# Patient Record
Sex: FEMALE | Race: BLACK OR AFRICAN AMERICAN | NOT HISPANIC OR LATINO | Employment: OTHER | URBAN - METROPOLITAN AREA
[De-identification: names, ages, dates, MRNs, and addresses within clinical notes are randomized per-mention and may not be internally consistent; named-entity substitution may affect disease eponyms.]

---

## 2023-03-09 ENCOUNTER — DOCUMENTATION (OUTPATIENT)
Dept: PRIMARY CARE | Facility: CLINIC | Age: 63
End: 2023-03-09

## 2023-03-10 ENCOUNTER — PATIENT OUTREACH (OUTPATIENT)
Dept: PRIMARY CARE | Facility: CLINIC | Age: 63
End: 2023-03-10

## 2023-03-10 NOTE — PROGRESS NOTES
Discharge Facility:[facility]  Discharge Diagnosis: C. difficile colitis  Admission Date:3/4/2023  Discharge Date:3/8/2023    PCP appt: NONE    2 attempt to reach pt and assess needs  Task sent to Clinical support staff to schedule follow up

## 2023-05-12 ENCOUNTER — TELEPHONE (OUTPATIENT)
Dept: PRIMARY CARE | Facility: CLINIC | Age: 63
End: 2023-05-12

## 2023-05-26 LAB
C. DIFFICILE TOXIN, PCR: NOT DETECTED
CAMPYLOBACTER GP: NOT DETECTED
NOROVIRUS GI/GII: NOT DETECTED
ROTAVIRUS A: NOT DETECTED
SALMONELLA SP.: NOT DETECTED
SHIGA TOXIN 1: NOT DETECTED
SHIGA TOXIN 2: NOT DETECTED
SHIGELLA SP.: NOT DETECTED
VIBRIO GRP.: NOT DETECTED
YERSINIA ENTEROCOLITICA: NOT DETECTED

## 2023-05-29 LAB — LACTOFERRIN, FECAL, QUANT.: NEGATIVE

## 2023-05-31 LAB
CALPROTECTIN, STOOL: 40 UG/G
PANCREATIC ELASTASE, FECAL: 88 UG/G

## 2023-06-01 LAB
CRYPTOSPORIDIUM ANTIGEN-DATA CONVERSION: NEGATIVE
GIARDIA LAMBLIA AG-DATA CONVERSION: NEGATIVE
OVA + PARASITE EXAM: NEGATIVE

## 2023-06-05 ENCOUNTER — TELEPHONE (OUTPATIENT)
Dept: PRIMARY CARE | Facility: CLINIC | Age: 63
End: 2023-06-05

## 2023-06-05 DIAGNOSIS — I26.99 PULMONARY EMBOLISM, UNSPECIFIED CHRONICITY, UNSPECIFIED PULMONARY EMBOLISM TYPE, UNSPECIFIED WHETHER ACUTE COR PULMONALE PRESENT (MULTI): Primary | ICD-10-CM

## 2023-06-05 RX ORDER — WARFARIN 2 MG/1
TABLET ORAL
Qty: 60 TABLET | Refills: 1 | Status: SHIPPED | OUTPATIENT
Start: 2023-06-05 | End: 2023-06-30

## 2023-06-13 ENCOUNTER — DOCUMENTATION (OUTPATIENT)
Dept: CARE COORDINATION | Facility: CLINIC | Age: 63
End: 2023-06-13
Payer: COMMERCIAL

## 2023-06-13 ENCOUNTER — PATIENT OUTREACH (OUTPATIENT)
Dept: CARE COORDINATION | Facility: CLINIC | Age: 63
End: 2023-06-13
Payer: COMMERCIAL

## 2023-06-13 NOTE — CARE PLAN
"06/13/2023   Spoke with patient today, she was referred to me from insurance clinical rounds.  See below, and  will continue to follow for needs.       Problem: Access to Care Issue  Goal: Assess and Address Access Barriers  Intervention: Confirm adherence with follow up care (labs, tests, studies) and scheduled appointments per provider/specialist instruction  Note: 06/13/2023 Spoke with patient and she states she has family friends and her exboyfriend to assist her to her appointments and get her groceries and medications.   Will continue to follow.  michel     Problem: Risk of Uncoordinated Care  Goal: Care will be Coordinated and Supported by a Multidisciplinary Team of Providers  Intervention: Identify all care team members, maintain contact information and the supporting role are recorded in patients chart and available for all members of the team  Note: Patient stated she has a \"enzyme replacement \" pill that she has to take 3 pills with every meal and it costs thousands of dollars and she can't afford it.  Referred patient to pharmacy for assistance.  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  At times she said she could use a little more money for food. Referred patient to hailee pearson     "

## 2023-06-16 ENCOUNTER — PATIENT OUTREACH (OUTPATIENT)
Dept: CARE COORDINATION | Facility: CLINIC | Age: 63
End: 2023-06-16
Payer: COMMERCIAL

## 2023-06-16 NOTE — PROGRESS NOTES
ACO PAMELA received referral to reach out to patient for assistance with food resources and chair lift. Telephone call to patient, patient stated today is not a good time for her to talk, asked ACO PAMELA to call next week.     INOCENTE Saini   III  [facility] Health/Accountable Care Organization  Office Phone: [phone]  Office hours: Monday - Friday; 8:00 am - 5:00 pm  [email or web address]

## 2023-06-19 ENCOUNTER — PATIENT OUTREACH (OUTPATIENT)
Dept: PHARMACY | Facility: HOSPITAL | Age: 63
End: 2023-06-19
Payer: COMMERCIAL

## 2023-06-19 NOTE — PROGRESS NOTES
Spoke to patient regarding copay issues with [employer]. She was quoted that her copay would be $305/month at [facility]. Upon more research, [employer] has copay assistance card that allows member to pay as little as $5/month with max of $3k/year savings. Patient was emailed link with instructions on how to sign up for [employer] copay card and instructed to bring with her to [facility] to get med affordable. Provided my contact info if she has questions or concerns.    Creon Assistance: [email or web address]      Thanks,  Haile Garcia, PharmD

## 2023-06-20 ENCOUNTER — TELEPHONE (OUTPATIENT)
Dept: PRIMARY CARE | Facility: CLINIC | Age: 63
End: 2023-06-20
Payer: COMMERCIAL

## 2023-06-22 ENCOUNTER — PATIENT OUTREACH (OUTPATIENT)
Dept: CARE COORDINATION | Facility: CLINIC | Age: 63
End: 2023-06-22
Payer: COMMERCIAL

## 2023-06-22 NOTE — CARE PLAN
BO SANTIAGO telephone call to patient per request of RN Case Manager Vivienne Solorzano. Patient seeking additional resources for food and is also exploring option of chair lift. With patient consent via email, enrolled patient in [employer] and sent referral to [employer]. BO SANTIAGO discussed with patient calling her insurance and inquiring about any potential coverage of chair lift. Patient reports she has been receiving OT and PT in the home. Patient is unsure if services have finished. BO SANTIAGO will inquire if patient is enrolled in  Home Care and determine if SW orders have been put in/can be put in for further in-home assessment. BO SANTIAGO reviewed name/contact information. BO SANTIAGO will call patient again in 1 week.

## 2023-06-27 ENCOUNTER — DOCUMENTATION (OUTPATIENT)
Dept: CARE COORDINATION | Facility: CLINIC | Age: 63
End: 2023-06-27
Payer: COMMERCIAL

## 2023-06-27 NOTE — PROGRESS NOTES
Velia Barney suggested that Yoselyn have a SW visit added to her home care.  Attempted outreach with Yoselyn to inquire who she is getting care from and there was no answer.   Called  home care and no they are not seeing her, they did see her last year      06/27/2023  I did speak with Yoselyn, she has PT OT from [facility] [phone].  I left a message with Jovita Bailon  (unsure of his name spelling) who is assigned to the green team and informed him of our involvement in her care, and at our SW recommendation suggested she have a SW added to her home care team.  Unfortunately, she only has therapies until 06/30/2023.   She is being re-evaluated today if PT OT should continue.   I left Braten my contact information.     Yoselyn said she was a bit under the weather today, did not sleep well and has a headache.  She had no questions at this time and thanked me for her help with her care.  michel

## 2023-06-27 NOTE — TELEPHONE ENCOUNTER
Pt nurse called and said she needed a referral for home care services. She had a weight gain of 10 pounds in 1 week. She did have a recent fall with no injury. Her INR was 2.2 and her coumadin is 3mg daily.

## 2023-06-28 DIAGNOSIS — I26.99 PULMONARY EMBOLISM, UNSPECIFIED CHRONICITY, UNSPECIFIED PULMONARY EMBOLISM TYPE, UNSPECIFIED WHETHER ACUTE COR PULMONALE PRESENT (MULTI): ICD-10-CM

## 2023-06-29 ENCOUNTER — DOCUMENTATION (OUTPATIENT)
Dept: CARE COORDINATION | Facility: CLINIC | Age: 63
End: 2023-06-29
Payer: COMMERCIAL

## 2023-06-29 NOTE — CARE PLAN
Problem: Financial Problem  Goal: Assess and Address Specific Financial Obstacles Affecting Patient's Adderence to Plan of Care  Intervention: Develop plan to address financial concerns  Note: ACO SW outreach to patient to follow up on [employer] Bank referral, outreach to insurance re: chair lift, and potential of SW visit through Home Care Agency. Left voice-mail requesting return call.    Reviewed [employer] note: [facility] called patient and left voicemail, sent email 6/23/23. Patient can call [phone] to speak with Fallon Kaiser for assistance with food resources.

## 2023-06-30 RX ORDER — WARFARIN 2 MG/1
TABLET ORAL
Qty: 60 TABLET | Refills: 1 | Status: SHIPPED | OUTPATIENT
Start: 2023-06-30 | End: 2023-07-19

## 2023-07-06 ENCOUNTER — PATIENT OUTREACH (OUTPATIENT)
Dept: CARE COORDINATION | Facility: CLINIC | Age: 63
End: 2023-07-06
Payer: COMMERCIAL

## 2023-07-06 NOTE — CARE PLAN
07/06/2023   Spoke with Yoselyn today and she is having difficulty managing her day to day tasks.  She feels she needs to see a thyroid doctor and also needs to see someone about her mental health.  She is aware that she has tasks she needs to accomplish for her health, but just can't get the energy to complete them.  I will outreach her pcp, she does not know when she saw him last, and also outreach pharmacy, the [facility] card that she was told to fill out-she doesn't know how.   She is going to her pharmacy today for some assistance, and hopefully re-fill her prescriptions.   She was thankful for the call, and does have my number.   Will continue to follow.  Hjm      Problem: Access to Care Issue  Goal: Assess and Address Access Barriers  Intervention: Confirm adherence with follow up care (labs, tests, studies) and scheduled appointments per provider/specialist instruction  Note: 07/06/2023   Spoke with Yoselyn today, and she states the visiting nurse is coming out to the home to check her INR, she is no longer receiving  PT OT per patient and does not know why it stopped.   We did discuss the need for her to make an appointment with Dr Kerr for a check up and to share with him all her issues and stressors.   She said she can't manage to do anything, she knows she has things to do for herself but can't get herself motivated to do any of it.   This care manager will outreach Dr. Patrick office for an appointment.  hjm     Problem: Risk of Uncoordinated Care  Goal: Care will be Coordinated and Supported by a Multidisciplinary Team of Providers  Intervention: Identify all care team members, maintain contact information and the supporting role are recorded in patients chart and available for all members of the team  Note: 07/06/2023   Patient stated she has not been taking her medications, she ran out.   She was contacted by  pharmacy and did not know how to get the [facility] discount card.   She is going with her friend today  and going to ask the pharmacist to help her.  michel

## 2023-07-08 ENCOUNTER — TELEPHONE (OUTPATIENT)
Dept: PRIMARY CARE | Facility: CLINIC | Age: 63
End: 2023-07-08
Payer: COMMERCIAL

## 2023-07-08 NOTE — TELEPHONE ENCOUNTER
Ms. Hernandez called complaining of pain in her leg.  She has a very complex medical history she is looking for pain medications which I am unable to prescribe for her.  She tells me she just got out of the hospital after 36 days and that she has at had thrombosis and perhaps pulmonary embolism.  She did not want to go to the emergency room.  I advised her that evaluation and treatment would be required in the emergency room if she felt that her symptoms could not be managed with self-care and over-the-counter medications.

## 2023-07-14 DIAGNOSIS — I26.99 PULMONARY EMBOLISM, UNSPECIFIED CHRONICITY, UNSPECIFIED PULMONARY EMBOLISM TYPE, UNSPECIFIED WHETHER ACUTE COR PULMONALE PRESENT (MULTI): ICD-10-CM

## 2023-07-19 RX ORDER — WARFARIN 2 MG/1
TABLET ORAL
Qty: 180 TABLET | Refills: 1 | Status: SHIPPED | OUTPATIENT
Start: 2023-07-19 | End: 2023-08-31 | Stop reason: WASHOUT

## 2023-07-20 ENCOUNTER — PATIENT OUTREACH (OUTPATIENT)
Dept: PHARMACY | Facility: HOSPITAL | Age: 63
End: 2023-07-20
Payer: COMMERCIAL

## 2023-07-20 ENCOUNTER — PATIENT OUTREACH (OUTPATIENT)
Dept: CARE COORDINATION | Facility: CLINIC | Age: 63
End: 2023-07-20
Payer: COMMERCIAL

## 2023-07-20 NOTE — PROGRESS NOTES
"07/20/2023 Outreached patient for case mgmt follow up.  At this time she is not currently having a visiting nurse come out to the home.  After speaking with Mae, I think a visiting nurse might be beneficial.  She could not review her medications, she stated she does not have them all and she has not called the pharmacy to determine what is missing and why.  The last conversation we had, she was going to the pharmacy with her friend to have the pharmacist help her with her [facility] oj, to get her medications discounted and filled.  She was also requesting to see a psychiatrist because she \"can't seem to do what she needs to do for her health, like making appointments and following up on tasks\"  Called Dr Patrick office and spoke with Mae () who stated she would give him the message.    Will continue to follow.  hjm  "

## 2023-07-20 NOTE — PROGRESS NOTES
Followed up with patient regarding Creon affordability. Patient mentioned she was not able to use discount card. Enrolled patient in discount card, called [facility] pharmacy in [place] to have [facility] add  card on file for Creon. Pharmacy staff verified patient's Creon is going through for $5 copay. Instructed to patient that [facility] needs Creon ordered and will arrive by Monday. Patient verbalized understanding of affordable Creon and availability of Monday.    Haile Garcia, PharmD

## 2023-07-24 ENCOUNTER — TELEPHONE (OUTPATIENT)
Dept: PRIMARY CARE | Facility: CLINIC | Age: 63
End: 2023-07-24
Payer: COMMERCIAL

## 2023-07-26 ENCOUNTER — PATIENT OUTREACH (OUTPATIENT)
Dept: CARE COORDINATION | Facility: CLINIC | Age: 63
End: 2023-07-26
Payer: COMMERCIAL

## 2023-07-31 ENCOUNTER — PATIENT OUTREACH (OUTPATIENT)
Dept: CARE COORDINATION | Facility: CLINIC | Age: 63
End: 2023-07-31
Payer: COMMERCIAL

## 2023-07-31 NOTE — CARE PLAN
ACO SW follow up outreach call to patient. Patient reports she did not call her insurance about chair lift as her physical therapist encouraged her to keep working at improving mobility. Patient reports she did not connect with the [employer] Patient declines need for services for food, utilities, rent at this time. Patient reports main need is physical therapy/occupational therapy and incontinence supplies. Patient reports her friend Edith is with her today and comes every Monday/Wednesday/Saturday. Patient reports her sister and other friends help her out as well. ACO PAMELA recommended patient and her friend call [facility] today and inquire about benefits for incontinence supplies. Patient agreed.  O PAMELA will reach out to RN Care Manager BIBIANA Solorzano to inquire about requesting PT/OT again as well as following up with appropriate provider regarding patient's chronic diarrhea. In-basket message sent to BIBIANA Solorzano.      INOCENTE Saini   III   Population Health/Accountable Care Organization  Office Phone: [phone]  [email or web address]

## 2023-08-02 ENCOUNTER — DOCUMENTATION (OUTPATIENT)
Dept: CARE COORDINATION | Facility: CLINIC | Age: 63
End: 2023-08-02
Payer: COMMERCIAL

## 2023-08-02 NOTE — PROGRESS NOTES
08/02/2023  Patient went to the ED to be assessed at the recommendation of her pcp, she had not been feeling well.  She was having weakness, and diarrhea for a few days.  Will reach out to admitting team to discuss this patient further and what some of her needs are from working with her for a few months.  Will continue to follow.  michel

## 2023-08-08 ENCOUNTER — DOCUMENTATION (OUTPATIENT)
Dept: CARE COORDINATION | Facility: CLINIC | Age: 63
End: 2023-08-08
Payer: COMMERCIAL

## 2023-08-08 NOTE — PROGRESS NOTES
ACO SW will defer outreach at this time. Patient is currently hospitalized.    INOCENTE Saini   III  [facility] Health/Accountable Care Organization  Office Phone: [phone]  [email or web address]

## 2023-08-08 NOTE — PROGRESS NOTES
08/08/2023   Opened patients medical record to assess potential discharge needs.  She is still inpatient presently.   Doc Halo the inpatient PAMELA Stephenson of the patients needs that she verbalized prior to admission.   Awaiting a response.   michel

## 2023-08-09 ENCOUNTER — PATIENT OUTREACH (OUTPATIENT)
Dept: CARE COORDINATION | Facility: CLINIC | Age: 63
End: 2023-08-09
Payer: COMMERCIAL

## 2023-08-09 ENCOUNTER — DOCUMENTATION (OUTPATIENT)
Dept: PRIMARY CARE | Facility: CLINIC | Age: 63
End: 2023-08-09
Payer: COMMERCIAL

## 2023-08-10 ENCOUNTER — PATIENT OUTREACH (OUTPATIENT)
Dept: PRIMARY CARE | Facility: CLINIC | Age: 63
End: 2023-08-10
Payer: COMMERCIAL

## 2023-08-10 ENCOUNTER — PATIENT OUTREACH (OUTPATIENT)
Dept: CARE COORDINATION | Facility: CLINIC | Age: 63
End: 2023-08-10
Payer: COMMERCIAL

## 2023-08-10 RX ORDER — MIRTAZAPINE 7.5 MG/1
7.5 TABLET, FILM COATED ORAL NIGHTLY
COMMUNITY
End: 2023-10-22 | Stop reason: HOSPADM

## 2023-08-10 RX ORDER — ONDANSETRON 8 MG/1
4 TABLET, ORALLY DISINTEGRATING ORAL DAILY PRN
COMMUNITY
End: 2023-08-31 | Stop reason: WASHOUT

## 2023-08-10 RX ORDER — CARVEDILOL 3.12 MG/1
3.12 TABLET ORAL
COMMUNITY
End: 2023-10-22 | Stop reason: HOSPADM

## 2023-08-10 RX ORDER — ESCITALOPRAM OXALATE 5 MG/1
5 TABLET ORAL DAILY
COMMUNITY
End: 2023-10-18 | Stop reason: ENTERED-IN-ERROR

## 2023-08-10 RX ORDER — DULOXETIN HYDROCHLORIDE 60 MG/1
60 CAPSULE, DELAYED RELEASE ORAL DAILY
COMMUNITY
End: 2023-10-17 | Stop reason: WASHOUT

## 2023-08-10 RX ORDER — CALCIUM CARBONATE 300MG(750)
800 TABLET,CHEWABLE ORAL DAILY
COMMUNITY
End: 2024-01-31 | Stop reason: ENTERED-IN-ERROR

## 2023-08-10 RX ORDER — SIMETHICONE 80 MG
80 TABLET,CHEWABLE ORAL EVERY 6 HOURS PRN
COMMUNITY
End: 2023-08-31 | Stop reason: WASHOUT

## 2023-08-10 RX ORDER — HYDROMORPHONE HYDROCHLORIDE 2 MG/1
2 TABLET ORAL DAILY
COMMUNITY
End: 2023-08-31 | Stop reason: WASHOUT

## 2023-08-10 RX ORDER — PREGABALIN 25 MG/1
25 CAPSULE ORAL DAILY
COMMUNITY
End: 2023-08-31 | Stop reason: WASHOUT

## 2023-08-10 RX ORDER — TIZANIDINE 4 MG/1
4 TABLET ORAL 2 TIMES DAILY PRN
COMMUNITY
End: 2023-08-31 | Stop reason: WASHOUT

## 2023-08-10 RX ORDER — GENTAMICIN SULFATE 1 MG/G
0.1 CREAM TOPICAL 2 TIMES DAILY
COMMUNITY
End: 2023-08-31 | Stop reason: WASHOUT

## 2023-08-10 RX ORDER — WARFARIN 2.5 MG/1
2.5 TABLET ORAL DAILY
COMMUNITY
End: 2023-10-18 | Stop reason: ENTERED-IN-ERROR

## 2023-08-10 RX ORDER — ATORVASTATIN CALCIUM 40 MG/1
40 TABLET, FILM COATED ORAL DAILY
COMMUNITY
End: 2023-10-18 | Stop reason: ENTERED-IN-ERROR

## 2023-08-10 RX ORDER — HYOSCYAMINE SULFATE 0.38 MG/1
0.38 TABLET, EXTENDED RELEASE ORAL EVERY 12 HOURS PRN
COMMUNITY
End: 2023-10-18 | Stop reason: ENTERED-IN-ERROR

## 2023-08-10 RX ORDER — CALCITRIOL 0.5 UG/1
0.5 CAPSULE ORAL DAILY
COMMUNITY
End: 2024-01-31 | Stop reason: ENTERED-IN-ERROR

## 2023-08-10 RX ORDER — ALLOPURINOL 100 MG/1
100 TABLET ORAL DAILY
COMMUNITY
End: 2023-10-17 | Stop reason: SDUPTHER

## 2023-08-10 RX ORDER — MIDODRINE HYDROCHLORIDE 2.5 MG/1
2.5 TABLET ORAL 3 TIMES DAILY PRN
COMMUNITY
End: 2023-10-18 | Stop reason: ENTERED-IN-ERROR

## 2023-08-10 RX ORDER — PANTOPRAZOLE SODIUM 40 MG/1
40 TABLET, DELAYED RELEASE ORAL 2 TIMES DAILY
COMMUNITY
End: 2023-10-18 | Stop reason: ENTERED-IN-ERROR

## 2023-08-10 RX ORDER — DICYCLOMINE HYDROCHLORIDE 10 MG/1
10 CAPSULE ORAL 4 TIMES DAILY
COMMUNITY
End: 2023-10-18 | Stop reason: ENTERED-IN-ERROR

## 2023-08-10 RX ORDER — LOPERAMIDE HYDROCHLORIDE 2 MG/1
2 CAPSULE ORAL 4 TIMES DAILY PRN
COMMUNITY
End: 2023-10-18 | Stop reason: ENTERED-IN-ERROR

## 2023-08-10 RX ORDER — ASPIRIN 81 MG/1
81 TABLET ORAL DAILY
COMMUNITY
End: 2023-10-18 | Stop reason: ENTERED-IN-ERROR

## 2023-08-10 RX ORDER — LEVOTHYROXINE SODIUM 25 UG/1
25 TABLET ORAL
COMMUNITY
End: 2023-08-17 | Stop reason: SDUPTHER

## 2023-08-10 RX ORDER — POTASSIUM CHLORIDE 20 MEQ/1
20 TABLET, EXTENDED RELEASE ORAL DAILY
COMMUNITY
End: 2023-08-31 | Stop reason: WASHOUT

## 2023-08-10 NOTE — PROGRESS NOTES
08/10/2023 Called Yoselyn to check in after discharge, she was on the other line with a family member and asked if I could call back later. michel

## 2023-08-10 NOTE — PROGRESS NOTES
08/10/2023 Attempted outreach for the second time today unable to leave a ,  is full. Reached out to pharmacy to determine if she received all of her medications.   I was informed that she did receive her medication from [facility] on 08/08/2023 when she discharged.   Will enroll in [employer] today michel

## 2023-08-10 NOTE — PROGRESS NOTES
Discharge Facility:  [facility]  Discharge Diagnosis:  acute/chronic pancreatitis  Admission Date:  8/1/23  Discharge Date:   8/8/23    PCP Appointment Date:  8/17/23     Specialist Appointment Date:   Physician/Dept/Service:   Primary: Lisy Shepard MD, PhD          Scheduled Date/Time:   24-Aug-2023 09:00          Location:   [address] [place] [address] [place] [address]          Phone Number:   [phone]    Physician/Dept/Service:   [facility]           Date/Time next PT/INR due:   11-Aug-2023 08:52    Hospital Encounter and Summary: Linked   See discharge assessment below for further details     Engagement  Call Start Time: 0823 (8/10/2023  8:28 AM)    Medications  Medications reviewed with patient/caregiver?: Yes (8/10/2023  8:28 AM)  Is the patient having any side effects they believe may be caused by any medication additions or changes?: No (8/10/2023  8:28 AM)  Does the patient have all medications ordered at discharge?: Yes (8/10/2023  8:28 AM)  Is the patient taking all medications as directed (includes completed medication regime)?: Yes (8/10/2023  8:28 AM)  Medication Comments: reviewed new, changed, and discontinued meds. (8/10/2023  8:28 AM)    Appointments  Does the patient have a primary care provider?: Yes (8/10/2023  8:28 AM)  Care Management Interventions: Verified appointment date/time/provider (8/10/2023  8:28 AM)  Has the patient kept scheduled appointments due by today?: Yes (8/10/2023  8:28 AM)  Care Management Interventions: Advised patient to keep appointment (8/10/2023  8:28 AM)    Self Management  [facility] (8/10/2023  8:28 AM)  Has home health visited the patient within 72 hours of discharge?: Yes (8/10/2023  8:28 AM)    Patient Teaching  Does the patient have access to their discharge instructions?: Yes (8/10/2023  8:28 AM)  What is the patient's perception of their health status since discharge?: Same (8/10/2023  8:28 AM)  Is the patient/caregiver able  to teach back the hierarchy of who to call/visit for symptoms/problems? PCP, Specialist, Home Health nurse, Urgent Care, ED, 911: Yes (8/10/2023  8:28 AM)  Patient/Caregiver Education Comments: continues with diarrhea 3-4 times a day.  reviewed nutrition and hydration. (8/10/2023  8:28 AM)

## 2023-08-11 ENCOUNTER — DOCUMENTATION (OUTPATIENT)
Dept: CARE COORDINATION | Facility: CLINIC | Age: 63
End: 2023-08-11
Payer: COMMERCIAL

## 2023-08-11 ENCOUNTER — PATIENT OUTREACH (OUTPATIENT)
Dept: CARE COORDINATION | Facility: CLINIC | Age: 63
End: 2023-08-11
Payer: COMMERCIAL

## 2023-08-11 NOTE — PROGRESS NOTES
08/11/2023 Attempted outreach to assess how she is doing at home, if there are any needs at this time or anything I can assist her with today. Mailbox is full michel

## 2023-08-14 ENCOUNTER — PATIENT OUTREACH (OUTPATIENT)
Dept: CARE COORDINATION | Facility: CLINIC | Age: 63
End: 2023-08-14
Payer: COMMERCIAL

## 2023-08-14 NOTE — PROGRESS NOTES
ACO SW telephone call to patient. Patient reports she is currently at an appointment and requested call 8/15/23.    INOCENTE Saini   III  [facility]/Accountable Care Organization  Office Phone: [phone]  [email or web address]

## 2023-08-15 ENCOUNTER — PATIENT OUTREACH (OUTPATIENT)
Dept: CARE COORDINATION | Facility: CLINIC | Age: 63
End: 2023-08-15
Payer: COMMERCIAL

## 2023-08-15 NOTE — PROGRESS NOTES
08/15/2023   Received a request from ACLISA Barney that Yoselyn was not receiving home care, and that she was having issues with her phone.   I attempted to call her, and left  a vm advising her to call home care to get reinstated.  I will also reach out to the [facility] practice nurse and request her to please request the doctor to re-enter the home care orders with a PAMELA referral added.  michel

## 2023-08-15 NOTE — CARE PLAN
"BO SANTIAGO outreach to patient. Patient reports she does not currently have home care in place. Patient reports her phone has not been working so perhaps she missed call. Patient shared that she has fallen since hospital discharge. Patient reports her tailbone is sore. Patient states her back \"hurt before she fell and it doesn't hurt any worse now.\" BO SANTIAGO shared the RN Care Manager has been trying to reach patient. BO SANTIAGO will reach out to RN Care Manager to request assistance with homecare orders.     BO SANTIAGO spoke with BIBIANA Solorzano, attempted to call patient again with BIBIANA Solorzano, went to [place]. BIBIANA Solorzano will message patient's PCP regarding home care orders. Patient is scheduled to see PCP 8/17/23.     INOCENTE Saini   III  [facility] Health/Accountable Care Organization  Office Phone: [phone]  [email or web address]           "

## 2023-08-17 ENCOUNTER — PATIENT OUTREACH (OUTPATIENT)
Dept: CARE COORDINATION | Facility: CLINIC | Age: 63
End: 2023-08-17

## 2023-08-17 ENCOUNTER — OFFICE VISIT (OUTPATIENT)
Dept: PRIMARY CARE | Facility: CLINIC | Age: 63
End: 2023-08-17
Payer: COMMERCIAL

## 2023-08-17 VITALS
DIASTOLIC BLOOD PRESSURE: 68 MMHG | SYSTOLIC BLOOD PRESSURE: 110 MMHG | HEART RATE: 72 BPM | BODY MASS INDEX: 18.95 KG/M2 | WEIGHT: 121 LBS

## 2023-08-17 DIAGNOSIS — I42.9 CARDIOMYOPATHY, UNSPECIFIED TYPE (MULTI): ICD-10-CM

## 2023-08-17 DIAGNOSIS — F41.8 DEPRESSION WITH ANXIETY: ICD-10-CM

## 2023-08-17 DIAGNOSIS — J45.20 MILD INTERMITTENT ASTHMA, UNSPECIFIED WHETHER COMPLICATED (HHS-HCC): ICD-10-CM

## 2023-08-17 DIAGNOSIS — J44.9 CHRONIC OBSTRUCTIVE PULMONARY DISEASE, UNSPECIFIED COPD TYPE (MULTI): Primary | ICD-10-CM

## 2023-08-17 DIAGNOSIS — Z99.2 PERITONEAL DIALYSIS STATUS (CMS-HCC): ICD-10-CM

## 2023-08-17 DIAGNOSIS — N18.6 ESRD (END STAGE RENAL DISEASE) ON DIALYSIS (MULTI): ICD-10-CM

## 2023-08-17 DIAGNOSIS — E03.9 HYPOTHYROIDISM, UNSPECIFIED TYPE: ICD-10-CM

## 2023-08-17 DIAGNOSIS — Z99.2 ESRD (END STAGE RENAL DISEASE) ON DIALYSIS (MULTI): ICD-10-CM

## 2023-08-17 DIAGNOSIS — E78.2 MIXED HYPERLIPIDEMIA: ICD-10-CM

## 2023-08-17 DIAGNOSIS — I25.10 CORONARY ARTERY CALCIFICATION SEEN ON CT SCAN: ICD-10-CM

## 2023-08-17 DIAGNOSIS — I42.9 CARDIOMYOPATHY, UNSPECIFIED TYPE (MULTI): Primary | ICD-10-CM

## 2023-08-17 DIAGNOSIS — I71.00 DISSECTION OF AORTA, UNSPECIFIED PORTION OF AORTA (MULTI): ICD-10-CM

## 2023-08-17 PROBLEM — I71.60: Status: ACTIVE | Noted: 2023-08-17

## 2023-08-17 PROBLEM — E78.5 HYPERLIPEMIA: Status: ACTIVE | Noted: 2023-08-17

## 2023-08-17 PROBLEM — R19.7 DIARRHEA: Status: ACTIVE | Noted: 2023-08-17

## 2023-08-17 PROBLEM — J45.909 ASTHMA (HHS-HCC): Status: ACTIVE | Noted: 2023-08-17

## 2023-08-17 PROCEDURE — 1036F TOBACCO NON-USER: CPT | Performed by: INTERNAL MEDICINE

## 2023-08-17 PROCEDURE — 99495 TRANSJ CARE MGMT MOD F2F 14D: CPT | Performed by: INTERNAL MEDICINE

## 2023-08-17 RX ORDER — PANCRELIPASE 24000; 76000; 120000 [USP'U]/1; [USP'U]/1; [USP'U]/1
3 CAPSULE, DELAYED RELEASE PELLETS ORAL
COMMUNITY
Start: 2023-07-21 | End: 2023-10-17 | Stop reason: SDUPTHER

## 2023-08-17 RX ORDER — HYDROXYZINE HYDROCHLORIDE 25 MG/1
25 TABLET, FILM COATED ORAL EVERY 6 HOURS PRN
COMMUNITY
Start: 2022-11-04 | End: 2023-08-31 | Stop reason: WASHOUT

## 2023-08-17 RX ORDER — LEVOTHYROXINE SODIUM 25 UG/1
25 TABLET ORAL
Qty: 90 TABLET | Refills: 0 | Status: SHIPPED | OUTPATIENT
Start: 2023-08-17 | End: 2023-08-31 | Stop reason: SDUPTHER

## 2023-08-17 RX ORDER — SEVELAMER HYDROCHLORIDE 800 MG/1
800 TABLET, FILM COATED ORAL
COMMUNITY
End: 2024-02-07 | Stop reason: HOSPADM

## 2023-08-17 ASSESSMENT — ENCOUNTER SYMPTOMS
FACIAL ASYMMETRY: 0
ADENOPATHY: 0
TROUBLE SWALLOWING: 0
SINUS PRESSURE: 0
NECK PAIN: 0
PHOTOPHOBIA: 0
APPETITE CHANGE: 0
SINUS PAIN: 0
EYE REDNESS: 0
FREQUENCY: 0
SPEECH DIFFICULTY: 0
VOICE CHANGE: 0
PALPITATIONS: 0
EYE PAIN: 0
JOINT SWELLING: 0
POLYDIPSIA: 0
FATIGUE: 0
STRIDOR: 0
DIARRHEA: 1
COLOR CHANGE: 0
CHOKING: 0
HEMATURIA: 0
CONSTIPATION: 0
BLOOD IN STOOL: 0
DIZZINESS: 0
ABDOMINAL PAIN: 1
FACIAL SWELLING: 0
NECK STIFFNESS: 0
LIGHT-HEADEDNESS: 0
ANAL BLEEDING: 0
BRUISES/BLEEDS EASILY: 0
RECTAL PAIN: 0
EYE ITCHING: 0
TREMORS: 0
EYE DISCHARGE: 0
FLANK PAIN: 0
WHEEZING: 0
DIAPHORESIS: 0
COUGH: 0
VOMITING: 1
DIFFICULTY URINATING: 0
ACTIVITY CHANGE: 0
RHINORRHEA: 0
SHORTNESS OF BREATH: 0
CHILLS: 0
WEAKNESS: 0
ABDOMINAL DISTENTION: 0
SLEEP DISTURBANCE: 0
ARTHRALGIAS: 0
SORE THROAT: 0
BACK PAIN: 1
MYALGIAS: 1
DYSURIA: 0
HEADACHES: 0
CHEST TIGHTNESS: 0
POLYPHAGIA: 0
NAUSEA: 1
WOUND: 0
NUMBNESS: 0
SEIZURES: 0
UNEXPECTED WEIGHT CHANGE: 1

## 2023-08-17 NOTE — PROGRESS NOTES
"Patient: Yoselyn Hernandez  : 1960  PCP: Gregg Kerr MD  MRN: [id number]  Program: No linked episodes     Yoselyn Hernandez is a 63 y.o. female presenting today for follow-up after being discharged from the hospital 9 days ago. The main problem requiring admission was syncope The discharge summary and/or Transitional Care Management documentation was reviewed. Medication reconciliation was performed as indicated via the \"[place]andon as Reviewed\" timestamp.     Yoselyn Hernandez was contacted by Transitional Care Management services two days after her discharge. This encounter and supporting documentation was reviewed.    The complexity of medical decision making for this patient's transitional care is moderate.  Patient presents for posthospitalization visit.  She was hospitalized at [facility] with syncope and possible CHF.  Her work-up was negative.  She had also been complaining of severe back pain.  She had an MRI which revealed lumbar stenosis.  Continues to complain of significant back pain.  She reports that her diarrhea symptoms are at baseline.  She has had some nausea and occasional vomiting.  She reports occasional headaches, no dizziness, no chest pain but reports baseline dyspnea on exertion she reports baseline abdominal pain and diarrhea.  Her depression symptoms have been stable  Physical Exam  Constitutional:       Appearance: Normal appearance.   Cardiovascular:      Rate and Rhythm: Normal rate and regular rhythm.      Heart sounds: No murmur heard.     No gallop.   Pulmonary:      Effort: No respiratory distress.      Breath sounds: No wheezing or rales.   Abdominal:      General: There is no distension.      Palpations: There is no mass.      Tenderness: There is no abdominal tenderness. There is no guarding.   Musculoskeletal:      Right lower leg: No edema.      Left lower leg: No edema.   Neurological:      Mental Status: She is alert.         Assessment/Plan       Review of Systems "   Constitutional:  Positive for unexpected weight change. Negative for activity change, appetite change, chills, diaphoresis and fatigue.   HENT:  Negative for congestion, dental problem, drooling, ear discharge, ear pain, facial swelling, hearing loss, mouth sores, nosebleeds, postnasal drip, rhinorrhea, sinus pressure, sinus pain, sneezing, sore throat, tinnitus, trouble swallowing and voice change.    Eyes:  Negative for photophobia, pain, discharge, redness, itching and visual disturbance.   Respiratory:  Negative for cough, choking, chest tightness, shortness of breath, wheezing and stridor.    Cardiovascular:  Negative for chest pain, palpitations and leg swelling.   Gastrointestinal:  Positive for abdominal pain, diarrhea, nausea and vomiting. Negative for abdominal distention, anal bleeding, blood in stool, constipation and rectal pain.   Endocrine: Negative for cold intolerance, heat intolerance, polydipsia, polyphagia and polyuria.   Genitourinary:  Negative for decreased urine volume, difficulty urinating, dysuria, enuresis, flank pain, frequency, genital sores, hematuria and urgency.   Musculoskeletal:  Positive for back pain and myalgias. Negative for arthralgias, gait problem, joint swelling, neck pain and neck stiffness.   Skin:  Negative for color change, pallor, rash and wound.   Neurological:  Negative for dizziness, tremors, seizures, syncope, facial asymmetry, speech difficulty, weakness, light-headedness, numbness and headaches.   Hematological:  Negative for adenopathy. Does not bruise/bleed easily.   Psychiatric/Behavioral:  Negative for sleep disturbance.        No family history on file.    Engagement  Call Start Time: 0823 (8/10/2023  8:28 AM)    Medications  Medications reviewed with patient/caregiver?: Yes (8/10/2023  8:28 AM)  Is the patient having any side effects they believe may be caused by any medication additions or changes?: No (8/10/2023  8:28 AM)  Does the patient have all  medications ordered at discharge?: Yes (8/10/2023  8:28 AM)  Is the patient taking all medications as directed (includes completed medication regime)?: Yes (8/10/2023  8:28 AM)  Medication Comments: reviewed new, changed, and discontinued meds. (8/10/2023  8:28 AM)    Appointments  Does the patient have a primary care provider?: Yes (8/10/2023  8:28 AM)  Care Management Interventions: Verified appointment date/time/provider (8/10/2023  8:28 AM)  Has the patient kept scheduled appointments due by today?: Yes (8/10/2023  8:28 AM)  Care Management Interventions: Advised patient to keep appointment (8/10/2023  8:28 AM)    Self Management  What is the home health agency?: [facility] (8/10/2023  8:28 AM)  Has home health visited the patient within 72 hours of discharge?: Yes (8/10/2023  8:28 AM)    Patient Teaching  Does the patient have access to their discharge instructions?: Yes (8/10/2023  8:28 AM)  What is the patient's perception of their health status since discharge?: Same (8/10/2023  8:28 AM)  Is the patient/caregiver able to teach back the hierarchy of who to call/visit for symptoms/problems? PCP, Specialist, Home Health nurse, Urgent Care, ED, 911: Yes (8/10/2023  8:28 AM)  Patient/Caregiver Education Comments: continues with diarrhea 3-4 times a day.  reviewed nutrition and hydration. (8/10/2023  8:28 AM)    Hypertension-low-salt diet and exercise.  Hyperlipidemia-we will continue with statin.  Chronic diarrhea-she will follow-up with GI.  Aortic aneurysm-she will follow-up with cardiology.  Nceamvmdxmggse-umsdmg-tn with cardiology.  Chronic renal failure-she will continue with peritoneal dialysis.  Follow-up with renal.  Depression/anxiety-she will schedule follow-up appointment with psychiatry.  Back pain-she will follow-up with pain management.  Hypothyroidism-we will restart Synthroid.  DVT-we will continue with anticoagulation.  She will take 5 mg of warfarin tonight and then continue with 2.5  daily.  INR will be monitored at home          No follow-ups on file.

## 2023-08-17 NOTE — PROGRESS NOTES
"08/17/2023 Called Yoselyn to see how her appointment went with Dr Kerr. She was 15 minutes late, so she was rescheduled for 2:30 this afternoon.   I reminded her to please talk with Dr Kerr about her wanting to see a  specialist, and also her home care needs and review her medications.   She said she would.  I informed her that I would call her tomorrow to see if she had any other questions and she said \"ok\".  hjm   "

## 2023-08-17 NOTE — PROGRESS NOTES
Subjective   Patient ID: Yoselyn Hernandez is a 63 y.o. female who presents for No chief complaint on file..  HPI    Review of Systems    Objective   Physical Exam    Assessment/Plan

## 2023-08-18 ENCOUNTER — PATIENT OUTREACH (OUTPATIENT)
Dept: PRIMARY CARE | Facility: CLINIC | Age: 63
End: 2023-08-18
Payer: COMMERCIAL

## 2023-08-18 NOTE — PROGRESS NOTES
Unable to reach patient for call back after patient's follow up appointment with PCP.     If no voicemail available call attempts x 2 were made to contact the patient to assist with any questions or concerns patient may have.

## 2023-08-18 NOTE — PATIENT INSTRUCTIONS
Please take medication as prescribed.  Follow-up in 1 month.  You will be notified about home team evaluation.

## 2023-08-23 ENCOUNTER — APPOINTMENT (OUTPATIENT)
Dept: PHARMACY | Facility: HOSPITAL | Age: 63
End: 2023-08-23
Payer: COMMERCIAL

## 2023-08-25 ENCOUNTER — PATIENT OUTREACH (OUTPATIENT)
Dept: CARE COORDINATION | Facility: CLINIC | Age: 63
End: 2023-08-25
Payer: COMMERCIAL

## 2023-08-25 NOTE — CARE PLAN
LISA SANTIAGO telephone call with patient to inquire if home care services have started. Patient reports RN visited her this week to check her medications (patient believes home care is through , but not sure), patient is unsure about next scheduled appointments. Patient reports friend is visiting her at this time.     BO SANTIAGO spoke with BIBIANA Jensen, discharge notes indicate patient is receiving home care through [facility] [phone]. LISA SANTIAGO telephone call to [facility], left voicemail requesting return call.     INOCENTE Saini   III   Population Health/Accountable Care Organization  Office Phone: [phone]  [email or web address]

## 2023-08-28 ENCOUNTER — DOCUMENTATION (OUTPATIENT)
Dept: CARE COORDINATION | Facility: CLINIC | Age: 63
End: 2023-08-28
Payer: COMMERCIAL

## 2023-08-28 NOTE — CARE PLAN
BO SANTIAGO received message from [facility] ([facility] [phone]). Referred to [facility] [phone] (another provider under [facility]). BO SANTIAGO call to [facility] and was referred back to Savage at [facility]. Savage confirmed patient will receive services with [facility]: RN visit scheduled for 8/29/23 and PT/OT later this week. BO SANTIAGO requested SW services as well. Savage stated SW orders will be added.

## 2023-08-30 ENCOUNTER — APPOINTMENT (OUTPATIENT)
Dept: PHARMACY | Facility: HOSPITAL | Age: 63
End: 2023-08-30
Payer: COMMERCIAL

## 2023-08-30 ENCOUNTER — TELEPHONE (OUTPATIENT)
Dept: PHARMACY | Facility: HOSPITAL | Age: 63
End: 2023-08-30

## 2023-08-30 NOTE — PROGRESS NOTES
CHF Assessment    Symptom/Staging:  -Most recent ejection fraction: ***  -NYHA Stage: ***  -ABCD Stage: ***  -Weight changes?: {YES-DESCRIBE/NO:39357}    Guideline-Directed Medical Therapy:  -ARNI: {YES-DESCRIBE/NO:25117}   -If no, then ACEi/ARB?: {YES-DESCRIBE/NO:25117}  -Beta Blocker: {YES-DESCRIBE/NO:25117}  -MRA: {YES-DESCRIBE/NO:25117}  -SGLT2i: {YES-DESCRIBE/NO:47492}    Secondary Prevention:  -The ASCVD Risk score (Steve VILLALOBOS, et al., 2019) failed to calculate for the following reasons:    The valid total cholesterol range is 130 to 320 mg/dL   -Aspirin 81mg? {yes/no:17393}  -Statin?: {YES-DESCRIBE/NO:98957}  -HTN?: {YES-DESCRIBE/NO:18367}

## 2023-08-30 NOTE — TELEPHONE ENCOUNTER
I called patient for her appointment to discuss her heart failure, but she stated that she was not feeling well and wanted to reschedule. Patient reported having persistent diarrhea and has someone helping her at the moment. I advised patient if symptoms worsen, go to urgent care or contact PCP. Rescheduled appointment for next week 9/6/2023.    Rosita hWitten, PGY-1 Resident

## 2023-08-31 ENCOUNTER — OFFICE VISIT (OUTPATIENT)
Dept: PRIMARY CARE | Facility: CLINIC | Age: 63
End: 2023-08-31
Payer: COMMERCIAL

## 2023-08-31 ENCOUNTER — PATIENT OUTREACH (OUTPATIENT)
Dept: CARE COORDINATION | Facility: CLINIC | Age: 63
End: 2023-08-31

## 2023-08-31 VITALS
DIASTOLIC BLOOD PRESSURE: 74 MMHG | BODY MASS INDEX: 18.01 KG/M2 | WEIGHT: 115 LBS | HEART RATE: 76 BPM | SYSTOLIC BLOOD PRESSURE: 120 MMHG

## 2023-08-31 DIAGNOSIS — R53.81 MALAISE: ICD-10-CM

## 2023-08-31 DIAGNOSIS — N18.6 ESRD (END STAGE RENAL DISEASE) ON DIALYSIS (MULTI): ICD-10-CM

## 2023-08-31 DIAGNOSIS — F41.8 DEPRESSION WITH ANXIETY: ICD-10-CM

## 2023-08-31 DIAGNOSIS — Z12.31 SCREENING MAMMOGRAM FOR BREAST CANCER: ICD-10-CM

## 2023-08-31 DIAGNOSIS — I26.99 PULMONARY EMBOLISM, UNSPECIFIED CHRONICITY, UNSPECIFIED PULMONARY EMBOLISM TYPE, UNSPECIFIED WHETHER ACUTE COR PULMONALE PRESENT (MULTI): ICD-10-CM

## 2023-08-31 DIAGNOSIS — R42 MAL DE DEBARQUEMENT: ICD-10-CM

## 2023-08-31 DIAGNOSIS — Z99.2 ESRD (END STAGE RENAL DISEASE) ON DIALYSIS (MULTI): ICD-10-CM

## 2023-08-31 DIAGNOSIS — I42.9 CARDIOMYOPATHY, UNSPECIFIED TYPE (MULTI): ICD-10-CM

## 2023-08-31 DIAGNOSIS — I10 HYPERTENSION, UNSPECIFIED TYPE: ICD-10-CM

## 2023-08-31 DIAGNOSIS — E78.2 MIXED HYPERLIPIDEMIA: ICD-10-CM

## 2023-08-31 DIAGNOSIS — E03.9 HYPOTHYROIDISM, UNSPECIFIED TYPE: ICD-10-CM

## 2023-08-31 DIAGNOSIS — R19.7 DIARRHEA, UNSPECIFIED TYPE: ICD-10-CM

## 2023-08-31 DIAGNOSIS — Z99.2 PERITONEAL DIALYSIS STATUS (CMS-HCC): ICD-10-CM

## 2023-08-31 DIAGNOSIS — I71.60 THORACOABDOMINAL AORTIC ANEURYSM (TAAA) WITHOUT RUPTURE, UNSPECIFIED PART (CMS-HCC): Primary | ICD-10-CM

## 2023-08-31 PROBLEM — I82.409 DVT (DEEP VENOUS THROMBOSIS) (MULTI): Status: ACTIVE | Noted: 2023-08-31

## 2023-08-31 PROCEDURE — 3074F SYST BP LT 130 MM HG: CPT | Performed by: INTERNAL MEDICINE

## 2023-08-31 PROCEDURE — 3078F DIAST BP <80 MM HG: CPT | Performed by: INTERNAL MEDICINE

## 2023-08-31 PROCEDURE — 1036F TOBACCO NON-USER: CPT | Performed by: INTERNAL MEDICINE

## 2023-08-31 PROCEDURE — 99214 OFFICE O/P EST MOD 30 MIN: CPT | Performed by: INTERNAL MEDICINE

## 2023-08-31 PROCEDURE — 3008F BODY MASS INDEX DOCD: CPT | Performed by: INTERNAL MEDICINE

## 2023-08-31 RX ORDER — LEVOTHYROXINE SODIUM 25 UG/1
25 TABLET ORAL
Qty: 90 TABLET | Refills: 0 | Status: SHIPPED | OUTPATIENT
Start: 2023-08-31 | End: 2023-10-17 | Stop reason: SDUPTHER

## 2023-08-31 ASSESSMENT — ENCOUNTER SYMPTOMS
SINUS PAIN: 0
CHILLS: 0
BRUISES/BLEEDS EASILY: 0
EYE DISCHARGE: 0
VOMITING: 0
COLOR CHANGE: 0
RECTAL PAIN: 0
HEADACHES: 0
DYSURIA: 0
WOUND: 0
ANAL BLEEDING: 0
PALPITATIONS: 0
SEIZURES: 0
JOINT SWELLING: 0
TROUBLE SWALLOWING: 0
FREQUENCY: 0
LIGHT-HEADEDNESS: 0
ABDOMINAL DISTENTION: 0
FLANK PAIN: 0
TREMORS: 0
WHEEZING: 0
SLEEP DISTURBANCE: 0
STRIDOR: 0
NAUSEA: 0
SINUS PRESSURE: 0
BLOOD IN STOOL: 0
NUMBNESS: 0
SPEECH DIFFICULTY: 0
ADENOPATHY: 0
FACIAL ASYMMETRY: 0
FATIGUE: 1
SHORTNESS OF BREATH: 0
POLYPHAGIA: 0
CONSTIPATION: 0
EYE REDNESS: 0
VOICE CHANGE: 0
CHOKING: 0
MYALGIAS: 0
DIARRHEA: 1
RHINORRHEA: 0
EYE PAIN: 0
ACTIVITY CHANGE: 0
COUGH: 0
SORE THROAT: 0
PHOTOPHOBIA: 0
BACK PAIN: 0
DIFFICULTY URINATING: 0
EYE ITCHING: 0
APPETITE CHANGE: 0
NECK PAIN: 0
DIZZINESS: 0
FACIAL SWELLING: 0
CHEST TIGHTNESS: 0
DIAPHORESIS: 0
WEAKNESS: 0
ABDOMINAL PAIN: 0
POLYDIPSIA: 0
HEMATURIA: 0
NECK STIFFNESS: 0
ARTHRALGIAS: 0

## 2023-08-31 NOTE — PROGRESS NOTES
Subjective   Patient ID: Yoselyn Hernandez is a 63 y.o. female who presents for Diarrhea.    Patient presents for follow-up.  She  was complaining of significant diarrhea until today when she started taking loperamide.  Her diarrhea has resolved today.  She is frustrated about her overall health.  She has had occasional dizziness.  She denies any headaches, no chest pain or shortness of breath, no abdominal pain no nausea vomiting but now is constipated.  She reports baseline arthritis symptoms.    Diarrhea   Pertinent negatives include no abdominal pain, arthralgias, chills, coughing, headaches, myalgias or vomiting.        Review of Systems   Constitutional:  Positive for fatigue. Negative for activity change, appetite change, chills and diaphoresis.   HENT:  Negative for congestion, dental problem, drooling, ear discharge, ear pain, facial swelling, hearing loss, mouth sores, nosebleeds, postnasal drip, rhinorrhea, sinus pressure, sinus pain, sneezing, sore throat, tinnitus, trouble swallowing and voice change.    Eyes:  Negative for photophobia, pain, discharge, redness, itching and visual disturbance.   Respiratory:  Negative for cough, choking, chest tightness, shortness of breath, wheezing and stridor.    Cardiovascular:  Negative for chest pain, palpitations and leg swelling.   Gastrointestinal:  Positive for diarrhea. Negative for abdominal distention, abdominal pain, anal bleeding, blood in stool, constipation, nausea, rectal pain and vomiting.   Endocrine: Negative for cold intolerance, heat intolerance, polydipsia, polyphagia and polyuria.   Genitourinary:  Negative for decreased urine volume, difficulty urinating, dysuria, enuresis, flank pain, frequency, genital sores, hematuria and urgency.   Musculoskeletal:  Negative for arthralgias, back pain, gait problem, joint swelling, myalgias, neck pain and neck stiffness.   Skin:  Negative for color change, pallor, rash and wound.   Neurological:  Negative for  dizziness, tremors, seizures, syncope, facial asymmetry, speech difficulty, weakness, light-headedness, numbness and headaches.   Hematological:  Negative for adenopathy. Does not bruise/bleed easily.   Psychiatric/Behavioral:  Negative for sleep disturbance.        Objective   Wt 52.2 kg (115 lb)   BMI 18.01 kg/m²     Physical Exam  Constitutional:       Appearance: Normal appearance.   Cardiovascular:      Rate and Rhythm: Normal rate and regular rhythm.      Heart sounds: No murmur heard.     No gallop.   Pulmonary:      Effort: No respiratory distress.      Breath sounds: No wheezing or rales.   Abdominal:      General: There is no distension.      Palpations: There is no mass.      Tenderness: There is no abdominal tenderness. There is no guarding.   Musculoskeletal:      Right lower leg: Edema (Trace edema) present.      Left lower leg: Edema (Trace edema) present.   Neurological:      Mental Status: She is alert.         Assessment/Plan   Diagnoses and all orders for this visit:  Thoracoabdominal aortic aneurysm (TAAA) without rupture, unspecified part (CMS/[facility])-follow-up with cardiology  Hypothyroidism, unspecified type-take medication as prescribed  -     levothyroxine (Synthroid, Levoxyl) 25 mcg tablet; Take 1 tablet (25 mcg) by mouth once daily in the morning. Take before meals.  Mixed hyperlipidemia-we will continue with statin  Cardiomyopathy, unspecified type (CMS/HCC)-follow-up with cardiology  Diarrhea, unspecified type-she will take medication as prescribed.  Schedule appoint with GI  ESRD (end stage renal disease) on dialysis (CMS/HCC)-she will schedule appointment with nephrology  Peritoneal dialysis status (CMS/HCC)-we will continue with present management.  Depression with anxiety-take Lexapro as prescribed.  History of DVT-we will continue with anticoagulation.  Check INR-take 7.5 mg of warfarin tonight.  Repeat and return to the 2.5 mg daily.  Recheck on Monday.  Health maintenance-schedule  mammogram.  GYN appointment for Pap.

## 2023-08-31 NOTE — PATIENT INSTRUCTIONS
Please schedule appoint with GI and nephrology.  Schedule appoint with cardiology.  Follow-up in 1 month.  Take medication as prescribed

## 2023-09-08 ENCOUNTER — TELEPHONE (OUTPATIENT)
Dept: PRIMARY CARE | Facility: CLINIC | Age: 63
End: 2023-09-08

## 2023-09-08 NOTE — TELEPHONE ENCOUNTER
Fatou called to report patient INR result 1.7. She is currently taking 2mg daily. Call back with updated orders if there is a dosage change and when to check INR again.

## 2023-09-11 ENCOUNTER — PATIENT OUTREACH (OUTPATIENT)
Dept: CARE COORDINATION | Facility: CLINIC | Age: 63
End: 2023-09-11
Payer: COMMERCIAL

## 2023-09-11 NOTE — CARE PLAN
[facility] SW telephone call to patient, no answer, voicemail full.  [facility] SW telephone call to [facility]. RN visited patient 9/8/23. Patient is scheduled for PT/OT. SHELIA Isabel from [facility] will reach out to [facility] regarding coverage of SW services.     INOCENTE Saini   III  [facility]/Accountable Care Organization  Office Phone: [phone]  [email or web address]

## 2023-09-13 ENCOUNTER — TELEPHONE (OUTPATIENT)
Dept: PRIMARY CARE | Facility: CLINIC | Age: 63
End: 2023-09-13

## 2023-09-18 ENCOUNTER — APPOINTMENT (OUTPATIENT)
Dept: PRIMARY CARE | Facility: CLINIC | Age: 63
End: 2023-09-18
Payer: COMMERCIAL

## 2023-10-17 ENCOUNTER — OFFICE VISIT (OUTPATIENT)
Dept: PRIMARY CARE | Facility: CLINIC | Age: 63
End: 2023-10-17
Payer: COMMERCIAL

## 2023-10-17 VITALS — WEIGHT: 130.2 LBS | BODY MASS INDEX: 21.03 KG/M2

## 2023-10-17 DIAGNOSIS — Z99.2 ESRD (END STAGE RENAL DISEASE) ON DIALYSIS (MULTI): ICD-10-CM

## 2023-10-17 DIAGNOSIS — M10.9 GOUT, UNSPECIFIED CAUSE, UNSPECIFIED CHRONICITY, UNSPECIFIED SITE: ICD-10-CM

## 2023-10-17 DIAGNOSIS — R19.7 DIARRHEA, UNSPECIFIED TYPE: ICD-10-CM

## 2023-10-17 DIAGNOSIS — N25.81 SECONDARY HYPERPARATHYROIDISM OF RENAL ORIGIN (MULTI): ICD-10-CM

## 2023-10-17 DIAGNOSIS — E78.2 MIXED HYPERLIPIDEMIA: ICD-10-CM

## 2023-10-17 DIAGNOSIS — J44.9 CHRONIC OBSTRUCTIVE PULMONARY DISEASE, UNSPECIFIED COPD TYPE (MULTI): ICD-10-CM

## 2023-10-17 DIAGNOSIS — I42.9 CARDIOMYOPATHY, UNSPECIFIED TYPE (MULTI): ICD-10-CM

## 2023-10-17 DIAGNOSIS — D68.8 OTHER SPECIFIED COAGULATION DEFECTS (MULTI): Primary | ICD-10-CM

## 2023-10-17 DIAGNOSIS — I71.00 DISSECTION OF AORTA, UNSPECIFIED PORTION OF AORTA (MULTI): ICD-10-CM

## 2023-10-17 DIAGNOSIS — E03.9 HYPOTHYROIDISM, UNSPECIFIED TYPE: ICD-10-CM

## 2023-10-17 DIAGNOSIS — R06.02 SOB (SHORTNESS OF BREATH): ICD-10-CM

## 2023-10-17 DIAGNOSIS — F11.20 OPIOID DEPENDENCE, UNCOMPLICATED (MULTI): ICD-10-CM

## 2023-10-17 DIAGNOSIS — N18.6 ESRD (END STAGE RENAL DISEASE) ON DIALYSIS (MULTI): ICD-10-CM

## 2023-10-17 PROCEDURE — 3008F BODY MASS INDEX DOCD: CPT | Performed by: INTERNAL MEDICINE

## 2023-10-17 PROCEDURE — 1036F TOBACCO NON-USER: CPT | Performed by: INTERNAL MEDICINE

## 2023-10-17 PROCEDURE — 99213 OFFICE O/P EST LOW 20 MIN: CPT | Performed by: INTERNAL MEDICINE

## 2023-10-17 RX ORDER — ALLOPURINOL 100 MG/1
100 TABLET ORAL DAILY
Qty: 90 TABLET | Refills: 1 | Status: SHIPPED | OUTPATIENT
Start: 2023-10-17

## 2023-10-17 RX ORDER — LEVOTHYROXINE SODIUM 25 UG/1
25 TABLET ORAL
Qty: 90 TABLET | Refills: 0 | Status: SHIPPED | OUTPATIENT
Start: 2023-10-17

## 2023-10-17 RX ORDER — PANCRELIPASE 24000; 76000; 120000 [USP'U]/1; [USP'U]/1; [USP'U]/1
3 CAPSULE, DELAYED RELEASE PELLETS ORAL
Qty: 279 CAPSULE | Refills: 0 | Status: SHIPPED | OUTPATIENT
Start: 2023-10-17 | End: 2024-01-12 | Stop reason: SDUPTHER

## 2023-10-17 ASSESSMENT — ENCOUNTER SYMPTOMS
PHOTOPHOBIA: 0
CHILLS: 0
CHEST TIGHTNESS: 0
WEAKNESS: 0
JOINT SWELLING: 0
SINUS PRESSURE: 0
STRIDOR: 0
NECK STIFFNESS: 0
NAUSEA: 0
PALPITATIONS: 0
CHOKING: 0
APPETITE CHANGE: 0
ACTIVITY CHANGE: 0
ABDOMINAL PAIN: 1
NECK PAIN: 0
ARTHRALGIAS: 1
ADENOPATHY: 0
RHINORRHEA: 0
DIARRHEA: 1
NUMBNESS: 0
DIAPHORESIS: 0
SORE THROAT: 0
TREMORS: 0
DIZZINESS: 0
EYE PAIN: 0
LIGHT-HEADEDNESS: 0
FLANK PAIN: 0
WHEEZING: 0
SINUS PAIN: 0
FREQUENCY: 0
WOUND: 0
COLOR CHANGE: 0
VOMITING: 0
BACK PAIN: 0
HEADACHES: 0
RECTAL PAIN: 0
COUGH: 0
EYE REDNESS: 0
BLOOD IN STOOL: 0
MYALGIAS: 0
BRUISES/BLEEDS EASILY: 0
TROUBLE SWALLOWING: 0
FATIGUE: 0
ANAL BLEEDING: 0
EYE DISCHARGE: 0
CONSTIPATION: 0
SLEEP DISTURBANCE: 0
VOICE CHANGE: 0
FACIAL SWELLING: 0
SPEECH DIFFICULTY: 0
HEMATURIA: 0
FACIAL ASYMMETRY: 0
DIFFICULTY URINATING: 0
EYE ITCHING: 0
POLYPHAGIA: 0
POLYDIPSIA: 0
DYSURIA: 0
SHORTNESS OF BREATH: 1
ABDOMINAL DISTENTION: 0
SEIZURES: 0

## 2023-10-17 ASSESSMENT — PAIN SCALES - GENERAL: PAINLEVEL: 10-WORST PAIN EVER

## 2023-10-17 NOTE — PROGRESS NOTES
Subjective   Patient ID: Yoselyn Hernandez is a 63 y.o. female who presents for Hernia (WANT TO DISCUSS TO DO SOMETHING WITH IT).    HPI     Review of Systems    Objective   There were no vitals taken for this visit.    Physical Exam    Assessment/Plan

## 2023-10-17 NOTE — PROGRESS NOTES
Subjective   Patient ID: Yoselyn Hernandez is a 63 y.o. female who presents for Hernia (WANT TO DISCUSS TO DO SOMETHING WITH IT).    Patient presents for follow-up.  She has been noncompliant with most of her medications.  She is frustrated with her overall health.  She is taking dialysis.  She has been complaining of shortness of breath with exertion and shortness of breath when she lays flat.  She also complaining of worsening lower extremity swelling over the last few weeks.  She denies any headaches, but does complain of dizziness.  She reports baseline abdominal pain and diarrhea.  She reports baseline arthritis symptoms         Review of Systems   Constitutional:  Negative for activity change, appetite change, chills, diaphoresis and fatigue.   HENT:  Negative for congestion, dental problem, drooling, ear discharge, ear pain, facial swelling, hearing loss, mouth sores, nosebleeds, postnasal drip, rhinorrhea, sinus pressure, sinus pain, sneezing, sore throat, tinnitus, trouble swallowing and voice change.    Eyes:  Negative for photophobia, pain, discharge, redness, itching and visual disturbance.   Respiratory:  Positive for shortness of breath. Negative for cough, choking, chest tightness, wheezing and stridor.    Cardiovascular:  Negative for chest pain, palpitations and leg swelling.   Gastrointestinal:  Positive for abdominal pain and diarrhea. Negative for abdominal distention, anal bleeding, blood in stool, constipation, nausea, rectal pain and vomiting.   Endocrine: Negative for cold intolerance, heat intolerance, polydipsia, polyphagia and polyuria.   Genitourinary:  Negative for decreased urine volume, difficulty urinating, dysuria, enuresis, flank pain, frequency, genital sores, hematuria and urgency.   Musculoskeletal:  Positive for arthralgias. Negative for back pain, gait problem, joint swelling, myalgias, neck pain and neck stiffness.   Skin:  Negative for color change, pallor, rash and wound.    Neurological:  Negative for dizziness, tremors, seizures, syncope, facial asymmetry, speech difficulty, weakness, light-headedness, numbness and headaches.   Hematological:  Negative for adenopathy. Does not bruise/bleed easily.   Psychiatric/Behavioral:  Negative for sleep disturbance.        Objective   Wt 59.1 kg (130 lb 3.2 oz)   BMI 21.03 kg/m²     Physical Exam  Constitutional:       Appearance: Normal appearance.   Cardiovascular:      Rate and Rhythm: Normal rate and regular rhythm.      Heart sounds: No murmur heard.     No gallop.   Pulmonary:      Effort: No respiratory distress.      Breath sounds: No wheezing or rales.   Abdominal:      General: There is no distension.      Palpations: There is no mass.      Tenderness: There is abdominal tenderness (Diffusely tender.  No rebound or guarding). There is no guarding.   Musculoskeletal:      Right lower leg: No edema.      Left lower leg: No edema.   Neurological:      Mental Status: She is alert.         Assessment/Plan   Diagnoses and all orders for this visit:  Other specified coagulation defects (CMS/HCC)  Secondary hyperparathyroidism of renal origin (CMS/HCC)-follow-up with nephrology  Opioid dependence, uncomplicated (CMS/HCC)-stable  ESRD (end stage renal disease) on dialysis (CMS/HCC)-follow-up with nephrology  Dissection of aorta, unspecified portion of aorta (CMS/HCC)-schedule appointment with cardiology  Cardiomyopathy, unspecified type (CMS/HCC)  Chronic obstructive pulmonary disease, unspecified COPD type (CMS/HCC)-stable symptoms  Hypothyroidism, unspecified type-restart Synthroid  -     levothyroxine (Synthroid, Levoxyl) 25 mcg tablet; Take 1 tablet (25 mcg) by mouth once daily in the morning. Take before meals.  Diarrhea, unspecified type-follow-up with GI  -     Creon 24,000-76,000 -120,000 unit capsule; Take 3 capsules by mouth 3 times a day with meals.  Gout, unspecified cause, unspecified chronicity, unspecified site  -      allopurinol (Zyloprim) 100 mg tablet; Take 1 tablet (100 mg) by mouth once daily.  Mixed hyperlipidemia-she will take atorvastatin as prescribed  SOB (shortness of breath)-refer to emergency room for further evaluation

## 2023-10-18 ENCOUNTER — HOSPITAL ENCOUNTER (OUTPATIENT)
Facility: HOSPITAL | Age: 63
Setting detail: OBSERVATION
Discharge: HOME | End: 2023-10-22
Attending: EMERGENCY MEDICINE | Admitting: INTERNAL MEDICINE
Payer: COMMERCIAL

## 2023-10-18 ENCOUNTER — APPOINTMENT (OUTPATIENT)
Dept: CARDIOLOGY | Facility: HOSPITAL | Age: 63
End: 2023-10-18
Payer: COMMERCIAL

## 2023-10-18 ENCOUNTER — DOCUMENTATION (OUTPATIENT)
Dept: CARE COORDINATION | Facility: CLINIC | Age: 63
End: 2023-10-18

## 2023-10-18 ENCOUNTER — APPOINTMENT (OUTPATIENT)
Dept: RADIOLOGY | Facility: HOSPITAL | Age: 63
End: 2023-10-18
Payer: COMMERCIAL

## 2023-10-18 DIAGNOSIS — Z99.2 ESRD (END STAGE RENAL DISEASE) ON DIALYSIS (MULTI): ICD-10-CM

## 2023-10-18 DIAGNOSIS — I25.5 ISCHEMIC CARDIOMYOPATHY: ICD-10-CM

## 2023-10-18 DIAGNOSIS — N18.6 ESRD (END STAGE RENAL DISEASE) (MULTI): ICD-10-CM

## 2023-10-18 DIAGNOSIS — M54.16 LUMBAR RADICULOPATHY: ICD-10-CM

## 2023-10-18 DIAGNOSIS — J44.9 CHRONIC OBSTRUCTIVE PULMONARY DISEASE, UNSPECIFIED COPD TYPE (MULTI): ICD-10-CM

## 2023-10-18 DIAGNOSIS — J90 PLEURAL EFFUSION: Primary | ICD-10-CM

## 2023-10-18 DIAGNOSIS — N25.81 SECONDARY HYPERPARATHYROIDISM OF RENAL ORIGIN (MULTI): ICD-10-CM

## 2023-10-18 DIAGNOSIS — N18.6 ESRD (END STAGE RENAL DISEASE) ON DIALYSIS (MULTI): ICD-10-CM

## 2023-10-18 DIAGNOSIS — F41.8 DEPRESSION WITH ANXIETY: ICD-10-CM

## 2023-10-18 PROBLEM — E87.70 FLUID EXCESS: Status: ACTIVE | Noted: 2023-10-18

## 2023-10-18 LAB
ALBUMIN SERPL BCP-MCNC: 2.7 G/DL (ref 3.4–5)
ALP SERPL-CCNC: 81 U/L (ref 33–136)
ALT SERPL W P-5'-P-CCNC: 9 U/L (ref 7–45)
ANION GAP SERPL CALC-SCNC: 15 MMOL/L (ref 10–20)
APTT PPP: 38 SECONDS (ref 27–38)
AST SERPL W P-5'-P-CCNC: 13 U/L (ref 9–39)
ATRIAL RATE: 90 BPM
BASOPHILS # BLD AUTO: 0.03 X10*3/UL (ref 0–0.1)
BASOPHILS NFR BLD AUTO: 0.5 %
BILIRUB SERPL-MCNC: 0.3 MG/DL (ref 0–1.2)
BNP SERPL-MCNC: >4700 PG/ML (ref 0–99)
BUN SERPL-MCNC: 40 MG/DL (ref 6–23)
CALCIUM SERPL-MCNC: 7.7 MG/DL (ref 8.6–10.3)
CARDIAC TROPONIN I PNL SERPL HS: 45 NG/L (ref 0–13)
CARDIAC TROPONIN I PNL SERPL HS: 51 NG/L (ref 0–13)
CHLORIDE SERPL-SCNC: 97 MMOL/L (ref 98–107)
CO2 SERPL-SCNC: 28 MMOL/L (ref 21–32)
CREAT SERPL-MCNC: 6.21 MG/DL (ref 0.5–1.05)
EOSINOPHIL # BLD AUTO: 0.14 X10*3/UL (ref 0–0.7)
EOSINOPHIL NFR BLD AUTO: 2.3 %
ERYTHROCYTE [DISTWIDTH] IN BLOOD BY AUTOMATED COUNT: 17.1 % (ref 11.5–14.5)
GFR SERPL CREATININE-BSD FRML MDRD: 7 ML/MIN/1.73M*2
GLUCOSE SERPL-MCNC: 80 MG/DL (ref 74–99)
HCT VFR BLD AUTO: 27.6 % (ref 36–46)
HGB BLD-MCNC: 8.8 G/DL (ref 12–16)
IMM GRANULOCYTES # BLD AUTO: 0.03 X10*3/UL (ref 0–0.7)
IMM GRANULOCYTES NFR BLD AUTO: 0.5 % (ref 0–0.9)
INR PPP: 2.1 (ref 0.9–1.1)
LYMPHOCYTES # BLD AUTO: 1.07 X10*3/UL (ref 1.2–4.8)
LYMPHOCYTES NFR BLD AUTO: 17.2 %
MAGNESIUM SERPL-MCNC: 1.4 MG/DL (ref 1.6–2.4)
MCH RBC QN AUTO: 29.3 PG (ref 26–34)
MCHC RBC AUTO-ENTMCNC: 31.9 G/DL (ref 32–36)
MCV RBC AUTO: 92 FL (ref 80–100)
MONOCYTES # BLD AUTO: 0.47 X10*3/UL (ref 0.1–1)
MONOCYTES NFR BLD AUTO: 7.6 %
NEUTROPHILS # BLD AUTO: 4.48 X10*3/UL (ref 1.2–7.7)
NEUTROPHILS NFR BLD AUTO: 71.9 %
NRBC BLD-RTO: 0 /100 WBCS (ref 0–0)
P AXIS: 39 DEGREES
P OFFSET: 200 MS
P ONSET: 147 MS
PLATELET # BLD AUTO: 202 X10*3/UL (ref 150–450)
PMV BLD AUTO: 10.2 FL (ref 7.5–11.5)
POTASSIUM SERPL-SCNC: 3.4 MMOL/L (ref 3.5–5.3)
PR INTERVAL: 136 MS
PROT SERPL-MCNC: 6.2 G/DL (ref 6.4–8.2)
PROTHROMBIN TIME: 23.3 SECONDS (ref 9.8–12.8)
Q ONSET: 215 MS
QRS COUNT: 15 BEATS
QRS DURATION: 86 MS
QT INTERVAL: 398 MS
QTC CALCULATION(BAZETT): 486 MS
QTC FREDERICIA: 455 MS
R AXIS: -42 DEGREES
RBC # BLD AUTO: 3 X10*6/UL (ref 4–5.2)
SARS-COV-2 RNA RESP QL NAA+PROBE: NOT DETECTED
SODIUM SERPL-SCNC: 137 MMOL/L (ref 136–145)
T AXIS: 129 DEGREES
T OFFSET: 414 MS
VENTRICULAR RATE: 90 BPM
WBC # BLD AUTO: 6.2 X10*3/UL (ref 4.4–11.3)

## 2023-10-18 PROCEDURE — 36415 COLL VENOUS BLD VENIPUNCTURE: CPT | Performed by: EMERGENCY MEDICINE

## 2023-10-18 PROCEDURE — 71045 X-RAY EXAM CHEST 1 VIEW: CPT | Performed by: RADIOLOGY

## 2023-10-18 PROCEDURE — 85610 PROTHROMBIN TIME: CPT | Performed by: EMERGENCY MEDICINE

## 2023-10-18 PROCEDURE — 85730 THROMBOPLASTIN TIME PARTIAL: CPT | Performed by: EMERGENCY MEDICINE

## 2023-10-18 PROCEDURE — 96374 THER/PROPH/DIAG INJ IV PUSH: CPT | Mod: 59

## 2023-10-18 PROCEDURE — 84484 ASSAY OF TROPONIN QUANT: CPT | Performed by: EMERGENCY MEDICINE

## 2023-10-18 PROCEDURE — 2500000001 HC RX 250 WO HCPCS SELF ADMINISTERED DRUGS (ALT 637 FOR MEDICARE OP): Performed by: NURSE PRACTITIONER

## 2023-10-18 PROCEDURE — 82374 ASSAY BLOOD CARBON DIOXIDE: CPT | Performed by: EMERGENCY MEDICINE

## 2023-10-18 PROCEDURE — 71045 X-RAY EXAM CHEST 1 VIEW: CPT | Mod: FY

## 2023-10-18 PROCEDURE — 96376 TX/PRO/DX INJ SAME DRUG ADON: CPT

## 2023-10-18 PROCEDURE — 99222 1ST HOSP IP/OBS MODERATE 55: CPT | Performed by: NURSE PRACTITIONER

## 2023-10-18 PROCEDURE — 93005 ELECTROCARDIOGRAM TRACING: CPT

## 2023-10-18 PROCEDURE — 71275 CT ANGIOGRAPHY CHEST: CPT | Performed by: RADIOLOGY

## 2023-10-18 PROCEDURE — 83880 ASSAY OF NATRIURETIC PEPTIDE: CPT | Performed by: EMERGENCY MEDICINE

## 2023-10-18 PROCEDURE — 83735 ASSAY OF MAGNESIUM: CPT | Performed by: EMERGENCY MEDICINE

## 2023-10-18 PROCEDURE — 2500000001 HC RX 250 WO HCPCS SELF ADMINISTERED DRUGS (ALT 637 FOR MEDICARE OP): Performed by: EMERGENCY MEDICINE

## 2023-10-18 PROCEDURE — 74174 CTA ABD&PLVS W/CONTRAST: CPT | Performed by: RADIOLOGY

## 2023-10-18 PROCEDURE — 99285 EMERGENCY DEPT VISIT HI MDM: CPT | Mod: 25,CS | Performed by: EMERGENCY MEDICINE

## 2023-10-18 PROCEDURE — 80053 COMPREHEN METABOLIC PANEL: CPT | Performed by: EMERGENCY MEDICINE

## 2023-10-18 PROCEDURE — G0378 HOSPITAL OBSERVATION PER HR: HCPCS

## 2023-10-18 PROCEDURE — 85025 COMPLETE CBC W/AUTO DIFF WBC: CPT | Performed by: EMERGENCY MEDICINE

## 2023-10-18 PROCEDURE — 99358 PROLONG SERVICE W/O CONTACT: CPT | Performed by: INTERNAL MEDICINE

## 2023-10-18 PROCEDURE — 87635 SARS-COV-2 COVID-19 AMP PRB: CPT | Performed by: EMERGENCY MEDICINE

## 2023-10-18 PROCEDURE — 2500000004 HC RX 250 GENERAL PHARMACY W/ HCPCS (ALT 636 FOR OP/ED): Performed by: NURSE PRACTITIONER

## 2023-10-18 PROCEDURE — 71275 CT ANGIOGRAPHY CHEST: CPT

## 2023-10-18 PROCEDURE — 2500000004 HC RX 250 GENERAL PHARMACY W/ HCPCS (ALT 636 FOR OP/ED): Performed by: EMERGENCY MEDICINE

## 2023-10-18 PROCEDURE — 96375 TX/PRO/DX INJ NEW DRUG ADDON: CPT

## 2023-10-18 PROCEDURE — 2550000001 HC RX 255 CONTRASTS: Performed by: EMERGENCY MEDICINE

## 2023-10-18 RX ORDER — ONDANSETRON HYDROCHLORIDE 2 MG/ML
4 INJECTION, SOLUTION INTRAVENOUS ONCE
Status: COMPLETED | OUTPATIENT
Start: 2023-10-18 | End: 2023-10-18

## 2023-10-18 RX ORDER — MIRTAZAPINE 15 MG/1
7.5 TABLET, FILM COATED ORAL NIGHTLY
Status: DISCONTINUED | OUTPATIENT
Start: 2023-10-18 | End: 2023-10-20

## 2023-10-18 RX ORDER — OXYCODONE HYDROCHLORIDE 5 MG/1
5 TABLET ORAL EVERY 6 HOURS PRN
Status: DISCONTINUED | OUTPATIENT
Start: 2023-10-18 | End: 2023-10-18

## 2023-10-18 RX ORDER — DOCUSATE SODIUM 100 MG/1
100 CAPSULE, LIQUID FILLED ORAL 2 TIMES DAILY
Status: DISCONTINUED | OUTPATIENT
Start: 2023-10-18 | End: 2023-10-22 | Stop reason: HOSPADM

## 2023-10-18 RX ORDER — ATORVASTATIN CALCIUM 40 MG/1
40 TABLET, FILM COATED ORAL NIGHTLY
Status: DISCONTINUED | OUTPATIENT
Start: 2023-10-18 | End: 2023-10-22 | Stop reason: HOSPADM

## 2023-10-18 RX ORDER — WARFARIN 2.5 MG/1
2.5 TABLET ORAL DAILY
Status: DISCONTINUED | OUTPATIENT
Start: 2023-10-18 | End: 2023-10-19

## 2023-10-18 RX ORDER — OXYCODONE AND ACETAMINOPHEN 5; 325 MG/1; MG/1
1 TABLET ORAL ONCE
Status: COMPLETED | OUTPATIENT
Start: 2023-10-18 | End: 2023-10-18

## 2023-10-18 RX ORDER — PREGABALIN 25 MG/1
25 CAPSULE ORAL DAILY
Status: DISCONTINUED | OUTPATIENT
Start: 2023-10-18 | End: 2023-10-22 | Stop reason: HOSPADM

## 2023-10-18 RX ORDER — CARVEDILOL 3.12 MG/1
3.12 TABLET ORAL
Status: DISCONTINUED | OUTPATIENT
Start: 2023-10-19 | End: 2023-10-19

## 2023-10-18 RX ORDER — LEVOTHYROXINE SODIUM 25 UG/1
25 TABLET ORAL
Status: DISCONTINUED | OUTPATIENT
Start: 2023-10-19 | End: 2023-10-22 | Stop reason: HOSPADM

## 2023-10-18 RX ORDER — CALCITRIOL 0.25 UG/1
0.5 CAPSULE ORAL DAILY
Status: DISCONTINUED | OUTPATIENT
Start: 2023-10-18 | End: 2023-10-22 | Stop reason: HOSPADM

## 2023-10-18 RX ORDER — SEVELAMER HYDROCHLORIDE 800 MG/1
800 TABLET, FILM COATED ORAL
Status: DISCONTINUED | OUTPATIENT
Start: 2023-10-19 | End: 2023-10-18

## 2023-10-18 RX ORDER — POLYETHYLENE GLYCOL 3350 17 G/17G
17 POWDER, FOR SOLUTION ORAL DAILY PRN
Status: DISCONTINUED | OUTPATIENT
Start: 2023-10-18 | End: 2023-10-22 | Stop reason: HOSPADM

## 2023-10-18 RX ORDER — ONDANSETRON HYDROCHLORIDE 2 MG/ML
4 INJECTION, SOLUTION INTRAVENOUS EVERY 8 HOURS PRN
Status: DISCONTINUED | OUTPATIENT
Start: 2023-10-18 | End: 2023-10-22 | Stop reason: HOSPADM

## 2023-10-18 RX ORDER — ACETAMINOPHEN 325 MG/1
975 TABLET ORAL ONCE
Status: DISCONTINUED | OUTPATIENT
Start: 2023-10-18 | End: 2023-10-18

## 2023-10-18 RX ORDER — MORPHINE SULFATE 4 MG/ML
4 INJECTION, SOLUTION INTRAMUSCULAR; INTRAVENOUS ONCE
Status: COMPLETED | OUTPATIENT
Start: 2023-10-18 | End: 2023-10-18

## 2023-10-18 RX ORDER — TRAMADOL HYDROCHLORIDE 50 MG/1
50 TABLET ORAL EVERY 8 HOURS PRN
Status: DISCONTINUED | OUTPATIENT
Start: 2023-10-18 | End: 2023-10-21

## 2023-10-18 RX ORDER — PANTOPRAZOLE SODIUM 40 MG/1
40 TABLET, DELAYED RELEASE ORAL
Status: DISCONTINUED | OUTPATIENT
Start: 2023-10-19 | End: 2023-10-22 | Stop reason: HOSPADM

## 2023-10-18 RX ORDER — SEVELAMER CARBONATE 800 MG/1
800 TABLET, FILM COATED ORAL
Status: DISCONTINUED | OUTPATIENT
Start: 2023-10-19 | End: 2023-10-22 | Stop reason: HOSPADM

## 2023-10-18 RX ORDER — ONDANSETRON 4 MG/1
4 TABLET, FILM COATED ORAL EVERY 8 HOURS PRN
Status: DISCONTINUED | OUTPATIENT
Start: 2023-10-18 | End: 2023-10-22 | Stop reason: HOSPADM

## 2023-10-18 RX ORDER — ALLOPURINOL 100 MG/1
100 TABLET ORAL DAILY
Status: DISCONTINUED | OUTPATIENT
Start: 2023-10-18 | End: 2023-10-22 | Stop reason: HOSPADM

## 2023-10-18 RX ADMIN — MIRTAZAPINE 7.5 MG: 15 TABLET, FILM COATED ORAL at 21:43

## 2023-10-18 RX ADMIN — ALLOPURINOL 100 MG: 100 TABLET ORAL at 21:43

## 2023-10-18 RX ADMIN — ONDANSETRON 4 MG: 2 INJECTION INTRAMUSCULAR; INTRAVENOUS at 19:57

## 2023-10-18 RX ADMIN — WARFARIN SODIUM 2.5 MG: 2.5 TABLET ORAL at 21:42

## 2023-10-18 RX ADMIN — IOHEXOL 95 ML: 350 INJECTION, SOLUTION INTRAVENOUS at 07:09

## 2023-10-18 RX ADMIN — HYDROMORPHONE HYDROCHLORIDE 0.2 MG: 0.2 INJECTION, SOLUTION INTRAMUSCULAR; INTRAVENOUS; SUBCUTANEOUS at 19:48

## 2023-10-18 RX ADMIN — CALCITRIOL CAPSULES 0.25 MCG 0.5 MCG: 0.25 CAPSULE ORAL at 21:42

## 2023-10-18 RX ADMIN — PREGABALIN 25 MG: 25 CAPSULE ORAL at 21:47

## 2023-10-18 RX ADMIN — MORPHINE SULFATE 4 MG: 4 INJECTION, SOLUTION INTRAMUSCULAR; INTRAVENOUS at 09:57

## 2023-10-18 RX ADMIN — OXYCODONE HYDROCHLORIDE AND ACETAMINOPHEN 1 TABLET: 5; 325 TABLET ORAL at 06:58

## 2023-10-18 RX ADMIN — ATORVASTATIN CALCIUM 40 MG: 40 TABLET, FILM COATED ORAL at 21:43

## 2023-10-18 RX ADMIN — SODIUM CHLORIDE, SODIUM LACTATE, CALCIUM CHLORIDE, MAGNESIUM CHLORIDE AND DEXTROSE 2000 ML: 2.5; 538; 448; 18.3; 5.08 INJECTION, SOLUTION INTRAPERITONEAL at 20:43

## 2023-10-18 RX ADMIN — ONDANSETRON 4 MG: 2 INJECTION INTRAMUSCULAR; INTRAVENOUS at 11:06

## 2023-10-18 SDOH — SOCIAL STABILITY: SOCIAL INSECURITY: ARE YOU OR HAVE YOU BEEN THREATENED OR ABUSED PHYSICALLY, EMOTIONALLY, OR SEXUALLY BY ANYONE?: NO

## 2023-10-18 SDOH — SOCIAL STABILITY: SOCIAL INSECURITY: DO YOU FEEL UNSAFE GOING BACK TO THE PLACE WHERE YOU ARE LIVING?: NO

## 2023-10-18 SDOH — SOCIAL STABILITY: SOCIAL INSECURITY: DO YOU FEEL ANYONE HAS EXPLOITED OR TAKEN ADVANTAGE OF YOU FINANCIALLY OR OF YOUR PERSONAL PROPERTY?: NO

## 2023-10-18 SDOH — SOCIAL STABILITY: SOCIAL INSECURITY: DOES ANYONE TRY TO KEEP YOU FROM HAVING/CONTACTING OTHER FRIENDS OR DOING THINGS OUTSIDE YOUR HOME?: NO

## 2023-10-18 SDOH — SOCIAL STABILITY: SOCIAL INSECURITY: HAS ANYONE EVER THREATENED TO HURT YOUR FAMILY OR YOUR PETS?: NO

## 2023-10-18 SDOH — SOCIAL STABILITY: SOCIAL INSECURITY: ARE THERE ANY APPARENT SIGNS OF INJURIES/BEHAVIORS THAT COULD BE RELATED TO ABUSE/NEGLECT?: NO

## 2023-10-18 SDOH — SOCIAL STABILITY: SOCIAL INSECURITY: ABUSE: ADULT

## 2023-10-18 SDOH — SOCIAL STABILITY: SOCIAL INSECURITY: HAVE YOU HAD THOUGHTS OF HARMING ANYONE ELSE?: NO

## 2023-10-18 ASSESSMENT — PAIN SCALES - WONG BAKER: WONGBAKER_NUMERICALRESPONSE: HURTS LITTLE MORE

## 2023-10-18 ASSESSMENT — ACTIVITIES OF DAILY LIVING (ADL)
HEARING - RIGHT EAR: FUNCTIONAL
BATHING: NEEDS ASSISTANCE
DRESSING YOURSELF: NEEDS ASSISTANCE
GROOMING: NEEDS ASSISTANCE
PATIENT'S MEMORY ADEQUATE TO SAFELY COMPLETE DAILY ACTIVITIES?: YES
JUDGMENT_ADEQUATE_SAFELY_COMPLETE_DAILY_ACTIVITIES: YES
HEARING - LEFT EAR: FUNCTIONAL
ADEQUATE_TO_COMPLETE_ADL: YES
TOILETING: NEEDS ASSISTANCE
LACK_OF_TRANSPORTATION: NO
WALKS IN HOME: NEEDS ASSISTANCE
FEEDING YOURSELF: INDEPENDENT

## 2023-10-18 ASSESSMENT — LIFESTYLE VARIABLES
HOW OFTEN DO YOU HAVE 6 OR MORE DRINKS ON ONE OCCASION: NEVER
AUDIT-C TOTAL SCORE: 0
SUBSTANCE_ABUSE_PAST_12_MONTHS: YES
AUDIT-C TOTAL SCORE: 0
HOW MANY STANDARD DRINKS CONTAINING ALCOHOL DO YOU HAVE ON A TYPICAL DAY: PATIENT DOES NOT DRINK
HOW OFTEN DO YOU HAVE A DRINK CONTAINING ALCOHOL: NEVER
SKIP TO QUESTIONS 9-10: 1
PRESCIPTION_ABUSE_PAST_12_MONTHS: NO

## 2023-10-18 ASSESSMENT — COGNITIVE AND FUNCTIONAL STATUS - GENERAL
TOILETING: A LITTLE
DRESSING REGULAR LOWER BODY CLOTHING: A LITTLE
HELP NEEDED FOR BATHING: A LITTLE
DAILY ACTIVITIY SCORE: 21
MOBILITY SCORE: 19
WALKING IN HOSPITAL ROOM: A LITTLE
PATIENT BASELINE BEDBOUND: NO
STANDING UP FROM CHAIR USING ARMS: A LITTLE
MOVING TO AND FROM BED TO CHAIR: A LITTLE
CLIMB 3 TO 5 STEPS WITH RAILING: A LOT

## 2023-10-18 ASSESSMENT — COLUMBIA-SUICIDE SEVERITY RATING SCALE - C-SSRS
6. HAVE YOU EVER DONE ANYTHING, STARTED TO DO ANYTHING, OR PREPARED TO DO ANYTHING TO END YOUR LIFE?: NO
1. IN THE PAST MONTH, HAVE YOU WISHED YOU WERE DEAD OR WISHED YOU COULD GO TO SLEEP AND NOT WAKE UP?: NO
2. HAVE YOU ACTUALLY HAD ANY THOUGHTS OF KILLING YOURSELF?: NO

## 2023-10-18 ASSESSMENT — PAIN SCALES - GENERAL
PAINLEVEL_OUTOF10: 9
PAINLEVEL_OUTOF10: 10 - WORST POSSIBLE PAIN
PAINLEVEL_OUTOF10: 10 - WORST POSSIBLE PAIN
PAINLEVEL_OUTOF10: 8

## 2023-10-18 ASSESSMENT — ENCOUNTER SYMPTOMS
BACK PAIN: 1
ABDOMINAL DISTENTION: 1
NUMBNESS: 1
FATIGUE: 1

## 2023-10-18 ASSESSMENT — PAIN DESCRIPTION - ORIENTATION: ORIENTATION: LEFT

## 2023-10-18 ASSESSMENT — PAIN DESCRIPTION - DESCRIPTORS: DESCRIPTORS: ACHING

## 2023-10-18 ASSESSMENT — PATIENT HEALTH QUESTIONNAIRE - PHQ9
2. FEELING DOWN, DEPRESSED OR HOPELESS: NEARLY EVERY DAY
SUM OF ALL RESPONSES TO PHQ9 QUESTIONS 1 & 2: 6
1. LITTLE INTEREST OR PLEASURE IN DOING THINGS: NEARLY EVERY DAY

## 2023-10-18 ASSESSMENT — PAIN DESCRIPTION - LOCATION: LOCATION: ABDOMEN

## 2023-10-18 ASSESSMENT — PAIN - FUNCTIONAL ASSESSMENT: PAIN_FUNCTIONAL_ASSESSMENT: 0-10

## 2023-10-18 NOTE — PROGRESS NOTES
Emergency Medicine Transition of Care Note.    I received Yoselyn Hernandez in signout from Dr. Tong.  Please see the previous ED provider note for all HPI, PE and MDM up to the time of signout at 0600. This is in addition to the primary record.    In brief Yoselyn Hernandez is an 63 y.o. female presenting for   Chief Complaint   Patient presents with    Shortness of Breath    Chest Pain     At the time of signout we were awaiting: CT and completion of labs    Diagnoses as of 10/18/23 1456   Pleural effusion   ESRD (end stage renal disease) (CMS/[facility])       Medical Decision Making    This is a 63-year-old female with a history of ESRD on peritoneal dialysis and aortic dissection status post multiple surgeries.  She presents with chest pain and shortness of breath.  The patient also reports a 24 pound weight gain lately.  CT scan showed new pleural effusion and pulmonary vascular congestion.  BNP was over 4700.  Initial troponin was  mildly elevated but repeat was downtrending.  Contacted the hospitalist, Dr. Iglesias, and her nephrologis Dr. Lora.  The patient will be placed in CDU with attempt to remove fluid via dialysis.    Final diagnoses:   [J90] Pleural effusion   [N18.6] ESRD (end stage renal disease) (CMS/[facility])           Procedure  Procedures    Ranjit Mcfadden MD

## 2023-10-18 NOTE — ED PROVIDER NOTES
HPI   Chief Complaint   Patient presents with    Shortness of Breath    Chest Pain       Chest pain, shortness of breath.  The patient presents with worsening chest pain shortness of breath for the last 2 weeks.  She has a history of both pulmonary embolism and dissection.  No history of MI or coronary disease in the past.  She states that she has had 8 different procedures for dissections in the past.  She does have a history of peritoneal dialysis and has been doing this regularly at home.  She did miss today because she is feeling too weak and tired.  She did speak to her nephrologist who told her to come in for more fluid removal.  She states that her symptoms are worse with laying flat.  She that this has been a chronic issue ongoing for months but it is just worse in the last 2 weeks.                          Rebecca Coma Scale Score: 15                  Patient History   Past Medical History:   Diagnosis Date    Other abnormalities of gait and mobility     Gait, antalgic    Personal history of other endocrine, nutritional and metabolic disease     History of hyperlipidemia    Personal history of other specified conditions     History of shortness of breath     Past Surgical History:   Procedure Laterality Date    COLONOSCOPY  12/05/2017    Complete Colonoscopy    COLONOSCOPY  12/2021    [facility] 12-25    CT ABDOMEN PELVIS ANGIOGRAM W AND/OR WO IV CONTRAST  11/17/2014    CT ABDOMEN PELVIS ANGIOGRAM W AND/OR WO IV CONTRAST 11/17/2014 [facility] CLINICAL LEGACY    CT ABDOMEN PELVIS ANGIOGRAM W AND/OR WO IV CONTRAST  02/01/2018    CT ABDOMEN PELVIS ANGIOGRAM W AND/OR WO IV CONTRAST 2/1/2018 [facility] AIB LEGACY    CT ABDOMEN PELVIS ANGIOGRAM W AND/OR WO IV CONTRAST  02/23/2018    CT ABDOMEN PELVIS ANGIOGRAM W AND/OR WO IV CONTRAST 2/23/2018 [facility] INPATIENT LEGACY    CT ABDOMEN PELVIS ANGIOGRAM W AND/OR WO IV CONTRAST  01/10/2019    CT ABDOMEN PELVIS ANGIOGRAM W AND/OR WO IV CONTRAST 1/10/2019 [facility] EMERGENCY LEGACY    CT ABDOMEN  PELVIS ANGIOGRAM W AND/OR WO IV CONTRAST  2019    CT ABDOMEN PELVIS ANGIOGRAM W AND/OR WO IV CONTRAST 2019 [facility] ANCILLARY LEGACY    CT ABDOMEN PELVIS ANGIOGRAM W AND/OR WO IV CONTRAST  2021    CT ABDOMEN PELVIS ANGIOGRAM W AND/OR WO IV CONTRAST 2021 [facility] CLINICAL LEGACY    CT ABDOMEN PELVIS ANGIOGRAM W AND/OR WO IV CONTRAST  2017    CT ABDOMEN PELVIS ANGIOGRAM W AND/OR WO IV CONTRAST 2017 [facility] CLINICAL LEGACY    CT ABDOMEN PELVIS ANGIOGRAM W AND/OR WO IV CONTRAST  10/24/2018    CT ABDOMEN PELVIS ANGIOGRAM W AND/OR WO IV CONTRAST 10/24/2018 [facility] EMERGENCY LEGACY    CT ABDOMEN PELVIS ANGIOGRAM W AND/OR WO IV CONTRAST  2022    CT ABDOMEN PELVIS ANGIOGRAM W AND/OR WO IV CONTRAST 2022 DOCTOR OFFICE LEGACY    CT AORTA AND BILATERAL ILIOFEMORAL RUNOFF ANGIOGRAM W AND/OR WO IV CONTRAST  2023    CT AORTA AND BILATERAL ILIOFEMORAL RUNOFF ANGIOGRAM W AND/OR WO IV CONTRAST 2023 [facility] CT    HYSTERECTOMY  2014    Hysterectomy    IR ANGIOGRAM AORTA ABDOMEN  2019    IR ANGIOGRAM AORTA ABDOMEN 2019 [facility] INPATIENT LEGACY    IR ANGIOGRAM AORTA ABDOMEN  2022    IR ANGIOGRAM AORTA ABDOMEN 2022 [facility] INPATIENT LEGACY    IR ANGIOGRAM AORTA THORACIC  2018    IR ANGIOGRAM AORTA THORACIC 2018 [facility] INPATIENT LEGACY    IR ANGIOGRAM ENDOVASCULAR AORTIC REPAIR  2022    IR ANGIOGRAM ENDOVASCULAR AORTIC REPAIR 2022 [facility] INPATIENT LEGACY    IR INTERVENTION NEURO STENT  2019    IR INTERVENTION NEURO STENT 2019 [facility] INPATIENT LEGACY    OTHER SURGICAL HISTORY  2017    Aortic Aneurysm Repair Thoracoabdominal     Family History   Problem Relation Name Age of Onset    COPD Mother      Kidney failure Father       Social History     Tobacco Use    Smoking status: Former     Packs/day: 0.25     Years: 20.00     Additional pack years: 0.00     Total pack years: 5.00     Types: Cigarettes     Quit date:      Years since quittin.8     Smokeless tobacco: Never   Vaping Use    Vaping Use: Never used   Substance Use Topics    Alcohol use: Not Currently    Drug use: Yes     Types: Marijuana       Physical Exam   ED Triage Vitals [10/18/23 0405]   Temp Heart Rate Resp BP   36.9 °C (98.5 °F) 92 16 127/77      SpO2 Temp Source Heart Rate Source Patient Position   -- Oral -- --      BP Location FiO2 (%)     -- --       Physical Exam  Vitals and nursing note reviewed.   Constitutional:       General: She is not in acute distress.     Appearance: She is well-developed.   HENT:      Head: Normocephalic and atraumatic.   Eyes:      Conjunctiva/sclera: Conjunctivae normal.   Cardiovascular:      Rate and Rhythm: Normal rate and regular rhythm.      Heart sounds: No murmur heard.  Pulmonary:      Effort: Pulmonary effort is normal. No respiratory distress.      Breath sounds: Normal breath sounds.   Abdominal:      Palpations: Abdomen is soft.      Tenderness: There is no abdominal tenderness.      Comments: Peritoneal dialysis catheter clean dry intact   Musculoskeletal:         General: No swelling.      Cervical back: Neck supple.      Right lower leg: Edema present.      Left lower leg: Edema present.      Comments: Edema greater on left   Skin:     General: Skin is warm and dry.      Capillary Refill: Capillary refill takes less than 2 seconds.   Neurological:      Mental Status: She is alert.   Psychiatric:         Mood and Affect: Mood normal.         ED Course & MDM   Diagnoses as of 10/21/23 0925   Pleural effusion   ESRD (end stage renal disease) (CMS/[facility])       Medical Decision Making  The patient has history of both pulmonary medicine and dissection.  We will also do acute coronary syndrome rule out.  This time it is may be slight pulmonary congestion VASc edema secondary to kidney disease.  Her orthopnea could also represent a degree of heart failure although in the setting of kidney failure this would be difficult to assess without an echo.  The  "patient has normal vital signs no hypoxia.  Consider observation.  The patient was supposedly told by her nephrologist to come here to have \"more fluid taken off\".    EKG interpreted by myself.  Normal sinus rhythm at a rate of 90.  Normal intervals.  Normal axis.  No signs of acute ischemia.      Procedure  Procedures     Olvin Tong MD  10/18/23 0448       Olvin Tong MD  10/21/23 9443       Olvin Tong MD  10/21/23 2053    "

## 2023-10-18 NOTE — PROGRESS NOTES
Covering   Admitted to 10/18 per direction of renal for chest discomfort and worsening shortness of breath.    Unable to complete peritoneal dialysis due to weakness.   Patient edematous with plan to start peritoneal dialysis with Epogen.    Lucretia GIRON RN CCM  RN Care Coordinator  [facility]  Phone [phone]

## 2023-10-18 NOTE — PROGRESS NOTES
Yoselyn Hernandez is a 63 y.o. female on day 0 of admission presenting with No Principal Problem: There is no principal problem currently on the Problem List. Please update the Problem List and refresh..    Subjective   Ms. Hernandez is a 64yo female with PMH of ventral hernia, diarrhea, sciatica, ESRD (PD under Dr. Lora; DW estimated at 58.5kg), secondary hyperparathyroidism, anemia, HTN, PE and dissection, who presented to [facility] ED under the direction of her PCP because of worsening shortness of breath and chest discomfort. Symptoms worsen while lying flat. Ongoing over the past month and worsening over the past 2 weeks. Endorses weakness as well. Denied abdominal pain, cough, fever, chills. She was unable to complete her PD due to weakness.      In the ED: temp 36.9, HR 92, RR 16, /77. Labs: potassium 3.4, BUN 40, mag 1.4, INR 2.1, hbg 8.8. COVID negative. CT angio CAP negative for acute bleed. CXR notes no acute process. We are consulted for renal care.     Objective   As above    Physical Exam  Physical Exam  Constitutional:    Alert and oriented x3, appears in no distress  HENT:      Clear oropharynx, moist mucous membranes  Cardiovascular:   Normal rate and regular rhythm; Normal pulses; Normal heart sounds. No murmur heard; No friction rub.   Pulmonary:   Pulmonary effort is poor. No respiratory distress. Diminished breath sounds. No stridor. No wheezing or rhonchi.    Abdominal:   Bowel sounds are normal. There is no distension. Abdomen is soft. There is no tenderness or guarding. PD cath in place.  Musculoskeletal:      +2 pitting edema Lt>Rt. Moves all extremities  Skin:  Skin is warm and dry. Capillary refill takes less than 2 seconds. No lesion or rash.   Neurological:   No focal deficit present. Alert and oriented to person, place, and time.   Psych:  Appropriate mood.      Meds  No current facility-administered medications for this encounter.    Current Outpatient Medications:     allopurinol (Zyloprim)  100 mg tablet, Take 1 tablet (100 mg) by mouth once daily., Disp: 90 tablet, Rfl: 1    aspirin 81 mg EC tablet, Take 1 tablet (81 mg) by mouth once daily., Disp: , Rfl:     atorvastatin (Lipitor) 40 mg tablet, Take 1 tablet (40 mg) by mouth once daily., Disp: , Rfl:     atorvastatin (Lipitor) 40 mg tablet, TAKE 1 TABLET BY MOUTH ONCE DAILY AT BEDTIME, Disp: 30 tablet, Rfl: 0    bisacodyl (Dulcolax) 10 mg suppository, UNWRAP AND INSERT 1 SUPPOSITORY RECTALLY ONCE DAILY AS NEEDED FOR CONSTIPATION., Disp: 5 suppository, Rfl: 0    calcitriol (Rocaltrol) 0.5 mcg capsule, Take 1 capsule (0.5 mcg) by mouth once daily., Disp: , Rfl:     carvedilol (Coreg) 3.125 mg tablet, Take 1 tablet (3.125 mg) by mouth 2 times a day with meals., Disp: , Rfl:     Creon 24,000-76,000 -120,000 unit capsule, Take 3 capsules by mouth 3 times a day with meals., Disp: 279 capsule, Rfl: 0    dicyclomine (Bentyl) 10 mg capsule, Take 1 capsule (10 mg) by mouth 4 times a day., Disp: , Rfl:     epoetin treasure (Epogen,Procrit) 10,000 unit/mL injection, Inject 1 mL (10,000 Units) under the skin every 14 (fourteen) days., Disp: , Rfl:     escitalopram (Lexapro) 5 mg tablet, Take 1 tablet (5 mg) by mouth once daily., Disp: , Rfl:     hyoscyamine ER (Levbid) 0.375 mg 12 hr tablet, Take 1 tablet (0.375 mg) by mouth every 12 hours if needed for cramping. Do not crush or chew., Disp: , Rfl:     levothyroxine (Synthroid, Levoxyl) 25 mcg tablet, Take 1 tablet (25 mcg) by mouth once daily in the morning. Take before meals., Disp: 90 tablet, Rfl: 0    loperamide (Imodium) 2 mg capsule, Take 1 capsule (2 mg) by mouth 4 times a day as needed for diarrhea., Disp: , Rfl:     loperamide (Imodium) 2 mg capsule, TAKE 1 CAPSULE BY MOUTH FOUR TIMES A DAY AS NEEDED FOR DIARRHEA OR LOOSE STOOL, Disp: 40 capsule, Rfl: 0    magnesium oxide (Mag-Ox) 400 mg (241.3 mg magnesium) tablet, TAKE 2 TABLETS BY MOUTH TWO TIMES A DAY, Disp: 40 tablet, Rfl: 0    magnesium oxide (Mag-Ox)  "400 mg tablet, Take 2 tablets (800 mg) by mouth once daily., Disp: , Rfl:     melatonin 5 mg tablet, TAKE 1 TABLET BY MOUTH ONCE DAILY AT AT 6:00 PM, Disp: 30 tablet, Rfl: 0    midodrine (Proamatine) 2.5 mg tablet, Take 1 tablet (2.5 mg) by mouth 3 times a day as needed (sbp less 90 mmhg)., Disp: , Rfl:     mirtazapine (Remeron) 7.5 mg tablet, Take 1 tablet (7.5 mg) by mouth once daily at bedtime., Disp: , Rfl:     naloxone (Narcan) 4 mg/0.1 mL nasal spray, INSTILL 1 SPRAY IN ONE NOSTRIL AS NEEDED FOR ACCIDENTAL OPIOID OVERDOSE; REPEAT WITH SECOND DOSE IN 5 MINUTES IF NO RESPONSE, Disp: 2 each, Rfl: 0    pantoprazole (ProtoNix) 40 mg EC tablet, Take 1 tablet (40 mg) by mouth 2 times a day. Do not crush, chew, or split., Disp: , Rfl:     pantoprazole (ProtoNix) 40 mg EC tablet, TAKE 1 TABLET BY MOUTH TWO TIMES A DAY BEFORE MEALS, Disp: 60 tablet, Rfl: 0    pregabalin (Lyrica) 25 mg capsule, TAKE 1 CAPSULE BY MOUTH ONCE DAILY AT BEDTIME., Disp: 30 capsule, Rfl: 0    sevelamer (Renagel) 800 mg tablet, Take 1 tablet (800 mg) by mouth 3 times a day with meals., Disp: , Rfl:     warfarin (Coumadin) 2.5 mg tablet, Take 1 tablet (2.5 mg) by mouth once daily. Take as directed per After Visit Summary., Disp: , Rfl:     warfarin (Coumadin) 2.5 mg tablet, TAKE 1 TABLET BY MOUTH ONCE DAILY, Disp: 30 tablet, Rfl: 0   Medications Discontinued During This Encounter   Medication Reason    acetaminophen (Tylenol) tablet 975 mg         Allergies  Allergies   Allergen Reactions    Ace Inhibitors Angioedema, Other and Swelling    Ciprofloxacin GI intolerance, Nausea Only and Diarrhea    Gabapentin Unknown     Jerking head movements        Last Recorded Vitals  Blood pressure 137/81, pulse 89, temperature 36.9 °C (98.5 °F), temperature source Oral, resp. rate 13, height 1.702 m (5' 7\"), weight 68 kg (149 lb 14.6 oz), SpO2 92 %.  Intake/Output last 3 Shifts:  No intake/output data recorded.    Last 24 hour Results  Results for orders " placed or performed during the hospital encounter of 10/18/23 (from the past 24 hour(s))   CBC and Auto Differential   Result Value Ref Range    WBC 6.2 4.4 - 11.3 x10*3/uL    nRBC 0.0 0.0 - 0.0 /100 WBCs    RBC 3.00 (L) 4.00 - 5.20 x10*6/uL    Hemoglobin 8.8 (L) 12.0 - 16.0 g/dL    Hematocrit 27.6 (L) 36.0 - 46.0 %    MCV 92 80 - 100 fL    MCH 29.3 26.0 - 34.0 pg    MCHC 31.9 (L) 32.0 - 36.0 g/dL    RDW 17.1 (H) 11.5 - 14.5 %    Platelets 202 150 - 450 x10*3/uL    MPV 10.2 7.5 - 11.5 fL    Neutrophils % 71.9 40.0 - 80.0 %    Immature Granulocytes %, Automated 0.5 0.0 - 0.9 %    Lymphocytes % 17.2 13.0 - 44.0 %    Monocytes % 7.6 2.0 - 10.0 %    Eosinophils % 2.3 0.0 - 6.0 %    Basophils % 0.5 0.0 - 2.0 %    Neutrophils Absolute 4.48 1.20 - 7.70 x10*3/uL    Immature Granulocytes Absolute, Automated 0.03 0.00 - 0.70 x10*3/uL    Lymphocytes Absolute 1.07 (L) 1.20 - 4.80 x10*3/uL    Monocytes Absolute 0.47 0.10 - 1.00 x10*3/uL    Eosinophils Absolute 0.14 0.00 - 0.70 x10*3/uL    Basophils Absolute 0.03 0.00 - 0.10 x10*3/uL   Comprehensive Metabolic Panel   Result Value Ref Range    Glucose 80 74 - 99 mg/dL    Sodium 137 136 - 145 mmol/L    Potassium 3.4 (L) 3.5 - 5.3 mmol/L    Chloride 97 (L) 98 - 107 mmol/L    Bicarbonate 28 21 - 32 mmol/L    Anion Gap 15 10 - 20 mmol/L    Urea Nitrogen 40 (H) 6 - 23 mg/dL    Creatinine 6.21 (H) 0.50 - 1.05 mg/dL    eGFR 7 (L) >60 mL/min/1.73m*2    Calcium 7.7 (L) 8.6 - 10.3 mg/dL    Albumin 2.7 (L) 3.4 - 5.0 g/dL    Alkaline Phosphatase 81 33 - 136 U/L    Total Protein 6.2 (L) 6.4 - 8.2 g/dL    AST 13 9 - 39 U/L    Bilirubin, Total 0.3 0.0 - 1.2 mg/dL    ALT 9 7 - 45 U/L   Magnesium   Result Value Ref Range    Magnesium 1.40 (L) 1.60 - 2.40 mg/dL   aPTT   Result Value Ref Range    aPTT 38 27 - 38 seconds   Protime-INR   Result Value Ref Range    Protime 23.3 (H) 9.8 - 12.8 seconds    INR 2.1 (H) 0.9 - 1.1   B-Type Natriuretic Peptide   Result Value Ref Range    BNP >4,700 (H) 0 - 99  pg/mL   Troponin I, High Sensitivity, Initial   Result Value Ref Range    Troponin I, High Sensitivity 51 (HH) 0 - 13 ng/L   Sars-CoV-2 RT PCR, Symptomatic   Result Value Ref Range    Coronavirus 2019, PCR Not Detected Not Detected   Troponin, High Sensitivity, 1 Hour   Result Value Ref Range    Troponin I, High Sensitivity 45 (H) 0 - 13 ng/L        Imaging results  reviewed    Assessment and Plan  Ms. Hernandez is a 64yo female with PMH of ventral hernia, diarrhea, sciatica, ESRD (PD under Dr. Lora; DW estimated at 58.5kg), secondary hyperparathyroidism, anemia, HTN, PE and dissection, who presented to [facility] [place] ED under the direction of her PCP because of worsening shortness of breath and chest discomfort. Symptoms worsen while lying flat. Ongoing over the past month and worsening over the past 2 weeks. Endorses weakness as well. Denied abdominal pain, cough, fever, chills. She was unable to complete her PD due to weakness.      In the ED: temp 36.9, HR 92, RR 16, /77. Labs: potassium 3.4, BUN 40, mag 1.4, INR 2.1, hbg 8.8. COVID negative. CT angio CAP negative for acute bleed. CXR notes no acute process. We are consulted for renal care.     Will start PD exchanges with 4.5% fluid every other exchange. Epogen with treatment. Per [facility] notes DW is roughly 58.5kg and pt is weighting at 68kg. She tells me she has been gaining weight but she is quite edematous. Will need to work to pull extra fluid off with exchanges. Labs as ordered.     In conjunction with attending nephrologist.   Anita Lofton, APRN-CNP    I have read the above and agree.  Ms. Hernandez has significant vascular disease, she has had more than a dozen procedures as a pertains to aortic dissection with grafts, she has had leaks.  She has had most of her procedures under the care of Dr. Basilio with whom I have communicated as he moved to the [facility] and I would like her to continue to follow with him.  She has severe heart  failure, the last EF I find is 15-20%.  Has a history of irritable bowel syndrome, we have her on peritoneal dialysis.  She has had many issues with admissions, fatigue and weakness to where she has required hospital admissions followed by rehab.  She had a long rehab stay back in May.  It looks as if she saw Dr. Maguire yesterday, I am reviewing his note and I see that she had shortness of breath, he referred her to the emergency department.  Given her chest pain and shortness of breath, 24 pound weight gain, she was admitted to the hospital.  Chest x-ray notes cardiomegaly, perhaps fluid overload.  I reviewed the CT scan as well, no new vascular issues.    She will have 1 4.25% exchange to help with ultrafiltration, the rest will be 2.5% exchanges.  In the epic system it is not easy to place peritoneal dialysis orders and we had to work with the pharmacy.  I am not certain how she got this overloaded, she may not be doing her dialysis at home and her sodium intake may be high.  We will try to get her latest iron stores but she will get erythropoietin here.

## 2023-10-18 NOTE — ED TRIAGE NOTES
Pt arrives to ED with c/o shortness of breath and intermittent chest pain / pressure x 2 weeks. Pt is peritoneal dialysis pt, states she has not missed a night except tonight d/t her sx. Pt states she has been seen by her PCP who instructed her to come to ED if her sx persist. Pt is also c/o worsening chronic LLE pain as well as buttox pain. Denies any fever/chills, numbness/tingling, HA, dizziness, n/v/d

## 2023-10-19 LAB
ALBUMIN SERPL BCP-MCNC: 2.9 G/DL (ref 3.4–5)
ANION GAP SERPL CALC-SCNC: 21 MMOL/L (ref 10–20)
BASOPHILS # BLD AUTO: 0.06 X10*3/UL (ref 0–0.1)
BASOPHILS NFR BLD AUTO: 1 %
BUN SERPL-MCNC: 43 MG/DL (ref 6–23)
CALCIUM SERPL-MCNC: 8.1 MG/DL (ref 8.6–10.3)
CHLORIDE SERPL-SCNC: 94 MMOL/L (ref 98–107)
CO2 SERPL-SCNC: 23 MMOL/L (ref 21–32)
CREAT SERPL-MCNC: 6.63 MG/DL (ref 0.5–1.05)
EOSINOPHIL # BLD AUTO: 0.14 X10*3/UL (ref 0–0.7)
EOSINOPHIL NFR BLD AUTO: 2.3 %
ERYTHROCYTE [DISTWIDTH] IN BLOOD BY AUTOMATED COUNT: 17.4 % (ref 11.5–14.5)
GFR SERPL CREATININE-BSD FRML MDRD: 7 ML/MIN/1.73M*2
GLUCOSE SERPL-MCNC: 78 MG/DL (ref 74–99)
HCT VFR BLD AUTO: 31.4 % (ref 36–46)
HGB BLD-MCNC: 9.8 G/DL (ref 12–16)
IMM GRANULOCYTES # BLD AUTO: 0.03 X10*3/UL (ref 0–0.7)
IMM GRANULOCYTES NFR BLD AUTO: 0.5 % (ref 0–0.9)
LYMPHOCYTES # BLD AUTO: 1.59 X10*3/UL (ref 1.2–4.8)
LYMPHOCYTES NFR BLD AUTO: 26.2 %
MCH RBC QN AUTO: 29.5 PG (ref 26–34)
MCHC RBC AUTO-ENTMCNC: 31.2 G/DL (ref 32–36)
MCV RBC AUTO: 95 FL (ref 80–100)
MONOCYTES # BLD AUTO: 0.39 X10*3/UL (ref 0.1–1)
MONOCYTES NFR BLD AUTO: 6.4 %
NEUTROPHILS # BLD AUTO: 3.85 X10*3/UL (ref 1.2–7.7)
NEUTROPHILS NFR BLD AUTO: 63.6 %
NRBC BLD-RTO: 0 /100 WBCS (ref 0–0)
PHOSPHATE SERPL-MCNC: 7.2 MG/DL (ref 2.5–4.9)
PLATELET # BLD AUTO: 211 X10*3/UL (ref 150–450)
PMV BLD AUTO: 10.4 FL (ref 7.5–11.5)
POTASSIUM SERPL-SCNC: 4.3 MMOL/L (ref 3.5–5.3)
RBC # BLD AUTO: 3.32 X10*6/UL (ref 4–5.2)
SODIUM SERPL-SCNC: 134 MMOL/L (ref 136–145)
WBC # BLD AUTO: 6.1 X10*3/UL (ref 4.4–11.3)

## 2023-10-19 PROCEDURE — 2500000001 HC RX 250 WO HCPCS SELF ADMINISTERED DRUGS (ALT 637 FOR MEDICARE OP): Performed by: NURSE PRACTITIONER

## 2023-10-19 PROCEDURE — 2500000001 HC RX 250 WO HCPCS SELF ADMINISTERED DRUGS (ALT 637 FOR MEDICARE OP): Performed by: INTERNAL MEDICINE

## 2023-10-19 PROCEDURE — 2500000004 HC RX 250 GENERAL PHARMACY W/ HCPCS (ALT 636 FOR OP/ED): Performed by: NURSE PRACTITIONER

## 2023-10-19 PROCEDURE — 36415 COLL VENOUS BLD VENIPUNCTURE: CPT | Performed by: NURSE PRACTITIONER

## 2023-10-19 PROCEDURE — 99232 SBSQ HOSP IP/OBS MODERATE 35: CPT | Performed by: NURSE PRACTITIONER

## 2023-10-19 PROCEDURE — 96376 TX/PRO/DX INJ SAME DRUG ADON: CPT

## 2023-10-19 PROCEDURE — G0378 HOSPITAL OBSERVATION PER HR: HCPCS

## 2023-10-19 PROCEDURE — 85025 COMPLETE CBC W/AUTO DIFF WBC: CPT | Performed by: NURSE PRACTITIONER

## 2023-10-19 PROCEDURE — 80069 RENAL FUNCTION PANEL: CPT | Performed by: NURSE PRACTITIONER

## 2023-10-19 RX ORDER — CARVEDILOL 6.25 MG/1
6.25 TABLET ORAL
Status: DISCONTINUED | OUTPATIENT
Start: 2023-10-19 | End: 2023-10-22 | Stop reason: HOSPADM

## 2023-10-19 RX ORDER — WARFARIN 3 MG/1
3 TABLET ORAL
Status: DISCONTINUED | OUTPATIENT
Start: 2023-10-21 | End: 2023-10-22 | Stop reason: HOSPADM

## 2023-10-19 RX ORDER — WARFARIN 1 MG/1
0.5 TABLET ORAL ONCE
Status: COMPLETED | OUTPATIENT
Start: 2023-10-19 | End: 2023-10-19

## 2023-10-19 RX ORDER — WARFARIN 3 MG/1
3 TABLET ORAL
Status: DISCONTINUED | OUTPATIENT
Start: 2023-10-19 | End: 2023-10-19

## 2023-10-19 RX ORDER — LORAZEPAM 1 MG/1
1 TABLET ORAL ONCE
Status: COMPLETED | OUTPATIENT
Start: 2023-10-19 | End: 2023-10-19

## 2023-10-19 RX ORDER — WARFARIN 2 MG/1
2 TABLET ORAL
Status: DISCONTINUED | OUTPATIENT
Start: 2023-10-20 | End: 2023-10-22 | Stop reason: HOSPADM

## 2023-10-19 RX ADMIN — CARVEDILOL 6.25 MG: 6.25 TABLET, FILM COATED ORAL at 17:10

## 2023-10-19 RX ADMIN — LORAZEPAM 1 MG: 1 TABLET ORAL at 03:30

## 2023-10-19 RX ADMIN — SEVELAMER CARBONATE 800 MG: 800 TABLET, FILM COATED ORAL at 09:15

## 2023-10-19 RX ADMIN — MIRTAZAPINE 7.5 MG: 15 TABLET, FILM COATED ORAL at 20:11

## 2023-10-19 RX ADMIN — HYDROMORPHONE HYDROCHLORIDE 0.2 MG: 0.2 INJECTION, SOLUTION INTRAMUSCULAR; INTRAVENOUS; SUBCUTANEOUS at 15:45

## 2023-10-19 RX ADMIN — PANCRELIPASE 3 CAPSULE: 24000; 76000; 120000 CAPSULE, DELAYED RELEASE PELLETS ORAL at 12:27

## 2023-10-19 RX ADMIN — WARFARIN SODIUM 2.5 MG: 2.5 TABLET ORAL at 17:31

## 2023-10-19 RX ADMIN — DOCUSATE SODIUM 100 MG: 100 CAPSULE, LIQUID FILLED ORAL at 09:15

## 2023-10-19 RX ADMIN — CARVEDILOL 3.12 MG: 3.12 TABLET, FILM COATED ORAL at 09:14

## 2023-10-19 RX ADMIN — SODIUM CHLORIDE, SODIUM LACTATE, CALCIUM CHLORIDE, MAGNESIUM CHLORIDE AND DEXTROSE: 4.25; 538; 448; 18.3; 5.08 INJECTION, SOLUTION INTRAPERITONEAL at 17:11

## 2023-10-19 RX ADMIN — PANCRELIPASE 3 CAPSULE: 24000; 76000; 120000 CAPSULE, DELAYED RELEASE PELLETS ORAL at 09:15

## 2023-10-19 RX ADMIN — SODIUM CHLORIDE, SODIUM LACTATE, CALCIUM CHLORIDE, MAGNESIUM CHLORIDE AND DEXTROSE 2000 ML: 2.5; 538; 448; 18.3; 5.08 INJECTION, SOLUTION INTRAPERITONEAL at 09:27

## 2023-10-19 RX ADMIN — HYDROMORPHONE HYDROCHLORIDE 0.2 MG: 0.2 INJECTION, SOLUTION INTRAMUSCULAR; INTRAVENOUS; SUBCUTANEOUS at 23:26

## 2023-10-19 RX ADMIN — SODIUM CHLORIDE, SODIUM LACTATE, CALCIUM CHLORIDE, MAGNESIUM CHLORIDE AND DEXTROSE 2000 ML: 2.5; 538; 448; 18.3; 5.08 INJECTION, SOLUTION INTRAPERITONEAL at 12:27

## 2023-10-19 RX ADMIN — PREGABALIN 25 MG: 25 CAPSULE ORAL at 09:14

## 2023-10-19 RX ADMIN — ATORVASTATIN CALCIUM 40 MG: 40 TABLET, FILM COATED ORAL at 20:11

## 2023-10-19 RX ADMIN — SODIUM CHLORIDE, SODIUM LACTATE, CALCIUM CHLORIDE, MAGNESIUM CHLORIDE AND DEXTROSE 2000 ML: 2.5; 538; 448; 18.3; 5.08 INJECTION, SOLUTION INTRAPERITONEAL at 21:04

## 2023-10-19 RX ADMIN — LEVOTHYROXINE SODIUM 25 MCG: 0.03 TABLET ORAL at 07:06

## 2023-10-19 RX ADMIN — HYDROMORPHONE HYDROCHLORIDE 0.2 MG: 0.2 INJECTION, SOLUTION INTRAMUSCULAR; INTRAVENOUS; SUBCUTANEOUS at 01:13

## 2023-10-19 RX ADMIN — CALCITRIOL CAPSULES 0.25 MCG 0.5 MCG: 0.25 CAPSULE ORAL at 09:15

## 2023-10-19 RX ADMIN — TRAMADOL HYDROCHLORIDE 50 MG: 50 TABLET, COATED ORAL at 03:32

## 2023-10-19 RX ADMIN — ALLOPURINOL 100 MG: 100 TABLET ORAL at 09:14

## 2023-10-19 RX ADMIN — SEVELAMER CARBONATE 800 MG: 800 TABLET, FILM COATED ORAL at 12:27

## 2023-10-19 RX ADMIN — WARFARIN SODIUM 0.5 MG: 1 TABLET ORAL at 18:24

## 2023-10-19 ASSESSMENT — PAIN SCALES - GENERAL
PAINLEVEL_OUTOF10: 10 - WORST POSSIBLE PAIN
PAINLEVEL_OUTOF10: 0 - NO PAIN
PAINLEVEL_OUTOF10: 10 - WORST POSSIBLE PAIN
PAINLEVEL_OUTOF10: 10 - WORST POSSIBLE PAIN
PAINLEVEL_OUTOF10: 5 - MODERATE PAIN
PAINLEVEL_OUTOF10: 10 - WORST POSSIBLE PAIN

## 2023-10-19 ASSESSMENT — COGNITIVE AND FUNCTIONAL STATUS - GENERAL
DRESSING REGULAR LOWER BODY CLOTHING: A LITTLE
STANDING UP FROM CHAIR USING ARMS: A LITTLE
HELP NEEDED FOR BATHING: A LITTLE
WALKING IN HOSPITAL ROOM: A LITTLE
CLIMB 3 TO 5 STEPS WITH RAILING: A LOT
DAILY ACTIVITIY SCORE: 21
MOBILITY SCORE: 19
TOILETING: A LITTLE
MOVING TO AND FROM BED TO CHAIR: A LITTLE

## 2023-10-19 ASSESSMENT — PAIN - FUNCTIONAL ASSESSMENT
PAIN_FUNCTIONAL_ASSESSMENT: 0-10

## 2023-10-19 ASSESSMENT — ACTIVITIES OF DAILY LIVING (ADL): LACK_OF_TRANSPORTATION: NO

## 2023-10-19 NOTE — H&P
History Of Present Illness  Yoselyn Hernandez is a 63 y.o. female presenting with shortness of breath for past few weeks. Presented to pcp for routine appt today and was told she was fluid overloaded and to go to ed. She performs pd at home. She has also been having intense low back pain radiating down her left leg to her feet. She stopped all her home meds a few weeks ago including lyrica. Would like this to be restarted    Sig pmh: carop myoptahy, chf, hyperparathyroidism, esrd on PD, hypothyroidism, aortic aneurysm, dvt/pe, copd, ventral hernia, diarrhea, sciatica, ESRD (PD under Dr. Lora; DW estimated at 58.5kg), secondary hyperparathyroidism, anemia, HTN, PE and dissection     Past Medical History  Other specified coagulation defects (CMS/HCC)  Secondary hyperparathyroidism of renal origin (CMS/HCC)-follow-up with nephrology  Opioid dependence, uncomplicated (CMS/HCC)-stable  ESRD (end stage renal disease) on dialysis (CMS/HCC)-follow-up with nephrology  Dissection of aorta, unspecified portion of aorta (CMS/HCC)-schedule appointment with cardiology  Cardiomyopathy, unspecified type (CMS/HCC)  Chronic obstructive pulmonary disease, unspecified COPD type (CMS/HCC)-stable symptoms  Hypothyroidism, unspecified type-restart Synthroid  -     levothyroxine (Synthroid, Levoxyl) 25 mcg tablet; Take 1 tablet (25 mcg) by mouth once daily in the morning. Take before meals.  Diarrhea, unspecified type-follow-up with GI  -     Creon 24,000-76,000 -120,000 unit capsule; Take 3 capsules by mouth 3 times a day with meals.  Gout, unspecified cause, unspecified chronicity, unspecified site  -     allopurinol (Zyloprim) 100 mg tablet; Take 1 tablet (100 mg) by mouth once daily.  Mixed hyperlipidemia-she will take atorvastatin as prescribed  SOB (shortness of breath)-refer to emergency room for further evaluation     She has a past medical history of Other abnormalities of gait and mobility, Personal history of other endocrine,  nutritional and metabolic disease, and Personal history of other specified conditions.    Surgical History  She has a past surgical history that includes Colonoscopy (12/05/2017); Hysterectomy (01/06/2014); Other surgical history (01/20/2017); CT angio abdomen pelvis w and or wo IV IV contrast (11/17/2014); CT angio abdomen pelvis w and or wo IV IV contrast (02/01/2018); CT angio abdomen pelvis w and or wo IV IV contrast (02/23/2018); CT angio abdomen pelvis w and or wo IV IV contrast (01/10/2019); CT angio abdomen pelvis w and or wo IV IV contrast (11/25/2019); CT angio abdomen pelvis w and or wo IV IV contrast (04/30/2021); CT angio abdomen pelvis w and or wo IV IV contrast (01/09/2017); CT angio abdomen pelvis w and or wo IV IV contrast (10/24/2018); IR angiogram aorta thoracic (02/21/2018); IR angiogram aorta abdomen (08/02/2019); IR intervention NEURO stent (08/02/2019); IR angiogram endovascular aortic repair (08/19/2022); IR angiogram aorta abdomen (08/19/2022); CT angio abdomen pelvis w and or wo IV IV contrast (12/13/2022); CT angio aorta and bilateral iliofemoral runoff w and or wo IV contrast (05/12/2023); and Colonoscopy (12/2021).     Social History  She reports that she quit smoking about 28 years ago. Her smoking use included cigarettes. She has a 5.00 pack-year smoking history. She has never used smokeless tobacco. She reports that she does not currently use alcohol. She reports current drug use. Drug: Marijuana.    Family History  Family History   Problem Relation Name Age of Onset    COPD Mother      Kidney failure Father          Allergies  Ace inhibitors, Ciprofloxacin, and Gabapentin    Review of Systems   Constitutional:  Positive for fatigue.   Gastrointestinal:  Positive for abdominal distention.        Fluid in abdomen, abd hernia    Genitourinary:         Anuric   Musculoskeletal:  Positive for back pain.   Neurological:  Positive for numbness.        Left leg to feet. Tingling numb pain    All other systems reviewed and are negative.       Physical Exam  Constitutional:       General: She is not in acute distress.  HENT:      Head: Normocephalic and atraumatic.      Nose: Nose normal.      Mouth/Throat:      Mouth: Mucous membranes are moist.      Pharynx: Oropharynx is clear. No oropharyngeal exudate or posterior oropharyngeal erythema.   Eyes:      Extraocular Movements: Extraocular movements intact.      Conjunctiva/sclera: Conjunctivae normal.      Pupils: Pupils are equal, round, and reactive to light.   Cardiovascular:      Rate and Rhythm: Normal rate and regular rhythm.      Pulses: Normal pulses.      Heart sounds: Normal heart sounds.   Pulmonary:      Effort: Pulmonary effort is normal.      Breath sounds: Normal breath sounds.   Abdominal:      General: Bowel sounds are normal.      Palpations: Abdomen is soft.      Comments: Soft, 2 hernias midline, nontender   Musculoskeletal:         General: Normal range of motion.      Cervical back: Normal range of motion and neck supple.   Skin:     General: Skin is warm and dry.      Capillary Refill: Capillary refill takes less than 2 seconds.   Neurological:      General: No focal deficit present.      Mental Status: She is alert and oriented to person, place, and time.      Motor: Weakness present.   Psychiatric:         Mood and Affect: Mood normal.          Last Recorded Vitals  BP (!) 131/91   Pulse 96   Temp 36.9 °C (98.5 °F) (Oral)   Resp 22   Wt 68 kg (149 lb 14.6 oz)   SpO2 100%     Relevant Results  Results for orders placed or performed during the hospital encounter of 10/18/23 (from the past 24 hour(s))   CBC and Auto Differential   Result Value Ref Range    WBC 6.2 4.4 - 11.3 x10*3/uL    nRBC 0.0 0.0 - 0.0 /100 WBCs    RBC 3.00 (L) 4.00 - 5.20 x10*6/uL    Hemoglobin 8.8 (L) 12.0 - 16.0 g/dL    Hematocrit 27.6 (L) 36.0 - 46.0 %    MCV 92 80 - 100 fL    MCH 29.3 26.0 - 34.0 pg    MCHC 31.9 (L) 32.0 - 36.0 g/dL    RDW 17.1 (H)  11.5 - 14.5 %    Platelets 202 150 - 450 x10*3/uL    MPV 10.2 7.5 - 11.5 fL    Neutrophils % 71.9 40.0 - 80.0 %    Immature Granulocytes %, Automated 0.5 0.0 - 0.9 %    Lymphocytes % 17.2 13.0 - 44.0 %    Monocytes % 7.6 2.0 - 10.0 %    Eosinophils % 2.3 0.0 - 6.0 %    Basophils % 0.5 0.0 - 2.0 %    Neutrophils Absolute 4.48 1.20 - 7.70 x10*3/uL    Immature Granulocytes Absolute, Automated 0.03 0.00 - 0.70 x10*3/uL    Lymphocytes Absolute 1.07 (L) 1.20 - 4.80 x10*3/uL    Monocytes Absolute 0.47 0.10 - 1.00 x10*3/uL    Eosinophils Absolute 0.14 0.00 - 0.70 x10*3/uL    Basophils Absolute 0.03 0.00 - 0.10 x10*3/uL   Comprehensive Metabolic Panel   Result Value Ref Range    Glucose 80 74 - 99 mg/dL    Sodium 137 136 - 145 mmol/L    Potassium 3.4 (L) 3.5 - 5.3 mmol/L    Chloride 97 (L) 98 - 107 mmol/L    Bicarbonate 28 21 - 32 mmol/L    Anion Gap 15 10 - 20 mmol/L    Urea Nitrogen 40 (H) 6 - 23 mg/dL    Creatinine 6.21 (H) 0.50 - 1.05 mg/dL    eGFR 7 (L) >60 mL/min/1.73m*2    Calcium 7.7 (L) 8.6 - 10.3 mg/dL    Albumin 2.7 (L) 3.4 - 5.0 g/dL    Alkaline Phosphatase 81 33 - 136 U/L    Total Protein 6.2 (L) 6.4 - 8.2 g/dL    AST 13 9 - 39 U/L    Bilirubin, Total 0.3 0.0 - 1.2 mg/dL    ALT 9 7 - 45 U/L   Magnesium   Result Value Ref Range    Magnesium 1.40 (L) 1.60 - 2.40 mg/dL   aPTT   Result Value Ref Range    aPTT 38 27 - 38 seconds   Protime-INR   Result Value Ref Range    Protime 23.3 (H) 9.8 - 12.8 seconds    INR 2.1 (H) 0.9 - 1.1   B-Type Natriuretic Peptide   Result Value Ref Range    BNP >4,700 (H) 0 - 99 pg/mL   Troponin I, High Sensitivity, Initial   Result Value Ref Range    Troponin I, High Sensitivity 51 (HH) 0 - 13 ng/L   Sars-CoV-2 RT PCR, Symptomatic   Result Value Ref Range    Coronavirus 2019, PCR Not Detected Not Detected   Troponin, High Sensitivity, 1 Hour   Result Value Ref Range    Troponin I, High Sensitivity 45 (H) 0 - 13 ng/L   ECG 12 lead   Result Value Ref Range    Ventricular Rate 90 BPM     Atrial Rate 90 BPM    IA Interval 136 ms    QRS Duration 86 ms    QT Interval 398 ms    QTC Calculation(Bazett) 486 ms    P Axis 39 degrees    R Axis -42 degrees    T Axis 129 degrees    QRS Count 15 beats    Q Onset 215 ms    P Onset 147 ms    P Offset 200 ms    T Offset 414 ms    QTC Fredericia 455 ms      ECG 12 lead    Result Date: 10/18/2023  Normal sinus rhythm Left atrial enlargement Left axis deviation Nonspecific ST and T wave abnormality Prolonged QT Abnormal ECG Confirmed by Nazario Chavez ([id number]) on 10/18/2023 3:41:55 PM    CT angio chest abdomen pelvis    Result Date: 10/18/2023  Interpreted By:  Yaw Wright  and Nicanor Ramires STUDY: CT ANGIO CHEST ABDOMEN PELVIS;  10/18/2023 6:58 am   INDICATION: Signs/Symptoms:cp history dissection x8. Per EMR: Worsening chest pain and shortness of breath for 2 weeks, history of PE and aortic dissection status post repair, on peritoneal dialysis   COMPARISON: 08/01/2023 CTA chest abdomen pelvis   ACCESSION NUMBER(S): [id number]   ORDERING CLINICIAN: BESS CHOU   PROCEDURE: CT ANGIOGRAM OF THE CHEST, ABDOMEN AND PELVIS   TECHNIQUE: High-resolution contrast-enhanced helical CT of the  chest, abdomen, and pelvis was performed,  timed to the arterial phase.  3-D processing was performed by the physician on an independent work station, with MIP and volume-rendering techniques. A total of 95 ml of Omnipaque 350 was administered intravenously during the examination.  The study was performed without oral contrast.  The patient tolerated the study without complications.   FINDINGS: VASCULAR: AORTA: Changes of remote dissection and aneurysm status post ascending thoracic, arch, descending thoracic, and abdominal aortic endograft repair. The excluded aneurysm sac involving the distal arch is largely thrombosed with high-density internal signal on pre and postcontrast images, likely calcifications. The endograft is patent and measures 3.0 cm in diameter at the  level of the mid arch. The excluded distal arch aneurysm sac measures 5.2 cm in largest dimension, unchanged compared to prior CT dated 08/01/2023. Evidence of endoleak. AORTIC ARCH BRANCHES: Redemonstration of dissection flap extending along the innominate artery and right common carotid artery with contrast filling of both the true and false lumens, which appears unchanged compared to prior CTA. The right subclavian artery arises from the true lumen and is widely patent. Status post grafting of arch vessels to the proximal ascending aorta just proximal to the endograft. The left subclavian artery arises from the left common carotid. There is moderate narrowing at the origin of the left common carotid without occlusion. CELIAC TRUNK: The native origin of the celiac trunk is occluded. The grafted branches of the celiac artery radiograph originate from the right common iliac artery and appear widely patent. Mild atherosclerotic calcifications of the celiac artery and its branches. SUPERIOR MESENTERIC ARTERY: The native origin of the superior mesenteric artery is occluded. The graft superior mesenteric artery and branches arise from the common trunk graft of the right common iliac artery. INFERIOR MESENTERIC ARTERY: The inferior mesenteric artery is occluded and nonvisualized. RENAL ARTERIES: The native bilateral renal arteries are completely occluded. The grafted bilateral renal arteries arise from a common trunk graft from the left common iliac artery with complete thrombotic occlusion of both vessels, unchanged compared to prior CT. RIGHT COMMON ILIAC ARTERY: The right common iliac is widely patent. A common trunk giving rise to branches of the celiac and superior mesenteric artery is graft into the right common iliac. Mild calcified atherosclerotic changes without focal stenosis. RIGHT EXTERNAL ILIAC ARTERY: Unchanged appearance of dissection flap extending along the right external iliac artery to the common  femoral with contrast filling of both the true and false lumens. No apparent propagation of dissection flap. 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 No focal areas of stenosis. LEFT COMMON ILIAC ARTERY: The left common iliac artery is widely patent with mild calcified atherosclerotic changes. A common trunk giving rise to the bilateral renal arteries is grafted to the left common iliac artery. There is a large lentiform filling defect with narrowing and eventual occlusion of this trunk, likely chronic thrombosis LEFT EXTERNAL ILIAC ARTERY: The left external iliac artery is widely patent with mild calcified atherosclerotic disease. LEFT INTERNAL ILIAC ARTERY: The left internal iliac artery is status post embolism, unchanged from prior. COMMON FEMORAL ARTERIES: The bilateral common arteries are normal in appearance without focal stenosis or aneurysmal dilatation. SUPERFICIAL FEMORAL ARTERIES: There is an area of mild focal narrowing without associated calcified atherosclerotic plaque of the left superficial femoral artery (series 605, image [id number]). The visualized right superficial femoral artery is normal in appearance. DEEP FEMORAL ARTERIES: The visualized deep femoral arteries are normal in appearance.     NONVASCULAR FINDINGS: CHEST: LUNGS/PLEURA/LARGE AIRWAYS: Interval development of small left-sided pleural effusion. Mild increase in interstitial markings compared to prior CT with pulmonary venous congestion. No focal parenchymal consolidation or pneumothorax. Stable appearance of chronic bullous emphysematous changes. The trachea and central airways are patent. Evaluation of pulmonary arteries is not optimized on this exam, however within these limitations no evidence of acute pulmonary embolism.   HEART: The heart is enlarged stable from prior. Moderate calcified atherosclerotic  disease of the coronary arteries. There is no pericardial effusion.   MEDIASTINUM AND ALIDA: No mediastinal or hilar lymphadenopathy. No pneumomediastinum. The visualized esophagus is normal in appearance.   CHEST WALL AND LOWER NECK: The soft tissues and osseous structures of the chest wall are unremarkable. No axillary lymphadenopathy. The visualized thyroid is unremarkable.     ABDOMEN: LIVER: The liver is nonenlarged with normal, homogeneous attenuation.   BILE DUCTS: The intrahepatic and extrahepatic bile ducts are nondilated.   GALLBLADDER: The gallbladder is surgically absent.   PANCREAS: Mild pancreatic ductal prominence with compression of the pancreatic tail by a large chronic fluid collection, similar in appearance to prior.   SPLEEN: The spleen is nonenlarged with homogeneous attenuation.   ADRENAL GLANDS: The adrenal glands are not well visualized on this exam.   KIDNEYS AND URETERS: Bilateral kidneys are completely nonenhancing and severely atrophied. There is no arterial perfusion of the bilateral kidneys. Few hypodense lesions of the right lower renal pole, the largest measuring up to 2.2 cm, likely simple renal cysts.     PELVIS:   BLADDER: The urinary bladder is decompressed, limiting evaluation.   REPRODUCTIVE ORGANS: The uterus is surgically absent.   BOWEL: The stomach is unremarkable without wall thickening. The small and large bowel are of normal course and caliber. No evidence of bowel wall thickening. The appendix is not definitively visualized, however there are no secondary signs of appendicitis.   PERITONEUM/LYMPH NODES: Peritoneal dialysis catheter is seen traversing mid anterior abdominal wall and coiling the left hemipelvis. There is a moderate volume of simple fluid ascites, mildly increased in volume compared to prior CT dated 08/01/2023. There is a small amount of pneumoperitoneum with air pockets collecting anterior dependently within multiple midline ventral abdominal hernias,  likely related to recent peritoneal dialysis. Redemonstration of large partially rim calcified simple fluid collection within the left hemiabdomen measuring 7.7 x 6.6 cm abutting the superior mesenteric vascular grafts without apparent compression. There is compression by this fluid collection of the pancreatic tail.   RETROPERITONEUM: No retroperitoneal fluid collections. No retroperitoneal lymphadenopathy.   OSSEOUS STRUCTURES: Postsurgical changes of median sternotomy. Mild degenerative changes of the pubic symphysis and thoracolumbar spine. No suspicious osseous lesions. Sclerotic osseous lesion of the left iliac bone is unchanged compared to prior CT.   SOFT TISSUES/ABDOMINAL WALL: There are 3 small midline ventral abdominal hernias. The superior 2 hernias are largely ascitic fluid-filled with few small pockets of air layering anti dependently. The inferior-most hernia contains ascitic fluid and a partial small loop of bowel without apparent inflammatory changes. Stable partially imaged anterosuperior left thigh subcutaneous fluid collection, likely postoperative.       1. No acute arterial abnormality. Postsurgical changes of endograft repair and debranching of the entire length of the aorta extending from ascending arch to bifurcation without stenosis or occlusion. No evidence of endoleak. Unchanged appearance of excluded aneurysm sac. 2. Stable appearance of right innominate and common carotid artery dissection and right external iliac and femoral artery dissection. 3. Unchanged appearance of complete thrombotic occlusion of grafted bilateral renal arteries with associated atrophy and nonenhancement of the bilateral kidneys. 4. Mild increase in ascitic fluid volume. Small amount of air collecting anti dependently within ventral abdominal hernias, likely related to peritoneal dialysis rather than visceral perforation. Stable positioning of peritoneal dialysis catheter within the left hemipelvis.   5.  Unchanged appearance of partially rim calcified simple fluid collection within the left hemiabdomen causing compression of the pancreatic tail. No additional fluid collections are visualized. 6. New development of small left-sided pleural effusion with pulmonary venous congestion compared to prior CT dated 08/01/2023. Correlate with fluid status. 7. Stable cardiomegaly with moderate calcified atherosclerotic disease of the coronary arteries.   I personally reviewed the images/study and I agree with the findings as stated. This study was interpreted at [place], [place].   Signed by: Yaw Wright 10/18/2023 10:33 AM Dictation workstation:   [id number]    XR chest 1 view    Result Date: 10/18/2023  Interpreted By:  Manju Sandoval, STUDY: XR CHEST 1 VIEW;  10/18/2023 4:54 am   INDICATION: Signs/Symptoms:Chest Pain.   COMPARISON: 05/12/2023   ACCESSION NUMBER(S): [id number]   ORDERING CLINICIAN: BESS CHOU   FINDINGS:     CARDIOMEDIASTINAL SILHOUETTE: Stable cardiomegaly. Stable appearance of the thoracic aorta with stent graft.   LUNGS: No pulmonary consolidation, pleural effusion or pneumothorax.   ABDOMEN: No remarkable upper abdominal findings.   BONES: No acute osseous abnormality.       No acute cardiopulmonary process.   MACRO: None   Signed by: Manju Sandoval 10/18/2023 5:47 AM Dictation workstation:   [id number]        Assessment/Plan   Yoselyn Hernandez is a 63 y.o. female presenting with shortness of breath for past few weeks. Presented to pcp for routine appt today and was told she was fluid overloaded and to go to ed. She performs pd at home. She has also been having intense low back pain radiating down her left leg to her feet. She stopped all her home meds a few weeks ago including lyrica. Would like this to be restarted    Sig pmh: carop myoptahy, chf, hyperparathyroidism, esrd on PD, hypothyroidism, aortic aneurysm, dvt/pe, copd     Principal  Problem:    Pleural effusion  Active Problems:    Fluid excess  - nephrology consult, appreciate recs  - continue home meds, restart lyrica     Dvt prophylaxis   - on coumadin  - scd/ambulate     Gi prophylaxis   - pantopraxole   - miralax    DC plan  - DC home when medically stable    Labs/Testing reviewed  45 min spent on professional & overall care of this patient    Karina Waddell, APRN-CNP

## 2023-10-19 NOTE — PROGRESS NOTES
Yoselyn Hernandez is a 63 y.o. female on day 0 of admission presenting with Pleural effusion.    Subjective   In speaking with the patient she is very depressed about her situation with her kidney failure and fluid overload.  She is unsure if she is getting the care she needs with her care providers.  She states she has had a large amount of weight loss over the past year when starting peritoneal dialysis.  She states she is forgetful at times and very fatigued.  She lives at home alone but does have friends that she talks to.     ROS  Gen: POSITIVE fatigue, fever, sweats.  Head: No headache, trauma.  Eyes: No vision loss, double vision, drainage, eye pain.  ENT: No hearing changes, pain, epistaxis, congestion  Cardiac: No chest pain  Pulmonary: No shortness of breath,  pleuritic pain,   Heme/lymph: No swollen glands  GI: No abdominal pain, nausea, vomiting, diarrhea POSITIVE peritoneal dialysis   : No  dysuria, frequency, urgency, hematuria  Musculoskeletal: No limb pain, joint pain, back pain, joint swelling or stiffness.  Skin: No rashes, pruritus, lumps, lesions.  Neuro: No Numbness, tingling, or weakness.  Psych: No  anxiety POSITIVE depression     Review of systems is otherwise negative unless stated above or in history of present illness.     Objective     Physical Exam  General: Vital signs stable, Pt is alert forgetful at times, no acute distress  Eyes: Conjunctiva normal, PERRL, EOMs intact  HENMT: Normocephalic, atraumatic, external ears and nose normal, no scars or masses.  No mastoid tenderness. Trachea is midline. No meningeal signs, negative Kernig and Brudzinski, moves neck freely.  No sinus tenderness  Resp: Respiratory effort is normal, no retractions, no stridor. Lungs CTA, no wheezes or rhonchi  CV: Heart is regular rate and rhythm.   GI: POSITIVE peritoneal dialysis exchanging  Skin: No evidence of trauma, skin is warm and dry. No rashes, lesions or ulcers.  Skel: full range of motion of upper and  "lower extremities.   Neuro: Normal gait, CN II-XII intact, no motor or sensory changes.  Psych: Alert and oriented ×3, judgment is appropriate, Depressed about her health    Last Recorded Vitals  Blood pressure 142/78, pulse 89, temperature 36.2 °C (97.2 °F), temperature source Temporal, resp. rate 19, height 1.702 m (5' 7\"), weight 68 kg (149 lb 14.6 oz), SpO2 93 %.  Intake/Output last 3 Shifts:  I/O last 3 completed shifts:  In: 2000 (29.4 mL/kg) [Other:2000]  Out: 1500 (22.1 mL/kg) [Other:1500]  Weight: 68 kg     Relevant Results  Results for orders placed or performed during the hospital encounter of 10/18/23 (from the past 24 hour(s))   ECG 12 lead   Result Value Ref Range    Ventricular Rate 90 BPM    Atrial Rate 90 BPM    AR Interval 136 ms    QRS Duration 86 ms    QT Interval 398 ms    QTC Calculation(Bazett) 486 ms    P Axis 39 degrees    R Axis -42 degrees    T Axis 129 degrees    QRS Count 15 beats    Q Onset 215 ms    P Onset 147 ms    P Offset 200 ms    T Offset 414 ms    QTC Fredericia 455 ms   Renal Function Panel   Result Value Ref Range    Glucose 78 74 - 99 mg/dL    Sodium 134 (L) 136 - 145 mmol/L    Potassium 4.3 3.5 - 5.3 mmol/L    Chloride 94 (L) 98 - 107 mmol/L    Bicarbonate 23 21 - 32 mmol/L    Anion Gap 21 (H) 10 - 20 mmol/L    Urea Nitrogen 43 (H) 6 - 23 mg/dL    Creatinine 6.63 (H) 0.50 - 1.05 mg/dL    eGFR 7 (L) >60 mL/min/1.73m*2    Calcium 8.1 (L) 8.6 - 10.3 mg/dL    Phosphorus 7.2 (H) 2.5 - 4.9 mg/dL    Albumin 2.9 (L) 3.4 - 5.0 g/dL   CBC and Auto Differential   Result Value Ref Range    WBC 6.1 4.4 - 11.3 x10*3/uL    nRBC 0.0 0.0 - 0.0 /100 WBCs    RBC 3.32 (L) 4.00 - 5.20 x10*6/uL    Hemoglobin 9.8 (L) 12.0 - 16.0 g/dL    Hematocrit 31.4 (L) 36.0 - 46.0 %    MCV 95 80 - 100 fL    MCH 29.5 26.0 - 34.0 pg    MCHC 31.2 (L) 32.0 - 36.0 g/dL    RDW 17.4 (H) 11.5 - 14.5 %    Platelets 211 150 - 450 x10*3/uL    MPV 10.4 7.5 - 11.5 fL    Neutrophils % 63.6 40.0 - 80.0 %    Immature " Granulocytes %, Automated 0.5 0.0 - 0.9 %    Lymphocytes % 26.2 13.0 - 44.0 %    Monocytes % 6.4 2.0 - 10.0 %    Eosinophils % 2.3 0.0 - 6.0 %    Basophils % 1.0 0.0 - 2.0 %    Neutrophils Absolute 3.85 1.20 - 7.70 x10*3/uL    Immature Granulocytes Absolute, Automated 0.03 0.00 - 0.70 x10*3/uL    Lymphocytes Absolute 1.59 1.20 - 4.80 x10*3/uL    Monocytes Absolute 0.39 0.10 - 1.00 x10*3/uL    Eosinophils Absolute 0.14 0.00 - 0.70 x10*3/uL    Basophils Absolute 0.06 0.00 - 0.10 x10*3/uL       Imaging Results  ECG 12 lead    Result Date: 10/18/2023  Normal sinus rhythm Left atrial enlargement Left axis deviation Nonspecific ST and T wave abnormality Prolonged QT Abnormal ECG Confirmed by Nazario Chavez ([id number]) on 10/18/2023 3:41:55 PM    CT angio chest abdomen pelvis    Result Date: 10/18/2023  Interpreted By:  Yaw Wright  and Nicanor Ramires STUDY: CT ANGIO CHEST ABDOMEN PELVIS;  10/18/2023 6:58 am   INDICATION: Signs/Symptoms:cp history dissection x8. Per EMR: Worsening chest pain and shortness of breath for 2 weeks, history of PE and aortic dissection status post repair, on peritoneal dialysis   COMPARISON: 08/01/2023 CTA chest abdomen pelvis   ACCESSION NUMBER(S): [id number]   ORDERING CLINICIAN: BESS CHOU   PROCEDURE: CT ANGIOGRAM OF THE CHEST, ABDOMEN AND PELVIS   TECHNIQUE: High-resolution contrast-enhanced helical CT of the  chest, abdomen, and pelvis was performed,  timed to the arterial phase.  3-D processing was performed by the physician on an independent work station, with MIP and volume-rendering techniques. A total of 95 ml of Omnipaque 350 was administered intravenously during the examination.  The study was performed without oral contrast.  The patient tolerated the study without complications.   FINDINGS: VASCULAR: AORTA: Changes of remote dissection and aneurysm status post ascending thoracic, arch, descending thoracic, and abdominal aortic endograft repair. The excluded aneurysm  sac involving the distal arch is largely thrombosed with high-density internal signal on pre and postcontrast images, likely calcifications. The endograft is patent and measures 3.0 cm in diameter at the level of the mid arch. The excluded distal arch aneurysm sac measures 5.2 cm in largest dimension, unchanged compared to prior CT dated 08/01/2023. Evidence of endoleak. AORTIC ARCH BRANCHES: Redemonstration of dissection flap extending along the innominate artery and right common carotid artery with contrast filling of both the true and false lumens, which appears unchanged compared to prior CTA. The right subclavian artery arises from the true lumen and is widely patent. Status post grafting of arch vessels to the proximal ascending aorta just proximal to the endograft. The left subclavian artery arises from the left common carotid. There is moderate narrowing at the origin of the left common carotid without occlusion. CELIAC TRUNK: The native origin of the celiac trunk is occluded. The grafted branches of the celiac artery radiograph originate from the right common iliac artery and appear widely patent. Mild atherosclerotic calcifications of the celiac artery and its branches. SUPERIOR MESENTERIC ARTERY: The native origin of the superior mesenteric artery is occluded. The graft superior mesenteric artery and branches arise from the common trunk graft of the right common iliac artery. INFERIOR MESENTERIC ARTERY: The inferior mesenteric artery is occluded and nonvisualized. RENAL ARTERIES: The native bilateral renal arteries are completely occluded. The grafted bilateral renal arteries arise from a common trunk graft from the left common iliac artery with complete thrombotic occlusion of both vessels, unchanged compared to prior CT. RIGHT COMMON ILIAC ARTERY: The right common iliac is widely patent. A common trunk giving rise to branches of the celiac and superior mesenteric artery is graft into the right common  iliac. Mild calcified atherosclerotic changes without focal stenosis. RIGHT EXTERNAL ILIAC ARTERY: Unchanged appearance of dissection flap extending along the right external iliac artery to the common femoral with contrast filling of both the true and false lumens. No apparent propagation of dissection flap. 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 No focal areas of stenosis. LEFT COMMON ILIAC ARTERY: The left common iliac artery is widely patent with mild calcified atherosclerotic changes. A common trunk giving rise to the bilateral renal arteries is grafted to the left common iliac artery. There is a large lentiform filling defect with narrowing and eventual occlusion of this trunk, likely chronic thrombosis LEFT EXTERNAL ILIAC ARTERY: The left external iliac artery is widely patent with mild calcified atherosclerotic disease. LEFT INTERNAL ILIAC ARTERY: The left internal iliac artery is status post embolism, unchanged from prior. COMMON FEMORAL ARTERIES: The bilateral common arteries are normal in appearance without focal stenosis or aneurysmal dilatation. SUPERFICIAL FEMORAL ARTERIES: There is an area of mild focal narrowing without associated calcified atherosclerotic plaque of the left superficial femoral artery (series 605, image [id number]). The visualized right superficial femoral artery is normal in appearance. DEEP FEMORAL ARTERIES: The visualized deep femoral arteries are normal in appearance.     NONVASCULAR FINDINGS: CHEST: LUNGS/PLEURA/LARGE AIRWAYS: Interval development of small left-sided pleural effusion. Mild increase in interstitial markings compared to prior CT with pulmonary venous congestion. No focal parenchymal consolidation or pneumothorax. Stable appearance of chronic bullous emphysematous changes. The trachea and central airways are patent. Evaluation of pulmonary  arteries is not optimized on this exam, however within these limitations no evidence of acute pulmonary embolism.   HEART: The heart is enlarged stable from prior. Moderate calcified atherosclerotic disease of the coronary arteries. There is no pericardial effusion.   MEDIASTINUM AND ALIDA: No mediastinal or hilar lymphadenopathy. No pneumomediastinum. The visualized esophagus is normal in appearance.   CHEST WALL AND LOWER NECK: The soft tissues and osseous structures of the chest wall are unremarkable. No axillary lymphadenopathy. The visualized thyroid is unremarkable.     ABDOMEN: LIVER: The liver is nonenlarged with normal, homogeneous attenuation.   BILE DUCTS: The intrahepatic and extrahepatic bile ducts are nondilated.   GALLBLADDER: The gallbladder is surgically absent.   PANCREAS: Mild pancreatic ductal prominence with compression of the pancreatic tail by a large chronic fluid collection, similar in appearance to prior.   SPLEEN: The spleen is nonenlarged with homogeneous attenuation.   ADRENAL GLANDS: The adrenal glands are not well visualized on this exam.   KIDNEYS AND URETERS: Bilateral kidneys are completely nonenhancing and severely atrophied. There is no arterial perfusion of the bilateral kidneys. Few hypodense lesions of the right lower renal pole, the largest measuring up to 2.2 cm, likely simple renal cysts.     PELVIS:   BLADDER: The urinary bladder is decompressed, limiting evaluation.   REPRODUCTIVE ORGANS: The uterus is surgically absent.   BOWEL: The stomach is unremarkable without wall thickening. The small and large bowel are of normal course and caliber. No evidence of bowel wall thickening. The appendix is not definitively visualized, however there are no secondary signs of appendicitis.   PERITONEUM/LYMPH NODES: Peritoneal dialysis catheter is seen traversing mid anterior abdominal wall and coiling the left hemipelvis. There is a moderate volume of simple fluid ascites, mildly  increased in volume compared to prior CT dated 08/01/2023. There is a small amount of pneumoperitoneum with air pockets collecting anterior dependently within multiple midline ventral abdominal hernias, likely related to recent peritoneal dialysis. Redemonstration of large partially rim calcified simple fluid collection within the left hemiabdomen measuring 7.7 x 6.6 cm abutting the superior mesenteric vascular grafts without apparent compression. There is compression by this fluid collection of the pancreatic tail.   RETROPERITONEUM: No retroperitoneal fluid collections. No retroperitoneal lymphadenopathy.   OSSEOUS STRUCTURES: Postsurgical changes of median sternotomy. Mild degenerative changes of the pubic symphysis and thoracolumbar spine. No suspicious osseous lesions. Sclerotic osseous lesion of the left iliac bone is unchanged compared to prior CT.   SOFT TISSUES/ABDOMINAL WALL: There are 3 small midline ventral abdominal hernias. The superior 2 hernias are largely ascitic fluid-filled with few small pockets of air layering anti dependently. The inferior-most hernia contains ascitic fluid and a partial small loop of bowel without apparent inflammatory changes. Stable partially imaged anterosuperior left thigh subcutaneous fluid collection, likely postoperative.       1. No acute arterial abnormality. Postsurgical changes of endograft repair and debranching of the entire length of the aorta extending from ascending arch to bifurcation without stenosis or occlusion. No evidence of endoleak. Unchanged appearance of excluded aneurysm sac. 2. Stable appearance of right innominate and common carotid artery dissection and right external iliac and femoral artery dissection. 3. Unchanged appearance of complete thrombotic occlusion of grafted bilateral renal arteries with associated atrophy and nonenhancement of the bilateral kidneys. 4. Mild increase in ascitic fluid volume. Small amount of air collecting anti  dependently within ventral abdominal hernias, likely related to peritoneal dialysis rather than visceral perforation. Stable positioning of peritoneal dialysis catheter within the left hemipelvis.   5. Unchanged appearance of partially rim calcified simple fluid collection within the left hemiabdomen causing compression of the pancreatic tail. No additional fluid collections are visualized. 6. New development of small left-sided pleural effusion with pulmonary venous congestion compared to prior CT dated 08/01/2023. Correlate with fluid status. 7. Stable cardiomegaly with moderate calcified atherosclerotic disease of the coronary arteries.   I personally reviewed the images/study and I agree with the findings as stated. This study was interpreted at [place], [place].   Signed by: Yaw Wright 10/18/2023 10:33 AM Dictation workstation:   [id number]    XR chest 1 view    Result Date: 10/18/2023  Interpreted By:  Manju Sandoval, STUDY: XR CHEST 1 VIEW;  10/18/2023 4:54 am   INDICATION: Signs/Symptoms:Chest Pain.   COMPARISON: 05/12/2023   ACCESSION NUMBER(S): [id number]   ORDERING CLINICIAN: BESS CHOU   FINDINGS:     CARDIOMEDIASTINAL SILHOUETTE: Stable cardiomegaly. Stable appearance of the thoracic aorta with stent graft.   LUNGS: No pulmonary consolidation, pleural effusion or pneumothorax.   ABDOMEN: No remarkable upper abdominal findings.   BONES: No acute osseous abnormality.       No acute cardiopulmonary process.   MACRO: None   Signed by: Manju Sandoval 10/18/2023 5:47 AM Dictation workstation:   [id number]    XR CHEST 1V FRONTAL PORT    Result Date: 10/8/2023  * * *Final Report* * * DATE OF EXAM: Oct  8 2023  2:49PM   SPX   [id number]  -  XR CHEST 1V FRONTAL PORT  / ACCESSION #  [id number] PROCEDURE REASON: Shortness of breath      * * * * Physician Interpretation * * * * RESULT: EXAMINATION:  CHEST RADIOGRAPH (PORTABLE SINGLE VIEW AP) Exam Date/Time:   10/8/2023 2:49 PM CLINICAL HISTORY: Shortness of breath MQ:  XCPR_5 Comparison:  04/24/2023 RESULT: The patient is status post median sternotomy.  An endovascular stent graft is again seen within a tortuous and dilated thoracic aorta.   The heart is stable in size.  Mild pulmonary venous congestion.  There is no lobar consolidation or pneumothorax.  Mild blunting of both costophrenic angles.    IMPRESSION: Endovascular stent graft is again seen within the tortuous and dilated thoracic aorta.  Mild pulmonary venous congestion, with blunting of both costophrenic angles. Transcribed Using Voice Recognition Transcribe Date/Time: Oct  8 2023  2:49P Dictated by: ANASTASIYA WEAVER MD This examination was interpreted and the report reviewed and electronically signed by: ANASTASIYA WEAVER MD on Oct  8 2023  2:51PM  EST      Medications:  allopurinol, 100 mg, oral, Daily  atorvastatin, 40 mg, oral, Nightly  calcitriol, 0.5 mcg, oral, Daily  carvedilol, 3.125 mg, oral, BID with meals  dextrose 2.5 % - LOW calcium 2.5 mEq/L, , intraperitoneal, 3 times per day  dextrose 4.25 % - LOW calcium 2.5 mEq/L (Delflex-LC, Dianeal) in 2,000 mL peritoneal dialysate, , intraperitoneal, q24h  docusate sodium, 100 mg, oral, BID  epoetin treasure or biosimilar, 10,000 Units, subcutaneous, Weekly  levothyroxine, 25 mcg, oral, Daily before breakfast  lipase-protease-amylase, 3 capsule, oral, TID with meals  mirtazapine, 7.5 mg, oral, Nightly  pantoprazole, 40 mg, oral, Daily before breakfast  pregabalin, 25 mg, oral, Daily  sevelamer carbonate, 800 mg, oral, TID with meals  warfarin, 2.5 mg, oral, Daily       PRN medications: HYDROmorphone, ondansetron **OR** ondansetron, polyethylene glycol, traMADol     Assessment/Plan     Principal Problem:    Pleural effusion  Active Problems:    Fluid excess  - nephrology consult, appreciate recs  - Continue peritoneal dialysis  - Continue on a sodium restricted diet  - Continue monitoring lab work daily  - BMP  creatinine 6.21  - BNP greater than 4700  - continue home meds, restart lyrica   - depressed about health concerns will consult psychiatry    Hypothyroid/HTN/gout   -continue on home meds    Dvt prophylaxis/PE history  - on coumadin  - INR 2.1  - scd/ambulate      Gi prophylaxis   - pantopraxole   - miralax       Labs/Testing reviewed    Interdisciplinary team rounding completed with hospitalist, nurse, TCC    NP discussed plan and lab/testing results with Dr. Prasanth Lora will see patient and talk to her about her peritoneal dialysis.  She will continue on sodium restricted diet.    Patient will have psychiatry come and see her regarding her depression about her health concerns.    * 45 minutes total spent on patient's care today; >50% time spent on counseling/coordination of care    TADEO De La Garza-CNP

## 2023-10-19 NOTE — PROGRESS NOTES
10/19/23 1051   Discharge Planning   Living Arrangements Alone   Support Systems Friends/neighbors  (neighbor Sandra Oakley)   Type of Residence Private residence   Number of Stairs to Enter Residence 8   Number of Stairs Within Residence 20   Home or Post Acute Services In home services   Type of Home Care Services Home nursing visits   Does the patient need discharge transport arranged? Yes   RoundTrip coordination needed? Yes   Has discharge transport been arranged? No   Financial Resource Strain   How hard is it for you to pay for the very basics like food, housing, medical care, and heating? Not hard   Housing Stability   In the last 12 months, was there a time when you were not able to pay the mortgage or rent on time? N   In the last 12 months, was there a time when you did not have a steady place to sleep or slept in a shelter (including now)? N   Transportation Needs   In the past 12 months, has lack of transportation kept you from medical appointments or from getting medications? yes  (patient states she has trouble affording medications sometimes)   In the past 12 months, has lack of transportation kept you from meetings, work, or from getting things needed for daily living? No   Patient Choice   Provider Choice list and CMS website (https://medicare.gov/care-compare#search) for post-acute Quality and Resource Measure Data were provided and reviewed with: Patient   Patient / Family choosing to utilize agency / facility established prior to hospitalization Yes         Spoke with patient to discuss her preferences for care. Discussed how patient manages health at home. Lives home alone in a house. Independent in all ADLS, uses cane for ambulation.  Has PD that she does each night, and follows with DR. Lora. Patient denies any problems getting to appointments, but does endorse some difficulty affording medications. Asked SW to follow up with this. No additional resources or needs identified.     Plan:  SOB/pleural effusions, nephrology following.  Status: obs  Payor: MMO  Disposition: Home alone, may need HHC  Barrier: SOB/nephro recommendations  ADOD:  1-2 days    Savannah Campbell RN

## 2023-10-19 NOTE — NURSING NOTE
Patient refused both breakfast and lunch this shift offered alternative meal options refused as well. Visibly anxious and tearful. Verbalized upset regarding coping with illness NP notified.

## 2023-10-19 NOTE — PROGRESS NOTES
Seen on dialysis.  Ms. Hernandez has ESRD on peritoneal dialysis.  She comes in now with significant fluid overload, she does have heart failure.  We discussed the importance of a low-sodium diet.  It would be very important that we make certain she has 4 exchanges, that she is ultrafiltrating appropriately.  I will ask psychiatry to see her as well as she is having significant depression which is not unexpected in the setting of her kidney failure and fragile state.

## 2023-10-19 NOTE — NURSING NOTE
Patient refused dinner this shift, alternative meal options offered. Patient refused alternative meal options. Continues to be tearful and anxious.

## 2023-10-20 LAB
ALBUMIN SERPL BCP-MCNC: 2.7 G/DL (ref 3.4–5)
ANION GAP SERPL CALC-SCNC: 18 MMOL/L (ref 10–20)
BASOPHILS # BLD AUTO: 0.03 X10*3/UL (ref 0–0.1)
BASOPHILS NFR BLD AUTO: 0.5 %
BUN SERPL-MCNC: 42 MG/DL (ref 6–23)
CALCIUM SERPL-MCNC: 8.7 MG/DL (ref 8.6–10.3)
CHLORIDE SERPL-SCNC: 94 MMOL/L (ref 98–107)
CO2 SERPL-SCNC: 26 MMOL/L (ref 21–32)
CREAT SERPL-MCNC: 6.76 MG/DL (ref 0.5–1.05)
EOSINOPHIL # BLD AUTO: 0.15 X10*3/UL (ref 0–0.7)
EOSINOPHIL NFR BLD AUTO: 2.5 %
ERYTHROCYTE [DISTWIDTH] IN BLOOD BY AUTOMATED COUNT: 17.2 % (ref 11.5–14.5)
GFR SERPL CREATININE-BSD FRML MDRD: 6 ML/MIN/1.73M*2
GLUCOSE SERPL-MCNC: 94 MG/DL (ref 74–99)
HCT VFR BLD AUTO: 28.5 % (ref 36–46)
HGB BLD-MCNC: 8.9 G/DL (ref 12–16)
IMM GRANULOCYTES # BLD AUTO: 0.02 X10*3/UL (ref 0–0.7)
IMM GRANULOCYTES NFR BLD AUTO: 0.3 % (ref 0–0.9)
INR PPP: 2.9 (ref 0.9–1.1)
LYMPHOCYTES # BLD AUTO: 0.87 X10*3/UL (ref 1.2–4.8)
LYMPHOCYTES NFR BLD AUTO: 14.7 %
MCH RBC QN AUTO: 28.8 PG (ref 26–34)
MCHC RBC AUTO-ENTMCNC: 31.2 G/DL (ref 32–36)
MCV RBC AUTO: 92 FL (ref 80–100)
MONOCYTES # BLD AUTO: 0.41 X10*3/UL (ref 0.1–1)
MONOCYTES NFR BLD AUTO: 6.9 %
NEUTROPHILS # BLD AUTO: 4.45 X10*3/UL (ref 1.2–7.7)
NEUTROPHILS NFR BLD AUTO: 75.1 %
NRBC BLD-RTO: 0 /100 WBCS (ref 0–0)
PHOSPHATE SERPL-MCNC: 6.7 MG/DL (ref 2.5–4.9)
PLATELET # BLD AUTO: 202 X10*3/UL (ref 150–450)
PMV BLD AUTO: 10.7 FL (ref 7.5–11.5)
POTASSIUM SERPL-SCNC: 3.7 MMOL/L (ref 3.5–5.3)
PROTHROMBIN TIME: 33 SECONDS (ref 9.8–12.8)
RBC # BLD AUTO: 3.09 X10*6/UL (ref 4–5.2)
SODIUM SERPL-SCNC: 134 MMOL/L (ref 136–145)
WBC # BLD AUTO: 5.9 X10*3/UL (ref 4.4–11.3)

## 2023-10-20 PROCEDURE — 2500000001 HC RX 250 WO HCPCS SELF ADMINISTERED DRUGS (ALT 637 FOR MEDICARE OP): Performed by: NURSE PRACTITIONER

## 2023-10-20 PROCEDURE — 99223 1ST HOSP IP/OBS HIGH 75: CPT | Performed by: PSYCHIATRY & NEUROLOGY

## 2023-10-20 PROCEDURE — 85025 COMPLETE CBC W/AUTO DIFF WBC: CPT | Performed by: NURSE PRACTITIONER

## 2023-10-20 PROCEDURE — 36415 COLL VENOUS BLD VENIPUNCTURE: CPT | Performed by: NURSE PRACTITIONER

## 2023-10-20 PROCEDURE — 2500000004 HC RX 250 GENERAL PHARMACY W/ HCPCS (ALT 636 FOR OP/ED): Performed by: NURSE PRACTITIONER

## 2023-10-20 PROCEDURE — 85610 PROTHROMBIN TIME: CPT | Performed by: NURSE PRACTITIONER

## 2023-10-20 PROCEDURE — 99232 SBSQ HOSP IP/OBS MODERATE 35: CPT | Performed by: NURSE PRACTITIONER

## 2023-10-20 PROCEDURE — 80069 RENAL FUNCTION PANEL: CPT | Performed by: NURSE PRACTITIONER

## 2023-10-20 PROCEDURE — 96376 TX/PRO/DX INJ SAME DRUG ADON: CPT

## 2023-10-20 PROCEDURE — G0378 HOSPITAL OBSERVATION PER HR: HCPCS

## 2023-10-20 PROCEDURE — 2500000001 HC RX 250 WO HCPCS SELF ADMINISTERED DRUGS (ALT 637 FOR MEDICARE OP): Performed by: PSYCHIATRY & NEUROLOGY

## 2023-10-20 RX ORDER — MIRTAZAPINE 15 MG/1
15 TABLET, FILM COATED ORAL NIGHTLY
Status: DISCONTINUED | OUTPATIENT
Start: 2023-10-20 | End: 2023-10-22 | Stop reason: HOSPADM

## 2023-10-20 RX ADMIN — SEVELAMER CARBONATE 800 MG: 800 TABLET, FILM COATED ORAL at 16:50

## 2023-10-20 RX ADMIN — PANCRELIPASE 3 CAPSULE: 24000; 76000; 120000 CAPSULE, DELAYED RELEASE PELLETS ORAL at 16:50

## 2023-10-20 RX ADMIN — SEVELAMER CARBONATE 800 MG: 800 TABLET, FILM COATED ORAL at 08:48

## 2023-10-20 RX ADMIN — HYDROMORPHONE HYDROCHLORIDE 0.2 MG: 0.2 INJECTION, SOLUTION INTRAMUSCULAR; INTRAVENOUS; SUBCUTANEOUS at 16:39

## 2023-10-20 RX ADMIN — TRAMADOL HYDROCHLORIDE 50 MG: 50 TABLET, COATED ORAL at 00:56

## 2023-10-20 RX ADMIN — PREGABALIN 25 MG: 25 CAPSULE ORAL at 08:47

## 2023-10-20 RX ADMIN — HYDROMORPHONE HYDROCHLORIDE 0.2 MG: 0.2 INJECTION, SOLUTION INTRAMUSCULAR; INTRAVENOUS; SUBCUTANEOUS at 13:23

## 2023-10-20 RX ADMIN — ONDANSETRON 4 MG: 2 INJECTION INTRAMUSCULAR; INTRAVENOUS at 13:29

## 2023-10-20 RX ADMIN — SODIUM CHLORIDE, SODIUM LACTATE, CALCIUM CHLORIDE, MAGNESIUM CHLORIDE AND DEXTROSE 2000 ML: 2.5; 538; 448; 18.3; 5.08 INJECTION, SOLUTION INTRAPERITONEAL at 13:23

## 2023-10-20 RX ADMIN — SODIUM CHLORIDE, SODIUM LACTATE, CALCIUM CHLORIDE, MAGNESIUM CHLORIDE AND DEXTROSE 2000 ML: 2.5; 538; 448; 18.3; 5.08 INJECTION, SOLUTION INTRAPERITONEAL at 23:53

## 2023-10-20 RX ADMIN — MIRTAZAPINE 15 MG: 15 TABLET, FILM COATED ORAL at 20:18

## 2023-10-20 RX ADMIN — ALLOPURINOL 100 MG: 100 TABLET ORAL at 08:48

## 2023-10-20 RX ADMIN — PANCRELIPASE 3 CAPSULE: 24000; 76000; 120000 CAPSULE, DELAYED RELEASE PELLETS ORAL at 08:47

## 2023-10-20 RX ADMIN — DOCUSATE SODIUM 100 MG: 100 CAPSULE, LIQUID FILLED ORAL at 08:47

## 2023-10-20 RX ADMIN — SODIUM CHLORIDE, SODIUM LACTATE, CALCIUM CHLORIDE, MAGNESIUM CHLORIDE AND DEXTROSE 2000 ML: 2.5; 538; 448; 18.3; 5.08 INJECTION, SOLUTION INTRAPERITONEAL at 10:12

## 2023-10-20 RX ADMIN — CARVEDILOL 6.25 MG: 6.25 TABLET, FILM COATED ORAL at 08:47

## 2023-10-20 RX ADMIN — WARFARIN SODIUM 2 MG: 2 TABLET ORAL at 18:09

## 2023-10-20 RX ADMIN — HYDROMORPHONE HYDROCHLORIDE 0.2 MG: 0.2 INJECTION, SOLUTION INTRAMUSCULAR; INTRAVENOUS; SUBCUTANEOUS at 20:19

## 2023-10-20 RX ADMIN — LEVOTHYROXINE SODIUM 25 MCG: 0.03 TABLET ORAL at 07:05

## 2023-10-20 RX ADMIN — HYDROMORPHONE HYDROCHLORIDE 0.2 MG: 0.2 INJECTION, SOLUTION INTRAMUSCULAR; INTRAVENOUS; SUBCUTANEOUS at 10:14

## 2023-10-20 RX ADMIN — HYDROMORPHONE HYDROCHLORIDE 0.2 MG: 0.2 INJECTION, SOLUTION INTRAMUSCULAR; INTRAVENOUS; SUBCUTANEOUS at 07:06

## 2023-10-20 RX ADMIN — ATORVASTATIN CALCIUM 40 MG: 40 TABLET, FILM COATED ORAL at 20:18

## 2023-10-20 RX ADMIN — TRAMADOL HYDROCHLORIDE 50 MG: 50 TABLET, COATED ORAL at 08:48

## 2023-10-20 RX ADMIN — ONDANSETRON 4 MG: 2 INJECTION INTRAMUSCULAR; INTRAVENOUS at 08:48

## 2023-10-20 RX ADMIN — HYDROMORPHONE HYDROCHLORIDE 0.2 MG: 0.2 INJECTION, SOLUTION INTRAMUSCULAR; INTRAVENOUS; SUBCUTANEOUS at 23:53

## 2023-10-20 RX ADMIN — DOCUSATE SODIUM 100 MG: 100 CAPSULE, LIQUID FILLED ORAL at 20:18

## 2023-10-20 RX ADMIN — CALCITRIOL CAPSULES 0.25 MCG 0.5 MCG: 0.25 CAPSULE ORAL at 08:47

## 2023-10-20 RX ADMIN — SEVELAMER CARBONATE 800 MG: 800 TABLET, FILM COATED ORAL at 11:58

## 2023-10-20 RX ADMIN — CARVEDILOL 6.25 MG: 6.25 TABLET, FILM COATED ORAL at 16:50

## 2023-10-20 RX ADMIN — SODIUM CHLORIDE, SODIUM LACTATE, CALCIUM CHLORIDE, MAGNESIUM CHLORIDE AND DEXTROSE: 4.25; 538; 448; 18.3; 5.08 INJECTION, SOLUTION INTRAPERITONEAL at 16:00

## 2023-10-20 RX ADMIN — HYDROMORPHONE HYDROCHLORIDE 0.2 MG: 0.2 INJECTION, SOLUTION INTRAMUSCULAR; INTRAVENOUS; SUBCUTANEOUS at 03:02

## 2023-10-20 RX ADMIN — PANCRELIPASE 3 CAPSULE: 24000; 76000; 120000 CAPSULE, DELAYED RELEASE PELLETS ORAL at 11:58

## 2023-10-20 ASSESSMENT — PAIN SCALES - GENERAL
PAINLEVEL_OUTOF10: 7
PAINLEVEL_OUTOF10: 10 - WORST POSSIBLE PAIN
PAINLEVEL_OUTOF10: 8
PAINLEVEL_OUTOF10: 6
PAINLEVEL_OUTOF10: 8
PAINLEVEL_OUTOF10: 5 - MODERATE PAIN
PAINLEVEL_OUTOF10: 6

## 2023-10-20 ASSESSMENT — COGNITIVE AND FUNCTIONAL STATUS - GENERAL
DAILY ACTIVITIY SCORE: 21
CLIMB 3 TO 5 STEPS WITH RAILING: A LOT
DRESSING REGULAR LOWER BODY CLOTHING: A LITTLE
WALKING IN HOSPITAL ROOM: A LITTLE
MOBILITY SCORE: 19
TOILETING: A LITTLE
STANDING UP FROM CHAIR USING ARMS: A LITTLE
MOVING TO AND FROM BED TO CHAIR: A LITTLE
HELP NEEDED FOR BATHING: A LITTLE

## 2023-10-20 ASSESSMENT — PAIN DESCRIPTION - DESCRIPTORS: DESCRIPTORS: SHARP;SPASM

## 2023-10-20 ASSESSMENT — PAIN - FUNCTIONAL ASSESSMENT
PAIN_FUNCTIONAL_ASSESSMENT: 0-10

## 2023-10-20 NOTE — CARE PLAN
Problem: Pain  Goal: Takes deep breaths with improved pain control throughout the shift  Outcome: Progressing  Goal: Turns in bed with improved pain control throughout the shift  Outcome: Progressing  Goal: Walks with improved pain control throughout the shift  Outcome: Progressing  Goal: Performs ADL's with improved pain control throughout shift  Outcome: Progressing  Goal: Participates in PT with improved pain control throughout the shift  Outcome: Progressing  Goal: Free from opioid side effects throughout the shift  Outcome: Progressing  Goal: Free from acute confusion related to pain meds throughout the shift  Outcome: Progressing     Problem: Fall/Injury  Goal: Not fall by end of shift  Outcome: Progressing  Goal: Be free from injury by end of the shift  Outcome: Progressing  Goal: Verbalize understanding of personal risk factors for fall in the hospital  Outcome: Progressing  Goal: Verbalize understanding of risk factor reduction measures to prevent injury from fall in the home  Outcome: Progressing  Goal: Use assistive devices by end of the shift  Outcome: Progressing  Goal: Pace activities to prevent fatigue by end of the shift  Outcome: Progressing   The patient's goals for the shift include      The clinical goals for the shift include peritoneal dialysis    Over the shift, the patient did not make progress toward the following goals. Barriers to progression include . Recommendations to address these barriers include .

## 2023-10-20 NOTE — CONSULTS
"Reason For Consult  depression    History Of Present Illness  Yoselyn Hernandez is a 63 y.o. single female presenting with CHF/fluid overload on 10/19, is on nightly peritoneal dialysis. Missed on night on 10/18 when came to ED for bothersome hernia symptoms. Pt lives independently but has a male friend who lives downstairs and helps her. Per pt quality of life affected adversely by chronic diarrhea. Smokes MJ for pain, it helps completely - \"In here they have to give me all these pain pills that dont do anything except make me woozy\".     Pt noted to be extremely depressed by staff and physicians, consult to psychiatry.   Has had difficulty with anorexia, not hungry, down 50 lbs.   Is extremely upset she's been taken off the transplant list due to health being \"too poor\".     Pt has had an evolution of depression since parents , followed by multiple aortic procedures for dissection, then renal failure, now on peritoneal dialysis nightly. She describes a downward spiral since 2016.   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   Never , no children - closest person is her sister but she has health concerns too. One cousin who comes over and many close friends - I call them my sisters - when I had explosive diarrhea 3 of them came over and cleaned me and the bathroom, I love them dearly\".     Will not consider HD - parent had that - she doesn't want to go through that. This is just fine- no bad days, not like HD where they're wiped out half the time.     Pt willing to take something for depression - I am concerned that any SSRI will make her diarrhea worse - agrees to increase in mirtazapine which may also help with anxiety    Past Psych History  Outpatient medication only  No hospitalizations or suicide attempts     Past Medical History  She has a past medical history of Other abnormalities of gait and mobility, Personal history of other " endocrine, nutritional and metabolic disease, and Personal history of other specified conditions.    Surgical History  She has a past surgical history that includes Colonoscopy (12/05/2017); Hysterectomy (01/06/2014); Other surgical history (01/20/2017); CT angio abdomen pelvis w and or wo IV IV contrast (11/17/2014); CT angio abdomen pelvis w and or wo IV IV contrast (02/01/2018); CT angio abdomen pelvis w and or wo IV IV contrast (02/23/2018); CT angio abdomen pelvis w and or wo IV IV contrast (01/10/2019); CT angio abdomen pelvis w and or wo IV IV contrast (11/25/2019); CT angio abdomen pelvis w and or wo IV IV contrast (04/30/2021); CT angio abdomen pelvis w and or wo IV IV contrast (01/09/2017); CT angio abdomen pelvis w and or wo IV IV contrast (10/24/2018); IR angiogram aorta thoracic (02/21/2018); IR angiogram aorta abdomen (08/02/2019); IR intervention NEURO stent (08/02/2019); IR angiogram endovascular aortic repair (08/19/2022); IR angiogram aorta abdomen (08/19/2022); CT angio abdomen pelvis w and or wo IV IV contrast (12/13/2022); CT angio aorta and bilateral iliofemoral runoff w and or wo IV contrast (05/12/2023); and Colonoscopy (12/2021).     Social History  She reports that she quit smoking about 28 years ago. Her smoking use included cigarettes. She has a 5.00 pack-year smoking history. She has never used smokeless tobacco. She reports that she does not currently use alcohol. She reports current drug use. Drug: Marijuana.    Family History  Family History   Problem Relation Name Age of Onset   • COPD Mother     • Kidney failure Father          Allergies  Ace inhibitors, Ciprofloxacin, and Gabapentin     Physical Exam  Physical Exam  Psychiatric:         Attention and Perception: Attention normal. She does not perceive auditory or visual hallucinations.         Mood and Affect: Mood is depressed. Affect is flat.         Speech: Speech normal.         Behavior: Behavior is slowed. Behavior is  "cooperative.         Thought Content: Thought content is not paranoid or delusional. Thought content does not include homicidal or suicidal ideation.         Cognition and Memory: Cognition normal.         Judgment: Judgment normal.         Last Recorded Vitals  Blood pressure 132/59, pulse 83, temperature 36.1 °C (97 °F), temperature source Temporal, resp. rate 19, height 1.702 m (5' 7\"), weight 68 kg (149 lb 14.6 oz), SpO2 95 %.    Relevant Results  Results for orders placed or performed during the hospital encounter of 10/18/23 (from the past 24 hour(s))   Renal Function Panel   Result Value Ref Range    Glucose 106 (H) 74 - 99 mg/dL    Sodium 135 (L) 136 - 145 mmol/L    Potassium 3.1 (L) 3.5 - 5.3 mmol/L    Chloride 93 (L) 98 - 107 mmol/L    Bicarbonate 26 21 - 32 mmol/L    Anion Gap 19 10 - 20 mmol/L    Urea Nitrogen 40 (H) 6 - 23 mg/dL    Creatinine 6.62 (H) 0.50 - 1.05 mg/dL    eGFR 7 (L) >60 mL/min/1.73m*2    Calcium 8.5 (L) 8.6 - 10.3 mg/dL    Phosphorus 6.4 (H) 2.5 - 4.9 mg/dL    Albumin 2.7 (L) 3.4 - 5.0 g/dL   CBC and Auto Differential   Result Value Ref Range    WBC 5.8 4.4 - 11.3 x10*3/uL    nRBC 0.0 0.0 - 0.0 /100 WBCs    RBC 3.14 (L) 4.00 - 5.20 x10*6/uL    Hemoglobin 9.2 (L) 12.0 - 16.0 g/dL    Hematocrit 29.1 (L) 36.0 - 46.0 %    MCV 93 80 - 100 fL    MCH 29.3 26.0 - 34.0 pg    MCHC 31.6 (L) 32.0 - 36.0 g/dL    RDW 17.1 (H) 11.5 - 14.5 %    Platelets 206 150 - 450 x10*3/uL    MPV 10.7 7.5 - 11.5 fL    Neutrophils % 66.9 40.0 - 80.0 %    Immature Granulocytes %, Automated 0.5 0.0 - 0.9 %    Lymphocytes % 21.9 13.0 - 44.0 %    Monocytes % 6.9 2.0 - 10.0 %    Eosinophils % 3.3 0.0 - 6.0 %    Basophils % 0.5 0.0 - 2.0 %    Neutrophils Absolute 3.87 1.20 - 7.70 x10*3/uL    Immature Granulocytes Absolute, Automated 0.03 0.00 - 0.70 x10*3/uL    Lymphocytes Absolute 1.27 1.20 - 4.80 x10*3/uL    Monocytes Absolute 0.40 0.10 - 1.00 x10*3/uL    Eosinophils Absolute 0.19 0.00 - 0.70 x10*3/uL    Basophils " Absolute 0.03 0.00 - 0.10 x10*3/uL   Coagulation Screen   Result Value Ref Range    Protime 38.7 (H) 9.8 - 12.8 seconds    INR 3.4 (H) 0.9 - 1.1    aPTT 41 (H) 27 - 38 seconds         Assessment/Plan     IMP:  Recurrent Major Depression severe without psychotic features  Anxiety  ESRD on PD    PLAN:  Increase mirtazapine 15 mg hs  Agrees to referral for therapy to psych - please contact [phone] for appt with someone specializing in medical illness - pt prefers zoom to inperson    I spent 60 minutes in the professional and overall care of this patient.      Rema Dhillon MD

## 2023-10-20 NOTE — PROGRESS NOTES
Yoselyn Hernandez is a 63 y.o. female on day 0 of admission presenting with Pleural effusion.    Subjective   No complaints feeling better just depressed will speak with Dr Dhillon, SW went in to speak with patient and pharmacy consult was put in for medications     ROS  Gen: No fatigue, fever, sweats.  Head: No headache, trauma.  Eyes: No vision loss, double vision, drainage, eye pain.  ENT: No hearing changes, pain, epistaxis, congestion  Cardiac: No chest pain  Pulmonary: No shortness of breath,  pleuritic pain,   Heme/lymph: No swollen glands  GI: No abdominal pain, nausea, vomiting, diarrhea, POSITIVE peritoneal dialysis catheter draining  : No  dysuria, frequency, urgency, hematuria  Musculoskeletal: No limb pain, joint pain, back pain, joint swelling or stiffness.  Skin: No rashes, pruritus, lumps, lesions.  Neuro: No Numbness, tingling, or weakness.  Psych: No  anxiety, POSITIVE depression tearful at times     Review of systems is otherwise negative unless stated above or in history of present illness.    Objective     Physical Exam  General: Vital signs stable, Pt is alert, no acute distress  Eyes: Conjunctiva normal, PERRL, EOMs intact  HENMT: Normocephalic, atraumatic, external ears and nose normal, no scars or masses.  No mastoid tenderness. Trachea is midline. No meningeal signs, negative Kernig and Brudzinski, moves neck freely.  No sinus tenderness  Resp: Respiratory effort is normal, no retractions, no stridor. Lungs CTA, no wheezes or rhonchi  CV: Heart is regular rate and rhythm.   : Positive peritoneal dialysis catheter draining from dwell overnight  Skin: No evidence of trauma, skin is warm and dry. No rashes, lesions or ulcers.  Skel: full range of motion of upper and lower extremities.   Neuro: Normal gait, CN II-XII intact, no motor or sensory changes.  Psych: Alert and oriented ×3, judgment is appropriate, normal mood and affect      Last Recorded Vitals  Blood pressure 125/88, pulse 95,  "temperature 37.6 °C (99.7 °F), temperature source Temporal, resp. rate 19, height 1.702 m (5' 7\"), weight 68 kg (149 lb 14.6 oz), SpO2 95 %.  Intake/Output last 3 Shifts:  I/O last 3 completed shifts:  In: 94543 (154.1 mL/kg) [P.O.:480; Other:12820]  Out: 85952 (169.9 mL/kg) [Other:04759]  Weight: 68 kg     Relevant Results  Results for orders placed or performed during the hospital encounter of 10/18/23 (from the past 24 hour(s))   Renal Function Panel   Result Value Ref Range    Glucose 94 74 - 99 mg/dL    Sodium 134 (L) 136 - 145 mmol/L    Potassium 3.7 3.5 - 5.3 mmol/L    Chloride 94 (L) 98 - 107 mmol/L    Bicarbonate 26 21 - 32 mmol/L    Anion Gap 18 10 - 20 mmol/L    Urea Nitrogen 42 (H) 6 - 23 mg/dL    Creatinine 6.76 (H) 0.50 - 1.05 mg/dL    eGFR 6 (L) >60 mL/min/1.73m*2    Calcium 8.7 8.6 - 10.3 mg/dL    Phosphorus 6.7 (H) 2.5 - 4.9 mg/dL    Albumin 2.7 (L) 3.4 - 5.0 g/dL   CBC and Auto Differential   Result Value Ref Range    WBC 5.9 4.4 - 11.3 x10*3/uL    nRBC 0.0 0.0 - 0.0 /100 WBCs    RBC 3.09 (L) 4.00 - 5.20 x10*6/uL    Hemoglobin 8.9 (L) 12.0 - 16.0 g/dL    Hematocrit 28.5 (L) 36.0 - 46.0 %    MCV 92 80 - 100 fL    MCH 28.8 26.0 - 34.0 pg    MCHC 31.2 (L) 32.0 - 36.0 g/dL    RDW 17.2 (H) 11.5 - 14.5 %    Platelets 202 150 - 450 x10*3/uL    MPV 10.7 7.5 - 11.5 fL    Neutrophils % 75.1 40.0 - 80.0 %    Immature Granulocytes %, Automated 0.3 0.0 - 0.9 %    Lymphocytes % 14.7 13.0 - 44.0 %    Monocytes % 6.9 2.0 - 10.0 %    Eosinophils % 2.5 0.0 - 6.0 %    Basophils % 0.5 0.0 - 2.0 %    Neutrophils Absolute 4.45 1.20 - 7.70 x10*3/uL    Immature Granulocytes Absolute, Automated 0.02 0.00 - 0.70 x10*3/uL    Lymphocytes Absolute 0.87 (L) 1.20 - 4.80 x10*3/uL    Monocytes Absolute 0.41 0.10 - 1.00 x10*3/uL    Eosinophils Absolute 0.15 0.00 - 0.70 x10*3/uL    Basophils Absolute 0.03 0.00 - 0.10 x10*3/uL   Protime-INR   Result Value Ref Range    Protime 33.0 (H) 9.8 - 12.8 seconds    INR 2.9 (H) 0.9 - 1.1 "       Imaging Results  ECG 12 lead    Result Date: 10/18/2023  Normal sinus rhythm Left atrial enlargement Left axis deviation Nonspecific ST and T wave abnormality Prolonged QT Abnormal ECG Confirmed by Nazario Chavez ([id number]) on 10/18/2023 3:41:55 PM    CT angio chest abdomen pelvis    Result Date: 10/18/2023  Interpreted By:  Yaw Wright  and Nicanor Ramires STUDY: CT ANGIO CHEST ABDOMEN PELVIS;  10/18/2023 6:58 am   INDICATION: Signs/Symptoms:cp history dissection x8. Per EMR: Worsening chest pain and shortness of breath for 2 weeks, history of PE and aortic dissection status post repair, on peritoneal dialysis   COMPARISON: 08/01/2023 CTA chest abdomen pelvis   ACCESSION NUMBER(S): [id number]   ORDERING CLINICIAN: BESS CHOU   PROCEDURE: CT ANGIOGRAM OF THE CHEST, ABDOMEN AND PELVIS   TECHNIQUE: High-resolution contrast-enhanced helical CT of the  chest, abdomen, and pelvis was performed,  timed to the arterial phase.  3-D processing was performed by the physician on an independent work station, with MIP and volume-rendering techniques. A total of 95 ml of Omnipaque 350 was administered intravenously during the examination.  The study was performed without oral contrast.  The patient tolerated the study without complications.   FINDINGS: VASCULAR: AORTA: Changes of remote dissection and aneurysm status post ascending thoracic, arch, descending thoracic, and abdominal aortic endograft repair. The excluded aneurysm sac involving the distal arch is largely thrombosed with high-density internal signal on pre and postcontrast images, likely calcifications. The endograft is patent and measures 3.0 cm in diameter at the level of the mid arch. The excluded distal arch aneurysm sac measures 5.2 cm in largest dimension, unchanged compared to prior CT dated 08/01/2023. Evidence of endoleak. AORTIC ARCH BRANCHES: Redemonstration of dissection flap extending along the innominate artery and right common  carotid artery with contrast filling of both the true and false lumens, which appears unchanged compared to prior CTA. The right subclavian artery arises from the true lumen and is widely patent. Status post grafting of arch vessels to the proximal ascending aorta just proximal to the endograft. The left subclavian artery arises from the left common carotid. There is moderate narrowing at the origin of the left common carotid without occlusion. CELIAC TRUNK: The native origin of the celiac trunk is occluded. The grafted branches of the celiac artery radiograph originate from the right common iliac artery and appear widely patent. Mild atherosclerotic calcifications of the celiac artery and its branches. SUPERIOR MESENTERIC ARTERY: The native origin of the superior mesenteric artery is occluded. The graft superior mesenteric artery and branches arise from the common trunk graft of the right common iliac artery. INFERIOR MESENTERIC ARTERY: The inferior mesenteric artery is occluded and nonvisualized. RENAL ARTERIES: The native bilateral renal arteries are completely occluded. The grafted bilateral renal arteries arise from a common trunk graft from the left common iliac artery with complete thrombotic occlusion of both vessels, unchanged compared to prior CT. RIGHT COMMON ILIAC ARTERY: The right common iliac is widely patent. A common trunk giving rise to branches of the celiac and superior mesenteric artery is graft into the right common iliac. Mild calcified atherosclerotic changes without focal stenosis. RIGHT EXTERNAL ILIAC ARTERY: Unchanged appearance of dissection flap extending along the right external iliac artery to the common femoral with contrast filling of both the true and false lumens. No apparent propagation of dissection flap. 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 No focal  areas of stenosis. LEFT COMMON ILIAC ARTERY: The left common iliac artery is widely patent with mild calcified atherosclerotic changes. A common trunk giving rise to the bilateral renal arteries is grafted to the left common iliac artery. There is a large lentiform filling defect with narrowing and eventual occlusion of this trunk, likely chronic thrombosis LEFT EXTERNAL ILIAC ARTERY: The left external iliac artery is widely patent with mild calcified atherosclerotic disease. LEFT INTERNAL ILIAC ARTERY: The left internal iliac artery is status post embolism, unchanged from prior. COMMON FEMORAL ARTERIES: The bilateral common arteries are normal in appearance without focal stenosis or aneurysmal dilatation. SUPERFICIAL FEMORAL ARTERIES: There is an area of mild focal narrowing without associated calcified atherosclerotic plaque of the left superficial femoral artery (series 605, image 593). The visualized right superficial femoral artery is normal in appearance. DEEP FEMORAL ARTERIES: The visualized deep femoral arteries are normal in appearance.     NONVASCULAR FINDINGS: CHEST: LUNGS/PLEURA/LARGE AIRWAYS: Interval development of small left-sided pleural effusion. Mild increase in interstitial markings compared to prior CT with pulmonary venous congestion. No focal parenchymal consolidation or pneumothorax. Stable appearance of chronic bullous emphysematous changes. The trachea and central airways are patent. Evaluation of pulmonary arteries is not optimized on this exam, however within these limitations no evidence of acute pulmonary embolism.   HEART: The heart is enlarged stable from prior. Moderate calcified atherosclerotic disease of the coronary arteries. There is no pericardial effusion.   MEDIASTINUM AND ALIDA: No mediastinal or hilar lymphadenopathy. No pneumomediastinum. The visualized esophagus is normal in appearance.   CHEST WALL AND LOWER NECK: The soft tissues and osseous structures of the chest wall  are unremarkable. No axillary lymphadenopathy. The visualized thyroid is unremarkable.     ABDOMEN: LIVER: The liver is nonenlarged with normal, homogeneous attenuation.   BILE DUCTS: The intrahepatic and extrahepatic bile ducts are nondilated.   GALLBLADDER: The gallbladder is surgically absent.   PANCREAS: Mild pancreatic ductal prominence with compression of the pancreatic tail by a large chronic fluid collection, similar in appearance to prior.   SPLEEN: The spleen is nonenlarged with homogeneous attenuation.   ADRENAL GLANDS: The adrenal glands are not well visualized on this exam.   KIDNEYS AND URETERS: Bilateral kidneys are completely nonenhancing and severely atrophied. There is no arterial perfusion of the bilateral kidneys. Few hypodense lesions of the right lower renal pole, the largest measuring up to 2.2 cm, likely simple renal cysts.     PELVIS:   BLADDER: The urinary bladder is decompressed, limiting evaluation.   REPRODUCTIVE ORGANS: The uterus is surgically absent.   BOWEL: The stomach is unremarkable without wall thickening. The small and large bowel are of normal course and caliber. No evidence of bowel wall thickening. The appendix is not definitively visualized, however there are no secondary signs of appendicitis.   PERITONEUM/LYMPH NODES: Peritoneal dialysis catheter is seen traversing mid anterior abdominal wall and coiling the left hemipelvis. There is a moderate volume of simple fluid ascites, mildly increased in volume compared to prior CT dated 08/01/2023. There is a small amount of pneumoperitoneum with air pockets collecting anterior dependently within multiple midline ventral abdominal hernias, likely related to recent peritoneal dialysis. Redemonstration of large partially rim calcified simple fluid collection within the left hemiabdomen measuring 7.7 x 6.6 cm abutting the superior mesenteric vascular grafts without apparent compression. There is compression by this fluid collection  of the pancreatic tail.   RETROPERITONEUM: No retroperitoneal fluid collections. No retroperitoneal lymphadenopathy.   OSSEOUS STRUCTURES: Postsurgical changes of median sternotomy. Mild degenerative changes of the pubic symphysis and thoracolumbar spine. No suspicious osseous lesions. Sclerotic osseous lesion of the left iliac bone is unchanged compared to prior CT.   SOFT TISSUES/ABDOMINAL WALL: There are 3 small midline ventral abdominal hernias. The superior 2 hernias are largely ascitic fluid-filled with few small pockets of air layering anti dependently. The inferior-most hernia contains ascitic fluid and a partial small loop of bowel without apparent inflammatory changes. Stable partially imaged anterosuperior left thigh subcutaneous fluid collection, likely postoperative.       1. No acute arterial abnormality. Postsurgical changes of endograft repair and debranching of the entire length of the aorta extending from ascending arch to bifurcation without stenosis or occlusion. No evidence of endoleak. Unchanged appearance of excluded aneurysm sac. 2. Stable appearance of right innominate and common carotid artery dissection and right external iliac and femoral artery dissection. 3. Unchanged appearance of complete thrombotic occlusion of grafted bilateral renal arteries with associated atrophy and nonenhancement of the bilateral kidneys. 4. Mild increase in ascitic fluid volume. Small amount of air collecting anti dependently within ventral abdominal hernias, likely related to peritoneal dialysis rather than visceral perforation. Stable positioning of peritoneal dialysis catheter within the left hemipelvis.   5. Unchanged appearance of partially rim calcified simple fluid collection within the left hemiabdomen causing compression of the pancreatic tail. No additional fluid collections are visualized. 6. New development of small left-sided pleural effusion with pulmonary venous congestion compared to prior CT  dated 08/01/2023. Correlate with fluid status. 7. Stable cardiomegaly with moderate calcified atherosclerotic disease of the coronary arteries.   I personally reviewed the images/study and I agree with the findings as stated. This study was interpreted at [place], [place].   Signed by: Yaw Wright 10/18/2023 10:33 AM Dictation workstation:   [id number]    XR chest 1 view    Result Date: 10/18/2023  Interpreted By:  Manju Sandoval, STUDY: XR CHEST 1 VIEW;  10/18/2023 4:54 am   INDICATION: Signs/Symptoms:Chest Pain.   COMPARISON: 05/12/2023   ACCESSION NUMBER(S): [id number]   ORDERING CLINICIAN: BESS CHOU   FINDINGS:     CARDIOMEDIASTINAL SILHOUETTE: Stable cardiomegaly. Stable appearance of the thoracic aorta with stent graft.   LUNGS: No pulmonary consolidation, pleural effusion or pneumothorax.   ABDOMEN: No remarkable upper abdominal findings.   BONES: No acute osseous abnormality.       No acute cardiopulmonary process.   MACRO: None   Signed by: Manju Sandoval 10/18/2023 5:47 AM Dictation workstation:   [id number]    XR CHEST 1V FRONTAL PORT    Result Date: 10/8/2023  * * *Final Report* * * DATE OF EXAM: Oct  8 2023  2:49PM   SPX   [id number]  -  XR CHEST 1V FRONTAL PORT  / ACCESSION #  [id number] PROCEDURE REASON: Shortness of breath      * * * * Physician Interpretation * * * * RESULT: EXAMINATION:  CHEST RADIOGRAPH (PORTABLE SINGLE VIEW AP) Exam Date/Time:  10/8/2023 2:49 PM CLINICAL HISTORY: Shortness of breath MQ:  [id number] Comparison:  04/24/2023 RESULT: The patient is status post median sternotomy.  An endovascular stent graft is again seen within a tortuous and dilated thoracic aorta.   The heart is stable in size.  Mild pulmonary venous congestion.  There is no lobar consolidation or pneumothorax.  Mild blunting of both costophrenic angles.    IMPRESSION: Endovascular stent graft is again seen within the tortuous and dilated thoracic aorta.  Mild  pulmonary venous congestion, with blunting of both costophrenic angles. Transcribed Using Voice Recognition Transcribe Date/Time: Oct  8 2023  2:49P Dictated by: ANASTASIYA WEAVER MD This examination was interpreted and the report reviewed and electronically signed by: ANASTASIYA WEAVER MD on Oct  8 2023  2:51PM  EST      Medications:  allopurinol, 100 mg, oral, Daily  atorvastatin, 40 mg, oral, Nightly  calcitriol, 0.5 mcg, oral, Daily  carvedilol, 6.25 mg, oral, BID with meals  dextrose 2.5 % - LOW calcium 2.5 mEq/L, , intraperitoneal, 3 times per day  dextrose 4.25 % - LOW calcium 2.5 mEq/L (Delflex-LC, Dianeal) in 2,000 mL peritoneal dialysate, , intraperitoneal, q24h  docusate sodium, 100 mg, oral, BID  epoetin treasure or biosimilar, 10,000 Units, subcutaneous, Weekly  levothyroxine, 25 mcg, oral, Daily before breakfast  lipase-protease-amylase, 3 capsule, oral, TID with meals  mirtazapine, 7.5 mg, oral, Nightly  pantoprazole, 40 mg, oral, Daily before breakfast  pregabalin, 25 mg, oral, Daily  sevelamer carbonate, 800 mg, oral, TID with meals  warfarin, 2 mg, oral, Once every other day  [START ON 10/21/2023] warfarin, 3 mg, oral, Once every other day       PRN medications: HYDROmorphone, ondansetron **OR** ondansetron, polyethylene glycol, traMADol     Assessment/Plan     Principal Problem:    Pleural effusion  Active Problems:    Fluid excess  - nephrology consult, appreciate recs  - Continue peritoneal dialysis  - Continue on a sodium restricted diet  - Continue monitoring lab work daily  - BMP creatinine 6.21  - BNP greater than 4700  - continue home meds, restart lyrica   - pharmacy consult for med control  - depressed about health concerns will consult psychiatry     Hypothyroid/HTN/gout   -continue on home meds     Dvt prophylaxis/PE history  - on coumadin  - INR 2.1  - scd/ambulate      Gi prophylaxis   - pantopraxole   - miralax    Labs/Testing reviewed    Interdisciplinary team rounding completed with  hospitalist, nurse, TCC    NP discussed plan and lab/testing results with Dr. Prasanth Lora still following will get recommendations  Dr Dhillon psych to see her and will get recommendations  Pharmacy to see her about her home meds    * 45 minutes total spent on patient's care today; >50% time spent on counseling/coordination of care    Sharri Krishnan, APRN-CNP

## 2023-10-20 NOTE — NURSING NOTE
1030 peritoneal dialysis empty 2100MLs (OUT).  2000mls dialysate IN.  1330 peritoneal dialysis empty 2100mls(OUT).   2000MLS dialysate IN.

## 2023-10-20 NOTE — PROGRESS NOTES
SW received referral from [facility] about pt having difficulties affording medication. Sent message to APRN about possible consult to pharmacy.

## 2023-10-20 NOTE — PROGRESS NOTES
Seen on peritoneal dialysis.  She has less edema, seems to be ultrafiltrating well.  She has received erythropoietin, multifactorial anemia.  Her ferritin is high.  I am hoping that we can avoid a blood transfusion.  We are awaiting psychiatry's recommendations but I think she will need a therapist as well.  Fractures is coming down, she is on a binder.  Blood pressures are up and down but reasonably well controlled.

## 2023-10-21 LAB
ALBUMIN SERPL BCP-MCNC: 2.7 G/DL (ref 3.4–5)
ANION GAP SERPL CALC-SCNC: 19 MMOL/L (ref 10–20)
APTT PPP: 41 SECONDS (ref 27–38)
BASOPHILS # BLD AUTO: 0.03 X10*3/UL (ref 0–0.1)
BASOPHILS NFR BLD AUTO: 0.5 %
BUN SERPL-MCNC: 40 MG/DL (ref 6–23)
CALCIUM SERPL-MCNC: 8.5 MG/DL (ref 8.6–10.3)
CHLORIDE SERPL-SCNC: 93 MMOL/L (ref 98–107)
CO2 SERPL-SCNC: 26 MMOL/L (ref 21–32)
CREAT SERPL-MCNC: 6.62 MG/DL (ref 0.5–1.05)
EOSINOPHIL # BLD AUTO: 0.19 X10*3/UL (ref 0–0.7)
EOSINOPHIL NFR BLD AUTO: 3.3 %
ERYTHROCYTE [DISTWIDTH] IN BLOOD BY AUTOMATED COUNT: 17.1 % (ref 11.5–14.5)
GFR SERPL CREATININE-BSD FRML MDRD: 7 ML/MIN/1.73M*2
GLUCOSE SERPL-MCNC: 106 MG/DL (ref 74–99)
HCT VFR BLD AUTO: 29.1 % (ref 36–46)
HGB BLD-MCNC: 9.2 G/DL (ref 12–16)
IMM GRANULOCYTES # BLD AUTO: 0.03 X10*3/UL (ref 0–0.7)
IMM GRANULOCYTES NFR BLD AUTO: 0.5 % (ref 0–0.9)
INR PPP: 3.4 (ref 0.9–1.1)
LYMPHOCYTES # BLD AUTO: 1.27 X10*3/UL (ref 1.2–4.8)
LYMPHOCYTES NFR BLD AUTO: 21.9 %
MCH RBC QN AUTO: 29.3 PG (ref 26–34)
MCHC RBC AUTO-ENTMCNC: 31.6 G/DL (ref 32–36)
MCV RBC AUTO: 93 FL (ref 80–100)
MONOCYTES # BLD AUTO: 0.4 X10*3/UL (ref 0.1–1)
MONOCYTES NFR BLD AUTO: 6.9 %
NEUTROPHILS # BLD AUTO: 3.87 X10*3/UL (ref 1.2–7.7)
NEUTROPHILS NFR BLD AUTO: 66.9 %
NRBC BLD-RTO: 0 /100 WBCS (ref 0–0)
PHOSPHATE SERPL-MCNC: 6.4 MG/DL (ref 2.5–4.9)
PLATELET # BLD AUTO: 206 X10*3/UL (ref 150–450)
PMV BLD AUTO: 10.7 FL (ref 7.5–11.5)
POTASSIUM SERPL-SCNC: 3.1 MMOL/L (ref 3.5–5.3)
PROTHROMBIN TIME: 38.7 SECONDS (ref 9.8–12.8)
RBC # BLD AUTO: 3.14 X10*6/UL (ref 4–5.2)
SODIUM SERPL-SCNC: 135 MMOL/L (ref 136–145)
WBC # BLD AUTO: 5.8 X10*3/UL (ref 4.4–11.3)

## 2023-10-21 PROCEDURE — 85025 COMPLETE CBC W/AUTO DIFF WBC: CPT | Performed by: NURSE PRACTITIONER

## 2023-10-21 PROCEDURE — 36415 COLL VENOUS BLD VENIPUNCTURE: CPT | Performed by: NURSE PRACTITIONER

## 2023-10-21 PROCEDURE — 99232 SBSQ HOSP IP/OBS MODERATE 35: CPT | Performed by: NURSE PRACTITIONER

## 2023-10-21 PROCEDURE — 2500000001 HC RX 250 WO HCPCS SELF ADMINISTERED DRUGS (ALT 637 FOR MEDICARE OP): Performed by: NURSE PRACTITIONER

## 2023-10-21 PROCEDURE — 2500000004 HC RX 250 GENERAL PHARMACY W/ HCPCS (ALT 636 FOR OP/ED): Performed by: NURSE PRACTITIONER

## 2023-10-21 PROCEDURE — S0119 ONDANSETRON 4 MG: HCPCS | Performed by: NURSE PRACTITIONER

## 2023-10-21 PROCEDURE — 85730 THROMBOPLASTIN TIME PARTIAL: CPT | Performed by: NURSE PRACTITIONER

## 2023-10-21 PROCEDURE — G0378 HOSPITAL OBSERVATION PER HR: HCPCS

## 2023-10-21 PROCEDURE — 2500000001 HC RX 250 WO HCPCS SELF ADMINISTERED DRUGS (ALT 637 FOR MEDICARE OP): Performed by: PSYCHIATRY & NEUROLOGY

## 2023-10-21 PROCEDURE — 85610 PROTHROMBIN TIME: CPT | Performed by: NURSE PRACTITIONER

## 2023-10-21 PROCEDURE — 96376 TX/PRO/DX INJ SAME DRUG ADON: CPT

## 2023-10-21 PROCEDURE — 80069 RENAL FUNCTION PANEL: CPT | Performed by: NURSE PRACTITIONER

## 2023-10-21 PROCEDURE — 2500000005 HC RX 250 GENERAL PHARMACY W/O HCPCS: Performed by: NURSE PRACTITIONER

## 2023-10-21 PROCEDURE — 2500000004 HC RX 250 GENERAL PHARMACY W/ HCPCS (ALT 636 FOR OP/ED): Performed by: INTERNAL MEDICINE

## 2023-10-21 RX ORDER — POTASSIUM CHLORIDE 20 MEQ/1
40 TABLET, EXTENDED RELEASE ORAL ONCE
Status: COMPLETED | OUTPATIENT
Start: 2023-10-21 | End: 2023-10-21

## 2023-10-21 RX ORDER — TRAMADOL HYDROCHLORIDE 50 MG/1
50 TABLET ORAL EVERY 6 HOURS PRN
Status: DISCONTINUED | OUTPATIENT
Start: 2023-10-21 | End: 2023-10-22 | Stop reason: HOSPADM

## 2023-10-21 RX ADMIN — ALLOPURINOL 100 MG: 100 TABLET ORAL at 08:18

## 2023-10-21 RX ADMIN — HYDROMORPHONE HYDROCHLORIDE 0.2 MG: 0.2 INJECTION, SOLUTION INTRAMUSCULAR; INTRAVENOUS; SUBCUTANEOUS at 23:47

## 2023-10-21 RX ADMIN — TRAMADOL HYDROCHLORIDE 50 MG: 50 TABLET, COATED ORAL at 20:13

## 2023-10-21 RX ADMIN — DOCUSATE SODIUM 100 MG: 100 CAPSULE, LIQUID FILLED ORAL at 20:13

## 2023-10-21 RX ADMIN — PANCRELIPASE 3 CAPSULE: 24000; 76000; 120000 CAPSULE, DELAYED RELEASE PELLETS ORAL at 17:43

## 2023-10-21 RX ADMIN — SEVELAMER CARBONATE 800 MG: 800 TABLET, FILM COATED ORAL at 12:00

## 2023-10-21 RX ADMIN — ONDANSETRON HYDROCHLORIDE 4 MG: 4 TABLET, FILM COATED ORAL at 06:11

## 2023-10-21 RX ADMIN — CALCITRIOL CAPSULES 0.25 MCG 0.5 MCG: 0.25 CAPSULE ORAL at 08:18

## 2023-10-21 RX ADMIN — HYDROMORPHONE HYDROCHLORIDE 0.2 MG: 0.2 INJECTION, SOLUTION INTRAMUSCULAR; INTRAVENOUS; SUBCUTANEOUS at 06:11

## 2023-10-21 RX ADMIN — HYDROMORPHONE HYDROCHLORIDE 0.2 MG: 0.2 INJECTION, SOLUTION INTRAMUSCULAR; INTRAVENOUS; SUBCUTANEOUS at 13:31

## 2023-10-21 RX ADMIN — PANCRELIPASE 3 CAPSULE: 24000; 76000; 120000 CAPSULE, DELAYED RELEASE PELLETS ORAL at 13:30

## 2023-10-21 RX ADMIN — SEVELAMER CARBONATE 800 MG: 800 TABLET, FILM COATED ORAL at 08:18

## 2023-10-21 RX ADMIN — CARVEDILOL 6.25 MG: 6.25 TABLET, FILM COATED ORAL at 16:37

## 2023-10-21 RX ADMIN — LEVOTHYROXINE SODIUM 25 MCG: 0.03 TABLET ORAL at 06:10

## 2023-10-21 RX ADMIN — PANCRELIPASE 3 CAPSULE: 24000; 76000; 120000 CAPSULE, DELAYED RELEASE PELLETS ORAL at 08:18

## 2023-10-21 RX ADMIN — HYDROMORPHONE HYDROCHLORIDE 0.2 MG: 0.2 INJECTION, SOLUTION INTRAMUSCULAR; INTRAVENOUS; SUBCUTANEOUS at 03:02

## 2023-10-21 RX ADMIN — HYDROMORPHONE HYDROCHLORIDE 0.2 MG: 0.2 INJECTION, SOLUTION INTRAMUSCULAR; INTRAVENOUS; SUBCUTANEOUS at 16:38

## 2023-10-21 RX ADMIN — PREGABALIN 25 MG: 25 CAPSULE ORAL at 08:18

## 2023-10-21 RX ADMIN — SEVELAMER CARBONATE 800 MG: 800 TABLET, FILM COATED ORAL at 16:37

## 2023-10-21 RX ADMIN — DOCUSATE SODIUM 100 MG: 100 CAPSULE, LIQUID FILLED ORAL at 08:18

## 2023-10-21 RX ADMIN — WARFARIN SODIUM 3 MG: 3 TABLET ORAL at 17:45

## 2023-10-21 RX ADMIN — ATORVASTATIN CALCIUM 40 MG: 40 TABLET, FILM COATED ORAL at 20:13

## 2023-10-21 RX ADMIN — SODIUM CHLORIDE, SODIUM LACTATE, CALCIUM CHLORIDE, MAGNESIUM CHLORIDE AND DEXTROSE 2000 ML: 2.5; 538; 448; 18.3; 5.08 INJECTION, SOLUTION INTRAPERITONEAL at 20:25

## 2023-10-21 RX ADMIN — PANTOPRAZOLE SODIUM 40 MG: 40 TABLET, DELAYED RELEASE ORAL at 06:10

## 2023-10-21 RX ADMIN — POTASSIUM CHLORIDE 40 MEQ: 1500 TABLET, EXTENDED RELEASE ORAL at 13:26

## 2023-10-21 RX ADMIN — CARVEDILOL 6.25 MG: 6.25 TABLET, FILM COATED ORAL at 08:18

## 2023-10-21 RX ADMIN — SODIUM CHLORIDE, SODIUM LACTATE, CALCIUM CHLORIDE, MAGNESIUM CHLORIDE AND DEXTROSE: 4.25; 538; 448; 18.3; 5.08 INJECTION, SOLUTION INTRAPERITONEAL at 15:22

## 2023-10-21 RX ADMIN — MIRTAZAPINE 15 MG: 15 TABLET, FILM COATED ORAL at 20:13

## 2023-10-21 ASSESSMENT — COGNITIVE AND FUNCTIONAL STATUS - GENERAL
DAILY ACTIVITIY SCORE: 21
CLIMB 3 TO 5 STEPS WITH RAILING: A LOT
DRESSING REGULAR LOWER BODY CLOTHING: A LITTLE
MOBILITY SCORE: 19
MOVING TO AND FROM BED TO CHAIR: A LITTLE
STANDING UP FROM CHAIR USING ARMS: A LITTLE
HELP NEEDED FOR BATHING: A LITTLE
WALKING IN HOSPITAL ROOM: A LITTLE
TOILETING: A LITTLE

## 2023-10-21 ASSESSMENT — PAIN SCALES - GENERAL
PAINLEVEL_OUTOF10: 10 - WORST POSSIBLE PAIN
PAINLEVEL_OUTOF10: 7
PAINLEVEL_OUTOF10: 10 - WORST POSSIBLE PAIN
PAINLEVEL_OUTOF10: 10 - WORST POSSIBLE PAIN

## 2023-10-21 ASSESSMENT — PAIN SCALES - WONG BAKER
WONGBAKER_NUMERICALRESPONSE: HURTS LITTLE MORE
WONGBAKER_NUMERICALRESPONSE: HURTS EVEN MORE

## 2023-10-21 ASSESSMENT — PAIN - FUNCTIONAL ASSESSMENT: PAIN_FUNCTIONAL_ASSESSMENT: 0-10

## 2023-10-21 NOTE — PROGRESS NOTES
"Yoselyn Hernandez is a 63 y.o. female who [place] to [facility] [place] with complaint of fluid overload and need for increased dialysis, she also was found to be with Pleural effusion.    Subjective   Still having back pain. She self medicates at home with marijuana but can't do that here.      ROS   Review of systems is otherwise negative unless stated above or in history of present illness.    Objective     Physical Exam  General: Vital signs stable, Pt is alert, no acute distress  Eyes: Conjunctiva normal, PERRL, EOMs intact  HENMT: Normocephalic, atraumatic, external ears and nose normal, no scars or masses.  No mastoid tenderness. Trachea is midline. No meningeal signs, negative Kernig and Brudzinski, moves neck freely.  No sinus tenderness  Resp: Respiratory effort is normal, no retractions, no stridor. Lungs CTA, no wheezes or rhonchi  CV: Heart is regular rate and rhythm.   : Positive peritoneal dialysis catheter. 3 midline abd hernias. Bs active x4. Soft  nontender abd.  Skin: No evidence of trauma, skin is warm and dry. No rashes, lesions or ulcers.  Skel: full range of motion of upper and lower extremities.   Neuro: Normal gait, CN II-XII intact, no motor or sensory changes.  Psych: Alert and oriented ×3, judgment is appropriate, normal mood and affect      Last Recorded Vitals  Blood pressure 112/82, pulse 85, temperature 36.8 °C (98.2 °F), temperature source Temporal, resp. rate 18, height 1.702 m (5' 7\"), weight 68 kg (149 lb 14.6 oz), SpO2 98 %.  Intake/Output last 3 Shifts:  I/O last 3 completed shifts:  In: 8610 (126.6 mL/kg) [P.O.:600; I.V.:10 (0.1 mL/kg); Other:8000]  Out: 50751 (149.3 mL/kg) [Other:57730]  Weight: 68 kg     Relevant Results  Results for orders placed or performed during the hospital encounter of 10/18/23 (from the past 24 hour(s))   Renal Function Panel   Result Value Ref Range    Glucose 106 (H) 74 - 99 mg/dL    Sodium 135 (L) 136 - 145 mmol/L    Potassium 3.1 (L) 3.5 - 5.3 mmol/L "    Chloride 93 (L) 98 - 107 mmol/L    Bicarbonate 26 21 - 32 mmol/L    Anion Gap 19 10 - 20 mmol/L    Urea Nitrogen 40 (H) 6 - 23 mg/dL    Creatinine 6.62 (H) 0.50 - 1.05 mg/dL    eGFR 7 (L) >60 mL/min/1.73m*2    Calcium 8.5 (L) 8.6 - 10.3 mg/dL    Phosphorus 6.4 (H) 2.5 - 4.9 mg/dL    Albumin 2.7 (L) 3.4 - 5.0 g/dL   CBC and Auto Differential   Result Value Ref Range    WBC 5.8 4.4 - 11.3 x10*3/uL    nRBC 0.0 0.0 - 0.0 /100 WBCs    RBC 3.14 (L) 4.00 - 5.20 x10*6/uL    Hemoglobin 9.2 (L) 12.0 - 16.0 g/dL    Hematocrit 29.1 (L) 36.0 - 46.0 %    MCV 93 80 - 100 fL    MCH 29.3 26.0 - 34.0 pg    MCHC 31.6 (L) 32.0 - 36.0 g/dL    RDW 17.1 (H) 11.5 - 14.5 %    Platelets 206 150 - 450 x10*3/uL    MPV 10.7 7.5 - 11.5 fL    Neutrophils % 66.9 40.0 - 80.0 %    Immature Granulocytes %, Automated 0.5 0.0 - 0.9 %    Lymphocytes % 21.9 13.0 - 44.0 %    Monocytes % 6.9 2.0 - 10.0 %    Eosinophils % 3.3 0.0 - 6.0 %    Basophils % 0.5 0.0 - 2.0 %    Neutrophils Absolute 3.87 1.20 - 7.70 x10*3/uL    Immature Granulocytes Absolute, Automated 0.03 0.00 - 0.70 x10*3/uL    Lymphocytes Absolute 1.27 1.20 - 4.80 x10*3/uL    Monocytes Absolute 0.40 0.10 - 1.00 x10*3/uL    Eosinophils Absolute 0.19 0.00 - 0.70 x10*3/uL    Basophils Absolute 0.03 0.00 - 0.10 x10*3/uL   Coagulation Screen   Result Value Ref Range    Protime 38.7 (H) 9.8 - 12.8 seconds    INR 3.4 (H) 0.9 - 1.1    aPTT 41 (H) 27 - 38 seconds       Imaging Results  ECG 12 lead    Result Date: 10/18/2023  Normal sinus rhythm Left atrial enlargement Left axis deviation Nonspecific ST and T wave abnormality Prolonged QT Abnormal ECG Confirmed by Nazario Chavez ([id number]) on 10/18/2023 3:41:55 PM    CT angio chest abdomen pelvis    Result Date: 10/18/2023  Interpreted By:  Hinckley, Christopher,  and [place] STUDY: CT ANGIO CHEST ABDOMEN PELVIS;  10/18/2023 6:58 am   INDICATION: Signs/Symptoms:cp history dissection x8. 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  3-D processing was performed by the physician on an independent work station, with MIP and volume-rendering techniques. A total of 95 ml of Omnipaque 350 was administered intravenously during the examination.  The study was performed without oral contrast.  The patient tolerated the study without complications.   FINDINGS: VASCULAR: AORTA: Changes of remote dissection and aneurysm status post ascending thoracic, arch, descending thoracic, and abdominal aortic endograft repair. The excluded aneurysm sac involving the distal arch is largely thrombosed with high-density internal signal on pre and postcontrast images, likely calcifications. The endograft is patent and measures 3.0 cm in diameter at the level of the mid arch. The excluded distal arch aneurysm sac measures 5.2 cm in largest dimension, unchanged compared to prior CT dated 08/01/2023. Evidence of endoleak. AORTIC ARCH BRANCHES: Redemonstration of dissection flap extending along the innominate artery and right common carotid artery with contrast filling of both the true and false lumens, which appears unchanged compared to prior CTA. The right subclavian artery arises from the true lumen and is widely patent. Status post grafting of arch vessels to the proximal ascending aorta just proximal to the endograft. The left subclavian artery arises from the left common carotid. There is moderate narrowing at the origin of the left common carotid without occlusion. CELIAC TRUNK: The native origin of the celiac trunk is occluded. The grafted branches of the celiac  artery radiograph originate from the right common iliac artery and appear widely patent. Mild atherosclerotic calcifications of the celiac artery and its branches. SUPERIOR MESENTERIC ARTERY: The native origin of the superior mesenteric artery is occluded. The graft superior mesenteric artery and branches arise from the common trunk graft of the right common iliac artery. INFERIOR MESENTERIC ARTERY: The inferior mesenteric artery is occluded and nonvisualized. RENAL ARTERIES: The native bilateral renal arteries are completely occluded. The grafted bilateral renal arteries arise from a common trunk graft from the left common iliac artery with complete thrombotic occlusion of both vessels, unchanged compared to prior CT. RIGHT COMMON ILIAC ARTERY: The right common iliac is widely patent. A common trunk giving rise to branches of the celiac and superior mesenteric artery is graft into the right common iliac. Mild calcified atherosclerotic changes without focal stenosis. RIGHT EXTERNAL ILIAC ARTERY: Unchanged appearance of dissection flap extending along the right external iliac artery to the common femoral with contrast filling of both the true and false lumens. No apparent propagation of dissection flap. 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 No focal areas of stenosis. LEFT COMMON ILIAC ARTERY: The left common iliac artery is widely patent with mild calcified atherosclerotic changes. A common trunk giving rise to the bilateral renal arteries is grafted to the left common iliac artery. There is a large lentiform filling defect with narrowing and eventual occlusion of this trunk, likely chronic thrombosis LEFT EXTERNAL ILIAC ARTERY: The left external iliac artery is widely patent with mild calcified atherosclerotic disease. LEFT INTERNAL ILIAC ARTERY: The left internal iliac artery is  status post embolism, unchanged from prior. COMMON FEMORAL ARTERIES: The bilateral common arteries are normal in appearance without focal stenosis or aneurysmal dilatation. SUPERFICIAL FEMORAL ARTERIES: There is an area of mild focal narrowing without associated calcified atherosclerotic plaque of the left superficial femoral artery (series 605, image 593). The visualized right superficial femoral artery is normal in appearance. DEEP FEMORAL ARTERIES: The visualized deep femoral arteries are normal in appearance.     NONVASCULAR FINDINGS: CHEST: LUNGS/PLEURA/LARGE AIRWAYS: Interval development of small left-sided pleural effusion. Mild increase in interstitial markings compared to prior CT with pulmonary venous congestion. No focal parenchymal consolidation or pneumothorax. Stable appearance of chronic bullous emphysematous changes. The trachea and central airways are patent. Evaluation of pulmonary arteries is not optimized on this exam, however within these limitations no evidence of acute pulmonary embolism.   HEART: The heart is enlarged stable from prior. Moderate calcified atherosclerotic disease of the coronary arteries. There is no pericardial effusion.   MEDIASTINUM AND ALIDA: No mediastinal or hilar lymphadenopathy. No pneumomediastinum. The visualized esophagus is normal in appearance.   CHEST WALL AND LOWER NECK: The soft tissues and osseous structures of the chest wall are unremarkable. No axillary lymphadenopathy. The visualized thyroid is unremarkable.     ABDOMEN: LIVER: The liver is nonenlarged with normal, homogeneous attenuation.   BILE DUCTS: The intrahepatic and extrahepatic bile ducts are nondilated.   GALLBLADDER: The gallbladder is surgically absent.   PANCREAS: Mild pancreatic ductal prominence with compression of the pancreatic tail by a large chronic fluid collection, similar in appearance to prior.   SPLEEN: The spleen is nonenlarged with homogeneous attenuation.   ADRENAL GLANDS: The  adrenal glands are not well visualized on this exam.   KIDNEYS AND URETERS: Bilateral kidneys are completely nonenhancing and severely atrophied. There is no arterial perfusion of the bilateral kidneys. Few hypodense lesions of the right lower renal pole, the largest measuring up to 2.2 cm, likely simple renal cysts.     PELVIS:   BLADDER: The urinary bladder is decompressed, limiting evaluation.   REPRODUCTIVE ORGANS: The uterus is surgically absent.   BOWEL: The stomach is unremarkable without wall thickening. The small and large bowel are of normal course and caliber. No evidence of bowel wall thickening. The appendix is not definitively visualized, however there are no secondary signs of appendicitis.   PERITONEUM/LYMPH NODES: Peritoneal dialysis catheter is seen traversing mid anterior abdominal wall and coiling the left hemipelvis. There is a moderate volume of simple fluid ascites, mildly increased in volume compared to prior CT dated 08/01/2023. There is a small amount of pneumoperitoneum with air pockets collecting anterior dependently within multiple midline ventral abdominal hernias, likely related to recent peritoneal dialysis. Redemonstration of large partially rim calcified simple fluid collection within the left hemiabdomen measuring 7.7 x 6.6 cm abutting the superior mesenteric vascular grafts without apparent compression. There is compression by this fluid collection of the pancreatic tail.   RETROPERITONEUM: No retroperitoneal fluid collections. No retroperitoneal lymphadenopathy.   OSSEOUS STRUCTURES: Postsurgical changes of median sternotomy. Mild degenerative changes of the pubic symphysis and thoracolumbar spine. No suspicious osseous lesions. Sclerotic osseous lesion of the left iliac bone is unchanged compared to prior CT.   SOFT TISSUES/ABDOMINAL WALL: There are 3 small midline ventral abdominal hernias. The superior 2 hernias are largely ascitic fluid-filled with few small pockets of air  layering anti dependently. The inferior-most hernia contains ascitic fluid and a partial small loop of bowel without apparent inflammatory changes. Stable partially imaged anterosuperior left thigh subcutaneous fluid collection, likely postoperative.       1. No acute arterial abnormality. Postsurgical changes of endograft repair and debranching of the entire length of the aorta extending from ascending arch to bifurcation without stenosis or occlusion. No evidence of endoleak. Unchanged appearance of excluded aneurysm sac. 2. Stable appearance of right innominate and common carotid artery dissection and right external iliac and femoral artery dissection. 3. Unchanged appearance of complete thrombotic occlusion of grafted bilateral renal arteries with associated atrophy and nonenhancement of the bilateral kidneys. 4. Mild increase in ascitic fluid volume. Small amount of air collecting anti dependently within ventral abdominal hernias, likely related to peritoneal dialysis rather than visceral perforation. Stable positioning of peritoneal dialysis catheter within the left hemipelvis.   5. Unchanged appearance of partially rim calcified simple fluid collection within the left hemiabdomen causing compression of the pancreatic tail. No additional fluid collections are visualized. 6. New development of small left-sided pleural effusion with pulmonary venous congestion compared to prior CT dated 08/01/2023. Correlate with fluid status. 7. Stable cardiomegaly with moderate calcified atherosclerotic disease of the coronary arteries.   I personally reviewed the images/study and I agree with the findings as stated. This study was interpreted at [place], [place].   Signed by: Yaw Wright 10/18/2023 10:33 AM Dictation workstation:   [id number]    XR chest 1 view    Result Date: 10/18/2023  Interpreted By:  Manju Sandoval, STUDY: XR CHEST 1 VIEW;  10/18/2023 4:54 am    INDICATION: Signs/Symptoms:Chest Pain.   COMPARISON: 05/12/2023   ACCESSION NUMBER(S): [id number]   ORDERING CLINICIAN: BESS CHOU   FINDINGS:     CARDIOMEDIASTINAL SILHOUETTE: Stable cardiomegaly. Stable appearance of the thoracic aorta with stent graft.   LUNGS: No pulmonary consolidation, pleural effusion or pneumothorax.   ABDOMEN: No remarkable upper abdominal findings.   BONES: No acute osseous abnormality.       No acute cardiopulmonary process.   MACRO: None   Signed by: Manju Sandoval 10/18/2023 5:47 AM Dictation workstation:   [id number]    XR CHEST 1V FRONTAL PORT    Result Date: 10/8/2023  * * *Final Report* * * DATE OF EXAM: Oct  8 2023  2:49PM   SPX   [id number]  -  XR CHEST 1V FRONTAL PORT  / ACCESSION #  [id number] PROCEDURE REASON: Shortness of breath      * * * * Physician Interpretation * * * * RESULT: EXAMINATION:  CHEST RADIOGRAPH (PORTABLE SINGLE VIEW AP) Exam Date/Time:  10/8/2023 2:49 PM CLINICAL HISTORY: Shortness of breath MQ:  XCPR_5 Comparison:  04/24/2023 RESULT: The patient is status post median sternotomy.  An endovascular stent graft is again seen within a tortuous and dilated thoracic aorta.   The heart is stable in size.  Mild pulmonary venous congestion.  There is no lobar consolidation or pneumothorax.  Mild blunting of both costophrenic angles.    IMPRESSION: Endovascular stent graft is again seen within the tortuous and dilated thoracic aorta.  Mild pulmonary venous congestion, with blunting of both costophrenic angles. Transcribed Using Voice Recognition Transcribe Date/Time: Oct  8 2023  2:49P Dictated by: ANASTASIYA WEAVER MD This examination was interpreted and the report reviewed and electronically signed by: ANASTASIYA WEAVER MD on Oct  8 2023  2:51PM  EST      Medications:  allopurinol, 100 mg, oral, Daily  atorvastatin, 40 mg, oral, Nightly  calcitriol, 0.5 mcg, oral, Daily  carvedilol, 6.25 mg, oral, BID with meals  dextrose 2.5 % - LOW calcium 2.5 mEq/L, ,  intraperitoneal, 3 times per day  dextrose 4.25 % - LOW calcium 2.5 mEq/L (Delflex-LC, Dianeal) in 2,000 mL peritoneal dialysate, , intraperitoneal, q24h  docusate sodium, 100 mg, oral, BID  epoetin treasure or biosimilar, 10,000 Units, subcutaneous, Weekly  levothyroxine, 25 mcg, oral, Daily before breakfast  lipase-protease-amylase, 3 capsule, oral, TID with meals  mirtazapine, 15 mg, oral, Nightly  pantoprazole, 40 mg, oral, Daily before breakfast  pregabalin, 25 mg, oral, Daily  sevelamer carbonate, 800 mg, oral, TID with meals  warfarin, 2 mg, oral, Once every other day  warfarin, 3 mg, oral, Once every other day       PRN medications: HYDROmorphone, ondansetron **OR** ondansetron, polyethylene glycol, traMADol     Assessment/Plan   Yoselyn Hernandez is a 63 y.o. female presenting with shortness of breath for past few weeks. Presented to pcp for routine appt today and was told she was fluid overloaded and to go to ed. She performs pd at home. She has also been having intense low back pain radiating down her left leg to her feet. She stopped all her home meds a few weeks ago including lyrica. Would like this to be restarted    Sig pmh: carop myoptahy, chf, hyperparathyroidism, esrd on PD, hypothyroidism, aortic aneurysm, dvt/pe, copd, ventral hernia, diarrhea, sciatica, ESRD (PD under Dr. Lora; DW estimated at 58.5kg), secondary hyperparathyroidism, anemia, HTN, PE and dissection     Principal Problem:    Pleural effusion  Active Problems:    Fluid excess  - nephrology consult, appreciate recs -- hoping to dc tomorrow per notes   - Continue peritoneal dialysis  - Continue on a sodium restricted diet  - Continue monitoring lab work daily  - BMP creatinine 6.21  - BNP greater than 4700  - continue home meds, restart lyrica   - pharmacy consult for med control  - depressed about health concerns will consult psychiatry - - rec increase mirtazipine to 15mg at bedtime      Hypothyroid/HTN/gout   -continue on home meds      Dvt prophylaxis/PE history  - on coumadin  - INR 2.1  - scd/ambulate      Gi prophylaxis   - pantopraxole   - miralax    Labs/Testing reviewed  Interdisciplinary team rounding completed with hospitalist, nurse, TCC  NP discussed plan and lab/testing results with Dr. Prasanth Lora still following - plan dc tomorrow?  Pharmacy to see her about her home meds    * 45 minutes total spent on patient's care today; >50% time spent on counseling/coordination of care    Karina Waddell, APRN-CNP

## 2023-10-21 NOTE — PROGRESS NOTES
Seen on peritoneal dialysis.  She will get potassium chloride today.  Phosphorus is coming down, hemoglobin is noted.  She had received erythropoietin.  She was seen by Dr. Dhillon, I do not yet see her recommendations.  She is in slightly better spirits.  Hoping for discharge tomorrow.

## 2023-10-21 NOTE — CARE PLAN
The patient's goals for the shift include  have pain less than 5/10  Problem: Pain  Goal: Takes deep breaths with improved pain control throughout the shift  Outcome: Progressing  Goal: Turns in bed with improved pain control throughout the shift  Outcome: Progressing  Goal: Walks with improved pain control throughout the shift  Outcome: Progressing  Goal: Performs ADL's with improved pain control throughout shift  Outcome: Progressing  Goal: Participates in PT with improved pain control throughout the shift  Outcome: Progressing  Goal: Free from opioid side effects throughout the shift  Outcome: Progressing  Goal: Free from acute confusion related to pain meds throughout the shift  Outcome: Progressing     Problem: Fall/Injury  Goal: Not fall by end of shift  Outcome: Progressing  Goal: Be free from injury by end of the shift  Outcome: Progressing  Goal: Verbalize understanding of personal risk factors for fall in the hospital  Outcome: Progressing  Goal: Verbalize understanding of risk factor reduction measures to prevent injury from fall in the home  Outcome: Progressing  Goal: Use assistive devices by end of the shift  Outcome: Progressing  Goal: Pace activities to prevent fatigue by end of the shift  Outcome: Progressing       The clinical goals for the shift include peritoneal dialysis  Goal met

## 2023-10-22 VITALS
HEART RATE: 85 BPM | SYSTOLIC BLOOD PRESSURE: 128 MMHG | RESPIRATION RATE: 20 BRPM | HEIGHT: 67 IN | OXYGEN SATURATION: 98 % | DIASTOLIC BLOOD PRESSURE: 71 MMHG | TEMPERATURE: 97.5 F | WEIGHT: 149.91 LBS | BODY MASS INDEX: 23.53 KG/M2

## 2023-10-22 LAB
ANION GAP SERPL CALC-SCNC: 16 MMOL/L (ref 10–20)
BUN SERPL-MCNC: 41 MG/DL (ref 6–23)
CALCIUM SERPL-MCNC: 8.1 MG/DL (ref 8.6–10.3)
CHLORIDE SERPL-SCNC: 97 MMOL/L (ref 98–107)
CO2 SERPL-SCNC: 26 MMOL/L (ref 21–32)
CREAT SERPL-MCNC: 7.23 MG/DL (ref 0.5–1.05)
ERYTHROCYTE [DISTWIDTH] IN BLOOD BY AUTOMATED COUNT: 17 % (ref 11.5–14.5)
GFR SERPL CREATININE-BSD FRML MDRD: 6 ML/MIN/1.73M*2
GLUCOSE SERPL-MCNC: 75 MG/DL (ref 74–99)
HCT VFR BLD AUTO: 28.5 % (ref 36–46)
HGB BLD-MCNC: 9 G/DL (ref 12–16)
INR PPP: 4 (ref 0.9–1.1)
MCH RBC QN AUTO: 29.9 PG (ref 26–34)
MCHC RBC AUTO-ENTMCNC: 31.6 G/DL (ref 32–36)
MCV RBC AUTO: 95 FL (ref 80–100)
NRBC BLD-RTO: 0.3 /100 WBCS (ref 0–0)
PLATELET # BLD AUTO: 220 X10*3/UL (ref 150–450)
PMV BLD AUTO: 11 FL (ref 7.5–11.5)
POTASSIUM SERPL-SCNC: 4.1 MMOL/L (ref 3.5–5.3)
PROTHROMBIN TIME: 45.3 SECONDS (ref 9.8–12.8)
RBC # BLD AUTO: 3.01 X10*6/UL (ref 4–5.2)
SODIUM SERPL-SCNC: 135 MMOL/L (ref 136–145)
WBC # BLD AUTO: 6 X10*3/UL (ref 4.4–11.3)

## 2023-10-22 PROCEDURE — 96376 TX/PRO/DX INJ SAME DRUG ADON: CPT

## 2023-10-22 PROCEDURE — 36415 COLL VENOUS BLD VENIPUNCTURE: CPT | Performed by: NURSE PRACTITIONER

## 2023-10-22 PROCEDURE — G0378 HOSPITAL OBSERVATION PER HR: HCPCS

## 2023-10-22 PROCEDURE — 2500000001 HC RX 250 WO HCPCS SELF ADMINISTERED DRUGS (ALT 637 FOR MEDICARE OP): Performed by: NURSE PRACTITIONER

## 2023-10-22 PROCEDURE — 99238 HOSP IP/OBS DSCHRG MGMT 30/<: CPT | Performed by: NURSE PRACTITIONER

## 2023-10-22 PROCEDURE — 85027 COMPLETE CBC AUTOMATED: CPT | Performed by: NURSE PRACTITIONER

## 2023-10-22 PROCEDURE — 2500000004 HC RX 250 GENERAL PHARMACY W/ HCPCS (ALT 636 FOR OP/ED): Performed by: NURSE PRACTITIONER

## 2023-10-22 PROCEDURE — 85610 PROTHROMBIN TIME: CPT | Performed by: NURSE PRACTITIONER

## 2023-10-22 PROCEDURE — 80051 ELECTROLYTE PANEL: CPT | Performed by: NURSE PRACTITIONER

## 2023-10-22 RX ORDER — MIRTAZAPINE 15 MG/1
15 TABLET, FILM COATED ORAL NIGHTLY
Qty: 30 TABLET | Refills: 0 | Status: SHIPPED | OUTPATIENT
Start: 2023-10-22 | End: 2024-01-12 | Stop reason: SDUPTHER

## 2023-10-22 RX ORDER — PREGABALIN 25 MG/1
25 CAPSULE ORAL DAILY
Qty: 30 CAPSULE | Refills: 0 | Status: SHIPPED | OUTPATIENT
Start: 2023-10-22 | End: 2024-02-07 | Stop reason: HOSPADM

## 2023-10-22 RX ORDER — CARVEDILOL 6.25 MG/1
6.25 TABLET ORAL
Qty: 60 TABLET | Refills: 0 | Status: SHIPPED | OUTPATIENT
Start: 2023-10-22 | End: 2024-01-12 | Stop reason: SDUPTHER

## 2023-10-22 RX ORDER — SEVELAMER CARBONATE 800 MG/1
800 TABLET, FILM COATED ORAL
Qty: 90 TABLET | Refills: 0 | Status: SHIPPED | OUTPATIENT
Start: 2023-10-22 | End: 2024-02-07 | Stop reason: HOSPADM

## 2023-10-22 RX ADMIN — LEVOTHYROXINE SODIUM 25 MCG: 0.03 TABLET ORAL at 06:30

## 2023-10-22 RX ADMIN — HYDROMORPHONE HYDROCHLORIDE 0.2 MG: 0.2 INJECTION, SOLUTION INTRAMUSCULAR; INTRAVENOUS; SUBCUTANEOUS at 10:01

## 2023-10-22 RX ADMIN — ALLOPURINOL 100 MG: 100 TABLET ORAL at 09:55

## 2023-10-22 RX ADMIN — CARVEDILOL 6.25 MG: 6.25 TABLET, FILM COATED ORAL at 09:55

## 2023-10-22 RX ADMIN — PREGABALIN 25 MG: 25 CAPSULE ORAL at 09:54

## 2023-10-22 RX ADMIN — CALCITRIOL CAPSULES 0.25 MCG 0.5 MCG: 0.25 CAPSULE ORAL at 09:55

## 2023-10-22 RX ADMIN — PANCRELIPASE 3 CAPSULE: 24000; 76000; 120000 CAPSULE, DELAYED RELEASE PELLETS ORAL at 09:55

## 2023-10-22 RX ADMIN — TRAMADOL HYDROCHLORIDE 50 MG: 50 TABLET, COATED ORAL at 05:25

## 2023-10-22 RX ADMIN — PANTOPRAZOLE SODIUM 40 MG: 40 TABLET, DELAYED RELEASE ORAL at 06:30

## 2023-10-22 RX ADMIN — DOCUSATE SODIUM 100 MG: 100 CAPSULE, LIQUID FILLED ORAL at 09:55

## 2023-10-22 RX ADMIN — SEVELAMER CARBONATE 800 MG: 800 TABLET, FILM COATED ORAL at 09:54

## 2023-10-22 ASSESSMENT — COGNITIVE AND FUNCTIONAL STATUS - GENERAL
MOBILITY SCORE: 19
MOVING TO AND FROM BED TO CHAIR: A LITTLE
STANDING UP FROM CHAIR USING ARMS: A LITTLE
WALKING IN HOSPITAL ROOM: A LITTLE
DAILY ACTIVITIY SCORE: 24
CLIMB 3 TO 5 STEPS WITH RAILING: A LOT

## 2023-10-22 ASSESSMENT — PAIN SCALES - GENERAL
PAINLEVEL_OUTOF10: 10 - WORST POSSIBLE PAIN
PAINLEVEL_OUTOF10: 10 - WORST POSSIBLE PAIN
PAINLEVEL_OUTOF10: 5 - MODERATE PAIN
PAINLEVEL_OUTOF10: 10 - WORST POSSIBLE PAIN

## 2023-10-22 ASSESSMENT — PAIN DESCRIPTION - DESCRIPTORS: DESCRIPTORS: PRESSURE;STABBING

## 2023-10-22 ASSESSMENT — PAIN - FUNCTIONAL ASSESSMENT: PAIN_FUNCTIONAL_ASSESSMENT: 0-10

## 2023-10-22 NOTE — DISCHARGE INSTRUCTIONS
Follow up with pain management - dr blackburn  Follow up with dr leary  Follow up with primary care   Consider mental health provider- you may contact [phone] for appt with someone specializing in medical illness - and let them know you prefer zoom to inperson

## 2023-10-22 NOTE — NURSING NOTE
Discharged via private vehicle with all personal belongings, accompanied by friend/family . In stable condition with VS WNL  and able to ambulate with assistance  with a cane.  Written discharge instructions were given and reviewed, including meds, follow-up appts, and precautions to take at home. Patient verbalized understanding of instructions, and all questions were addressed.   Cautioned about not driving while taking narcotic medications and pt verbalized understanding.

## 2023-10-22 NOTE — PROGRESS NOTES
Seen on peritoneal dialysis.  Less edema, she is in much better spirits.  She will be discharged today.  I reviewed Dr. Dhillon's recommendations.  She will call her therapist as well.  Blood pressures are noted.  Phosphorus has been coming down, multifactorial anemia.  I will see her at home dialysis clinic this coming Tuesday.

## 2023-10-22 NOTE — CARE PLAN
Problem: Pain  Goal: Takes deep breaths with improved pain control throughout the shift  Outcome: Progressing  Goal: Turns in bed with improved pain control throughout the shift  Outcome: Progressing  Goal: Walks with improved pain control throughout the shift  Outcome: Progressing  Goal: Performs ADL's with improved pain control throughout shift  Outcome: Progressing  Goal: Participates in PT with improved pain control throughout the shift  Outcome: Progressing  Goal: Free from opioid side effects throughout the shift  Outcome: Progressing  Goal: Free from acute confusion related to pain meds throughout the shift  Outcome: Progressing     Problem: Fall/Injury  Goal: Not fall by end of shift  Outcome: Progressing  Goal: Be free from injury by end of the shift  Outcome: Progressing  Goal: Verbalize understanding of personal risk factors for fall in the hospital  Outcome: Progressing  Goal: Verbalize understanding of risk factor reduction measures to prevent injury from fall in the home  Outcome: Progressing  Goal: Use assistive devices by end of the shift  Outcome: Progressing  Goal: Pace activities to prevent fatigue by end of the shift  Outcome: Progressing   The patient's goals for the shift include      The clinical goals for the shift include Pt will remain safe throughout the shift

## 2023-10-22 NOTE — DISCHARGE SUMMARY
Discharge Diagnosis  Pleural effusion    Discharge Meds     Your medication list        START taking these medications        Instructions Last Dose Given Next Dose Due   pregabalin 25 mg capsule  Commonly known as: Lyrica      Take 1 capsule (25 mg) by mouth once daily.       sevelamer carbonate 800 mg tablet  Commonly known as: Renvela      Take 1 tablet (800 mg) by mouth 3 times a day with meals. Swallow tablet whole; do not crush, break, or chew.              CHANGE how you take these medications        Instructions Last Dose Given Next Dose Due   carvedilol 6.25 mg tablet  Commonly known as: Coreg  What changed:   medication strength  how much to take      Take 1 tablet (6.25 mg) by mouth 2 times a day with meals.       mirtazapine 15 mg tablet  Commonly known as: Remeron  What changed:   medication strength  how much to take      Take 1 tablet (15 mg) by mouth once daily at bedtime.              CONTINUE taking these medications        Instructions Last Dose Given Next Dose Due   allopurinol 100 mg tablet  Commonly known as: Zyloprim      Take 1 tablet (100 mg) by mouth once daily.       atorvastatin 40 mg tablet  Commonly known as: Lipitor      TAKE 1 TABLET BY MOUTH ONCE DAILY AT BEDTIME       calcitriol 0.5 mcg capsule  Commonly known as: Rocaltrol           Creon 24,000-76,000 -120,000 unit capsule  Generic drug: lipase-protease-amylase      Take 3 capsules by mouth 3 times a day with meals.       epoetin treasure 10,000 unit/mL injection  Commonly known as: Epogen,Procrit           levothyroxine 25 mcg tablet  Commonly known as: Synthroid, Levoxyl      Take 1 tablet (25 mcg) by mouth once daily in the morning. Take before meals.       magnesium oxide 400 mg tablet  Commonly known as: Mag-Ox           naloxone 4 mg/0.1 mL nasal spray  Commonly known as: Narcan      INSTILL 1 SPRAY IN ONE NOSTRIL AS NEEDED FOR ACCIDENTAL OPIOID OVERDOSE; REPEAT WITH SECOND DOSE IN 5 MINUTES IF NO RESPONSE       sevelamer HCl  800 mg tablet  Commonly known as: Renagel           warfarin 2.5 mg tablet  Commonly known as: Coumadin      TAKE 1 TABLET BY MOUTH ONCE DAILY                 Where to Get Your Medications        These medications were sent to [facility]/pharmacy #[id number] - [facility], [place] - [address] [address] [facility] [address]  [address] [facility] [address]      Phone: [phone]   carvedilol 6.25 mg tablet  mirtazapine 15 mg tablet  pregabalin 25 mg capsule  sevelamer carbonate 800 mg tablet         Test Results Pending At Discharge  Pending Labs       No current pending labs.            Hospital Course  Yoselyn Hernandez is a 63 y.o. female presenting with shortness of breath for past few weeks. Presented to pcp for routine appt today and was told she was fluid overloaded and to go to ed. She performs pd at home. She has also been having intense low back pain radiating down her left leg to her feet. She stopped all her home meds a few weeks ago including lyrica. Would like this to be restarted    Sig pmh: carop myoptahy, chf, hyperparathyroidism, esrd on PD, hypothyroidism, aortic aneurysm, dvt/pe, copd, ventral hernia, diarrhea, sciatica, ESRD (PD under Dr. Lora; DW estimated at 58.5kg), secondary hyperparathyroidism, anemia, HTN, PE and dissection      Principal Problem:    Pleural effusion  Active Problems:    Fluid excess  - nephrology consult, appreciate recs -- hoping to dc tomorrow per notes   - Continue peritoneal dialysis  - Continue on a sodium restricted diet  - Continue monitoring lab work daily  - BMP creatinine 6.21  - BNP greater than 4700  - continue home meds, restart lyrica - fu with dr blackburn for pain management   - pharmacy consult for med control  - depressed about health concerns will consult psychiatry - - rec increase mirtazipine to 15mg at bedtime      Hypothyroid/HTN/gout   -continue on home meds     Dvt prophylaxis/PE history  - on coumadin  - INR 2.1  - scd/ambulate      Gi prophylaxis   -  pantopraxole   - miralax    Pertinent Physical Exam At Time of Discharge  Physical Exam  General: Vital signs stable, Pt is alert, no acute distress  Eyes: Conjunctiva normal, PERRL, EOMs intact  HENMT: Normocephalic, atraumatic, external ears and nose normal, no scars or masses.  No mastoid tenderness. Trachea is midline. No meningeal signs, negative Kernig and Brudzinski, moves neck freely.  No sinus tenderness  Resp: Respiratory effort is normal, no retractions, no stridor. Lungs CTA, no wheezes or rhonchi  CV: Heart is regular rate and rhythm. Trace edema left lower ext. None in right lower. Slight edema noted bl thigh.  : Positive peritoneal dialysis catheter. 3 midline abd hernias. Bs active x4. Soft  nontender abd.  Skin: No evidence of trauma, skin is warm and dry. No rashes, lesions or ulcers.  Skel: full range of motion of upper and lower extremities.   Neuro: Normal gait, CN II-XII intact, no motor or sensory changes.  Psych: Alert and oriented ×3, judgment is appropriate, normal mood and affect      Outpatient Follow-Up  No future appointments.      Karina Waddell, APRN-CNP

## 2023-10-23 ENCOUNTER — PATIENT OUTREACH (OUTPATIENT)
Dept: PRIMARY CARE | Facility: CLINIC | Age: 63
End: 2023-10-23
Payer: COMMERCIAL

## 2023-10-23 ENCOUNTER — DOCUMENTATION (OUTPATIENT)
Dept: CARE COORDINATION | Facility: CLINIC | Age: 63
End: 2023-10-23
Payer: COMMERCIAL

## 2023-10-23 NOTE — PROGRESS NOTES
Discharge Facility: [facility]  Discharge Diagnosis:Pleural Effusion  Admission Date: 10/18/2023  Discharge Date: 10/22/2023    PCP Appointment Date: Tasked to office for scheduling due to no contact with patient  Specialist Appointment Date: None  Hospital Encounter and Summary: Linked    2 attempts made to reach the patient with no return call as of this note.

## 2023-10-25 ENCOUNTER — DOCUMENTATION (OUTPATIENT)
Dept: CARE COORDINATION | Facility: CLINIC | Age: 63
End: 2023-10-25
Payer: COMMERCIAL

## 2023-11-05 PROCEDURE — 83880 ASSAY OF NATRIURETIC PEPTIDE: CPT | Performed by: STUDENT IN AN ORGANIZED HEALTH CARE EDUCATION/TRAINING PROGRAM

## 2023-11-05 PROCEDURE — 83735 ASSAY OF MAGNESIUM: CPT

## 2023-11-05 PROCEDURE — 84484 ASSAY OF TROPONIN QUANT: CPT | Performed by: EMERGENCY MEDICINE

## 2023-11-05 PROCEDURE — 36415 COLL VENOUS BLD VENIPUNCTURE: CPT | Performed by: EMERGENCY MEDICINE

## 2023-11-05 PROCEDURE — 83516 IMMUNOASSAY NONANTIBODY: CPT | Performed by: STUDENT IN AN ORGANIZED HEALTH CARE EDUCATION/TRAINING PROGRAM

## 2023-11-05 PROCEDURE — 83690 ASSAY OF LIPASE: CPT

## 2023-11-05 PROCEDURE — 99285 EMERGENCY DEPT VISIT HI MDM: CPT | Mod: 25

## 2023-11-05 PROCEDURE — 85652 RBC SED RATE AUTOMATED: CPT | Performed by: STUDENT IN AN ORGANIZED HEALTH CARE EDUCATION/TRAINING PROGRAM

## 2023-11-05 PROCEDURE — 99285 EMERGENCY DEPT VISIT HI MDM: CPT | Performed by: EMERGENCY MEDICINE

## 2023-11-05 PROCEDURE — 85025 COMPLETE CBC W/AUTO DIFF WBC: CPT

## 2023-11-05 PROCEDURE — 93010 ELECTROCARDIOGRAM REPORT: CPT | Performed by: NURSE PRACTITIONER

## 2023-11-05 PROCEDURE — 96374 THER/PROPH/DIAG INJ IV PUSH: CPT | Mod: 59

## 2023-11-05 RX ORDER — SODIUM CHLORIDE 9 MG/ML
3 INJECTION, SOLUTION INTRAMUSCULAR; INTRAVENOUS; SUBCUTANEOUS AS NEEDED
Status: DISCONTINUED | OUTPATIENT
Start: 2023-11-05 | End: 2023-11-05

## 2023-11-05 ASSESSMENT — PAIN DESCRIPTION - LOCATION: LOCATION: ABDOMEN

## 2023-11-05 ASSESSMENT — COLUMBIA-SUICIDE SEVERITY RATING SCALE - C-SSRS
2. HAVE YOU ACTUALLY HAD ANY THOUGHTS OF KILLING YOURSELF?: NO
1. IN THE PAST MONTH, HAVE YOU WISHED YOU WERE DEAD OR WISHED YOU COULD GO TO SLEEP AND NOT WAKE UP?: NO
6. HAVE YOU EVER DONE ANYTHING, STARTED TO DO ANYTHING, OR PREPARED TO DO ANYTHING TO END YOUR LIFE?: NO

## 2023-11-05 ASSESSMENT — PAIN SCALES - GENERAL: PAINLEVEL_OUTOF10: 10 - WORST POSSIBLE PAIN

## 2023-11-05 ASSESSMENT — LIFESTYLE VARIABLES
REASON UNABLE TO ASSESS: NO
HAVE PEOPLE ANNOYED YOU BY CRITICIZING YOUR DRINKING: NO
HAVE YOU EVER FELT YOU SHOULD CUT DOWN ON YOUR DRINKING: NO
EVER FELT BAD OR GUILTY ABOUT YOUR DRINKING: NO
EVER HAD A DRINK FIRST THING IN THE MORNING TO STEADY YOUR NERVES TO GET RID OF A HANGOVER: NO

## 2023-11-05 ASSESSMENT — PAIN - FUNCTIONAL ASSESSMENT: PAIN_FUNCTIONAL_ASSESSMENT: 0-10

## 2023-11-06 ENCOUNTER — HOSPITAL ENCOUNTER (OUTPATIENT)
Facility: HOSPITAL | Age: 63
Setting detail: OBSERVATION
Discharge: HOME | End: 2023-11-15
Attending: EMERGENCY MEDICINE | Admitting: STUDENT IN AN ORGANIZED HEALTH CARE EDUCATION/TRAINING PROGRAM
Payer: COMMERCIAL

## 2023-11-06 ENCOUNTER — APPOINTMENT (OUTPATIENT)
Dept: RADIOLOGY | Facility: HOSPITAL | Age: 63
End: 2023-11-06
Payer: COMMERCIAL

## 2023-11-06 ENCOUNTER — CLINICAL SUPPORT (OUTPATIENT)
Dept: EMERGENCY MEDICINE | Facility: HOSPITAL | Age: 63
End: 2023-11-06
Payer: COMMERCIAL

## 2023-11-06 DIAGNOSIS — K46.9 ABDOMINAL HERNIA WITHOUT OBSTRUCTION AND WITHOUT GANGRENE, RECURRENCE NOT SPECIFIED, UNSPECIFIED HERNIA TYPE: ICD-10-CM

## 2023-11-06 DIAGNOSIS — R53.81 PHYSICAL DECONDITIONING: ICD-10-CM

## 2023-11-06 DIAGNOSIS — Z99.2 PERITONEAL DIALYSIS STATUS (CMS-HCC): ICD-10-CM

## 2023-11-06 DIAGNOSIS — R10.13 EPIGASTRIC PAIN: ICD-10-CM

## 2023-11-06 DIAGNOSIS — K59.00 CONSTIPATION, UNSPECIFIED CONSTIPATION TYPE: ICD-10-CM

## 2023-11-06 DIAGNOSIS — R19.7 DIARRHEA, UNSPECIFIED TYPE: ICD-10-CM

## 2023-11-06 DIAGNOSIS — R79.1 SUBTHERAPEUTIC INTERNATIONAL NORMALIZED RATIO (INR): ICD-10-CM

## 2023-11-06 DIAGNOSIS — R10.13 ABDOMINAL PAIN, EPIGASTRIC: Primary | ICD-10-CM

## 2023-11-06 DIAGNOSIS — K65.9: ICD-10-CM

## 2023-11-06 PROBLEM — R10.9 ABDOMINAL PAIN: Status: ACTIVE | Noted: 2023-11-06

## 2023-11-06 LAB
ALBUMIN SERPL BCP-MCNC: 2.7 G/DL (ref 3.4–5)
ALP SERPL-CCNC: 100 U/L (ref 33–136)
ALT SERPL W P-5'-P-CCNC: 11 U/L (ref 7–45)
ANION GAP BLDV CALCULATED.4IONS-SCNC: 13 MMOL/L (ref 10–25)
ANION GAP SERPL CALC-SCNC: 27 MMOL/L (ref 10–20)
AST SERPL W P-5'-P-CCNC: 13 U/L (ref 9–39)
ATRIAL RATE: 95 BPM
BASE EXCESS BLDV CALC-SCNC: 0 MMOL/L (ref -2–3)
BASOPHILS # BLD AUTO: 0.02 X10*3/UL (ref 0–0.1)
BASOPHILS NFR BLD AUTO: 0.3 %
BILIRUB SERPL-MCNC: 0.5 MG/DL (ref 0–1.2)
BNP SERPL-MCNC: >5000 PG/ML (ref 0–99)
BODY TEMPERATURE: 37 DEGREES CELSIUS
BUN SERPL-MCNC: 49 MG/DL (ref 6–23)
CA-I BLDV-SCNC: 1 MMOL/L (ref 1.1–1.33)
CALCIUM SERPL-MCNC: 8 MG/DL (ref 8.6–10.6)
CARDIAC TROPONIN I PNL SERPL HS: 40 NG/L (ref 0–34)
CARDIAC TROPONIN I PNL SERPL HS: 41 NG/L (ref 0–34)
CHLORIDE BLDV-SCNC: 100 MMOL/L (ref 98–107)
CHLORIDE SERPL-SCNC: 98 MMOL/L (ref 98–107)
CO2 SERPL-SCNC: 17 MMOL/L (ref 21–32)
CREAT SERPL-MCNC: 8.24 MG/DL (ref 0.5–1.05)
CRP SERPL-MCNC: 4.91 MG/DL
EOSINOPHIL # BLD AUTO: 0.06 X10*3/UL (ref 0–0.7)
EOSINOPHIL NFR BLD AUTO: 0.8 %
ERYTHROCYTE [DISTWIDTH] IN BLOOD BY AUTOMATED COUNT: 18.6 % (ref 11.5–14.5)
ERYTHROCYTE [SEDIMENTATION RATE] IN BLOOD BY WESTERGREN METHOD: 60 MM/H (ref 0–30)
FLUAV RNA RESP QL NAA+PROBE: NOT DETECTED
FLUBV RNA RESP QL NAA+PROBE: NOT DETECTED
GFR SERPL CREATININE-BSD FRML MDRD: 5 ML/MIN/1.73M*2
GLUCOSE BLDV-MCNC: 72 MG/DL (ref 74–99)
GLUCOSE SERPL-MCNC: 70 MG/DL (ref 74–99)
HCO3 BLDV-SCNC: 23.5 MMOL/L (ref 22–26)
HCT VFR BLD AUTO: 28.5 % (ref 36–46)
HCT VFR BLD EST: 29 % (ref 36–46)
HGB BLD-MCNC: 9.1 G/DL (ref 12–16)
HGB BLDV-MCNC: 9.5 G/DL (ref 12–16)
HOLD SPECIMEN: NORMAL
IMM GRANULOCYTES # BLD AUTO: 0.03 X10*3/UL (ref 0–0.7)
IMM GRANULOCYTES NFR BLD AUTO: 0.4 % (ref 0–0.9)
INR PPP: 1.4 (ref 0.9–1.1)
LACTATE BLDV-SCNC: 1.3 MMOL/L (ref 0.4–2)
LIPASE SERPL-CCNC: 80 U/L (ref 9–82)
LIPASE SERPL-CCNC: 89 U/L (ref 9–82)
LYMPHOCYTES # BLD AUTO: 0.94 X10*3/UL (ref 1.2–4.8)
LYMPHOCYTES NFR BLD AUTO: 13.1 %
MAGNESIUM SERPL-MCNC: 1.4 MG/DL (ref 1.6–2.4)
MCH RBC QN AUTO: 30.1 PG (ref 26–34)
MCHC RBC AUTO-ENTMCNC: 31.9 G/DL (ref 32–36)
MCV RBC AUTO: 94 FL (ref 80–100)
MONOCYTES # BLD AUTO: 0.46 X10*3/UL (ref 0.1–1)
MONOCYTES NFR BLD AUTO: 6.4 %
NEUTROPHILS # BLD AUTO: 5.66 X10*3/UL (ref 1.2–7.7)
NEUTROPHILS NFR BLD AUTO: 79 %
NRBC BLD-RTO: 0 /100 WBCS (ref 0–0)
OXYHGB MFR BLDV: 76.6 % (ref 45–75)
P AXIS: 58 DEGREES
P OFFSET: 210 MS
P ONSET: 147 MS
PCO2 BLDV: 33 MM HG (ref 41–51)
PH BLDV: 7.46 PH (ref 7.33–7.43)
PHOSPHATE SERPL-MCNC: 8.1 MG/DL (ref 2.5–4.9)
PLATELET # BLD AUTO: 186 X10*3/UL (ref 150–450)
PO2 BLDV: 51 MM HG (ref 35–45)
POTASSIUM BLDV-SCNC: 3.3 MMOL/L (ref 3.5–5.3)
POTASSIUM SERPL-SCNC: 3.5 MMOL/L (ref 3.5–5.3)
PR INTERVAL: 150 MS
PROT SERPL-MCNC: 6.3 G/DL (ref 6.4–8.2)
PROTHROMBIN TIME: 15.3 SECONDS (ref 9.8–12.8)
Q ONSET: 222 MS
QRS COUNT: 16 BEATS
QRS DURATION: 90 MS
QT INTERVAL: 354 MS
QTC CALCULATION(BAZETT): 444 MS
QTC FREDERICIA: 412 MS
R AXIS: 110 DEGREES
RBC # BLD AUTO: 3.02 X10*6/UL (ref 4–5.2)
SAO2 % BLDV: 79 % (ref 45–75)
SARS-COV-2 RNA RESP QL NAA+PROBE: NOT DETECTED
SODIUM BLDV-SCNC: 133 MMOL/L (ref 136–145)
SODIUM SERPL-SCNC: 138 MMOL/L (ref 136–145)
T AXIS: -46 DEGREES
T OFFSET: 399 MS
T4 FREE SERPL-MCNC: 1.01 NG/DL (ref 0.78–1.48)
TSH SERPL-ACNC: 5.28 MIU/L (ref 0.44–3.98)
TTG IGA SER IA-ACNC: <1 U/ML
VENTRICULAR RATE: 95 BPM
WBC # BLD AUTO: 7.2 X10*3/UL (ref 4.4–11.3)

## 2023-11-06 PROCEDURE — 87636 SARSCOV2 & INF A&B AMP PRB: CPT | Performed by: EMERGENCY MEDICINE

## 2023-11-06 PROCEDURE — 36415 COLL VENOUS BLD VENIPUNCTURE: CPT

## 2023-11-06 PROCEDURE — 71275 CT ANGIOGRAPHY CHEST: CPT

## 2023-11-06 PROCEDURE — 85610 PROTHROMBIN TIME: CPT | Performed by: EMERGENCY MEDICINE

## 2023-11-06 PROCEDURE — 71275 CT ANGIOGRAPHY CHEST: CPT | Performed by: RADIOLOGY

## 2023-11-06 PROCEDURE — G0378 HOSPITAL OBSERVATION PER HR: HCPCS

## 2023-11-06 PROCEDURE — 2500000004 HC RX 250 GENERAL PHARMACY W/ HCPCS (ALT 636 FOR OP/ED): Performed by: EMERGENCY MEDICINE

## 2023-11-06 PROCEDURE — 96374 THER/PROPH/DIAG INJ IV PUSH: CPT | Mod: 59 | Performed by: STUDENT IN AN ORGANIZED HEALTH CARE EDUCATION/TRAINING PROGRAM

## 2023-11-06 PROCEDURE — 84100 ASSAY OF PHOSPHORUS: CPT | Performed by: EMERGENCY MEDICINE

## 2023-11-06 PROCEDURE — 96376 TX/PRO/DX INJ SAME DRUG ADON: CPT | Mod: 59 | Performed by: STUDENT IN AN ORGANIZED HEALTH CARE EDUCATION/TRAINING PROGRAM

## 2023-11-06 PROCEDURE — 93005 ELECTROCARDIOGRAM TRACING: CPT

## 2023-11-06 PROCEDURE — 2500000001 HC RX 250 WO HCPCS SELF ADMINISTERED DRUGS (ALT 637 FOR MEDICARE OP): Performed by: STUDENT IN AN ORGANIZED HEALTH CARE EDUCATION/TRAINING PROGRAM

## 2023-11-06 PROCEDURE — 84443 ASSAY THYROID STIM HORMONE: CPT | Performed by: STUDENT IN AN ORGANIZED HEALTH CARE EDUCATION/TRAINING PROGRAM

## 2023-11-06 PROCEDURE — 84439 ASSAY OF FREE THYROXINE: CPT | Performed by: STUDENT IN AN ORGANIZED HEALTH CARE EDUCATION/TRAINING PROGRAM

## 2023-11-06 PROCEDURE — 83690 ASSAY OF LIPASE: CPT | Performed by: EMERGENCY MEDICINE

## 2023-11-06 PROCEDURE — 96375 TX/PRO/DX INJ NEW DRUG ADDON: CPT | Mod: 59 | Performed by: STUDENT IN AN ORGANIZED HEALTH CARE EDUCATION/TRAINING PROGRAM

## 2023-11-06 PROCEDURE — 84484 ASSAY OF TROPONIN QUANT: CPT | Performed by: EMERGENCY MEDICINE

## 2023-11-06 PROCEDURE — 2500000004 HC RX 250 GENERAL PHARMACY W/ HCPCS (ALT 636 FOR OP/ED)

## 2023-11-06 PROCEDURE — 82947 ASSAY GLUCOSE BLOOD QUANT: CPT

## 2023-11-06 PROCEDURE — 2550000001 HC RX 255 CONTRASTS: Performed by: EMERGENCY MEDICINE

## 2023-11-06 PROCEDURE — 2500000004 HC RX 250 GENERAL PHARMACY W/ HCPCS (ALT 636 FOR OP/ED): Performed by: STUDENT IN AN ORGANIZED HEALTH CARE EDUCATION/TRAINING PROGRAM

## 2023-11-06 PROCEDURE — 2500000001 HC RX 250 WO HCPCS SELF ADMINISTERED DRUGS (ALT 637 FOR MEDICARE OP): Performed by: EMERGENCY MEDICINE

## 2023-11-06 PROCEDURE — 86140 C-REACTIVE PROTEIN: CPT | Performed by: STUDENT IN AN ORGANIZED HEALTH CARE EDUCATION/TRAINING PROGRAM

## 2023-11-06 PROCEDURE — 99222 1ST HOSP IP/OBS MODERATE 55: CPT | Performed by: STUDENT IN AN ORGANIZED HEALTH CARE EDUCATION/TRAINING PROGRAM

## 2023-11-06 PROCEDURE — 82435 ASSAY OF BLOOD CHLORIDE: CPT | Mod: 59

## 2023-11-06 PROCEDURE — 74174 CTA ABD&PLVS W/CONTRAST: CPT | Performed by: RADIOLOGY

## 2023-11-06 PROCEDURE — 36415 COLL VENOUS BLD VENIPUNCTURE: CPT | Performed by: EMERGENCY MEDICINE

## 2023-11-06 RX ORDER — ONDANSETRON HYDROCHLORIDE 2 MG/ML
4 INJECTION, SOLUTION INTRAVENOUS ONCE
Status: COMPLETED | OUTPATIENT
Start: 2023-11-06 | End: 2023-11-06

## 2023-11-06 RX ORDER — HEPARIN SODIUM 5000 [USP'U]/ML
INJECTION, SOLUTION INTRAVENOUS; SUBCUTANEOUS EVERY 4 HOURS PRN
Status: DISCONTINUED | OUTPATIENT
Start: 2023-11-06 | End: 2023-11-12

## 2023-11-06 RX ORDER — CARVEDILOL 6.25 MG/1
6.25 TABLET ORAL
Status: DISCONTINUED | OUTPATIENT
Start: 2023-11-06 | End: 2023-11-15 | Stop reason: HOSPADM

## 2023-11-06 RX ORDER — LOPERAMIDE HYDROCHLORIDE 2 MG/1
2 CAPSULE ORAL 4 TIMES DAILY PRN
Status: DISCONTINUED | OUTPATIENT
Start: 2023-11-06 | End: 2023-11-15 | Stop reason: HOSPADM

## 2023-11-06 RX ORDER — LEVOTHYROXINE SODIUM 25 UG/1
25 TABLET ORAL
Status: DISCONTINUED | OUTPATIENT
Start: 2023-11-07 | End: 2023-11-15 | Stop reason: HOSPADM

## 2023-11-06 RX ORDER — PREGABALIN 25 MG/1
25 CAPSULE ORAL DAILY
Status: DISCONTINUED | OUTPATIENT
Start: 2023-11-06 | End: 2023-11-15 | Stop reason: HOSPADM

## 2023-11-06 RX ORDER — HEPARIN SODIUM 5000 [USP'U]/ML
60 INJECTION, SOLUTION INTRAVENOUS; SUBCUTANEOUS ONCE
Status: DISCONTINUED | OUTPATIENT
Start: 2023-11-06 | End: 2023-11-06

## 2023-11-06 RX ORDER — HYDROMORPHONE HYDROCHLORIDE 1 MG/ML
0.2 INJECTION, SOLUTION INTRAMUSCULAR; INTRAVENOUS; SUBCUTANEOUS EVERY 4 HOURS PRN
Status: DISCONTINUED | OUTPATIENT
Start: 2023-11-06 | End: 2023-11-09

## 2023-11-06 RX ORDER — ALLOPURINOL 100 MG/1
100 TABLET ORAL DAILY
Status: DISCONTINUED | OUTPATIENT
Start: 2023-11-06 | End: 2023-11-15 | Stop reason: HOSPADM

## 2023-11-06 RX ORDER — ONDANSETRON HYDROCHLORIDE 2 MG/ML
INJECTION, SOLUTION INTRAVENOUS
Status: COMPLETED
Start: 2023-11-06 | End: 2023-11-06

## 2023-11-06 RX ORDER — ATORVASTATIN CALCIUM 40 MG/1
40 TABLET, FILM COATED ORAL NIGHTLY
Status: DISCONTINUED | OUTPATIENT
Start: 2023-11-06 | End: 2023-11-15 | Stop reason: HOSPADM

## 2023-11-06 RX ORDER — CALCITRIOL 0.5 UG/1
0.5 CAPSULE ORAL DAILY
Status: DISCONTINUED | OUTPATIENT
Start: 2023-11-06 | End: 2023-11-15 | Stop reason: HOSPADM

## 2023-11-06 RX ORDER — TALC
3 POWDER (GRAM) TOPICAL DAILY
Status: DISCONTINUED | OUTPATIENT
Start: 2023-11-06 | End: 2023-11-15 | Stop reason: HOSPADM

## 2023-11-06 RX ORDER — HYDROMORPHONE HYDROCHLORIDE 1 MG/ML
0.5 INJECTION, SOLUTION INTRAMUSCULAR; INTRAVENOUS; SUBCUTANEOUS ONCE
Status: COMPLETED | OUTPATIENT
Start: 2023-11-06 | End: 2023-11-06

## 2023-11-06 RX ORDER — SEVELAMER HYDROCHLORIDE 800 MG/1
800 TABLET, FILM COATED ORAL
Status: DISCONTINUED | OUTPATIENT
Start: 2023-11-06 | End: 2023-11-15 | Stop reason: HOSPADM

## 2023-11-06 RX ORDER — ACETAMINOPHEN 160 MG/5ML
650 SOLUTION ORAL EVERY 4 HOURS PRN
Status: DISCONTINUED | OUTPATIENT
Start: 2023-11-06 | End: 2023-11-10

## 2023-11-06 RX ORDER — HYDROMORPHONE HYDROCHLORIDE 1 MG/ML
INJECTION, SOLUTION INTRAMUSCULAR; INTRAVENOUS; SUBCUTANEOUS
Status: COMPLETED
Start: 2023-11-06 | End: 2023-11-06

## 2023-11-06 RX ORDER — HYDROMORPHONE HYDROCHLORIDE 1 MG/ML
0.4 INJECTION, SOLUTION INTRAMUSCULAR; INTRAVENOUS; SUBCUTANEOUS EVERY 4 HOURS PRN
Status: DISCONTINUED | OUTPATIENT
Start: 2023-11-06 | End: 2023-11-09

## 2023-11-06 RX ORDER — MIRTAZAPINE 15 MG/1
15 TABLET, FILM COATED ORAL NIGHTLY
Status: DISCONTINUED | OUTPATIENT
Start: 2023-11-06 | End: 2023-11-15 | Stop reason: HOSPADM

## 2023-11-06 RX ORDER — ONDANSETRON HYDROCHLORIDE 2 MG/ML
4 INJECTION, SOLUTION INTRAVENOUS EVERY 6 HOURS PRN
Status: DISCONTINUED | OUTPATIENT
Start: 2023-11-06 | End: 2023-11-15 | Stop reason: HOSPADM

## 2023-11-06 RX ORDER — POLYETHYLENE GLYCOL 3350 17 G/17G
17 POWDER, FOR SOLUTION ORAL DAILY PRN
Status: DISCONTINUED | OUTPATIENT
Start: 2023-11-06 | End: 2023-11-09

## 2023-11-06 RX ORDER — ACETAMINOPHEN 650 MG/1
650 SUPPOSITORY RECTAL EVERY 4 HOURS PRN
Status: DISCONTINUED | OUTPATIENT
Start: 2023-11-06 | End: 2023-11-10

## 2023-11-06 RX ORDER — HEPARIN SODIUM 10000 [USP'U]/100ML
0-4000 INJECTION, SOLUTION INTRAVENOUS CONTINUOUS
Status: DISCONTINUED | OUTPATIENT
Start: 2023-11-06 | End: 2023-11-12

## 2023-11-06 RX ORDER — ACETAMINOPHEN 325 MG/1
650 TABLET ORAL EVERY 4 HOURS PRN
Status: DISCONTINUED | OUTPATIENT
Start: 2023-11-06 | End: 2023-11-06

## 2023-11-06 RX ADMIN — IOHEXOL 100 ML: 350 INJECTION, SOLUTION INTRAVENOUS at 06:22

## 2023-11-06 RX ADMIN — HYDROMORPHONE HYDROCHLORIDE 0.5 MG: 1 INJECTION, SOLUTION INTRAMUSCULAR; INTRAVENOUS; SUBCUTANEOUS at 05:08

## 2023-11-06 RX ADMIN — ATORVASTATIN CALCIUM 40 MG: 40 TABLET, FILM COATED ORAL at 21:22

## 2023-11-06 RX ADMIN — ONDANSETRON 4 MG: 2 INJECTION INTRAMUSCULAR; INTRAVENOUS at 05:08

## 2023-11-06 RX ADMIN — ONDANSETRON 4 MG: 2 INJECTION INTRAMUSCULAR; INTRAVENOUS at 11:27

## 2023-11-06 RX ADMIN — HYDROMORPHONE HYDROCHLORIDE 0.4 MG: 1 INJECTION, SOLUTION INTRAMUSCULAR; INTRAVENOUS; SUBCUTANEOUS at 22:18

## 2023-11-06 RX ADMIN — SEVELAMER HYDROCHLORIDE 800 MG: 800 TABLET, FILM COATED ORAL at 17:04

## 2023-11-06 RX ADMIN — ALLOPURINOL 100 MG: 100 TABLET ORAL at 16:19

## 2023-11-06 RX ADMIN — HYDROMORPHONE HYDROCHLORIDE 0.4 MG: 1 INJECTION, SOLUTION INTRAMUSCULAR; INTRAVENOUS; SUBCUTANEOUS at 18:09

## 2023-11-06 RX ADMIN — CALCITRIOL 0.5 MCG: 0.5 CAPSULE ORAL at 17:04

## 2023-11-06 RX ADMIN — PREGABALIN 25 MG: 25 CAPSULE ORAL at 17:10

## 2023-11-06 RX ADMIN — ONDANSETRON 4 MG: 2 INJECTION INTRAMUSCULAR; INTRAVENOUS at 22:18

## 2023-11-06 RX ADMIN — Medication 3 MG: at 21:23

## 2023-11-06 RX ADMIN — CARVEDILOL 6.25 MG: 12.5 TABLET, FILM COATED ORAL at 16:19

## 2023-11-06 RX ADMIN — HEPARIN SODIUM 700 UNITS/HR: 10000 INJECTION, SOLUTION INTRAVENOUS at 15:30

## 2023-11-06 RX ADMIN — HYDROMORPHONE HYDROCHLORIDE 0.5 MG: 1 INJECTION, SOLUTION INTRAMUSCULAR; INTRAVENOUS; SUBCUTANEOUS at 11:29

## 2023-11-06 RX ADMIN — PANCRELIPASE 3 CAPSULE: 24000; 76000; 120000 CAPSULE, DELAYED RELEASE PELLETS ORAL at 17:04

## 2023-11-06 RX ADMIN — LOPERAMIDE HYDROCHLORIDE 2 MG: 2 CAPSULE ORAL at 16:44

## 2023-11-06 RX ADMIN — MIRTAZAPINE 15 MG: 15 TABLET, FILM COATED ORAL at 21:22

## 2023-11-06 SDOH — SOCIAL STABILITY: SOCIAL INSECURITY: ARE THERE ANY APPARENT SIGNS OF INJURIES/BEHAVIORS THAT COULD BE RELATED TO ABUSE/NEGLECT?: NO

## 2023-11-06 SDOH — SOCIAL STABILITY: SOCIAL INSECURITY: DOES ANYONE TRY TO KEEP YOU FROM HAVING/CONTACTING OTHER FRIENDS OR DOING THINGS OUTSIDE YOUR HOME?: NO

## 2023-11-06 SDOH — SOCIAL STABILITY: SOCIAL INSECURITY: HAVE YOU HAD THOUGHTS OF HARMING ANYONE ELSE?: NO

## 2023-11-06 SDOH — SOCIAL STABILITY: SOCIAL INSECURITY: DO YOU FEEL UNSAFE GOING BACK TO THE PLACE WHERE YOU ARE LIVING?: NO

## 2023-11-06 SDOH — SOCIAL STABILITY: SOCIAL INSECURITY: ARE YOU OR HAVE YOU BEEN THREATENED OR ABUSED PHYSICALLY, EMOTIONALLY, OR SEXUALLY BY ANYONE?: NO

## 2023-11-06 SDOH — SOCIAL STABILITY: SOCIAL INSECURITY: DO YOU FEEL ANYONE HAS EXPLOITED OR TAKEN ADVANTAGE OF YOU FINANCIALLY OR OF YOUR PERSONAL PROPERTY?: NO

## 2023-11-06 SDOH — SOCIAL STABILITY: SOCIAL INSECURITY: ABUSE: ADULT

## 2023-11-06 SDOH — SOCIAL STABILITY: SOCIAL INSECURITY: HAS ANYONE EVER THREATENED TO HURT YOUR FAMILY OR YOUR PETS?: NO

## 2023-11-06 ASSESSMENT — PAIN - FUNCTIONAL ASSESSMENT
PAIN_FUNCTIONAL_ASSESSMENT: 0-10

## 2023-11-06 ASSESSMENT — PAIN SCALES - GENERAL
PAINLEVEL_OUTOF10: 10 - WORST POSSIBLE PAIN

## 2023-11-06 ASSESSMENT — PAIN DESCRIPTION - LOCATION
LOCATION: ABDOMEN
LOCATION: ABDOMEN

## 2023-11-06 ASSESSMENT — LIFESTYLE VARIABLES
SUBSTANCE_ABUSE_PAST_12_MONTHS: YES
PRESCIPTION_ABUSE_PAST_12_MONTHS: NO
HOW OFTEN DO YOU HAVE 6 OR MORE DRINKS ON ONE OCCASION: NEVER
SKIP TO QUESTIONS 9-10: 1
AUDIT-C TOTAL SCORE: 0
HOW MANY STANDARD DRINKS CONTAINING ALCOHOL DO YOU HAVE ON A TYPICAL DAY: PATIENT DOES NOT DRINK
AUDIT-C TOTAL SCORE: 0
HOW OFTEN DO YOU HAVE A DRINK CONTAINING ALCOHOL: NEVER

## 2023-11-06 ASSESSMENT — COGNITIVE AND FUNCTIONAL STATUS - GENERAL
TURNING FROM BACK TO SIDE WHILE IN FLAT BAD: A LITTLE
PATIENT BASELINE BEDBOUND: NO
MOBILITY SCORE: 19
DAILY ACTIVITIY SCORE: 22
HELP NEEDED FOR BATHING: A LITTLE
TOILETING: A LITTLE
STANDING UP FROM CHAIR USING ARMS: A LITTLE
WALKING IN HOSPITAL ROOM: A LITTLE
MOVING TO AND FROM BED TO CHAIR: A LITTLE
CLIMB 3 TO 5 STEPS WITH RAILING: A LITTLE

## 2023-11-06 ASSESSMENT — ACTIVITIES OF DAILY LIVING (ADL)
FEEDING YOURSELF: INDEPENDENT
GROOMING: INDEPENDENT
LACK_OF_TRANSPORTATION: NO
DRESSING YOURSELF: INDEPENDENT
JUDGMENT_ADEQUATE_SAFELY_COMPLETE_DAILY_ACTIVITIES: YES
HEARING - LEFT EAR: FUNCTIONAL
PATIENT'S MEMORY ADEQUATE TO SAFELY COMPLETE DAILY ACTIVITIES?: YES
TOILETING: INDEPENDENT
WALKS IN HOME: NEEDS ASSISTANCE
ADEQUATE_TO_COMPLETE_ADL: YES
ASSISTIVE_DEVICE: CANE;WALKER
BATHING: NEEDS ASSISTANCE
HEARING - RIGHT EAR: FUNCTIONAL

## 2023-11-06 ASSESSMENT — PATIENT HEALTH QUESTIONNAIRE - PHQ9
SUM OF ALL RESPONSES TO PHQ9 QUESTIONS 1 & 2: 3
2. FEELING DOWN, DEPRESSED OR HOPELESS: MORE THAN HALF THE DAYS
1. LITTLE INTEREST OR PLEASURE IN DOING THINGS: SEVERAL DAYS

## 2023-11-06 ASSESSMENT — PAIN DESCRIPTION - ORIENTATION: ORIENTATION: MID

## 2023-11-06 NOTE — H&P
"History Of Present Illness   64 y/o female with a history of ESRD on dialysis, AAA, HFrEF (25% 03/2023) and pancreatitis, PE (03/2023 on warfarin) who presents to the ED with acute on chronic abdominal pain that worsened over the last 4 days.  Patient reports 18 episodes of diarrhea today, as well as worsening 10/10 abdominal pain so she came to the ED for an evaluation. She also reports associated nausea and vomiting. Denies fevers or chills. She tried imodium with no relief of diarrhea. Reports prolonged immobility the past few days. Patient also reports worsening shortness of breath and new onset chest pain over the last few days. She reports PND which she has not had before. Denies any diaphoresis. States she last did her peritoneal dialysis session on Saturday. Also endorses intermittent HA and chronic back pain.  Patient reports abdominal pain and diarrhea have been an ongoing issue for the last four years but recently have gotten worse.     Of note: Patient recently seen in [facility] ED for 1.5 year hx of diarrhea with frequent flare ups as well as chronic SOB i/s/o chronic PE. Patient was found to be subtherapeutic on warfarin during that ED stay was given a one time extra warfarin dose as she had no worsening SOB at the time and instructed to follow up with pcp for dosing adjustments.       ED Course     VS   Heart Rate:  []   Temp:  [36.3 °C (97.3 °F)-36.8 °C (98.2 °F)]   Resp:  [5-34]   BP: ()/(50-85)   Height:  [170.2 cm (5' 7\")]   Weight:  [56.2 kg (124 lb)]   SpO2:  [90 %-99 %]      Labs  Na 138 K 3.5 Bicarb 17 Bun 49 Creat 8.24  Phos 8.1  Lipase 80   WBC 7.2 Hgb 9.1 RDW 18.6 Plt 186   COVID/flu neg  Trop 40-> 41   INR 1.4 PT 15.3     Imaging  CTA chest, abd. Pelvis pending    Intervention  Heparin gtt     Past Medical History  Past Medical History:   Diagnosis Date    Other abnormalities of gait and mobility     Gait, antalgic    Personal history of other endocrine, nutritional and " metabolic disease     History of hyperlipidemia    Personal history of other specified conditions     History of shortness of breath       Surgical History  Past Surgical History:   Procedure Laterality Date    COLONOSCOPY  12/05/2017    Complete Colonoscopy    COLONOSCOPY  12/2021    [facility] 12-25    CT ABDOMEN PELVIS ANGIOGRAM W AND/OR WO IV CONTRAST  11/17/2014    CT ABDOMEN PELVIS ANGIOGRAM W AND/OR WO IV CONTRAST 11/17/2014 [facility] CLINICAL LEGACY    CT ABDOMEN PELVIS ANGIOGRAM W AND/OR WO IV CONTRAST  02/01/2018    CT ABDOMEN PELVIS ANGIOGRAM W AND/OR WO IV CONTRAST 2/1/2018 [facility] AIB LEGACY    CT ABDOMEN PELVIS ANGIOGRAM W AND/OR WO IV CONTRAST  02/23/2018    CT ABDOMEN PELVIS ANGIOGRAM W AND/OR WO IV CONTRAST [place]23/[place] [facility] [place]    CT ABDOMEN PELVIS ANGIOGRAM W AND/OR WO IV CONTRAST  01/10/2019    CT ABDOMEN PELVIS ANGIOGRAM W AND/OR WO IV CONTRAST 1/10/2019 [facility] EMERGENCY LEGACY    CT ABDOMEN PELVIS ANGIOGRAM W AND/OR WO IV CONTRAST  11/25/2019    CT ABDOMEN PELVIS ANGIOGRAM W AND/OR WO IV CONTRAST 11/25/2019 [facility] ANCILLARY LEGACY    CT ABDOMEN PELVIS ANGIOGRAM W AND/OR WO IV CONTRAST  04/30/2021    CT ABDOMEN PELVIS ANGIOGRAM W AND/OR WO IV CONTRAST 4/30/2021 [facility] CLINICAL LEGACY    CT ABDOMEN PELVIS ANGIOGRAM W AND/OR WO IV CONTRAST  01/09/2017    CT ABDOMEN PELVIS ANGIOGRAM W AND/OR WO IV CONTRAST 1/9/2017 [facility] CLINICAL LEGACY    CT ABDOMEN PELVIS ANGIOGRAM W AND/OR WO IV CONTRAST  10/24/2018    CT ABDOMEN PELVIS ANGIOGRAM W AND/OR WO IV CONTRAST 10/24/2018 [facility] EMERGENCY LEGACY    CT ABDOMEN PELVIS ANGIOGRAM W AND/OR WO IV CONTRAST  12/13/2022    CT ABDOMEN PELVIS ANGIOGRAM W AND/OR WO IV CONTRAST 12/13/2022 DOCTOR OFFICE LEGACY    CT AORTA AND BILATERAL ILIOFEMORAL RUNOFF ANGIOGRAM W AND/OR WO IV CONTRAST  05/12/2023    CT AORTA AND BILATERAL ILIOFEMORAL RUNOFF ANGIOGRAM W AND/OR WO IV CONTRAST 5/12/2023 [facility] [place]    HYSTERECTOMY  01/06/2014    Hysterectomy    IR ANGIOGRAM AORTA ABDOMEN  08/02/2019    IR  ANGIOGRAM [place]ORTA ABDOMEN [place] [facility] [place]    IR ANGIOGRAM AORTA ABDOMEN  08/19/2022    IR ANGIOGRAM AORTA ABDOMEN [place]/20[place] [facility] [place]    IR ANGIOGRAM AORTA THORACIC  02/21/2018    IR ANGIOGRAM AORTA THORACIC 2[place]21/2018 [facility] [place]    IR ANGIOGRAM ENDOVASCULAR AORTIC REPAIR  08/19/2022    IR ANGIOGRAM ENDOVASCULAR AORTIC REPAIR 8[place]/2022 [facility] [place]    IR INTERVENTION NEURO STENT  08/02/2019    IR INTERVENTION NEURO STENT 8/2/2019 [facility] [place]    OTHER SURGICAL HISTORY  01/20/2017    Aortic Aneurysm Repair Thoracoabdominal        Social History  She reports that she quit smoking about 28 years ago. Her smoking use included cigarettes. She has a 5.00 pack-year smoking history. She has never used smokeless tobacco. She reports that she does not currently use alcohol. She reports current drug use. Drug: Marijuana.    Family History  Family History   Problem Relation Name Age of Onset    COPD Mother      Kidney failure Father          Allergies  Ace inhibitors, Ciprofloxacin, Gabapentin, and Oxycodone    Review of Systems   All other systems reviewed and are negative.       Physical Exam  Constitutional:       Appearance: She is not diaphoretic.   HENT:      Head: Normocephalic and atraumatic.   Eyes:      Pupils: Pupils are equal, round, and reactive to light.   Cardiovascular:      Rate and Rhythm: Normal rate and regular rhythm.      Heart sounds: Normal heart sounds.   Pulmonary:      Effort: Pulmonary effort is normal.      Breath sounds: Normal breath sounds. No wheezing, rhonchi or rales.   Abdominal:      Palpations: Abdomen is soft.      Tenderness: There is abdominal tenderness. There is no guarding or rebound.   Musculoskeletal:      Cervical back: Neck supple.      Right lower leg: Edema present.      Left lower leg: Edema present.   Skin:     General: Skin is warm and dry.   Neurological:      Mental Status: She is alert.          Last Recorded Vitals  Blood  "pressure 128/75, pulse 96, temperature 36.8 °C (98.2 °F), resp. rate 13, height 1.702 m (5' 7\"), weight 56.2 kg (124 lb), SpO2 95 %.    Relevant Results    Scheduled medications  allopurinol, 100 mg, oral, Daily  atorvastatin, 40 mg, oral, Nightly  calcitriol, 0.5 mcg, oral, Daily  carvedilol, 6.25 mg, oral, BID with meals  [START ON 11/7/2023] levothyroxine, 25 mcg, oral, Daily before breakfast  lipase-protease-amylase, 3 capsule, oral, TID with meals  melatonin, 3 mg, oral, Daily  mirtazapine, 15 mg, oral, Nightly  pregabalin, 25 mg, oral, Daily  sevelamer HCl, 800 mg, oral, TID with meals      Continuous medications  heparin, 0-4,000 Units/hr, Last Rate: 700 Units/hr (11/06/23 1530)      PRN medications  PRN medications: acetaminophen **OR** acetaminophen **OR** acetaminophen, heparin, polyethylene glycol         Assessment/Plan   Principal Problem:    Abdominal pain   64 y/o female with a history of ESRD on dialysis, AAA, HFrEF (25% 03/2023) . pancreatitis, and PE (03/2023 on warfarin) who presents to the ED with acute on chronic abdominal pain. Patient also with associated nausea, vomiting, and non-bloody diarrhea. Differential continues to be broad infectious colitis given acute worsening  vs ischemic colitis given AA hx but low concern given non bloody diarrhea vs IBD/IBS. CTA chest/abd/pelvis pending. Continue with heparin gtt then bridge to warfarin to obtain therapeautic INR.    #Acute on Chronic Abdominal Pain  #Acute on Chronic Diarrhea   - Cdiff, stool infectious panel, stool electrolytes, fecal calprotectin, anti-tissue  - ESR, CRP   - follow up CTA chest/abd/pelvis  - consider GI c/s for colonoscopy to r/I microscopic colitis if stool workup negative   - pain regimen: oxycodone 5mg for moderate pain, 10mg for severe pain    #Acute on chronic SOB  #HFrEF (25% 03/2023)  #Hx of PE  #CP  Worsening SOB/CP could likely be due to delayed dialysis session given symptom onset correlation  vs decompensated " HFrEF given reported PND and LE edema. Also ACS on differential given risk factors but low concern given minimally elevated troponin  :; Troponin 40 -> 41   - c/w heparin gtt, then transition to warfarin to achieve therapeutic inr pending CTA read  - follow up CTA chest/abd/pelvis   - check BNP, iron studies  - formal echo   - needs GDMT optimized : consider staring losartan, spironolactone, and jardiance if can tolerate  - c/w home coreg     #AAA  -BP < 120/80, HR < 80  -c/w home coreg     #ESRD  -c/w sevalamer 600 tid  -hold nightly PD until CTA comes back  -monitor Bicarb given significant diarrhea    #Hypothryoidism  -c/w levothyroxine 25mcg  -recheck tsh, t4    PGY-3 IM  Chacha Garland MD

## 2023-11-06 NOTE — ED TRIAGE NOTES
Enters ED reporting generalized abdominal pain rated 10/10 that began today. Associated complaints include shortness of breath and chest pain, nausea and diarrhea. Baseline history of peritoneal dialysis daily. Denies injuries or falls. Denies sick contacts

## 2023-11-06 NOTE — PROGRESS NOTES
The patient has an emergent need for contrast administration during CT scan prior to the result of laboratory studies to evaluate for life and/or limb threatening pathology. The risks of worsening kidney injury are outweighed by the risks of delay to diagnosis.    Cleo Thomson MD 11/06/23 at 4:56 AM

## 2023-11-06 NOTE — PROGRESS NOTES
Yoselyn Hernandez is a 62 y/o female with h/o ESRD on dialysis, AAA s/p repair, pancreatitis, who is received in signout from . She is being worked up in the ED today for abdominal pain, diarrhea, and dyspnea x4 days, as well as nausea, vomiting, left sided chest pain, and a headache. She is pending the radiologist's interpretation of a CTA chest/abdomen/pelvis, and will likely be admitted. She was also found to have subtherapeutic PT/INR, and will need to be bridged with heparin once her CTA is read. CTA images have been available since 0606 today, awaiting official read. Radiology resident did call and informed ED that there were no acute findings on the CTA to preclude anticoagulative therapy. Heparin initiated. Admit to medicine.

## 2023-11-06 NOTE — ED PROVIDER NOTES
Limitations to History: None  External Records Reviewed: Yes  Independent Historians: Patient    HPI  Yoselyn Hernandez is a 63 y.o. female with a history of ESRD on dialysis, AAA, and pancreatitis, who presents to the ED for abdominal pain that began about 4 days ago. Pt has also been experiencing diarrhea and shortness of breath for 4 days, an episode of vomiting yesterday, left-sided chest pain, nausea, and a headache. She reports 18 episodes of diarrhea today, as well as worsening abdominal pain and shortness of breath, so she came to the ED for an evaluation. Denies fevers. She tried imodium with no relief of diarrhea. No hx of cancer. Reports prolonged immobility the past few days. Allergies to Oxycodone. At this time, Yoselyn denies any other acute concerns.    PMH  Past Medical History:   Diagnosis Date    Other abnormalities of gait and mobility     Gait, antalgic    Personal history of other endocrine, nutritional and metabolic disease     History of hyperlipidemia    Personal history of other specified conditions     History of shortness of breath       Meds  Current Outpatient Medications   Medication Instructions    allopurinol (ZYLOPRIM) 100 mg, oral, Daily    atorvastatin (Lipitor) 40 mg tablet TAKE 1 TABLET BY MOUTH ONCE DAILY AT BEDTIME    calcitriol (ROCALTROL) 0.5 mcg, oral, Daily    carvedilol (COREG) 6.25 mg, oral, 2 times daily with meals    Creon 24,000-76,000 -120,000 unit capsule 3 capsules, oral, 3 times daily with meals    epoetin treasure (EPOGEN,PROCRIT) 10,000 Units, subcutaneous, Every 14 days    levothyroxine (SYNTHROID, LEVOXYL) 25 mcg, oral, Daily before breakfast    magnesium oxide (MAG-OX) 800 mg, oral, Daily    mirtazapine (REMERON) 15 mg, oral, Nightly    naloxone (Narcan) 4 mg/0.1 mL nasal spray INSTILL 1 SPRAY IN ONE NOSTRIL AS NEEDED FOR ACCIDENTAL OPIOID OVERDOSE; REPEAT WITH SECOND DOSE IN 5 MINUTES IF NO RESPONSE    pregabalin (LYRICA) 25 mg, oral, Daily    sevelamer carbonate  "(RENVELA) 800 mg, oral, 3 times daily with meals, Swallow tablet whole; do not crush, break, or chew.    sevelamer HCl (RENAGEL) 800 mg, oral, 3 times daily with meals    warfarin (Coumadin) 2.5 mg tablet TAKE 1 TABLET BY MOUTH ONCE DAILY       Allergies  Allergies   Allergen Reactions    Ace Inhibitors Angioedema, Other, Swelling and Unknown    Ciprofloxacin GI intolerance, Nausea Only and Diarrhea    Gabapentin Unknown     Jerking head movements    Oxycodone Itching        SHx  Social History     Tobacco Use    Smoking status: Former     Packs/day: 0.25     Years: 20.00     Additional pack years: 0.00     Total pack years: 5.00     Types: Cigarettes     Quit date:      Years since quittin.8    Smokeless tobacco: Never   Vaping Use    Vaping Use: Never used   Substance Use Topics    Alcohol use: Not Currently    Drug use: Yes     Types: Marijuana         ------------------------------------------------------------------------------------------------------------------------------------------    /77   Pulse 93   Temp 36.3 °C (97.3 °F) (Temporal)   Resp 17   Ht 1.702 m (5' 7\")   Wt 56.2 kg (124 lb)   SpO2 98%   BMI 19.42 kg/m²     Physical Exam  Vitals and nursing note reviewed.   Constitutional:       Appearance: She is well-developed.   Cardiovascular:      Rate and Rhythm: Normal rate and regular rhythm.      Pulses:           Dorsalis pedis pulses are 2+ on the right side and 2+ on the left side.      Heart sounds: Normal heart sounds. No murmur heard.     No friction rub. No gallop.   Pulmonary:      Effort: Pulmonary effort is normal. No respiratory distress.      Breath sounds: Normal breath sounds. No wheezing, rhonchi or rales.   Chest:      Chest wall: Tenderness (left chest wall) present.   Abdominal:      General: A surgical scar is present.      Palpations: Abdomen is soft.      Tenderness: There is abdominal tenderness in the epigastric area.      Hernia: A hernia is present. Hernia " is present in the ventral area.      Comments: Dialysis access port in RLQ   Musculoskeletal:      Right lower leg: Tenderness present. 2+ Edema present.      Left lower leg: Tenderness present. 2+ Edema present.   Skin:     General: Skin is warm and dry.   Neurological:      General: No focal deficit present.      Mental Status: She is alert and oriented to person, place, and time.   Psychiatric:         Mood and Affect: Mood normal.         Behavior: Behavior normal.          ------------------------------------------------------------------------------------------------------------------------------------------    Medical Decision Making: Patient is a 63 y.o. y/o female with a history of ESRD on dialysis, AAA, and pancreatitis who presents with abdominal pain that began a few days ago. Although hemodynamically stable, given a concerning history and physical exam findings, I was concerned for multiple etiologies that will require more extensive workup. Bloodwork to rule out ACS, acidosis, infection, and pancreatitis. Imaging to rule out PE, AAA rupture, AMI, bowel strangulation, and infection. So far, labs and imaging have been unrevealing, though INR shows subtherapeutic anticoagulation. Awaiting CTA abd/pel read and VBG. Ordered Dilaudid and Zofran for symptom control, pt reports feeling improved.      ED Course as of 11/06/23 0744   Mon Nov 06, 2023   0714 CT angio chest abdomen pelvis [LM]      ED Course User Index  [LM] Cleo Thomson MD       Discussed with: MD Palmer Majano, OMS4  Emergency Medicine       Palmer Youngblood  11/06/23 1744       Cleo Thomson MD  11/11/23 0904

## 2023-11-07 LAB
ALBUMIN SERPL BCP-MCNC: 2.4 G/DL (ref 3.4–5)
ALP SERPL-CCNC: 101 U/L (ref 33–136)
ALT SERPL W P-5'-P-CCNC: 12 U/L (ref 7–45)
ANION GAP SERPL CALC-SCNC: 21 MMOL/L (ref 10–20)
AST SERPL W P-5'-P-CCNC: 13 U/L (ref 9–39)
BASOPHILS NFR FLD MANUAL: 0 %
BILIRUB SERPL-MCNC: 0.5 MG/DL (ref 0–1.2)
BLASTS NFR FLD MANUAL: 0 %
BUN SERPL-MCNC: 55 MG/DL (ref 6–23)
CALCIUM SERPL-MCNC: 7.7 MG/DL (ref 8.6–10.6)
CHLORIDE SERPL-SCNC: 92 MMOL/L (ref 98–107)
CLARITY FLD: ABNORMAL
CO2 SERPL-SCNC: 24 MMOL/L (ref 21–32)
COLOR FLD: ABNORMAL
CREAT SERPL-MCNC: 8.87 MG/DL (ref 0.5–1.05)
EOSINOPHIL NFR FLD MANUAL: 1 %
ERYTHROCYTE [DISTWIDTH] IN BLOOD BY AUTOMATED COUNT: 17.9 % (ref 11.5–14.5)
GFR SERPL CREATININE-BSD FRML MDRD: 5 ML/MIN/1.73M*2
GLUCOSE SERPL-MCNC: 71 MG/DL (ref 74–99)
HCT VFR BLD AUTO: 24.3 % (ref 36–46)
HGB BLD-MCNC: 8 G/DL (ref 12–16)
IMMATURE GRANULOCYTES IN FLUID: 0 %
LYMPHOCYTES NFR FLD MANUAL: 2 %
MCH RBC QN AUTO: 30 PG (ref 26–34)
MCHC RBC AUTO-ENTMCNC: 32.9 G/DL (ref 32–36)
MCV RBC AUTO: 91 FL (ref 80–100)
MONOS+MACROS NFR FLD MANUAL: 2 %
NEUTROPHILS NFR FLD MANUAL: 95 %
NRBC BLD-RTO: 0 /100 WBCS (ref 0–0)
OTHER CELLS NFR FLD MANUAL: 0 %
PLASMA CELLS NFR FLD MANUAL: 0 %
PLATELET # BLD AUTO: 157 X10*3/UL (ref 150–450)
POTASSIUM SERPL-SCNC: 3.7 MMOL/L (ref 3.5–5.3)
PROT SERPL-MCNC: 5.5 G/DL (ref 6.4–8.2)
RBC # BLD AUTO: 2.67 X10*6/UL (ref 4–5.2)
RBC # FLD AUTO: 54 /UL
SODIUM SERPL-SCNC: 133 MMOL/L (ref 136–145)
TOTAL CELLS COUNTED FLD: 99
UFH PPP CHRO-ACNC: 0.1 IU/ML
UFH PPP CHRO-ACNC: 0.1 IU/ML
UFH PPP CHRO-ACNC: 0.2 IU/ML
UFH PPP CHRO-ACNC: 0.3 IU/ML
UFH PPP CHRO-ACNC: 0.3 IU/ML
WBC # BLD AUTO: 4.2 X10*3/UL (ref 4.4–11.3)
WBC # FLD MANUAL: 1628 /UL

## 2023-11-07 PROCEDURE — 36415 COLL VENOUS BLD VENIPUNCTURE: CPT

## 2023-11-07 PROCEDURE — 99222 1ST HOSP IP/OBS MODERATE 55: CPT | Performed by: INTERNAL MEDICINE

## 2023-11-07 PROCEDURE — 99221 1ST HOSP IP/OBS SF/LOW 40: CPT | Performed by: SURGERY

## 2023-11-07 PROCEDURE — 2500000004 HC RX 250 GENERAL PHARMACY W/ HCPCS (ALT 636 FOR OP/ED)

## 2023-11-07 PROCEDURE — 85520 HEPARIN ASSAY: CPT

## 2023-11-07 PROCEDURE — 87205 SMEAR GRAM STAIN: CPT | Performed by: INTERNAL MEDICINE

## 2023-11-07 PROCEDURE — 80053 COMPREHEN METABOLIC PANEL: CPT | Performed by: STUDENT IN AN ORGANIZED HEALTH CARE EDUCATION/TRAINING PROGRAM

## 2023-11-07 PROCEDURE — 85027 COMPLETE CBC AUTOMATED: CPT | Performed by: STUDENT IN AN ORGANIZED HEALTH CARE EDUCATION/TRAINING PROGRAM

## 2023-11-07 PROCEDURE — G0378 HOSPITAL OBSERVATION PER HR: HCPCS

## 2023-11-07 PROCEDURE — 85520 HEPARIN ASSAY: CPT | Performed by: STUDENT IN AN ORGANIZED HEALTH CARE EDUCATION/TRAINING PROGRAM

## 2023-11-07 PROCEDURE — 99232 SBSQ HOSP IP/OBS MODERATE 35: CPT | Performed by: STUDENT IN AN ORGANIZED HEALTH CARE EDUCATION/TRAINING PROGRAM

## 2023-11-07 PROCEDURE — 2500000004 HC RX 250 GENERAL PHARMACY W/ HCPCS (ALT 636 FOR OP/ED): Performed by: STUDENT IN AN ORGANIZED HEALTH CARE EDUCATION/TRAINING PROGRAM

## 2023-11-07 PROCEDURE — 2500000001 HC RX 250 WO HCPCS SELF ADMINISTERED DRUGS (ALT 637 FOR MEDICARE OP): Performed by: STUDENT IN AN ORGANIZED HEALTH CARE EDUCATION/TRAINING PROGRAM

## 2023-11-07 PROCEDURE — 90947 DIALYSIS REPEATED EVAL: CPT

## 2023-11-07 PROCEDURE — 89051 BODY FLUID CELL COUNT: CPT | Performed by: INTERNAL MEDICINE

## 2023-11-07 PROCEDURE — 96376 TX/PRO/DX INJ SAME DRUG ADON: CPT | Performed by: STUDENT IN AN ORGANIZED HEALTH CARE EDUCATION/TRAINING PROGRAM

## 2023-11-07 PROCEDURE — 2500000004 HC RX 250 GENERAL PHARMACY W/ HCPCS (ALT 636 FOR OP/ED): Performed by: INTERNAL MEDICINE

## 2023-11-07 RX ORDER — PANTOPRAZOLE SODIUM 40 MG/1
40 TABLET, DELAYED RELEASE ORAL
Status: DISCONTINUED | OUTPATIENT
Start: 2023-11-07 | End: 2023-11-14

## 2023-11-07 RX ADMIN — HEPARIN SODIUM 1000 UNITS/HR: 10000 INJECTION, SOLUTION INTRAVENOUS at 12:24

## 2023-11-07 RX ADMIN — HYDROMORPHONE HYDROCHLORIDE 0.4 MG: 1 INJECTION, SOLUTION INTRAMUSCULAR; INTRAVENOUS; SUBCUTANEOUS at 20:25

## 2023-11-07 RX ADMIN — PANCRELIPASE 3 CAPSULE: 24000; 76000; 120000 CAPSULE, DELAYED RELEASE PELLETS ORAL at 16:59

## 2023-11-07 RX ADMIN — HYDROMORPHONE HYDROCHLORIDE 0.4 MG: 1 INJECTION, SOLUTION INTRAMUSCULAR; INTRAVENOUS; SUBCUTANEOUS at 06:16

## 2023-11-07 RX ADMIN — ALLOPURINOL 100 MG: 100 TABLET ORAL at 08:12

## 2023-11-07 RX ADMIN — PREGABALIN 25 MG: 25 CAPSULE ORAL at 08:12

## 2023-11-07 RX ADMIN — PANTOPRAZOLE SODIUM 40 MG: 40 TABLET, DELAYED RELEASE ORAL at 16:59

## 2023-11-07 RX ADMIN — ONDANSETRON 4 MG: 2 INJECTION INTRAMUSCULAR; INTRAVENOUS at 06:16

## 2023-11-07 RX ADMIN — SODIUM CHLORIDE, SODIUM LACTATE, CALCIUM CHLORIDE, MAGNESIUM CHLORIDE AND DEXTROSE: 2.5; 538; 448; 18.3; 5.08 INJECTION, SOLUTION INTRAPERITONEAL at 14:30

## 2023-11-07 RX ADMIN — HEPARIN SODIUM 800 UNITS/HR: 10000 INJECTION, SOLUTION INTRAVENOUS at 01:50

## 2023-11-07 RX ADMIN — CARVEDILOL 6.25 MG: 12.5 TABLET, FILM COATED ORAL at 08:12

## 2023-11-07 RX ADMIN — LEVOTHYROXINE SODIUM 25 MCG: 25 TABLET ORAL at 06:17

## 2023-11-07 RX ADMIN — SEVELAMER HYDROCHLORIDE 800 MG: 800 TABLET, FILM COATED ORAL at 11:56

## 2023-11-07 RX ADMIN — CALCITRIOL 0.5 MCG: 0.5 CAPSULE ORAL at 10:40

## 2023-11-07 RX ADMIN — HEPARIN SODIUM 1000 UNITS/HR: 10000 INJECTION, SOLUTION INTRAVENOUS at 17:00

## 2023-11-07 RX ADMIN — SODIUM CHLORIDE, SODIUM LACTATE, CALCIUM CHLORIDE, MAGNESIUM CHLORIDE AND DEXTROSE: 2.5; 538; 448; 18.3; 5.08 INJECTION, SOLUTION INTRAPERITONEAL at 15:53

## 2023-11-07 RX ADMIN — SODIUM CHLORIDE, SODIUM LACTATE, CALCIUM CHLORIDE, MAGNESIUM CHLORIDE AND DEXTROSE: 4.25; 538; 448; 18.3; 5.08 INJECTION, SOLUTION INTRAPERITONEAL at 15:54

## 2023-11-07 RX ADMIN — HYDROMORPHONE HYDROCHLORIDE 0.4 MG: 1 INJECTION, SOLUTION INTRAMUSCULAR; INTRAVENOUS; SUBCUTANEOUS at 15:48

## 2023-11-07 RX ADMIN — HYDROMORPHONE HYDROCHLORIDE 0.4 MG: 1 INJECTION, SOLUTION INTRAMUSCULAR; INTRAVENOUS; SUBCUTANEOUS at 02:18

## 2023-11-07 RX ADMIN — ATORVASTATIN CALCIUM 40 MG: 40 TABLET, FILM COATED ORAL at 20:24

## 2023-11-07 RX ADMIN — Medication 3 MG: at 20:24

## 2023-11-07 RX ADMIN — MIRTAZAPINE 15 MG: 15 TABLET, FILM COATED ORAL at 20:24

## 2023-11-07 RX ADMIN — HYDROMORPHONE HYDROCHLORIDE 0.4 MG: 1 INJECTION, SOLUTION INTRAMUSCULAR; INTRAVENOUS; SUBCUTANEOUS at 10:46

## 2023-11-07 RX ADMIN — CARVEDILOL 6.25 MG: 12.5 TABLET, FILM COATED ORAL at 16:59

## 2023-11-07 RX ADMIN — PANTOPRAZOLE SODIUM 40 MG: 40 TABLET, DELAYED RELEASE ORAL at 11:56

## 2023-11-07 ASSESSMENT — PAIN DESCRIPTION - LOCATION
LOCATION: ABDOMEN
LOCATION: ABDOMEN
LOCATION: ARM

## 2023-11-07 ASSESSMENT — PAIN - FUNCTIONAL ASSESSMENT
PAIN_FUNCTIONAL_ASSESSMENT: 0-10

## 2023-11-07 ASSESSMENT — PAIN SCALES - GENERAL
PAINLEVEL_OUTOF10: 7
PAINLEVEL_OUTOF10: 10 - WORST POSSIBLE PAIN
PAINLEVEL_OUTOF10: 8
PAINLEVEL_OUTOF10: 10 - WORST POSSIBLE PAIN
PAINLEVEL_OUTOF10: 4

## 2023-11-07 NOTE — PROGRESS NOTES
INTERNAL MEDICINE PROGRESS NOTE     BRIEF NARRATIVE      Yoselyn Hernandez is a 63 y.o. female on day 0 of admission presenting with Abdominal pain.  Patient has a history of ESRD on dialysis, AAA, HFrEF (25% 03/2023) and pancreatitis, PE (03/2023 on warfarin) who presents to the ED with acute on chronic abdominal pain that worsened over the last 4 days.  Patient reports 18 episodes of diarrhea today, as well as worsening 10/10 abdominal pain so she came to the ED for an evaluation. She also reports associated nausea and vomiting. Denies fevers or chills. She tried imodium with no relief of diarrhea. Reports prolonged immobility the past few days. Patient also reports worsening shortness of breath and new onset chest pain over the last few days. She reports PND which she has not had before. Denies any diaphoresis. States she last did her peritoneal dialysis session on Saturday. Also endorses intermittent HA and chronic back pain.  Patient reports abdominal pain and diarrhea have been an ongoing issue for the last four years but recently have gotten worse.      Of note: Patient recently seen in [facility] ED for 1.5 year hx of diarrhea with frequent flare ups as well as chronic SOB i/s/o chronic PE. Patient was found to be subtherapeutic on warfarin during that ED stay was given a one time extra warfarin dose as she had no worsening SOB at the time and instructed to follow up with pcp for dosing adjustments.     SUBJECTIVE     Patient seen and examined today, she still complaining of abdominal pain but stated abdominal pain is better today.  Patient reports history of several days of diarrhea followed by several days of constipation,  appearing like IBS with combined diarrhea ongoing constipation for him.  Patient last bowel movement was yesterday morning.  Patient was placed on renal diet however she refused to diet and would like to be on regular diet  OBJECTIVE      Visit Vitals  /65   Pulse 71   Temp 36.1 °C (97 °F) (Temporal)   Resp 16        Intake/Output Summary (Last 24 hours) at 11/7/2023 1220  Last data filed at 11/7/2023 0955  Gross per 24 hour   Intake 150 ml   Output --   Net 150 ml       Physical Exam   Constitutional:       Appearance: She is not diaphoretic.   HENT:      Head: Normocephalic and atraumatic.   Eyes:      Pupils: Pupils are equal, round, and reactive to light.   Cardiovascular:      Rate and Rhythm: Normal rate and regular rhythm.      Heart sounds: Normal heart sounds.   Pulmonary:      Effort: Pulmonary effort is normal.      Breath sounds: Normal breath sounds. No wheezing, rhonchi or rales.   Abdominal:      Palpations: Abdomen is soft.      Tenderness: There is abdominal tenderness. There is no guarding or rebound.   Musculoskeletal:      Cervical back: Neck supple.      Right lower leg: Edema present.      Left lower leg: Edema present.   Skin:     General: Skin is warm and dry.   Neurological:      Mental Status: She is alert.   Current Meds   allopurinol, 100 mg, oral, Daily  atorvastatin, 40 mg, oral, Nightly  calcitriol, 0.5 mcg, oral, Daily  carvedilol, 6.25 mg, oral, BID with meals  levothyroxine, 25 mcg, oral, Daily before breakfast  lipase-protease-amylase, 3 capsule, oral, TID with meals  melatonin, 3 mg, oral, Daily  mirtazapine, 15 mg, oral, Nightly  pantoprazole, 40 mg, oral, BID AC  pregabalin, 25 mg, oral, Daily  sevelamer HCl, 800 mg, oral, TID with meals       PRN medications: [DISCONTINUED] acetaminophen **OR** acetaminophen **OR** acetaminophen, heparin, HYDROmorphone, HYDROmorphone, loperamide, ondansetron, polyethylene glycol     LABS and IMAGING     WBC   Date Value Ref Range Status    11/07/2023 4.2 (L) 4.4 - 11.3 x10*3/uL Final   11/05/2023 7.2 4.4 - 11.3 x10*3/uL Final     Hemoglobin   Date Value Ref Range Status   11/07/2023 8.0 (L) 12.0 - 16.0 g/dL Final   11/05/2023 9.1 (L) 12.0 - 16.0 g/dL Final     Hematocrit   Date Value Ref Range Status   11/07/2023 24.3 (L) 36.0 - 46.0 % Final   11/05/2023 28.5 (L) 36.0 - 46.0 % Final     Bicarbonate   Date Value Ref Range Status   11/07/2023 24 21 - 32 mmol/L Final   11/06/2023 17 (L) 21 - 32 mmol/L Final     Creatinine   Date Value Ref Range Status   11/07/2023 8.87 (H) 0.50 - 1.05 mg/dL Final   11/06/2023 8.24 (H) 0.50 - 1.05 mg/dL Final     Calcium   Date Value Ref Range Status   11/07/2023 7.7 (L) 8.6 - 10.6 mg/dL Final   11/06/2023 8.0 (L) 8.6 - 10.6 mg/dL Final     INR   Date Value Ref Range Status   11/06/2023 1.4 (H) 0.9 - 1.1 Final          ASSESSMENT / PLANS      #Acute on Chronic Abdominal Pain  #Acute on Chronic Diarrhea  Patient presented with episodes of intermittent diarrhea diarrhea last 4 to 5 days followed by constipation of the same duration.  Chronic condition, seem more like IBS with combine both diarrhea and constipation form.  Patient last colonoscopy and EGD 2021 and 2022 respectively  -CTA c/a/p negative for any acute changes, showed chronic changes with interval development  Incisional hernia  -CRP elevated  -Tissue transglutaminase IgA negative  -C. difficile pending, continue to follow  -We will start PPI trial with pantoprazole 40 mg twice daily  - follow up CTA chest/abd/pelvis  -We will consider GI c/s for colonoscopy to r/I microscopic colitis if stool workup negative   - pain regimen: oxycodone 5mg for moderate pain, 10mg for severe pain  -We will consult ACS for incisional hernia evaluation     #Acute on chronic SOB  #HFrEF (25% 03/2023)  #Hx of PE  #CP  Worsening SOB/CP could likely be due to delayed dialysis session given symptom onset correlation  vs decompensated HFrEF given reported PND and LE edema. Also ACS on  differential given risk factors but low concern given minimally elevated troponin  :; Troponin 40 -> 41   - c/w heparin gtt, then transition to warfarin to achieve therapeutic inr pending CTA read  - follow up CTA chest/abd/pelvis   - check BNP, iron studies  - formal echo   - needs GDMT optimized : consider staring losartan, spironolactone, and jardiance if can tolerate  - c/w home coreg      #AAA  -BP < 120/80, HR < 80  -c/w home coreg      #ESRD  -c/w sevalamer 600 tid  -hold nightly PD until CTA comes back  -monitor Bicarb given significant diarrhea     #Hypothryoidism  -c/w levothyroxine 25mcg  -recheck tsh, t4      Etelvina Lewis MD

## 2023-11-07 NOTE — CONSULTS
[facility]  ACUTE CARE SURGERY - HISTORY AND PHYSICAL / CONSULT    Patient Name: Yoselyn Hernandez  MRN: [id number]  Admit Date: 1106  : 1960  AGE: 63 y.o.   GENDER: female  ==============================================================================  TODAY'S ASSESSMENT AND PLAN OF CARE:  Patient is a 63-year-old woman with history of ESRD on dialysis, aortic aneurysm status post repair, heart failure with reduced ejection fraction, pancreatitis, PE on warfarin who presented with worsening abdominal pain x4 days in the setting of multiple episodes of diarrhea.  ACS was consulted regarding patients epigastric and incisional hernias.  Clinically the patient is not obstructed.  She has a benign abdominal exam, as well as have CT findings demonstrating stable appearance of known incisional hernias without evidence of incarceration or obstruction.  They are tolerating a diet and having robust bowel function and they are nontoxic-appearing with stable vitals.     Plan:  There is no indication to fix these incisional hernias in the inpatient setting  As such we recommend outpatient follow-up/referral to general surgery for evaluation/consideration of elective hernia repair  Continue monitor for signs concerning for bowel obstruction, such as nausea vomiting p.o. intolerance cessation of bowel function, and extreme tenderness or incarceration or epigastric hernia areas    Discussed with attending physician Dr. Avery Sarmiento MD  [facility] [id number]    ==============================================================================  CHIEF COMPLAINT/REASON FOR CONSULT:  Incisional hernia    PAST MEDICAL HISTORY:   PMH: AAA, ESRD, HFrEF, pancreatitis, PE  Past Medical History:   Diagnosis Date    Other abnormalities of gait and mobility     Gait, antalgic    Personal history of other endocrine, nutritional and metabolic disease     History of hyperlipidemia    Personal history of other  specified conditions     History of shortness of breath     Last menstrual period: unk    PSH:   Past Surgical History:   Procedure Laterality Date    COLONOSCOPY  12/05/2017    Complete Colonoscopy    COLONOSCOPY  12/2021    [facility] 12-25    CT ABDOMEN PELVIS ANGIOGRAM W AND/OR WO IV CONTRAST  11/17/2014    CT ABDOMEN PELVIS ANGIOGRAM W AND/OR WO IV CONTRAST 11/17/2014 [facility] CLINICAL LEGACY    CT ABDOMEN PELVIS ANGIOGRAM W AND/OR WO IV CONTRAST  02/01/2018    CT ABDOMEN PELVIS ANGIOGRAM W AND/OR WO IV CONTRAST 2/1/2018 [facility] AIB LEGACY    CT ABDOMEN PELVIS ANGIOGRAM W AND/OR WO IV CONTRAST  02/23/2018    CT ABDOMEN PELVIS ANGIOGRAM W AND/OR WO IV CONTRAST 2/23/2018 [facility] INPATIENT LEGACY    CT ABDOMEN PELVIS ANGIOGRAM W AND/OR WO IV CONTRAST  01/10/2019    CT ABDOMEN PELVIS ANGIOGRAM W AND/OR WO IV CONTRAST 1/10/2019 [facility] EMERGENCY LEGACY    CT ABDOMEN PELVIS ANGIOGRAM W AND/OR WO IV CONTRAST  11/25/2019    CT ABDOMEN PELVIS ANGIOGRAM W AND/OR WO IV CONTRAST 11/25/2019 [facility] ANCILLARY LEGACY    CT ABDOMEN PELVIS ANGIOGRAM W AND/OR WO IV CONTRAST  04/30/2021    CT ABDOMEN PELVIS ANGIOGRAM W AND/OR WO IV CONTRAST 4/30/2021 [facility] CLINICAL LEGACY    CT ABDOMEN PELVIS ANGIOGRAM W AND/OR WO IV CONTRAST  01/09/2017    CT ABDOMEN PELVIS ANGIOGRAM W AND/OR WO IV CONTRAST 1/9/2017 [facility] CLINICAL LEGACY    CT ABDOMEN PELVIS ANGIOGRAM W AND/OR WO IV CONTRAST  10/24/2018    CT ABDOMEN PELVIS ANGIOGRAM W AND/OR WO IV CONTRAST 10/24/2018 [facility] EMERGENCY LEGACY    CT ABDOMEN PELVIS ANGIOGRAM W AND/OR WO IV CONTRAST  12/13/2022    CT ABDOMEN PELVIS ANGIOGRAM W AND/OR WO IV CONTRAST 12/13/2022 DOCTOR OFFICE LEGACY    CT AORTA AND BILATERAL ILIOFEMORAL RUNOFF ANGIOGRAM W AND/OR WO IV CONTRAST  05/12/2023    CT AORTA AND BILATERAL ILIOFEMORAL RUNOFF ANGIOGRAM W AND/OR WO IV CONTRAST 5/12/2023 [facility] CT    HYSTERECTOMY  01/06/2014    Hysterectomy    IR ANGIOGRAM AORTA ABDOMEN  08/02/2019    IR ANGIOGRAM AORTA ABDOMEN 8/2/2019 [facility] INPATIENT LEGACY    IR  ANGIOGRAM AORTA ABDOMEN  2022    IR ANGIOGRAM AORTA ABDOMEN 2022 [facility] INPATIENT LEGACY    IR ANGIOGRAM AORTA THORACIC  2018    IR ANGIOGRAM AORTA THORACIC 2018 [facility] INPATIENT LEGACY    IR ANGIOGRAM ENDOVASCULAR AORTIC REPAIR  2022    IR ANGIOGRAM ENDOVASCULAR AORTIC REPAIR 2022 [facility] INPATIENT LEGACY    IR INTERVENTION NEURO STENT  2019    IR INTERVENTION NEURO STENT 2019 [facility] INPATIENT LEGACY    OTHER SURGICAL HISTORY  2017    Aortic Aneurysm Repair Thoracoabdominal     FH:   Family History   Problem Relation Name Age of Onset    COPD Mother      Kidney failure Father       SOCIAL HISTORY:    Smoking:    Social History     Tobacco Use   Smoking Status Former    Packs/day: 0.25    Years: 20.00    Additional pack years: 0.00    Total pack years: 5.00    Types: Cigarettes    Quit date:     Years since quittin.8   Smokeless Tobacco Never       Alcohol:    Social History     Substance and Sexual Activity   Alcohol Use Not Currently       Drug use: unk    MEDICATIONS:   Prior to Admission medications    Medication Sig Start Date End Date Taking? Authorizing Provider   allopurinol (Zyloprim) 100 mg tablet Take 1 tablet (100 mg) by mouth once daily. 10/17/23   Gregg Kerr MD   atorvastatin (Lipitor) 40 mg tablet TAKE 1 TABLET BY MOUTH ONCE DAILY AT BEDTIME 23  Erasmo Saldaña MD   calcitriol (Rocaltrol) 0.5 mcg capsule Take 1 capsule (0.5 mcg) by mouth once daily.    Historical Provider, MD   carvedilol (Coreg) 6.25 mg tablet Take 1 tablet (6.25 mg) by mouth 2 times a day with meals. 10/22/23 11/21/23  Karina Waddell, APRN-CNP   Creon 24,000-76,000 -120,000 unit capsule Take 3 capsules by mouth 3 times a day with meals. 10/17/23   Gregg Kerr MD   epoetin treasure (Epogen,Procrit) 10,000 unit/mL injection Inject 1 mL (10,000 Units) under the skin every 14 (fourteen) days.    Historical Provider, MD   levothyroxine (Synthroid, Levoxyl) 25 mcg tablet  Take 1 tablet (25 mcg) by mouth once daily in the morning. Take before meals. 10/17/23   Gregg Kerr MD   magnesium oxide (Mag-Ox) 400 mg tablet Take 2 tablets (800 mg) by mouth once daily.    Historical Provider, MD   mirtazapine (Remeron) 15 mg tablet Take 1 tablet (15 mg) by mouth once daily at bedtime. 10/22/23 11/21/23  ALYSA Hanson   naloxone (Narcan) 4 mg/0.1 mL nasal spray INSTILL 1 SPRAY IN ONE NOSTRIL AS NEEDED FOR ACCIDENTAL OPIOID OVERDOSE; REPEAT WITH SECOND DOSE IN 5 MINUTES IF NO RESPONSE 8/8/23 8/7/24  Erasmo Saldaña MD   pregabalin (Lyrica) 25 mg capsule Take 1 capsule (25 mg) by mouth once daily. 10/22/23 11/21/23  ALYSA Hanson   sevelamer (Renagel) 800 mg tablet Take 1 tablet (800 mg) by mouth 3 times a day with meals.    Historical Provider, MD   sevelamer carbonate (Renvela) 800 mg tablet Take 1 tablet (800 mg) by mouth 3 times a day with meals. Swallow tablet whole; do not crush, break, or chew. 10/22/23 11/21/23  ALYSA Hanson   warfarin (Coumadin) 2.5 mg tablet TAKE 1 TABLET BY MOUTH ONCE DAILY 8/8/23 8/7/24  Erasmo Saldaña MD     ALLERGIES:   Allergies   Allergen Reactions    Ace Inhibitors Angioedema, Other, Swelling and Unknown    Ciprofloxacin GI intolerance, Nausea Only and Diarrhea    Gabapentin Unknown     Jerking head movements    Oxycodone Itching       REVIEW OF SYSTEMS:  Review of Systems  Negative unless otherwise specified in HPI  PHYSICAL EXAM:  Physical Exam  Constitutional: no acute distress  Neuro: A/O x4, no gross deficits   Psych: normal affect  HEENT: Normocephalic, atraumatic, no scleral icterus, EOMI  Cardiac: RRR  Pulmonary: unlabored respirations   Abdomen: soft, mild distention, mildly tender, no evidence of peritonitis, 3 midline easily reducible incisional hernias 2-3 cm superiorly 1cm middle, just <1cm inferiorly, PD catheter  Skin: warm and dry overall   Extremities: moving all four, no swelling noted     IMAGING  SUMMARY:  (summary of findings, not a copy of dictation)  CT angio chest abdomen pelvis    Result Date: 11/6/2023  Interpreted By:  Erasmo Jamison and Kamau Nyokabi STUDY: CT ANGIO CHEST ABDOMEN PELVIS;  11/6/2023 6:20 am   INDICATION: Signs/Symptoms:chest pain, epigastric pain h/o multiple aortic surgeries.   Per EMR: 63-year-old female ED with abdominal pain rated 2/10 shortness of breath and chest pain, nausea and diarrhea peritoneal dialysis. History of endograft repair of thoracic ascending, thoracic descending, and entire abdominal aorta.   COMPARISON: CT angiography chest abdomen pelvis 08/01/2023   ACCESSION NUMBER(S): [id number]   ORDERING CLINICIAN: ANTONIO RAINES   TECHNIQUE: High-resolution contrast-enhanced helical CT of the chest, abdomen, pelvis was performed,  timed to the arterial phase.  3-D processing was performed by the physician on an independent work station, with MIP and volume-rendering techniques.  Total of 100 ml of Omnipaque 350 was injected  intravenously during the examination.  The study was performed without oral contrast.  The patient tolerated the injection without complications.   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 There is a dissection flap extending into the brachiocephalic and right common carotid arteries, which is unchanged. 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   The aortic endograft extends from the distal ascending thoracic aorta to the distal abdominal aorta. The endograft is patent. There is no evidence of endoleak. Redemonstration of proximal descending aortic aneurysm measuring up to 5.3 cm in largest dimension (series 501, image 139), unchanged from at least 01/10/2019.   There is a graft arising from the right common iliac artery supplying the  celiac and superior mesenteric arteries, which are widely patent. Additionally, there is a graft arising from the left common iliac artery supplying the bilateral renal arteries. The proximal component of the graft is incompletely thrombosed with moderate narrowing of the lumen, which is unchanged from 08/01/2023. The graft supplying the left renal artery is thrombosed. The graft supplying the right renal artery appears patent, but demonstrates decreased flow supplying the right kidney. The kidneys demonstrate delayed nephrogram, as discussed further below.   The bilateral common iliac arteries are widely patent. 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 There is beam hardening from the embolization coils of left internal iliac artery.   Unchanged appearance of limited dissection involving the right external iliac artery and extending to the right common femoral artery with, which is unchanged. Unchanged short segment left common femoral artery dissection when compared to prior CT with adjacent fluid collection.   CELIAC ARTERY: As mentioned above, the celiac artery branches are supplied by a graft arising from the right common iliac artery. The graft and celiac artery branches are widely patent.   SUPERIOR MESENTERIC ARTERY: As mentioned above, the superior mesenteric artery is supplied by a graft arising from the right common iliac artery. The graft and superior mesenteric artery and branches are widely patent.   INFERIOR MESENTERIC ARTERY: The proximal VERNELL appears occluded, but distally patent via retrograde flow.   RENAL VASCULATURE: As mentioned above, the bilateral renal arteries are supplied by a graft arising from the left common iliac artery. The graft is partially thrombosed moderately narrowing the lumen, which is unchanged. The left renal artery is thrombosed and there is significantly decreased flow  into the right renal artery. 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 There are bilateral delayed nephrograms, left greater than right.     NONVASCULAR FINDINGS:   CT CHEST:   LUNGS/AIRWAY: Trachea and central airways are patent. Redemonstration of chronic centrilobular and paraseptal emphysematous change predominantly in bilateral apices. Left basilar atelectasis. Minimal right basilar atelectasis or scarring. There is no pleural effusion or pneumothorax.   HEART: The heart is enlarged with enlargement of the bilateral atria and left ventricle. There are coronary artery calcifications.   MEDIASTINUM: There is no mediastinal lymphadenopathy. The visualized esophagus normal in appearance.   THE CHEST WALL AND LOWER NECK: Median sternotomy wires noted. The chest wall soft tissues are normal in appearance. The visualized thyroid gland is normal appearance.   CT ABDOMEN:   LIVER: The liver is enlarged measuring up to 19.3 cm in the craniocaudal dimension, previously measuring 16.7 cm (series 503, image 103) and demonstrates heterogeneous enhancement on the delayed phase. There are no focal lesions.   BILE DUCTS: The intra and extrahepatic ducts are nondilated. Mild common bile duct dilation secondary to cholecystectomy.   GALLBLADDER: Status post cholecystectomy.   PANCREAS: Unchanged main pancreatic ductal dilation measuring up to 0.6 cm with compression of the pancreatic tail from the adjacent chronic fluid collection.   SPLEEN: Spleen is normal in size and attenuation.   ADRENAL GLANDS: Bilateral adrenal glands are normal appearance.   KIDNEYS/URETERS: Bilateral kidneys are severely atrophied with delayed nephrogram, left greater than right. Redemonstration of bilateral well-circumscribed fluid attenuating lesions consistent with simple renal cysts. The largest measures up to 1.8 cm in the largest dimension, previously measuring 1.7  cm, in the superior pole of the right kidney (series 601, image 61).   PERITONEUM/RETROPERITONEUM: Unchanged appearance of a large partially rim calcified fluid collection within the left hemiabdomen measuring 7.3 x 6.7 cm, which appears to have been slowly increasing in size, for example this structure measured 5.6 x 5.1 cm on 10/10/2021. The fluid collection surrounds a portion of the superior mesenteric artery graft without evidence compression, but does exert mass effect on the tail of the pancreas. Moderate abdominopelvic peritoneal fluid is likely dialysate fluid. Unchanged appearance of peritoneal dialysis catheter with tip coiled in the left hemipelvis without evidence of discontinuation. No lymphadenopathy.   BOWEL: The stomach is normal in appearance. The small and large bowel are normal in caliber without evidence of obstruction or abnormal wall thickening. There is an incisional hernia within which there is a small portion of anterior transverse colon. There is no evidence of strangulation or obstruction. The appendix is not well-visualized however there are no secondary signs of appendicitis.   CT PELVIS:   REPRODUCTIVE ORGANS: The uterus is surgically absent.   BLADDER: The urinary bladder is decompressed limiting assessment.   OSSEOUS STRUCTURES: Mild degenerative change of the thoracolumbar spine. Otherwise, the visualized bony structures are unremarkable.   ABDOMINAL WALL SOFT TISSUES: Unchanged appearance of midline ventral abdominal hernias containing fluid and an anterior segment of the transverse colon, as described above. Diffuse body wall edema.       1. No acute aortic or arterial pathology. 2. Stable postoperative changes of thoracic and abdominal aortic endograft repair and arch debranching. There is no evidence of endoleak. Stable aneurysmal dilation of the thoracic descending aorta. 3. Again seen are dissections involving the right innominate artery at its origin and extending into the  right common carotid artery, right external iliac extending to right common femoral artery, and left femoral artery, which are stable. 4. Interval development incisional hernia containing a portion of transverse colon without evidence of obstruction or strangulation. 5. Interval increase in size of hepatomegaly with associated heterogeneous enhancement of the liver, which can be seen with hepatitis. Clinical correlation is suggested. 6. Additional stable findings as above.   Critical Finding:  See findings. Notification was initiated on 11/6/2023 at 5:05 pm by  Erasmo Jamison.  (**-YCF-**) Instructions: Clinical consult. 24 hours.     I personally reviewed the images/study and I agree with Dr. Vj Joe findings as stated. This study was interpreted at [place], [place].   Signed by: Erasmo Jamison 11/6/2023 5:06 PM Dictation workstation:   [id number]    ECG 12 lead    Result Date: 11/6/2023  Please see ED Provider Note for formal interpretation Confirmed by Karen Paz ([id number]) on 11/6/2023 3:54:16 AM       LABS:  Results for orders placed or performed during the hospital encounter of 11/06/23 (from the past 24 hour(s))   Heparin Assay, UFH   Result Value Ref Range    Heparin Unfractionated 0.1 See Comment Below for Therapeutic Ranges IU/mL   CBC   Result Value Ref Range    WBC 4.2 (L) 4.4 - 11.3 x10*3/uL    nRBC 0.0 0.0 - 0.0 /100 WBCs    RBC 2.67 (L) 4.00 - 5.20 x10*6/uL    Hemoglobin 8.0 (L) 12.0 - 16.0 g/dL    Hematocrit 24.3 (L) 36.0 - 46.0 %    MCV 91 80 - 100 fL    MCH 30.0 26.0 - 34.0 pg    MCHC 32.9 32.0 - 36.0 g/dL    RDW 17.9 (H) 11.5 - 14.5 %    Platelets 157 150 - 450 x10*3/uL   Comprehensive metabolic panel   Result Value Ref Range    Glucose 71 (L) 74 - 99 mg/dL    Sodium 133 (L) 136 - 145 mmol/L    Potassium 3.7 3.5 - 5.3 mmol/L    Chloride 92 (L) 98 - 107 mmol/L    Bicarbonate 24 21 - 32 mmol/L    Anion Gap 21 (H) 10 - 20 mmol/L    Urea Nitrogen 55 (H) 6  - 23 mg/dL    Creatinine 8.87 (H) 0.50 - 1.05 mg/dL    eGFR 5 (L) >60 mL/min/1.73m*2    Calcium 7.7 (L) 8.6 - 10.6 mg/dL    Albumin 2.4 (L) 3.4 - 5.0 g/dL    Alkaline Phosphatase 101 33 - 136 U/L    Total Protein 5.5 (L) 6.4 - 8.2 g/dL    AST 13 9 - 39 U/L    Bilirubin, Total 0.5 0.0 - 1.2 mg/dL    ALT 12 7 - 45 U/L   Heparin Assay, UFH   Result Value Ref Range    Heparin Unfractionated 0.1 See Comment Below for Therapeutic Ranges IU/mL   Heparin Assay, UFH   Result Value Ref Range    Heparin Unfractionated 0.2 See Comment Below for Therapeutic Ranges IU/mL       I have reviewed all laboratory and imaging results ordered/pertinent for this encounter.

## 2023-11-07 NOTE — CARE PLAN
The patient's goals for the shift include To remain pain free    The clinical goals for the shift include Heparin will become therapeutic    Over the shift, the patient did not make progress toward the following goals. Barriers to progression include continued abdominal pain and heparin off when patient arrived. Recommendations to address these barriers include restarted Heparin and patient will call for assistance prior to ambulating.

## 2023-11-07 NOTE — CARE PLAN
The patient's goals for the shift include To remain pain free    The clinical goals for the shift include Heparin will become therapeutic

## 2023-11-07 NOTE — NURSING NOTE
Patient arrived from ED with Heparin off. This nurse restarted Heparin at 7ml/hr per order. Initial Heparin assay was cancelled by lab for insufficient quantity. It took multiple attempts to draw. Heparin redrawn also took multiple attempts and RN's to obtain. Heparin rate adjusted based on that draw.

## 2023-11-07 NOTE — CONSULTS
"63-year-old patient with past medical history including ESRD on PD as noted below who was admitted with complaints of profuse explosive diarrhea and abdominal pain.  The patient has been admitted multiple times over the past year with similar complaints following an initial episode of C. difficile colitis.  Patient states that the diarrhea \"comes and goes\" this most recent episode started a few days ago.  She describes multiple explosive watery bowel movements per day.  Denies vomiting.  Does not have an appetite. She attributes all her symptoms to abdominal herias that she has and is frustrated and upset that she cannot get them surgically corrected.  Patient states that she is compliant with PD at home.  She states her current [facility] prescription is 10 and half hours, with 5 exchanges and fill volume of 1.8 L.  She has a last fill whose volume she does not know.  She describes pain in her abdomen with dwell.  Patient complains of leg swelling and orthopnea.  She describes using 4-5 pillows to sleep at night but when she rolls off of the pillows, she wakes up with shortness of breath and sitting up relieves the shortness of breath.  She also describes feeling very sad and hopeless and emotional and was crying intermittently throughout my encounter.    RoS negative for all other systems except as noted above.    PMH; ESRD on PD, C. Diff colitis, chronic diarrhea, AAA, HFrEF, PE, pancreatitis    Past Surgical History:   Procedure Laterality Date    COLONOSCOPY  12/05/2017    Complete Colonoscopy    COLONOSCOPY  12/2021    [facility] 12-25    CT ABDOMEN PELVIS ANGIOGRAM W AND/OR WO IV CONTRAST  11/17/2014    CT ABDOMEN PELVIS ANGIOGRAM W AND/OR WO IV CONTRAST 11/17/2014 [facility] CLINICAL LEGACY    CT ABDOMEN PELVIS ANGIOGRAM W AND/OR WO IV CONTRAST  02/01/2018    CT ABDOMEN PELVIS ANGIOGRAM W AND/OR WO IV CONTRAST 2/1/2018 [facility] AIB LEGACY    CT ABDOMEN PELVIS ANGIOGRAM W AND/OR WO IV CONTRAST  02/23/2018    CT ABDOMEN PELVIS " ANGIOGRAM W AND/OR WO IV CONTRAST 2/23/2018 [facility] [place] [place] INPATIENT LEGACY    CT ABDOMEN PELVIS ANGIOGRAM W AND/OR WO IV CONTRAST  01/10/2019    CT ABDOMEN PELVIS ANGIOGRAM W AND/OR WO IV CONTRAST 1/10/2019 [facility] EMERGENCY LEGACY    CT ABDOMEN PELVIS ANGIOGRAM W AND/OR WO IV CONTRAST  11/25/2019    CT ABDOMEN PELVIS ANGIOGRAM W AND/OR WO IV CONTRAST 11/25/2019 [facility] ANCILLARY LEGACY    CT ABDOMEN PELVIS ANGIOGRAM W AND/OR WO IV CONTRAST  04/30/2021    CT ABDOMEN PELVIS ANGIOGRAM W AND/OR WO IV CONTRAST 4/30/2021 [facility] CLINICAL LEGACY    CT ABDOMEN PELVIS ANGIOGRAM W AND/OR WO IV CONTRAST  01/09/2017    CT ABDOMEN PELVIS ANGIOGRAM W AND/OR WO IV CONTRAST 1/9/2017 [facility] CLINICAL LEGACY    CT ABDOMEN PELVIS ANGIOGRAM W AND/OR WO IV CONTRAST  10/24/2018    CT ABDOMEN PELVIS ANGIOGRAM W AND/OR WO IV CONTRAST 10/24/2018 [facility] EMERGENCY LEGACY    CT ABDOMEN PELVIS ANGIOGRAM W AND/OR WO IV CONTRAST  12/13/2022    CT ABDOMEN PELVIS ANGIOGRAM W AND/OR WO IV CONTRAST 12/13/2022 [facility] LEGACY    CT AORTA AND BILATERAL ILIOFEMORAL RUNOFF ANGIOGRAM W AND/OR WO IV CONTRAST  05/12/2023    CT AORTA AND BILATERAL ILIOFEMORAL RUNOFF ANGIOGRAM W AND/OR WO IV CONTRAST 5/12/2023 [facility] [place] [place] CT    HYSTERECTOMY  01/06/2014    Hysterectomy    IR ANGIOGRAM AORTA ABDOMEN  08/02/2019    IR ANGIOGRAM AORTA ABDOMEN 8/2/2019 [facility] [place] [place] INPATIENT LEGACY    IR ANGIOGRAM AORTA ABDOMEN  08/19/2022    IR ANGIOGRAM AORTA ABDOMEN 8/19/2022 [facility] [place] [place] INPATIENT LEGACY    IR ANGIOGRAM AORTA THORACIC  02/21/2018    IR ANGIOGRAM AORTA THORACIC 2/21/2018 [facility] [place] [place] INPATIENT LEGACY    IR ANGIOGRAM ENDOVASCULAR AORTIC REPAIR  08/19/2022    IR ANGIOGRAM ENDOVASCULAR AORTIC REPAIR 8/19/2022 [facility] [place] [place] INPATIENT LEGACY    IR INTERVENTION NEURO STENT  08/02/2019    IR INTERVENTION NEURO STENT 8/2/2019 [facility] [place] [place] INPATIENT LEGACY    OTHER SURGICAL HISTORY  01/20/2017    Aortic Aneurysm Repair Thoracoabdominal      Social History     Tobacco Use    Smoking status: Former     Packs/day: 0.25     Years: 20.00      Additional pack years: 0.00     Total pack years: 5.00     Types: Cigarettes     Quit date:      Years since quittin.8    Smokeless tobacco: Never   Vaping Use    Vaping Use: Never used   Substance Use Topics    Alcohol use: Not Currently    Drug use: Yes     Types: Marijuana      Family History   Problem Relation Name Age of Onset    COPD Mother      Kidney failure Father          Allergies   Allergen Reactions    Ace Inhibitors Angioedema, Other, Swelling and Unknown    Ciprofloxacin GI intolerance, Nausea Only and Diarrhea    Gabapentin Unknown     Jerking head movements    Oxycodone Itching      Distressed and tearful  HEENT:  moist, no pallor  2-3+ edema joel LE up to knees  Breath sounds joel equal, clear  S1 S2 regular, normal, no rub or murmur  Abdomen soft, PD catheter in situ, covered by dressing, has epigastric and umbilical tenderness  AAO x3, non focal     Results from last 72 hours   Lab Units 23  0622 23  0457 23  2244   SODIUM mmol/L 133* 138  --    POTASSIUM mmol/L 3.7 3.5  --    CHLORIDE mmol/L 92* 98  --    CO2 mmol/L 24 17*  --    BUN mg/dL 55* 49*  --    PHOSPHORUS mg/dL  --  8.1*  --    HEMOGLOBIN g/dL 8.0*  --  9.1*      Current Facility-Administered Medications   Medication Dose Route Frequency Provider Last Rate Last Admin    acetaminophen (Tylenol) oral liquid 650 mg  650 mg oral q4h PRN Chacha Garland MD        Or    acetaminophen (Tylenol) suppository 650 mg  650 mg rectal q4h PRN Chacha Garland MD        allopurinol (Zyloprim) tablet 100 mg  100 mg oral Daily Chacha Garland MD   100 mg at 23 0812    atorvastatin (Lipitor) tablet 40 mg  40 mg oral Nightly Chacha Garland MD   40 mg at 23 2122    calcitriol (Rocaltrol) capsule 0.5 mcg  0.5 mcg oral Daily Chacha Garland MD   0.5 mcg at 23 1040    carvedilol (Coreg) tablet 6.25 mg  6.25 mg oral BID with meals Chacha Garland MD   6.25 mg at 23 0812    heparin (porcine) injection  1,500-3,000 Units  1,500-3,000 Units intravenous q4h PRN Janna Mcpherson DO        heparin 25,000 Units in dextrose 5% 250 mL (100 Units/mL) infusion (premix)  0-4,000 Units/hr intravenous Continuous Janna Mcpherson DO 10 mL/hr at 11/07/23 1224 1,000 Units/hr at 11/07/23 1224    HYDROmorphone (Dilaudid) injection 0.2 mg  0.2 mg intravenous q4h PRN Chacha Garland MD        HYDROmorphone (Dilaudid) injection 0.4 mg  0.4 mg intravenous q4h PRN Chacha Garland MD   0.4 mg at 11/07/23 1046    levothyroxine (Synthroid, Levoxyl) tablet 25 mcg  25 mcg oral Daily before breakfast Chacha Garland MD   25 mcg at 11/07/23 0617    lipase-protease-amylase (Creon) 24,000-76,000 -120,000 unit per capsule 3 capsule  3 capsule oral TID with meals Chacha Garland MD   3 capsule at 11/06/23 1704    loperamide (Imodium) capsule 2 mg  2 mg oral 4x daily PRN Paulie Mcmillan MD   2 mg at 11/06/23 1644    melatonin tablet 3 mg  3 mg oral Daily Chacha Garland MD   3 mg at 11/06/23 2123    mirtazapine (Remeron) tablet 15 mg  15 mg oral Nightly Chacha Garland MD   15 mg at 11/06/23 2122    ondansetron (Zofran) injection 4 mg  4 mg intravenous q6h PRN Rachna Chen DO   4 mg at 11/07/23 0616    pantoprazole (ProtoNix) EC tablet 40 mg  40 mg oral BID AC Etelvina Lewis MD   40 mg at 11/07/23 1156    polyethylene glycol (Glycolax, Miralax) packet 17 g  17 g oral Daily PRN Chacha Garland MD        pregabalin (Lyrica) capsule 25 mg  25 mg oral Daily Chacha Garland MD   25 mg at 11/07/23 0812    sevelamer HCl (Renagel) tablet 800 mg  800 mg oral TID with meals Chacha Garland MD   800 mg at 11/07/23 1156      63 Year old with h/o ESRD on HD admitted with   Nephrology following for ESRD    #ESRD on PD: Will resume home prescription-10.5 hours on cycler, fill volume 1.8 L, total volume 11 L, last filled 2 L.  In view of lower extremity edema and complaints of orthopnea, will use 2.5% and 4.25% dextrose.  I suspect  "noncompliance with PD at home.  I discussed with her primary nephrologist Dr. Lora who confirms that intermittently she does not perform PD at home.  Please liberalize patient's diet and allow her to eat a regular diet as she is severely malnourished as evidenced by low albumin and muscle wasting.    # Anemia: HgB   Hemoglobin   Date Value Ref Range Status   11/07/2023 8.0 (L) 12.0 - 16.0 g/dL Final   continue Aranesp 100 mcg subcu every 2 weeks    # MBD: Ca   Calcium   Date Value Ref Range Status   11/07/2023 7.7 (L) 8.6 - 10.6 mg/dL Final     Phosphorus   Date Value Ref Range Status   11/06/2023 8.1 (H) 2.5 - 4.9 mg/dL Final     Comment:     The performance characteristics of phosphorus testing in heparinized plasma have been validated by the individual  laboratory site where testing is performed. Testing on heparinized plasma is not approved by the FDA; however, such approval is not necessary.     PTH   Date Value Ref Range Status   07/29/2022 690.4 (H) 12.0 - 88.0 pg/mL Final     Vitamin D, 25-Hydroxy   Date Value Ref Range Status   06/30/2021 36 ng/mL Final     Comment:     .  DEFICIENCY:         < 20   NG/ML  INSUFFICIENCY:      20-29  NG/ML  SUFFICIENCY:         NG/ML    THIS ASSAY ACCURATELY QUANTIFIES THE SUM OF  VITAMIN D3, 25-HYDROXY AND VIT D2,25-HYDROXY.     Start sevelamer carbonate 2400 mg p.o. 3 times daily with meals and daily renal vitamin, continue calcitriol 0.5 mcg daily    #Hypertension:  Bp control acceptable, continue current anti-hypertensive medications/start    #Volume status: Has lower extremity edema which may be related to hypoalbuminemia.  However given history of orthopnea, will aim for aggressive UF today with 4.25% dextrose, will reevaluate tomorrow.    #Abdominal pain, diarrhea, CT abdomen showing \"large partially rim calcified fluid   collection within the left hemiabdomen measuring 7.3 x 6.7 cm\": Please consult surgery for opinion.    Will continue to follow   "

## 2023-11-08 LAB
ALBUMIN SERPL BCP-MCNC: 2.6 G/DL (ref 3.4–5)
ALP SERPL-CCNC: 99 U/L (ref 33–136)
ALT SERPL W P-5'-P-CCNC: 13 U/L (ref 7–45)
ANION GAP SERPL CALC-SCNC: 23 MMOL/L (ref 10–20)
AST SERPL W P-5'-P-CCNC: 13 U/L (ref 9–39)
BILIRUB SERPL-MCNC: 0.3 MG/DL (ref 0–1.2)
BUN SERPL-MCNC: 46 MG/DL (ref 6–23)
CALCIUM SERPL-MCNC: 7.5 MG/DL (ref 8.6–10.6)
CHLORIDE SERPL-SCNC: 94 MMOL/L (ref 98–107)
CO2 SERPL-SCNC: 19 MMOL/L (ref 21–32)
CREAT SERPL-MCNC: 8.03 MG/DL (ref 0.5–1.05)
ERYTHROCYTE [DISTWIDTH] IN BLOOD BY AUTOMATED COUNT: 17.7 % (ref 11.5–14.5)
GFR SERPL CREATININE-BSD FRML MDRD: 5 ML/MIN/1.73M*2
GLUCOSE SERPL-MCNC: 103 MG/DL (ref 74–99)
HCT VFR BLD AUTO: 24.9 % (ref 36–46)
HGB BLD-MCNC: 8 G/DL (ref 12–16)
MAGNESIUM SERPL-MCNC: 1.25 MG/DL (ref 1.6–2.4)
MCH RBC QN AUTO: 30.2 PG (ref 26–34)
MCHC RBC AUTO-ENTMCNC: 32.1 G/DL (ref 32–36)
MCV RBC AUTO: 94 FL (ref 80–100)
NRBC BLD-RTO: 0 /100 WBCS (ref 0–0)
PHOSPHATE SERPL-MCNC: 7.8 MG/DL (ref 2.5–4.9)
PLATELET # BLD AUTO: 182 X10*3/UL (ref 150–450)
POTASSIUM SERPL-SCNC: 3.4 MMOL/L (ref 3.5–5.3)
PROT SERPL-MCNC: 5.8 G/DL (ref 6.4–8.2)
RBC # BLD AUTO: 2.65 X10*6/UL (ref 4–5.2)
SODIUM SERPL-SCNC: 133 MMOL/L (ref 136–145)
UFH PPP CHRO-ACNC: 0.2 IU/ML
UFH PPP CHRO-ACNC: 0.4 IU/ML
UFH PPP CHRO-ACNC: 0.4 IU/ML
WBC # BLD AUTO: 4.9 X10*3/UL (ref 4.4–11.3)

## 2023-11-08 PROCEDURE — 99232 SBSQ HOSP IP/OBS MODERATE 35: CPT | Performed by: STUDENT IN AN ORGANIZED HEALTH CARE EDUCATION/TRAINING PROGRAM

## 2023-11-08 PROCEDURE — 36415 COLL VENOUS BLD VENIPUNCTURE: CPT

## 2023-11-08 PROCEDURE — 2500000001 HC RX 250 WO HCPCS SELF ADMINISTERED DRUGS (ALT 637 FOR MEDICARE OP): Performed by: STUDENT IN AN ORGANIZED HEALTH CARE EDUCATION/TRAINING PROGRAM

## 2023-11-08 PROCEDURE — G0378 HOSPITAL OBSERVATION PER HR: HCPCS

## 2023-11-08 PROCEDURE — 36415 COLL VENOUS BLD VENIPUNCTURE: CPT | Performed by: STUDENT IN AN ORGANIZED HEALTH CARE EDUCATION/TRAINING PROGRAM

## 2023-11-08 PROCEDURE — 2500000004 HC RX 250 GENERAL PHARMACY W/ HCPCS (ALT 636 FOR OP/ED): Performed by: STUDENT IN AN ORGANIZED HEALTH CARE EDUCATION/TRAINING PROGRAM

## 2023-11-08 PROCEDURE — 4055F PT RCVNG PERITON DIALYSIS: CPT | Performed by: INTERNAL MEDICINE

## 2023-11-08 PROCEDURE — 85520 HEPARIN ASSAY: CPT

## 2023-11-08 PROCEDURE — 96376 TX/PRO/DX INJ SAME DRUG ADON: CPT | Performed by: STUDENT IN AN ORGANIZED HEALTH CARE EDUCATION/TRAINING PROGRAM

## 2023-11-08 PROCEDURE — 80053 COMPREHEN METABOLIC PANEL: CPT | Performed by: STUDENT IN AN ORGANIZED HEALTH CARE EDUCATION/TRAINING PROGRAM

## 2023-11-08 PROCEDURE — 84100 ASSAY OF PHOSPHORUS: CPT | Performed by: STUDENT IN AN ORGANIZED HEALTH CARE EDUCATION/TRAINING PROGRAM

## 2023-11-08 PROCEDURE — 83735 ASSAY OF MAGNESIUM: CPT | Performed by: STUDENT IN AN ORGANIZED HEALTH CARE EDUCATION/TRAINING PROGRAM

## 2023-11-08 PROCEDURE — 2500000001 HC RX 250 WO HCPCS SELF ADMINISTERED DRUGS (ALT 637 FOR MEDICARE OP): Performed by: INTERNAL MEDICINE

## 2023-11-08 PROCEDURE — 2500000004 HC RX 250 GENERAL PHARMACY W/ HCPCS (ALT 636 FOR OP/ED)

## 2023-11-08 PROCEDURE — 2500000004 HC RX 250 GENERAL PHARMACY W/ HCPCS (ALT 636 FOR OP/ED): Performed by: INTERNAL MEDICINE

## 2023-11-08 PROCEDURE — 85027 COMPLETE CBC AUTOMATED: CPT | Performed by: STUDENT IN AN ORGANIZED HEALTH CARE EDUCATION/TRAINING PROGRAM

## 2023-11-08 RX ORDER — HEPARIN SODIUM 1000 [USP'U]/ML
INJECTION, SOLUTION INTRAVENOUS; SUBCUTANEOUS
Status: DISPENSED
Start: 2023-11-08 | End: 2023-11-08

## 2023-11-08 RX ORDER — CEFTAZIDIME 1 G/1
INJECTION, POWDER, FOR SOLUTION INTRAMUSCULAR; INTRAVENOUS
Status: COMPLETED
Start: 2023-11-08 | End: 2023-11-08

## 2023-11-08 RX ORDER — SODIUM CHLORIDE 0.9 % (FLUSH) 0.9 %
10 SYRINGE (ML) INJECTION AS NEEDED
Status: DISCONTINUED | OUTPATIENT
Start: 2023-11-08 | End: 2023-11-15 | Stop reason: HOSPADM

## 2023-11-08 RX ORDER — NYSTATIN 100000 [USP'U]/ML
500000 SUSPENSION ORAL EVERY 8 HOURS SCHEDULED
Status: DISCONTINUED | OUTPATIENT
Start: 2023-11-08 | End: 2023-11-15 | Stop reason: HOSPADM

## 2023-11-08 RX ORDER — VANCOMYCIN HYDROCHLORIDE 1 G/20ML
INJECTION, POWDER, LYOPHILIZED, FOR SOLUTION INTRAVENOUS
Status: COMPLETED
Start: 2023-11-08 | End: 2023-11-08

## 2023-11-08 RX ORDER — GENTAMICIN SULFATE 1 MG/G
CREAM TOPICAL DAILY
Status: DISCONTINUED | OUTPATIENT
Start: 2023-11-08 | End: 2023-11-15 | Stop reason: HOSPADM

## 2023-11-08 RX ORDER — SODIUM CHLORIDE 0.9 % (FLUSH) 0.9 %
10 SYRINGE (ML) INJECTION EVERY 12 HOURS SCHEDULED
Status: DISCONTINUED | OUTPATIENT
Start: 2023-11-08 | End: 2023-11-15 | Stop reason: HOSPADM

## 2023-11-08 RX ADMIN — PANCRELIPASE 3 CAPSULE: 24000; 76000; 120000 CAPSULE, DELAYED RELEASE PELLETS ORAL at 17:15

## 2023-11-08 RX ADMIN — NYSTATIN 500000 UNITS: 500000 SUSPENSION ORAL at 13:03

## 2023-11-08 RX ADMIN — ALLOPURINOL 100 MG: 100 TABLET ORAL at 08:39

## 2023-11-08 RX ADMIN — HEPARIN SODIUM 1100 UNITS/HR: 10000 INJECTION, SOLUTION INTRAVENOUS at 16:15

## 2023-11-08 RX ADMIN — HYDROMORPHONE HYDROCHLORIDE 0.4 MG: 1 INJECTION, SOLUTION INTRAMUSCULAR; INTRAVENOUS; SUBCUTANEOUS at 21:24

## 2023-11-08 RX ADMIN — VANCOMYCIN HYDROCHLORIDE: 1 INJECTION, POWDER, LYOPHILIZED, FOR SOLUTION INTRAVENOUS at 10:00

## 2023-11-08 RX ADMIN — LEVOTHYROXINE SODIUM 25 MCG: 25 TABLET ORAL at 06:09

## 2023-11-08 RX ADMIN — CARVEDILOL 6.25 MG: 12.5 TABLET, FILM COATED ORAL at 17:16

## 2023-11-08 RX ADMIN — SEVELAMER HYDROCHLORIDE 800 MG: 800 TABLET, FILM COATED ORAL at 13:04

## 2023-11-08 RX ADMIN — Medication 3 MG: at 21:17

## 2023-11-08 RX ADMIN — PREGABALIN 25 MG: 25 CAPSULE ORAL at 08:39

## 2023-11-08 RX ADMIN — CALCITRIOL 0.5 MCG: 0.5 CAPSULE ORAL at 08:38

## 2023-11-08 RX ADMIN — VANCOMYCIN HYDROCHLORIDE: 1 INJECTION, POWDER, LYOPHILIZED, FOR SOLUTION INTRAVENOUS at 11:00

## 2023-11-08 RX ADMIN — HEPARIN SODIUM 1500 UNITS: 5000 INJECTION, SOLUTION INTRAVENOUS; SUBCUTANEOUS at 10:58

## 2023-11-08 RX ADMIN — NYSTATIN 500000 UNITS: 500000 SUSPENSION ORAL at 21:17

## 2023-11-08 RX ADMIN — CEFTAZIDIME: 1 INJECTION, POWDER, FOR SOLUTION INTRAMUSCULAR; INTRAVENOUS at 11:20

## 2023-11-08 RX ADMIN — PANTOPRAZOLE SODIUM 40 MG: 40 TABLET, DELAYED RELEASE ORAL at 06:09

## 2023-11-08 RX ADMIN — CARVEDILOL 6.25 MG: 12.5 TABLET, FILM COATED ORAL at 08:39

## 2023-11-08 RX ADMIN — SEVELAMER HYDROCHLORIDE 800 MG: 800 TABLET, FILM COATED ORAL at 17:16

## 2023-11-08 RX ADMIN — PANTOPRAZOLE SODIUM 40 MG: 40 TABLET, DELAYED RELEASE ORAL at 16:12

## 2023-11-08 RX ADMIN — HYDROMORPHONE HYDROCHLORIDE 0.4 MG: 1 INJECTION, SOLUTION INTRAMUSCULAR; INTRAVENOUS; SUBCUTANEOUS at 17:15

## 2023-11-08 RX ADMIN — Medication 10 ML: at 12:17

## 2023-11-08 RX ADMIN — SEVELAMER HYDROCHLORIDE 800 MG: 800 TABLET, FILM COATED ORAL at 08:38

## 2023-11-08 RX ADMIN — ATORVASTATIN CALCIUM 40 MG: 40 TABLET, FILM COATED ORAL at 21:17

## 2023-11-08 RX ADMIN — HYDROMORPHONE HYDROCHLORIDE 0.4 MG: 1 INJECTION, SOLUTION INTRAMUSCULAR; INTRAVENOUS; SUBCUTANEOUS at 12:15

## 2023-11-08 RX ADMIN — PANCRELIPASE 3 CAPSULE: 24000; 76000; 120000 CAPSULE, DELAYED RELEASE PELLETS ORAL at 08:39

## 2023-11-08 RX ADMIN — PANCRELIPASE 3 CAPSULE: 24000; 76000; 120000 CAPSULE, DELAYED RELEASE PELLETS ORAL at 13:04

## 2023-11-08 RX ADMIN — Medication 10 ML: at 21:20

## 2023-11-08 RX ADMIN — MIRTAZAPINE 15 MG: 15 TABLET, FILM COATED ORAL at 21:17

## 2023-11-08 RX ADMIN — HYDROMORPHONE HYDROCHLORIDE 0.4 MG: 1 INJECTION, SOLUTION INTRAMUSCULAR; INTRAVENOUS; SUBCUTANEOUS at 04:56

## 2023-11-08 ASSESSMENT — PAIN - FUNCTIONAL ASSESSMENT
PAIN_FUNCTIONAL_ASSESSMENT: 0-10

## 2023-11-08 ASSESSMENT — PAIN DESCRIPTION - LOCATION
LOCATION: LEG
LOCATION: LEG

## 2023-11-08 ASSESSMENT — PAIN SCALES - GENERAL
PAINLEVEL_OUTOF10: 7
PAINLEVEL_OUTOF10: 10 - WORST POSSIBLE PAIN
PAINLEVEL_OUTOF10: 10 - WORST POSSIBLE PAIN
PAINLEVEL_OUTOF10: 6
PAINLEVEL_OUTOF10: 8
PAINLEVEL_OUTOF10: 10 - WORST POSSIBLE PAIN
PAINLEVEL_OUTOF10: 3
PAINLEVEL_OUTOF10: 0 - NO PAIN
PAINLEVEL_OUTOF10: 10 - WORST POSSIBLE PAIN

## 2023-11-08 ASSESSMENT — COGNITIVE AND FUNCTIONAL STATUS - GENERAL
MOVING TO AND FROM BED TO CHAIR: A LITTLE
TURNING FROM BACK TO SIDE WHILE IN FLAT BAD: A LITTLE
STANDING UP FROM CHAIR USING ARMS: A LITTLE
HELP NEEDED FOR BATHING: A LITTLE
TOILETING: A LITTLE
MOBILITY SCORE: 19
DAILY ACTIVITIY SCORE: 22
CLIMB 3 TO 5 STEPS WITH RAILING: A LITTLE
WALKING IN HOSPITAL ROOM: A LITTLE

## 2023-11-08 NOTE — PROGRESS NOTES
11/8/23 2156 Transitional Care Coordinator Notes:    Met with patient to discuss discharge needs. Patient stated she lives alone and uses a cane for ambulation assistance. Patient states she has had some falls at home. Will ask team to place PT/OT orders.     Patient performs peritoneal dialysis at home. Patient requesting home care services. Will notify team of her request.                     Assessment/Plan   Principal Problem:    Abdominal pain    Discharge Plan: discharge home with possible home care services           Rubi Duncan RN

## 2023-11-08 NOTE — PROGRESS NOTES
.                                                                                                                                                                                                                                                                                             INTERNAL MEDICINE PROGRESS NOTE     BRIEF NARRATIVE      Yoselyn Hernandez is a 63 y.o. female on day 0 of admission presenting with Abdominal pain.  Patient has a history of ESRD on dialysis, AAA, HFrEF (25% 03/2023) and pancreatitis, PE (03/2023 on warfarin) who presents to the ED with acute on chronic abdominal pain that worsened over the last 4 days.  Patient reports 18 episodes of diarrhea today, as well as worsening 10/10 abdominal pain so she came to the ED for an evaluation. She also reports associated nausea and vomiting. Denies fevers or chills. She tried imodium with no relief of diarrhea. Reports prolonged immobility the past few days. Patient also reports worsening shortness of breath and new onset chest pain over the last few days. She reports PND which she has not had before. Denies any diaphoresis. States she last did her peritoneal dialysis session on Saturday. Also endorses intermittent HA and chronic back pain.  Patient reports abdominal pain and diarrhea have been an ongoing issue for the last four years but recently have gotten worse.      Of note: Patient recently seen in [facility] ED for 1.5 year hx of diarrhea with frequent flare ups as well as chronic SOB i/s/o chronic PE. Patient was found to be subtherapeutic on warfarin during that ED stay was given a one time extra warfarin dose as she had no worsening SOB at the time and instructed to follow up with pcp for dosing adjustments.     SUBJECTIVE     Patient seen and examined today, feels that abdominal pain is better today, patient had a normal bowel movement overnight.    OBJECTIVE      Visit Vitals  /73 (BP Location: Left arm, Patient Position:  Lying)   Pulse 76   Temp 36.7 °C (98.1 °F) (Temporal)   Resp 17      No intake or output data in the 24 hours ending 11/08/23 1127      Physical Exam   Constitutional:       Appearance: She is not diaphoretic.   HENT:      Head: Normocephalic and atraumatic.   Eyes:      Pupils: Pupils are equal, round, and reactive to light.   Cardiovascular:      Rate and Rhythm: Normal rate and regular rhythm.      Heart sounds: Normal heart sounds.   Pulmonary:      Effort: Pulmonary effort is normal.      Breath sounds: Normal breath sounds. No wheezing, rhonchi or rales.   Abdominal:      Palpations: Abdomen is soft.      Tenderness: There is abdominal tenderness. There is no guarding or rebound.   Musculoskeletal:      Cervical back: Neck supple.      Right lower leg: Edema present.      Left lower leg: Edema present.   Skin:     General: Skin is warm and dry.   Neurological:      Mental Status: She is alert.     Current Meds   cefTAZidime, , ,   heparin, , ,   vancomycin, , ,   allopurinol, 100 mg, oral, Daily  atorvastatin, 40 mg, oral, Nightly  calcitriol, 0.5 mcg, oral, Daily  carvedilol, 6.25 mg, oral, BID with meals  dextrose 2.5 % - LOW calcium 2.5 mEq/L 2,000 mL peritoneal dialysate, , intraperitoneal, q24h  dextrose 2.5 % - LOW calcium 2.5 mEq/L 2,000 mL with heparin 1,000 Units, cefTAZidime 1,000 mg, vancomycin 2,000 mg peritoneal dialysate, , intraperitoneal, q24h  dextrose 2.5 % - LOW calcium 2.5 mEq/L 5,000 mL peritoneal dialysate, , intraperitoneal, q24h  dextrose 4.25 % - LOW calcium 2.5 mEq/L 5,000 mL peritoneal dialysate, , intraperitoneal, q24h  levothyroxine, 25 mcg, oral, Daily before breakfast  lipase-protease-amylase, 3 capsule, oral, TID with meals  melatonin, 3 mg, oral, Daily  mirtazapine, 15 mg, oral, Nightly  nystatin, 500,000 Units, oral, q8h AMARJIT  pantoprazole, 40 mg, oral, BID AC  pregabalin, 25 mg, oral, Daily  sevelamer HCl, 800 mg, oral, TID with meals  sodium chloride 0.9%, 10 mL, intravenous,  q12h [facility]       PRN medications: cefTAZidime, heparin, vancomycin, [DISCONTINUED] acetaminophen **OR** acetaminophen **OR** acetaminophen, heparin, HYDROmorphone, HYDROmorphone, loperamide, ondansetron, polyethylene glycol, sodium chloride 0.9%     LABS and IMAGING     WBC   Date Value Ref Range Status   11/08/2023 4.9 4.4 - 11.3 x10*3/uL Final   11/07/2023 4.2 (L) 4.4 - 11.3 x10*3/uL Final   11/05/2023 7.2 4.4 - 11.3 x10*3/uL Final     Hemoglobin   Date Value Ref Range Status   11/08/2023 8.0 (L) 12.0 - 16.0 g/dL Final   11/07/2023 8.0 (L) 12.0 - 16.0 g/dL Final   11/05/2023 9.1 (L) 12.0 - 16.0 g/dL Final     Hematocrit   Date Value Ref Range Status   11/08/2023 24.9 (L) 36.0 - 46.0 % Final   11/07/2023 24.3 (L) 36.0 - 46.0 % Final   11/05/2023 28.5 (L) 36.0 - 46.0 % Final     Bicarbonate   Date Value Ref Range Status   11/07/2023 24 21 - 32 mmol/L Final   11/06/2023 17 (L) 21 - 32 mmol/L Final     Creatinine   Date Value Ref Range Status   11/07/2023 8.87 (H) 0.50 - 1.05 mg/dL Final   11/06/2023 8.24 (H) 0.50 - 1.05 mg/dL Final     Calcium   Date Value Ref Range Status   11/07/2023 7.7 (L) 8.6 - 10.6 mg/dL Final   11/06/2023 8.0 (L) 8.6 - 10.6 mg/dL Final     INR   Date Value Ref Range Status   11/06/2023 1.4 (H) 0.9 - 1.1 Final          ASSESSMENT / PLANS      #Acute on Chronic Abdominal Pain  #Acute on Chronic Diarrhea  #Abdominal hernia  Patient presented with episodes of intermittent diarrhea diarrhea last 4 to 5 days followed by constipation of the same duration.  Chronic condition, seem more like IBS with combine both diarrhea and constipation form.  Patient last colonoscopy and EGD 2021 and 2022 respectively  -CTA c/a/p negative for any acute changes, showed chronic changes with interval development  Incisional hernia  -CRP elevated  -Tissue transglutaminase IgA negative  -C. difficile pending, continue to follow  -We will start PPI trial with pantoprazole 40 mg twice daily  - follow up CTA  chest/abd/pelvis  -We will consider GI c/s for colonoscopy to r/I microscopic colitis if stool workup negative   - pain regimen: oxycodone 5mg for moderate pain, 10mg for severe pain  -ACS consulted, no acute intervention recommended.  Will place outpatient general surgery follow-up upon discharge for abdominal hernia, and other unchanged intra-abdominal abnormalities     #Acute on chronic SOB  #HFrEF (25% 03/2023)  #Hx of PE  #CP  Worsening SOB/CP could likely be due to delayed dialysis session given symptom onset correlation  vs decompensated HFrEF given reported PND and LE edema. Also ACS on differential given risk factors but low concern given minimally elevated troponin  :; Troponin 40 -> 41   - c/w heparin gtt, then transition to warfarin to achieve therapeutic inr pending CTA read  - follow up CTA chest/abd/pelvis   - check BNP, iron studies  - formal echo   - needs GDMT optimized : consider staring losartan, spironolactone, and jardiance if can tolerate  - c/w home coreg      #AAA  #Stable right innominate, cannulated coronary dissection  -BP < 120/80, HR < 80  -c/w home coreg      #ESRD  -c/w sevalamer 600 tid  -hold nightly PD until CTA comes back  -monitor Bicarb given significant diarrhea  -Nephrology starting intra-abdominal antibiotic     #Hypothryoidism  -c/w levothyroxine 25mcg  -recheck tsh, t4      Etelvina Lewis MD

## 2023-11-08 NOTE — PROGRESS NOTES
[facility]  ACUTE CARE SURGERY - PROGRESS NOTE    Patient Name: Yoselyn Hernandez  MRN: [id number]  Admit Date: 1106  : 1960  AGE: 63 y.o.   GENDER: female  ==============================================================================  TODAY'S ASSESSMENT AND PLAN OF CARE:    Yoselyn Hernandez is a 64 yo F admitted for abdominal pain in setting of diarrhea with PMHx of ESRD on dialysis, aortic aneurysm status post repair, heart failure with reduced ejection fraction, pancreatitis, PE on warfarin.  ACS was consulted regarding patients epigastric and incisional hernias.  On admission patient was deemed not obstructed with benign abdominal exam, with CT showing stable appearance of known incisional hernias without evidence of incarceration or obstruction.    Plan:  Continue to monitor for signs c/f bowel obstruction (N/V, po intolerance, cessation of bowel function, extreme tenderness or incarceration or epigastric hernia areas)  Outpatient follow-up and referral to General Surgery for evaluation of elective hernia repair  ACS will sign off at this time    Discussed with Dr. Avery Bradshaw MD  [id number]  General Surgery  Acute Care Surgery [id number]    ==============================================================================  CHIEF COMPLAINT / EVENTS LAST 24HRS / HPI:  NAEON  -Patient states she had some vision issues  -Patient recently received multiple doses of imodium  -Patient not endorsing abdominal pain at the moment    PHYSICAL EXAM:  Heart Rate:  [71-89]   Temp:  [36.1 °C (97 °F)-36.8 °C (98.2 °F)]   Resp:  [16-18]   BP: (104-124)/(65-85)   Weight:  [57 kg (125 lb 10.6 oz)-59.1 kg (130 lb 4.8 oz)]   SpO2:  [98 %-100 %]   Physical Exam    Constitutional: no acute distress  Neuro: A/O x4, no gross deficits   Psych: normal affect, sad affect upon reflecting all procedures patient has gone through  HEENT: Normocephalic, atraumatic, no scleral icterus, EOMI  Cardiac:  well perfused  Pulmonary: unlabored respirations   Abdomen: soft, mild distention, mildly tender, no evidence of peritonitis, 3 midline easily reducible incisional hernias 2-3 cm superiorly 1cm middle, just <1cm inferiorly, PD catheter (with PD equipment at bedside)  Skin: warm and dry overall   Extremities: moving all four, no swelling noted     IMAGING SUMMARY:  (summary of new imaging findings, not a copy of dictation)    No new imaging    I have reviewed all medications, laboratory results, and imaging pertinent for today's encounter.

## 2023-11-08 NOTE — CARE PLAN
Problem: Fall/Injury  Goal: Not fall by end of shift  Outcome: Progressing     Problem: Fall/Injury  Goal: Verbalize understanding of personal risk factors for fall in the hospital  Outcome: Progressing     Problem: Pain  Goal: Takes deep breaths with improved pain control throughout the shift  Outcome: Progressing   The patient's goals for the shift include To remain pain free    The clinical goals for the shift include Heparin will become therapeutic by end of shift    Will continue to monitor and assess.

## 2023-11-08 NOTE — PROGRESS NOTES
"Seen and examined.  Has not had a bowel movement since admission.  States abdominal pain is better.  She disconnected the cyclical self.    RoS negative for all other systems except as noted above.    Visit Vitals  /73 (BP Location: Left arm, Patient Position: Lying)   Pulse 76   Temp 36.7 °C (98.1 °F) (Temporal)   Resp 17   Ht 1.702 m (5' 7\")   Wt 57 kg (125 lb 10.6 oz)   SpO2 99%   BMI 19.68 kg/m²   Smoking Status Former   BSA 1.64 m²      No distress. Not tearful today. Cachectic  HEENT:  moist, no pallor  2+ edema joel LE, better  Breath sounds joel equal, clear  S1 S2 regular, normal, no rub or murmur  Abdomen soft, non tender on palpation while patient distracted  AAO x3, non focal    I/O last 3 completed shifts:  In: 150 (2.6 mL/kg) [P.O.:150]  Out: - (0 mL/kg)   Weight: 57 kg   No intake/output data recorded.   Total UF : 1.7 l (I drain 80 ml)    Results from last 72 hours   Lab Units 11/08/23  0804 11/07/23  0622 11/06/23  0457 11/05/23  2244   SODIUM mmol/L  --  133* 138  --    POTASSIUM mmol/L  --  3.7 3.5  --    CHLORIDE mmol/L  --  92* 98  --    CO2 mmol/L  --  24 17*  --    BUN mg/dL  --  55* 49*  --    PHOSPHORUS mg/dL  --   --  8.1*  --    HEMOGLOBIN g/dL 8.0* 8.0*  --  9.1*        PD fluid cell count 1628 with 95% neutrophils    Assessment and Plan:  63 Year old with h/o ESRD on PD admitted with abdominal pain and diarrhea  Nephrology following for ESRD     #ESRD on PD: Will resume home prescription-10.5 hours on cycler, fill volume 1.8 L, total volume 11 L, last filled 2 L.  In view of lower extremity edema and complaints of orthopnea, will use 2.5% and 4.25% dextrose again today.    # PD peritonitis: On cell count and diff. Will start with IP Vancomycin and Ceftazidime, ct for 2 weeks, continue Nystatin swish and swallow for 5 days after last dose of Vancomycin     # Anemia: HgB 8 g/dl  continue Aranesp 100 mcg subcu every 2 weeks     # MBD: Ca         Calcium   Date Value Ref Range Status " "  11/07/2023 7.7 (L) 8.6 - 10.6 mg/dL Final              Phosphorus   Date Value Ref Range Status   11/06/2023 8.1 (H) 2.5 - 4.9 mg/dL Final       Comment:       The performance characteristics of phosphorus testing in heparinized plasma have been validated by the individual  laboratory site where testing is performed. Testing on heparinized plasma is not approved by the FDA; however, such approval is not necessary.            PTH   Date Value Ref Range Status   07/29/2022 690.4 (H) 12.0 - 88.0 pg/mL Final              Vitamin D, 25-Hydroxy   Date Value Ref Range Status   06/30/2021 36 ng/mL Final       Comment:       .  DEFICIENCY:         < 20   NG/ML  INSUFFICIENCY:      20-29  NG/ML  SUFFICIENCY:         NG/ML     THIS ASSAY ACCURATELY QUANTIFIES THE SUM OF  VITAMIN D3, 25-HYDROXY AND VIT D2,25-HYDROXY.      Start sevelamer carbonate 2400 mg p.o. 3 times daily with meals and daily renal vitamin, continue calcitriol 0.5 mcg daily     #Hypertension:  Bp control acceptable, continue current anti-hypertensive medications     #Volume status: Has lower extremity edema which may be related to hypoalbuminemia.  However given history of orthopnea, will aim for aggressive UF again today with 4.25% dextrose, will reevaluate tomorrow.     #Abdominal pain, diarrhea, CT abdomen showing \"large partially rim calcified fluid   collection within the left hemiabdomen measuring 7.3 x 6.7 cm\": per surgery     # malnutrition: Please consider nutrition consult and protein supplement like Nepro shake BID     Will continue to follow       "

## 2023-11-08 NOTE — CARE PLAN
The patient's goals for the shift include To remain pain free    The clinical goals for the shift include Patient will remain therapeutic on heparin during shift      Problem: Pain - Adult  Goal: Verbalizes/displays adequate comfort level or baseline comfort level  Flowsheets (Taken 11/8/2023 1251)  Verbalizes/displays adequate comfort level or baseline comfort level:   Encourage patient to monitor pain and request assistance   Administer analgesics based on type and severity of pain and evaluate response   Consider cultural and social influences on pain and pain management   Implement non-pharmacological measures as appropriate and evaluate response   Notify Licensed Independent Practitioner if interventions unsuccessful or patient reports new pain   Assess pain using appropriate pain scale     Problem: Safety - Adult  Goal: Free from fall injury  Flowsheets (Taken 11/8/2023 1251)  Free from fall injury:   Instruct family/caregiver on patient safety   Based on caregiver fall risk screen, instruct family/caregiver to ask for assistance with transferring infant if caregiver noted to have fall risk factors     Problem: Discharge Planning  Goal: Discharge to home or other facility with appropriate resources  Flowsheets (Taken 11/8/2023 1251)  Discharge to home or other facility with appropriate resources:   Identify barriers to discharge with patient and caregiver   Identify discharge learning needs (meds, wound care, etc)   Refer to discharge planning if patient needs post-hospital services based on physician order or complex needs related to functional status, cognitive ability or social support system   Arrange for interpreters to assist at discharge as needed   Arrange for needed discharge resources and transportation as appropriate     Problem: Chronic Conditions and Co-morbidities  Goal: Patient's chronic conditions and co-morbidity symptoms are monitored and maintained or improved  Flowsheets (Taken 11/8/2023  1251)  Care Plan - Patient's Chronic Conditions and Co-Morbidity Symptoms are Monitored and Maintained or Improved:   Monitor and assess patient's chronic conditions and comorbid symptoms for stability, deterioration, or improvement   Collaborate with multidisciplinary team to address chronic and comorbid conditions and prevent exacerbation or deterioration   Update acute care plan with appropriate goals if chronic or comorbid symptoms are exacerbated and prevent overall improvement and discharge

## 2023-11-09 LAB
ALBUMIN SERPL BCP-MCNC: 2.6 G/DL (ref 3.4–5)
ALP SERPL-CCNC: 101 U/L (ref 33–136)
ALT SERPL W P-5'-P-CCNC: 12 U/L (ref 7–45)
ANION GAP SERPL CALC-SCNC: 21 MMOL/L (ref 10–20)
AST SERPL W P-5'-P-CCNC: 14 U/L (ref 9–39)
BASOPHILS NFR FLD MANUAL: 0 %
BILIRUB SERPL-MCNC: 0.3 MG/DL (ref 0–1.2)
BLASTS NFR FLD MANUAL: 0 %
BUN SERPL-MCNC: 43 MG/DL (ref 6–23)
CALCIUM SERPL-MCNC: 7.6 MG/DL (ref 8.6–10.6)
CHLORIDE SERPL-SCNC: 96 MMOL/L (ref 98–107)
CLARITY FLD: CLEAR
CO2 SERPL-SCNC: 22 MMOL/L (ref 21–32)
COLOR FLD: COLORLESS
CREAT SERPL-MCNC: 7.37 MG/DL (ref 0.5–1.05)
EOSINOPHIL NFR FLD MANUAL: 0 %
GFR SERPL CREATININE-BSD FRML MDRD: 6 ML/MIN/1.73M*2
GLUCOSE SERPL-MCNC: 107 MG/DL (ref 74–99)
IMMATURE GRANULOCYTES IN FLUID: 0 %
INR PPP: 1.3 (ref 0.9–1.1)
LYMPHOCYTES NFR FLD MANUAL: 66 %
MAGNESIUM SERPL-MCNC: 1.21 MG/DL (ref 1.6–2.4)
MAGNESIUM SERPL-MCNC: 1.23 MG/DL (ref 1.6–2.4)
MONOS+MACROS NFR FLD MANUAL: 29 %
NEUTROPHILS NFR FLD MANUAL: 6 %
OTHER CELLS NFR FLD MANUAL: 0 %
PHOSPHATE SERPL-MCNC: 6 MG/DL (ref 2.5–4.9)
PLASMA CELLS NFR FLD MANUAL: 0 %
POTASSIUM SERPL-SCNC: 2.9 MMOL/L (ref 3.5–5.3)
PROT SERPL-MCNC: 6.5 G/DL (ref 6.4–8.2)
PROTHROMBIN TIME: 14.1 SECONDS (ref 9.8–12.8)
RBC # FLD AUTO: 3 /UL
SODIUM SERPL-SCNC: 136 MMOL/L (ref 136–145)
TOTAL CELLS COUNTED FLD: 70
UFH PPP CHRO-ACNC: 0.5 IU/ML
WBC # FLD AUTO: 21 /UL

## 2023-11-09 PROCEDURE — 96365 THER/PROPH/DIAG IV INF INIT: CPT | Performed by: STUDENT IN AN ORGANIZED HEALTH CARE EDUCATION/TRAINING PROGRAM

## 2023-11-09 PROCEDURE — 84100 ASSAY OF PHOSPHORUS: CPT | Performed by: STUDENT IN AN ORGANIZED HEALTH CARE EDUCATION/TRAINING PROGRAM

## 2023-11-09 PROCEDURE — G0378 HOSPITAL OBSERVATION PER HR: HCPCS

## 2023-11-09 PROCEDURE — 2500000004 HC RX 250 GENERAL PHARMACY W/ HCPCS (ALT 636 FOR OP/ED): Performed by: INTERNAL MEDICINE

## 2023-11-09 PROCEDURE — 80053 COMPREHEN METABOLIC PANEL: CPT | Performed by: STUDENT IN AN ORGANIZED HEALTH CARE EDUCATION/TRAINING PROGRAM

## 2023-11-09 PROCEDURE — 4055F PT RCVNG PERITON DIALYSIS: CPT | Performed by: INTERNAL MEDICINE

## 2023-11-09 PROCEDURE — 99221 1ST HOSP IP/OBS SF/LOW 40: CPT | Performed by: PSYCHIATRY & NEUROLOGY

## 2023-11-09 PROCEDURE — 2500000004 HC RX 250 GENERAL PHARMACY W/ HCPCS (ALT 636 FOR OP/ED): Performed by: STUDENT IN AN ORGANIZED HEALTH CARE EDUCATION/TRAINING PROGRAM

## 2023-11-09 PROCEDURE — 99232 SBSQ HOSP IP/OBS MODERATE 35: CPT | Performed by: STUDENT IN AN ORGANIZED HEALTH CARE EDUCATION/TRAINING PROGRAM

## 2023-11-09 PROCEDURE — 36415 COLL VENOUS BLD VENIPUNCTURE: CPT | Performed by: STUDENT IN AN ORGANIZED HEALTH CARE EDUCATION/TRAINING PROGRAM

## 2023-11-09 PROCEDURE — 2500000001 HC RX 250 WO HCPCS SELF ADMINISTERED DRUGS (ALT 637 FOR MEDICARE OP): Performed by: INTERNAL MEDICINE

## 2023-11-09 PROCEDURE — 89051 BODY FLUID CELL COUNT: CPT | Performed by: INTERNAL MEDICINE

## 2023-11-09 PROCEDURE — 2500000004 HC RX 250 GENERAL PHARMACY W/ HCPCS (ALT 636 FOR OP/ED)

## 2023-11-09 PROCEDURE — 2500000001 HC RX 250 WO HCPCS SELF ADMINISTERED DRUGS (ALT 637 FOR MEDICARE OP): Performed by: STUDENT IN AN ORGANIZED HEALTH CARE EDUCATION/TRAINING PROGRAM

## 2023-11-09 PROCEDURE — 83735 ASSAY OF MAGNESIUM: CPT | Performed by: STUDENT IN AN ORGANIZED HEALTH CARE EDUCATION/TRAINING PROGRAM

## 2023-11-09 PROCEDURE — 85520 HEPARIN ASSAY: CPT | Performed by: STUDENT IN AN ORGANIZED HEALTH CARE EDUCATION/TRAINING PROGRAM

## 2023-11-09 PROCEDURE — 96376 TX/PRO/DX INJ SAME DRUG ADON: CPT | Performed by: STUDENT IN AN ORGANIZED HEALTH CARE EDUCATION/TRAINING PROGRAM

## 2023-11-09 PROCEDURE — 85610 PROTHROMBIN TIME: CPT | Performed by: STUDENT IN AN ORGANIZED HEALTH CARE EDUCATION/TRAINING PROGRAM

## 2023-11-09 RX ORDER — POTASSIUM CHLORIDE 20 MEQ/1
40 TABLET, EXTENDED RELEASE ORAL ONCE
Status: COMPLETED | OUTPATIENT
Start: 2023-11-09 | End: 2023-11-09

## 2023-11-09 RX ORDER — HYDROMORPHONE HYDROCHLORIDE 1 MG/ML
0.2 INJECTION, SOLUTION INTRAMUSCULAR; INTRAVENOUS; SUBCUTANEOUS EVERY 4 HOURS PRN
Status: DISCONTINUED | OUTPATIENT
Start: 2023-11-09 | End: 2023-11-10

## 2023-11-09 RX ORDER — WARFARIN 2.5 MG/1
2.5 TABLET ORAL DAILY
Status: DISCONTINUED | OUTPATIENT
Start: 2023-11-09 | End: 2023-11-15 | Stop reason: HOSPADM

## 2023-11-09 RX ORDER — POLYETHYLENE GLYCOL 3350 17 G/17G
17 POWDER, FOR SOLUTION ORAL EVERY 12 HOURS PRN
Status: DISCONTINUED | OUTPATIENT
Start: 2023-11-09 | End: 2023-11-15 | Stop reason: HOSPADM

## 2023-11-09 RX ORDER — DOCUSATE SODIUM 100 MG/1
100 CAPSULE, LIQUID FILLED ORAL 2 TIMES DAILY
Status: DISCONTINUED | OUTPATIENT
Start: 2023-11-09 | End: 2023-11-15 | Stop reason: HOSPADM

## 2023-11-09 RX ORDER — LANOLIN ALCOHOL/MO/W.PET/CERES
400 CREAM (GRAM) TOPICAL ONCE
Status: COMPLETED | OUTPATIENT
Start: 2023-11-09 | End: 2023-11-09

## 2023-11-09 RX ORDER — POTASSIUM CHLORIDE 14.9 MG/ML
20 INJECTION INTRAVENOUS
Status: DISPENSED | OUTPATIENT
Start: 2023-11-09 | End: 2023-11-09

## 2023-11-09 RX ADMIN — Medication 400 MG: at 12:05

## 2023-11-09 RX ADMIN — POTASSIUM CHLORIDE 20 MEQ: 14.9 INJECTION, SOLUTION INTRAVENOUS at 16:04

## 2023-11-09 RX ADMIN — HYDROMORPHONE HYDROCHLORIDE 0.4 MG: 1 INJECTION, SOLUTION INTRAMUSCULAR; INTRAVENOUS; SUBCUTANEOUS at 08:01

## 2023-11-09 RX ADMIN — DOCUSATE SODIUM 100 MG: 100 CAPSULE, LIQUID FILLED ORAL at 20:43

## 2023-11-09 RX ADMIN — PANTOPRAZOLE SODIUM 40 MG: 40 TABLET, DELAYED RELEASE ORAL at 16:10

## 2023-11-09 RX ADMIN — PANCRELIPASE 3 CAPSULE: 24000; 76000; 120000 CAPSULE, DELAYED RELEASE PELLETS ORAL at 17:29

## 2023-11-09 RX ADMIN — DOCUSATE SODIUM 100 MG: 100 CAPSULE, LIQUID FILLED ORAL at 11:47

## 2023-11-09 RX ADMIN — HYDROMORPHONE HYDROCHLORIDE 0.2 MG: 1 INJECTION, SOLUTION INTRAMUSCULAR; INTRAVENOUS; SUBCUTANEOUS at 14:58

## 2023-11-09 RX ADMIN — VANCOMYCIN HYDROCHLORIDE: 500 INJECTION, POWDER, LYOPHILIZED, FOR SOLUTION INTRAVENOUS at 14:30

## 2023-11-09 RX ADMIN — SEVELAMER HYDROCHLORIDE 800 MG: 800 TABLET, FILM COATED ORAL at 08:08

## 2023-11-09 RX ADMIN — PANTOPRAZOLE SODIUM 40 MG: 40 TABLET, DELAYED RELEASE ORAL at 06:01

## 2023-11-09 RX ADMIN — GENTAMICIN SULFATE: 1 CREAM TOPICAL at 10:43

## 2023-11-09 RX ADMIN — ATORVASTATIN CALCIUM 40 MG: 40 TABLET, FILM COATED ORAL at 20:43

## 2023-11-09 RX ADMIN — NYSTATIN 500000 UNITS: 500000 SUSPENSION ORAL at 16:10

## 2023-11-09 RX ADMIN — PREGABALIN 25 MG: 25 CAPSULE ORAL at 08:08

## 2023-11-09 RX ADMIN — ALLOPURINOL 100 MG: 100 TABLET ORAL at 08:08

## 2023-11-09 RX ADMIN — CARVEDILOL 6.25 MG: 12.5 TABLET, FILM COATED ORAL at 08:08

## 2023-11-09 RX ADMIN — WARFARIN SODIUM 2.5 MG: 2.5 TABLET ORAL at 10:42

## 2023-11-09 RX ADMIN — CALCITRIOL 0.5 MCG: 0.5 CAPSULE ORAL at 08:08

## 2023-11-09 RX ADMIN — NYSTATIN 500000 UNITS: 500000 SUSPENSION ORAL at 06:01

## 2023-11-09 RX ADMIN — LEVOTHYROXINE SODIUM 25 MCG: 25 TABLET ORAL at 06:01

## 2023-11-09 RX ADMIN — SEVELAMER HYDROCHLORIDE 800 MG: 800 TABLET, FILM COATED ORAL at 17:29

## 2023-11-09 RX ADMIN — Medication 3 MG: at 20:49

## 2023-11-09 RX ADMIN — POTASSIUM CHLORIDE 20 MEQ: 14.9 INJECTION, SOLUTION INTRAVENOUS at 12:18

## 2023-11-09 RX ADMIN — POTASSIUM CHLORIDE 40 MEQ: 1500 TABLET, EXTENDED RELEASE ORAL at 11:36

## 2023-11-09 RX ADMIN — CARVEDILOL 6.25 MG: 12.5 TABLET, FILM COATED ORAL at 17:29

## 2023-11-09 RX ADMIN — Medication 10 ML: at 08:10

## 2023-11-09 RX ADMIN — HEPARIN SODIUM 1100 UNITS/HR: 10000 INJECTION, SOLUTION INTRAVENOUS at 16:03

## 2023-11-09 RX ADMIN — HYDROMORPHONE HYDROCHLORIDE 0.2 MG: 1 INJECTION, SOLUTION INTRAMUSCULAR; INTRAVENOUS; SUBCUTANEOUS at 20:43

## 2023-11-09 RX ADMIN — PANCRELIPASE 3 CAPSULE: 24000; 76000; 120000 CAPSULE, DELAYED RELEASE PELLETS ORAL at 08:07

## 2023-11-09 RX ADMIN — MIRTAZAPINE 15 MG: 15 TABLET, FILM COATED ORAL at 20:43

## 2023-11-09 ASSESSMENT — COGNITIVE AND FUNCTIONAL STATUS - GENERAL
CLIMB 3 TO 5 STEPS WITH RAILING: A LITTLE
TURNING FROM BACK TO SIDE WHILE IN FLAT BAD: A LITTLE
STANDING UP FROM CHAIR USING ARMS: A LITTLE
TOILETING: A LITTLE
HELP NEEDED FOR BATHING: A LITTLE
DAILY ACTIVITIY SCORE: 22
MOVING TO AND FROM BED TO CHAIR: A LITTLE
WALKING IN HOSPITAL ROOM: A LITTLE
MOBILITY SCORE: 19

## 2023-11-09 ASSESSMENT — PAIN - FUNCTIONAL ASSESSMENT
PAIN_FUNCTIONAL_ASSESSMENT: 0-10

## 2023-11-09 ASSESSMENT — PAIN SCALES - GENERAL
PAINLEVEL_OUTOF10: 0 - NO PAIN
PAINLEVEL_OUTOF10: 5 - MODERATE PAIN
PAINLEVEL_OUTOF10: 10 - WORST POSSIBLE PAIN
PAINLEVEL_OUTOF10: 6

## 2023-11-09 ASSESSMENT — PAIN DESCRIPTION - DESCRIPTORS
DESCRIPTORS: ACHING;CRAMPING;SHARP
DESCRIPTORS: ACHING;BURNING;CRAMPING;SHARP

## 2023-11-09 ASSESSMENT — PAIN DESCRIPTION - LOCATION: LOCATION: ABDOMEN

## 2023-11-09 NOTE — CONSULTS
Inpatient consult to Psychiatry  Consult performed by: Hope Corbin MD  Consult ordered by: Etelvina Lewis MD         HISTORY OF PRESENT ILLNESS:  Yoselyn Hernandez is a 63 y.o. female with unremarkable psychiatric history and a past medical history of ESRD on dialysis, AAA, HFrEF (25% 3/2023), pancreatitis, PE (3/23 on warfarin) who was admitted to [facility] on  for abdominal pain. Psychiatry was consulted on   for depressed mood.    On chart review, Patient presents to the ED for abdominal pian. Patient reports intermittent episodes of diarrhea followed by constipation, shortness of breath, chest pain, and prolonged immobility. CT chest/abd/pelvic was unremarkable for acute change, however, showed chronic changes with interval development incisional hernia.     On interview, Patient describes the up and down of managing her chronic illness. Patient reports thoughts of death, however, denies plan or intent to take her own life. Patient denies past suicide attempt or engaging in self harming behavior. Patient states,  her Sabianist beliefs and spirituality is against suicidality. Patient reports feeling helpless at times due to the burden of diarrhea and worsen general weakness.   Patient reports changes in sleep habits, which started after dialysis was initiated. Patient reports staying up overnight watching TV and thinking of  parents at times, usually sleeps from 6 am to 11 am in the morning or longer than 11 am sometimes. Patient describes positive family relationship with both parents. Patient states, Mirtazapine was  prescribed by nephrologist to stimulate appetite and mood stabilization. Patient reports improvement in since she started taking Mirtazapine but, reports fluctuation of appetites. Patient reports smoking two to three blunts daily to manage pain. Patient reports plan to seek psychotherapy in the past, however, multiple admission to the hospital had hindered her attempt.         PSYCHIATRIC REVIEW OF SYSTEMS  Depression: Patient reports feelings of hopelessness in the context of chronic illness   Anxiety: negative  Vicenta: negative  Psychosis: negative  Delirium: negative   Trauma: negative    PSYCHIATRIC HISTORY  Prior diagnoses: Denies   Prior hospitalizations: Denies   History of suicide attempts: Denies   History of self-harm: Denies   History of trauma/abuse/loss: Denies   History of violence: Denies     Current psychiatrist: Denies   Current mental health agency: Denies   Current : Denies   Current outpatient treatment: Denies   Guardian or payee: Denies     Current psychiatric medications: Mirtazapine 15 mg   Past psychiatric medications: Escitalopram 5 mg, Duloxetine 60 mg   Past psychiatric treatments: Denies     Family psychiatric history: Unknown   SUBSTANCE USE HISTORY   She reports that she quit smoking about 28 years ago. Her smoking use included cigarettes. She has a 5.00 pack-year smoking history. She has never used smokeless tobacco. She reports that she does not currently use alcohol. She reports current drug use. Drug: Marijuana.    Tobacco: Denies   Alcohol: Denies   Cannabis: Smokes 2 to 3 blunts daily to manage pain       SOCIAL HISTORY  Social History     Socioeconomic History    Marital status: Single     Spouse name: None    Number of children: None    Years of education: None    Highest education level: None   Occupational History    None   Tobacco Use    Smoking status: Former     Packs/day: 0.25     Years: 20.00     Additional pack years: 0.00     Total pack years: 5.00     Types: Cigarettes     Quit date:      Years since quittin.    Smokeless tobacco: Never   Vaping Use    Vaping Use: Never used   Substance and Sexual Activity    Alcohol use: Not Currently    Drug use: Yes     Types: Marijuana    Sexual activity: None   Other Topics Concern    None   Social History Narrative    None     Social Determinants of Health     Financial Resource  Strain: Medium Risk (11/6/2023)    Overall Financial Resource Strain (CARDIA)     Difficulty of Paying Living Expenses: Somewhat hard   Food Insecurity: Not on file   Transportation Needs: No Transportation Needs (11/6/2023)    PRAPARE - Transportation     Lack of Transportation (Medical): No     Lack of Transportation (Non-Medical): No   Recent Concern: Transportation Needs - Unmet Transportation Needs (10/19/2023)    PRAPARE - Transportation     Lack of Transportation (Medical): Yes     Lack of Transportation (Non-Medical): No   Physical Activity: Not on file   Stress: Not on file   Social Connections: Not on file   Intimate Partner Violence: Not on file   Housing Stability: Low Risk  (11/6/2023)    Housing Stability Vital Sign     Unable to Pay for Housing in the Last Year: No     Number of Places Lived in the Last Year: 1     Unstable Housing in the Last Year: No      Current living situation: Lives alone   Current employment/source of income: Retired   Current stressors: Management of chronic illness, flare of IBS, and generalized weakness   Education: BS and Master degree   History of learning difficulty: No   Marital status: Never     Children: No children   Social support: Step sister   Legal history: Denies    history: Denies   Access to weapons: Denies     PAST MEDICAL HISTORY  Past Medical History:   Diagnosis Date    Other abnormalities of gait and mobility     Gait, antalgic    Personal history of other endocrine, nutritional and metabolic disease     History of hyperlipidemia    Personal history of other specified conditions     History of shortness of breath        Additional Past Medical History: As stated above       PAST SURGICAL HISTORY  Past Surgical History:   Procedure Laterality Date    COLONOSCOPY  12/05/2017    Complete Colonoscopy    COLONOSCOPY  12/2021    rpt 12-25    CT ABDOMEN PELVIS ANGIOGRAM W AND/OR WO IV CONTRAST  11/17/2014    CT ABDOMEN PELVIS ANGIOGRAM W AND/OR WO  IV CONTRAST 11/17/2014 [facility] CLINICAL LEGACY    CT ABDOMEN PELVIS ANGIOGRAM W AND/OR WO IV CONTRAST  02/01/2018    CT ABDOMEN PELVIS ANGIOGRAM W AND/OR WO IV CONTRAST 2/1/2018 [facility] AIB LEGACY    CT ABDOMEN PELVIS ANGIOGRAM W AND/OR WO IV CONTRAST  02/23/2018    CT ABDOMEN PELVIS ANGIOGRAM W AND/OR WO IV CONTRAST 2/23/2018 [facility] INPATIENT LEGACY    CT ABDOMEN PELVIS ANGIOGRAM W AND/OR WO IV CONTRAST  01/10/2019    CT ABDOMEN PELVIS ANGIOGRAM W AND/OR WO IV CONTRAST 1/10/2019 [facility] EMERGENCY LEGACY    CT ABDOMEN PELVIS ANGIOGRAM W AND/OR WO IV CONTRAST  11/25/2019    CT ABDOMEN PELVIS ANGIOGRAM W AND/OR WO IV CONTRAST 11/25/2019 [facility] ANCILLARY LEGACY    CT ABDOMEN PELVIS ANGIOGRAM W AND/OR WO IV CONTRAST  04/30/2021    CT ABDOMEN PELVIS ANGIOGRAM W AND/OR WO IV CONTRAST 4/30/2021 [facility] CLINICAL LEGACY    CT ABDOMEN PELVIS ANGIOGRAM W AND/OR WO IV CONTRAST  01/09/2017    CT ABDOMEN PELVIS ANGIOGRAM W AND/OR WO IV CONTRAST 1/9/2017 [facility] CLINICAL LEGACY    CT ABDOMEN PELVIS ANGIOGRAM W AND/OR WO IV CONTRAST  10/24/2018    CT ABDOMEN PELVIS ANGIOGRAM W AND/OR WO IV CONTRAST 10/24/2018 [facility] EMERGENCY LEGACY    CT ABDOMEN PELVIS ANGIOGRAM W AND/OR WO IV CONTRAST  12/13/2022    CT ABDOMEN PELVIS ANGIOGRAM W AND/OR WO IV CONTRAST 12/13/2022 [facility] OFFICE LEGACY    CT AORTA AND BILATERAL ILIOFEMORAL RUNOFF ANGIOGRAM W AND/OR WO IV CONTRAST  05/12/2023    CT AORTA AND BILATERAL ILIOFEMORAL RUNOFF ANGIOGRAM W AND/OR WO IV CONTRAST 5/12/2023 [facility] CT    HYSTERECTOMY  01/06/2014    Hysterectomy    IR ANGIOGRAM AORTA ABDOMEN  08/02/2019    IR ANGIOGRAM AORTA ABDOMEN 8/2/2019 [facility] INPATIENT LEGACY    IR ANGIOGRAM AORTA ABDOMEN  08/19/2022    IR ANGIOGRAM AORTA ABDOMEN 8/19/2022 [facility] INPATIENT LEGACY    IR ANGIOGRAM AORTA THORACIC  02/21/2018    IR ANGIOGRAM AORTA THORACIC 2/21/2018 [facility] INPATIENT LEGACY    IR ANGIOGRAM ENDOVASCULAR AORTIC REPAIR  08/19/2022    IR ANGIOGRAM ENDOVASCULAR AORTIC REPAIR 8/19/2022 [facility] INPATIENT LEGACY    IR  "INTERVENTION NEURO STENT  08/02/2019    IR INTERVENTION NEURO STENT 8/2/2019 [facility] [place]PATIENT LEGACY    OTHER SURGICAL HISTORY  01/20/2017    Aortic Aneurysm Repair Thoracoabdominal        Additional Past Surgical History: As stated above     FAMILY HISTORY  Family History   Problem Relation Name Age of Onset    COPD Mother      Kidney failure Father          ALLERGIES  Ace inhibitors, Ciprofloxacin, Gabapentin, and Oxycodone    OARRS REVIEW  OARRS checked:   OARRS comments:     OBJECTIVE    VITALS      11/8/2023    12:03 PM 11/8/2023     3:57 PM 11/8/2023     8:14 PM 11/8/2023    11:52 PM 11/9/2023     4:20 AM 11/9/2023    11:14 AM 11/9/2023     3:15 PM   Vitals   Systolic 133 124 101 102 100 92    Diastolic 77 80 63 65 56 53    Heart Rate 82 82 87 86 79 80    Temp 36.8 °C (98.2 °F) 35.8 °C (96.4 °F) 36.2 °C (97.2 °F) 36.8 °C (98.2 °F) 36.9 °C (98.4 °F) 36.9 °C (98.4 °F)    Resp 18 17 16 19 16     Height (in)       1.702 m (5' 7.01\")   Weight (lb)       125.66   BMI       19.68 kg/m2   BSA (m2)       1.64 m2        MENTAL STATUS EXAM  Appearance:  female, short gray hair, sitting in bed comfortably, adquate hygiene    Attitude: calm, cooperative   Behavior: Engaging, good eye contact   Motor Activity: No abnormal movement observed   Speech: spontaneous, Normal rate, rhythm, and volume   Mood: \"I am feeling better\"  Affect: Mood congruent, appropriate to situation    Thought Process: Organized, logical   Thought Content:  Denies SI/HI, no delusion elicited    Thought Perception: Denies AVH, does not appear to be internally stimulated   Cognition: Alert, oriented   Insight: Fair, as patient understand symptoms of illness and the need for treatment   Judgement: Fair, as patient is able to appreciate the need for recommend treatment        HOME MEDICATIONS  Medication Documentation Review Audit       Reviewed by Zandra Landa RN (Registered Nurse) on 11/06/23 at 2223      Medication Order Taking? Sig " Documenting Provider Last Dose Status   allopurinol (Zyloprim) 100 mg tablet [id number]  Take 1 tablet (100 mg) by mouth once daily. Gregg Kerr MD  Active   atorvastatin (Lipitor) 40 mg tablet [id number]  TAKE 1 TABLET BY MOUTH ONCE DAILY AT BEDTIME Erasmo Saldaña MD  Active   calcitriol (Rocaltrol) 0.5 mcg capsule [id number]  Take 1 capsule (0.5 mcg) by mouth once daily. Historical Provider, MD  Active   carvedilol (Coreg) 6.25 mg tablet [id number]  Take 1 tablet (6.25 mg) by mouth 2 times a day with meals. ALYSA Hanson  Active   Creon 24,000-76,000 -120,000 unit capsule [id number]  Take 3 capsules by mouth 3 times a day with meals. Gregg Kerr MD  Active   epoetin treasure (Epogen,Procrit) 10,000 unit/mL injection [id number]  Inject 1 mL (10,000 Units) under the skin every 14 (fourteen) days. Historical Provider, MD  Active   levothyroxine (Synthroid, Levoxyl) 25 mcg tablet [id number]  Take 1 tablet (25 mcg) by mouth once daily in the morning. Take before meals. Gregg Kerr MD  Active   magnesium oxide (Mag-Ox) 400 mg tablet [id number]  Take 2 tablets (800 mg) by mouth once daily. Historical Provider, MD  Active   mirtazapine (Remeron) 15 mg tablet [id number]  Take 1 tablet (15 mg) by mouth once daily at bedtime. ALYSA Hanson  Active   naloxone (Narcan) 4 mg/0.1 mL nasal spray [id number]  INSTILL 1 SPRAY IN ONE NOSTRIL AS NEEDED FOR ACCIDENTAL OPIOID OVERDOSE; REPEAT WITH SECOND DOSE IN 5 MINUTES IF NO RESPONSE Erasmo Saldaña MD  Active   pregabalin (Lyrica) 25 mg capsule [id number]  Take 1 capsule (25 mg) by mouth once daily. ALYSA Hanson  Active   sevelamer (Renagel) 800 mg tablet [id number]  Take 1 tablet (800 mg) by mouth 3 times a day with meals. Historical Provider, MD  Active   sevelamer carbonate (Renvela) 800 mg tablet [id number]  Take 1 tablet (800 mg) by mouth 3 times a day with meals. Swallow tablet whole; do not crush, break, or chew. Karina Waddell, APRN-CNP   Active   warfarin (Coumadin) 2.5 mg tablet [id number]  TAKE 1 TABLET BY MOUTH ONCE DAILY Erasmo Saldaña MD  Active                     CURRENT MEDICATIONS  Scheduled medications  allopurinol, 100 mg, oral, Daily  atorvastatin, 40 mg, oral, Nightly  calcitriol, 0.5 mcg, oral, Daily  carvedilol, 6.25 mg, oral, BID with meals  dextrose 2.5 % - LOW calcium 2.5 mEq/L 2,000 mL with vancomycin 50 mg peritoneal dialysate, , intraperitoneal, q24h  dextrose 2.5 % - LOW calcium 2.5 mEq/L 5,000 mL with vancomycin 125 mg peritoneal dialysate, , intraperitoneal, q24h  dextrose 2.5 % - LOW calcium 2.5 mEq/L 5,000 mL with vancomycin 125 mg peritoneal dialysate, , intraperitoneal, q24h  docusate sodium, 100 mg, oral, BID  gentamicin, , Topical, Daily  levothyroxine, 25 mcg, oral, Daily before breakfast  lipase-protease-amylase, 3 capsule, oral, TID with meals  melatonin, 3 mg, oral, Daily  mirtazapine, 15 mg, oral, Nightly  nystatin, 500,000 Units, oral, q8h AMARJIT  pantoprazole, 40 mg, oral, BID AC  potassium chloride, 20 mEq, intravenous, q2h  pregabalin, 25 mg, oral, Daily  sevelamer HCl, 800 mg, oral, TID with meals  sodium chloride 0.9%, 10 mL, intravenous, q12h AMARJIT  warfarin, 2.5 mg, oral, Daily        Continuous medications  heparin, 0-4,000 Units/hr, Last Rate: 1,100 Units/hr (11/09/23 1304)        PRN medications  PRN medications: [DISCONTINUED] acetaminophen **OR** acetaminophen **OR** acetaminophen, heparin, HYDROmorphone, loperamide, ondansetron, polyethylene glycol, sodium chloride 0.9%     LABS  Results for orders placed or performed during the hospital encounter of 11/06/23 (from the past 24 hour(s))   Heparin Assay, UFH   Result Value Ref Range    Heparin Unfractionated 0.4 See Comment Below for Therapeutic Ranges IU/mL   Comprehensive metabolic panel   Result Value Ref Range    Glucose 107 (H) 74 - 99 mg/dL    Sodium 136 136 - 145 mmol/L    Potassium 2.9 (LL) 3.5 - 5.3 mmol/L    Chloride 96 (L) 98 - 107 mmol/L     Bicarbonate 22 21 - 32 mmol/L    Anion Gap 21 (H) 10 - 20 mmol/L    Urea Nitrogen 43 (H) 6 - 23 mg/dL    Creatinine 7.37 (H) 0.50 - 1.05 mg/dL    eGFR 6 (L) >60 mL/min/1.73m*2    Calcium 7.6 (L) 8.6 - 10.6 mg/dL    Albumin 2.6 (L) 3.4 - 5.0 g/dL    Alkaline Phosphatase 101 33 - 136 U/L    Total Protein 6.5 6.4 - 8.2 g/dL    AST 14 9 - 39 U/L    Bilirubin, Total 0.3 0.0 - 1.2 mg/dL    ALT 12 7 - 45 U/L   Magnesium   Result Value Ref Range    Magnesium 1.21 (L) 1.60 - 2.40 mg/dL   Phosphorus   Result Value Ref Range    Phosphorus 6.0 (H) 2.5 - 4.9 mg/dL   Heparin Assay, UFH   Result Value Ref Range    Heparin Unfractionated 0.5 See Comment Below for Therapeutic Ranges IU/mL   Protime-INR   Result Value Ref Range    Protime 14.1 (H) 9.8 - 12.8 seconds    INR 1.3 (H) 0.9 - 1.1   Body Fluid Cell Count   Result Value Ref Range    Color, Fluid Colorless Colorless, Straw, Yellow    Clarity, Fluid Clear Clear    WBC, Fluid 21 See Comment /uL    RBC, Fluid 3 0  /uL /uL   Body Fluid Differential   Result Value Ref Range    Neutrophils %, Manual, Fluid 6 <25 % %    Lymphocytes %, Manual, Fluid 66 <75 % %    Mono/Macrophages %, Manual, Fluid 29 <70 % %    Eosinophils %, Manual, Fluid 0 0 % %    Basophils %, Manual, Fluid 0 0 % %    Immature Granulocytes %, Manual, Fluid 0 0 % %    Blasts %, Manual, Fluid 0 0 % %    Unclassified Cells %, Manual, Fluid 0 0 % %    Plasma Cells %, Manual, Fluid 0 0 % %    Total Cells Counted, Fluid 70    Magnesium   Result Value Ref Range    Magnesium 1.23 (L) 1.60 - 2.40 mg/dL        IMAGING  No results found.     PSYCHIATRIC RISK ASSESSMENT  Violence Risk Factors:  none  Acute Risk of Harm to Others is Considered: Low  Suicide Risk Factors: chronic medical illness  Protective Factors: Protestant affiliation/spirituality, moral objections to suicide, and positive family relationships  Acute Risk of Harm to Self is Considered: Low    ASSESSMENT AND PLAN  Yoselyn Hernandez is a 63 y.o. female with  "unremarkable psychiatric history and a past medical history of ESRD on dialysis, AAA, HFrEF (25% 3/2023), pancreatitis, PE (3/23 on warfarin) who was admitted to [facility] on 11/5 for abdominal pain. Psychiatry was consulted on 11/9  for depressed mood.    On initial assessment, Patient was calm and pleasant. Patient reports feeling of hopelessness and passive thought of death, in the context of chronic illness burden. Patient denies active plan or intent to harm self. Patient reports changes in sleeping pattern after the initiation of dialysis. Patient reports some therapeutic benefit with Mirtazapine and plan to engage in psychotherapy. Mental health resource was provided. Patient mood fluctuation is due to the burden of chronic illness. Patient will benefit mostly from outpatient mental health services.    IMPRESSION  R/O Unspecified Depressive Disorder     RECOMMENDATIONS    Safety:  - Patient  does not currently meet criteria for inpatient psychiatric admission.   - To evaluate decision-making capacity, recommend use of the Capacity Evaluation Tool. Search “ IP Capacity Evaluation under SmartText\" unless the patient has a legal guardian, in which case all decisions per the legal guardian.  - Patient does not require a 1:1 sitter from a psychiatric perspective at this time.  -Defer to primary team decision for 1:1 sitter for safety precautions.  - As with all hospitalized patients, would recommend delirium precautions, as below.    Work-up:  None     Medications:  -Continue Mirtazapine 15 mg, PO,  at bedtime     Ancillary Services:  - Recommend , and art therapy     Follow-up:  - Patient was given list of outpatient [mental health/chemical dependency] resources on 11/9/23.    - Discussed recommendations with primary team.  - Psychiatry will continue to follow    Thank you for allowing us to participate in the care of this patient. Please page [phone] with any questions or concerns.    Patient " staffed/discussed with Dr. Pardo, who agrees with above plan.    Medication Consent  Medication Consent: n/a; consult service    Hope Corbin MD

## 2023-11-09 NOTE — PROGRESS NOTES
"Seen and examined.  Has not had a bowel movement since admission.  States abdominal pain is present but her PD fluid was clear. Very upset at being told she might be able to go home. Sates she is SoB    RoS negative for all other systems except as noted above.    Visit Vitals  BP 92/53   Pulse 80   Temp 36.9 °C (98.4 °F)   Resp 16   Ht 1.702 m (5' 7\")   Wt 57 kg (125 lb 10.6 oz)   SpO2 95%   BMI 19.68 kg/m²   Smoking Status Former   BSA 1.64 m²      No distress.  Cachectic  HEENT:  moist, no pallor  2+ edema joel LE  Breath sounds joel equal, clear  S1 S2 regular, normal, no rub or murmur  Abdomen soft, non tender on palpation while patient distracted but otherwise has RUE tenderness  AAO x3, non focal      Intake/Output Summary (Last 24 hours) at 11/9/2023 1316  Last data filed at 11/9/2023 1304  Gross per 24 hour   Intake 1152.84 ml   Output --   Net 1152.84 ml    Total UF ~2.4 L    Current Facility-Administered Medications   Medication Dose Route Frequency Provider Last Rate Last Admin    acetaminophen (Tylenol) oral liquid 650 mg  650 mg oral q4h PRN Chacha Garland MD        Or    acetaminophen (Tylenol) suppository 650 mg  650 mg rectal q4h PRN Chacha Garland MD        allopurinol (Zyloprim) tablet 100 mg  100 mg oral Daily Chacha Garland MD   100 mg at 11/09/23 0808    atorvastatin (Lipitor) tablet 40 mg  40 mg oral Nightly Chacha Garland MD   40 mg at 11/08/23 2117    calcitriol (Rocaltrol) capsule 0.5 mcg  0.5 mcg oral Daily Chacha Garland MD   0.5 mcg at 11/09/23 0808    carvedilol (Coreg) tablet 6.25 mg  6.25 mg oral BID with meals Chacha Garland MD   6.25 mg at 11/09/23 0808    dextrose 2.5 % - LOW calcium 2.5 mEq/L 2,000 mL with vancomycin 50 mg peritoneal dialysate   intraperitoneal q24h Kristi Montoya MD        dextrose 2.5 % - LOW calcium 2.5 mEq/L 5,000 mL with vancomycin 125 mg peritoneal dialysate   intraperitoneal q24h Kristi Montoya MD        dextrose 2.5 % - LOW calcium " 2.5 mEq/L 5,000 mL with vancomycin 125 mg peritoneal dialysate   intraperitoneal q24h Kristi Montoya MD        docusate sodium (Colace) capsule 100 mg  100 mg oral BID Etelvina Lewis MD   100 mg at 11/09/23 1147    gentamicin (Garamycin) 0.1 % cream   Topical Daily Kristi Montoya MD   Given at 11/09/23 1043    heparin (porcine) injection 1,500-3,000 Units  1,500-3,000 Units intravenous q4h PRN Janna BURROWS Mcpherson, DO   1,500 Units at 11/08/23 1058    heparin 25,000 Units in dextrose 5% 250 mL (100 Units/mL) infusion (premix)  0-4,000 Units/hr intravenous Continuous Janna M Mcpherson, DO 11 mL/hr at 11/09/23 1304 1,100 Units/hr at 11/09/23 1304    HYDROmorphone (Dilaudid) injection 0.2 mg  0.2 mg intravenous q4h PRN Chacha Garland MD        levothyroxine (Synthroid, Levoxyl) tablet 25 mcg  25 mcg oral Daily before breakfast Chacha Garland MD   25 mcg at 11/09/23 0601    lipase-protease-amylase (Creon) 24,000-76,000 -120,000 unit per capsule 3 capsule  3 capsule oral TID with meals Chacha Garland MD   3 capsule at 11/09/23 0807    loperamide (Imodium) capsule 2 mg  2 mg oral 4x daily PRN Paulie Mcmillan MD   2 mg at 11/06/23 1644    melatonin tablet 3 mg  3 mg oral Daily Chacha Garland MD   3 mg at 11/08/23 2117    mirtazapine (Remeron) tablet 15 mg  15 mg oral Nightly Chacha Garland MD   15 mg at 11/08/23 2117    nystatin (Mycostatin) 100,000 unit/mL suspension 500,000 Units  500,000 Units oral q8h MAARJIT Kristi Montoya MD   500,000 Units at 11/09/23 0601    ondansetron (Zofran) injection 4 mg  4 mg intravenous q6h PRN Rachna Chen DO   4 mg at 11/07/23 0616    pantoprazole (ProtoNix) EC tablet 40 mg  40 mg oral BID AC Etelvina Lewis MD   40 mg at 11/09/23 0601    polyethylene glycol (Glycolax, Miralax) packet 17 g  17 g oral q12h PRN Etelvina Lewis MD        potassium chloride 20 mEq in 100 mL IV premix  20 mEq intravenous q2h Etelvina Lewis MD 50 mL/hr at  11/09/23 1218 20 mEq at 11/09/23 1218    pregabalin (Lyrica) capsule 25 mg  25 mg oral Daily Chacha Garland MD   25 mg at 11/09/23 0808    sevelamer HCl (Renagel) tablet 800 mg  800 mg oral TID with meals Chacha Garland MD   800 mg at 11/09/23 0808    sodium chloride 0.9% flush 10 mL  10 mL intravenous PRN Etelvina Lewis MD        sodium chloride 0.9% flush 10 mL  10 mL intravenous q12h AMARJIT Etelvina Lewis MD   10 mL at 11/09/23 0810    warfarin (Coumadin) tablet 2.5 mg  2.5 mg oral Daily Etelvina Lewis MD   2.5 mg at 11/09/23 1042      63 Year old with h/o ESRD on PD admitted with abdominal pain and diarrhea  Nephrology following for ESRD    #ESRD:  ct  home prescription-10.5 hours on cycler, fill volume 1.8 L, total volume 11 L, last filled 2 L.  In view of lower extremity edema and complaints of orthopnea, had used 2.5% and 4.25% dextrose last night but now BP lower, so will use all 2.5%. LE edema likely due to severely low albumin      # PD peritonitis: On cell count and diff. Will start with IP Vancomycin and Ceftazidime, ct for 3 weeks, continue Nystatin swish and swallow for 5 days after last dose of Vancomycin     # Anemia:   Hemoglobin   Date Value Ref Range Status   11/08/2023 8.0 (L) 12.0 - 16.0 g/dL Final   continue Aranesp 100 mcg subcu every 2 weeks     # MBD: Ca   Calcium   Date Value Ref Range Status   11/09/2023 7.6 (L) 8.6 - 10.6 mg/dL Final     Phosphorus   Date Value Ref Range Status   11/09/2023 6.0 (H) 2.5 - 4.9 mg/dL Final     Comment:     The performance characteristics of phosphorus testing in heparinized plasma have been validated by the individual  laboratory site where testing is performed. Testing on heparinized plasma is not approved by the FDA; however, such approval is not necessary.     PTH   Date Value Ref Range Status   07/29/2022 690.4 (H) 12.0 - 88.0 pg/mL Final     Vitamin D, 25-Hydroxy   Date Value Ref Range Status   06/30/2021 36 ng/mL Final      Comment:     .  DEFICIENCY:         < 20   NG/ML  INSUFFICIENCY:      20-29  NG/ML  SUFFICIENCY:         NG/ML    THIS ASSAY ACCURATELY QUANTIFIES THE SUM OF  VITAMIN D3, 25-HYDROXY AND VIT D2,25-HYDROXY.     continue Sevelamer carbonate and  daily renal MVI.     #Hypertension: Bp control acceptable, continue current anti-hypertensive medications     #Volume status: continue UF as tolerated    # malnutrition: Please consider nutrition consult and protein supplement like Nepro shake BID     Will continue to follow   Will continue to follow

## 2023-11-09 NOTE — CONSULTS
"Nutrition   Nutrition Assessment    Reason for Assessment: Provider consult order     62 y/o female with a history of ESRD on dialysis, AAA, HFrEF (25% 03/2023) and pancreatitis, PE (03/2023 on warfarin) who presents to the ED with acute on chronic abdominal pain that worsened over the last 4 days.  Patient reports 18 episodes of diarrhea today, as well as worsening 10/10 abdominal pain so she came to the ED for an evaluation.     Patient reports abdominal pain and diarrhea have been an ongoing issue for the last four years but recently have gotten worse.     Nutrition History:   Food and Nutrient History: Met with patient briefly as interview cut short due to Provider entered room to speak with patient. Patient states she's been on CAPD for ~ 3 years. Reports that her appetite \"comes and goes, but it goes more than it comes since being on dialysis\". Pt. states she tries to eat 2 meals a day or 1 big meal and admits to \"not eating properly.\"  Lunch meal on her tray table with 1/4 of grilled cheese consumed. States this is her first meal of the day and is agreeable to drinking a serving of Nephro or Ensure.  Energy intake: Energy Intake: Fair 50-75 %  GI Symptoms: Reports having a bm today.  Vitamin/Herbal Supplement Use: home meds include magnesium-oxide.  Oral Problems: None  Food and Nutrition Program Participation:      Anthropometrics:  Height: 170.2 cm (5' 7.01\")   Weight: 57 kg (125 lb 10.6 oz)   BMI (Calculated): 19.68  IBW/kg (Dietitian Calculated): 61.4 kg  Percent of IBW: 93 %     Weight History:     Wt Readings from Last 10 Encounters:   11/09/23 57 kg (125 lb 10.6 oz)   10/18/23 68 kg (149 lb 14.6 oz)   10/17/23 59.1 kg (130 lb 3.2 oz)   08/31/23 52.2 kg (115 lb)   08/17/23 54.9 kg (121 lb)   05/25/23 62.6 kg (138 lb)   09/19/22 58.5 kg (129 lb 0.1 oz)   08/12/22 59.4 kg (131 lb)   07/13/22 54.5 kg (120 lb 2.4 oz)   06/20/22 49.9 kg (110 lb 0.3 oz)     Weight Change %:   Question validity of 10/18 wt. " Patient reports her weight's at 120lbs. Based on above weight records, 3.8% wt loss x 1 month (not clinically significant). Suspect wide wt fluctuations r/t fluid exchanges with CAPD.     Nutrition Focused Physical Exam Findings:  Deferred as Psych came in to speak with patient.     Nutrition Significant Labs:  Lab Results   Component Value Date    GLUCOSE 107 (H) 11/09/2023    CALCIUM 7.6 (L) 11/09/2023     11/09/2023    K 2.9 (LL) 11/09/2023    CO2 22 11/09/2023    CL 96 (L) 11/09/2023    BUN 43 (H) 11/09/2023    CREATININE 7.37 (H) 11/09/2023     Lab Results   Component Value Date    WBC 4.9 11/08/2023    HGB 8.0 (L) 11/08/2023    HCT 24.9 (L) 11/08/2023    MCV 94 11/08/2023     11/08/2023     Lab Results   Component Value Date    PHOS 6.0 (H) 11/09/2023     Lab Results   Component Value Date    MG 1.23 (L) 11/09/2023     Lab Results   Component Value Date    VITD25 36 06/30/2021     Results from last 7 days   Lab Units 11/09/23  0755 11/08/23  1529 11/07/23  0622 11/06/23  0457   GLUCOSE mg/dL 107* 103* 71* 70*         Nutrition Specific Medications:  Scheduled medications  allopurinol, 100 mg, oral, Daily  atorvastatin, 40 mg, oral, Nightly  calcitriol, 0.5 mcg, oral, Daily  docusate sodium, 100 mg, oral, BID  lipase-protease-amylase, 3 capsule, oral, TID with meals  mirtazapine, 15 mg, oral, Nightly  nystatin, 500,000 Units, oral, q8h AMARJIT  pantoprazole, 40 mg, oral, BID AC  potassium chloride, 20 mEq, intravenous, q2h  sevelamer HCl, 800 mg, oral, TID with meals  warfarin, 2.5 mg, oral, Daily    PRN medications  PRN medications: [DISCONTINUED] acetaminophen **OR** acetaminophen **OR** acetaminophen, heparin, HYDROmorphone, loperamide, ondansetron, polyethylene glycol, sodium chloride 0.9%    I/O:     Intake/Output Summary (Last 24 hours) at 11/9/2023 1527  Last data filed at 11/9/2023 1304  Gross per 24 hour   Intake 733.73 ml   Output --   Net 733.73 ml   Last BM Date: 11/09/23 - large, loose.      Dietary Orders (From admission, onward)       Start     Ordered    11/08/23 1102  Adult diet Regular  Diet effective now        Question:  Diet type  Answer:  Regular    11/08/23 1101    11/06/23 2244  May Participate in Room Service  Once        Question:  .  Answer:  Yes    11/06/23 2244                   Estimated Needs:   Total Energy Estimated Needs (kCal): 1700 kCal  Method for Estimating Needs: 57kg/30kcal;    Daily kcal needs range:   Total Protein Estimated Needs (g): 75 g  Method for Estimating Needs: 57kg/1.2-1.4g.;        Nutrition Diagnosis   Nutrition Diagnosis:  Malnutrition Diagnosis  Patient has Malnutrition Diagnosis:  (Highly likely, but unable to substantiate without the benefit of a nutrition physical exam.)    Nutrition Diagnosis  Patient has Nutrition Diagnosis: Yes  Diagnosis Status (1): New  Nutrition Diagnosis 1: Altered nutrition related to laboratory values  Related to (1): ESRD  As Evidenced by (1): elevated phos level       Nutrition Interventions/Recommendations   Nutrition Interventions and Recommendations:        Nutrition Prescription:  Individualized Nutrition Prescription Provided        Patient would benefit from a Low phosphorus diet, but agree with liberal diet as her PO   intake has been inconsistent.   Nephro ordered by Dietitian     Daily Renal MVI       Nutrition Monitoring and Evaluation   Monitoring/Evaluation:   Food/Nutrient Related History Monitoring   PO intake    Weight    Labs     Time Spent/Follow-up Reminder:   Follow Up  Time Spent (min): 45 minutes  Last Date of Nutrition Visit: 11/09/23  Nutrition Follow-Up Needed?: Dietitian to reassess per policy

## 2023-11-09 NOTE — PROGRESS NOTES
..                                                                                                                                                                                                                                                                                             INTERNAL MEDICINE PROGRESS NOTE     BRIEF NARRATIVE      Yoselyn Hernandez is a 63 y.o. female on day 0 of admission presenting with Abdominal pain.  Patient has a history of ESRD on dialysis, AAA, HFrEF (25% 03/2023) and pancreatitis, PE (03/2023 on warfarin) who presents to the ED with acute on chronic abdominal pain that worsened over the last 4 days.  Patient reports 18 episodes of diarrhea today, as well as worsening 10/10 abdominal pain so she came to the ED for an evaluation. She also reports associated nausea and vomiting. Denies fevers or chills. She tried imodium with no relief of diarrhea. Reports prolonged immobility the past few days. Patient also reports worsening shortness of breath and new onset chest pain over the last few days. She reports PND which she has not had before. Denies any diaphoresis. States she last did her peritoneal dialysis session on Saturday. Also endorses intermittent HA and chronic back pain.  Patient reports abdominal pain and diarrhea have been an ongoing issue for the last four years but recently have gotten worse.      Of note: Patient recently seen in [facility] ED for 1.5 year hx of diarrhea with frequent flare ups as well as chronic SOB i/s/o chronic PE. Patient was found to be subtherapeutic on warfarin during that ED stay was given a one time extra warfarin dose as she had no worsening SOB at the time and instructed to follow up with pcp for dosing adjustments.     SUBJECTIVE     Patient seen and examined today, feels that abdominal pain is better today.  Patient now reports that her last bowel movement was 5 days ago.  Denies abdominal pain or distention    OBJECTIVE      Visit  Vitals  BP 92/53   Pulse 80   Temp 36.9 °C (98.4 °F)   Resp 16        Intake/Output Summary (Last 24 hours) at 11/9/2023 1230  Last data filed at 11/8/2023 1619  Gross per 24 hour   Intake 924.59 ml   Output --   Net 924.59 ml           Physical Exam   Constitutional:       Appearance: She is not diaphoretic.   HENT:      Head: Normocephalic and atraumatic.   Eyes:      Pupils: Pupils are equal, round, and reactive to light.   Cardiovascular:      Rate and Rhythm: Normal rate and regular rhythm.      Heart sounds: Normal heart sounds.   Pulmonary:      Effort: Pulmonary effort is normal.      Breath sounds: Normal breath sounds. No wheezing, rhonchi or rales.   Abdominal:      Palpations: Abdomen is soft.      Tenderness: There is abdominal tenderness. There is no guarding or rebound.   Musculoskeletal:      Cervical back: Neck supple.      Right lower leg: Edema present.      Left lower leg: Edema present.   Skin:     General: Skin is warm and dry.   Neurological:      Mental Status: She is alert.     Current Meds   allopurinol, 100 mg, oral, Daily  atorvastatin, 40 mg, oral, Nightly  calcitriol, 0.5 mcg, oral, Daily  carvedilol, 6.25 mg, oral, BID with meals  dextrose 2.5 % - LOW calcium 2.5 mEq/L 2,000 mL with vancomycin 50 mg peritoneal dialysate, , intraperitoneal, q24h  dextrose 2.5 % - LOW calcium 2.5 mEq/L 5,000 mL with vancomycin 125 mg peritoneal dialysate, , intraperitoneal, q24h  dextrose 2.5 % - LOW calcium 2.5 mEq/L 5,000 mL with vancomycin 125 mg peritoneal dialysate, , intraperitoneal, q24h  docusate sodium, 100 mg, oral, BID  gentamicin, , Topical, Daily  levothyroxine, 25 mcg, oral, Daily before breakfast  lipase-protease-amylase, 3 capsule, oral, TID with meals  melatonin, 3 mg, oral, Daily  mirtazapine, 15 mg, oral, Nightly  nystatin, 500,000 Units, oral, q8h AMARJIT  pantoprazole, 40 mg, oral, BID AC  potassium chloride, 20 mEq, intravenous, q2h  pregabalin, 25 mg, oral, Daily  sevelamer HCl, 800  mg, oral, TID with meals  sodium chloride 0.9%, 10 mL, intravenous, q12h AMARJIT  warfarin, 2.5 mg, oral, Daily       PRN medications: [DISCONTINUED] acetaminophen **OR** acetaminophen **OR** acetaminophen, heparin, HYDROmorphone, loperamide, ondansetron, polyethylene glycol, sodium chloride 0.9%     LABS and IMAGING     WBC   Date Value Ref Range Status   11/08/2023 4.9 4.4 - 11.3 x10*3/uL Final   11/07/2023 4.2 (L) 4.4 - 11.3 x10*3/uL Final     Hemoglobin   Date Value Ref Range Status   11/08/2023 8.0 (L) 12.0 - 16.0 g/dL Final   11/07/2023 8.0 (L) 12.0 - 16.0 g/dL Final     Hematocrit   Date Value Ref Range Status   11/08/2023 24.9 (L) 36.0 - 46.0 % Final   11/07/2023 24.3 (L) 36.0 - 46.0 % Final     Bicarbonate   Date Value Ref Range Status   11/09/2023 22 21 - 32 mmol/L Final   11/08/2023 19 (L) 21 - 32 mmol/L Final   11/07/2023 24 21 - 32 mmol/L Final     Creatinine   Date Value Ref Range Status   11/09/2023 7.37 (H) 0.50 - 1.05 mg/dL Final   11/08/2023 8.03 (H) 0.50 - 1.05 mg/dL Final   11/07/2023 8.87 (H) 0.50 - 1.05 mg/dL Final     Calcium   Date Value Ref Range Status   11/09/2023 7.6 (L) 8.6 - 10.6 mg/dL Final   11/08/2023 7.5 (L) 8.6 - 10.6 mg/dL Final   11/07/2023 7.7 (L) 8.6 - 10.6 mg/dL Final     INR   Date Value Ref Range Status   11/09/2023 1.3 (H) 0.9 - 1.1 Final          ASSESSMENT / PLANS      #Acute on Chronic Abdominal Pain  #Acute on Chronic Diarrhea  #Abdominal hernia  Patient presented with episodes of intermittent diarrhea diarrhea last 4 to 5 days followed by constipation of the same duration.  Chronic condition, seem more like IBS with combine both diarrhea and constipation form.  Patient last colonoscopy and EGD 2021 and 2022 respectively  -CTA c/a/p negative for any acute changes, showed chronic changes with interval development  Incisional hernia  -CRP elevated  -Tissue transglutaminase IgA negative  -Continue PPI trial with pantoprazole 40 mg twice daily  - follow up CTA  chest/abd/pelvis  - pain regimen: oxycodone 5mg for moderate pain, 10mg for severe pain  -ACS consulted, no acute intervention recommended.  Will place outpatient general surgery follow-up upon discharge for abdominal hernia, and other unchanged intra-abdominal abnormalities     #Hypokalemia  #Hypomagnesemia  -Replace per protocol  -Daily labs    #Acute on chronic SOB  #HFrEF (25% 03/2023)  #Hx of PE  #CP  Worsening SOB/CP could likely be due to delayed dialysis session given symptom onset correlation  vs decompensated HFrEF given reported PND and LE edema. Also ACS on differential given risk factors but low concern given minimally elevated troponin  :; Troponin 40 -> 41, BNP> 5000  - c/w heparin gtt, then transition to warfarin to achieve therapeutic inr pending CTA read  - follow up CTA chest/abd/pelvis   - check BNP, iron studies  - formal echo   - needs GDMT optimized : consider staring losartan, spironolactone, and jardiance if can tolerate  - c/w home coreg      #AAA  #Stable right innominate, cannulated coronary dissection  -BP < 120/80, HR < 80  -c/w home coreg      #ESRD  -c/w sevalamer 600 tid  -hold nightly PD until CTA comes back  -monitor Bicarb given significant diarrhea  -Nephrology starting intra-abdominal antibiotic     #Hypothryoidism  -c/w levothyroxine 25mcg  -TSH elevated 5.28, free T4 within normal limits at 1.01.  Most likely subclinical      Etelvina Lewis MD

## 2023-11-09 NOTE — CARE PLAN
Problem: Fall/Injury  Goal: Not fall by end of shift  Outcome: Progressing     Problem: Fall/Injury  Goal: Be free from injury by end of the shift  Outcome: Progressing     Problem: Fall/Injury  Goal: Verbalize understanding of personal risk factors for fall in the hospital  Outcome: Progressing   The patient's goals for the shift include To remain pain free    The clinical goals for the shift include Patient will remain therapeutic on heparin during shift    Will continue to monitor and assist.

## 2023-11-09 NOTE — CARE PLAN
The patient's goals for the shift include To remain pain free    The clinical goals for the shift include Patient will remain therapeutic on heparin during shift    Problem: Fall/Injury  Goal: Not fall by end of shift  Outcome: Progressing  Goal: Be free from injury by end of the shift  Outcome: Progressing  Goal: Verbalize understanding of personal risk factors for fall in the hospital  Outcome: Progressing  Goal: Verbalize understanding of risk factor reduction measures to prevent injury from fall in the home  Outcome: Progressing  Goal: Use assistive devices by end of the shift  Outcome: Progressing  Goal: Pace activities to prevent fatigue by end of the shift  Outcome: Progressing     Problem: Pain  Goal: Takes deep breaths with improved pain control throughout the shift  Outcome: Progressing  Goal: Turns in bed with improved pain control throughout the shift  Outcome: Progressing  Goal: Walks with improved pain control throughout the shift  Outcome: Progressing  Goal: Performs ADL's with improved pain control throughout shift  Outcome: Progressing  Goal: Participates in PT with improved pain control throughout the shift  Outcome: Progressing  Goal: Free from opioid side effects throughout the shift  Outcome: Progressing  Goal: Free from acute confusion related to pain meds throughout the shift  Outcome: Progressing     Problem: Pain - Adult  Goal: Verbalizes/displays adequate comfort level or baseline comfort level  Outcome: Progressing     Problem: Safety - Adult  Goal: Free from fall injury  Outcome: Progressing     Problem: Discharge Planning  Goal: Discharge to home or other facility with appropriate resources  Outcome: Progressing     Problem: Chronic Conditions and Co-morbidities  Goal: Patient's chronic conditions and co-morbidity symptoms are monitored and maintained or improved  Outcome: Progressing

## 2023-11-10 LAB
ALBUMIN SERPL BCP-MCNC: 2.7 G/DL (ref 3.4–5)
ALP SERPL-CCNC: 106 U/L (ref 33–136)
ALT SERPL W P-5'-P-CCNC: 12 U/L (ref 7–45)
ANION GAP SERPL CALC-SCNC: 20 MMOL/L (ref 10–20)
AST SERPL W P-5'-P-CCNC: 11 U/L (ref 9–39)
BILIRUB SERPL-MCNC: 0.3 MG/DL (ref 0–1.2)
BUN SERPL-MCNC: 39 MG/DL (ref 6–23)
CALCIUM SERPL-MCNC: 8 MG/DL (ref 8.6–10.6)
CHLORIDE SERPL-SCNC: 99 MMOL/L (ref 98–107)
CO2 SERPL-SCNC: 23 MMOL/L (ref 21–32)
CREAT SERPL-MCNC: 7.7 MG/DL (ref 0.5–1.05)
ERYTHROCYTE [DISTWIDTH] IN BLOOD BY AUTOMATED COUNT: 17.8 % (ref 11.5–14.5)
GFR SERPL CREATININE-BSD FRML MDRD: 6 ML/MIN/1.73M*2
GLUCOSE SERPL-MCNC: 90 MG/DL (ref 74–99)
HCT VFR BLD AUTO: 27 % (ref 36–46)
HGB BLD-MCNC: 8.7 G/DL (ref 12–16)
MAGNESIUM SERPL-MCNC: 1.32 MG/DL (ref 1.6–2.4)
MCH RBC QN AUTO: 30.1 PG (ref 26–34)
MCHC RBC AUTO-ENTMCNC: 32.2 G/DL (ref 32–36)
MCV RBC AUTO: 93 FL (ref 80–100)
NRBC BLD-RTO: 0 /100 WBCS (ref 0–0)
PHOSPHATE SERPL-MCNC: 5.4 MG/DL (ref 2.5–4.9)
PLATELET # BLD AUTO: 251 X10*3/UL (ref 150–450)
POTASSIUM SERPL-SCNC: 3.7 MMOL/L (ref 3.5–5.3)
PROT SERPL-MCNC: 6 G/DL (ref 6.4–8.2)
RBC # BLD AUTO: 2.89 X10*6/UL (ref 4–5.2)
SODIUM SERPL-SCNC: 138 MMOL/L (ref 136–145)
UFH PPP CHRO-ACNC: 0.5 IU/ML
WBC # BLD AUTO: 6.4 X10*3/UL (ref 4.4–11.3)

## 2023-11-10 PROCEDURE — 80053 COMPREHEN METABOLIC PANEL: CPT | Performed by: STUDENT IN AN ORGANIZED HEALTH CARE EDUCATION/TRAINING PROGRAM

## 2023-11-10 PROCEDURE — 2500000004 HC RX 250 GENERAL PHARMACY W/ HCPCS (ALT 636 FOR OP/ED)

## 2023-11-10 PROCEDURE — 85027 COMPLETE CBC AUTOMATED: CPT | Performed by: STUDENT IN AN ORGANIZED HEALTH CARE EDUCATION/TRAINING PROGRAM

## 2023-11-10 PROCEDURE — 2500000001 HC RX 250 WO HCPCS SELF ADMINISTERED DRUGS (ALT 637 FOR MEDICARE OP): Performed by: STUDENT IN AN ORGANIZED HEALTH CARE EDUCATION/TRAINING PROGRAM

## 2023-11-10 PROCEDURE — 2500000004 HC RX 250 GENERAL PHARMACY W/ HCPCS (ALT 636 FOR OP/ED): Performed by: INTERNAL MEDICINE

## 2023-11-10 PROCEDURE — 84100 ASSAY OF PHOSPHORUS: CPT | Performed by: STUDENT IN AN ORGANIZED HEALTH CARE EDUCATION/TRAINING PROGRAM

## 2023-11-10 PROCEDURE — 85520 HEPARIN ASSAY: CPT | Performed by: STUDENT IN AN ORGANIZED HEALTH CARE EDUCATION/TRAINING PROGRAM

## 2023-11-10 PROCEDURE — 2500000004 HC RX 250 GENERAL PHARMACY W/ HCPCS (ALT 636 FOR OP/ED): Performed by: STUDENT IN AN ORGANIZED HEALTH CARE EDUCATION/TRAINING PROGRAM

## 2023-11-10 PROCEDURE — G0378 HOSPITAL OBSERVATION PER HR: HCPCS

## 2023-11-10 PROCEDURE — 96376 TX/PRO/DX INJ SAME DRUG ADON: CPT | Mod: 59 | Performed by: STUDENT IN AN ORGANIZED HEALTH CARE EDUCATION/TRAINING PROGRAM

## 2023-11-10 PROCEDURE — 36415 COLL VENOUS BLD VENIPUNCTURE: CPT | Performed by: STUDENT IN AN ORGANIZED HEALTH CARE EDUCATION/TRAINING PROGRAM

## 2023-11-10 PROCEDURE — 83735 ASSAY OF MAGNESIUM: CPT | Performed by: STUDENT IN AN ORGANIZED HEALTH CARE EDUCATION/TRAINING PROGRAM

## 2023-11-10 PROCEDURE — 2500000001 HC RX 250 WO HCPCS SELF ADMINISTERED DRUGS (ALT 637 FOR MEDICARE OP): Performed by: INTERNAL MEDICINE

## 2023-11-10 PROCEDURE — 99232 SBSQ HOSP IP/OBS MODERATE 35: CPT | Performed by: STUDENT IN AN ORGANIZED HEALTH CARE EDUCATION/TRAINING PROGRAM

## 2023-11-10 PROCEDURE — 90947 DIALYSIS REPEATED EVAL: CPT

## 2023-11-10 RX ORDER — HYDROMORPHONE HYDROCHLORIDE 2 MG/1
1 TABLET ORAL EVERY 4 HOURS PRN
Status: DISCONTINUED | OUTPATIENT
Start: 2023-11-10 | End: 2023-11-13

## 2023-11-10 RX ORDER — HYDROMORPHONE HYDROCHLORIDE 2 MG/1
1 TABLET ORAL EVERY 4 HOURS PRN
Status: DISCONTINUED | OUTPATIENT
Start: 2023-11-10 | End: 2023-11-10

## 2023-11-10 RX ORDER — HYDROMORPHONE HYDROCHLORIDE 2 MG/1
2 TABLET ORAL EVERY 4 HOURS PRN
Status: DISCONTINUED | OUTPATIENT
Start: 2023-11-10 | End: 2023-11-10

## 2023-11-10 RX ORDER — HYDROXYZINE HYDROCHLORIDE 25 MG/1
25 TABLET, FILM COATED ORAL 3 TIMES DAILY PRN
Status: DISCONTINUED | OUTPATIENT
Start: 2023-11-10 | End: 2023-11-15 | Stop reason: HOSPADM

## 2023-11-10 RX ORDER — OXYCODONE HYDROCHLORIDE 5 MG/1
2.5 TABLET ORAL EVERY 6 HOURS PRN
Status: DISCONTINUED | OUTPATIENT
Start: 2023-11-10 | End: 2023-11-12

## 2023-11-10 RX ORDER — ACETAMINOPHEN 325 MG/1
975 TABLET ORAL EVERY 8 HOURS
Status: DISCONTINUED | OUTPATIENT
Start: 2023-11-10 | End: 2023-11-15 | Stop reason: HOSPADM

## 2023-11-10 RX ADMIN — PANTOPRAZOLE SODIUM 40 MG: 40 TABLET, DELAYED RELEASE ORAL at 16:06

## 2023-11-10 RX ADMIN — NYSTATIN 500000 UNITS: 500000 SUSPENSION ORAL at 21:49

## 2023-11-10 RX ADMIN — MIRTAZAPINE 15 MG: 15 TABLET, FILM COATED ORAL at 21:47

## 2023-11-10 RX ADMIN — PANCRELIPASE 3 CAPSULE: 24000; 76000; 120000 CAPSULE, DELAYED RELEASE PELLETS ORAL at 08:10

## 2023-11-10 RX ADMIN — Medication 10 ML: at 21:50

## 2023-11-10 RX ADMIN — VANCOMYCIN HYDROCHLORIDE: 500 INJECTION, POWDER, LYOPHILIZED, FOR SOLUTION INTRAVENOUS at 16:53

## 2023-11-10 RX ADMIN — GENTAMICIN SULFATE: 1 CREAM TOPICAL at 16:54

## 2023-11-10 RX ADMIN — HYDROMORPHONE HYDROCHLORIDE 0.2 MG: 1 INJECTION, SOLUTION INTRAMUSCULAR; INTRAVENOUS; SUBCUTANEOUS at 06:16

## 2023-11-10 RX ADMIN — CARVEDILOL 6.25 MG: 12.5 TABLET, FILM COATED ORAL at 08:10

## 2023-11-10 RX ADMIN — Medication 10 ML: at 08:09

## 2023-11-10 RX ADMIN — ACETAMINOPHEN 975 MG: 325 TABLET ORAL at 21:48

## 2023-11-10 RX ADMIN — HYDROMORPHONE HYDROCHLORIDE 2 MG: 2 TABLET ORAL at 17:47

## 2023-11-10 RX ADMIN — HYDROMORPHONE HYDROCHLORIDE 0.2 MG: 1 INJECTION, SOLUTION INTRAMUSCULAR; INTRAVENOUS; SUBCUTANEOUS at 02:12

## 2023-11-10 RX ADMIN — NYSTATIN 500000 UNITS: 500000 SUSPENSION ORAL at 06:11

## 2023-11-10 RX ADMIN — NYSTATIN 500000 UNITS: 500000 SUSPENSION ORAL at 14:38

## 2023-11-10 RX ADMIN — ALLOPURINOL 100 MG: 100 TABLET ORAL at 08:10

## 2023-11-10 RX ADMIN — DOCUSATE SODIUM 100 MG: 100 CAPSULE, LIQUID FILLED ORAL at 08:10

## 2023-11-10 RX ADMIN — HEPARIN SODIUM 1100 UNITS/HR: 10000 INJECTION, SOLUTION INTRAVENOUS at 14:51

## 2023-11-10 RX ADMIN — PREGABALIN 25 MG: 25 CAPSULE ORAL at 08:10

## 2023-11-10 RX ADMIN — PANTOPRAZOLE SODIUM 40 MG: 40 TABLET, DELAYED RELEASE ORAL at 06:11

## 2023-11-10 RX ADMIN — GENTAMICIN SULFATE: 1 CREAM TOPICAL at 08:22

## 2023-11-10 RX ADMIN — DOCUSATE SODIUM 100 MG: 100 CAPSULE, LIQUID FILLED ORAL at 21:48

## 2023-11-10 RX ADMIN — Medication 3 MG: at 17:50

## 2023-11-10 RX ADMIN — ACETAMINOPHEN 650 MG: 650 SOLUTION ORAL at 10:18

## 2023-11-10 RX ADMIN — VANCOMYCIN HYDROCHLORIDE: 500 INJECTION, POWDER, LYOPHILIZED, FOR SOLUTION INTRAVENOUS at 16:54

## 2023-11-10 RX ADMIN — ACETAMINOPHEN 650 MG: 650 SOLUTION ORAL at 17:47

## 2023-11-10 RX ADMIN — WARFARIN SODIUM 2.5 MG: 2.5 TABLET ORAL at 17:42

## 2023-11-10 RX ADMIN — OXYCODONE HYDROCHLORIDE 2.5 MG: 5 TABLET ORAL at 21:46

## 2023-11-10 RX ADMIN — LEVOTHYROXINE SODIUM 25 MCG: 25 TABLET ORAL at 06:11

## 2023-11-10 RX ADMIN — POLYETHYLENE GLYCOL 3350 17 G: 17 POWDER, FOR SOLUTION ORAL at 08:10

## 2023-11-10 RX ADMIN — CARVEDILOL 6.25 MG: 12.5 TABLET, FILM COATED ORAL at 16:06

## 2023-11-10 RX ADMIN — HYDROMORPHONE HYDROCHLORIDE 2 MG: 2 TABLET ORAL at 10:19

## 2023-11-10 RX ADMIN — ATORVASTATIN CALCIUM 40 MG: 40 TABLET, FILM COATED ORAL at 21:48

## 2023-11-10 RX ADMIN — SEVELAMER HYDROCHLORIDE 800 MG: 800 TABLET, FILM COATED ORAL at 08:10

## 2023-11-10 RX ADMIN — CALCITRIOL 0.5 MCG: 0.5 CAPSULE ORAL at 08:10

## 2023-11-10 ASSESSMENT — PAIN DESCRIPTION - LOCATION
LOCATION: OTHER (COMMENT)
LOCATION: ABDOMEN
LOCATION: LEG
LOCATION: ABDOMEN
LOCATION: LEG
LOCATION: LEG

## 2023-11-10 ASSESSMENT — COGNITIVE AND FUNCTIONAL STATUS - GENERAL
STANDING UP FROM CHAIR USING ARMS: A LITTLE
TOILETING: A LITTLE
WALKING IN HOSPITAL ROOM: A LITTLE
MOBILITY SCORE: 20
DAILY ACTIVITIY SCORE: 22
HELP NEEDED FOR BATHING: A LITTLE
CLIMB 3 TO 5 STEPS WITH RAILING: A LITTLE
MOVING TO AND FROM BED TO CHAIR: A LITTLE

## 2023-11-10 ASSESSMENT — PAIN - FUNCTIONAL ASSESSMENT
PAIN_FUNCTIONAL_ASSESSMENT: 0-10

## 2023-11-10 ASSESSMENT — PAIN SCALES - GENERAL
PAINLEVEL_OUTOF10: 8
PAINLEVEL_OUTOF10: 9
PAINLEVEL_OUTOF10: 3
PAINLEVEL_OUTOF10: 7
PAINLEVEL_OUTOF10: 8
PAINLEVEL_OUTOF10: 0 - NO PAIN
PAINLEVEL_OUTOF10: 3
PAINLEVEL_OUTOF10: 0 - NO PAIN
PAINLEVEL_OUTOF10: 8
PAINLEVEL_OUTOF10: 5 - MODERATE PAIN

## 2023-11-10 ASSESSMENT — PAIN SCALES - WONG BAKER
WONGBAKER_NUMERICALRESPONSE: HURTS WHOLE LOT
WONGBAKER_NUMERICALRESPONSE: HURTS EVEN MORE
WONGBAKER_NUMERICALRESPONSE: NO HURT

## 2023-11-10 ASSESSMENT — PAIN DESCRIPTION - DESCRIPTORS
DESCRIPTORS: ACHING

## 2023-11-10 NOTE — PROGRESS NOTES
"Spiritual Care Visit    Clinical Encounter Type  Visited With: Patient  Routine Visit: Introduction  Continue Visiting: Yes  Referral From: Physician  Referral To:     Presybeterian Encounters  Presybeterian Needs: Prayer    Values/Beliefs  Cultural Requests During Hospitalization: none noted  Spiritual Requests During Hospitalization: listening ear, companionship, assistance with grief, prayer         Patient Spiritual Care Encounters  Suffering Severity: Moderate  Fear Level: Mild  Feelings of Loneliness: Moderate  Feelings of Hopelessness: Fair  Coping: Sometimes demonstrated              PC-7 Assessment (Level of Unmet Needs)  Existential Struggle: Some  Spiritual/Presybeterian Struggle: Some  Legacy: Some  Relationships: Some  Fear of Death/Dying: Further assess  Values/Medical Decision Making: Further assess  Ritual/Other: None  PC-7 Score: 4    SDAT (Spiritual Distress Assessment Tool)  Need for Life Balance: Some evidence of unmet spiritual need  Need for Connection: Some evidence of unmet spiritual need  Need for Values Acknowledgement: Some evidence of unmet spiritual need  Need to Maintain Control: Substancial evidence of unmet spiritual need  Need to Maintain Identity: Substancial evidence of unmet spiritual need  SDAT Score: 7  SDAT Average Score: 1.4    Taxonomy  Intended Effects: Build relationship of care and support, Demonstrate caring and concern, Lessen someone's feelings of isolation, Journeying with someone in the grief process  Methods: Encourage self care, Encourage story-telling, Explore presence of God, Exploring hope, Offer support  Interventions: Active listening, Assist with identifying strengths, Discuss concerns, Discuss coping mechanisms with someone, [place], Prayer for healing      Initial spiritual care visit with patient . Pt is grieving the death of both her mother and father. She is an only child and patient shared, through tears, \"I am alone\".  sat with patient and listened as " "she engaged in life review and story telling. Pt has been grieving the death of her parents, and she has \"not had a chance to really grieve due to my illness\" she said. Pt expressed some frustration with her hospital stay. She shared that she \"does not feel the doctor's have a plan going forward\". Pt said she cannot remember \"all the questions I want to ask them\".  provided a pen and a piece of paper and assisted patient in writing down all her questions for the doctor and team. Patient found this to be \"so wonderful\".  provided support and comfort as well as prayer.  also provided a mediation to decrease her anxiety. Patient said, \"you have really helped me today\". Pt did share that she has \"good friends\" that support her.  was happy to hear this.  gave patient her card and provided prayer for patient .  will continue to follow. Pt seemed more hopeful at end of visit.    "

## 2023-11-10 NOTE — CARE PLAN
Problem: Fall/Injury  Goal: Not fall by end of shift  11/10/2023 0904 by Ly Lucas RN  Outcome: Progressing  11/10/2023 0904 by Ly Lucas RN  Outcome: Progressing  Goal: Be free from injury by end of the shift  11/10/2023 0904 by Ly Lucas RN  Outcome: Progressing  11/10/2023 0904 by yL Lucas RN  Outcome: Progressing  Goal: Verbalize understanding of personal risk factors for fall in the hospital  11/10/2023 0904 by Ly Lucas RN  Outcome: Progressing  11/10/2023 0904 by Ly Lucas RN  Outcome: Progressing  Goal: Verbalize understanding of risk factor reduction measures to prevent injury from fall in the home  11/10/2023 0904 by Ly Lucas RN  Outcome: Progressing  11/10/2023 0904 by Ly Lucas RN  Outcome: Progressing  Goal: Use assistive devices by end of the shift  11/10/2023 0904 by Ly Lucas RN  Outcome: Progressing  11/10/2023 0904 by Ly Lucas RN  Outcome: Progressing  Goal: Pace activities to prevent fatigue by end of the shift  11/10/2023 0904 by Ly Lucas RN  Outcome: Progressing  11/10/2023 0904 by Ly Lucas RN  Outcome: Progressing   The patient's goals for the shift include To remain pain free    The clinical goals for the shift include Patient will remain therapeutic on heparin during shift    Over the shift, the patient did not make progress toward the following goals. Barriers to progression include pain. Recommendations to address these barriers include pain management.

## 2023-11-10 NOTE — CARE PLAN
The patient's goals for the shift include To remain pain free    The clinical goals for the shift include Patient will remain therapeutic on heparin during shift    Over the shift, the patient did not make progress toward the following goals. Barriers to progression include pain control. Recommendations to address these barriers include plan for pain management.

## 2023-11-10 NOTE — CARE PLAN
Problem: Fall/Injury  Goal: Not fall by end of shift  Outcome: Progressing     Problem: Fall/Injury  Goal: Verbalize understanding of personal risk factors for fall in the hospital  Outcome: Progressing     Problem: Pain  Goal: Takes deep breaths with improved pain control throughout the shift  Outcome: Progressing   The patient's goals for the shift include To remain pain free    The clinical goals for the shift include Patient will remain therapeutic on heparin during shift    Will continue to monitor and assess.

## 2023-11-10 NOTE — PROGRESS NOTES
"SUBJECTIVE  Patient seen at bedside. Patient reports feeling better overall. Patient reports sleeping well overnight. Patient express the desire to connect with a therapist for support.      OBJECTIVE    VITALS      11/9/2023     3:15 PM 11/9/2023     4:02 PM 11/9/2023     8:40 PM 11/10/2023    12:35 AM 11/10/2023     4:27 AM 11/10/2023     8:15 AM 11/10/2023    12:21 PM   Vitals   Systolic  100 108 105 122 126 109   Diastolic  63 64 65 68 79 64   Heart Rate  75 89 98 92 89 77   Temp  36.9 °C (98.4 °F) 36.8 °C (98.2 °F) 36.9 °C (98.4 °F) 36.5 °C (97.7 °F) 36.6 °C (97.9 °F) 36.8 °C (98.2 °F)   Resp   16 18 18 18 18   Height (in) 1.702 m (5' 7.01\")         Weight (lb) 125.66         BMI 19.68 kg/m2         BSA (m2) 1.64 m2              MENTAL STATUS EXAM  Appearance:  female, short gray hair, laying in bed comfortably, adquate hygiene    Attitude: calm, cooperative   Behavior: Engaging, good eye contact   Motor Activity: No abnormal movement observed   Speech: spontaneous, Normal rate, rhythm, and volume   Mood: \"am good\"  Affect: Mood congruent, appropriate to situation    Thought Process: Organized, logical   Thought Content:  Denies SI/HI, no delusion elicited    Thought Perception: Denies AVH, does not appear to be internally stimulated   Cognition: Alert, oriented   Insight: Fair, as patient understand symptoms of illness and the need for treatment   Judgement: Fair, as patient is able to appreciate the need for recommend treatment    CURRENT MEDICATIONS  Scheduled medications  allopurinol, 100 mg, oral, Daily  atorvastatin, 40 mg, oral, Nightly  calcitriol, 0.5 mcg, oral, Daily  carvedilol, 6.25 mg, oral, BID with meals  dextrose 2.5 % - LOW calcium 2.5 mEq/L 2,000 mL with vancomycin 50 mg peritoneal dialysate, , intraperitoneal, q24h  dextrose 2.5 % - LOW calcium 2.5 mEq/L 5,000 mL with vancomycin 125 mg peritoneal dialysate, , intraperitoneal, q24h  dextrose 2.5 % - LOW calcium 2.5 mEq/L 5,000 " mL with vancomycin 125 mg peritoneal dialysate, , intraperitoneal, q24h  docusate sodium, 100 mg, oral, BID  gentamicin, , Topical, Daily  levothyroxine, 25 mcg, oral, Daily before breakfast  lipase-protease-amylase, 3 capsule, oral, TID with meals  melatonin, 3 mg, oral, Daily  mirtazapine, 15 mg, oral, Nightly  nystatin, 500,000 Units, oral, q8h AMARJIT  pantoprazole, 40 mg, oral, BID AC  pregabalin, 25 mg, oral, Daily  sevelamer HCl, 800 mg, oral, TID with meals  sodium chloride 0.9%, 10 mL, intravenous, q12h AMARJIT  warfarin, 2.5 mg, oral, Daily        Continuous medications  heparin, 0-4,000 Units/hr, Last Rate: 1,100 Units/hr (11/10/23 1451)        PRN medications  PRN medications: [DISCONTINUED] acetaminophen **OR** acetaminophen **OR** acetaminophen, heparin, HYDROmorphone, loperamide, ondansetron, polyethylene glycol, sodium chloride 0.9%     LABS  Results for orders placed or performed during the hospital encounter of 11/06/23 (from the past 24 hour(s))   CBC   Result Value Ref Range    WBC 6.4 4.4 - 11.3 x10*3/uL    nRBC 0.0 0.0 - 0.0 /100 WBCs    RBC 2.89 (L) 4.00 - 5.20 x10*6/uL    Hemoglobin 8.7 (L) 12.0 - 16.0 g/dL    Hematocrit 27.0 (L) 36.0 - 46.0 %    MCV 93 80 - 100 fL    MCH 30.1 26.0 - 34.0 pg    MCHC 32.2 32.0 - 36.0 g/dL    RDW 17.8 (H) 11.5 - 14.5 %    Platelets 251 150 - 450 x10*3/uL   Comprehensive metabolic panel   Result Value Ref Range    Glucose 90 74 - 99 mg/dL    Sodium 138 136 - 145 mmol/L    Potassium 3.7 3.5 - 5.3 mmol/L    Chloride 99 98 - 107 mmol/L    Bicarbonate 23 21 - 32 mmol/L    Anion Gap 20 10 - 20 mmol/L    Urea Nitrogen 39 (H) 6 - 23 mg/dL    Creatinine 7.70 (H) 0.50 - 1.05 mg/dL    eGFR 6 (L) >60 mL/min/1.73m*2    Calcium 8.0 (L) 8.6 - 10.6 mg/dL    Albumin 2.7 (L) 3.4 - 5.0 g/dL    Alkaline Phosphatase 106 33 - 136 U/L    Total Protein 6.0 (L) 6.4 - 8.2 g/dL    AST 11 9 - 39 U/L    Bilirubin, Total 0.3 0.0 - 1.2 mg/dL    ALT 12 7 - 45 U/L   Heparin Assay, UFH   Result Value  "Ref Range    Heparin Unfractionated 0.5 See Comment Below for Therapeutic Ranges IU/mL   Magnesium   Result Value Ref Range    Magnesium 1.32 (L) 1.60 - 2.40 mg/dL   Phosphorus   Result Value Ref Range    Phosphorus 5.4 (H) 2.5 - 4.9 mg/dL        IMAGING  No results found.     PSYCHIATRIC RISK ASSESSMENT  Acute Risk of Harm to Others is Considered: Low  Acute Risk of Harm to Self is Considered: Low    ASSESSMENT AND PLAN  Yoselyn Hernandez is a 63 y.o. female with unremarkable psychiatric history and a past medical history of ESRD on dialysis, AAA, HFrEF (25% 3/2023), pancreatitis, PE (3/23 on warfarin) who was admitted to [facility] on 11/5 for abdominal pain. Psychiatry was consulted on 11/9  for depressed mood.     On initial assessment, Patient was calm and pleasant. Patient reports feeling of hopelessness and passive thought of death, in the context of chronic illness burden. Patient denies active plan or intent to harm self. Patient reports changes in sleeping pattern after the initiation of dialysis. Patient reports some therapeutic benefit with Mirtazapine and plan to engage in psychotherapy. Mental health resource was provided. Patient mood fluctuation is due to the burden of chronic illness. Patient will benefit mostly from outpatient mental health services.     11/10/23: Patient reports feeling better overall. Patient is future oriented with plan to engage in mental health services. Psychiatry will sign off, Please page [phone] with any questions or concerns. Continue with Mirtazapine 15 mg, patient will also benefit from  services and art therapy while admitted.    IMPRESSION  R/O Unspecified Depressive Disorder      RECOMMENDATIONS     Safety:  - Patient  does not currently meet criteria for inpatient psychiatric admission.   - To evaluate decision-making capacity, recommend use of the Capacity Evaluation Tool. Search “ IP Capacity Evaluation under SmartText\" unless the patient has a legal guardian, in " which case all decisions per the legal guardian.  - Patient does not require a 1:1 sitter from a psychiatric perspective at this time.  -Defer to primary team decision for 1:1 sitter for safety precautions.  - As with all hospitalized patients, would recommend delirium precautions, as below.     Work-up:  None      Medications:  -Continue Mirtazapine 15 mg, PO,  at bedtime      Ancillary Services:  - Recommend , and art therapy      Follow-up:  - Patient was given list of outpatient [mental health/chemical dependency] resources on 11/9/23.     - Psychiatry will signoff, Please page [phone] with any questions or concerns.     Patient discussed with Dr. Crouch, who agrees with above plan.    Medication Consent  Medication Consent: n/a; consult service    Hope Corbin MD

## 2023-11-10 NOTE — PROGRESS NOTES
...                                                                                                                                                                                                                                                                                             INTERNAL MEDICINE PROGRESS NOTE     BRIEF NARRATIVE      Yoselyn Hernandez is a 63 y.o. female on day 0 of admission presenting with Abdominal pain.  Patient has a history of ESRD on dialysis, AAA, HFrEF (25% 03/2023) and pancreatitis, PE (03/2023 on warfarin) who presents to the ED with acute on chronic abdominal pain that worsened over the last 4 days.  Patient reports 18 episodes of diarrhea today, as well as worsening 10/10 abdominal pain so she came to the ED for an evaluation. She also reports associated nausea and vomiting. Denies fevers or chills. She tried imodium with no relief of diarrhea. Reports prolonged immobility the past few days. Patient also reports worsening shortness of breath and new onset chest pain over the last few days. She reports PND which she has not had before. Denies any diaphoresis. States she last did her peritoneal dialysis session on Saturday. Also endorses intermittent HA and chronic back pain.  Patient reports abdominal pain and diarrhea have been an ongoing issue for the last four years but recently have gotten worse.      Of note: Patient recently seen in [facility] ED for 1.5 year hx of diarrhea with frequent flare ups as well as chronic SOB i/s/o chronic PE. Patient was found to be subtherapeutic on warfarin during that ED stay was given a one time extra warfarin dose as she had no worsening SOB at the time and instructed to follow up with pcp for dosing adjustments.     SUBJECTIVE     Patient seen and examined today, feels that abdominal pain is better today.  Patient appears still depressed today, still complaining of abdominal pain.  OBJECTIVE      Visit Vitals  /62 (BP Location:  Right arm, Patient Position: Lying)   Pulse 87   Temp 36.6 °C (97.9 °F) (Temporal)   Resp 17        Intake/Output Summary (Last 24 hours) at 11/10/2023 1811  Last data filed at 11/10/2023 0958  Gross per 24 hour   Intake 60 ml   Output 680 ml   Net -620 ml           Physical Exam   Constitutional:       Appearance: She is not diaphoretic.   HENT:      Head: Normocephalic and atraumatic.   Eyes:      Pupils: Pupils are equal, round, and reactive to light.   Cardiovascular:      Rate and Rhythm: Normal rate and regular rhythm.      Heart sounds: Normal heart sounds.   Pulmonary:      Effort: Pulmonary effort is normal.      Breath sounds: Normal breath sounds. No wheezing, rhonchi or rales.   Abdominal:      Palpations: Abdomen is soft.      Tenderness: There is abdominal tenderness. There is no guarding or rebound.   Musculoskeletal:      Cervical back: Neck supple.      Right lower leg: Edema present.      Left lower leg: Edema present.   Skin:     General: Skin is warm and dry.   Neurological:      Mental Status: She is alert.     Current Meds   allopurinol, 100 mg, oral, Daily  atorvastatin, 40 mg, oral, Nightly  calcitriol, 0.5 mcg, oral, Daily  carvedilol, 6.25 mg, oral, BID with meals  dextrose 2.5 % - LOW calcium 2.5 mEq/L 2,000 mL with vancomycin 50 mg peritoneal dialysate, , intraperitoneal, q24h  dextrose 2.5 % - LOW calcium 2.5 mEq/L 5,000 mL with vancomycin 125 mg peritoneal dialysate, , intraperitoneal, q24h  dextrose 2.5 % - LOW calcium 2.5 mEq/L 5,000 mL with vancomycin 125 mg peritoneal dialysate, , intraperitoneal, q24h  docusate sodium, 100 mg, oral, BID  gentamicin, , Topical, Daily  levothyroxine, 25 mcg, oral, Daily before breakfast  lipase-protease-amylase, 3 capsule, oral, TID with meals  melatonin, 3 mg, oral, Daily  mirtazapine, 15 mg, oral, Nightly  nystatin, 500,000 Units, oral, q8h AMARJIT  pantoprazole, 40 mg, oral, BID AC  pregabalin, 25 mg, oral, Daily  sevelamer HCl, 800 mg, oral, TID with  meals  sodium chloride 0.9%, 10 mL, intravenous, q12h AMARJIT  warfarin, 2.5 mg, oral, Daily       PRN medications: [DISCONTINUED] acetaminophen **OR** acetaminophen **OR** acetaminophen, heparin, HYDROmorphone, loperamide, ondansetron, polyethylene glycol, sodium chloride 0.9%     LABS and IMAGING     WBC   Date Value Ref Range Status   11/10/2023 6.4 4.4 - 11.3 x10*3/uL Final   11/08/2023 4.9 4.4 - 11.3 x10*3/uL Final     Hemoglobin   Date Value Ref Range Status   11/10/2023 8.7 (L) 12.0 - 16.0 g/dL Final   11/08/2023 8.0 (L) 12.0 - 16.0 g/dL Final     Hematocrit   Date Value Ref Range Status   11/10/2023 27.0 (L) 36.0 - 46.0 % Final   11/08/2023 24.9 (L) 36.0 - 46.0 % Final     Bicarbonate   Date Value Ref Range Status   11/10/2023 23 21 - 32 mmol/L Final   11/09/2023 22 21 - 32 mmol/L Final   11/08/2023 19 (L) 21 - 32 mmol/L Final     Creatinine   Date Value Ref Range Status   11/10/2023 7.70 (H) 0.50 - 1.05 mg/dL Final   11/09/2023 7.37 (H) 0.50 - 1.05 mg/dL Final   11/08/2023 8.03 (H) 0.50 - 1.05 mg/dL Final     Calcium   Date Value Ref Range Status   11/10/2023 8.0 (L) 8.6 - 10.6 mg/dL Final   11/09/2023 7.6 (L) 8.6 - 10.6 mg/dL Final   11/08/2023 7.5 (L) 8.6 - 10.6 mg/dL Final     INR   Date Value Ref Range Status   11/09/2023 1.3 (H) 0.9 - 1.1 Final          ASSESSMENT / PLANS      #Acute on Chronic Abdominal Pain  #Acute on Chronic Diarrhea  #Abdominal hernia  Patient presented with episodes of intermittent diarrhea diarrhea last 4 to 5 days followed by constipation of the same duration.  Chronic condition, seem more like IBS with combine both diarrhea and constipation form.  Patient last colonoscopy and EGD 2021 and 2022 respectively  -CTA c/a/p negative for any acute changes, showed chronic changes with interval development  Incisional hernia  -CRP elevated  -Tissue transglutaminase IgA negative  -Continue PPI trial with pantoprazole 40 mg twice daily  - follow up CTA chest/abd/pelvis  - pain regimen:  oxycodone 5mg for moderate pain, 10mg for severe pain.  IV Dilaudid and switch to p.o.  -ACS consulted, no acute intervention recommended.  Will place outpatient general surgery follow-up upon discharge for abdominal hernia, and other unchanged intra-abdominal abnormalities     #Hypokalemia  #Hypomagnesemia  -Replace per protocol  -Daily labs    #Depressive disorder  -Psych consulted, appreciate recommendation  -Continue mirtazapine 15 mg at bedside  -We will consult  and art   -Continue to monitor close    #Acute on chronic SOB  #HFrEF (25% 03/2023)  #Hx of PE  #CP  Worsening SOB/CP could likely be due to delayed dialysis session given symptom onset correlation  vs decompensated HFrEF given reported PND and LE edema. Also ACS on differential given risk factors but low concern given minimally elevated troponin  :; Troponin 40 -> 41, BNP> 5000  - c/w heparin gtt, then transition to warfarin to achieve therapeutic inr pending CTA read  - follow up CTA chest/abd/pelvis   - check BNP, iron studies  - formal echo   - needs GDMT optimized : consider staring losartan, spironolactone, and jardiance if can tolerate  - c/w home coreg      #AAA  #Stable right innominate, cannulated coronary dissection  -BP < 120/80, HR < 80  -c/w home coreg      #ESRD  -c/w sevalamer 600 tid  -hold nightly PD until CTA comes back  -monitor Bicarb given significant diarrhea  -Nephrology starting intra-abdominal antibiotic     #Hypothryoidism  -c/w levothyroxine 25mcg  -TSH elevated 5.28, free T4 within normal limits at 1.01.  Most likely subclinical      Etelvina Lewis MD

## 2023-11-11 LAB
ALBUMIN SERPL BCP-MCNC: 2.4 G/DL (ref 3.4–5)
ALP SERPL-CCNC: 90 U/L (ref 33–136)
ALT SERPL W P-5'-P-CCNC: 10 U/L (ref 7–45)
ANION GAP SERPL CALC-SCNC: 18 MMOL/L (ref 10–20)
AST SERPL W P-5'-P-CCNC: 9 U/L (ref 9–39)
BILIRUB SERPL-MCNC: 0.3 MG/DL (ref 0–1.2)
BUN SERPL-MCNC: 40 MG/DL (ref 6–23)
CALCIUM SERPL-MCNC: 8.3 MG/DL (ref 8.6–10.6)
CHLORIDE SERPL-SCNC: 101 MMOL/L (ref 98–107)
CO2 SERPL-SCNC: 24 MMOL/L (ref 21–32)
CREAT SERPL-MCNC: 6.82 MG/DL (ref 0.5–1.05)
GFR SERPL CREATININE-BSD FRML MDRD: 6 ML/MIN/1.73M*2
GLUCOSE SERPL-MCNC: 82 MG/DL (ref 74–99)
INR PPP: 1.9 (ref 0.9–1.1)
POTASSIUM SERPL-SCNC: 3.9 MMOL/L (ref 3.5–5.3)
PROT SERPL-MCNC: 5.3 G/DL (ref 6.4–8.2)
PROTHROMBIN TIME: 21.6 SECONDS (ref 9.8–12.8)
SODIUM SERPL-SCNC: 139 MMOL/L (ref 136–145)
UFH PPP CHRO-ACNC: 0.4 IU/ML

## 2023-11-11 PROCEDURE — 80053 COMPREHEN METABOLIC PANEL: CPT | Performed by: STUDENT IN AN ORGANIZED HEALTH CARE EDUCATION/TRAINING PROGRAM

## 2023-11-11 PROCEDURE — 2500000004 HC RX 250 GENERAL PHARMACY W/ HCPCS (ALT 636 FOR OP/ED): Performed by: STUDENT IN AN ORGANIZED HEALTH CARE EDUCATION/TRAINING PROGRAM

## 2023-11-11 PROCEDURE — 2500000004 HC RX 250 GENERAL PHARMACY W/ HCPCS (ALT 636 FOR OP/ED): Performed by: INTERNAL MEDICINE

## 2023-11-11 PROCEDURE — 85520 HEPARIN ASSAY: CPT | Performed by: STUDENT IN AN ORGANIZED HEALTH CARE EDUCATION/TRAINING PROGRAM

## 2023-11-11 PROCEDURE — 90945 DIALYSIS ONE EVALUATION: CPT | Performed by: INTERNAL MEDICINE

## 2023-11-11 PROCEDURE — 2500000001 HC RX 250 WO HCPCS SELF ADMINISTERED DRUGS (ALT 637 FOR MEDICARE OP): Performed by: STUDENT IN AN ORGANIZED HEALTH CARE EDUCATION/TRAINING PROGRAM

## 2023-11-11 PROCEDURE — G0378 HOSPITAL OBSERVATION PER HR: HCPCS

## 2023-11-11 PROCEDURE — 2500000001 HC RX 250 WO HCPCS SELF ADMINISTERED DRUGS (ALT 637 FOR MEDICARE OP): Performed by: INTERNAL MEDICINE

## 2023-11-11 PROCEDURE — 36415 COLL VENOUS BLD VENIPUNCTURE: CPT | Performed by: STUDENT IN AN ORGANIZED HEALTH CARE EDUCATION/TRAINING PROGRAM

## 2023-11-11 PROCEDURE — 90947 DIALYSIS REPEATED EVAL: CPT

## 2023-11-11 PROCEDURE — 99232 SBSQ HOSP IP/OBS MODERATE 35: CPT | Performed by: STUDENT IN AN ORGANIZED HEALTH CARE EDUCATION/TRAINING PROGRAM

## 2023-11-11 PROCEDURE — 96376 TX/PRO/DX INJ SAME DRUG ADON: CPT | Mod: 59 | Performed by: STUDENT IN AN ORGANIZED HEALTH CARE EDUCATION/TRAINING PROGRAM

## 2023-11-11 PROCEDURE — 85610 PROTHROMBIN TIME: CPT | Performed by: STUDENT IN AN ORGANIZED HEALTH CARE EDUCATION/TRAINING PROGRAM

## 2023-11-11 PROCEDURE — 2500000004 HC RX 250 GENERAL PHARMACY W/ HCPCS (ALT 636 FOR OP/ED)

## 2023-11-11 RX ORDER — LANOLIN ALCOHOL/MO/W.PET/CERES
400 CREAM (GRAM) TOPICAL ONCE
Status: COMPLETED | OUTPATIENT
Start: 2023-11-11 | End: 2023-11-11

## 2023-11-11 RX ADMIN — HYDROMORPHONE HYDROCHLORIDE 1 MG: 2 TABLET ORAL at 01:45

## 2023-11-11 RX ADMIN — ATORVASTATIN CALCIUM 40 MG: 40 TABLET, FILM COATED ORAL at 21:38

## 2023-11-11 RX ADMIN — PANTOPRAZOLE SODIUM 40 MG: 40 TABLET, DELAYED RELEASE ORAL at 17:00

## 2023-11-11 RX ADMIN — MIRTAZAPINE 15 MG: 15 TABLET, FILM COATED ORAL at 21:38

## 2023-11-11 RX ADMIN — DOCUSATE SODIUM 100 MG: 100 CAPSULE, LIQUID FILLED ORAL at 21:38

## 2023-11-11 RX ADMIN — CARVEDILOL 6.25 MG: 12.5 TABLET, FILM COATED ORAL at 08:51

## 2023-11-11 RX ADMIN — VANCOMYCIN HYDROCHLORIDE: 500 INJECTION, POWDER, LYOPHILIZED, FOR SOLUTION INTRAVENOUS at 20:18

## 2023-11-11 RX ADMIN — NYSTATIN 500000 UNITS: 500000 SUSPENSION ORAL at 13:14

## 2023-11-11 RX ADMIN — SEVELAMER HYDROCHLORIDE 800 MG: 800 TABLET, FILM COATED ORAL at 17:00

## 2023-11-11 RX ADMIN — CALCITRIOL 0.5 MCG: 0.5 CAPSULE ORAL at 08:51

## 2023-11-11 RX ADMIN — DOCUSATE SODIUM 100 MG: 100 CAPSULE, LIQUID FILLED ORAL at 08:51

## 2023-11-11 RX ADMIN — GENTAMICIN SULFATE: 1 CREAM TOPICAL at 09:00

## 2023-11-11 RX ADMIN — PANTOPRAZOLE SODIUM 40 MG: 40 TABLET, DELAYED RELEASE ORAL at 06:20

## 2023-11-11 RX ADMIN — ACETAMINOPHEN 975 MG: 325 TABLET ORAL at 13:14

## 2023-11-11 RX ADMIN — PANCRELIPASE 3 CAPSULE: 24000; 76000; 120000 CAPSULE, DELAYED RELEASE PELLETS ORAL at 08:51

## 2023-11-11 RX ADMIN — ACETAMINOPHEN 975 MG: 325 TABLET ORAL at 21:38

## 2023-11-11 RX ADMIN — Medication 10 ML: at 21:43

## 2023-11-11 RX ADMIN — CARVEDILOL 6.25 MG: 12.5 TABLET, FILM COATED ORAL at 17:00

## 2023-11-11 RX ADMIN — Medication 10 ML: at 08:52

## 2023-11-11 RX ADMIN — NYSTATIN 500000 UNITS: 500000 SUSPENSION ORAL at 06:21

## 2023-11-11 RX ADMIN — Medication 3 MG: at 21:38

## 2023-11-11 RX ADMIN — OXYCODONE HYDROCHLORIDE 2.5 MG: 5 TABLET ORAL at 06:25

## 2023-11-11 RX ADMIN — OXYCODONE HYDROCHLORIDE 2.5 MG: 5 TABLET ORAL at 13:13

## 2023-11-11 RX ADMIN — ALLOPURINOL 100 MG: 100 TABLET ORAL at 08:51

## 2023-11-11 RX ADMIN — OXYCODONE HYDROCHLORIDE 2.5 MG: 5 TABLET ORAL at 21:38

## 2023-11-11 RX ADMIN — WARFARIN SODIUM 2.5 MG: 2.5 TABLET ORAL at 17:00

## 2023-11-11 RX ADMIN — HEPARIN SODIUM 1100 UNITS/HR: 10000 INJECTION, SOLUTION INTRAVENOUS at 13:18

## 2023-11-11 RX ADMIN — PREGABALIN 25 MG: 25 CAPSULE ORAL at 08:50

## 2023-11-11 RX ADMIN — Medication 400 MG: at 08:51

## 2023-11-11 RX ADMIN — PANCRELIPASE 3 CAPSULE: 24000; 76000; 120000 CAPSULE, DELAYED RELEASE PELLETS ORAL at 17:00

## 2023-11-11 RX ADMIN — HYDROMORPHONE HYDROCHLORIDE 1 MG: 2 TABLET ORAL at 08:50

## 2023-11-11 RX ADMIN — SEVELAMER HYDROCHLORIDE 800 MG: 800 TABLET, FILM COATED ORAL at 08:51

## 2023-11-11 RX ADMIN — LEVOTHYROXINE SODIUM 25 MCG: 25 TABLET ORAL at 06:21

## 2023-11-11 ASSESSMENT — COGNITIVE AND FUNCTIONAL STATUS - GENERAL
MOBILITY SCORE: 20
DAILY ACTIVITIY SCORE: 21
TOILETING: A LITTLE
WALKING IN HOSPITAL ROOM: A LITTLE
CLIMB 3 TO 5 STEPS WITH RAILING: A LITTLE
MOVING TO AND FROM BED TO CHAIR: A LITTLE
DRESSING REGULAR LOWER BODY CLOTHING: A LITTLE
STANDING UP FROM CHAIR USING ARMS: A LITTLE
HELP NEEDED FOR BATHING: A LITTLE

## 2023-11-11 ASSESSMENT — PAIN SCALES - GENERAL
PAINLEVEL_OUTOF10: 10 - WORST POSSIBLE PAIN
PAINLEVEL_OUTOF10: 6
PAINLEVEL_OUTOF10: 4
PAINLEVEL_OUTOF10: 8
PAINLEVEL_OUTOF10: 10 - WORST POSSIBLE PAIN
PAINLEVEL_OUTOF10: 6
PAINLEVEL_OUTOF10: 10 - WORST POSSIBLE PAIN
PAINLEVEL_OUTOF10: 5 - MODERATE PAIN

## 2023-11-11 ASSESSMENT — PAIN - FUNCTIONAL ASSESSMENT
PAIN_FUNCTIONAL_ASSESSMENT: 0-10

## 2023-11-11 ASSESSMENT — PAIN DESCRIPTION - LOCATION: LOCATION: ABDOMEN

## 2023-11-11 NOTE — CARE PLAN
Problem: Fall/Injury  Goal: Not fall by end of shift  Outcome: Progressing     Problem: Fall/Injury  Goal: Be free from injury by end of the shift  Outcome: Progressing     Problem: Fall/Injury  Goal: Verbalize understanding of personal risk factors for fall in the hospital  Outcome: Progressing     Problem: Fall/Injury  Goal: Pace activities to prevent fatigue by end of the shift  Outcome: Progressing     Problem: Pain  Goal: Takes deep breaths with improved pain control throughout the shift  Outcome: Progressing     Problem: Pain  Goal: Performs ADL's with improved pain control throughout shift  Outcome: Progressing     Problem: Pain  Goal: Free from opioid side effects throughout the shift  Outcome: Progressing   The patient's goals for the shift include To remain pain free    The clinical goals for the shift include pt will remain hemodynamically stable during this shift.    O

## 2023-11-11 NOTE — PROGRESS NOTES
...                                                                                                                                                                                                                                                                                             INTERNAL MEDICINE PROGRESS NOTE     BRIEF NARRATIVE      Yoselyn Hernandez is a 63 y.o. female on day 0 of admission presenting with Abdominal pain.  Patient has a history of ESRD on dialysis, AAA, HFrEF (25% 03/2023) and pancreatitis, PE (03/2023 on warfarin) who presents to the ED with acute on chronic abdominal pain that worsened over the last 4 days.  Patient reports 18 episodes of diarrhea today, as well as worsening 10/10 abdominal pain so she came to the ED for an evaluation. She also reports associated nausea and vomiting. Denies fevers or chills. She tried imodium with no relief of diarrhea. Reports prolonged immobility the past few days. Patient also reports worsening shortness of breath and new onset chest pain over the last few days. She reports PND which she has not had before. Denies any diaphoresis. States she last did her peritoneal dialysis session on Saturday. Also endorses intermittent HA and chronic back pain.  Patient reports abdominal pain and diarrhea have been an ongoing issue for the last four years but recently have gotten worse.      Of note: Patient recently seen in [facility] ED for 1.5 year hx of diarrhea with frequent flare ups as well as chronic SOB i/s/o chronic PE. Patient was found to be subtherapeutic on warfarin during that ED stay was given a one time extra warfarin dose as she had no worsening SOB at the time and instructed to follow up with pcp for dosing adjustments.     SUBJECTIVE     Patient seen and examined today, feels that abdominal pain is better today.  Today patient started complaining of generalized weakness, she stated that she is having difficulty ambulating.  Of note, patient  has been ambulating normally since her admission.  OBJECTIVE      Visit Vitals  /70   Pulse 83   Temp 37.4 °C (99.3 °F)   Resp 17      No intake or output data in the 24 hours ending 11/11/23 1226        Physical Exam   Constitutional:       Appearance: She is not diaphoretic.   HENT:      Head: Normocephalic and atraumatic.   Eyes:      Pupils: Pupils are equal, round, and reactive to light.   Cardiovascular:      Rate and Rhythm: Normal rate and regular rhythm.      Heart sounds: Normal heart sounds.   Pulmonary:      Effort: Pulmonary effort is normal.      Breath sounds: Normal breath sounds. No wheezing, rhonchi or rales.   Abdominal:      Palpations: Abdomen is soft.      Tenderness: There is abdominal tenderness. There is no guarding or rebound.   Musculoskeletal:      Cervical back: Neck supple.      Right lower leg: Edema present.      Left lower leg: Edema present.   Skin:     General: Skin is warm and dry.   Neurological:      Mental Status: She is alert.     Current Meds   acetaminophen, 975 mg, oral, q8h  allopurinol, 100 mg, oral, Daily  atorvastatin, 40 mg, oral, Nightly  calcitriol, 0.5 mcg, oral, Daily  carvedilol, 6.25 mg, oral, BID with meals  dextrose 2.5 % - LOW calcium 2.5 mEq/L 2,000 mL with vancomycin 50 mg peritoneal dialysate, , intraperitoneal, q24h  dextrose 2.5 % - LOW calcium 2.5 mEq/L 5,000 mL with vancomycin 125 mg peritoneal dialysate, , intraperitoneal, q24h  dextrose 2.5 % - LOW calcium 2.5 mEq/L 5,000 mL with vancomycin 125 mg peritoneal dialysate, , intraperitoneal, q24h  docusate sodium, 100 mg, oral, BID  gentamicin, , Topical, Daily  levothyroxine, 25 mcg, oral, Daily before breakfast  lipase-protease-amylase, 3 capsule, oral, TID with meals  melatonin, 3 mg, oral, Daily  mirtazapine, 15 mg, oral, Nightly  nystatin, 500,000 Units, oral, q8h AMARJIT  pantoprazole, 40 mg, oral, BID AC  pregabalin, 25 mg, oral, Daily  sevelamer HCl, 800 mg, oral, TID with meals  sodium chloride  0.9%, 10 mL, intravenous, q12h AMARJIT  warfarin, 2.5 mg, oral, Daily       PRN medications: eucerin, heparin, HYDROmorphone, hydrOXYzine HCL, loperamide, ondansetron, oxyCODONE, polyethylene glycol, sodium chloride 0.9%     LABS and IMAGING     WBC   Date Value Ref Range Status   11/10/2023 6.4 4.4 - 11.3 x10*3/uL Final     Hemoglobin   Date Value Ref Range Status   11/10/2023 8.7 (L) 12.0 - 16.0 g/dL Final     Hematocrit   Date Value Ref Range Status   11/10/2023 27.0 (L) 36.0 - 46.0 % Final     Bicarbonate   Date Value Ref Range Status   11/11/2023 24 21 - 32 mmol/L Final   11/10/2023 23 21 - 32 mmol/L Final   11/09/2023 22 21 - 32 mmol/L Final     Creatinine   Date Value Ref Range Status   11/11/2023 6.82 (H) 0.50 - 1.05 mg/dL Final   11/10/2023 7.70 (H) 0.50 - 1.05 mg/dL Final   11/09/2023 7.37 (H) 0.50 - 1.05 mg/dL Final     Calcium   Date Value Ref Range Status   11/11/2023 8.3 (L) 8.6 - 10.6 mg/dL Final   11/10/2023 8.0 (L) 8.6 - 10.6 mg/dL Final   11/09/2023 7.6 (L) 8.6 - 10.6 mg/dL Final     INR   Date Value Ref Range Status   11/11/2023 1.9 (H) 0.9 - 1.1 Final   11/09/2023 1.3 (H) 0.9 - 1.1 Final          ASSESSMENT / PLANS      #Acute on Chronic Abdominal Pain  #Acute on Chronic Diarrhea  #Abdominal hernia  Patient presented with episodes of intermittent diarrhea diarrhea last 4 to 5 days followed by constipation of the same duration.  Chronic condition, seem more like IBS with combine both diarrhea and constipation form.  Patient last colonoscopy and EGD 2021 and 2022 respectively  -CTA c/a/p negative for any acute changes, showed chronic changes with interval development  Incisional hernia  -CRP elevated  -Tissue transglutaminase IgA negative  -Continue PPI trial with pantoprazole 40 mg twice daily  - follow up CTA chest/abd/pelvis  - pain regimen: oxycodone 5mg for moderate pain, 10mg for severe pain.  IV Dilaudid and switch to p.o.  -ACS consulted, no acute intervention recommended.  Will place  outpatient general surgery follow-up upon discharge for abdominal hernia, and other unchanged intra-abdominal abnormalities     #Hypokalemia  #Hypomagnesemia  -Replace per protocol  -Daily labs    #Depressive disorder  -Psych consulted, appreciate recommendation  -Continue mirtazapine 15 mg at bedside  -We will consult  and art   -Continue to monitor close    #Generalized weakness and deconditioning  -On 11/11, patient started complaining of generalized weakness and inability to ambulate properly.  This is new complaint, patient has been ambulating without any difficulty since her admission, she was able to go to the bathroom  -We will order PT/OT    #Acute on chronic SOB  #HFrEF (25% 03/2023)  #Hx of PE  #CP  Worsening SOB/CP could likely be due to delayed dialysis session given symptom onset correlation  vs decompensated HFrEF given reported PND and LE edema. Also ACS on differential given risk factors but low concern given minimally elevated troponin  :; Troponin 40 -> 41, BNP> 5000  - c/w heparin gtt, then transition to warfarin to achieve therapeutic inr pending CTA read  - follow up CTA chest/abd/pelvis   - check BNP, iron studies  - formal echo   - needs GDMT optimized : consider staring losartan, spironolactone, and jardiance if can tolerate  - c/w home coreg      #AAA  #Stable right innominate, cannulated coronary dissection  -BP < 120/80, HR < 80  -c/w home coreg      #ESRD  -c/w sevalamer 600 tid  -hold nightly PD until CTA comes back  -monitor Bicarb given significant diarrhea  -Nephrology starting intra-abdominal antibiotic     #Hypothryoidism  -c/w levothyroxine 25mcg  -TSH elevated 5.28, free T4 within normal limits at 1.01.  Most likely subclinical      Etelvina Lewis MD

## 2023-11-11 NOTE — CARE PLAN
Problem: Fall/Injury  Goal: Not fall by end of shift  Outcome: Progressing     Problem: Fall/Injury  Goal: Verbalize understanding of personal risk factors for fall in the hospital  Outcome: Progressing     Problem: Fall/Injury  Goal: Use assistive devices by end of the shift  Outcome: Progressing     Problem: Pain  Goal: Takes deep breaths with improved pain control throughout the shift  Outcome: Progressing   The patient's goals for the shift include To remain pain free    The clinical goals for the shift include pain management    Will continue to monitor and assess.

## 2023-11-12 LAB
ALBUMIN SERPL BCP-MCNC: 2.5 G/DL (ref 3.4–5)
ALP SERPL-CCNC: 90 U/L (ref 33–136)
ALT SERPL W P-5'-P-CCNC: 9 U/L (ref 7–45)
ANION GAP SERPL CALC-SCNC: 19 MMOL/L (ref 10–20)
AST SERPL W P-5'-P-CCNC: 9 U/L (ref 9–39)
BILIRUB SERPL-MCNC: 0.3 MG/DL (ref 0–1.2)
BUN SERPL-MCNC: 42 MG/DL (ref 6–23)
CALCIUM SERPL-MCNC: 8.7 MG/DL (ref 8.6–10.6)
CHLORIDE SERPL-SCNC: 100 MMOL/L (ref 98–107)
CO2 SERPL-SCNC: 23 MMOL/L (ref 21–32)
CREAT SERPL-MCNC: 7.41 MG/DL (ref 0.5–1.05)
ERYTHROCYTE [DISTWIDTH] IN BLOOD BY AUTOMATED COUNT: 17.8 % (ref 11.5–14.5)
GFR SERPL CREATININE-BSD FRML MDRD: 6 ML/MIN/1.73M*2
GLUCOSE SERPL-MCNC: 100 MG/DL (ref 74–99)
HCT VFR BLD AUTO: 24.6 % (ref 36–46)
HGB BLD-MCNC: 8.1 G/DL (ref 12–16)
INR PPP: 2.2 (ref 0.9–1.1)
MCH RBC QN AUTO: 30.2 PG (ref 26–34)
MCHC RBC AUTO-ENTMCNC: 32.9 G/DL (ref 32–36)
MCV RBC AUTO: 92 FL (ref 80–100)
NRBC BLD-RTO: 0 /100 WBCS (ref 0–0)
PLATELET # BLD AUTO: 256 X10*3/UL (ref 150–450)
POTASSIUM SERPL-SCNC: 4 MMOL/L (ref 3.5–5.3)
PROT SERPL-MCNC: 5.5 G/DL (ref 6.4–8.2)
PROTHROMBIN TIME: 25.3 SECONDS (ref 9.8–12.8)
RBC # BLD AUTO: 2.68 X10*6/UL (ref 4–5.2)
SODIUM SERPL-SCNC: 138 MMOL/L (ref 136–145)
UFH PPP CHRO-ACNC: 0.4 IU/ML
WBC # BLD AUTO: 6.2 X10*3/UL (ref 4.4–11.3)

## 2023-11-12 PROCEDURE — 96367 TX/PROPH/DG ADDL SEQ IV INF: CPT | Performed by: STUDENT IN AN ORGANIZED HEALTH CARE EDUCATION/TRAINING PROGRAM

## 2023-11-12 PROCEDURE — 85610 PROTHROMBIN TIME: CPT | Performed by: STUDENT IN AN ORGANIZED HEALTH CARE EDUCATION/TRAINING PROGRAM

## 2023-11-12 PROCEDURE — 2500000004 HC RX 250 GENERAL PHARMACY W/ HCPCS (ALT 636 FOR OP/ED): Performed by: INTERNAL MEDICINE

## 2023-11-12 PROCEDURE — 2500000004 HC RX 250 GENERAL PHARMACY W/ HCPCS (ALT 636 FOR OP/ED): Performed by: STUDENT IN AN ORGANIZED HEALTH CARE EDUCATION/TRAINING PROGRAM

## 2023-11-12 PROCEDURE — 85027 COMPLETE CBC AUTOMATED: CPT | Performed by: STUDENT IN AN ORGANIZED HEALTH CARE EDUCATION/TRAINING PROGRAM

## 2023-11-12 PROCEDURE — 96366 THER/PROPH/DIAG IV INF ADDON: CPT | Performed by: STUDENT IN AN ORGANIZED HEALTH CARE EDUCATION/TRAINING PROGRAM

## 2023-11-12 PROCEDURE — 2500000004 HC RX 250 GENERAL PHARMACY W/ HCPCS (ALT 636 FOR OP/ED)

## 2023-11-12 PROCEDURE — 99232 SBSQ HOSP IP/OBS MODERATE 35: CPT | Performed by: STUDENT IN AN ORGANIZED HEALTH CARE EDUCATION/TRAINING PROGRAM

## 2023-11-12 PROCEDURE — 85520 HEPARIN ASSAY: CPT | Performed by: STUDENT IN AN ORGANIZED HEALTH CARE EDUCATION/TRAINING PROGRAM

## 2023-11-12 PROCEDURE — 2500000001 HC RX 250 WO HCPCS SELF ADMINISTERED DRUGS (ALT 637 FOR MEDICARE OP): Performed by: STUDENT IN AN ORGANIZED HEALTH CARE EDUCATION/TRAINING PROGRAM

## 2023-11-12 PROCEDURE — 97161 PT EVAL LOW COMPLEX 20 MIN: CPT | Mod: GP

## 2023-11-12 PROCEDURE — 36415 COLL VENOUS BLD VENIPUNCTURE: CPT | Performed by: STUDENT IN AN ORGANIZED HEALTH CARE EDUCATION/TRAINING PROGRAM

## 2023-11-12 PROCEDURE — 80053 COMPREHEN METABOLIC PANEL: CPT | Performed by: STUDENT IN AN ORGANIZED HEALTH CARE EDUCATION/TRAINING PROGRAM

## 2023-11-12 PROCEDURE — G0378 HOSPITAL OBSERVATION PER HR: HCPCS

## 2023-11-12 PROCEDURE — 2500000001 HC RX 250 WO HCPCS SELF ADMINISTERED DRUGS (ALT 637 FOR MEDICARE OP): Performed by: INTERNAL MEDICINE

## 2023-11-12 PROCEDURE — 90947 DIALYSIS REPEATED EVAL: CPT

## 2023-11-12 RX ADMIN — HYDROXYZINE HYDROCHLORIDE 25 MG: 25 TABLET, FILM COATED ORAL at 13:00

## 2023-11-12 RX ADMIN — HEPARIN SODIUM 1100 UNITS/HR: 10000 INJECTION, SOLUTION INTRAVENOUS at 09:31

## 2023-11-12 RX ADMIN — VANCOMYCIN HYDROCHLORIDE: 500 INJECTION, POWDER, LYOPHILIZED, FOR SOLUTION INTRAVENOUS at 17:16

## 2023-11-12 RX ADMIN — CALCITRIOL 0.5 MCG: 0.5 CAPSULE ORAL at 09:18

## 2023-11-12 RX ADMIN — HYDROMORPHONE HYDROCHLORIDE 1 MG: 2 TABLET ORAL at 20:49

## 2023-11-12 RX ADMIN — OXYCODONE HYDROCHLORIDE 2.5 MG: 5 TABLET ORAL at 06:18

## 2023-11-12 RX ADMIN — SEVELAMER HYDROCHLORIDE 800 MG: 800 TABLET, FILM COATED ORAL at 16:58

## 2023-11-12 RX ADMIN — GENTAMICIN SULFATE: 1 CREAM TOPICAL at 17:20

## 2023-11-12 RX ADMIN — MIRTAZAPINE 15 MG: 15 TABLET, FILM COATED ORAL at 20:49

## 2023-11-12 RX ADMIN — PANTOPRAZOLE SODIUM 40 MG: 40 TABLET, DELAYED RELEASE ORAL at 16:58

## 2023-11-12 RX ADMIN — ACETAMINOPHEN 975 MG: 325 TABLET ORAL at 06:19

## 2023-11-12 RX ADMIN — CARVEDILOL 6.25 MG: 12.5 TABLET, FILM COATED ORAL at 09:22

## 2023-11-12 RX ADMIN — CARVEDILOL 6.25 MG: 12.5 TABLET, FILM COATED ORAL at 16:58

## 2023-11-12 RX ADMIN — PANCRELIPASE 3 CAPSULE: 24000; 76000; 120000 CAPSULE, DELAYED RELEASE PELLETS ORAL at 14:05

## 2023-11-12 RX ADMIN — HYDROMORPHONE HYDROCHLORIDE 1 MG: 2 TABLET ORAL at 09:18

## 2023-11-12 RX ADMIN — Medication 10 ML: at 09:18

## 2023-11-12 RX ADMIN — WARFARIN SODIUM 2.5 MG: 2.5 TABLET ORAL at 18:12

## 2023-11-12 RX ADMIN — ACETAMINOPHEN 975 MG: 325 TABLET ORAL at 14:03

## 2023-11-12 RX ADMIN — DOCUSATE SODIUM 100 MG: 100 CAPSULE, LIQUID FILLED ORAL at 09:18

## 2023-11-12 RX ADMIN — Medication 10 ML: at 20:53

## 2023-11-12 RX ADMIN — HYDROXYZINE HYDROCHLORIDE 25 MG: 25 TABLET, FILM COATED ORAL at 20:49

## 2023-11-12 RX ADMIN — SEVELAMER HYDROCHLORIDE 800 MG: 800 TABLET, FILM COATED ORAL at 14:05

## 2023-11-12 RX ADMIN — PREGABALIN 25 MG: 25 CAPSULE ORAL at 09:18

## 2023-11-12 RX ADMIN — PANTOPRAZOLE SODIUM 40 MG: 40 TABLET, DELAYED RELEASE ORAL at 06:19

## 2023-11-12 RX ADMIN — LEVOTHYROXINE SODIUM 25 MCG: 25 TABLET ORAL at 06:19

## 2023-11-12 RX ADMIN — ATORVASTATIN CALCIUM 40 MG: 40 TABLET, FILM COATED ORAL at 20:49

## 2023-11-12 RX ADMIN — NYSTATIN 500000 UNITS: 500000 SUSPENSION ORAL at 14:03

## 2023-11-12 RX ADMIN — ACETAMINOPHEN 975 MG: 325 TABLET ORAL at 20:49

## 2023-11-12 RX ADMIN — Medication 3 MG: at 20:49

## 2023-11-12 RX ADMIN — ALLOPURINOL 100 MG: 100 TABLET ORAL at 09:18

## 2023-11-12 RX ADMIN — PANCRELIPASE 3 CAPSULE: 24000; 76000; 120000 CAPSULE, DELAYED RELEASE PELLETS ORAL at 16:58

## 2023-11-12 ASSESSMENT — COGNITIVE AND FUNCTIONAL STATUS - GENERAL
MOBILITY SCORE: 20
STANDING UP FROM CHAIR USING ARMS: A LITTLE
CLIMB 3 TO 5 STEPS WITH RAILING: A LITTLE
DRESSING REGULAR LOWER BODY CLOTHING: A LITTLE
MOBILITY SCORE: 22
DAILY ACTIVITIY SCORE: 21
WALKING IN HOSPITAL ROOM: A LITTLE
CLIMB 3 TO 5 STEPS WITH RAILING: A LITTLE
HELP NEEDED FOR BATHING: A LITTLE
WALKING IN HOSPITAL ROOM: A LITTLE
TOILETING: A LITTLE
MOVING TO AND FROM BED TO CHAIR: A LITTLE

## 2023-11-12 ASSESSMENT — PAIN - FUNCTIONAL ASSESSMENT
PAIN_FUNCTIONAL_ASSESSMENT: 0-10

## 2023-11-12 ASSESSMENT — PAIN SCALES - GENERAL
PAINLEVEL_OUTOF10: 5 - MODERATE PAIN
PAINLEVEL_OUTOF10: 10 - WORST POSSIBLE PAIN
PAINLEVEL_OUTOF10: 10 - WORST POSSIBLE PAIN
PAINLEVEL_OUTOF10: 5 - MODERATE PAIN
PAINLEVEL_OUTOF10: 10 - WORST POSSIBLE PAIN

## 2023-11-12 ASSESSMENT — ACTIVITIES OF DAILY LIVING (ADL): ADL_ASSISTANCE: INDEPENDENT

## 2023-11-12 NOTE — PROGRESS NOTES
...                                                                                                                                                                                                                                                                                             INTERNAL MEDICINE PROGRESS NOTE     BRIEF NARRATIVE      Yoselyn Hernandez is a 63 y.o. female on day 0 of admission presenting with Abdominal pain.  Patient has a history of ESRD on dialysis, AAA, HFrEF (25% 03/2023) and pancreatitis, PE (03/2023 on warfarin) who presents to the ED with acute on chronic abdominal pain that worsened over the last 4 days.  Patient reports 18 episodes of diarrhea today, as well as worsening 10/10 abdominal pain so she came to the ED for an evaluation. She also reports associated nausea and vomiting. Denies fevers or chills. She tried imodium with no relief of diarrhea. Reports prolonged immobility the past few days. Patient also reports worsening shortness of breath and new onset chest pain over the last few days. She reports PND which she has not had before. Denies any diaphoresis. States she last did her peritoneal dialysis session on Saturday. Also endorses intermittent HA and chronic back pain.  Patient reports abdominal pain and diarrhea have been an ongoing issue for the last four years but recently have gotten worse.   Of note: Patient recently seen in [facility] ED for 1.5 year hx of diarrhea with frequent flare ups as well as chronic SOB i/s/o chronic PE. Patient was found to be subtherapeutic on warfarin during that ED stay was given a one time extra warfarin dose as she had no worsening SOB at the time and instructed to follow up with pcp for dosing adjustments.     SUBJECTIVE     Patient seen and examined today, feels that abdominal pain is better today.  Today patient started complaining of generalized weakness, she stated that she is having difficulty ambulating.  Of note, patient has  been ambulating normally since her admission.  OBJECTIVE      Visit Vitals  /87 (BP Location: Right arm, Patient Position: Lying)   Pulse 79   Temp 36.9 °C (98.4 °F)   Resp 18        Intake/Output Summary (Last 24 hours) at 11/12/2023 1501  Last data filed at 11/12/2023 1241  Gross per 24 hour   Intake 447 ml   Output 1285 ml   Net -838 ml           Physical Exam   Constitutional:       Appearance: She is not diaphoretic.   HENT:      Head: Normocephalic and atraumatic.   Eyes:      Pupils: Pupils are equal, round, and reactive to light.   Cardiovascular:      Rate and Rhythm: Normal rate and regular rhythm.      Heart sounds: Normal heart sounds.   Pulmonary:      Effort: Pulmonary effort is normal.      Breath sounds: Normal breath sounds. No wheezing, rhonchi or rales.   Abdominal:      Palpations: Abdomen is soft.      Tenderness: There is abdominal tenderness. There is no guarding or rebound.   Musculoskeletal:      Cervical back: Neck supple.      Right lower leg: Edema present.      Left lower leg: Edema present.   Skin:     General: Skin is warm and dry.   Neurological:      Mental Status: She is alert.     Current Meds   acetaminophen, 975 mg, oral, q8h  allopurinol, 100 mg, oral, Daily  atorvastatin, 40 mg, oral, Nightly  calcitriol, 0.5 mcg, oral, Daily  carvedilol, 6.25 mg, oral, BID with meals  dextrose 2.5 % - LOW calcium 2.5 mEq/L 2,000 mL with vancomycin 50 mg peritoneal dialysate, , intraperitoneal, q24h  dextrose 2.5 % - LOW calcium 2.5 mEq/L 5,000 mL with vancomycin 125 mg peritoneal dialysate, , intraperitoneal, q24h  dextrose 2.5 % - LOW calcium 2.5 mEq/L 5,000 mL with vancomycin 125 mg peritoneal dialysate, , intraperitoneal, q24h  docusate sodium, 100 mg, oral, BID  gentamicin, , Topical, Daily  levothyroxine, 25 mcg, oral, Daily before breakfast  lipase-protease-amylase, 3 capsule, oral, TID with meals  melatonin, 3 mg, oral, Daily  mirtazapine, 15 mg, oral, Nightly  nystatin, 500,000  Units, oral, q8h AMARJIT  pantoprazole, 40 mg, oral, BID AC  pregabalin, 25 mg, oral, Daily  sevelamer HCl, 800 mg, oral, TID with meals  sodium chloride 0.9%, 10 mL, intravenous, q12h AMARJIT  warfarin, 2.5 mg, oral, Daily       PRN medications: eucerin, heparin, HYDROmorphone, hydrOXYzine HCL, loperamide, ondansetron, polyethylene glycol, sodium chloride 0.9%     LABS and IMAGING     WBC   Date Value Ref Range Status   11/12/2023 6.2 4.4 - 11.3 x10*3/uL Final   11/10/2023 6.4 4.4 - 11.3 x10*3/uL Final     Hemoglobin   Date Value Ref Range Status   11/12/2023 8.1 (L) 12.0 - 16.0 g/dL Final   11/10/2023 8.7 (L) 12.0 - 16.0 g/dL Final     Hematocrit   Date Value Ref Range Status   11/12/2023 24.6 (L) 36.0 - 46.0 % Final   11/10/2023 27.0 (L) 36.0 - 46.0 % Final     Bicarbonate   Date Value Ref Range Status   11/12/2023 23 21 - 32 mmol/L Final   11/11/2023 24 21 - 32 mmol/L Final   11/10/2023 23 21 - 32 mmol/L Final     Creatinine   Date Value Ref Range Status   11/12/2023 7.41 (H) 0.50 - 1.05 mg/dL Final   11/11/2023 6.82 (H) 0.50 - 1.05 mg/dL Final   11/10/2023 7.70 (H) 0.50 - 1.05 mg/dL Final     Calcium   Date Value Ref Range Status   11/12/2023 8.7 8.6 - 10.6 mg/dL Final   11/11/2023 8.3 (L) 8.6 - 10.6 mg/dL Final   11/10/2023 8.0 (L) 8.6 - 10.6 mg/dL Final     INR   Date Value Ref Range Status   11/12/2023 2.2 (H) 0.9 - 1.1 Final   11/11/2023 1.9 (H) 0.9 - 1.1 Final          ASSESSMENT / PLANS      #Acute on Chronic Abdominal Pain  #Acute on Chronic Diarrhea  #Abdominal hernia  Patient presented with episodes of intermittent diarrhea diarrhea last 4 to 5 days followed by constipation of the same duration.  Chronic condition, seem more like IBS with combine both diarrhea and constipation form.  Patient last colonoscopy and EGD 2021 and 2022 respectively  -CTA c/a/p negative for any acute changes, showed chronic changes with interval development  Incisional hernia  -CRP elevated  -Tissue transglutaminase IgA  negative  -Continue PPI trial with pantoprazole 40 mg twice daily, patient responding to the trial well  - ain regimen: IV Dilaudid and switch to p.o.  -ACS consulted, no acute intervention recommended.  Will place outpatient general surgery follow-up upon discharge for abdominal hernia, and other unchanged intra-abdominal abnormalities     #Hypokalemia  #Hypomagnesemia  -Replace per protocol  -Daily labs    #Depressive disorder  -Psych consulted, appreciate recommendation  -Continue mirtazapine 15 mg at bedside  -We will consult  and art   -Continue to monitor close    #Generalized weakness and deconditioning  -On 11/11, patient started complaining of generalized weakness and inability to ambulate properly.  This is new complaint, patient has been ambulating without any difficulty since her admission, she was able to go to the bathroom  - PT/OT evaluate and treat    #Acute on chronic SOB  #HFrEF (25% 03/2023)  #Hx of PE  #CP  Worsening SOB/CP could likely be due to delayed dialysis session given symptom onset correlation  vs decompensated HFrEF given reported PND and LE edema. Also ACS on differential given risk factors but low concern given minimally elevated troponin  :; Troponin 40 -> 41, BNP> 5000  -INR finally therapeutic at 2.2 today, heparin drip stopped 11/12.  Continue home Coumadin  -Iron study unremarkable  -Follow-up echo if necessary  -c/w home coreg   -Outpatient cardiology follow-up upon discharge     #AAA  #Stable right innominate, cannulated coronary dissection  -BP < 120/80, HR < 80  -c/w home coreg      #ESRD  -c/w sevalamer 600 tid  -monitor Bicarb given significant diarrhea  -Nephrology added intra-abdominal antibiotic  -Daily lab     #Hypothryoidism  -c/w levothyroxine 25mcg  -TSH elevated 5.28, free T4 within normal limits at 1.01.  Most likely subclinical    Etelvina Lewis MD

## 2023-11-12 NOTE — PROGRESS NOTES
Physical Therapy    Physical Therapy Evaluation    Patient Name: Yoselyn Hernandez  MRN: [id number]  Today's Date: 11/12/2023   Time Calculation  Start Time: 1403  Stop Time: 1429  Time Calculation (min): 26 min    Assessment/Plan   PT Assessment  PT Assessment Results: Decreased strength, Decreased endurance, Impaired balance  Rehab Prognosis: Good  Evaluation/Treatment Tolerance: Patient limited by fatigue  Medical Staff Made Aware: Yes  Strengths: Support of Caregivers  Barriers to Participation: Comorbidities  End of Session Communication: Bedside nurse  Assessment Comment: Pt admitted for weakness and fatigue presents with impaired endurance.  She would benefit from skilled therapy to further progress mobility, balance, and endurance  End of Session Patient Position: Bed, 3 rail up  IP OR SWING BED PT PLAN  Inpatient or Swing Bed: Inpatient  PT Plan  Treatment/Interventions: Gait training, Stair training, Balance training, Neuromuscular re-education, Strengthening, Endurance training, Therapeutic exercise, Therapeutic activity, Home exercise program  PT Plan: Skilled PT  PT Frequency: 3 times per week  PT Discharge Recommendations: Low intensity level of continued care  Equipment Recommended upon Discharge: Wheeled walker  PT Recommended Transfer Status: Independent  PT - OK to Discharge: Yes      Subjective   General Visit Information:  General  Reason for Referral: diarrhea and weakness  Past Medical History Relevant to Rehab: ESRD on dialysis, AAA, HFrEF (25% 03/2023) and pancreatitis, PE  Family/Caregiver Present: Yes  Caregiver Feedback: sister present during eval  Patient Position Received: Bed, 3 rail up  General Comment: pt is agreeable to therapy eval  Home Living:  Home Living  Type of Home: House  Lives With: Alone  Home Adaptive Equipment: Walker rolling or standard  Home Layout: Two level  Home Access: Stairs to enter with rails  Entrance Stairs-Number of Steps: 8  Prior Level of Function:  Prior  Function Per Pt/Caregiver Report  Level of [place]: Independent with ADLs and functional transfers, Independent with homemaking with ambulation  Receives Help From: Family  ADL Assistance: Independent  Homemaking Assistance: Independent  Ambulatory Assistance: Independent  Prior Function Comments:  (has family assist with home dialysis supplies)  Precautions:  Precautions  Medical Precautions: Fall precautions  Vital Signs:       Objective   Pain:  Pain Assessment  Pain Assessment: 0-10  Pain Score: 5 - Moderate pain  Pain Location: Abdomen  Cognition:  Cognition  Overall Cognitive Status: Within Functional Limits  Orientation Level: Oriented X4  Attention: Within Functional Limits  Memory: Within Funtional Limits    General Assessments:                Activity Tolerance  Endurance: Decreased tolerance for upright activites    Sensation  Light Touch: No apparent deficits     Coordination  Movements are Fluid and Coordinated: Yes  Alternating Toe Taps: Intact  Heel to [place]: Intact     Static Sitting Balance  Static Sitting-Balance Support: No upper extremity supported  Static Sitting-Level of Assistance: Independent    Static Standing Balance  Static Standing-Balance Support: Bilateral upper extremity supported  Static Standing-Level of Assistance: Close supervision  Functional Assessments:  Bed Mobility  Bed Mobility: Yes  Bed Mobility 1  Bed Mobility 1: Supine to sitting, Sitting to supine  Level of Assistance 1: Independent    Transfers  Transfer: Yes  Transfer 1  Transfer From 1: Sit to  Transfer to 1: Stand  Transfer Device 1: Walker  Transfer Level of Assistance 1: Independent    Ambulation/Gait Training  Ambulation/Gait Training Performed: Yes  Ambulation/Gait Training 1  Surface 1: Level tile  Device 1: Rolling walker  Assistance 1: Close supervision  Comments/Distance (ft) 1: x150'  Extremity/Trunk Assessments:  Cervical Spine   Cervical Spine: Within Functional Limits  Lumbar Spine   Lumbar Spine :  Within Functional Limits  RUE   RUE : Within Functional Limits  LUE   LUE: Within Functional Limits  RLE   RLE : Within Functional Limits  LLE   LLE : Within Functional Limits  Outcome Measures:  [facility] Basic Mobility  Turning from your back to your side while in a flat bed without using bedrails: None  Moving from lying on your back to sitting on the side of a flat bed without using bedrails: None  Moving to and from bed to chair (including a wheelchair): None  Standing up from a chair using your arms (e.g. wheelchair or bedside chair): None  To walk in hospital room: A little  Climbing 3-5 steps with railing: A little  Basic Mobility - Total Score: 22    Encounter Problems       Encounter Problems (Active)       PT Problem       Pt to improve ambulation to 200' with LRAD       Start:  11/12/23    Expected End:  12/03/23            Pt to improve stair negotiation x14 SBA       Start:  11/12/23    Expected End:  12/03/23               Pain - Adult              Education Documentation  Mobility Training, taught by Lawrence Stevens, PT at 11/12/2023  4:02 PM.  Learner: Patient  Readiness: Eager  Method: Explanation  Response: Verbalizes Understanding    Education Comments  No comments found.

## 2023-11-12 NOTE — CARE PLAN
Problem: Fall/Injury  Goal: Not fall by end of shift  Outcome: Progressing     Problem: Fall/Injury  Goal: Verbalize understanding of personal risk factors for fall in the hospital  Outcome: Progressing     Problem: Fall/Injury  Goal: Verbalize understanding of risk factor reduction measures to prevent injury from fall in the home  Outcome: Progressing     Problem: Pain - Adult  Goal: Verbalizes/displays adequate comfort level or baseline comfort level  Outcome: Progressing   The patient's goals for the shift include To remain pain free    The clinical goals for the shift include Pt will report decrease of pain with pain meds given as ordered.

## 2023-11-13 LAB
ALBUMIN SERPL BCP-MCNC: 2.5 G/DL (ref 3.4–5)
ALP SERPL-CCNC: 86 U/L (ref 33–136)
ALT SERPL W P-5'-P-CCNC: 9 U/L (ref 7–45)
ANION GAP SERPL CALC-SCNC: 18 MMOL/L (ref 10–20)
AST SERPL W P-5'-P-CCNC: 9 U/L (ref 9–39)
BILIRUB SERPL-MCNC: 0.2 MG/DL (ref 0–1.2)
BUN SERPL-MCNC: 42 MG/DL (ref 6–23)
CALCIUM SERPL-MCNC: 8.6 MG/DL (ref 8.6–10.6)
CHLORIDE SERPL-SCNC: 99 MMOL/L (ref 98–107)
CO2 SERPL-SCNC: 26 MMOL/L (ref 21–32)
CREAT SERPL-MCNC: 7.2 MG/DL (ref 0.5–1.05)
GFR SERPL CREATININE-BSD FRML MDRD: 6 ML/MIN/1.73M*2
GLUCOSE SERPL-MCNC: 82 MG/DL (ref 74–99)
INR PPP: 2.4 (ref 0.9–1.1)
MAGNESIUM SERPL-MCNC: 1.28 MG/DL (ref 1.6–2.4)
POTASSIUM SERPL-SCNC: 3.8 MMOL/L (ref 3.5–5.3)
PROT SERPL-MCNC: 6 G/DL (ref 6.4–8.2)
PROTHROMBIN TIME: 27 SECONDS (ref 9.8–12.8)
SODIUM SERPL-SCNC: 139 MMOL/L (ref 136–145)

## 2023-11-13 PROCEDURE — 90945 DIALYSIS ONE EVALUATION: CPT | Performed by: STUDENT IN AN ORGANIZED HEALTH CARE EDUCATION/TRAINING PROGRAM

## 2023-11-13 PROCEDURE — 99233 SBSQ HOSP IP/OBS HIGH 50: CPT | Performed by: INTERNAL MEDICINE

## 2023-11-13 PROCEDURE — 2500000001 HC RX 250 WO HCPCS SELF ADMINISTERED DRUGS (ALT 637 FOR MEDICARE OP): Performed by: STUDENT IN AN ORGANIZED HEALTH CARE EDUCATION/TRAINING PROGRAM

## 2023-11-13 PROCEDURE — 2500000004 HC RX 250 GENERAL PHARMACY W/ HCPCS (ALT 636 FOR OP/ED): Performed by: STUDENT IN AN ORGANIZED HEALTH CARE EDUCATION/TRAINING PROGRAM

## 2023-11-13 PROCEDURE — 36415 COLL VENOUS BLD VENIPUNCTURE: CPT | Performed by: STUDENT IN AN ORGANIZED HEALTH CARE EDUCATION/TRAINING PROGRAM

## 2023-11-13 PROCEDURE — 85610 PROTHROMBIN TIME: CPT | Performed by: STUDENT IN AN ORGANIZED HEALTH CARE EDUCATION/TRAINING PROGRAM

## 2023-11-13 PROCEDURE — 2500000001 HC RX 250 WO HCPCS SELF ADMINISTERED DRUGS (ALT 637 FOR MEDICARE OP): Performed by: INTERNAL MEDICINE

## 2023-11-13 PROCEDURE — 80053 COMPREHEN METABOLIC PANEL: CPT | Performed by: STUDENT IN AN ORGANIZED HEALTH CARE EDUCATION/TRAINING PROGRAM

## 2023-11-13 PROCEDURE — G0378 HOSPITAL OBSERVATION PER HR: HCPCS

## 2023-11-13 PROCEDURE — 83735 ASSAY OF MAGNESIUM: CPT | Performed by: INTERNAL MEDICINE

## 2023-11-13 RX ORDER — HYDROMORPHONE HYDROCHLORIDE 2 MG/1
2 TABLET ORAL EVERY 4 HOURS PRN
Status: DISPENSED | OUTPATIENT
Start: 2023-11-13 | End: 2023-11-13

## 2023-11-13 RX ORDER — HYDROMORPHONE HYDROCHLORIDE 2 MG/1
2 TABLET ORAL EVERY 4 HOURS PRN
Status: DISCONTINUED | OUTPATIENT
Start: 2023-11-13 | End: 2023-11-14

## 2023-11-13 RX ADMIN — Medication 10 ML: at 09:06

## 2023-11-13 RX ADMIN — NYSTATIN 500000 UNITS: 500000 SUSPENSION ORAL at 22:20

## 2023-11-13 RX ADMIN — VANCOMYCIN HYDROCHLORIDE: 500 INJECTION, POWDER, LYOPHILIZED, FOR SOLUTION INTRAVENOUS at 14:30

## 2023-11-13 RX ADMIN — PANTOPRAZOLE SODIUM 40 MG: 40 TABLET, DELAYED RELEASE ORAL at 06:27

## 2023-11-13 RX ADMIN — HYDROMORPHONE HYDROCHLORIDE 1 MG: 2 TABLET ORAL at 13:20

## 2023-11-13 RX ADMIN — ALLOPURINOL 100 MG: 100 TABLET ORAL at 09:02

## 2023-11-13 RX ADMIN — CALCITRIOL 0.5 MCG: 0.5 CAPSULE ORAL at 09:02

## 2023-11-13 RX ADMIN — ACETAMINOPHEN 975 MG: 325 TABLET ORAL at 20:38

## 2023-11-13 RX ADMIN — PREGABALIN 25 MG: 25 CAPSULE ORAL at 09:02

## 2023-11-13 RX ADMIN — HYDROMORPHONE HYDROCHLORIDE 2 MG: 2 TABLET ORAL at 22:19

## 2023-11-13 RX ADMIN — PANCRELIPASE 3 CAPSULE: 24000; 76000; 120000 CAPSULE, DELAYED RELEASE PELLETS ORAL at 09:02

## 2023-11-13 RX ADMIN — PANTOPRAZOLE SODIUM 40 MG: 40 TABLET, DELAYED RELEASE ORAL at 17:29

## 2023-11-13 RX ADMIN — CARVEDILOL 6.25 MG: 12.5 TABLET, FILM COATED ORAL at 17:29

## 2023-11-13 RX ADMIN — PANCRELIPASE 3 CAPSULE: 24000; 76000; 120000 CAPSULE, DELAYED RELEASE PELLETS ORAL at 17:29

## 2023-11-13 RX ADMIN — Medication 3 MG: at 20:38

## 2023-11-13 RX ADMIN — CARVEDILOL 6.25 MG: 12.5 TABLET, FILM COATED ORAL at 09:02

## 2023-11-13 RX ADMIN — GENTAMICIN SULFATE: 1 CREAM TOPICAL at 17:31

## 2023-11-13 RX ADMIN — SEVELAMER HYDROCHLORIDE 800 MG: 800 TABLET, FILM COATED ORAL at 13:21

## 2023-11-13 RX ADMIN — HYDROMORPHONE HYDROCHLORIDE 1 MG: 2 TABLET ORAL at 04:47

## 2023-11-13 RX ADMIN — SEVELAMER HYDROCHLORIDE 800 MG: 800 TABLET, FILM COATED ORAL at 09:02

## 2023-11-13 RX ADMIN — WARFARIN SODIUM 2.5 MG: 2.5 TABLET ORAL at 17:31

## 2023-11-13 RX ADMIN — HYDROMORPHONE HYDROCHLORIDE 2 MG: 2 TABLET ORAL at 17:30

## 2023-11-13 RX ADMIN — ACETAMINOPHEN 975 MG: 325 TABLET ORAL at 04:47

## 2023-11-13 RX ADMIN — LEVOTHYROXINE SODIUM 25 MCG: 25 TABLET ORAL at 06:27

## 2023-11-13 RX ADMIN — ATORVASTATIN CALCIUM 40 MG: 40 TABLET, FILM COATED ORAL at 20:38

## 2023-11-13 RX ADMIN — VANCOMYCIN HYDROCHLORIDE: 1 INJECTION, POWDER, LYOPHILIZED, FOR SOLUTION INTRAVENOUS at 14:30

## 2023-11-13 RX ADMIN — HYDROMORPHONE HYDROCHLORIDE 1 MG: 2 TABLET ORAL at 09:06

## 2023-11-13 RX ADMIN — ACETAMINOPHEN 975 MG: 325 TABLET ORAL at 13:19

## 2023-11-13 RX ADMIN — MIRTAZAPINE 15 MG: 15 TABLET, FILM COATED ORAL at 20:38

## 2023-11-13 RX ADMIN — SEVELAMER HYDROCHLORIDE 800 MG: 800 TABLET, FILM COATED ORAL at 17:29

## 2023-11-13 RX ADMIN — Medication 10 ML: at 20:39

## 2023-11-13 RX ADMIN — PANCRELIPASE 3 CAPSULE: 24000; 76000; 120000 CAPSULE, DELAYED RELEASE PELLETS ORAL at 13:21

## 2023-11-13 RX ADMIN — HYDROXYZINE HYDROCHLORIDE 25 MG: 25 TABLET, FILM COATED ORAL at 09:24

## 2023-11-13 ASSESSMENT — PAIN - FUNCTIONAL ASSESSMENT
PAIN_FUNCTIONAL_ASSESSMENT: 0-10

## 2023-11-13 ASSESSMENT — COGNITIVE AND FUNCTIONAL STATUS - GENERAL
DAILY ACTIVITIY SCORE: 22
STANDING UP FROM CHAIR USING ARMS: A LITTLE
TOILETING: A LITTLE
WALKING IN HOSPITAL ROOM: A LITTLE
CLIMB 3 TO 5 STEPS WITH RAILING: A LITTLE
MOBILITY SCORE: 21
HELP NEEDED FOR BATHING: A LITTLE

## 2023-11-13 ASSESSMENT — PAIN SCALES - GENERAL
PAINLEVEL_OUTOF10: 10 - WORST POSSIBLE PAIN
PAINLEVEL_OUTOF10: 10 - WORST POSSIBLE PAIN
PAINLEVEL_OUTOF10: 8
PAINLEVEL_OUTOF10: 10 - WORST POSSIBLE PAIN
PAINLEVEL_OUTOF10: 8
PAINLEVEL_OUTOF10: 10 - WORST POSSIBLE PAIN
PAINLEVEL_OUTOF10: 8
PAINLEVEL_OUTOF10: 10 - WORST POSSIBLE PAIN
PAINLEVEL_OUTOF10: 6
PAINLEVEL_OUTOF10: 6
PAINLEVEL_OUTOF10: 7

## 2023-11-13 NOTE — NURSING NOTE
11/13/23 @ 1726 Connected pt to PD cycler as ordered without problems.  Savannah layne RN educated on how to disconnect pt when needed.  Page # [phone] if staff or pt needs PD nurse on call.  Exit site dressing changed, no concerns.  Pt in good spirits, ambulated in griffith today.  Waiting for friend to bring baked potato for dinner.  Pt denies pain.  Kierra Burnett RN

## 2023-11-13 NOTE — PROGRESS NOTES
11/13/23 1310 Transitional Care Coordinator Notes:    MD awaiting nephrology recommendations and culture sensitivities. PT/OT recommending low intensity therapy. Patient PCP is Dr. Kerr, will ask MD to place home care orders with [facility]. Will continue to follow for discharge updates.               Assessment/Plan   Principal Problem:    Abdominal pain    Discharge Plans: discharge home with home care           Rubi Duncan RN

## 2023-11-13 NOTE — PROGRESS NOTES
"Yoselyn Hernandez is a 63 y.o. female on day 0 of admission presenting with Abdominal pain.      Subjective   C/o abdominal fullness and epigastric pain. Very upset over\" giving herself\" peritonitis. No BM yet       Objective     Visit Vitals  /70   Pulse 83   Temp 37.4 °C (99.3 °F)   Resp 17      No intake or output data in the 24 hours ending 11/11/23 1226    PD intake and output:   Initial drain 1910 ml  Total UF 1385 ml     Physical Exam  General: chronically ill appearing, in NAD  HEENT: NC/AT, clear sclerae, MMM  Lungs: CTAB  Heart RRR, no M,R,G  Abdomen: mild epigastric tendeness with palpation, PD catheter exit site clean  Extr - 1+ edema on both LE  Skin: warm and dry    Current Meds   acetaminophen, 975 mg, oral, q8h  allopurinol, 100 mg, oral, Daily  atorvastatin, 40 mg, oral, Nightly  calcitriol, 0.5 mcg, oral, Daily  carvedilol, 6.25 mg, oral, BID with meals  dextrose 2.5 % - LOW calcium 2.5 mEq/L 2,000 mL with vancomycin 50 mg peritoneal dialysate, , intraperitoneal, q24h  dextrose 2.5 % - LOW calcium 2.5 mEq/L 5,000 mL with vancomycin 125 mg peritoneal dialysate, , intraperitoneal, q24h  dextrose 2.5 % - LOW calcium 2.5 mEq/L 5,000 mL with vancomycin 125 mg peritoneal dialysate, , intraperitoneal, q24h  docusate sodium, 100 mg, oral, BID  gentamicin, , Topical, Daily  levothyroxine, 25 mcg, oral, Daily before breakfast  lipase-protease-amylase, 3 capsule, oral, TID with meals  melatonin, 3 mg, oral, Daily  mirtazapine, 15 mg, oral, Nightly  nystatin, 500,000 Units, oral, q8h AMARJIT  pantoprazole, 40 mg, oral, BID AC  pregabalin, 25 mg, oral, Daily  sevelamer HCl, 800 mg, oral, TID with meals  sodium chloride 0.9%, 10 mL, intravenous, q12h AMARJIT  warfarin, 2.5 mg, oral, Daily        PRN medications: eucerin, heparin, HYDROmorphone, hydrOXYzine HCL, loperamide, ondansetron, oxyCODONE, polyethylene glycol, sodium chloride 0.9%         Assessment/Plan   ESRD patient on Dr. Lora, admitted with abdominal pain " and diarrhea. NO diarrhea since admission. PD fluid cell count and culture indicate staph epi peritonitis.   Currently on IP antibiotics for that.   PD prescription: 5 x 1800 ml cycler assisted exhanges over 10.5 hours. Last fill 1200 ml . Total therapy volume 64991 ml. Currently on all 2.5 % dianeal.     Impression:  Staph epi PD associated peritonitis  ESRD on PD  Anemia of ESRD +/- other    Recommendations/plan:  Continue IP antibiotics for 2 week (review antibiogram and use vanc alternative if feasible)  PD catheter stripping with TPA per out unit protocol, if in house  Continue all 2.5 % dianeal exchanges until euvolemic  Add renal MVI 1 po daily  Obtain outpatient DIALLO dose and order( we will do)  Review last iron studies, parenteral iron if appropriate.  Will follow along.  (Late entry for 11/11/23)    Maxine Zhao MD

## 2023-11-13 NOTE — PROGRESS NOTES
"Yoselyn Hernandez is a 63 y.o. female on day 0 of admission presenting with Abdominal pain.    Subjective   Lying in bed. No distress.  Reports to have intermittent abdominal pain, points more towards epigastric and umbilical area.  States she thinks she may have had fever at home.         Objective     General: Lying in bed without distress.  Cooperative.  Skin: No rashes or ulcerations.  HEENT: Sclera is white.  Mucous membranes moist.  Neck: Supple.  No JVD.  Cardiac: Regular rate and rhythm, S1/S2 normal.  Lungs: Clear to auscultation bilaterally, no wheezing or crackles, no accessory muscle use at rest.  Abdomen: Soft, some tenderness noted epigastric area, mild distention, BS +  Extremities: No cyanosis.  No lower extremity edema.  Neurologic: Alert and oriented x3.  No focal deficits.  Psychiatric: Appropriate mood and behavior.  Currently no agitation.    Last Recorded Vitals  Blood pressure 113/68, pulse 80, temperature 36.5 °C (97.7 °F), resp. rate 18, height 1.702 m (5' 7.01\"), weight 57 kg (125 lb 10.6 oz), SpO2 98 %.  On room air.    Intake/Output last 3 Shifts:  I/O last 3 completed shifts:  In: 472.2 (8.3 mL/kg) [P.O.:240; I.V.:232.2 (4.1 mL/kg)]  Out: 1833 (32.2 mL/kg) [Other:1833]  Weight: 57 kg     Relevant Results  acetaminophen, 975 mg, oral, q8h  allopurinol, 100 mg, oral, Daily  atorvastatin, 40 mg, oral, Nightly  calcitriol, 0.5 mcg, oral, Daily  carvedilol, 6.25 mg, oral, BID with meals  dextrose 2.5 % - LOW calcium 2.5 mEq/L 2,000 mL with vancomycin 50 mg peritoneal dialysate, , intraperitoneal, q24h  dextrose 2.5 % - LOW calcium 2.5 mEq/L 5,000 mL with vancomycin 125 mg peritoneal dialysate, , intraperitoneal, q24h  dextrose 2.5 % - LOW calcium 2.5 mEq/L 5,000 mL with vancomycin 125 mg peritoneal dialysate, , intraperitoneal, q24h  docusate sodium, 100 mg, oral, BID  gentamicin, , Topical, Daily  levothyroxine, 25 mcg, oral, Daily before breakfast  lipase-protease-amylase, 3 capsule, oral, TID " with meals  melatonin, 3 mg, oral, Daily  mirtazapine, 15 mg, oral, Nightly  nystatin, 500,000 Units, oral, q8h AMARJIT  pantoprazole, 40 mg, oral, BID AC  pregabalin, 25 mg, oral, Daily  sevelamer HCl, 800 mg, oral, TID with meals  sodium chloride 0.9%, 10 mL, intravenous, q12h AMARJIT  warfarin, 2.5 mg, oral, Daily           PRN medications: eucerin, HYDROmorphone, hydrOXYzine HCL, loperamide, ondansetron, polyethylene glycol, sodium chloride 0.9%     Results for orders placed or performed during the hospital encounter of 11/06/23 (from the past 24 hour(s))   Comprehensive metabolic panel   Result Value Ref Range    Glucose 82 74 - 99 mg/dL    Sodium 139 136 - 145 mmol/L    Potassium 3.8 3.5 - 5.3 mmol/L    Chloride 99 98 - 107 mmol/L    Bicarbonate 26 21 - 32 mmol/L    Anion Gap 18 10 - 20 mmol/L    Urea Nitrogen 42 (H) 6 - 23 mg/dL    Creatinine 7.20 (H) 0.50 - 1.05 mg/dL    eGFR 6 (L) >60 mL/min/1.73m*2    Calcium 8.6 8.6 - 10.6 mg/dL    Albumin 2.5 (L) 3.4 - 5.0 g/dL    Alkaline Phosphatase 86 33 - 136 U/L    Total Protein 6.0 (L) 6.4 - 8.2 g/dL    AST 9 9 - 39 U/L    Bilirubin, Total 0.2 0.0 - 1.2 mg/dL    ALT 9 7 - 45 U/L   Protime-INR   Result Value Ref Range    Protime 27.0 (H) 9.8 - 12.8 seconds    INR 2.4 (H) 0.9 - 1.1        Hospital Course:  Yoselyn Hernandez is a 63 y.o. female on day 0 of admission presenting with Abdominal pain.  Patient has a history of ESRD on dialysis, AAA, HFrEF (25% 03/2023) and pancreatitis, PE (03/2023 on warfarin) who presents to the ED with acute on chronic abdominal pain that worsened over the last 4 days.  Fluid drawn off from PD catheter shows growth of staph epidermis and concerns for peritonitis.  Nephrology has been consulted.  Patient started on vancomycin with dialysate.    Assessment/Plan     Acute on Chronic Abdominal Pain  Acute bacterial peritonitis  Acute on Chronic Diarrhea  Abdominal hernia  -Patient presented with episodes of intermittent diarrhea diarrhea last 4 to 5  days followed by constipation of the same duration.  Chronic condition, seem more like IBS with combine both diarrhea and constipation form to explain chronic pain.  Patient last colonoscopy and EGD 2021 and 2022 respectively.  Patient already following up with GI outpatient.  -CTA c/a/p negative for any acute changes, showed chronic changes with interval development  Incisional hernia  -CRP elevated  -Tissue transglutaminase IgA negative  -Continue PPI trial with pantoprazole 40 mg twice daily.  -Culture from peritoneal fluid growing Staph epidermidis.  Concern for acute peritonitis.  Nephrology following.  Patient currently getting vancomycin with peritoneal dialysis.  -ACS consulted, no acute intervention recommended.  Will place outpatient general surgery follow-up upon discharge for abdominal hernia.     Hypokalemia  Hypomagnesemia  -Hypokalemia resolved.  Check magnesium level today.     Depressive disorder  -Psych consulted, appreciate recommendation  -Continue mirtazapine 15 mg at bedside  - and art   -Continue to monitor close  -Reports mentally feeling anxious when at home by herself.     Generalized weakness and deconditioning  -On 11/11, patient started complaining of generalized weakness and inability to ambulate properly.  This is new complaint, patient has been ambulating without any difficulty since her admission, she was able to go to the bathroom  - PT has seen the patient and recommended low intensity.     Acute on chronic SOB  HFrEF (25% 03/2023)  Hx of PE  CP  -Worsening SOB/CP could likely be due to delayed dialysis session given symptom onset correlation  vs decompensated HFrEF given reported PND and LE edema. Also ACS on differential given risk factors but low concern given minimally elevated troponin.  Currently patient does not appear to be having any significant dyspnea and does not have hypoxia.  -INR finally therapeutic at 2.4 today  -Iron study unremarkable  -Follow-up echo if  necessary  -c/w home coreg   -Outpatient cardiology follow-up upon discharge     AAA  Stable right innominate, cannulated coronary dissection  -BP < 120/80, HR < 80  -c/w home coreg      ESRD on peritoneal dialysis  -c/w sevalamer 600 tid  -monitor Bicarb given significant diarrhea  -Nephrology added intra-abdominal antibiotic.     Hypothryoidism  -c/w levothyroxine 25mcg  -TSH elevated 5.28, free T4 within normal limits at 1.01.     Disposition: Await cultures and sensitivities and await further nephrology input.    Carl Wiley MD

## 2023-11-13 NOTE — CARE PLAN
The patient's goals for the shift include To remain pain free    The clinical goals for the shift include pt will identify non-pharmacological interventions for pain.      Problem: Skin  Goal: Prevent/minimize sheer/friction injuries  Outcome: Progressing  Flowsheets (Taken 11/13/2023 3760)  Prevent/minimize sheer/friction injuries: Increase activity/out of bed for meals     Problem: Fall/Injury  Goal: Not fall by end of shift  Outcome: Progressing  Goal: Be free from injury by end of the shift  Outcome: Progressing  Goal: Verbalize understanding of personal risk factors for fall in the hospital  Outcome: Progressing  Goal: Verbalize understanding of risk factor reduction measures to prevent injury from fall in the home  Outcome: Progressing  Goal: Use assistive devices by end of the shift  Outcome: Progressing  Goal: Pace activities to prevent fatigue by end of the shift  Outcome: Progressing     Problem: Pain  Goal: Takes deep breaths with improved pain control throughout the shift  Outcome: Progressing  Goal: Turns in bed with improved pain control throughout the shift  Outcome: Progressing  Goal: Walks with improved pain control throughout the shift  Outcome: Progressing  Goal: Performs ADL's with improved pain control throughout shift  Outcome: Progressing  Goal: Participates in PT with improved pain control throughout the shift  Outcome: Progressing  Goal: Free from opioid side effects throughout the shift  Outcome: Progressing  Goal: Free from acute confusion related to pain meds throughout the shift  Outcome: Progressing     Problem: Pain - Adult  Goal: Verbalizes/displays adequate comfort level or baseline comfort level  Outcome: Progressing     Problem: Safety - Adult  Goal: Free from fall injury  Outcome: Progressing     Problem: Discharge Planning  Goal: Discharge to home or other facility with appropriate resources  Outcome: Progressing     Problem: Chronic Conditions and Co-morbidities  Goal: Patient's  chronic conditions and co-morbidity symptoms are monitored and maintained or improved  Outcome: Progressing     Problem: Skin  Goal: Prevent/minimize sheer/friction injuries  11/13/2023 1851 by Savannah Knapp RN  Outcome: Progressing  11/13/2023 1850 by Savannah Knapp RN  Outcome: Progressing  Flowsheets (Taken 11/13/2023 1850)  Prevent/minimize sheer/friction injuries: Increase activity/out of bed for meals  Goal: Promote/optimize nutrition  Outcome: Progressing

## 2023-11-13 NOTE — CARE PLAN
Problem: Fall/Injury  Goal: Not fall by end of shift  Outcome: Progressing  Goal: Be free from injury by end of the shift  Outcome: Progressing  Goal: Verbalize understanding of personal risk factors for fall in the hospital  Outcome: Progressing  Goal: Verbalize understanding of risk factor reduction measures to prevent injury from fall in the home  Outcome: Progressing  Goal: Use assistive devices by end of the shift  Outcome: Progressing  Goal: Pace activities to prevent fatigue by end of the shift  Outcome: Progressing     Problem: Pain  Goal: Takes deep breaths with improved pain control throughout the shift  Outcome: Progressing  Goal: Turns in bed with improved pain control throughout the shift  Outcome: Progressing  Goal: Walks with improved pain control throughout the shift  Outcome: Progressing  Goal: Performs ADL's with improved pain control throughout shift  Outcome: Progressing  Goal: Participates in PT with improved pain control throughout the shift  Outcome: Progressing  Goal: Free from opioid side effects throughout the shift  Outcome: Progressing  Goal: Free from acute confusion related to pain meds throughout the shift  Outcome: Progressing     Problem: Pain - Adult  Goal: Verbalizes/displays adequate comfort level or baseline comfort level  Outcome: Progressing     Problem: Safety - Adult  Goal: Free from fall injury  Outcome: Progressing     Problem: Discharge Planning  Goal: Discharge to home or other facility with appropriate resources  Outcome: Progressing     Problem: Chronic Conditions and Co-morbidities  Goal: Patient's chronic conditions and co-morbidity symptoms are monitored and maintained or improved  Outcome: Progressing   The patient's goals for the shift include To remain pain free    The clinical goals for the shift include Pt will report decrease of pain with pain meds given as ordered.

## 2023-11-13 NOTE — PROGRESS NOTES
Yoselyn Hernandez   63 [address]    @@  [facility]/Room: [id number]/6020/6020-A    Subjective:   Complaining of dizziness   Diarrhea is improved  Denies any fevers or chills     Objective:     Meds:   acetaminophen, 975 mg, q8h  allopurinol, 100 mg, Daily  atorvastatin, 40 mg, Nightly  calcitriol, 0.5 mcg, Daily  carvedilol, 6.25 mg, BID with meals  dextrose 1.5% - LOW calcium 2.5mEq/L 2,000 mL with vancomycin 50 mg peritoneal dialysate, , q24h  dextrose 2.5 % - LOW calcium 2.5 mEq/L 5,000 mL with vancomycin 125 mg peritoneal dialysate, , q24h  dextrose 2.5 % - LOW calcium 2.5 mEq/L 5,000 mL with vancomycin 125 mg peritoneal dialysate, , q24h  docusate sodium, 100 mg, BID  gentamicin, , Daily  levothyroxine, 25 mcg, Daily before breakfast  lipase-protease-amylase, 3 capsule, TID with meals  melatonin, 3 mg, Daily  mirtazapine, 15 mg, Nightly  nystatin, 500,000 Units, q8h AMARJIT  pantoprazole, 40 mg, BID AC  pregabalin, 25 mg, Daily  sevelamer HCl, 800 mg, TID with meals  sodium chloride 0.9%, 10 mL, q12h AMARJIT  warfarin, 2.5 mg, Daily         eucerin, , PRN  HYDROmorphone, 1 mg, q4h PRN  hydrOXYzine HCL, 25 mg, TID PRN  loperamide, 2 mg, 4x daily PRN  ondansetron, 4 mg, q6h PRN  polyethylene glycol, 17 g, q12h PRN  sodium chloride 0.9%, 10 mL, PRN        Vitals:    11/13/23 0900   BP: 113/68   Pulse: 80   Resp: 18   Temp: 36.5 °C (97.7 °F)   SpO2: 98%          Intake/Output Summary (Last 24 hours) at 11/13/2023 1516  Last data filed at 11/13/2023 1051  Gross per 24 hour   Intake --   Output 2057 ml   Net -2057 ml       General appearance: no distress  Eyes: non-icteric  Skin: no apparent rash  Heart: regular  Lungs: CTA bilat no wheezing/crackles  Abdomen: soft, nt/nd  Extremities: no edema bilat  Neuro: No FND,asterixis  Access PD catheter without any drainage or pus or induration     Blood Labs:  Results for orders placed or performed during the hospital encounter of 11/06/23 (from the past 24 hour(s))   Comprehensive metabolic panel    Result Value Ref Range    Glucose 82 74 - 99 mg/dL    Sodium 139 136 - 145 mmol/L    Potassium 3.8 3.5 - 5.3 mmol/L    Chloride 99 98 - 107 mmol/L    Bicarbonate 26 21 - 32 mmol/L    Anion Gap 18 10 - 20 mmol/L    Urea Nitrogen 42 (H) 6 - 23 mg/dL    Creatinine 7.20 (H) 0.50 - 1.05 mg/dL    eGFR 6 (L) >60 mL/min/1.73m*2    Calcium 8.6 8.6 - 10.6 mg/dL    Albumin 2.5 (L) 3.4 - 5.0 g/dL    Alkaline Phosphatase 86 33 - 136 U/L    Total Protein 6.0 (L) 6.4 - 8.2 g/dL    AST 9 9 - 39 U/L    Bilirubin, Total 0.2 0.0 - 1.2 mg/dL    ALT 9 7 - 45 U/L   Protime-INR   Result Value Ref Range    Protime 27.0 (H) 9.8 - 12.8 seconds    INR 2.4 (H) 0.9 - 1.1              ASSESSMENT:  ESRD patient on Dr. Lora, admitted with abdominal pain and diarrhea. NO diarrhea since admission. PD fluid cell count and culture indicate staph epi peritonitis.   Currently on IP antibiotics for that.   PD prescription: 5 x 1800 ml cycler assisted exhanges over 10.5 hours. Last fill 1200 ml . Total therapy volume 00138 ml. Currently on all 2.5 % dianeal.      Impression:  Staph epi PD associated peritonitis  ESRD on PD  Anemia of ESRD +/- other     Recommendations/plan:  Will plan to switch one of the bags to 1.5% as she is euvolemic and complaining of dizziness and lightheadedness   Continue IP antibiotics for 2 week (review antibiogram and use vanc alternative if feasible)  PD catheter stripping with TPA per out unit protocol, if in house  Continue renal MVI 1 po daily  Obtain outpatient DIALLO dose and order( we will do)  Will follow along.    Tana Rodríguez DO  Nephrology Fellow   Daytime / Weekend Renal Pager [phone]  After 7 pm Emergencies [phone] Pager [phone]

## 2023-11-14 LAB
ALBUMIN SERPL BCP-MCNC: 2.6 G/DL (ref 3.4–5)
ALP SERPL-CCNC: 84 U/L (ref 33–136)
ALT SERPL W P-5'-P-CCNC: 10 U/L (ref 7–45)
ANION GAP SERPL CALC-SCNC: 18 MMOL/L (ref 10–20)
AST SERPL W P-5'-P-CCNC: 11 U/L (ref 9–39)
BILIRUB SERPL-MCNC: 0.2 MG/DL (ref 0–1.2)
BUN SERPL-MCNC: 44 MG/DL (ref 6–23)
CALCIUM SERPL-MCNC: 8.8 MG/DL (ref 8.6–10.6)
CHLORIDE SERPL-SCNC: 100 MMOL/L (ref 98–107)
CO2 SERPL-SCNC: 25 MMOL/L (ref 21–32)
CREAT SERPL-MCNC: 7.19 MG/DL (ref 0.5–1.05)
GFR SERPL CREATININE-BSD FRML MDRD: 6 ML/MIN/1.73M*2
GLUCOSE SERPL-MCNC: 87 MG/DL (ref 74–99)
INR PPP: 2.7 (ref 0.9–1.1)
POTASSIUM SERPL-SCNC: 3.9 MMOL/L (ref 3.5–5.3)
PROT SERPL-MCNC: 6 G/DL (ref 6.4–8.2)
PROTHROMBIN TIME: 30.2 SECONDS (ref 9.8–12.8)
SODIUM SERPL-SCNC: 139 MMOL/L (ref 136–145)

## 2023-11-14 PROCEDURE — 2500000001 HC RX 250 WO HCPCS SELF ADMINISTERED DRUGS (ALT 637 FOR MEDICARE OP): Performed by: INTERNAL MEDICINE

## 2023-11-14 PROCEDURE — 2500000001 HC RX 250 WO HCPCS SELF ADMINISTERED DRUGS (ALT 637 FOR MEDICARE OP): Performed by: STUDENT IN AN ORGANIZED HEALTH CARE EDUCATION/TRAINING PROGRAM

## 2023-11-14 PROCEDURE — 96366 THER/PROPH/DIAG IV INF ADDON: CPT | Performed by: STUDENT IN AN ORGANIZED HEALTH CARE EDUCATION/TRAINING PROGRAM

## 2023-11-14 PROCEDURE — 2500000004 HC RX 250 GENERAL PHARMACY W/ HCPCS (ALT 636 FOR OP/ED): Performed by: STUDENT IN AN ORGANIZED HEALTH CARE EDUCATION/TRAINING PROGRAM

## 2023-11-14 PROCEDURE — 90945 DIALYSIS ONE EVALUATION: CPT | Performed by: INTERNAL MEDICINE

## 2023-11-14 PROCEDURE — 36415 COLL VENOUS BLD VENIPUNCTURE: CPT | Performed by: STUDENT IN AN ORGANIZED HEALTH CARE EDUCATION/TRAINING PROGRAM

## 2023-11-14 PROCEDURE — 80053 COMPREHEN METABOLIC PANEL: CPT | Performed by: STUDENT IN AN ORGANIZED HEALTH CARE EDUCATION/TRAINING PROGRAM

## 2023-11-14 PROCEDURE — G0378 HOSPITAL OBSERVATION PER HR: HCPCS

## 2023-11-14 PROCEDURE — 90947 DIALYSIS REPEATED EVAL: CPT

## 2023-11-14 PROCEDURE — 2500000004 HC RX 250 GENERAL PHARMACY W/ HCPCS (ALT 636 FOR OP/ED): Performed by: INTERNAL MEDICINE

## 2023-11-14 PROCEDURE — 85610 PROTHROMBIN TIME: CPT | Performed by: STUDENT IN AN ORGANIZED HEALTH CARE EDUCATION/TRAINING PROGRAM

## 2023-11-14 PROCEDURE — 99232 SBSQ HOSP IP/OBS MODERATE 35: CPT | Performed by: INTERNAL MEDICINE

## 2023-11-14 PROCEDURE — 84100 ASSAY OF PHOSPHORUS: CPT | Performed by: INTERNAL MEDICINE

## 2023-11-14 PROCEDURE — 96367 TX/PROPH/DG ADDL SEQ IV INF: CPT | Performed by: STUDENT IN AN ORGANIZED HEALTH CARE EDUCATION/TRAINING PROGRAM

## 2023-11-14 RX ORDER — PANTOPRAZOLE SODIUM 40 MG/1
40 TABLET, DELAYED RELEASE ORAL
Status: DISCONTINUED | OUTPATIENT
Start: 2023-11-15 | End: 2023-11-15 | Stop reason: HOSPADM

## 2023-11-14 RX ORDER — MAGNESIUM SULFATE HEPTAHYDRATE 40 MG/ML
2 INJECTION, SOLUTION INTRAVENOUS ONCE
Status: COMPLETED | OUTPATIENT
Start: 2023-11-14 | End: 2023-11-14

## 2023-11-14 RX ORDER — OXYCODONE HYDROCHLORIDE 5 MG/1
2.5 TABLET ORAL EVERY 4 HOURS PRN
Status: DISCONTINUED | OUTPATIENT
Start: 2023-11-14 | End: 2023-11-15 | Stop reason: HOSPADM

## 2023-11-14 RX ADMIN — PREGABALIN 25 MG: 25 CAPSULE ORAL at 09:01

## 2023-11-14 RX ADMIN — OXYCODONE HYDROCHLORIDE 2.5 MG: 5 TABLET ORAL at 12:41

## 2023-11-14 RX ADMIN — ACETAMINOPHEN 975 MG: 325 TABLET ORAL at 20:40

## 2023-11-14 RX ADMIN — NYSTATIN 500000 UNITS: 500000 SUSPENSION ORAL at 14:00

## 2023-11-14 RX ADMIN — VANCOMYCIN HYDROCHLORIDE: 500 INJECTION, POWDER, LYOPHILIZED, FOR SOLUTION INTRAVENOUS at 14:30

## 2023-11-14 RX ADMIN — DOCUSATE SODIUM 100 MG: 100 CAPSULE, LIQUID FILLED ORAL at 09:01

## 2023-11-14 RX ADMIN — ACETAMINOPHEN 975 MG: 325 TABLET ORAL at 12:41

## 2023-11-14 RX ADMIN — CARVEDILOL 6.25 MG: 12.5 TABLET, FILM COATED ORAL at 09:01

## 2023-11-14 RX ADMIN — SEVELAMER HYDROCHLORIDE 800 MG: 800 TABLET, FILM COATED ORAL at 12:41

## 2023-11-14 RX ADMIN — MAGNESIUM SULFATE HEPTAHYDRATE 2 G: 40 INJECTION, SOLUTION INTRAVENOUS at 20:38

## 2023-11-14 RX ADMIN — PANTOPRAZOLE SODIUM 40 MG: 40 TABLET, DELAYED RELEASE ORAL at 06:28

## 2023-11-14 RX ADMIN — OXYCODONE HYDROCHLORIDE 2.5 MG: 5 TABLET ORAL at 20:41

## 2023-11-14 RX ADMIN — CALCITRIOL 0.5 MCG: 0.5 CAPSULE ORAL at 09:00

## 2023-11-14 RX ADMIN — WARFARIN SODIUM 2.5 MG: 2.5 TABLET ORAL at 17:24

## 2023-11-14 RX ADMIN — Medication 3 MG: at 20:40

## 2023-11-14 RX ADMIN — PANCRELIPASE 3 CAPSULE: 24000; 76000; 120000 CAPSULE, DELAYED RELEASE PELLETS ORAL at 12:41

## 2023-11-14 RX ADMIN — Medication 10 ML: at 20:47

## 2023-11-14 RX ADMIN — GENTAMICIN SULFATE: 1 CREAM TOPICAL at 17:09

## 2023-11-14 RX ADMIN — ALLOPURINOL 100 MG: 100 TABLET ORAL at 09:01

## 2023-11-14 RX ADMIN — SEVELAMER HYDROCHLORIDE 800 MG: 800 TABLET, FILM COATED ORAL at 09:01

## 2023-11-14 RX ADMIN — PANCRELIPASE 3 CAPSULE: 24000; 76000; 120000 CAPSULE, DELAYED RELEASE PELLETS ORAL at 09:00

## 2023-11-14 RX ADMIN — Medication 10 ML: at 09:01

## 2023-11-14 RX ADMIN — ACETAMINOPHEN 975 MG: 325 TABLET ORAL at 05:12

## 2023-11-14 RX ADMIN — MIRTAZAPINE 15 MG: 15 TABLET, FILM COATED ORAL at 20:41

## 2023-11-14 RX ADMIN — VANCOMYCIN HYDROCHLORIDE: 1 INJECTION, POWDER, LYOPHILIZED, FOR SOLUTION INTRAVENOUS at 14:30

## 2023-11-14 RX ADMIN — CARVEDILOL 6.25 MG: 12.5 TABLET, FILM COATED ORAL at 17:24

## 2023-11-14 RX ADMIN — HYDROXYZINE HYDROCHLORIDE 25 MG: 25 TABLET, FILM COATED ORAL at 21:19

## 2023-11-14 RX ADMIN — LEVOTHYROXINE SODIUM 25 MCG: 25 TABLET ORAL at 06:28

## 2023-11-14 RX ADMIN — HYDROMORPHONE HYDROCHLORIDE 2 MG: 2 TABLET ORAL at 05:11

## 2023-11-14 RX ADMIN — ATORVASTATIN CALCIUM 40 MG: 40 TABLET, FILM COATED ORAL at 20:40

## 2023-11-14 ASSESSMENT — COGNITIVE AND FUNCTIONAL STATUS - GENERAL
TOILETING: A LITTLE
MOVING TO AND FROM BED TO CHAIR: A LITTLE
WALKING IN HOSPITAL ROOM: A LITTLE
HELP NEEDED FOR BATHING: A LITTLE
CLIMB 3 TO 5 STEPS WITH RAILING: A LITTLE
DRESSING REGULAR LOWER BODY CLOTHING: A LITTLE
MOBILITY SCORE: 19
CLIMB 3 TO 5 STEPS WITH RAILING: A LOT
DAILY ACTIVITIY SCORE: 21
DAILY ACTIVITIY SCORE: 22
MOBILITY SCORE: 22
DRESSING REGULAR LOWER BODY CLOTHING: A LITTLE
HELP NEEDED FOR BATHING: A LITTLE
WALKING IN HOSPITAL ROOM: A LITTLE
STANDING UP FROM CHAIR USING ARMS: A LITTLE

## 2023-11-14 ASSESSMENT — PAIN - FUNCTIONAL ASSESSMENT
PAIN_FUNCTIONAL_ASSESSMENT: 0-10

## 2023-11-14 ASSESSMENT — PAIN SCALES - GENERAL
PAINLEVEL_OUTOF10: 10 - WORST POSSIBLE PAIN
PAINLEVEL_OUTOF10: 9
PAINLEVEL_OUTOF10: 10 - WORST POSSIBLE PAIN

## 2023-11-14 ASSESSMENT — PAIN DESCRIPTION - DESCRIPTORS: DESCRIPTORS: ACHING;THROBBING;TENDER

## 2023-11-14 NOTE — CARE PLAN
The patient is requesting a HHA and I am making an application to [employer] for her.   In addition, the patient is interested in get a chairlift as she is challenged by stairs in her home.   Going to see what is possible in getting the chairlift and follow up.

## 2023-11-14 NOTE — PROGRESS NOTES
"Yoselyn Hernandez is a 63 y.o. female on day 0 of admission presenting with Abdominal pain.    Subjective   Lying in bed. No distress.  Reports feeling abdominal fullness and discomfort.  Otherwise feels okay.         Objective     General: Lying in bed without distress.  Cooperative.  Skin: No rashes or ulcerations.  HEENT: Sclera is white.  Mucous membranes moist.  Neck: Supple.  No JVD.  Cardiac: Regular rate and rhythm, S1/S2 normal.  Lungs: Clear to auscultation bilaterally, no wheezing or crackles, no accessory muscle use at rest.  Abdomen: Soft, some tenderness noted epigastric area, mild distention, BS +  Extremities: No cyanosis.  No lower extremity edema.  Neurologic: Alert and oriented x3.  No focal deficits.  Psychiatric: Appropriate mood and behavior.  Currently no agitation.    Last Recorded Vitals  Blood pressure 131/70, pulse 91, temperature 36.8 °C (98.2 °F), resp. rate 18, height 1.702 m (5' 7.01\"), weight 57 kg (125 lb 10.6 oz), SpO2 100 %.  On room air.    Intake/Output last 3 Shifts:  I/O last 3 completed shifts:  In: 240 (4.2 mL/kg) [P.O.:240]  Out: 1509 (26.5 mL/kg) [Other:1509]  Weight: 57 kg     Relevant Results  acetaminophen, 975 mg, oral, q8h  allopurinol, 100 mg, oral, Daily  atorvastatin, 40 mg, oral, Nightly  calcitriol, 0.5 mcg, oral, Daily  carvedilol, 6.25 mg, oral, BID with meals  dextrose 1.5% - LOW calcium 2.5mEq/L 2,000 mL with vancomycin 50 mg peritoneal dialysate, , intraperitoneal, q24h  dextrose 2.5 % - LOW calcium 2.5 mEq/L 5,000 mL with vancomycin 125 mg peritoneal dialysate, , intraperitoneal, q24h  dextrose 2.5 % - LOW calcium 2.5 mEq/L 5,000 mL with vancomycin 125 mg peritoneal dialysate, , intraperitoneal, q24h  docusate sodium, 100 mg, oral, BID  gentamicin, , Topical, Daily  levothyroxine, 25 mcg, oral, Daily before breakfast  lipase-protease-amylase, 3 capsule, oral, TID with meals  melatonin, 3 mg, oral, Daily  mirtazapine, 15 mg, oral, Nightly  nystatin, 500,000 " Units, oral, q8h AMARJIT  pantoprazole, 40 mg, oral, BID AC  pregabalin, 25 mg, oral, Daily  sevelamer HCl, 800 mg, oral, TID with meals  sodium chloride 0.9%, 10 mL, intravenous, q12h AMARJIT  warfarin, 2.5 mg, oral, Daily           PRN medications: eucerin, hydrOXYzine HCL, loperamide, ondansetron, oxyCODONE, polyethylene glycol, sodium chloride 0.9%     Results for orders placed or performed during the hospital encounter of 11/06/23 (from the past 24 hour(s))   Comprehensive metabolic panel   Result Value Ref Range    Glucose 87 74 - 99 mg/dL    Sodium 139 136 - 145 mmol/L    Potassium 3.9 3.5 - 5.3 mmol/L    Chloride 100 98 - 107 mmol/L    Bicarbonate 25 21 - 32 mmol/L    Anion Gap 18 10 - 20 mmol/L    Urea Nitrogen 44 (H) 6 - 23 mg/dL    Creatinine 7.19 (H) 0.50 - 1.05 mg/dL    eGFR 6 (L) >60 mL/min/1.73m*2    Calcium 8.8 8.6 - 10.6 mg/dL    Albumin 2.6 (L) 3.4 - 5.0 g/dL    Alkaline Phosphatase 84 33 - 136 U/L    Total Protein 6.0 (L) 6.4 - 8.2 g/dL    AST 11 9 - 39 U/L    Bilirubin, Total 0.2 0.0 - 1.2 mg/dL    ALT 10 7 - 45 U/L   Protime-INR   Result Value Ref Range    Protime 30.2 (H) 9.8 - 12.8 seconds    INR 2.7 (H) 0.9 - 1.1        Hospital Course:  Yoselyn Hernandez is a 63 y.o. female on day 0 of admission presenting with Abdominal pain.  Patient has a history of ESRD on dialysis, AAA, HFrEF (25% 03/2023) and pancreatitis, PE (03/2023 on warfarin) who presents to the ED with acute on chronic abdominal pain that worsened over the last 4 days.  Fluid drawn off from PD catheter shows growth of staph epidermis and concerns for peritonitis.  Nephrology has been consulted.  Patient started on vancomycin with dialysate.    Assessment/Plan     Acute on Chronic Abdominal Pain  Acute bacterial peritonitis  Acute on Chronic Diarrhea  Abdominal hernia  -Suspect that patient's acute component of abdominal pain is related to bacterial peritonitis and chronic abdominal pain may be related to irritable bowel syndrome.  -CTA c/a/p  negative for any acute changes, showed chronic changes with interval development of incisional hernia.  -CRP elevated  -Tissue transglutaminase IgA negative  -Change PPI to daily.  -Culture from peritoneal fluid growing Staph epidermidis.  Concern for acute peritonitis.  Nephrology following.  Patient currently getting intraperitoneal vancomycin with peritoneal dialysis.  -ACS consulted, no acute intervention recommended.  Will place outpatient general surgery follow-up upon discharge for abdominal hernia.     Hypokalemia  Hypomagnesemia  -Hypokalemia resolved.   -Hypomagnesemia still present, will give IV magnesium sulfate replacement.  Recheck magnesium level tomorrow.     Depressive disorder  -Psych consulted.  -Continue mirtazapine 15 mg at bedside  - and art   -Continue to monitor close  -Reports mentally feeling anxious when at home by herself.     Generalized weakness and deconditioning  -On 11/11, patient started complaining of generalized weakness and inability to ambulate properly.  This is new complaint, patient has been ambulating without any difficulty since her admission, she was able to go to the bathroom  - PT has seen the patient and recommended low intensity.     Acute on chronic SOB  HFrEF (25% 03/2023)  Hx of PE  CP  -Symptoms could be related to bacterial peritonitis, currently not having a lot of complaints about dyspnea.  -Last INR 2.7  -Iron study unremarkable  -Follow-up echo if necessary  -c/w home coreg   -Outpatient cardiology follow-up upon discharge     AAA  Stable right innominate, cannulated coronary dissection  -BP < 120/80, HR < 80  -c/w home coreg      ESRD on peritoneal dialysis  -c/w sevalamer 600 tid  -monitor Bicarb given significant diarrhea  -Nephrology has ordered vancomycin with dialysate.     Hypothryoidism  -c/w levothyroxine 25mcg    Disposition: Discussed with nephrology, observe for another 24 hours, possible discharge tomorrow.    Carl Wiley MD

## 2023-11-14 NOTE — CARE PLAN
Problem: Fall/Injury  Goal: Not fall by end of shift  Outcome: Progressing     Problem: Fall/Injury  Goal: Verbalize understanding of personal risk factors for fall in the hospital  Outcome: Progressing     Problem: Fall/Injury  Goal: Use assistive devices by end of the shift  Outcome: Progressing     Problem: Pain  Goal: Performs ADL's with improved pain control throughout shift  Outcome: Progressing     Problem: Safety - Adult  Goal: Free from fall injury  Outcome: Progressing   The patient's goals for the shift include To remain pain free    The clinical goals for the shift include Pt will remain safe and free of fall/injury during this shift.

## 2023-11-14 NOTE — CARE PLAN
Problem: Pain  Goal: Takes deep breaths with improved pain control throughout the shift  Outcome: Not Progressing  Goal: Turns in bed with improved pain control throughout the shift  Outcome: Not Progressing  Goal: Walks with improved pain control throughout the shift  Outcome: Not Progressing  Goal: Performs ADL's with improved pain control throughout shift  Outcome: Not Progressing   The patient's goals for the shift include To remain pain free    The clinical goals for the shift include pain control        Problem: Pain  Goal: Takes deep breaths with improved pain control throughout the shift  Outcome: Not Progressing  Goal: Turns in bed with improved pain control throughout the shift  Outcome: Not Progressing  Goal: Walks with improved pain control throughout the shift  Outcome: Not Progressing  Goal: Performs ADL's with improved pain control throughout shift  Outcome: Not Progressing

## 2023-11-15 ENCOUNTER — HOME HEALTH ADMISSION (OUTPATIENT)
Dept: HOME HEALTH SERVICES | Facility: HOME HEALTH | Age: 63
End: 2023-11-15
Payer: COMMERCIAL

## 2023-11-15 ENCOUNTER — PHARMACY VISIT (OUTPATIENT)
Dept: PHARMACY | Facility: CLINIC | Age: 63
End: 2023-11-15
Payer: COMMERCIAL

## 2023-11-15 VITALS
WEIGHT: 125.66 LBS | SYSTOLIC BLOOD PRESSURE: 131 MMHG | DIASTOLIC BLOOD PRESSURE: 79 MMHG | RESPIRATION RATE: 17 BRPM | TEMPERATURE: 98.6 F | HEART RATE: 88 BPM | BODY MASS INDEX: 19.72 KG/M2 | OXYGEN SATURATION: 98 % | HEIGHT: 67 IN

## 2023-11-15 LAB
ALBUMIN SERPL BCP-MCNC: 2.6 G/DL (ref 3.4–5)
ALP SERPL-CCNC: 82 U/L (ref 33–136)
ALT SERPL W P-5'-P-CCNC: 8 U/L (ref 7–45)
ANION GAP SERPL CALC-SCNC: 16 MMOL/L (ref 10–20)
AST SERPL W P-5'-P-CCNC: 10 U/L (ref 9–39)
BILIRUB SERPL-MCNC: 0.3 MG/DL (ref 0–1.2)
BUN SERPL-MCNC: 49 MG/DL (ref 6–23)
CALCIUM SERPL-MCNC: 8.9 MG/DL (ref 8.6–10.6)
CHLORIDE SERPL-SCNC: 101 MMOL/L (ref 98–107)
CO2 SERPL-SCNC: 24 MMOL/L (ref 21–32)
CREAT SERPL-MCNC: 6.99 MG/DL (ref 0.5–1.05)
GFR SERPL CREATININE-BSD FRML MDRD: 6 ML/MIN/1.73M*2
GLUCOSE SERPL-MCNC: 91 MG/DL (ref 74–99)
INR PPP: 2.5 (ref 0.9–1.1)
MAGNESIUM SERPL-MCNC: 1.64 MG/DL (ref 1.6–2.4)
PHOSPHATE SERPL-MCNC: 6.2 MG/DL (ref 2.5–4.9)
POTASSIUM SERPL-SCNC: 4.3 MMOL/L (ref 3.5–5.3)
PROT SERPL-MCNC: 6 G/DL (ref 6.4–8.2)
PROTHROMBIN TIME: 29 SECONDS (ref 9.8–12.8)
SODIUM SERPL-SCNC: 137 MMOL/L (ref 136–145)

## 2023-11-15 PROCEDURE — G0378 HOSPITAL OBSERVATION PER HR: HCPCS

## 2023-11-15 PROCEDURE — 83735 ASSAY OF MAGNESIUM: CPT | Performed by: INTERNAL MEDICINE

## 2023-11-15 PROCEDURE — 36415 COLL VENOUS BLD VENIPUNCTURE: CPT | Performed by: STUDENT IN AN ORGANIZED HEALTH CARE EDUCATION/TRAINING PROGRAM

## 2023-11-15 PROCEDURE — 99239 HOSP IP/OBS DSCHRG MGMT >30: CPT | Performed by: INTERNAL MEDICINE

## 2023-11-15 PROCEDURE — 2500000001 HC RX 250 WO HCPCS SELF ADMINISTERED DRUGS (ALT 637 FOR MEDICARE OP): Performed by: INTERNAL MEDICINE

## 2023-11-15 PROCEDURE — 90945 DIALYSIS ONE EVALUATION: CPT | Performed by: STUDENT IN AN ORGANIZED HEALTH CARE EDUCATION/TRAINING PROGRAM

## 2023-11-15 PROCEDURE — 2500000004 HC RX 250 GENERAL PHARMACY W/ HCPCS (ALT 636 FOR OP/ED): Performed by: STUDENT IN AN ORGANIZED HEALTH CARE EDUCATION/TRAINING PROGRAM

## 2023-11-15 PROCEDURE — RXMED WILLOW AMBULATORY MEDICATION CHARGE

## 2023-11-15 PROCEDURE — 2500000001 HC RX 250 WO HCPCS SELF ADMINISTERED DRUGS (ALT 637 FOR MEDICARE OP): Performed by: STUDENT IN AN ORGANIZED HEALTH CARE EDUCATION/TRAINING PROGRAM

## 2023-11-15 PROCEDURE — 85610 PROTHROMBIN TIME: CPT | Performed by: STUDENT IN AN ORGANIZED HEALTH CARE EDUCATION/TRAINING PROGRAM

## 2023-11-15 PROCEDURE — 97165 OT EVAL LOW COMPLEX 30 MIN: CPT | Mod: GO

## 2023-11-15 PROCEDURE — 80053 COMPREHEN METABOLIC PANEL: CPT | Performed by: STUDENT IN AN ORGANIZED HEALTH CARE EDUCATION/TRAINING PROGRAM

## 2023-11-15 PROCEDURE — 90947 DIALYSIS REPEATED EVAL: CPT

## 2023-11-15 PROCEDURE — 2500000004 HC RX 250 GENERAL PHARMACY W/ HCPCS (ALT 636 FOR OP/ED): Performed by: INTERNAL MEDICINE

## 2023-11-15 RX ORDER — ACETAMINOPHEN 325 MG/1
975 TABLET ORAL EVERY 8 HOURS PRN
Qty: 270 TABLET | Refills: 0 | Status: SHIPPED | OUTPATIENT
Start: 2023-11-15 | End: 2024-02-07 | Stop reason: HOSPADM

## 2023-11-15 RX ORDER — PANTOPRAZOLE SODIUM 40 MG/1
40 TABLET, DELAYED RELEASE ORAL
Qty: 30 TABLET | Refills: 2 | Status: SHIPPED | OUTPATIENT
Start: 2023-11-16 | End: 2023-11-15 | Stop reason: HOSPADM

## 2023-11-15 RX ORDER — OXYCODONE HYDROCHLORIDE 5 MG/1
2.5 TABLET ORAL EVERY 4 HOURS PRN
Qty: 21 TABLET | Refills: 0
Start: 2023-11-15 | End: 2023-11-15 | Stop reason: SDUPTHER

## 2023-11-15 RX ORDER — DOCUSATE SODIUM 100 MG/1
100 CAPSULE, LIQUID FILLED ORAL 2 TIMES DAILY PRN
Start: 2023-11-15 | End: 2023-12-20 | Stop reason: WASHOUT

## 2023-11-15 RX ORDER — OXYCODONE HYDROCHLORIDE 5 MG/1
2.5 TABLET ORAL EVERY 4 HOURS PRN
Qty: 21 TABLET | Refills: 0 | Status: SHIPPED | OUTPATIENT
Start: 2023-11-15 | End: 2024-01-12 | Stop reason: ENTERED-IN-ERROR

## 2023-11-15 RX ORDER — GENTAMICIN SULFATE 1 MG/G
CREAM TOPICAL DAILY
Qty: 30 G | Refills: 2 | Status: SHIPPED | OUTPATIENT
Start: 2023-11-16 | End: 2024-01-31 | Stop reason: ENTERED-IN-ERROR

## 2023-11-15 RX ORDER — LOPERAMIDE HYDROCHLORIDE 2 MG/1
2 CAPSULE ORAL 4 TIMES DAILY PRN
Qty: 30 CAPSULE | Refills: 0 | Status: SHIPPED | OUTPATIENT
Start: 2023-11-15 | End: 2024-02-07 | Stop reason: HOSPADM

## 2023-11-15 RX ADMIN — PANCRELIPASE 3 CAPSULE: 24000; 76000; 120000 CAPSULE, DELAYED RELEASE PELLETS ORAL at 09:49

## 2023-11-15 RX ADMIN — Medication 10 ML: at 09:51

## 2023-11-15 RX ADMIN — CARVEDILOL 6.25 MG: 12.5 TABLET, FILM COATED ORAL at 16:29

## 2023-11-15 RX ADMIN — SEVELAMER HYDROCHLORIDE 800 MG: 800 TABLET, FILM COATED ORAL at 16:28

## 2023-11-15 RX ADMIN — NYSTATIN 500000 UNITS: 500000 SUSPENSION ORAL at 16:28

## 2023-11-15 RX ADMIN — NYSTATIN 500000 UNITS: 500000 SUSPENSION ORAL at 05:08

## 2023-11-15 RX ADMIN — OXYCODONE HYDROCHLORIDE 2.5 MG: 5 TABLET ORAL at 13:13

## 2023-11-15 RX ADMIN — PREGABALIN 25 MG: 25 CAPSULE ORAL at 09:49

## 2023-11-15 RX ADMIN — OXYCODONE HYDROCHLORIDE 2.5 MG: 5 TABLET ORAL at 05:08

## 2023-11-15 RX ADMIN — LEVOTHYROXINE SODIUM 25 MCG: 25 TABLET ORAL at 06:09

## 2023-11-15 RX ADMIN — ACETAMINOPHEN 975 MG: 325 TABLET ORAL at 05:08

## 2023-11-15 RX ADMIN — ACETAMINOPHEN 975 MG: 325 TABLET ORAL at 13:13

## 2023-11-15 RX ADMIN — ALLOPURINOL 100 MG: 100 TABLET ORAL at 09:50

## 2023-11-15 RX ADMIN — PANCRELIPASE 3 CAPSULE: 24000; 76000; 120000 CAPSULE, DELAYED RELEASE PELLETS ORAL at 16:29

## 2023-11-15 RX ADMIN — PANTOPRAZOLE SODIUM 40 MG: 40 TABLET, DELAYED RELEASE ORAL at 06:09

## 2023-11-15 RX ADMIN — CARVEDILOL 6.25 MG: 12.5 TABLET, FILM COATED ORAL at 09:49

## 2023-11-15 RX ADMIN — ONDANSETRON 4 MG: 2 INJECTION INTRAMUSCULAR; INTRAVENOUS at 07:23

## 2023-11-15 RX ADMIN — CALCITRIOL 0.5 MCG: 0.5 CAPSULE ORAL at 09:49

## 2023-11-15 RX ADMIN — SEVELAMER HYDROCHLORIDE 800 MG: 800 TABLET, FILM COATED ORAL at 09:49

## 2023-11-15 ASSESSMENT — COGNITIVE AND FUNCTIONAL STATUS - GENERAL
DRESSING REGULAR UPPER BODY CLOTHING: A LITTLE
PERSONAL GROOMING: A LITTLE
TOILETING: A LITTLE
DRESSING REGULAR LOWER BODY CLOTHING: A LITTLE
HELP NEEDED FOR BATHING: A LITTLE
DAILY ACTIVITIY SCORE: 19

## 2023-11-15 ASSESSMENT — ACTIVITIES OF DAILY LIVING (ADL)
ADL_ASSISTANCE: INDEPENDENT
BATHING_ASSISTANCE: MINIMAL

## 2023-11-15 ASSESSMENT — PAIN - FUNCTIONAL ASSESSMENT
PAIN_FUNCTIONAL_ASSESSMENT: 0-10

## 2023-11-15 ASSESSMENT — PAIN SCALES - GENERAL
PAINLEVEL_OUTOF10: 5 - MODERATE PAIN
PAINLEVEL_OUTOF10: 7
PAINLEVEL_OUTOF10: 8
PAINLEVEL_OUTOF10: 10 - WORST POSSIBLE PAIN

## 2023-11-15 NOTE — CARE PLAN
"While I was in the ED seeing patients, was notified by bedside nursing that patient wanted to see me again.  Went to address the patient's concerns.  Upon arrival to the room patient was there along with her sister.  Previously patient had claimed she had no ride and no family around to care for her and that is part the reason why she was anxious about being at home.  Patient had initially agreed for [facility] to help arrange a ride for her.  Since she had refused Uber/Lyft we had initially arranged wheelchair van.  Then I was notified later that patient's sister arrived and would transport her home.  Patient continues to complain that she should have stayed another day and did not need to be discharged.  I explained to the patient that she is tolerating diet, hemodynamically stable, I have not even received any concerns from nursing about significant diarrhea, and this was a chronic issue that had already been evaluated outpatient by GI and can continue to be followed up by GI.  I also pointed out once again that we had discussed this 2 days ago and at that time she had seemed fine with it, and today she reported she does not recall any of this conversation.  I told her we had to agree to disagree, I told her have a good day and left the room.  As I was leaving the room she called me \"asshole\" and slammed the door.  "

## 2023-11-15 NOTE — PROGRESS NOTES
Nephrology Follow-up Note   Patient ID: Yoselyn Hernandez is a 63 y.o. female.   Admitted for :   Chief Complaint   Patient presents with    Abdominal Pain      Evaluation of patient on dialysis at [facility]  [address]  [facility] [address] on 11/5/2023     Subjective:   C/o diarrhea with bowel incontinence  C/o abd pain and pressure especially at night    Otherwise  c/o CP/SOB/F/C  No alarms with PD overnight ; I drain 892; overall last 24 hrs  ml    Patient Active Problem List   Diagnosis    Asthma    Cardiomyopathy (CMS/HCC)    Depression with anxiety    Coronary artery calcification seen on CT scan    Diarrhea    Dissection of aorta (CMS/HCC)    ESRD (end stage renal disease) on dialysis (CMS/HCC)    Hyperlipemia    Peritoneal dialysis status (CMS/HCC)    Thoraco abdominal aneurysm (CMS/HCC)    Chronic obstructive pulmonary disease, unspecified COPD type (CMS/HCC)    DVT (deep venous thrombosis) (CMS/HCC)    Other specified coagulation defects (CMS/HCC)    Secondary hyperparathyroidism of renal origin (CMS/HCC)    Opioid dependence, uncomplicated (CMS/HCC)    Pleural effusion    Fluid excess    Abdominal pain       Scheduled medications:  acetaminophen, 975 mg, oral, q8h  allopurinol, 100 mg, oral, Daily  atorvastatin, 40 mg, oral, Nightly  calcitriol, 0.5 mcg, oral, Daily  carvedilol, 6.25 mg, oral, BID with meals  dextrose 1.5% - LOW calcium 2.5mEq/L 2,000 mL with vancomycin 50 mg peritoneal dialysate, , intraperitoneal, q24h  dextrose 2.5 % - LOW calcium 2.5 mEq/L 5,000 mL with vancomycin 125 mg peritoneal dialysate, , intraperitoneal, q24h  dextrose 2.5 % - LOW calcium 2.5 mEq/L 5,000 mL with vancomycin 125 mg peritoneal dialysate, , intraperitoneal, q24h  docusate sodium, 100 mg, oral, BID  gentamicin, , Topical, Daily  levothyroxine, 25 mcg, oral, Daily before breakfast  lipase-protease-amylase, 3 capsule, oral, TID with meals  melatonin, 3 mg, oral,  Daily  mirtazapine, 15 mg, oral, Nightly  nystatin, 500,000 Units, oral, q8h AMARJIT  [START ON 11/15/2023] pantoprazole, 40 mg, oral, Daily before breakfast  pregabalin, 25 mg, oral, Daily  sevelamer HCl, 800 mg, oral, TID with meals  sodium chloride 0.9%, 10 mL, intravenous, q12h AMARJIT  warfarin, 2.5 mg, oral, Daily         PRN medications: eucerin, hydrOXYzine HCL, loperamide, ondansetron, oxyCODONE, polyethylene glycol, sodium chloride 0.9%     Heart Rate:  [77-91]   Temp:  [36.2 °C (97.2 °F)-37 °C (98.6 °F)]   Resp:  [16-18]   BP: (107-131)/(64-76)   SpO2:  [94 %-100 %]    Weight: 56.2 kg (124 lb)   Gen: alert, NAD  HEENT: NC/AT  Neck: supple, no JVD   Pulm: clear ant b/l   CVS: RRR, no rub  Abd: S/NT, mildly distended   LE: no edema , no cyanosis   Neuro: no asterixis   Dialysis acces:  PD catheter exit site clean      Lab Results   Component Value Date    WBC 6.2 11/12/2023    HGB 8.1 (L) 11/12/2023    HCT 24.6 (L) 11/12/2023    MCV 92 11/12/2023     11/12/2023     Lab Results   Component Value Date    GLUCOSE 87 11/14/2023    CALCIUM 8.8 11/14/2023     11/14/2023    K 3.9 11/14/2023    CO2 25 11/14/2023     11/14/2023    BUN 44 (H) 11/14/2023    CREATININE 7.19 (H) 11/14/2023     Results from last 72 hours   Lab Units 11/14/23  0714 11/13/23  0850 11/12/23  0731   ALBUMIN g/dL 2.6*   < > 2.5*   GLUCOSE mg/dL 87   < > 100*   HEMOGLOBIN g/dL  --   --  8.1*   WBC AUTO x10*3/uL  --   --  6.2    < > = values in this interval not displayed.      Results from last 72 hours   Lab Units 11/14/23  0714   SODIUM mmol/L 139   POTASSIUM mmol/L 3.9   CO2 mmol/L 25   BUN mg/dL 44*   CREATININE mg/dL 7.19*   CALCIUM mg/dL 8.8        Assessment   ESRD-PD admitted with staph epi peritonitis on IP Vanco   Significant abd discomfort at night when supine   Euvolemic on exam   Plan   Plan PD per submitted orders, lower fill volume to 1400 to ease of abd distension and pain   MBD - please add Phosphorus to AM labs;   continue with Sevelamer q AC  Anemia of CKD: EPO to be given Weekly   Access: no issues   BP: acceptable  Low salt diet    Daily renal MVI   Hold docusate in the setting of ongoing diarrhea

## 2023-11-15 NOTE — CARE PLAN
The patient's goals for the shift include   Problem: Fall/Injury  Goal: Be free from injury by end of the shift  Outcome: Progressing     Problem: Pain  Goal: Performs ADL's with improved pain control throughout shift  Outcome: Progressing     Problem: Pain - Adult  Goal: Verbalizes/displays adequate comfort level or baseline comfort level  Outcome: Progressing       The clinical goals for the shift include pt will remain safe throughout shift.

## 2023-11-15 NOTE — CARE PLAN
Patient was interested in information on chair lift in her home.  I followed up from Monday and provided the patient with contractors recommended for seniors by the Department [employer]ging. Patient was pleased.

## 2023-11-15 NOTE — PROGRESS NOTES
11/15/23 1321 Transitional Care Coordinator Notes:    Per MD, patient will possibly discharge today. Home care orders sent to [facility] and SOC scheduled for 11/18. MD notified of SOC date and agreeable. Family will transport patient to home. Will continue to monitor for discharge updates.     1321 Updates: SOC scheduled for 11/18. Transport placed in Round Trip. Will follow for time confirmation.               Assessment/Plan   Principal Problem:    Abdominal pain    Discharge Plans: discharge home with home care           Rubi Duncan RN

## 2023-11-15 NOTE — PROGRESS NOTES
Yoselyn Hernandez   63 [address]    @@  [facility]/Room: [id number]/6020/6020-A    Subjective: patient feels much better this AM with the new reduced dose of PD fluid. Denies any fevers or chills. Diarrhea is improved - more formed stools now.     Objective:     Meds:   acetaminophen, 975 mg, q8h  allopurinol, 100 mg, Daily  atorvastatin, 40 mg, Nightly  calcitriol, 0.5 mcg, Daily  carvedilol, 6.25 mg, BID with meals  dextrose 1.5% - LOW calcium 2.5mEq/L 2,000 mL with vancomycin 50 mg peritoneal dialysate, , q24h  dextrose 2.5 % - LOW calcium 2.5 mEq/L 5,000 mL with vancomycin 125 mg peritoneal dialysate, , q24h  dextrose 2.5 % - LOW calcium 2.5 mEq/L 5,000 mL with vancomycin 125 mg peritoneal dialysate, , q24h  [Held by provider] docusate sodium, 100 mg, BID  gentamicin, , Daily  levothyroxine, 25 mcg, Daily before breakfast  lipase-protease-amylase, 3 capsule, TID with meals  melatonin, 3 mg, Daily  mirtazapine, 15 mg, Nightly  nystatin, 500,000 Units, q8h AMARJIT  pantoprazole, 40 mg, Daily before breakfast  pregabalin, 25 mg, Daily  sevelamer HCl, 800 mg, TID with meals  sodium chloride 0.9%, 10 mL, q12h AMARJIT  warfarin, 2.5 mg, Daily         eucerin, , PRN  hydrOXYzine HCL, 25 mg, TID PRN  loperamide, 2 mg, 4x daily PRN  ondansetron, 4 mg, q6h PRN  oxyCODONE, 2.5 mg, q4h PRN  polyethylene glycol, 17 g, q12h PRN  sodium chloride 0.9%, 10 mL, PRN        Vitals:    11/15/23 1003   BP: 131/79   Pulse: 88   Resp:    Temp:    SpO2: 98%          Intake/Output Summary (Last 24 hours) at 11/15/2023 1404  Last data filed at 11/15/2023 1000  Gross per 24 hour   Intake 360 ml   Output 1197 ml   Net -837 ml       General appearance: no distress  Eyes: non-icteric  Skin: no apparent rash  Heart: regular  Lungs: CTA bilat no wheezing/crackles  Abdomen: soft, nt/nd  Extremities: no edema bilat  Mckenna  Neuro: No FND,asterixis  Access    Blood Labs:  Results for orders placed or performed during the hospital encounter of 11/06/23 (from the past 24  hour(s))   Comprehensive metabolic panel   Result Value Ref Range    Glucose 91 74 - 99 mg/dL    Sodium 137 136 - 145 mmol/L    Potassium 4.3 3.5 - 5.3 mmol/L    Chloride 101 98 - 107 mmol/L    Bicarbonate 24 21 - 32 mmol/L    Anion Gap 16 10 - 20 mmol/L    Urea Nitrogen 49 (H) 6 - 23 mg/dL    Creatinine 6.99 (H) 0.50 - 1.05 mg/dL    eGFR 6 (L) >60 mL/min/1.73m*2    Calcium 8.9 8.6 - 10.6 mg/dL    Albumin 2.6 (L) 3.4 - 5.0 g/dL    Alkaline Phosphatase 82 33 - 136 U/L    Total Protein 6.0 (L) 6.4 - 8.2 g/dL    AST 10 9 - 39 U/L    Bilirubin, Total 0.3 0.0 - 1.2 mg/dL    ALT 8 7 - 45 U/L   Protime-INR   Result Value Ref Range    Protime 29.0 (H) 9.8 - 12.8 seconds    INR 2.5 (H) 0.9 - 1.1   Magnesium   Result Value Ref Range    Magnesium 1.64 1.60 - 2.40 mg/dL              ASSESSMENT:  ESRD patient on Dr. Lora, admitted with abdominal pain and diarrhea. NO diarrhea since admission. PD fluid cell count and culture indicate staph epi peritonitis.   Currently on IP antibiotics for that.   PD prescription: 5 x 1800 ml cycler assisted exhanges over 10.5 hours. Last fill 1200 ml . Total therapy volume 42651 ml. Currently on all 2.5 % dianeal.      Impression:  Staph epi PD associated peritonitis  ESRD on PD  Anemia of ESRD +/- other     Recommendations/plan:  - Patient feels much better with the new PD prescription - will plan to discharge with this new regimen:  TV 47149 ml's, time: 10.5  hours, 5 exchanges, each fill 1400 ml, last fill 1200 ml's 1.5% and 2.5%  - Continue IP antibiotics for 2 week - plan for IP vancomycin till 11/22 (25 mg/l dosing)  - PD catheter stripping with TPA per out unit protocol, if in house  - Continue renal MVI 1 po daily  - okay to be discharged from renal standpoint        Tana Rodríguez DO  Nephrology Fellow   Daytime / Weekend Renal Pager [phone]  After 7 pm Emergencies [phone] Pager [phone]

## 2023-11-15 NOTE — PROGRESS NOTES
Physical Therapy                 Therapy Communication Note    Patient Name: Yoselyn Hernandez  MRN: [id number]  Today's Date: 11/15/2023     Discipline: Physical Therapy    Missed Visit Reason: Missed Visit Reason: Patient refused (Pt declined PT at this time, awaiting to be D/C today and was not feeling up to work with PT.)    Missed Time: Attempt

## 2023-11-15 NOTE — PROGRESS NOTES
"Occupational Therapy    Evaluation    Patient Name: Yoselyn Hernandez  MRN: [id number]  Today's Date: 11/15/2023  Time Calculation  Start Time: 1003  Stop Time: 1035  Time Calculation (min): 32 min    Assessment  IP OT Assessment  OT Assessment: NATY IS A 64 Y/O F WHO EXHIBITS DIFFICULTY PERFORMING ADL AND IADL TASKS  Prognosis: Good  Evaluation/Treatment Tolerance: Patient limited by fatigue  Medical Staff Made Aware: Yes  End of Session Communication: Bedside nurse  End of Session Patient Position: Up in chair  Plan:  Treatment Interventions: ADL retraining, Functional transfer training, UE strengthening/ROM, Endurance training  OT Frequency: 2 times per week  OT Discharge Recommendations: Low intensity level of continued care  OT Recommended Transfer Status:  (DISTANT SUPERVISION FOR SAFETY)  OT - OK to Discharge: Yes    Subjective   Current Problem:  1. Abdominal pain, epigastric        2. Subtherapeutic international normalized ratio (INR)        3. Epigastric pain  Referral to Primary Care      4. Physical deconditioning  Referral to Home Health        General:  General  Reason for Referral: diarrhea and weakness  Past Medical History Relevant to Rehab: ESRD on dialysis, AAA, HFrEF (25% 03/2023) and pancreatitis, PE  Family/Caregiver Present: No  Prior to Session Communication: Bedside nurse  Patient Position Received: Bed, 2 rail up, Alarm off, not on at start of session  Preferred Learning Style: verbal  General Comment: RN CLEARED; PATEINT SUPINE IN  BED UPON APPROACH. SHE WAS PLEASANT, HIGHLY COMMUNICATIVE, AND VERBALIZED FULL RECEPTIVITY TO PARTCIPATION.  Precautions:  Medical Precautions: Fall precautions  Vital Signs:  Heart Rate: 88  SpO2: 98 %  BP: 131/79  BP Location: Right arm  BP Method: Automatic  Patient Position: Sitting  Pain:  Pain Assessment  Pain Assessment: 0-10  Pain Score: 8 (\"I AM ALWAYS IN PAIN USUALLY 10+/10\")    Objective   Cognition:  Overall Cognitive Status: Within Functional " Limits  Orientation Level: Oriented X4  Attention: Within Functional Limits  Memory: Within Funtional Limits  Safety/Judgement: Within Functional Limits  Insight: Within function limits           Home Living:  Type of Home: House  Lives With: Alone  Home Adaptive Equipment: Walker rolling or standard  Home Layout: Two level  Home Access: Stairs to enter with rails  Entrance Stairs-Rails: Both  Entrance Stairs-Number of Steps: 8  Bathroom Shower/Tub: Tub/shower unit, Walk-in shower (CURRENTLY HAS BOTH; PREFERS TUB/USES SHOWER FOR PD SUPPLIES)  Bathroom Toilet: Handicapped height  Bathroom Equipment:  (HH, SHOWER BENCH)   Prior Function:  Level of [place]: Independent with ADLs and functional transfers, Independent with homemaking with ambulation  Receives Help From:  (REPORTS HAVING PRN ASSISTANCE FROM FRIENDS)  ADL Assistance: Independent  Homemaking Assistance: Needs assistance  Meal Prep:  (SIMPLE WARMING OF MEALS PREPARED BY FRIENDS)  Laundry:  (PRN ASSISTANCE FROM FRIENDS)  Vocational: Retired (TEACHER)  Leisure: Orthodox, TRAVEL  Hand Dominance: Right  IADL History:  Current License:  (HAS NOT DRIVEN SINCE 2022)  ADL:  Eating Assistance: Independent  Grooming Assistance: Independent  Bathing Assistance: Minimal (ANTICIPATED)  UE Dressing Assistance: Stand by  LE Dressing Assistance: Stand by  Toileting Assistance with Device: Stand by (ANTICIPATED)  Activity Tolerance:  Endurance: Decreased tolerance for upright activites (REST BREAKS REQUIRED WITH MIN EXERTION DURING ADL TASKS)  Bed Mobility/Transfers: Bed Mobility  Bed Mobility: Yes  Bed Mobility 1  Bed Mobility 1:  (MOD I IN OOB USING LOG ROLL)    Transfers  Transfer: Yes  Modalities:     IADL's:   Current License:  (HAS NOT DRIVEN SINCE 2022)  Vision: Vision - Basic Assessment  Current Vision: No visual deficits  Visual History:  (LASER SURGERY)  Sensation:  Light Touch: No apparent deficits  Strength:  Strength Comments: LUE >/= 3+/5; RUE SHOULDER  3-/3  Perception:     Coordination:      Hand Function:  Hand Function  Gross Grasp: Functional  Extremities: RUE   RUE : Exceptions to WFL      Outcome Measures: [facility] Daily Activity  Putting on and taking off regular lower body clothing: A little  Bathing (including washing, rinsing, drying): A little  Putting on and taking off regular upper body clothing: A little  Toileting, which includes using toilet, bedpan or urinal: A little  Taking care of personal grooming such as brushing teeth: A little  Eating Meals: None  Daily Activity - Total Score: 19         and Brief Confusion Assessment Method (bCAM)  CAM Result: CAM -  Education Documentation  Precautions, taught by Sita Enriquez OT at 11/15/2023  1:02 PM.  Learner: Patient  Readiness: Eager  Method: Explanation  Response: Verbalizes Understanding    ADL Training, taught by Sita Enriquez OT at 11/15/2023  1:02 PM.  Learner: Patient  Readiness: Eager  Method: Explanation  Response: Verbalizes Understanding    Education Comments  No comments found.      Goals:   Encounter Problems       Encounter Problems (Active)       ADLs       Patient will perform UB bathing with modified independent level of assistance        Start:  11/15/23    Expected End:  11/15/23            Patient with complete upper body dressing with modified independent level of assistance donning and doffing with PRN adaptive equipment        Start:  11/15/23    Expected End:  11/15/23            Patient with complete lower body dressing with modified independent level of assistance donning and doffing all LE clothes  with PRN adaptive equipment        Start:  11/15/23    Expected End:  11/15/23            Patient will complete daily grooming tasks brushing teeth and washing face/hair with modified independent level of assistance        Start:  11/15/23    Expected End:  11/15/23            Patient will complete toileting including hygiene clothing management/hygiene with modified  independent level of assistance        Start:  11/15/23    Expected End:  11/15/23               EXERCISE/STRENGTHENING       Patient will complete RUE exercises for 4 sets and 10 reps in order to improve ROM and strength for increased functional activity for ADL performance.        Start:  11/15/23    Expected End:  11/15/23

## 2023-11-15 NOTE — DISCHARGE SUMMARY
Discharge Diagnosis  Acute on chronic abdominal pain with chronic intermittent diarrhea  Bacterial peritonitis    Issues Requiring Follow-Up  Follow-up with GI outpatient.  Scheduling department will call you to help you schedule an appointment.  May follow-up with your current GI doctor or find a new GI doctor.    Test Results Pending At Discharge    Hospital Course  Yoselyn Hernandez is a 63 y.o. female on day 0 of admission presenting with Abdominal pain.  Patient has a history of ESRD on dialysis, AAA, HFrEF (25% 03/2023) and pancreatitis, PE (03/2023 on warfarin) who presents to the ED with acute on chronic abdominal pain that worsened over the last 4 days.  Patient has known chronic abdominal pain with intermittent diarrhea for which she had seen gastroenterology outpatient.  There has been previous documentation indicating evaluation with endoscopy and colonoscopy.  No specific diagnosis given.  Nephrology has been consulted.  Fluid drawn off from PD catheter shows growth of staph epidermis and concerns for bacterial peritonitis. Patient started on vancomycin with dialysate.  Patient remained afebrile.  No leukocytosis.  Hemodynamically stable.  No other acute issues found.  Patient stable for discharge from nephrology standpoint.  When informing patient about the fact that she is stable for discharge patient quickly becomes anxious and agitated.  Patient keeps asking to wait another day.  Patient keeps claiming that she is at home by herself and feels anxious.  Patient also claims that she came for diarrhea and no one has addressed.  I have informed the patient that we have not seen significant amount of diarrhea here, she has also been evaluated by GI outpatient, and this evaluation can remain outpatient since she is tolerating diet and hemodynamically stable.  Patient claims that the GI doctor does nothing for her.  I offered to get our scheduling department to assist her on scheduling with another GI doctor  if that is her wish.  Patient agreeable.  Patient then claimed that she could not go home because she does not have a ride.  I offered her that we can arrange a ride for her.  She refuses to go by Uber or Ly[employer].  We offered wheelchair van.  PT/OT saw the patient while in the hospital because she claimed that she could not care for herself at home, but PT/OT felt patient stable for home with home health care.  Home health care ordered.          Assessment/Plan   Acute on Chronic Abdominal Pain  Acute bacterial peritonitis  Acute on Chronic Diarrhea  Abdominal hernia  -Suspect that patient's acute component of abdominal pain is related to bacterial peritonitis and chronic abdominal pain may be related to irritable bowel syndrome?  When she first came in we had talked about her abdominal pain she had talked about intermittent constipation and diarrhea, on the day of discharge she is denying intermittent constipation and very focused on diarrhea only.  Having said this she claims she does have Colace at home, to which I pointed out if she has Colace at home at one point someone was concern for constipation.  I advised her to call it should only be for as needed if she is constipated.  -CTA c/a/p negative for any acute changes, showed chronic changes with interval development of incisional hernia.  -Tissue transglutaminase IgA negative  -Culture from peritoneal fluid growing Staph epidermidis.  Concern for acute peritonitis.  Discussed with nephrology.  Patient is stable for discharge from their standpoint.  They have notified patient's home peritoneal dialysis team and have arranged for vancomycin with PD for home.  Plan was total 2 weeks intraperitoneal vancomycin.  Patient also given prescription for gentamicin ointment to place around PD catheter site.  -ACS consulted, no acute intervention recommended.  Will place outpatient general surgery follow-up upon discharge for abdominal hernia.  -Gave 7 days worth of  oxycodone (total 21 pills) for bacterial peritonitis, not for reported chronic abdominal pain, which should not be treated with opiates.     Hypokalemia  Hypomagnesemia  -Hypokalemia resolved.   -Hypomagnesemia resolved.     Depressive disorder  -Psych consulted.  -Continue mirtazapine 15 mg at bedside  - and art   -Continue to monitor close  -Patient demonstrates a lot of anxiety, and some behaviors concerning for wanting to stay in the hospital.  Frequently talks about how she does not have anyone at home to care for her.     Generalized weakness and reported deconditioning  -On 11/11, patient started complaining of generalized weakness and inability to ambulate properly.  This is new complaint, patient has been ambulating without any difficulty since her admission, she was able to go to the bathroom  - PT has seen the patient and recommended low intensity.  Home health care ordered.     Acute on chronic SOB  HFrEF (25% 03/2023)  Hx of PE  CP  -Symptoms could be related to bacterial peritonitis, currently not having increased respiratory rate or hypoxia issues.  Patient did not even mention dyspnea the last 2 days, and on the day of discharge Complaining why no one addressed her dyspnea.  During her rant and yelling at me patient did not appear short of breath at all.  -Last INR 2.5  -Iron study unremarkable  -c/w home coreg   -Outpatient cardiology follow-up      AAA  Stable right innominate, cannulated coronary dissection  -BP < 120/80, HR < 80  -c/w home coreg      ESRD on peritoneal dialysis  -c/w sevalamer 600 tid  -monitor Bicarb given significant diarrhea  -Nephrology has ordered vancomycin with dialysate.     Hypothryoidism  -c/w levothyroxine 25mcg    Pertinent Physical Exam At Time of Discharge  General: Lying in bed without distress.  Agitated and angry about going home.  Skin: No rashes or ulcerations.  HEENT: Sclera is white.  Mucous membranes moist.  Neck: Supple.  No JVD.  Cardiac: Regular  rate and rhythm, S1/S2 normal.  Lungs: Clear to auscultation bilaterally, no wheezing or crackles, no accessory muscle use at rest.  Abdomen: Soft, some tenderness noted epigastric area, mild distention, BS +  Extremities: No cyanosis.  No lower extremity edema.  Neurologic: Alert and oriented x3.  No focal deficits.  Psychiatric: Yesterday appeared normal behavior.  Today when talking about her being discharged patient quickly becomes anxious and agitated.  Patient making claims that no one cares about her diarrhea which is the main reason why she came.  Patient then proceeded to claimed that no one has tried to help her with her diarrhea or evaluate the diarrhea.  When I pointed out that she has had GI follow-up and evaluations including endoscopies/colonoscopies which contradicts what she has just stated, patient claims they do not do anything.    Home Medications     Medication List      START taking these medications     acetaminophen 325 mg tablet; Commonly known as: Tylenol; Take 3 tablets   (975 mg) by mouth every 8 hours if needed (pain or fever).   dextrose 1.5% - LOW calcium 2.5mEq/L Ca 2.5 mEq/L- Mg 0.5 mEq/L solution   2,000 mL with vancomycin 1,000 mg recon soln 50 mg; Inject 1,400 mL into   the abdomen / abdominal cavity once every 24 hours. TV 55889 ml's, time:   10.5  hours, 5 exchanges, each fill 1400 ml, last fill 1200 ml's 1.5% and   2.5% - Continue IP antibiotics for 2 week - plan for IP vancomycin till   11/22 (25 mg/l dosing)   * dextrose 2.5 % - LOW calcium 2.5 mEq/L Ca 2.5 mEq/L- Mg 0.5 mEq/L   solution 5,000 mL with vancomycin 1,000 mg recon soln 125 mg; Inject 1,400   mL into the abdomen / abdominal cavity once every 24 hours. TV [id number] ml's,   time: 10.5  hours, 5 exchanges, each fill 1400 ml, last fill 1200 ml's   1.5% and 2.5% - Continue IP antibiotics for 2 week - plan for IP   vancomycin till 11/22 (25 mg/l dosing)   * dextrose 2.5 % - LOW calcium 2.5 mEq/L Ca 2.5 mEq/L- Mg 0.5 mEq/L    solution 5,000 mL with vancomycin 1,000 mg recon soln 125 mg; Inject 1,400   mL into the abdomen / abdominal cavity once every 24 hours. TV 40929 ml's,   time: 10.5  hours, 5 exchanges, each fill 1400 ml, last fill 1200 ml's   1.5% and 2.5% - Continue IP antibiotics for 2 week - plan for IP   vancomycin till 11/22 (25 mg/l dosing)   docusate sodium 100 mg capsule; Commonly known as: Colace; Take 1   capsule (100 mg) by mouth 2 times a day as needed for constipation.   gentamicin 0.1 % cream; Commonly known as: Garamycin; Apply topically   once daily. Do not start before November 16, 2023.; Start taking on:   November 16, 2023   loperamide 2 mg capsule; Commonly known as: Imodium; Take 1 capsule (2   mg) by mouth 4 times a day as needed for diarrhea.   oxyCODONE 5 mg immediate release tablet; Commonly known as: Roxicodone;   Take 0.5 tablets (2.5 mg) by mouth every 4 hours if needed for severe pain   (7 - 10).  * This list has 2 medication(s) that are the same as other medications   prescribed for you. Read the directions carefully, and ask your doctor or   other care provider to review them with you.     CONTINUE taking these medications     allopurinol 100 mg tablet; Commonly known as: Zyloprim; Take 1 tablet   (100 mg) by mouth once daily.   atorvastatin 40 mg tablet; Commonly known as: Lipitor; TAKE 1 TABLET BY   MOUTH ONCE DAILY AT BEDTIME   calcitriol 0.5 mcg capsule; Commonly known as: Rocaltrol   carvedilol 6.25 mg tablet; Commonly known as: Coreg; Take 1 tablet (6.25   mg) by mouth 2 times a day with meals.   Creon 24,000-76,000 -120,000 unit capsule; Generic drug:   lipase-protease-amylase; Take 3 capsules by mouth 3 times a day with   meals.   epoetin treasure 10,000 unit/mL injection; Commonly known as: Epogen,Procrit   levothyroxine 25 mcg tablet; Commonly known as: Synthroid, Levoxyl; Take   1 tablet (25 mcg) by mouth once daily in the morning. Take before meals.   magnesium oxide 400 mg tablet; Commonly  known as: Mag-Ox   mirtazapine 15 mg tablet; Commonly known as: Remeron; Take 1 tablet (15   mg) by mouth once daily at bedtime.   naloxone 4 mg/0.1 mL nasal spray; Commonly known as: Narcan; INSTILL 1   SPRAY IN ONE NOSTRIL AS NEEDED FOR ACCIDENTAL OPIOID OVERDOSE; REPEAT WITH   SECOND DOSE IN 5 MINUTES IF NO RESPONSE   pregabalin 25 mg capsule; Commonly known as: Lyrica; Take 1 capsule (25   mg) by mouth once daily.   sevelamer carbonate 800 mg tablet; Commonly known as: Renvela; Take 1   tablet (800 mg) by mouth 3 times a day with meals. Swallow tablet whole;   do not crush, break, or chew.   sevelamer HCl 800 mg tablet; Commonly known as: Renagel   warfarin 2.5 mg tablet; Commonly known as: Coumadin; TAKE 1 TABLET BY   MOUTH ONCE DAILY       Outpatient Follow-Up    Scheduling request made to help patient follow-up with GI outpatient in 2 to 4 weeks.  Patient has already seen a gastroenterologist outpatient and already has had work-up but she claims she does not feel they have done anything for her so wants a new gastroenterologist.    Time spent caring for patient and coordinating discharge total 35 minutes.    Carl Wiley MD

## 2023-11-16 ENCOUNTER — PATIENT OUTREACH (OUTPATIENT)
Dept: PRIMARY CARE | Facility: CLINIC | Age: 63
End: 2023-11-16
Payer: COMMERCIAL

## 2023-11-16 NOTE — PROGRESS NOTES
[place] [facility] [place] Diagnosis: Abdominal pain  Admission Date: 11/06/2023  Discharge Date: 11/15/2023    PCP Appointment Date: Tasked to office  Specialist Appointment Date: General surgery 11/21/2023, GI 11/30/2023  Hospital Encounter and Summary: Linked    See discharge assessment below for further details    Engagement  Call Start Time: 1310 (11/16/2023  1:23 PM)    Medications  Medications reviewed with patient/caregiver?: Yes (11/16/2023  1:23 PM)  Is the patient having any side effects they believe may be caused by any medication additions or changes?: No (11/16/2023  1:23 PM)  Does the patient have all medications ordered at discharge?: Yes (11/16/2023  1:23 PM)  Prescription Comments: No new medication upon discharge (11/16/2023  1:23 PM)  Is the patient taking all medications as directed (includes completed medication regime)?: Yes (11/16/2023  1:23 PM)  Medication Comments: Patient denies any issues obtaining or affording medication (11/16/2023  1:23 PM)    Appointments  Does the patient have a primary care provider?: Yes (11/16/2023  1:23 PM)    Self Management  What is the home health agency?: [facility] (11/16/2023  1:23 PM)  Has home health visited the patient within 72 hours of discharge?: Unsure (11/16/2023  1:23 PM)  What Durable Medical Equipment (DME) was ordered?: N/A (11/16/2023  1:23 PM)    Patient Teaching  Does the patient have access to their discharge instructions?: Yes (11/16/2023  1:23 PM)  Care Management Interventions: Reviewed instructions with patient (11/16/2023  1:23 PM)  What is the patient's perception of their health status since discharge?: Improving (11/16/2023  1:23 PM)  Is the patient/caregiver able to teach back the hierarchy of who to call/visit for symptoms/problems? PCP, Specialist, Home Health nurse, Urgent Care, ED, 911: Yes (11/16/2023  1:23 PM)  Patient/Caregiver Education Comments: CM spoke to patient via phone. She states that she is doing well at home. She  is awaiting a call from [facility]. Medication list was discussed at length. PCP follow up appoitnment has been tasked to the office as the available appointment would interfere with the patients dialysis needs. She has no questions or concerns at this time. She was thankful for this call. (11/16/2023  1:23 PM)

## 2023-11-19 ENCOUNTER — HOME CARE VISIT (OUTPATIENT)
Dept: HOME HEALTH SERVICES | Facility: HOME HEALTH | Age: 63
End: 2023-11-19
Payer: COMMERCIAL

## 2023-11-19 VITALS
WEIGHT: 125 LBS | BODY MASS INDEX: 19.62 KG/M2 | HEART RATE: 80 BPM | DIASTOLIC BLOOD PRESSURE: 70 MMHG | SYSTOLIC BLOOD PRESSURE: 120 MMHG | HEIGHT: 67 IN | TEMPERATURE: 97.6 F

## 2023-11-19 PROCEDURE — 0023 HH SOC

## 2023-11-19 PROCEDURE — G0299 HHS/HOSPICE OF RN EA 15 MIN: HCPCS

## 2023-11-19 ASSESSMENT — ENCOUNTER SYMPTOMS
DIARRHEA: 1
PAIN LOCATION: GENERALIZED
APPETITE LEVEL: FAIR
DIZZINESS: 1
PAIN SEVERITY GOAL: 4/10
HEADACHES: 1
PAIN: 1
CHANGE IN APPETITE: VARYING
SUBJECTIVE PAIN PROGRESSION: UNCHANGED
LAST BOWEL MOVEMENT: 66796
LOWEST PAIN SEVERITY IN PAST 24 HOURS: 4/10
ABDOMINAL PAIN: 1
HIGHEST PAIN SEVERITY IN PAST 24 HOURS: 10/10
PERSON REPORTING PAIN: PATIENT

## 2023-11-19 ASSESSMENT — ACTIVITIES OF DAILY LIVING (ADL): OASIS_M1830: 02

## 2023-11-20 ENCOUNTER — APPOINTMENT (OUTPATIENT)
Dept: PRIMARY CARE | Facility: CLINIC | Age: 63
End: 2023-11-20
Payer: COMMERCIAL

## 2023-11-21 ENCOUNTER — APPOINTMENT (OUTPATIENT)
Dept: SURGERY | Facility: CLINIC | Age: 63
End: 2023-11-21
Payer: COMMERCIAL

## 2023-11-21 DIAGNOSIS — Z99.2 HYPERPARATHYROIDISM DUE TO END STAGE RENAL DISEASE ON DIALYSIS (MULTI): Primary | ICD-10-CM

## 2023-11-21 DIAGNOSIS — N18.6 HYPERPARATHYROIDISM DUE TO END STAGE RENAL DISEASE ON DIALYSIS (MULTI): Primary | ICD-10-CM

## 2023-11-21 DIAGNOSIS — N25.81 HYPERPARATHYROIDISM DUE TO END STAGE RENAL DISEASE ON DIALYSIS (MULTI): Primary | ICD-10-CM

## 2023-11-24 ENCOUNTER — HOME CARE VISIT (OUTPATIENT)
Dept: HOME HEALTH SERVICES | Facility: HOME HEALTH | Age: 63
End: 2023-11-24
Payer: COMMERCIAL

## 2023-11-25 ENCOUNTER — HOME CARE VISIT (OUTPATIENT)
Dept: HOME HEALTH SERVICES | Facility: HOME HEALTH | Age: 63
End: 2023-11-25
Payer: COMMERCIAL

## 2023-11-28 ENCOUNTER — PATIENT OUTREACH (OUTPATIENT)
Dept: PRIMARY CARE | Facility: CLINIC | Age: 63
End: 2023-11-28
Payer: COMMERCIAL

## 2023-11-28 ENCOUNTER — HOME CARE VISIT (OUTPATIENT)
Dept: HOME HEALTH SERVICES | Facility: HOME HEALTH | Age: 63
End: 2023-11-28
Payer: COMMERCIAL

## 2023-11-29 ENCOUNTER — HOME CARE VISIT (OUTPATIENT)
Dept: HOME HEALTH SERVICES | Facility: HOME HEALTH | Age: 63
End: 2023-11-29
Payer: COMMERCIAL

## 2023-11-30 ENCOUNTER — HOME CARE VISIT (OUTPATIENT)
Dept: HOME HEALTH SERVICES | Facility: HOME HEALTH | Age: 63
End: 2023-11-30
Payer: COMMERCIAL

## 2023-12-07 ENCOUNTER — APPOINTMENT (OUTPATIENT)
Dept: HOME HEALTH SERVICES | Facility: HOME HEALTH | Age: 63
End: 2023-12-07
Payer: COMMERCIAL

## 2023-12-11 PROCEDURE — G0180 MD CERTIFICATION HHA PATIENT: HCPCS | Performed by: INTERNAL MEDICINE

## 2023-12-14 ENCOUNTER — HOME CARE VISIT (OUTPATIENT)
Dept: HOME HEALTH SERVICES | Facility: HOME HEALTH | Age: 63
End: 2023-12-14
Payer: COMMERCIAL

## 2023-12-14 VITALS — HEART RATE: 67 BPM | TEMPERATURE: 97.8 F | SYSTOLIC BLOOD PRESSURE: 120 MMHG | DIASTOLIC BLOOD PRESSURE: 69 MMHG

## 2023-12-14 PROCEDURE — G0299 HHS/HOSPICE OF RN EA 15 MIN: HCPCS

## 2023-12-14 ASSESSMENT — ACTIVITIES OF DAILY LIVING (ADL)
HOME_HEALTH_OASIS: 00
OASIS_M1830: 01

## 2023-12-14 ASSESSMENT — ENCOUNTER SYMPTOMS
PERSON REPORTING PAIN: PATIENT
DENIES PAIN: 1

## 2023-12-20 ENCOUNTER — OFFICE VISIT (OUTPATIENT)
Dept: GASTROENTEROLOGY | Facility: CLINIC | Age: 63
End: 2023-12-20
Payer: COMMERCIAL

## 2023-12-20 VITALS
RESPIRATION RATE: 18 BRPM | BODY MASS INDEX: 20.3 KG/M2 | HEART RATE: 105 BPM | SYSTOLIC BLOOD PRESSURE: 123 MMHG | WEIGHT: 129.63 LBS | TEMPERATURE: 96.8 F | DIASTOLIC BLOOD PRESSURE: 68 MMHG

## 2023-12-20 DIAGNOSIS — R10.10 UPPER ABDOMINAL PAIN: ICD-10-CM

## 2023-12-20 DIAGNOSIS — R19.7 DIARRHEA, UNSPECIFIED TYPE: ICD-10-CM

## 2023-12-20 DIAGNOSIS — R15.9 INCONTINENCE OF FECES, UNSPECIFIED FECAL INCONTINENCE TYPE: ICD-10-CM

## 2023-12-20 DIAGNOSIS — K52.9 CHRONIC DIARRHEA: Primary | ICD-10-CM

## 2023-12-20 DIAGNOSIS — K62.5 RECTAL BLEEDING: ICD-10-CM

## 2023-12-20 DIAGNOSIS — D12.6 TUBULAR ADENOMA OF COLON: ICD-10-CM

## 2023-12-20 PROCEDURE — 3008F BODY MASS INDEX DOCD: CPT | Performed by: NURSE PRACTITIONER

## 2023-12-20 PROCEDURE — 1036F TOBACCO NON-USER: CPT | Performed by: NURSE PRACTITIONER

## 2023-12-20 PROCEDURE — 99204 OFFICE O/P NEW MOD 45 MIN: CPT | Performed by: NURSE PRACTITIONER

## 2023-12-20 RX ORDER — DIPHENOXYLATE HYDROCHLORIDE AND ATROPINE SULFATE 2.5; .025 MG/1; MG/1
1 TABLET ORAL 4 TIMES DAILY PRN
Qty: 120 TABLET | Refills: 2 | Status: SHIPPED | OUTPATIENT
Start: 2023-12-20 | End: 2024-03-27 | Stop reason: HOSPADM

## 2023-12-20 ASSESSMENT — ENCOUNTER SYMPTOMS
ARTHRALGIAS: 0
FREQUENCY: 0
BACK PAIN: 0
PHOTOPHOBIA: 0
LIGHT-HEADEDNESS: 0
PALPITATIONS: 0
DIAPHORESIS: 0
EYE PAIN: 0
JOINT SWELLING: 0
CHILLS: 0
DYSURIA: 0
COUGH: 0
ADENOPATHY: 0
WEAKNESS: 0
HEMATURIA: 0
NUMBNESS: 0
HALLUCINATIONS: 0
FLANK PAIN: 0
FEVER: 0
AGITATION: 0
MYALGIAS: 0
HEADACHES: 0
SORE THROAT: 0
FATIGUE: 0
WHEEZING: 0
SHORTNESS OF BREATH: 0
DIZZINESS: 0
NERVOUS/ANXIOUS: 0

## 2023-12-20 NOTE — PROGRESS NOTES
Subjective   Patient ID: Yoselyn Hernandez is a 63 y.o. female who presents for evaluation of chronic diarrhea.     This is a 63 year old AAF with an extensive history which includes marijuana use, anxiety/depression, ESRD on peritoneal dialysis, Cdiff colitis (4/2022 and 7/2022) , HTN, HLD, HFrEF (LVEF 15-20 % on 3/2023 TTE), GERD, aortic dissection with multiple prior aortic interventions at , and PE (3/2023) who is presenting to the GI clinic for an initial visit.     History per pt, friend, and extensive review of EMR     Pt is accompanied to this visit by her close friend     PCP: Dr. Kerr with     Reports for the past 4 years she's been having mushy to watery diarrhea. She has diarrhea 5 times per day on average. She endorses frequent diarrhea which wakes her from sleep at night. Some relief with regular use of loperamide. Reports that she  was taking Creon 3 capsules TID with meals per Dr. Andrade which briefly bulked up her stools, but it did not cut down on the high frequency of bowel movements. Tried a fiber supplement which did not help her diarrhea.     Reports that the only time she does not have diarrhea is when she's admitted to the hospital. She's gone up to 5 days without moving her bowels while in the hospital.     Reports for the past 4 years she's been having diffuse upper abdominal discomfort  which is sometimes related to defecation, but not always (when associated with defecation, it does improve when she moves her bowels).     Reports intermittent abdominal bloating and excessive flatulence, but only related to filling from peritoneal dialysis.     ROS positive for  very scant rectal bleeding which she attributes to frequent wiping with toilet paper secondary to the diarrhea.     Reports for the past 4 years she's been dealing with passive and urge fecal incontinence. Sometimes she wakes up at night to find that she's had fecal incontinence requiring a change of her bedding.      Denies  "recent weight loss, vomiting, hematemesis, or melena.     Diet includes peppers, onions, brussels sprouts, and beans.         Past medical history:   See above     Past surgical history:   See above   Left inguinal hernia repair (2014 )   Laparoscopic cholecystectomy (2018 )   Total hysterectomy for fibroids in 2006 at  per pt (states they took \"everything\" including ovaries)  Left common carotid to left subclavian artery bypass (6/2019 )     Family history:   No GI cancers or IBD    Social history:   Smokes marijuana \"every day, all day, as often as I can\" for pain  The other day she took a \"sip\" of wine, but otherwise has not drank in 3 years  Denies use of tobacco     Medications:   Pt reports stopping almost all her regular medications. In the past 2 weeks she's only been taking Creon (on occasion), loperamide, and carvedilol. She ran out of warfarin.     On 5/25/23  she saw Dr. Andrade in the GI clinic for evaluation of chronic diarrhea. Subsequent work up noted a negative Cdiff PCR, negative stool pathogen panel, negative stool O/P , and negative fecal calptotectin. Fecal elastase was low at 88.     Colonoscopy 2021 : one small tubular adenoma removed from the cecum and internal hemorrhoids; random biopsy negative for microscopic colitis; fair prep     EGD 1/3/22 : erosive gastropathy (pathology noting reactive gastropathy; H pylori negative)     EGD 11/2/22 [facility]: gastritis and duodenitis; duodenal biopsy unremarkable     Briefly, in 11/2023 she was hospitalized at [facility] for acute on chronic abdominal pain for which she was treated for PD related peritonitis. She was referred for a second GI opinion of chronic diarrhea per her request. TTG IgA was negative at that time.  She was seen by general surgery for ventral hernias where acute surgical intervention was not recommended, but she was referred for outpatient evaluation.       Review of Systems   Constitutional:  Negative for chills, diaphoresis, " fatigue and fever.   HENT:  Negative for congestion, ear pain, hearing loss, sneezing and sore throat.    Eyes:  Negative for photophobia, pain and visual disturbance.   Respiratory:  Negative for cough, shortness of breath and wheezing.    Cardiovascular:  Negative for chest pain, palpitations and leg swelling.   Endocrine: Negative for cold intolerance and heat intolerance.   Genitourinary:  Negative for dysuria, flank pain, frequency and hematuria.   Musculoskeletal:  Negative for arthralgias, back pain, gait problem, joint swelling and myalgias.   Skin:  Negative for rash.   Neurological:  Negative for dizziness, syncope, weakness, light-headedness, numbness and headaches.   Hematological:  Negative for adenopathy.   Psychiatric/Behavioral:  Negative for agitation and hallucinations. The patient is not nervous/anxious.        /68 (BP Location: Right arm, Patient Position: Sitting, BP Cuff Size: Adult)   Pulse 105   Temp 36 °C (96.8 °F) (Temporal)   Resp 18   Wt 58.8 kg (129 lb 10.1 oz)   BMI 20.30 kg/m²      Objective     Allergies   Allergen Reactions    Ace Inhibitors Angioedema, Other, Swelling and Unknown    Ciprofloxacin GI intolerance, Nausea Only and Diarrhea    Gabapentin Unknown     Jerking head movements    Oxycodone Itching       Physical Exam  Constitutional:       General: She is not in acute distress.     Appearance: Normal appearance.   HENT:      Head: Normocephalic and atraumatic.   Eyes:      Conjunctiva/sclera: Conjunctivae normal.   Cardiovascular:      Rate and Rhythm: Normal rate and regular rhythm.      Heart sounds: No murmur heard.     No gallop.   Pulmonary:      Effort: Pulmonary effort is normal.      Breath sounds: Normal breath sounds.   Abdominal:      General: Bowel sounds are normal. There is no distension.      Tenderness: There is no abdominal tenderness. There is no guarding.      Hernia: A hernia is present. Hernia is present in the ventral area.   Musculoskeletal:          General: No swelling or deformity. Normal range of motion.      Cervical back: Normal range of motion. No rigidity.   Skin:     General: Skin is warm and dry.      Coloration: Skin is not jaundiced.      Findings: No lesion or rash.   Neurological:      General: No focal deficit present.      Mental Status: She is alert and oriented to person, place, and time.   Psychiatric:         Mood and Affect: Mood normal.         Assessment/Plan   Problem List Items Addressed This Visit       Diarrhea     Other Visit Diagnoses       Chronic diarrhea    -  Primary    Relevant Medications    diphenoxylate-atropine (Lomotil) 2.5-0.025 mg tablet    Upper abdominal pain        Incontinence of feces, unspecified fecal incontinence type        Rectal bleeding        Tubular adenoma of colon               Chronic diarrhea, chronic upper abdominal discomfort, fecal incontinence: etiology not entirely clear and potentially multifactorial. Pt has had an extensive work up for chronic diarrhea which unfortunately has not yielded any major findings. I do question some functional component, perhaps IBS, especially as diarrhea stops while she's in the hospital. Also consider EPI given transient relief with Creon and chronic pancreatitis changes on EUS, but this is unclear. Recent fecal elastase was low, but it looks like a watery stool sample was submitted so results may not be accurate.   - advise trial of low FODMAP diet; reading materials provided   - start Lomotil as needed  - continue loperamide as needed  - will consider further testing including a recheck of fecal fat/fecal elastase and hydrogen breath testing for SIBO pending the above       2. Scant rectal bleeding:  based upon history, I suspect hemorrhoid related bleeding     3. History of tubular adenoma:   - recommend surveillance colonoscopy in 2026    4. History of PE:   - recommend PCP follow up to determine if she still requires warfarin as she stopped taking it on  her own accord    5. Health maintenance:   - recommend PCP follow up as above given her multiple co morbidities     6. Follow up:  - return to clinic in 10-12 weeks

## 2023-12-20 NOTE — PATIENT INSTRUCTIONS
Thanks for coming to the GI clinic.     Try the low FODMAP diet.     Try Lomotil as needed for diarrhea.     You can use loperamide (Imodium) up to 16 mg/day as needed for diarrhea.     Follow up in 10-12 weeks.

## 2024-01-12 ENCOUNTER — APPOINTMENT (OUTPATIENT)
Dept: RADIOLOGY | Facility: HOSPITAL | Age: 64
DRG: 299 | End: 2024-01-12
Payer: COMMERCIAL

## 2024-01-12 ENCOUNTER — HOSPITAL ENCOUNTER (INPATIENT)
Facility: HOSPITAL | Age: 64
LOS: 12 days | Discharge: HOME | DRG: 299 | End: 2024-01-24
Attending: INTERNAL MEDICINE | Admitting: INTERNAL MEDICINE
Payer: COMMERCIAL

## 2024-01-12 DIAGNOSIS — F41.8 DEPRESSION WITH ANXIETY: ICD-10-CM

## 2024-01-12 DIAGNOSIS — M79.602 LEFT ARM PAIN: ICD-10-CM

## 2024-01-12 DIAGNOSIS — I82.419 DEEP VEIN THROMBOSIS (DVT) OF FEMORAL VEIN, UNSPECIFIED CHRONICITY, UNSPECIFIED LATERALITY (MULTI): ICD-10-CM

## 2024-01-12 DIAGNOSIS — E78.5 HYPERLIPIDEMIA, UNSPECIFIED HYPERLIPIDEMIA TYPE: ICD-10-CM
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
## 2024-01-12 DIAGNOSIS — R19.7 DIARRHEA, UNSPECIFIED TYPE: ICD-10-CM

## 2024-01-12 DIAGNOSIS — N25.81 SECONDARY HYPERPARATHYROIDISM OF RENAL ORIGIN (MULTI): ICD-10-CM

## 2024-01-12 DIAGNOSIS — M79.605 LEFT LEG PAIN: Primary | ICD-10-CM

## 2024-01-12 PROBLEM — R07.89 ATYPICAL CHEST PAIN: Status: ACTIVE | Noted: 2024-01-12

## 2024-01-12 PROBLEM — R10.11 ABDOMINAL PAIN, RUQ (RIGHT UPPER QUADRANT): Status: ACTIVE | Noted: 2023-11-06

## 2024-01-12 PROBLEM — I35.1 AORTIC VALVE REGURGITATION: Status: ACTIVE | Noted: 2023-04-17

## 2024-01-12 PROBLEM — I73.9 PERIPHERAL VASCULAR DISEASE (CMS-HCC): Status: ACTIVE | Noted: 2022-06-20

## 2024-01-12 PROBLEM — I50.23 ACUTE ON CHRONIC SYSTOLIC HEART FAILURE (MULTI): Status: ACTIVE | Noted: 2023-03-06

## 2024-01-12 PROBLEM — M54.31 SCIATICA, RIGHT SIDE: Status: ACTIVE | Noted: 2022-10-28

## 2024-01-12 PROBLEM — K43.6 OTHER AND UNSPECIFIED VENTRAL HERNIA WITH OBSTRUCTION, WITHOUT GANGRENE: Status: ACTIVE | Noted: 2023-08-02

## 2024-01-12 PROBLEM — E86.0 DEHYDRATION: Status: ACTIVE | Noted: 2022-10-28

## 2024-01-12 PROBLEM — R56.9 SEIZURE (MULTI): Status: ACTIVE | Noted: 2022-07-30

## 2024-01-12 PROBLEM — R06.02 SHORTNESS OF BREATH: Status: ACTIVE | Noted: 2021-09-13

## 2024-01-12 PROBLEM — M79.606 PAIN OF LOWER EXTREMITY: Status: ACTIVE | Noted: 2024-01-12

## 2024-01-12 PROBLEM — D50.9 IRON DEFICIENCY ANEMIA, UNSPECIFIED: Status: ACTIVE | Noted: 2021-06-02

## 2024-01-12 PROBLEM — J20.9 ACUTE BRONCHITIS WITH BRONCHOSPASM: Status: ACTIVE | Noted: 2024-01-12

## 2024-01-12 PROBLEM — N25.89 OTHER DISORDERS RESULTING FROM IMPAIRED RENAL TUBULAR FUNCTION: Status: ACTIVE | Noted: 2021-06-02

## 2024-01-12 PROBLEM — N28.9 ABNORMAL RENAL FUNCTION: Status: ACTIVE | Noted: 2024-01-12

## 2024-01-12 PROBLEM — G92.9 TOXIC ENCEPHALOPATHY: Status: ACTIVE | Noted: 2023-03-08

## 2024-01-12 PROBLEM — R63.4 ABNORMAL WEIGHT LOSS: Status: ACTIVE | Noted: 2024-01-12

## 2024-01-12 PROBLEM — T14.8XXA MUSCLE STRAIN: Status: ACTIVE | Noted: 2024-01-12

## 2024-01-12 PROBLEM — K62.5 RECTAL HEMORRHAGE: Status: ACTIVE | Noted: 2024-01-12

## 2024-01-12 PROBLEM — M79.89 SYMPTOM OF LEG SWELLING: Status: ACTIVE | Noted: 2024-01-12

## 2024-01-12 PROBLEM — G89.29 CHRONIC PAIN: Status: ACTIVE | Noted: 2022-10-03

## 2024-01-12 PROBLEM — E87.5 HYPERKALEMIA: Status: ACTIVE | Noted: 2022-07-30

## 2024-01-12 PROBLEM — E83.51 HYPOCALCEMIA: Status: ACTIVE | Noted: 2023-10-05

## 2024-01-12 PROBLEM — D12.6 TUBULAR ADENOMA OF COLON: Status: ACTIVE | Noted: 2024-01-12

## 2024-01-12 PROBLEM — M62.81 MUSCLE WEAKNESS (GENERALIZED): Status: ACTIVE | Noted: 2022-10-28

## 2024-01-12 PROBLEM — M54.10 RADICULAR PAIN: Status: ACTIVE | Noted: 2024-01-12

## 2024-01-12 PROBLEM — I87.009 POSTPHLEBITIC SYNDROME: Status: ACTIVE | Noted: 2024-01-12

## 2024-01-12 PROBLEM — K86.89 DILATED PANCREATIC DUCT (HHS-HCC): Status: ACTIVE | Noted: 2024-01-12

## 2024-01-12 PROBLEM — K29.80 ACUTE DUODENITIS: Status: ACTIVE | Noted: 2024-01-12

## 2024-01-12 PROBLEM — F17.200 NICOTINE DEPENDENCE: Status: ACTIVE | Noted: 2023-10-17

## 2024-01-12 PROBLEM — I50.9 HEART FAILURE (MULTI): Status: ACTIVE | Noted: 2024-01-12

## 2024-01-12 PROBLEM — I82.509 CHRONIC DEEP VEIN THROMBOSIS (DVT) OF LOWER EXTREMITY (MULTI): Status: ACTIVE | Noted: 2022-12-19

## 2024-01-12 PROBLEM — K52.9 COLITIS: Status: ACTIVE | Noted: 2024-01-12

## 2024-01-12 PROBLEM — Z66 DO NOT RESUSCITATE: Status: ACTIVE | Noted: 2023-03-08

## 2024-01-12 PROBLEM — R20.0 NUMBNESS: Status: ACTIVE | Noted: 2024-01-12

## 2024-01-12 PROBLEM — R29.2 HOFFMAN SIGN PRESENT: Status: ACTIVE | Noted: 2024-01-12

## 2024-01-12 PROBLEM — R29.2: Status: ACTIVE | Noted: 2024-01-12

## 2024-01-12 PROBLEM — K58.0 IRRITABLE BOWEL SYNDROME WITH DIARRHEA: Status: ACTIVE | Noted: 2022-10-28

## 2024-01-12 PROBLEM — I70.90 OCCLUSION OF ARTERY: Status: ACTIVE | Noted: 2024-01-12

## 2024-01-12 PROBLEM — R26.89 ANTALGIC GAIT: Status: ACTIVE | Noted: 2024-01-12

## 2024-01-12 PROBLEM — R42 DIZZINESS: Status: ACTIVE | Noted: 2024-01-12

## 2024-01-12 PROBLEM — M54.32 SCIATICA, LEFT SIDE: Status: ACTIVE | Noted: 2022-10-28

## 2024-01-12 PROBLEM — M79.606 LEG PAIN: Status: ACTIVE | Noted: 2024-01-12

## 2024-01-12 PROBLEM — K58.9 IRRITABLE BOWEL SYNDROME: Status: ACTIVE | Noted: 2022-12-19

## 2024-01-12 PROBLEM — R06.83 PRIMARY SNORING: Status: ACTIVE | Noted: 2024-01-12

## 2024-01-12 PROBLEM — N39.0 URINARY TRACT INFECTION: Status: ACTIVE | Noted: 2024-01-12

## 2024-01-12 PROBLEM — R03.0 ELEVATED BLOOD PRESSURE READING: Status: ACTIVE | Noted: 2024-01-12

## 2024-01-12 PROBLEM — K29.70 GASTRITIS: Status: ACTIVE | Noted: 2024-01-12

## 2024-01-12 PROBLEM — K44.9 DIAPHRAGMATIC HERNIA WITHOUT OBSTRUCTION OR GANGRENE: Status: ACTIVE | Noted: 2023-08-02

## 2024-01-12 PROBLEM — Z86.39 HISTORY OF ELEVATED LIPIDS: Status: ACTIVE | Noted: 2024-01-12

## 2024-01-12 PROBLEM — Z20.822 CONTACT WITH AND (SUSPECTED) EXPOSURE TO COVID-19: Status: ACTIVE | Noted: 2023-03-08

## 2024-01-12 PROBLEM — R54 AGE-RELATED PHYSICAL DEBILITY: Status: ACTIVE | Noted: 2023-03-08

## 2024-01-12 PROBLEM — K40.90 LEFT INGUINAL HERNIA: Status: ACTIVE | Noted: 2023-03-08

## 2024-01-12 PROBLEM — D69.6 LOW PLATELET COUNT (CMS-HCC): Status: ACTIVE | Noted: 2022-07-30

## 2024-01-12 PROBLEM — R11.2 INTRACTABLE VOMITING WITH NAUSEA: Status: ACTIVE | Noted: 2024-01-12

## 2024-01-12 PROBLEM — I10 HYPERTENSION: Status: ACTIVE | Noted: 2021-06-02

## 2024-01-12 PROBLEM — I11.9 HYPERTENSIVE HEART DISEASE WITHOUT CONGESTIVE HEART FAILURE: Status: ACTIVE | Noted: 2022-08-03
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
## 2024-01-12 PROBLEM — K65.9 PERITONITIS (MULTI): Status: ACTIVE | Noted: 2023-11-06

## 2024-01-12 PROBLEM — R55 SYNCOPE AND COLLAPSE: Status: ACTIVE | Noted: 2023-08-08

## 2024-01-12 PROBLEM — S92.109A TALUS FRACTURE: Status: ACTIVE | Noted: 2024-01-12

## 2024-01-12 PROBLEM — I77.71 DISSECTION OF CAROTID ARTERY (MULTI): Status: ACTIVE | Noted: 2023-08-02

## 2024-01-12 PROBLEM — E88.09 OTHER DISORDERS OF PLASMA-PROTEIN METABOLISM, NOT ELSEWHERE CLASSIFIED: Status: ACTIVE | Noted: 2021-08-03

## 2024-01-12 PROBLEM — R29.2 HYPERREFLEXIA OF LOWER EXTREMITY: Status: ACTIVE | Noted: 2024-01-12

## 2024-01-12 PROBLEM — D64.9 ANEMIA: Status: ACTIVE | Noted: 2024-01-12

## 2024-01-12 PROBLEM — E03.9 ACQUIRED HYPOTHYROIDISM: Status: ACTIVE | Noted: 2023-04-17

## 2024-01-12 PROBLEM — S92.009A FRACTURE OF CALCANEUS: Status: ACTIVE | Noted: 2024-01-12

## 2024-01-12 PROBLEM — A04.72 CLOSTRIDIUM DIFFICILE COLITIS: Status: ACTIVE | Noted: 2023-03-05

## 2024-01-12 PROBLEM — N39.0 ACUTE UTI: Status: ACTIVE | Noted: 2024-01-12

## 2024-01-12 PROBLEM — I26.99 PULMONARY EMBOLISM (MULTI): Status: ACTIVE | Noted: 2023-03-08

## 2024-01-12 PROBLEM — R65.10 SYSTEMIC INFLAMMATORY RESPONSE SYNDROME (SIRS) (MULTI): Status: ACTIVE | Noted: 2024-01-12

## 2024-01-12 PROBLEM — K52.9 ACUTE COLITIS: Status: ACTIVE | Noted: 2024-01-12

## 2024-01-12 PROBLEM — R29.2 HYPERREFLEXIA: Status: ACTIVE | Noted: 2024-01-12

## 2024-01-12 PROBLEM — E63.9 DEFICIENCY OF MACRONUTRIENTS: Status: ACTIVE | Noted: 2023-04-17

## 2024-01-12 PROBLEM — R92.8 ABNORMAL MAMMOGRAM: Status: ACTIVE | Noted: 2024-01-12

## 2024-01-12 PROBLEM — A04.71 ENTEROCOLITIS DUE TO CLOSTRIDIUM DIFFICILE, RECURRENT: Status: ACTIVE | Noted: 2022-07-21

## 2024-01-12 PROBLEM — R94.39 ABNORMAL CARDIOVASCULAR STRESS TEST: Status: ACTIVE | Noted: 2022-08-03

## 2024-01-12 PROBLEM — K21.9 GERD (GASTROESOPHAGEAL REFLUX DISEASE): Status: ACTIVE | Noted: 2023-03-08

## 2024-01-12 PROBLEM — R58 ECCHYMOSIS: Status: ACTIVE | Noted: 2024-01-12

## 2024-01-12 PROBLEM — M54.2 NECK PAIN: Status: ACTIVE | Noted: 2024-01-12

## 2024-01-12 PROBLEM — R09.89 CAROTID BRUIT: Status: ACTIVE | Noted: 2024-01-12

## 2024-01-12 LAB
ALBUMIN SERPL BCP-MCNC: 2.5 G/DL (ref 3.4–5)
ALP SERPL-CCNC: 115 U/L (ref 33–136)
ALT SERPL W P-5'-P-CCNC: 13 U/L (ref 7–45)
ANION GAP SERPL CALC-SCNC: 21 MMOL/L (ref 10–20)
APTT PPP: 28 SECONDS (ref 27–38)
AST SERPL W P-5'-P-CCNC: 13 U/L (ref 9–39)
BASOPHILS # BLD AUTO: 0.02 X10*3/UL (ref 0–0.1)
BASOPHILS NFR BLD AUTO: 0.4 %
BILIRUB SERPL-MCNC: 0.4 MG/DL (ref 0–1.2)
BUN SERPL-MCNC: 53 MG/DL (ref 6–23)
CALCIUM SERPL-MCNC: 7.8 MG/DL (ref 8.6–10.3)
CHLORIDE SERPL-SCNC: 95 MMOL/L (ref 98–107)
CO2 SERPL-SCNC: 22 MMOL/L (ref 21–32)
CREAT SERPL-MCNC: 9.51 MG/DL (ref 0.5–1.05)
EGFRCR SERPLBLD CKD-EPI 2021: 4 ML/MIN/1.73M*2
EOSINOPHIL # BLD AUTO: 0.05 X10*3/UL (ref 0–0.7)
EOSINOPHIL NFR BLD AUTO: 1 %
ERYTHROCYTE [DISTWIDTH] IN BLOOD BY AUTOMATED COUNT: 17.6 % (ref 11.5–14.5)
GLUCOSE SERPL-MCNC: 76 MG/DL (ref 74–99)
HCT VFR BLD AUTO: 31.2 % (ref 36–46)
HGB BLD-MCNC: 10.3 G/DL (ref 12–16)
IMM GRANULOCYTES # BLD AUTO: 0.03 X10*3/UL (ref 0–0.7)
IMM GRANULOCYTES NFR BLD AUTO: 0.6 % (ref 0–0.9)
INR PPP: 1.1 (ref 0.9–1.1)
INR PPP: 1.2 (ref 0.9–1.1)
LYMPHOCYTES # BLD AUTO: 0.73 X10*3/UL (ref 1.2–4.8)
LYMPHOCYTES NFR BLD AUTO: 14.1 %
MCH RBC QN AUTO: 30 PG (ref 26–34)
MCHC RBC AUTO-ENTMCNC: 33 G/DL (ref 32–36)
MCV RBC AUTO: 91 FL (ref 80–100)
MONOCYTES # BLD AUTO: 0.37 X10*3/UL (ref 0.1–1)
MONOCYTES NFR BLD AUTO: 7.2 %
NEUTROPHILS # BLD AUTO: 3.96 X10*3/UL (ref 1.2–7.7)
NEUTROPHILS NFR BLD AUTO: 76.7 %
NRBC BLD-RTO: 0 /100 WBCS (ref 0–0)
PLATELET # BLD AUTO: 146 X10*3/UL (ref 150–450)
POTASSIUM SERPL-SCNC: 3.6 MMOL/L (ref 3.5–5.3)
PROT SERPL-MCNC: 5.6 G/DL (ref 6.4–8.2)
PROTHROMBIN TIME: 12.9 SECONDS (ref 9.8–12.8)
PROTHROMBIN TIME: 13.3 SECONDS (ref 9.8–12.8)
RBC # BLD AUTO: 3.43 X10*6/UL (ref 4–5.2)
SODIUM SERPL-SCNC: 134 MMOL/L (ref 136–145)
UFH PPP CHRO-ACNC: 0.2 IU/ML
UFH PPP CHRO-ACNC: 0.4 IU/ML
WBC # BLD AUTO: 5.2 X10*3/UL (ref 4.4–11.3)

## 2024-01-12 PROCEDURE — 96376 TX/PRO/DX INJ SAME DRUG ADON: CPT | Mod: 59

## 2024-01-12 PROCEDURE — 36415 COLL VENOUS BLD VENIPUNCTURE: CPT | Performed by: EMERGENCY MEDICINE

## 2024-01-12 PROCEDURE — 2500000001 HC RX 250 WO HCPCS SELF ADMINISTERED DRUGS (ALT 637 FOR MEDICARE OP): Performed by: NURSE PRACTITIONER

## 2024-01-12 PROCEDURE — 73090 X-RAY EXAM OF FOREARM: CPT | Mod: LT

## 2024-01-12 PROCEDURE — 96374 THER/PROPH/DIAG INJ IV PUSH: CPT | Mod: 59

## 2024-01-12 PROCEDURE — 85610 PROTHROMBIN TIME: CPT | Performed by: PHYSICIAN ASSISTANT

## 2024-01-12 PROCEDURE — 36415 COLL VENOUS BLD VENIPUNCTURE: CPT | Performed by: PHYSICIAN ASSISTANT

## 2024-01-12 PROCEDURE — 80053 COMPREHEN METABOLIC PANEL: CPT | Performed by: PHYSICIAN ASSISTANT

## 2024-01-12 PROCEDURE — 99285 EMERGENCY DEPT VISIT HI MDM: CPT | Performed by: EMERGENCY MEDICINE

## 2024-01-12 PROCEDURE — 74176 CT ABD & PELVIS W/O CONTRAST: CPT | Performed by: RADIOLOGY

## 2024-01-12 PROCEDURE — 3E1M39Z IRRIGATION OF PERITONEAL CAVITY USING DIALYSATE, PERCUTANEOUS APPROACH: ICD-10-PCS | Performed by: INTERNAL MEDICINE

## 2024-01-12 PROCEDURE — 85610 PROTHROMBIN TIME: CPT | Performed by: EMERGENCY MEDICINE

## 2024-01-12 PROCEDURE — 73030 X-RAY EXAM OF SHOULDER: CPT | Mod: LT

## 2024-01-12 PROCEDURE — 2500000004 HC RX 250 GENERAL PHARMACY W/ HCPCS (ALT 636 FOR OP/ED): Performed by: PHYSICIAN ASSISTANT

## 2024-01-12 PROCEDURE — 2500000004 HC RX 250 GENERAL PHARMACY W/ HCPCS (ALT 636 FOR OP/ED): Performed by: INTERNAL MEDICINE

## 2024-01-12 PROCEDURE — 2500000004 HC RX 250 GENERAL PHARMACY W/ HCPCS (ALT 636 FOR OP/ED): Performed by: EMERGENCY MEDICINE

## 2024-01-12 PROCEDURE — 8010000001 HC DIALYSIS - HEMODIALYSIS PER DAY

## 2024-01-12 PROCEDURE — 85520 HEPARIN ASSAY: CPT | Performed by: INTERNAL MEDICINE

## 2024-01-12 PROCEDURE — 73030 X-RAY EXAM OF SHOULDER: CPT | Mod: LEFT SIDE | Performed by: RADIOLOGY

## 2024-01-12 PROCEDURE — 1100000001 HC PRIVATE ROOM DAILY

## 2024-01-12 PROCEDURE — 99223 1ST HOSP IP/OBS HIGH 75: CPT | Performed by: PSYCHIATRY & NEUROLOGY

## 2024-01-12 PROCEDURE — 93971 EXTREMITY STUDY: CPT

## 2024-01-12 PROCEDURE — 74176 CT ABD & PELVIS W/O CONTRAST: CPT

## 2024-01-12 PROCEDURE — 85025 COMPLETE CBC W/AUTO DIFF WBC: CPT | Performed by: PHYSICIAN ASSISTANT

## 2024-01-12 PROCEDURE — 73090 X-RAY EXAM OF FOREARM: CPT | Mod: LEFT SIDE | Performed by: RADIOLOGY

## 2024-01-12 PROCEDURE — 93971 EXTREMITY STUDY: CPT | Performed by: RADIOLOGY

## 2024-01-12 PROCEDURE — 94760 N-INVAS EAR/PLS OXIMETRY 1: CPT

## 2024-01-12 PROCEDURE — 2500000004 HC RX 250 GENERAL PHARMACY W/ HCPCS (ALT 636 FOR OP/ED): Performed by: NURSE PRACTITIONER

## 2024-01-12 PROCEDURE — 96375 TX/PRO/DX INJ NEW DRUG ADDON: CPT | Mod: 59

## 2024-01-12 PROCEDURE — 99223 1ST HOSP IP/OBS HIGH 75: CPT | Performed by: NURSE PRACTITIONER

## 2024-01-12 RX ORDER — HEPARIN SODIUM 5000 [USP'U]/ML
80 INJECTION, SOLUTION INTRAVENOUS; SUBCUTANEOUS ONCE
Status: COMPLETED | OUTPATIENT
Start: 2024-01-12 | End: 2024-01-12

## 2024-01-12 RX ORDER — CARVEDILOL 6.25 MG/1
6.25 TABLET ORAL 2 TIMES DAILY
Status: DISCONTINUED | OUTPATIENT
Start: 2024-01-12 | End: 2024-01-24 | Stop reason: HOSPADM

## 2024-01-12 RX ORDER — WARFARIN SODIUM 5 MG/1
5 TABLET ORAL ONCE
Status: DISCONTINUED | OUTPATIENT
Start: 2024-01-12 | End: 2024-01-12

## 2024-01-12 RX ORDER — DULOXETIN HYDROCHLORIDE 30 MG/1
30 CAPSULE, DELAYED RELEASE ORAL DAILY
Status: DISCONTINUED | OUTPATIENT
Start: 2024-01-13 | End: 2024-01-24 | Stop reason: HOSPADM

## 2024-01-12 RX ORDER — ALLOPURINOL 100 MG/1
100 TABLET ORAL DAILY
Status: DISCONTINUED | OUTPATIENT
Start: 2024-01-12 | End: 2024-01-24 | Stop reason: HOSPADM

## 2024-01-12 RX ORDER — NALOXONE HYDROCHLORIDE 4 MG/.1ML
4 SPRAY NASAL AS NEEDED
Status: DISCONTINUED | OUTPATIENT
Start: 2024-01-12 | End: 2024-01-24 | Stop reason: HOSPADM

## 2024-01-12 RX ORDER — HYDROMORPHONE HYDROCHLORIDE 1 MG/ML
1 INJECTION, SOLUTION INTRAMUSCULAR; INTRAVENOUS; SUBCUTANEOUS ONCE
Status: COMPLETED | OUTPATIENT
Start: 2024-01-12 | End: 2024-01-12

## 2024-01-12 RX ORDER — LEVOTHYROXINE SODIUM 25 UG/1
25 TABLET ORAL
Status: DISCONTINUED | OUTPATIENT
Start: 2024-01-13 | End: 2024-01-24 | Stop reason: HOSPADM

## 2024-01-12 RX ORDER — WARFARIN SODIUM 5 MG/1
5 TABLET ORAL DAILY
Status: DISCONTINUED | OUTPATIENT
Start: 2024-01-12 | End: 2024-01-18

## 2024-01-12 RX ORDER — PREGABALIN 25 MG/1
25 CAPSULE ORAL DAILY
Status: DISCONTINUED | OUTPATIENT
Start: 2024-01-12 | End: 2024-01-12

## 2024-01-12 RX ORDER — SEVELAMER CARBONATE 800 MG/1
800 TABLET, FILM COATED ORAL
Status: DISCONTINUED | OUTPATIENT
Start: 2024-01-12 | End: 2024-01-15

## 2024-01-12 RX ORDER — PANCRELIPASE 24000; 76000; 120000 [USP'U]/1; [USP'U]/1; [USP'U]/1
3 CAPSULE, DELAYED RELEASE PELLETS ORAL
Qty: 279 CAPSULE | Refills: 0 | Status: SHIPPED | OUTPATIENT
Start: 2024-01-12

## 2024-01-12 RX ORDER — MIRTAZAPINE 15 MG/1
15 TABLET, FILM COATED ORAL NIGHTLY
Status: DISCONTINUED | OUTPATIENT
Start: 2024-01-12 | End: 2024-01-24 | Stop reason: HOSPADM

## 2024-01-12 RX ORDER — CALCITRIOL 0.25 UG/1
0.5 CAPSULE ORAL DAILY
Status: DISCONTINUED | OUTPATIENT
Start: 2024-01-12 | End: 2024-01-24 | Stop reason: HOSPADM

## 2024-01-12 RX ORDER — HEPARIN SODIUM 5000 [USP'U]/ML
2000-4000 INJECTION, SOLUTION INTRAVENOUS; SUBCUTANEOUS EVERY 4 HOURS PRN
Status: DISCONTINUED | OUTPATIENT
Start: 2024-01-12 | End: 2024-01-16

## 2024-01-12 RX ORDER — ATORVASTATIN CALCIUM 40 MG/1
40 TABLET, FILM COATED ORAL NIGHTLY
Qty: 30 TABLET | Refills: 0 | Status: SHIPPED | OUTPATIENT
Start: 2024-01-12

## 2024-01-12 RX ORDER — DIPHENOXYLATE HYDROCHLORIDE AND ATROPINE SULFATE 2.5; .025 MG/1; MG/1
1 TABLET ORAL 4 TIMES DAILY PRN
Status: DISCONTINUED | OUTPATIENT
Start: 2024-01-12 | End: 2024-01-22

## 2024-01-12 RX ORDER — HEPARIN SODIUM 10000 [USP'U]/100ML
0-4500 INJECTION, SOLUTION INTRAVENOUS CONTINUOUS
Status: DISCONTINUED | OUTPATIENT
Start: 2024-01-12 | End: 2024-01-16

## 2024-01-12 RX ORDER — ONDANSETRON HYDROCHLORIDE 2 MG/ML
4 INJECTION, SOLUTION INTRAVENOUS ONCE
Status: COMPLETED | OUTPATIENT
Start: 2024-01-12 | End: 2024-01-12

## 2024-01-12 RX ORDER — CARVEDILOL 6.25 MG/1
6.25 TABLET ORAL
Qty: 60 TABLET | Refills: 0 | Status: SHIPPED | OUTPATIENT
Start: 2024-01-12 | End: 2024-02-07 | Stop reason: HOSPADM

## 2024-01-12 RX ORDER — ACETAMINOPHEN 325 MG/1
975 TABLET ORAL EVERY 8 HOURS PRN
Status: DISCONTINUED | OUTPATIENT
Start: 2024-01-12 | End: 2024-01-24 | Stop reason: HOSPADM

## 2024-01-12 RX ORDER — LANOLIN ALCOHOL/MO/W.PET/CERES
800 CREAM (GRAM) TOPICAL DAILY
Status: DISCONTINUED | OUTPATIENT
Start: 2024-01-12 | End: 2024-01-24 | Stop reason: HOSPADM

## 2024-01-12 RX ORDER — LOPERAMIDE HYDROCHLORIDE 2 MG/1
2 CAPSULE ORAL 4 TIMES DAILY PRN
Status: DISCONTINUED | OUTPATIENT
Start: 2024-01-12 | End: 2024-01-22

## 2024-01-12 RX ORDER — WARFARIN 2.5 MG/1
2.5 TABLET ORAL
Qty: 30 TABLET | Refills: 0 | Status: SHIPPED | OUTPATIENT
Start: 2024-01-12 | End: 2024-02-07 | Stop reason: HOSPADM

## 2024-01-12 RX ORDER — ENOXAPARIN SODIUM 100 MG/ML
1 INJECTION SUBCUTANEOUS EVERY 24 HOURS
Status: DISCONTINUED | OUTPATIENT
Start: 2024-01-12 | End: 2024-01-12

## 2024-01-12 RX ORDER — MIRTAZAPINE 15 MG/1
15 TABLET, FILM COATED ORAL NIGHTLY
Qty: 30 TABLET | Refills: 0 | Status: SHIPPED | OUTPATIENT
Start: 2024-01-12 | End: 2024-06-10 | Stop reason: HOSPADM

## 2024-01-12 RX ADMIN — HEPARIN SODIUM 2000 UNITS: 5000 INJECTION, SOLUTION INTRAVENOUS; SUBCUTANEOUS at 16:00

## 2024-01-12 RX ADMIN — HYDROMORPHONE HYDROCHLORIDE 1 MG: 1 INJECTION, SOLUTION INTRAMUSCULAR; INTRAVENOUS; SUBCUTANEOUS at 08:10

## 2024-01-12 RX ADMIN — ALLOPURINOL 100 MG: 100 TABLET ORAL at 14:50

## 2024-01-12 RX ADMIN — SEVELAMER CARBONATE 800 MG: 800 TABLET, FILM COATED ORAL at 17:06

## 2024-01-12 RX ADMIN — MAGNESIUM OXIDE TAB 400 MG (241.3 MG ELEMENTAL MG) 800 MG: 400 (241.3 MG) TAB at 14:50

## 2024-01-12 RX ADMIN — WARFARIN SODIUM 5 MG: 5 TABLET ORAL at 17:06

## 2024-01-12 RX ADMIN — SODIUM CHLORIDE, SODIUM LACTATE, CALCIUM CHLORIDE, MAGNESIUM CHLORIDE AND DEXTROSE: 2.5; 538; 448; 18.3; 5.08 INJECTION, SOLUTION INTRAPERITONEAL at 21:36

## 2024-01-12 RX ADMIN — SODIUM CHLORIDE 500 ML: 9 INJECTION, SOLUTION INTRAVENOUS at 03:41

## 2024-01-12 RX ADMIN — HYDROMORPHONE HYDROCHLORIDE 1 MG: 1 INJECTION, SOLUTION INTRAMUSCULAR; INTRAVENOUS; SUBCUTANEOUS at 03:41

## 2024-01-12 RX ADMIN — HEPARIN SODIUM 1000 UNITS/HR: 10000 INJECTION, SOLUTION INTRAVENOUS at 11:57

## 2024-01-12 RX ADMIN — ONDANSETRON 4 MG: 2 INJECTION INTRAMUSCULAR; INTRAVENOUS at 03:40

## 2024-01-12 RX ADMIN — MIRTAZAPINE 15 MG: 15 TABLET, FILM COATED ORAL at 21:37

## 2024-01-12 RX ADMIN — HYDROMORPHONE HYDROCHLORIDE 0.5 MG: 1 INJECTION, SOLUTION INTRAMUSCULAR; INTRAVENOUS; SUBCUTANEOUS at 20:16

## 2024-01-12 RX ADMIN — HEPARIN SODIUM 4350 UNITS: 5000 INJECTION INTRAVENOUS; SUBCUTANEOUS at 12:00

## 2024-01-12 RX ADMIN — HYDROMORPHONE HYDROCHLORIDE 0.5 MG: 1 INJECTION, SOLUTION INTRAMUSCULAR; INTRAVENOUS; SUBCUTANEOUS at 14:51

## 2024-01-12 RX ADMIN — ACETAMINOPHEN 975 MG: 325 TABLET ORAL at 17:06

## 2024-01-12 RX ADMIN — CALCITRIOL CAPSULES 0.25 MCG 0.5 MCG: 0.25 CAPSULE ORAL at 14:50

## 2024-01-12 SDOH — ECONOMIC STABILITY: HOUSING INSECURITY
IN THE LAST 12 MONTHS, WAS THERE A TIME WHEN YOU DID NOT HAVE A STEADY PLACE TO SLEEP OR SLEPT IN A SHELTER (INCLUDING NOW)?: NO

## 2024-01-12 SDOH — ECONOMIC STABILITY: INCOME INSECURITY: HOW HARD IS IT FOR YOU TO PAY FOR THE VERY BASICS LIKE FOOD, HOUSING, MEDICAL CARE, AND HEATING?: NOT VERY HARD

## 2024-01-12 SDOH — HEALTH STABILITY: PHYSICAL HEALTH: ON AVERAGE, HOW MANY MINUTES DO YOU ENGAGE IN EXERCISE AT THIS LEVEL?: 0 MIN

## 2024-01-12 SDOH — HEALTH STABILITY: MENTAL HEALTH: HOW OFTEN DO YOU HAVE 6 OR MORE DRINKS ON ONE OCCASION?: NEVER

## 2024-01-12 SDOH — SOCIAL STABILITY: SOCIAL INSECURITY: ARE THERE ANY APPARENT SIGNS OF INJURIES/BEHAVIORS THAT COULD BE RELATED TO ABUSE/NEGLECT?: NO

## 2024-01-12 SDOH — SOCIAL STABILITY: SOCIAL INSECURITY: WITHIN THE LAST YEAR, HAVE YOU BEEN AFRAID OF YOUR PARTNER OR EX-PARTNER?: NO

## 2024-01-12 SDOH — HEALTH STABILITY: MENTAL HEALTH: HOW OFTEN DO YOU HAVE A DRINK CONTAINING ALCOHOL?: NEVER

## 2024-01-12 SDOH — SOCIAL STABILITY: SOCIAL INSECURITY
WITHIN THE LAST YEAR, HAVE TO BEEN RAPED OR FORCED TO HAVE ANY KIND OF SEXUAL ACTIVITY BY YOUR PARTNER OR EX-PARTNER?: NO

## 2024-01-12 SDOH — ECONOMIC STABILITY: INCOME INSECURITY: IN THE PAST 12 MONTHS, HAS THE ELECTRIC, GAS, OIL, OR WATER COMPANY THREATENED TO SHUT OFF SERVICE IN YOUR HOME?: NO

## 2024-01-12 SDOH — SOCIAL STABILITY: SOCIAL INSECURITY: HAVE YOU HAD THOUGHTS OF HARMING ANYONE ELSE?: YES

## 2024-01-12 SDOH — ECONOMIC STABILITY: TRANSPORTATION INSECURITY
IN THE PAST 12 MONTHS, HAS LACK OF TRANSPORTATION KEPT YOU FROM MEETINGS, WORK, OR FROM GETTING THINGS NEEDED FOR DAILY LIVING?: NO

## 2024-01-12 SDOH — SOCIAL STABILITY: SOCIAL INSECURITY: DOES ANYONE TRY TO KEEP YOU FROM HAVING/CONTACTING OTHER FRIENDS OR DOING THINGS OUTSIDE YOUR HOME?: NO

## 2024-01-12 SDOH — ECONOMIC STABILITY: INCOME INSECURITY: IN THE LAST 12 MONTHS, WAS THERE A TIME WHEN YOU WERE NOT ABLE TO PAY THE MORTGAGE OR RENT ON TIME?: NO

## 2024-01-12 SDOH — SOCIAL STABILITY: SOCIAL NETWORK: ARE YOU MARRIED, WIDOWED, DIVORCED, SEPARATED, NEVER MARRIED, OR LIVING WITH A PARTNER?: LIVING WITH PARTNER

## 2024-01-12 SDOH — SOCIAL STABILITY: SOCIAL INSECURITY: WITHIN THE LAST YEAR, HAVE YOU BEEN HUMILIATED OR EMOTIONALLY ABUSED IN OTHER WAYS BY YOUR PARTNER OR EX-PARTNER?: NO

## 2024-01-12 SDOH — SOCIAL STABILITY: SOCIAL INSECURITY
WITHIN THE LAST YEAR, HAVE YOU BEEN KICKED, HIT, SLAPPED, OR OTHERWISE PHYSICALLY HURT BY YOUR PARTNER OR EX-PARTNER?: NO

## 2024-01-12 SDOH — ECONOMIC STABILITY: FOOD INSECURITY: WITHIN THE PAST 12 MONTHS, THE FOOD YOU BOUGHT JUST DIDN'T LAST AND YOU DIDN'T HAVE MONEY TO GET MORE.: NEVER TRUE

## 2024-01-12 SDOH — SOCIAL STABILITY: SOCIAL INSECURITY: HAS ANYONE EVER THREATENED TO HURT YOUR FAMILY OR YOUR PETS?: NO

## 2024-01-12 SDOH — HEALTH STABILITY: MENTAL HEALTH
STRESS IS WHEN SOMEONE FEELS TENSE, NERVOUS, ANXIOUS, OR CAN'T SLEEP AT NIGHT BECAUSE THEIR MIND IS TROUBLED. HOW STRESSED ARE YOU?: TO SOME EXTENT

## 2024-01-12 SDOH — HEALTH STABILITY: MENTAL HEALTH: HOW MANY STANDARD DRINKS CONTAINING ALCOHOL DO YOU HAVE ON A TYPICAL DAY?: PATIENT DOES NOT DRINK

## 2024-01-12 SDOH — ECONOMIC STABILITY: FOOD INSECURITY: WITHIN THE PAST 12 MONTHS, YOU WORRIED THAT YOUR FOOD WOULD RUN OUT BEFORE YOU GOT MONEY TO BUY MORE.: NEVER TRUE

## 2024-01-12 SDOH — SOCIAL STABILITY: SOCIAL NETWORK
DO YOU BELONG TO ANY CLUBS OR ORGANIZATIONS SUCH AS CHURCH GROUPS UNIONS, FRATERNAL OR ATHLETIC GROUPS, OR SCHOOL GROUPS?: NO

## 2024-01-12 SDOH — SOCIAL STABILITY: SOCIAL INSECURITY: ABUSE: ADULT

## 2024-01-12 SDOH — SOCIAL STABILITY: SOCIAL NETWORK: HOW OFTEN DO YOU ATTENT MEETINGS OF THE CLUB OR ORGANIZATION YOU BELONG TO?: NEVER

## 2024-01-12 SDOH — SOCIAL STABILITY: SOCIAL NETWORK: HOW OFTEN DO YOU GET TOGETHER WITH FRIENDS OR RELATIVES?: MORE THAN THREE TIMES A WEEK

## 2024-01-12 SDOH — SOCIAL STABILITY: SOCIAL NETWORK: HOW OFTEN DO YOU ATTEND CHURCH OR RELIGIOUS SERVICES?: NEVER

## 2024-01-12 SDOH — ECONOMIC STABILITY: HOUSING INSECURITY: IN THE LAST 12 MONTHS, HOW MANY PLACES HAVE YOU LIVED?: 1

## 2024-01-12 SDOH — SOCIAL STABILITY: SOCIAL INSECURITY: DO YOU FEEL ANYONE HAS EXPLOITED OR TAKEN ADVANTAGE OF YOU FINANCIALLY OR OF YOUR PERSONAL PROPERTY?: YES

## 2024-01-12 SDOH — SOCIAL STABILITY: SOCIAL INSECURITY: WERE YOU ABLE TO COMPLETE ALL THE BEHAVIORAL HEALTH SCREENINGS?: YES

## 2024-01-12 SDOH — SOCIAL STABILITY: SOCIAL INSECURITY: DO YOU FEEL UNSAFE GOING BACK TO THE PLACE WHERE YOU ARE LIVING?: NO

## 2024-01-12 SDOH — HEALTH STABILITY: PHYSICAL HEALTH: ON AVERAGE, HOW MANY DAYS PER WEEK DO YOU ENGAGE IN MODERATE TO STRENUOUS EXERCISE (LIKE A BRISK WALK)?: 0 DAYS

## 2024-01-12 SDOH — SOCIAL STABILITY: SOCIAL INSECURITY: ARE YOU OR HAVE YOU BEEN THREATENED OR ABUSED PHYSICALLY, EMOTIONALLY, OR SEXUALLY BY ANYONE?: NO

## 2024-01-12 SDOH — ECONOMIC STABILITY: TRANSPORTATION INSECURITY
IN THE PAST 12 MONTHS, HAS THE LACK OF TRANSPORTATION KEPT YOU FROM MEDICAL APPOINTMENTS OR FROM GETTING MEDICATIONS?: NO

## 2024-01-12 SDOH — SOCIAL STABILITY: SOCIAL NETWORK
IN A TYPICAL WEEK, HOW MANY TIMES DO YOU TALK ON THE PHONE WITH FAMILY, FRIENDS, OR NEIGHBORS?: MORE THAN THREE TIMES A WEEK

## 2024-01-12 ASSESSMENT — ENCOUNTER SYMPTOMS
NEUROLOGICAL NEGATIVE: 1
RESPIRATORY NEGATIVE: 1
ACTIVITY CHANGE: 1
ARTHRALGIAS: 1
ENDOCRINE NEGATIVE: 1
GASTROINTESTINAL NEGATIVE: 1
NERVOUS/ANXIOUS: 1

## 2024-01-12 ASSESSMENT — COGNITIVE AND FUNCTIONAL STATUS - GENERAL
WALKING IN HOSPITAL ROOM: A LITTLE
TURNING FROM BACK TO SIDE WHILE IN FLAT BAD: A LITTLE
MOVING FROM LYING ON BACK TO SITTING ON SIDE OF FLAT BED WITH BEDRAILS: A LITTLE
PATIENT BASELINE BEDBOUND: NO
MOVING TO AND FROM BED TO CHAIR: A LITTLE
PERSONAL GROOMING: A LITTLE
CLIMB 3 TO 5 STEPS WITH RAILING: A LITTLE
STANDING UP FROM CHAIR USING ARMS: A LITTLE
MOBILITY SCORE: 18
DRESSING REGULAR LOWER BODY CLOTHING: A LITTLE
DAILY ACTIVITIY SCORE: 18
MOVING TO AND FROM BED TO CHAIR: A LITTLE
WALKING IN HOSPITAL ROOM: A LITTLE
HELP NEEDED FOR BATHING: A LITTLE
MOVING FROM LYING ON BACK TO SITTING ON SIDE OF FLAT BED WITH BEDRAILS: A LITTLE
DRESSING REGULAR LOWER BODY CLOTHING: A LITTLE
STANDING UP FROM CHAIR USING ARMS: A LITTLE
EATING MEALS: A LITTLE
HELP NEEDED FOR BATHING: A LITTLE
TOILETING: A LITTLE
DRESSING REGULAR UPPER BODY CLOTHING: A LITTLE
DRESSING REGULAR UPPER BODY CLOTHING: A LITTLE
CLIMB 3 TO 5 STEPS WITH RAILING: A LITTLE
TURNING FROM BACK TO SIDE WHILE IN FLAT BAD: A LITTLE
MOBILITY SCORE: 18
DAILY ACTIVITIY SCORE: 20
TOILETING: A LITTLE

## 2024-01-12 ASSESSMENT — ACTIVITIES OF DAILY LIVING (ADL)
JUDGMENT_ADEQUATE_SAFELY_COMPLETE_DAILY_ACTIVITIES: YES
GROOMING: NEEDS ASSISTANCE
ADEQUATE_TO_COMPLETE_ADL: YES
WALKS IN HOME: NEEDS ASSISTANCE
HEARING - RIGHT EAR: FUNCTIONAL
LACK_OF_TRANSPORTATION: NO
DRESSING YOURSELF: NEEDS ASSISTANCE
TOILETING: NEEDS ASSISTANCE
PATIENT'S MEMORY ADEQUATE TO SAFELY COMPLETE DAILY ACTIVITIES?: YES
BATHING: NEEDS ASSISTANCE
HEARING - LEFT EAR: FUNCTIONAL
FEEDING YOURSELF: NEEDS ASSISTANCE

## 2024-01-12 ASSESSMENT — PAIN DESCRIPTION - ORIENTATION
ORIENTATION: LEFT
ORIENTATION: LEFT

## 2024-01-12 ASSESSMENT — PAIN SCALES - GENERAL
PAINLEVEL_OUTOF10: 8
PAINLEVEL_OUTOF10: 10 - WORST POSSIBLE PAIN
PAINLEVEL_OUTOF10: 0 - NO PAIN
PAINLEVEL_OUTOF10: 10 - WORST POSSIBLE PAIN
PAINLEVEL_OUTOF10: 10 - WORST POSSIBLE PAIN

## 2024-01-12 ASSESSMENT — LIFESTYLE VARIABLES
HOW OFTEN DO YOU HAVE 6 OR MORE DRINKS ON ONE OCCASION: NEVER
AUDIT-C TOTAL SCORE: 0
HOW OFTEN DO YOU HAVE A DRINK CONTAINING ALCOHOL: NEVER
AUDIT-C TOTAL SCORE: 0
HOW MANY STANDARD DRINKS CONTAINING ALCOHOL DO YOU HAVE ON A TYPICAL DAY: PATIENT DOES NOT DRINK
AUDIT-C TOTAL SCORE: 0
SKIP TO QUESTIONS 9-10: 1
SKIP TO QUESTIONS 9-10: 1

## 2024-01-12 ASSESSMENT — PATIENT HEALTH QUESTIONNAIRE - PHQ9
SUM OF ALL RESPONSES TO PHQ9 QUESTIONS 1 & 2: 6
2. FEELING DOWN, DEPRESSED OR HOPELESS: NEARLY EVERY DAY
1. LITTLE INTEREST OR PLEASURE IN DOING THINGS: NEARLY EVERY DAY

## 2024-01-12 ASSESSMENT — PAIN DESCRIPTION - ONSET: ONSET: ONGOING

## 2024-01-12 ASSESSMENT — COLUMBIA-SUICIDE SEVERITY RATING SCALE - C-SSRS
5. HAVE YOU STARTED TO WORK OUT OR WORKED OUT THE DETAILS OF HOW TO KILL YOURSELF? DO YOU INTEND TO CARRY OUT THIS PLAN?: NO
2. HAVE YOU ACTUALLY HAD ANY THOUGHTS OF KILLING YOURSELF?: NO
6. HAVE YOU EVER DONE ANYTHING, STARTED TO DO ANYTHING, OR PREPARED TO DO ANYTHING TO END YOUR LIFE?: NO
4. HAVE YOU HAD THESE THOUGHTS AND HAD SOME INTENTION OF ACTING ON THEM?: NO
1. IN THE PAST MONTH, HAVE YOU WISHED YOU WERE DEAD OR WISHED YOU COULD GO TO SLEEP AND NOT WAKE UP?: NO

## 2024-01-12 ASSESSMENT — PAIN DESCRIPTION - PAIN TYPE: TYPE: ACUTE PAIN

## 2024-01-12 ASSESSMENT — PAIN - FUNCTIONAL ASSESSMENT
PAIN_FUNCTIONAL_ASSESSMENT: 0-10

## 2024-01-12 ASSESSMENT — PAIN DESCRIPTION - LOCATION
LOCATION: ARM

## 2024-01-12 ASSESSMENT — PAIN DESCRIPTION - PROGRESSION: CLINICAL_PROGRESSION: GRADUALLY IMPROVING

## 2024-01-12 ASSESSMENT — PAIN DESCRIPTION - FREQUENCY: FREQUENCY: CONSTANT/CONTINUOUS

## 2024-01-12 NOTE — CONSULTS
"Reason For Consult  Suicidal thoughts, depression    History Of Present Illness  Pt well known to me from previous consultation.    From HPI  Yoselyn Hernandez is a 63 y.o. single female past medical history of ESRD on peritoneal dialysis, AAA, HFrEF (25% 03/2023) and pancreatitis, PE (03/2023 on warfarin)  presenting with left lower extremity pain as well as left arm pain.  Patient states she has not been feeling well for the last couple weeks, feeling down, lots of recent life stressors.  Due to this patient reports she was not taking any of medications x 2 weeks except her allopurinol.  She also missed one PD session.  Patient also with complaints of bilateral lower palm pain.  She is concerned that she may be having a gout flair.  Patient is tearful.  She states she had previous thoughts of \"not wanting to be here.\"  When I asked patient if she had thoughts of harming yourself or anyone else she denied any suicidal or homicidal thought as of now, and states that she would not do anything to herself.       Nobody's talking to me about this chronic clot.   I have a chronic blood clot, I dont understand what that means.   I live alone, both parents are dead, no  or children.   I lay most nights afraid that I'm going to die from it.   15% of my heart is working    Pt able to identify the real question -   \"Am I dying\" - starts to weep.  \"I'm afraid\".   I was an active person, I traveled, I live alone, I do have a friend who comes by.   I'm always afraid and I stay afraid.   I try to keep busy.   My next career I might try to be a .   I might go to [employer] school.   I'm trying to stay alive.     Not suicidal, no paranoia or hallucinations  Fully oriented.      Past Medical History  She has a past medical history of Other abnormalities of gait and mobility, Personal history of other endocrine, nutritional and metabolic disease, and Personal history of other specified conditions.    Surgical History  She has " a past surgical history that includes Colonoscopy (12/05/2017); Hysterectomy (01/06/2014); Other surgical history (01/20/2017); CT angio abdomen pelvis w and or wo IV IV contrast (11/17/2014); CT angio abdomen pelvis w and or wo IV IV contrast (02/01/2018); CT angio abdomen pelvis w and or wo IV IV contrast (02/23/2018); CT angio abdomen pelvis w and or wo IV IV contrast (01/10/2019); CT angio abdomen pelvis w and or wo IV IV contrast (11/25/2019); CT angio abdomen pelvis w and or wo IV IV contrast (04/30/2021); CT angio abdomen pelvis w and or wo IV IV contrast (01/09/2017); CT angio abdomen pelvis w and or wo IV IV contrast (10/24/2018); IR angiogram aorta thoracic (02/21/2018); IR angiogram aorta abdomen (08/02/2019); IR intervention NEURO stent (08/02/2019); IR angiogram endovascular aortic repair (08/19/2022); IR angiogram aorta abdomen (08/19/2022); CT angio abdomen pelvis w and or wo IV IV contrast (12/13/2022); CT angio aorta and bilateral iliofemoral runoff w and or wo IV contrast (05/12/2023); and Colonoscopy (12/2021).     Social History  She reports that she quit smoking about 29 years ago. Her smoking use included cigarettes. She has a 5.00 pack-year smoking history. She has never used smokeless tobacco. She reports that she does not currently use alcohol. She reports current drug use. Drug: Marijuana.    Family History  Family History   Problem Relation Name Age of Onset    COPD Mother      Kidney failure Father          Allergies  Ace inhibitors, Ciprofloxacin, Gabapentin, and Oxycodone    Physical Exam  Physical Exam  Psychiatric:         Attention and Perception: Attention normal. She does not perceive auditory or visual hallucinations.         Mood and Affect: Mood is anxious and depressed. Affect is tearful.         Speech: Speech normal.         Behavior: Behavior is cooperative.         Thought Content: Thought content normal. Thought content is not paranoid. Thought content does not include  "homicidal or suicidal ideation.         Cognition and Memory: Cognition normal.         Judgment: Judgment is impulsive.      Comments: Pleasant, articulate female known to me, fully oriented, fluent. Depressed, anxious, tearful at times, not suicidal, does not want to die, grieving her active life. Has reactivity. Not suicidal, no paranoia or hallucinations. Some perseveration related to anxiety over physical condition. Insight good, recognizes cause and effect. Judgment improved - would like something for depression         Last Recorded Vitals  Blood pressure 119/70, pulse 96, temperature 36.6 °C (97.8 °F), temperature source Oral, resp. rate 18, height 1.702 m (5' 7\"), weight 62.7 kg (138 lb 3.7 oz), SpO2 95 %.    Relevant Results  Scheduled medications  allopurinol, 100 mg, oral, Daily  calcitriol, 0.5 mcg, oral, Daily  carvedilol, 6.25 mg, oral, BID  dextrose 1.5% - LOW calcium 2.5mEq/L 2,000 mL peritoneal dialysate, , intraperitoneal, BID  dextrose 2.5 % - LOW calcium 2.5 mEq/L 2,000 mL peritoneal dialysate, , intraperitoneal, TID  DULoxetine, 30 mg, oral, Daily  levothyroxine, 25 mcg, oral, Daily before breakfast  magnesium oxide, 800 mg, oral, Daily  mirtazapine, 15 mg, oral, Nightly  sevelamer carbonate, 800 mg, oral, TID with meals  warfarin, 5 mg, oral, Daily      Continuous medications  heparin, 0-4,500 Units/hr, Last Rate: 1,100 Units/hr (01/13/24 0328)      PRN medications: acetaminophen, diphenoxylate-atropine, heparin, HYDROmorphone, loperamide, naloxone       Results for orders placed or performed during the hospital encounter of 01/12/24 (from the past 24 hour(s))   Heparin Assay, UFH   Result Value Ref Range    Heparin Unfractionated 0.2 See Comment Below for Therapeutic Ranges IU/mL   Heparin Assay, UFH   Result Value Ref Range    Heparin Unfractionated 0.4 See Comment Below for Therapeutic Ranges IU/mL   Heparin Assay   Result Value Ref Range    Heparin Unfractionated 0.3 See Comment Below for " Therapeutic Ranges IU/mL   Protime-INR   Result Value Ref Range    Protime 13.2 (H) 9.8 - 12.8 seconds    INR 1.2 (H) 0.9 - 1.1   Renal function panel   Result Value Ref Range    Glucose 91 74 - 99 mg/dL    Sodium 134 (L) 136 - 145 mmol/L    Potassium 3.6 3.5 - 5.3 mmol/L    Chloride 94 (L) 98 - 107 mmol/L    Bicarbonate 25 21 - 32 mmol/L    Anion Gap 19 10 - 20 mmol/L    Urea Nitrogen 54 (H) 6 - 23 mg/dL    Creatinine 8.74 (H) 0.50 - 1.05 mg/dL    eGFR 5 (L) >60 mL/min/1.73m*2    Calcium 7.8 (L) 8.6 - 10.3 mg/dL    Phosphorus 8.9 (H) 2.5 - 4.9 mg/dL    Albumin 2.5 (L) 3.4 - 5.0 g/dL   Magnesium   Result Value Ref Range    Magnesium 1.50 (L) 1.60 - 2.40 mg/dL   CBC   Result Value Ref Range    WBC 5.4 4.4 - 11.3 x10*3/uL    nRBC 0.0 0.0 - 0.0 /100 WBCs    RBC 3.31 (L) 4.00 - 5.20 x10*6/uL    Hemoglobin 9.9 (L) 12.0 - 16.0 g/dL    Hematocrit 30.3 (L) 36.0 - 46.0 %    MCV 92 80 - 100 fL    MCH 29.9 26.0 - 34.0 pg    MCHC 32.7 32.0 - 36.0 g/dL    RDW 17.6 (H) 11.5 - 14.5 %    Platelets 171 150 - 450 x10*3/uL       Assessment/Plan     IMP:  Pt responded well to psychotherapy and explanation about chronic clot, she was afraid if she moved wrong it would break off and kill her.   Recurrent Major Depression severe without psychosis  Situational Anxiety    PLAN:  Cymbalta 30 mg daily (renal dose) (more likely to cause constipation than diarrhea)  Pastoral counseling consult  Pt needs therapist - refer to  psychiatry [phone] - sessions can be virtual  If appropriate, palliative consult    I spent 60 minutes in the professional and overall care of this patient.      Rema Dhillon MD

## 2024-01-12 NOTE — ED TRIAGE NOTES
"Patient BIBA from home c/o Lt leg and arm pain and Lt leg numbness x2 days. Patient rates pain \"12/10\". No other complaints at this time.  "

## 2024-01-12 NOTE — PROGRESS NOTES
Pharmacy Medication History Review    Yoselyn Hernandez is a 63 y.o. female admitted for Left leg pain. Pharmacy reviewed the patient's [id number] medications and allergies for accuracy.    The list below reflectives the updated PTA list. Please review each medication in order reconciliation for additional clarification and justification.  Prior to Admission Medications   Prescriptions Last Dose Informant Patient Reported?              Creon 24,000-76,000 -120,000 unit capsule   No    Sig: Take 3 capsules by mouth 3 times a day with meals.   Patient taking differently: Take 3 capsules by mouth 3 times a day with meals. Patient stopped taking 3 weeks ago for Diarrhea. Patient takes Lomotil as well and it works better for her   acetaminophen (Tylenol) 325 mg tablet   No    Sig: Take 3 tablets (975 mg) by mouth every 8 hours if needed (pain or fever).   allopurinol (Zyloprim) 100 mg tablet 1/10/2024  No    Sig: Take 1 tablet (100 mg) by mouth once daily.             atorvastatin (Lipitor) 40 mg tablet 12/29/2023  No    Sig: TAKE 1 TABLET BY MOUTH ONCE DAILY AT BEDTIME   calcitriol (Rocaltrol) 0.5 mcg capsule 12/29/2023  Yes    Sig: Take 1 capsule (0.5 mcg) by mouth once daily.             carvedilol (Coreg) 6.25 mg tablet 12/29/2023  No    Sig: Take 1 tablet (6.25 mg) by mouth 2 times a day with meals.   diphenoxylate-atropine (Lomotil) 2.5-0.025 mg tablet   No    Sig: Take 1 tablet by mouth 4 times a day as needed for diarrhea.   Patient taking differently: Take 1 tablet by mouth 3 times a day. Patient takes at each meal she eats from 1-3 tabs daily   epoetin treasure (Epogen,Procrit) 10,000 unit/mL injection   Yes    Sig: Inject 1 mL (10,000 Units) under the skin every 14 (fourteen) days.   gentamicin (Garamycin) 0.1 % cream   No    Sig: Apply topically once daily. Do not start before November 16, 2023.   levothyroxine (Synthroid, Levoxyl) 25 mcg tablet 12/29/2023  No    Sig: Take 1 tablet (25 mcg) by mouth once  daily in the morning. Take before meals.   loperamide (Imodium) 2 mg capsule   No    Sig: Take 1 capsule (2 mg) by mouth 4 times a day as needed for diarrhea.   magnesium oxide (Mag-Ox) 400 mg tablet 12/29/2023  Yes    Sig: Take 2 tablets (800 mg) by mouth once daily.   mirtazapine (Remeron) 15 mg tablet   No    Sig: Take 1 tablet (15 mg) by mouth once daily at bedtime.   mirtazapine (Remeron) 15 mg tablet 12/29/2023  No    Sig: Take 1 tablet (15 mg) by mouth once daily at bedtime.   naloxone (Narcan) 4 mg/0.1 mL nasal spray   No    Sig: INSTILL 1 SPRAY IN ONE NOSTRIL AS NEEDED FOR ACCIDENTAL OPIOID OVERDOSE; REPEAT WITH SECOND DOSE IN 5 MINUTES IF NO RESPONSE                sevelamer (Renagel) 800 mg tablet 12/29/2023  Yes    Sig: Take 1 tablet (800 mg) by mouth 3 times a day with meals.                          warfarin (Coumadin) 2.5 mg tablet 12/29/2023  No    Sig: TAKE 1 TABLET BY MOUTH ONCE DAILY      Facility-Administered Medications: None      Spoke to the patient. Patient stated that she had stopped taking all her meds for 2 weeks (except Allopurinol) Patient stopped taking Creon 3 weeks ago. She doesn't think she still needs to take it because the Lomotil is working.         The list below reflectives the updated allergy list. Please review each documented allergy for additional clarification and justification.  Allergies  Reviewed by Loyda Ann, EMT on 1/12/2024        Severity Reactions Comments    Ace Inhibitors High Angioedema, Other, Swelling, Unknown     Ciprofloxacin High GI intolerance, Nausea Only, Diarrhea     Gabapentin Not Specified Unknown Jerking head movements    Oxycodone Low Itching             Below are additional concerns with the patient's PTA list.      Ernestina Poole CPhT

## 2024-01-12 NOTE — CONSULTS
Inpatient consult to Nephrology  Consult performed by: Jose Martin Jacobo DO  Consult ordered by: Lawrence Sahu, APRN-CNP      Reason For Consult  ESRD on peritoneal dialysis    History Of Present Illness  Yoselyn Hernandez is a 63 y.o. 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  Prior to the onset of pain, she suffered 2 falls.  There was no loss of consciousness but she began having pain to her left side.    In the ED, workup was notable for a partially occlusive DVT in the left common femoral and entire length of the femoral vein. Suspected partially occlusive DVT in the  popliteal vein.  Of note, she was noted to have complete thrombotic occlusion of grafted  bilateral renal arteries last October by CT imaging.  She was placed on heparin drip in the ED.  Nephrology is consulted for renal care.     Past Medical History:   Diagnosis Date    Other abnormalities of gait and mobility     Gait, antalgic    Personal history of other endocrine, nutritional and metabolic disease     History of hyperlipidemia    Personal history of other specified conditions     History of shortness of breath       Past Surgical History:   Procedure Laterality Date    COLONOSCOPY  12/05/2017    Complete Colonoscopy    COLONOSCOPY  12/2021    rpt 12-25    CT ABDOMEN PELVIS ANGIOGRAM W AND/OR WO IV CONTRAST  11/17/2014    CT ABDOMEN PELVIS ANGIOGRAM W AND/OR WO IV CONTRAST 11/17/2014 [facility] CLINICAL LEGACY    CT  ABDOMEN PELVIS ANGIOGRAM W AND/OR WO IV CONTRAST  02/01/2018    CT ABDOMEN PELVIS ANGIOGRAM W AND/OR WO IV CONTRAST 2/1/2018 [facility] AIB LEGACY    CT ABDOMEN PELVIS ANGIOGRAM W AND/OR WO IV CONTRAST  02/23/2018    CT ABDOMEN PELVIS ANGIOGRAM W AND/OR WO IV CONTRAST 2/23/2018 [facility] INPATIENT LEGACY    CT ABDOMEN PELVIS ANGIOGRAM W AND/OR WO IV CONTRAST  01/10/2019    CT ABDOMEN PELVIS ANGIOGRAM W AND/OR WO IV CONTRAST 1/10/2019 [facility] EMERGENCY LEGACY    CT ABDOMEN PELVIS ANGIOGRAM W AND/OR WO IV CONTRAST  11/25/2019    CT ABDOMEN PELVIS ANGIOGRAM W AND/OR WO IV CONTRAST 11/25/2019 [facility] ANCILLARY LEGACY    CT ABDOMEN PELVIS ANGIOGRAM W AND/OR WO IV CONTRAST  04/30/2021    CT ABDOMEN PELVIS ANGIOGRAM W AND/OR WO IV CONTRAST 4/30/2021 [facility] CLINICAL LEGACY    CT ABDOMEN PELVIS ANGIOGRAM W AND/OR WO IV CONTRAST  01/09/2017    CT ABDOMEN PELVIS ANGIOGRAM W AND/OR WO IV CONTRAST 1/9/2017 [facility] CLINICAL LEGACY    CT ABDOMEN PELVIS ANGIOGRAM W AND/OR WO IV CONTRAST  10/24/2018    CT ABDOMEN PELVIS ANGIOGRAM W AND/OR WO IV CONTRAST 10/24/2018 [facility] EMERGENCY LEGACY    CT ABDOMEN PELVIS ANGIOGRAM W AND/OR WO IV CONTRAST  12/13/2022    CT ABDOMEN PELVIS ANGIOGRAM W AND/OR WO IV CONTRAST 12/13/2022 [facility] LEGACY    CT AORTA AND BILATERAL ILIOFEMORAL RUNOFF ANGIOGRAM W AND/OR WO IV CONTRAST  05/12/2023    CT AORTA AND BILATERAL ILIOFEMORAL RUNOFF ANGIOGRAM W AND/OR WO IV CONTRAST 5/12/2023 [facility] CT    HYSTERECTOMY  01/06/2014    Hysterectomy    IR ANGIOGRAM AORTA ABDOMEN  08/02/2019    IR ANGIOGRAM AORTA ABDOMEN 8/2/2019 [facility] INPATIENT LEGACY    IR ANGIOGRAM AORTA ABDOMEN  08/19/2022    IR ANGIOGRAM AORTA ABDOMEN 8/19/2022 [facility] INPATIENT LEGACY    IR ANGIOGRAM AORTA THORACIC  02/21/2018    IR ANGIOGRAM AORTA THORACIC 2/21/2018 [facility] INPATIENT LEGACY    IR ANGIOGRAM ENDOVASCULAR AORTIC REPAIR  08/19/2022    IR ANGIOGRAM ENDOVASCULAR AORTIC REPAIR 8/19/2022 [facility] INPATIENT LEGACY    IR INTERVENTION NEURO STENT  08/02/2019    IR INTERVENTION  "NEURO STENT 2019 [facility] INPATIENT LEGACY    OTHER SURGICAL HISTORY  2017    Aortic Aneurysm Repair Thoracoabdominal       Social History     Tobacco Use    Smoking status: Former     Packs/day: 0.25     Years: 20.00     Additional pack years: 0.00     Total pack years: 5.00     Types: Cigarettes     Quit date:      Years since quittin.0    Smokeless tobacco: Never   Vaping Use    Vaping Use: Never used   Substance Use Topics    Alcohol use: Not Currently    Drug use: Yes     Types: Marijuana        Family History  Family History   Problem Relation Name Age of Onset    COPD Mother      Kidney failure Father          Allergies  Ace inhibitors, Ciprofloxacin, Gabapentin, and Oxycodone    Review of Systems   10 point review of systems obtained and negative unless stated in HPI    Physical Exam   Constitutional: awake/alert/oriented x3, no distress,  alert and cooperative   ENMT: mucous membranes moist   Head/Neck: Neck supple, no JVD, trachea midline   Respiratory/Thorax: CTAB, normal breath sounds with  good chest expansion, thorax symmetric   Cardiovascular: Regular, rate and rhythm, ERNIE, normal  S 1and S 2   Gastrointestinal: soft, no rebound tenderness or  guarding, +BS, no bruits  PD site noted    Genitourinary: no Mckenna   Musculoskeletal: ROM intact, no joint swelling, normal  strength   Extremities: normal extremities, no LE edema   Neurological: alert and oriented x3, no focal deficits   No asterixis   Psychological: Appropriate mood and behavior   Skin: Warm and dry, no lesions to exposed skin        I&O 24HR  No intake or output data in the 24 hours ending 24 1517    Vitals 24HR  Heart Rate:  [78-94]   Temp:  [36.6 °C (97.8 °F)]   Resp:  [12-18]   BP: (108-138)/(65-87)   Height:  [170.2 cm (5' 7\")]   Weight:  [54.4 kg (120 lb)]   SpO2:  [93 %-100 %]     Scheduled Medications  allopurinol, 100 mg, oral, Daily  calcitriol, 0.5 mcg, oral, Daily  carvedilol, 6.25 mg, oral, BID  [START ON " 1/13/2024] levothyroxine, 25 mcg, oral, Daily before breakfast  magnesium oxide, 800 mg, oral, Daily  mirtazapine, 15 mg, oral, Nightly  sevelamer carbonate, 800 mg, oral, TID with meals  warfarin, 5 mg, oral, Daily      Continuous medications  heparin, 0-4,500 Units/hr, Last Rate: 1,000 Units/hr (01/12/24 1157)        PRN medications: acetaminophen, diphenoxylate-atropine, heparin, HYDROmorphone, loperamide, naloxone     Relevant Results  Results from last 7 days   Lab Units 01/12/24  0344   WBC AUTO x10*3/uL 5.2   HEMOGLOBIN g/dL 10.3*   HEMATOCRIT % 31.2*   PLATELETS AUTO x10*3/uL 146*   NEUTROS PCT AUTO % 76.7   LYMPHS PCT AUTO % 14.1   MONOS PCT AUTO % 7.2   EOS PCT AUTO % 1.0      Results from last 7 days   Lab Units 01/12/24  0344   SODIUM mmol/L 134*   POTASSIUM mmol/L 3.6   CHLORIDE mmol/L 95*   CO2 mmol/L 22   BUN mg/dL 53*   CREATININE mg/dL 9.51*   CALCIUM mg/dL 7.8*   PROTEIN TOTAL g/dL 5.6*   BILIRUBIN TOTAL mg/dL 0.4   ALK PHOS U/L 115   ALT U/L 13   AST U/L 13   GLUCOSE mg/dL 76      Lower extremity venous duplex left   Final Result   Partially occlusive DVT in the left common femoral and entire length   of the femoral vein. Suspected partially occlusive DVT in the   popliteal vein.        Left lower extremity edema.        Baker's cyst Baker's cyst.                  MACRO:   None        Signed by: Manju Sandoval 1/12/2024 6:29 AM   Dictation workstation:   [id number]      XR shoulder left 2+ views   Final Result   No acute osseous abnormality.        Erosive changes at the acromioclavicular joint is favored to be   secondary to hyperparathyroidism/chronic renal disease. Infection is   considered less likely, however, please correlate with clinical   history.             MACRO:   None        Signed by: Manju Sandoval 1/12/2024 4:32 AM   Dictation workstation:   [id number]      XR forearm left 2 views   Final Result   No acute osseous abnormality.        Erosive changes at the acromioclavicular  joint is favored to be   secondary to hyperparathyroidism/chronic renal disease. Infection is   considered less likely, however, please correlate with clinical   history.             MACRO:   None        Signed by: Manju Sandoval 1/12/2024 4:32 AM   Dictation workstation:   [id number]      CT abdomen pelvis wo IV contrast   Final Result   No acute abdominal or pelvic process.        Stable chronic findings as above.        MACRO:   None        Signed by: Manju Sandoval 1/12/2024 4:25 AM   Dictation workstation:   [id number]            Assessment/Plan     Yoselyn Hernandez is a 63 y.o. 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  Prior to the onset of pain, she suffered 2 falls.  There was no loss of consciousness but she began having pain to her left side.    In the ED, workup was notable for a partially occlusive DVT in the left common femoral and entire length of the femoral vein. Suspected partially occlusive DVT in the  popliteal vein.  Of note, she was noted to have complete thrombotic occlusion of grafted  bilateral renal arteries last October by CT imaging.  She was placed on heparin drip in the ED.  Nephrology is consulted for renal care.    Ms. Hernandez has evidence of chronic occlusive disease.  She is prescribed Coumadin albeit she reports stopping all her medication for the past 2 to 3 weeks.  She has a psychiatric history and reports depression and feeling as if she wishes to  give up.  We will have psychiatry see her.  Medications have been resumed.  I will check a phosphorus level, vitamin D and intact PTH.  I will order peritoneal dialysis to begin shortly.  Nephrology will follow closely with her care.       Principal Problem:    Left leg pain      I spent 55 minutes in the professional and overall care of this patient.      Jose Martin Jacobo, DO

## 2024-01-12 NOTE — PROGRESS NOTES
01/12/24 1053   [facility] Disability Status   Are you deaf or do you have serious difficulty hearing? N   Are you blind or do you have serious difficulty seeing, even when wearing glasses? N   Because of a physical, mental, or emotional condition, do you have serious difficulty concentrating, remembering, or making decisions? (5 years old or older) N   Do you have serious difficulty walking or climbing stairs? Y  (cane)   Do you have serious difficulty dressing or bathing? N   Because of a physical, mental, or emotional condition, do you have serious difficulty doing errands alone such as visiting the doctor? Y

## 2024-01-12 NOTE — ED PROVIDER NOTES
"Chief Complaint   Patient presents with    Leg Pain     HPI:   Yoselyn Hernandez is an 63 y.o. female with complicated PMH that includes ESRD (daily peritoneal dialysis), COPD, DVT and PE (on Coumadin), HFrEF (echo 03/2023 showed EF 15 to 20%), daily marijuana use, HTN, HLD, IBS-D, chronic pain, PVD, anemia, carotid artery dissection, CAD, MDD that presents to the ED via EMS with male  for evaluation of left leg pain and left arm.  Patient reports that she had 2 falls 2 days ago.  She said first she slipped on her cane and fell into the furniture then later that day she slipped trying to get into the bathtub and fell onto her bottom on the floor.  She endorses pain of her buttock, left hip, left leg, left forearm, left elbow and left shoulder.  She denies hitting her head nor losing consciousness.  She said she took oxycodone yesterday evening without relief of symptoms, she has not taken any medication since.  She ambulates with a cane.  Patient denies any chest pain, endorses chronic shortness of breath.  Denies palpitations.  Endorses chronic tingling and paresthesias of her lower extremities.  Endorses chronic edema of her lower extremities.  Patient is asking for Dilaudid.  She says that is the only medication that works.    Medications: \"They are all in my chart\"  Soc HX: Daily marijuana use  Allergies   Allergen Reactions    Ace Inhibitors Angioedema, Other, Swelling and Unknown    Ciprofloxacin GI intolerance, Nausea Only and Diarrhea    Gabapentin Unknown     Jerking head movements    Oxycodone Itching   :  Past Medical History:   Diagnosis Date    Other abnormalities of gait and mobility     Gait, antalgic    Personal history of other endocrine, nutritional and metabolic disease     History of hyperlipidemia    Personal history of other specified conditions     History of shortness of breath     Past Surgical History:   Procedure Laterality Date    COLONOSCOPY  12/05/2017    Complete Colonoscopy    " COLONOSCOPY  12/2021    rpt 12-25    CT ABDOMEN PELVIS ANGIOGRAM W AND/OR WO IV CONTRAST  11/17/2014    CT ABDOMEN PELVIS ANGIOGRAM W AND/OR WO IV CONTRAST 11/17/2014 [facility] CLINICAL LEGACY    CT ABDOMEN PELVIS ANGIOGRAM W AND/OR WO IV CONTRAST  02/01/2018    CT ABDOMEN PELVIS ANGIOGRAM W AND/OR WO IV CONTRAST 2/1/2018 [facility] AIB LEGACY    CT ABDOMEN PELVIS ANGIOGRAM W AND/OR WO IV CONTRAST  02/23/2018    CT ABDOMEN PELVIS ANGIOGRAM W AND/OR WO IV CONTRAST 2/23/2018 [facility] INPATIENT LEGACY    CT ABDOMEN PELVIS ANGIOGRAM W AND/OR WO IV CONTRAST  01/10/2019    CT ABDOMEN PELVIS ANGIOGRAM W AND/OR WO IV CONTRAST 1/10/2019 [facility] EMERGENCY LEGACY    CT ABDOMEN PELVIS ANGIOGRAM W AND/OR WO IV CONTRAST  11/25/2019    CT ABDOMEN PELVIS ANGIOGRAM W AND/OR WO IV CONTRAST 11/25/2019 [facility] ANCILLARY LEGACY    CT ABDOMEN PELVIS ANGIOGRAM W AND/OR WO IV CONTRAST  04/30/2021    CT ABDOMEN PELVIS ANGIOGRAM W AND/OR WO IV CONTRAST 4/30/2021 [facility] CLINICAL LEGACY    CT ABDOMEN PELVIS ANGIOGRAM W AND/OR WO IV CONTRAST  01/09/2017    CT ABDOMEN PELVIS ANGIOGRAM W AND/OR WO IV CONTRAST 1/9/2017 [facility] CLINICAL LEGACY    CT ABDOMEN PELVIS ANGIOGRAM W AND/OR WO IV CONTRAST  10/24/2018    CT ABDOMEN PELVIS ANGIOGRAM W AND/OR WO IV CONTRAST 10/24/2018 [facility] EMERGENCY LEGACY    CT ABDOMEN PELVIS ANGIOGRAM W AND/OR WO IV CONTRAST  12/13/2022    CT ABDOMEN PELVIS ANGIOGRAM W AND/OR WO IV CONTRAST 12/13/2022 DOCTOR OFFICE LEGACY    CT AORTA AND BILATERAL ILIOFEMORAL RUNOFF ANGIOGRAM W AND/OR WO IV CONTRAST  05/12/2023    CT AORTA AND BILATERAL ILIOFEMORAL RUNOFF ANGIOGRAM W AND/OR WO IV CONTRAST 5/12/2023 [facility] CT    HYSTERECTOMY  01/06/2014    Hysterectomy    IR ANGIOGRAM AORTA ABDOMEN  08/02/2019    IR ANGIOGRAM AORTA ABDOMEN 8/2/2019 [facility] INPATIENT LEGACY    IR ANGIOGRAM AORTA ABDOMEN  08/19/2022    IR ANGIOGRAM AORTA ABDOMEN 8/19/2022 [facility] INPATIENT LEGACY    IR ANGIOGRAM AORTA THORACIC  02/21/2018    IR ANGIOGRAM AORTA THORACIC 2/21/2018 [facility] INPATIENT LEGACY     IR ANGIOGRAM ENDOVASCULAR AORTIC REPAIR  08/19/2022    IR ANGIOGRAM ENDOVASCULAR AORTIC REPAIR 8/19/2022 [facility] INPATIENT LEGACY    IR INTERVENTION NEURO STENT  08/02/2019    IR INTERVENTION NEURO STENT 8/2/2019 [facility] INPATIENT LEGACY    OTHER SURGICAL HISTORY  01/20/2017    Aortic Aneurysm Repair Thoracoabdominal     Family History   Problem Relation Name Age of Onset    COPD Mother      Kidney failure Father       Physical Exam  Vitals and nursing note reviewed.   Constitutional:       General: She is not in acute distress.     Appearance: She is not ill-appearing or toxic-appearing.      Comments: Appears older than stated age.  Cachectic.  Lying on left side in fetal position on ED cart   HENT:      Head: Normocephalic and atraumatic.      Right Ear: External ear normal.      Left Ear: External ear normal.   Eyes:      Extraocular Movements: Extraocular movements intact.      Pupils: Pupils are equal, round, and reactive to light.      Comments: Bilateral cataract.  No pain or dizziness with eye movement.  No nystagmus.   Cardiovascular:      Rate and Rhythm: Normal rate and regular rhythm.      Pulses: Normal pulses.      Heart sounds: No murmur heard.     Comments: Bilateral pedal edema 3+ left lower extremity, 2+ right lower extremity  Pulmonary:      Effort: Pulmonary effort is normal. No respiratory distress.      Comments: Breath sounds diminished bilaterally  Abdominal:      General: Bowel sounds are normal.      Palpations: Abdomen is soft.      Tenderness: There is no abdominal tenderness. There is no guarding or rebound.   Musculoskeletal:         General: No deformity or signs of injury. Normal range of motion.      Cervical back: Normal range of motion. No tenderness.      Comments: ROM of left upper extremity and bilateral lower extremities limited secondary to pain   Skin:     General: Skin is warm and dry.      Capillary Refill: Capillary refill takes less than 2 seconds.      Findings: No bruising  or rash.   Neurological:      Mental Status: She is alert.      Cranial Nerves: No cranial nerve deficit.     VS: As documented in the triage note and EMR flowsheet from this visit were reviewed.    External Records Reviewed: I reviewed recent and relevant outside records including: Echo 03/2023 EF 15 to 20%, severely elevated RVSP, moderately diarrhea did IVC.  Reviewed GI note 12/20/2023.  Seen for evaluation of chronic diarrhea.  Advised to follow low FODMAP diet, start Lomotil and continue loperamide recommended colonoscopy in 2026      Medical Decision Making:   ED Course as of 01/12/24 2016 Fri Jan 12, 2024   0309 Vitals Reviewed: Afebrile. Normotensive. Not tachycardic nor tachypneic. No hypoxia.   [KA]   0333 Patient is 63-year-old female that presents to the ED for evaluation of left leg and left arm pain after sustaining a fall 2 days ago.  She is on Coumadin.  Denies hitting her head or losing consciousness.  Because it has been greater than 48 hours do not feel she necessitates CT imaging of her head or C-spine.  She has limited range of motion of her left upper and left lower extremity which she says is secondary to pain.  She has bilateral lower extremity edema.  Both she and son state this is not new.  I do not see any large areas of ecchymosis.  Pelvis is stable however palpating over her sacrum elicits pain.  Will obtain CT abdomen pelvis without contrast, x-rays of the left shoulder and left forearm.  Will collect basic labs and INR.  Patient to be given 1 mg Dilaudid, 4 mg IV Zofran and 500 mL normal saline.  She says she is not fluid restricted and she was told by her doctors that they would like her to actually have more fluid.  She gets her peritoneal dialysis every day [KA]   0500   I personally reviewed patient's labs.  CBC shows normocytic anemia similar to baseline.  CMP shows creatinine 9.51, mild hyponatremia.  Hypoalbuminemia with albumin 2.5.  I did talk to patient about results and  she admits that she did not do peritoneal dialysis today and will do when she goes home.  She also admits that she has not been taking any of her medications for the past few weeks.  She has not taken her Coumadin, carvedilol, renal vitamin, any of her important medications.  She said she is feeling overwhelmed with all of her medicines.  She said that she tried to talk to her doctor about it but they did not discontinue or change any of her medications.  She is very nervous about a blood clot in her leg.  Would like a ultrasound of the left lower extremity.  Considering she has been off her Coumadin for a few weeks I will order this.  Encouraged patient to restart her meds and she is agreeable to restarting her Coumadin, carvedilol, mirtazapine, atorvastatin and KCl.  She would like me to send refills to the pharmacy for her which I will do as a courtesy. [KA]   1060  patient will be signed out to incoming provider Dr. Pizarro pending ultrasound.  Disposition likely home. [KA]   1857 Emergency Medicine Attending Attestation:         This patient was seen by the advanced practice provider.  I have personally performed a substantive portion of the encounter.  I have seen and examined the patient; agree with the workup, evaluation, MDM, management and diagnosis.  The care plan has been discussed.      I personally saw the patient and made/approved the management plan and take responsibility for the patient management.     Whitney has been off her medications for the past 2 weeks including Coumadin 2.5 mg daily.  She now has asymptomatic extensive left lower extremity DVT that is requiring IV narcotics for pain control.  I discussed anticoagulation with pharmacy team and they are recommending heparin to Coumadin which will take more than 48 hours to bridge her.  She was evaluated by vascular at [facility] and they recommended against using a DOAC due to her renal failure.  Patient will be admitted to the hospitalist service  on a heparin drip.  I have consulted Dr. Jacobo and he is aware  Exam: Left leg moderate swelling ankle, calf, thigh.  2+ DP.  Normal temp/color.   MDM: See above.              I independently interpreted patient's EKG and agree with the above mentioned interpretation.      Anita Pizarro MD   [JG]      ED Course User Index  [JG] Anita Pizarro MD  [KA] Renetta Lucas PA-C         Diagnoses as of 01/12/24 2016   Hyperlipidemia, unspecified hyperlipidemia type   Deep vein thrombosis (DVT) of femoral vein, unspecified chronicity, unspecified laterality (CMS/HCC)   Left leg pain   Left arm pain   Struck bath tub with fall, initial encounter     Chronic Medical Conditions Significantly Affecting Care:   Medication noncompliance     Counseling: Spoke with the patient and discussed today´s findings, in addition to providing specific details for the plan of care and expected course.  Patient was given the opportunity to ask questions.  Educated on the common potential side effects of medications prescribed.  The plan of care was mutually agreed upon with the patient. The patient and/or family were given the opportunity to ask questions. All questions asked today in the ED were answered to the best of my ability with today's information.    This patient was cared for in the setting of nationwide stress on resources and staffing.    This report was transcribed using voice recognition software.  Every effort was made to ensure accuracy, however, inadvertently computerized transcription errors may be present.       Renetta Lucas PA-C  01/12/24 0554       Renetta Lucas PA-C  01/12/24 2019

## 2024-01-12 NOTE — H&P
"History Of Present Illness  Yoselyn Hernandez is a 63 y.o. female past medical history of ESRD on dialysis, AAA, HFrEF (25% 03/2023) and pancreatitis, PE (03/2023 on warfarin)  presenting with left lower extremity pain as well as left arm pain.  Patient states she has not been feeling well for the last couple weeks, feeling down, lots of recent life stressors.  Due to this patient reports she was not taking any of medications.  She also missed a PD session.  Patient also with complaints of bilateral lower palm pain.  She is concerned that she may be having a gout flair.  Patient is tearful.  She states she had previous thoughts of \"not wanting to be here.\"  When I asked patient if she had thoughts of harming yourself or anyone else she denied any suicidal or homicidal thought as of now, and states that she would not do anything to herself.  Ultrasound was done of her left lower extremity due to swelling that showed partially occlusive DVT in the left common femoral and entire length of the femoral vein. Suspected partially occlusive DVT in the popliteal vein.  Patient states to me that this DVT is chronic however patients INR is still subtherapeutic and she will need to be admitted for bridge with heparin drip as she also has a history of pulmonary embolism.  Patient is without fever, chills, chest pain, and shortness of breath.  Patient has a history of diarrhea.  Patient was started on heparin drip in the emergency department.  Patient glucose 76, sodium 134, potassium 3.6, chloride 95, anion gap 21, creatinine 9.51, WBC 5.2, hemoglobin 10.3, hematocrit 31.2, platelet count 146.    Past Medical History  She has a past medical history of Other abnormalities of gait and mobility, Personal history of other endocrine, nutritional and metabolic disease, and Personal history of other specified conditions.    Surgical History  She has a past surgical history that includes Colonoscopy (12/05/2017); Hysterectomy (01/06/2014); " Other surgical history (01/20/2017); CT angio abdomen pelvis w and or wo IV IV contrast (11/17/2014); CT angio abdomen pelvis w and or wo IV IV contrast (02/01/2018); CT angio abdomen pelvis w and or wo IV IV contrast (02/23/2018); CT angio abdomen pelvis w and or wo IV IV contrast (01/10/2019); CT angio abdomen pelvis w and or wo IV IV contrast (11/25/2019); CT angio abdomen pelvis w and or wo IV IV contrast (04/30/2021); CT angio abdomen pelvis w and or wo IV IV contrast (01/09/2017); CT angio abdomen pelvis w and or wo IV IV contrast (10/24/2018); IR angiogram aorta thoracic (02/21/2018); IR angiogram aorta abdomen (08/02/2019); IR intervention NEURO stent (08/02/2019); IR angiogram endovascular aortic repair (08/19/2022); IR angiogram aorta abdomen (08/19/2022); CT angio abdomen pelvis w and or wo IV IV contrast (12/13/2022); CT angio aorta and bilateral iliofemoral runoff w and or wo IV contrast (05/12/2023); and Colonoscopy (12/2021).     Social History  She reports that she quit smoking about 29 years ago. Her smoking use included cigarettes. She has a 5.00 pack-year smoking history. She has never used smokeless tobacco. She reports that she does not currently use alcohol. She reports current drug use. Drug: Marijuana.    Family History  Family History   Problem Relation Name Age of Onset    COPD Mother      Kidney failure Father          Allergies  Ace inhibitors, Ciprofloxacin, Gabapentin, and Oxycodone    Review of Systems   Constitutional:  Positive for activity change.   HENT: Negative.     Respiratory: Negative.     Cardiovascular:  Positive for leg swelling.   Gastrointestinal: Negative.    Endocrine: Negative.    Genitourinary: Negative.    Musculoskeletal:  Positive for arthralgias.   Skin: Negative.    Neurological: Negative.    Psychiatric/Behavioral:  Positive for suicidal ideas. The patient is nervous/anxious.         Physical Exam  HENT:      Head: Normocephalic.   Cardiovascular:      Rate and  Rhythm: Normal rate and regular rhythm.   Pulmonary:      Effort: Pulmonary effort is normal.      Breath sounds: Normal breath sounds.   Abdominal:      General: Bowel sounds are normal.      Palpations: Abdomen is soft.   Musculoskeletal:         General: Normal range of motion.      Cervical back: Neck supple.   Skin:     General: Skin is warm and dry.   Neurological:      Mental Status: She is alert and oriented to person, place, and time.   Psychiatric:      Comments: Tearful, currently denies suicidal ideation           Last Recorded Vitals  /65   Pulse 93   Temp 36.6 °C (97.8 °F) (Oral)   Resp 18   Wt 54.4 kg (120 lb)   SpO2 95%     Relevant Results      Results for orders placed or performed during the hospital encounter of 01/12/24 (from the past 24 hour(s))   CBC and Auto Differential   Result Value Ref Range    WBC 5.2 4.4 - 11.3 x10*3/uL    nRBC 0.0 0.0 - 0.0 /100 WBCs    RBC 3.43 (L) 4.00 - 5.20 x10*6/uL    Hemoglobin 10.3 (L) 12.0 - 16.0 g/dL    Hematocrit 31.2 (L) 36.0 - 46.0 %    MCV 91 80 - 100 fL    MCH 30.0 26.0 - 34.0 pg    MCHC 33.0 32.0 - 36.0 g/dL    RDW 17.6 (H) 11.5 - 14.5 %    Platelets 146 (L) 150 - 450 x10*3/uL    Neutrophils % 76.7 40.0 - 80.0 %    Immature Granulocytes %, Automated 0.6 0.0 - 0.9 %    Lymphocytes % 14.1 13.0 - 44.0 %    Monocytes % 7.2 2.0 - 10.0 %    Eosinophils % 1.0 0.0 - 6.0 %    Basophils % 0.4 0.0 - 2.0 %    Neutrophils Absolute 3.96 1.20 - 7.70 x10*3/uL    Immature Granulocytes Absolute, Automated 0.03 0.00 - 0.70 x10*3/uL    Lymphocytes Absolute 0.73 (L) 1.20 - 4.80 x10*3/uL    Monocytes Absolute 0.37 0.10 - 1.00 x10*3/uL    Eosinophils Absolute 0.05 0.00 - 0.70 x10*3/uL    Basophils Absolute 0.02 0.00 - 0.10 x10*3/uL   Comprehensive Metabolic Panel   Result Value Ref Range    Glucose 76 74 - 99 mg/dL    Sodium 134 (L) 136 - 145 mmol/L    Potassium 3.6 3.5 - 5.3 mmol/L    Chloride 95 (L) 98 - 107 mmol/L    Bicarbonate 22 21 - 32 mmol/L    Anion Gap 21  (H) 10 - 20 mmol/L    Urea Nitrogen 53 (H) 6 - 23 mg/dL    Creatinine 9.51 (H) 0.50 - 1.05 mg/dL    eGFR 4 (L) >60 mL/min/1.73m*2    Calcium 7.8 (L) 8.6 - 10.3 mg/dL    Albumin 2.5 (L) 3.4 - 5.0 g/dL    Alkaline Phosphatase 115 33 - 136 U/L    Total Protein 5.6 (L) 6.4 - 8.2 g/dL    AST 13 9 - 39 U/L    Bilirubin, Total 0.4 0.0 - 1.2 mg/dL    ALT 13 7 - 45 U/L   Protime-INR   Result Value Ref Range    Protime 13.3 (H) 9.8 - 12.8 seconds    INR 1.2 (H) 0.9 - 1.1   Coagulation Screen   Result Value Ref Range    Protime 12.9 (H) 9.8 - 12.8 seconds    INR 1.1 0.9 - 1.1    aPTT 28 27 - 38 seconds          Assessment/Plan   Principal Problem:    Left leg pain  Assessment:  Left lower extremity DVT  Subtherapeutic INR with a history of DVT and PE    Plan:  Heparin drip started in ER  Coumadin 5 mg daily, close monitor INR, patient's home dose is usually 2.5 mg, discussed with pharmacy with recs to start off at 5 mg as patient has been off of the medication for 2 weeks  Patient reports to me that this left lower extremity DVT is chronic however her INR is still subtherapeutic    History of gout we will rule out acute episode, will check uric acid, CRP and sed rate, will continue allopurinol for now pain is in both palms potentially wrist area    Depression  -Consult psych  -Patient gets upset and stops caring for herself    End-stage renal disease  -Consult nephrology  -Patient on peritoneal dialysis  -Renvela with meals     History of hypertension  -Continue Coreg    HFrEF  -appears euvolemic     DVT prophylaxis-heparin drip bridge to Coumadin         TADEO Miles-CNP

## 2024-01-12 NOTE — PROGRESS NOTES
01/12/24 1057   Physical Activity   On average, how many days per week do you engage in moderate to strenuous exercise (like a brisk walk)? 0 days   On average, how many minutes do you engage in exercise at this level? 0 min   Financial Resource Strain   How hard is it for you to pay for the very basics like food, housing, medical care, and heating? Not very   Housing Stability   In the last 12 months, was there a time when you were not able to pay the mortgage or rent on time? N   In the last 12 months, how many places have you lived? 1   In the last 12 months, was there a time when you did not have a steady place to sleep or slept in a shelter (including now)? N   Transportation Needs   In the past 12 months, has lack of transportation kept you from medical appointments or from getting medications? no   In the past 12 months, has lack of transportation kept you from meetings, work, or from getting things needed for daily living? No   Food Insecurity   Within the past 12 months, you worried that your food would run out before you got the money to buy more. Never true   Within the past 12 months, the food you bought just didn't last and you didn't have money to get more. Never true   Stress   Do you feel stress - tense, restless, nervous, or anxious, or unable to sleep at night because your mind is troubled all the time - these days? To some exte   Social Connections   In a typical week, how many times do you talk on the phone with family, friends, or neighbors? More than 3   How often do you get together with friends or relatives? More than 3   How often do you attend Nondenominational or Muslim services? Never   Do you belong to any clubs or organizations such as Nondenominational groups, unions, fraternal or athletic groups, or school groups? No   How often do you attend meetings of the clubs or organizations you belong to? Never   Are you , , , , never , or living with a partner? Living  with   Intimate Partner Violence   Within the last year, have you been afraid of your partner or ex-partner? No   Within the last year, have you been humiliated or emotionally abused in other ways by your partner or ex-partner? No   Within the last year, have you been kicked, hit, slapped, or otherwise physically hurt by your partner or ex-partner? No   Within the last year, have you been raped or forced to have any kind of sexual activity by your partner or ex-partner? No   Alcohol Use   Q1: How often do you have a drink containing alcohol? Never   Q2: How many drinks containing alcohol do you have on a typical day when you are drinking? None   Q3: How often do you have six or more drinks on one occasion? Never   Utilities   In the past 12 months has the electric, gas, oil, or water company threatened to shut off services in your home? No

## 2024-01-12 NOTE — PROGRESS NOTES
Home with ex-boyfriend, half sister to assist every M/W/F, [facility]     01/12/24 1056   Current Planned Discharge Disposition   Current Planned Discharge Disposition Home H

## 2024-01-12 NOTE — PROGRESS NOTES
Transitional Care Coordination Progress Note:  Plan per Medical/Surgical team: treatment of left leg DVT with IV Heparin gtt to bridge to coumadin  Status: inpatient  Payor source: [employer]  Discharge disposition: Home with ex-boyfriend, half sister to assist every M/W/F, [facility]  Potential Barriers: peritoneal dialysis, non compliant with coumadin, INR 1.2, needs bridge  ADOD: 1/16/2024  LEYDI Deleon RN, BSN Transitional Care Coordinator ED# [phone]      01/12/24 1054   Discharge Planning   Living Arrangements Friends   Support Systems Family members;Friends/neighbors   Assistance Needed peritoneal dialysis every night, pain management   Type of Residence Private residence   Number of Stairs to Enter Residence 8   Number of Stairs Within Residence 20   Do you have animals or pets at home? No   Home or Post Acute Services In home services   Type of Home Care Services Home nursing visits;Home OT;Home PT   Patient expects to be discharged to: Home with ex-boyfriend, half sister to assist every M/W/F, [facility]   Does the patient need discharge transport arranged? Yes   RoundTrip coordination needed? Yes   Has discharge transport been arranged? No   Financial Resource Strain   How hard is it for you to pay for the very basics like food, housing, medical care, and heating? Not very   Housing Stability   In the last 12 months, was there a time when you were not able to pay the mortgage or rent on time? N   In the last 12 months, how many places have you lived? 1   In the last 12 months, was there a time when you did not have a steady place to sleep or slept in a shelter (including now)? N   Transportation Needs   In the past 12 months, has lack of transportation kept you from medical appointments or from getting medications? no   In the past 12 months, has lack of transportation kept you from meetings, work, or from getting things needed for daily living? No   Patient Choice   Provider Choice list and CMS website  (https://medicare.gov/care-compare#search) for post-acute Quality and Resource Measure Data were provided and reviewed with: Patient   Patient / Family choosing to utilize agency / facility established prior to hospitalization Yes

## 2024-01-13 LAB
25(OH)D3 SERPL-MCNC: 15 NG/ML (ref 30–100)
ALBUMIN SERPL BCP-MCNC: 2.5 G/DL (ref 3.4–5)
ANION GAP SERPL CALC-SCNC: 19 MMOL/L (ref 10–20)
BUN SERPL-MCNC: 54 MG/DL (ref 6–23)
CALCIUM SERPL-MCNC: 7.8 MG/DL (ref 8.6–10.3)
CHLORIDE SERPL-SCNC: 94 MMOL/L (ref 98–107)
CO2 SERPL-SCNC: 25 MMOL/L (ref 21–32)
CREAT SERPL-MCNC: 8.74 MG/DL (ref 0.5–1.05)
EGFRCR SERPLBLD CKD-EPI 2021: 5 ML/MIN/1.73M*2
ERYTHROCYTE [DISTWIDTH] IN BLOOD BY AUTOMATED COUNT: 17.6 % (ref 11.5–14.5)
GLUCOSE SERPL-MCNC: 91 MG/DL (ref 74–99)
HCT VFR BLD AUTO: 30.3 % (ref 36–46)
HGB BLD-MCNC: 9.9 G/DL (ref 12–16)
INR PPP: 1.2 (ref 0.9–1.1)
MAGNESIUM SERPL-MCNC: 1.5 MG/DL (ref 1.6–2.4)
MCH RBC QN AUTO: 29.9 PG (ref 26–34)
MCHC RBC AUTO-ENTMCNC: 32.7 G/DL (ref 32–36)
MCV RBC AUTO: 92 FL (ref 80–100)
NRBC BLD-RTO: 0 /100 WBCS (ref 0–0)
PHOSPHATE SERPL-MCNC: 8.9 MG/DL (ref 2.5–4.9)
PLATELET # BLD AUTO: 171 X10*3/UL (ref 150–450)
POTASSIUM SERPL-SCNC: 3.6 MMOL/L (ref 3.5–5.3)
PROTHROMBIN TIME: 13.2 SECONDS (ref 9.8–12.8)
PTH-INTACT SERPL-MCNC: 2923.7 PG/ML (ref 18.5–88)
RBC # BLD AUTO: 3.31 X10*6/UL (ref 4–5.2)
SODIUM SERPL-SCNC: 134 MMOL/L (ref 136–145)
UFH PPP CHRO-ACNC: 0.3 IU/ML
WBC # BLD AUTO: 5.4 X10*3/UL (ref 4.4–11.3)

## 2024-01-13 PROCEDURE — 83970 ASSAY OF PARATHORMONE: CPT | Mod: AHULAB | Performed by: INTERNAL MEDICINE

## 2024-01-13 PROCEDURE — 1100000001 HC PRIVATE ROOM DAILY

## 2024-01-13 PROCEDURE — 36415 COLL VENOUS BLD VENIPUNCTURE: CPT | Performed by: NURSE PRACTITIONER

## 2024-01-13 PROCEDURE — 85520 HEPARIN ASSAY: CPT | Performed by: INTERNAL MEDICINE

## 2024-01-13 PROCEDURE — 2500000005 HC RX 250 GENERAL PHARMACY W/O HCPCS: Performed by: INTERNAL MEDICINE

## 2024-01-13 PROCEDURE — 2500000001 HC RX 250 WO HCPCS SELF ADMINISTERED DRUGS (ALT 637 FOR MEDICARE OP): Performed by: PSYCHIATRY & NEUROLOGY

## 2024-01-13 PROCEDURE — 2500000004 HC RX 250 GENERAL PHARMACY W/ HCPCS (ALT 636 FOR OP/ED): Performed by: INTERNAL MEDICINE

## 2024-01-13 PROCEDURE — 82306 VITAMIN D 25 HYDROXY: CPT | Mod: AHULAB | Performed by: INTERNAL MEDICINE

## 2024-01-13 PROCEDURE — 85027 COMPLETE CBC AUTOMATED: CPT | Performed by: NURSE PRACTITIONER

## 2024-01-13 PROCEDURE — 85610 PROTHROMBIN TIME: CPT | Performed by: NURSE PRACTITIONER

## 2024-01-13 PROCEDURE — 94760 N-INVAS EAR/PLS OXIMETRY 1: CPT

## 2024-01-13 PROCEDURE — 2500000001 HC RX 250 WO HCPCS SELF ADMINISTERED DRUGS (ALT 637 FOR MEDICARE OP): Performed by: NURSE PRACTITIONER

## 2024-01-13 PROCEDURE — 2500000001 HC RX 250 WO HCPCS SELF ADMINISTERED DRUGS (ALT 637 FOR MEDICARE OP): Performed by: INTERNAL MEDICINE

## 2024-01-13 PROCEDURE — 2500000004 HC RX 250 GENERAL PHARMACY W/ HCPCS (ALT 636 FOR OP/ED): Performed by: NURSE PRACTITIONER

## 2024-01-13 PROCEDURE — 36415 COLL VENOUS BLD VENIPUNCTURE: CPT | Performed by: INTERNAL MEDICINE

## 2024-01-13 PROCEDURE — 99232 SBSQ HOSP IP/OBS MODERATE 35: CPT | Performed by: INTERNAL MEDICINE

## 2024-01-13 PROCEDURE — 83735 ASSAY OF MAGNESIUM: CPT | Performed by: NURSE PRACTITIONER

## 2024-01-13 PROCEDURE — 80069 RENAL FUNCTION PANEL: CPT | Performed by: NURSE PRACTITIONER

## 2024-01-13 RX ORDER — ONDANSETRON 4 MG/1
4 TABLET, FILM COATED ORAL EVERY 8 HOURS PRN
Status: DISCONTINUED | OUTPATIENT
Start: 2024-01-13 | End: 2024-01-24 | Stop reason: HOSPADM

## 2024-01-13 RX ORDER — MAGNESIUM SULFATE 1 G/100ML
1 INJECTION INTRAVENOUS ONCE
Status: COMPLETED | OUTPATIENT
Start: 2024-01-13 | End: 2024-01-13

## 2024-01-13 RX ORDER — POTASSIUM CHLORIDE 1.5 G/1.58G
40 POWDER, FOR SOLUTION ORAL ONCE
Status: COMPLETED | OUTPATIENT
Start: 2024-01-13 | End: 2024-01-13

## 2024-01-13 RX ORDER — ONDANSETRON HYDROCHLORIDE 2 MG/ML
4 INJECTION, SOLUTION INTRAVENOUS EVERY 8 HOURS PRN
Status: DISCONTINUED | OUTPATIENT
Start: 2024-01-13 | End: 2024-01-24 | Stop reason: HOSPADM

## 2024-01-13 RX ADMIN — HYDROMORPHONE HYDROCHLORIDE 0.5 MG: 1 INJECTION, SOLUTION INTRAMUSCULAR; INTRAVENOUS; SUBCUTANEOUS at 09:26

## 2024-01-13 RX ADMIN — SODIUM CHLORIDE, SODIUM LACTATE, CALCIUM CHLORIDE, MAGNESIUM CHLORIDE AND DEXTROSE: 1.5; 538; 448; 18.3; 5.08 INJECTION, SOLUTION INTRAPERITONEAL at 17:56

## 2024-01-13 RX ADMIN — SODIUM CHLORIDE, SODIUM LACTATE, CALCIUM CHLORIDE, MAGNESIUM CHLORIDE AND DEXTROSE: 2.5; 538; 448; 18.3; 5.08 INJECTION, SOLUTION INTRAPERITONEAL at 01:58

## 2024-01-13 RX ADMIN — HYDROMORPHONE HYDROCHLORIDE 0.5 MG: 1 INJECTION, SOLUTION INTRAMUSCULAR; INTRAVENOUS; SUBCUTANEOUS at 21:58

## 2024-01-13 RX ADMIN — MAGNESIUM OXIDE TAB 400 MG (241.3 MG ELEMENTAL MG) 800 MG: 400 (241.3 MG) TAB at 09:20

## 2024-01-13 RX ADMIN — DULOXETINE HYDROCHLORIDE 30 MG: 30 CAPSULE, DELAYED RELEASE ORAL at 09:20

## 2024-01-13 RX ADMIN — HYDROMORPHONE HYDROCHLORIDE 0.5 MG: 1 INJECTION, SOLUTION INTRAMUSCULAR; INTRAVENOUS; SUBCUTANEOUS at 04:58

## 2024-01-13 RX ADMIN — SODIUM CHLORIDE, SODIUM LACTATE, CALCIUM CHLORIDE, MAGNESIUM CHLORIDE AND DEXTROSE: 2.5; 538; 448; 18.3; 5.08 INJECTION, SOLUTION INTRAPERITONEAL at 22:59

## 2024-01-13 RX ADMIN — MAGNESIUM SULFATE HEPTAHYDRATE 1 G: 1 INJECTION, SOLUTION INTRAVENOUS at 17:56

## 2024-01-13 RX ADMIN — CARVEDILOL 6.25 MG: 6.25 TABLET, FILM COATED ORAL at 09:20

## 2024-01-13 RX ADMIN — LEVOTHYROXINE SODIUM 25 MCG: 25 TABLET ORAL at 06:41

## 2024-01-13 RX ADMIN — HEPARIN SODIUM 1100 UNITS/HR: 10000 INJECTION, SOLUTION INTRAVENOUS at 14:36

## 2024-01-13 RX ADMIN — HYDROMORPHONE HYDROCHLORIDE 0.5 MG: 1 INJECTION, SOLUTION INTRAMUSCULAR; INTRAVENOUS; SUBCUTANEOUS at 17:56

## 2024-01-13 RX ADMIN — POTASSIUM CHLORIDE 40 MEQ: 1.5 POWDER, FOR SOLUTION ORAL at 17:10

## 2024-01-13 RX ADMIN — ONDANSETRON HYDROCHLORIDE 4 MG: 4 TABLET, FILM COATED ORAL at 20:45

## 2024-01-13 RX ADMIN — SEVELAMER CARBONATE 800 MG: 800 TABLET, FILM COATED ORAL at 17:11

## 2024-01-13 RX ADMIN — SODIUM CHLORIDE, SODIUM LACTATE, CALCIUM CHLORIDE, MAGNESIUM CHLORIDE AND DEXTROSE: 2.5; 538; 448; 18.3; 5.08 INJECTION, SOLUTION INTRAPERITONEAL at 03:20

## 2024-01-13 RX ADMIN — SODIUM CHLORIDE, SODIUM LACTATE, CALCIUM CHLORIDE, MAGNESIUM CHLORIDE AND DEXTROSE: 2.5; 538; 448; 18.3; 5.08 INJECTION, SOLUTION INTRAPERITONEAL at 13:51

## 2024-01-13 RX ADMIN — HYDROMORPHONE HYDROCHLORIDE 0.5 MG: 1 INJECTION, SOLUTION INTRAMUSCULAR; INTRAVENOUS; SUBCUTANEOUS at 00:43

## 2024-01-13 RX ADMIN — CARVEDILOL 6.25 MG: 6.25 TABLET, FILM COATED ORAL at 20:45

## 2024-01-13 RX ADMIN — SEVELAMER CARBONATE 800 MG: 800 TABLET, FILM COATED ORAL at 09:20

## 2024-01-13 RX ADMIN — ALLOPURINOL 100 MG: 100 TABLET ORAL at 09:20

## 2024-01-13 RX ADMIN — CALCITRIOL CAPSULES 0.25 MCG 0.5 MCG: 0.25 CAPSULE ORAL at 09:20

## 2024-01-13 RX ADMIN — HYDROMORPHONE HYDROCHLORIDE 0.5 MG: 1 INJECTION, SOLUTION INTRAMUSCULAR; INTRAVENOUS; SUBCUTANEOUS at 13:55

## 2024-01-13 RX ADMIN — WARFARIN SODIUM 5 MG: 5 TABLET ORAL at 17:11

## 2024-01-13 RX ADMIN — MIRTAZAPINE 15 MG: 15 TABLET, FILM COATED ORAL at 20:45

## 2024-01-13 RX ADMIN — SEVELAMER CARBONATE 800 MG: 800 TABLET, FILM COATED ORAL at 12:28

## 2024-01-13 RX ADMIN — ACETAMINOPHEN 975 MG: 325 TABLET ORAL at 03:26

## 2024-01-13 RX ADMIN — SODIUM CHLORIDE, SODIUM LACTATE, CALCIUM CHLORIDE, MAGNESIUM CHLORIDE AND DEXTROSE: 1.5; 538; 448; 18.3; 5.08 INJECTION, SOLUTION INTRAPERITONEAL at 09:33

## 2024-01-13 ASSESSMENT — PAIN SCALES - GENERAL
PAINLEVEL_OUTOF10: 10 - WORST POSSIBLE PAIN
PAINLEVEL_OUTOF10: 8
PAINLEVEL_OUTOF10: 0 - NO PAIN
PAINLEVEL_OUTOF10: 7
PAINLEVEL_OUTOF10: 10 - WORST POSSIBLE PAIN
PAINLEVEL_OUTOF10: 9
PAINLEVEL_OUTOF10: 2
PAINLEVEL_OUTOF10: 10 - WORST POSSIBLE PAIN
PAINLEVEL_OUTOF10: 10 - WORST POSSIBLE PAIN

## 2024-01-13 ASSESSMENT — COGNITIVE AND FUNCTIONAL STATUS - GENERAL
TURNING FROM BACK TO SIDE WHILE IN FLAT BAD: A LITTLE
MOVING TO AND FROM BED TO CHAIR: A LITTLE
DRESSING REGULAR UPPER BODY CLOTHING: A LITTLE
WALKING IN HOSPITAL ROOM: A LITTLE
STANDING UP FROM CHAIR USING ARMS: A LITTLE
MOVING FROM LYING ON BACK TO SITTING ON SIDE OF FLAT BED WITH BEDRAILS: A LITTLE
MOBILITY SCORE: 18
DAILY ACTIVITIY SCORE: 20
DRESSING REGULAR LOWER BODY CLOTHING: A LITTLE
MOVING FROM LYING ON BACK TO SITTING ON SIDE OF FLAT BED WITH BEDRAILS: A LITTLE
TOILETING: A LITTLE
STANDING UP FROM CHAIR USING ARMS: A LITTLE
MOVING TO AND FROM BED TO CHAIR: A LITTLE
WALKING IN HOSPITAL ROOM: A LITTLE
HELP NEEDED FOR BATHING: A LITTLE
DRESSING REGULAR LOWER BODY CLOTHING: A LITTLE
CLIMB 3 TO 5 STEPS WITH RAILING: A LITTLE
DRESSING REGULAR UPPER BODY CLOTHING: A LITTLE
TOILETING: A LITTLE
HELP NEEDED FOR BATHING: A LITTLE
MOBILITY SCORE: 18
DAILY ACTIVITIY SCORE: 20
TURNING FROM BACK TO SIDE WHILE IN FLAT BAD: A LITTLE
CLIMB 3 TO 5 STEPS WITH RAILING: A LITTLE

## 2024-01-13 ASSESSMENT — PAIN - FUNCTIONAL ASSESSMENT
PAIN_FUNCTIONAL_ASSESSMENT: 0-10

## 2024-01-13 NOTE — PROCEDURES
Patient undergoing peritoneal dialysis.  She seems less depressed today.  She was evaluated by psychiatry with resources given.  She is resumed on her usual medication.  She remains on heparin drip as a bridge to Coumadin which has been resumed.  I will give her 40 mEq of potassium and 1 g of magnesium.  She is resumed on her phosphorus binder.  She is undergoing peritoneal dialysis with 4 daily exchanges interchanging between 1.5 and 2.5% dextrose with a fill volume of 2 L x 4 hours each.  A long overnight dwell of 2.5% dextrose was ordered.  No issues with PD, continue per submitted orders.   Peripheral Block    Pre-sedation assessment completed: 6/15/2021 6:31 AM    Patient reassessed immediately prior to procedure    Patient location during procedure: pre-op  Start time: 6/15/2021 6:31 AM  Stop time: 6/15/2021 6:36 AM  Reason for block: at surgeon's request and post-op pain management  Performed by  Anesthesiologist: Ronaldo Yadav MD  Preanesthetic Checklist  Completed: patient identified, IV checked, site marked, risks and benefits discussed, surgical consent, monitors and equipment checked, pre-op evaluation and timeout performed  Prep:  Sterile barriers:gloves  Prep: ChloraPrep  Patient monitoring: blood pressure monitoring, continuous pulse oximetry and EKG  Procedure  Sedation:yes    Guidance:ultrasound guided  ULTRASOUND INTERPRETATION.  Using ultrasound guidance a 22 G gauge needle was placed in close proximity to the brachial plexus nerve, at which point, under ultrasound guidance anesthetic was injected in the area of the nerve and spread of the anesthesia was seen on ultrasound in close proximity thereto.  There were no abnormalities seen on ultrasound; a digital image was taken; and the patient tolerated the procedure with no complications. Images:still images obtained    Laterality:right  Block Type:interscalene  Injection Technique:single-shot  Needle Type:short-bevel  Needle Gauge:22 G      Medications Used: bupivacaine liposome (EXPAREL) 1.3 % injection, 10 mL  bupivacaine (PF) (MARCAINE) 0.5 % injection, 10 mL      Medications  Comment:.    Post Assessment  Injection Assessment: negative aspiration for heme, no paresthesia on injection and incremental injection  Patient Tolerance:comfortable throughout block  Complications:no

## 2024-01-14 LAB
ALBUMIN SERPL BCP-MCNC: 2.4 G/DL (ref 3.4–5)
ANION GAP SERPL CALC-SCNC: 21 MMOL/L (ref 10–20)
BUN SERPL-MCNC: 52 MG/DL (ref 6–23)
CALCIUM SERPL-MCNC: 8.4 MG/DL (ref 8.6–10.3)
CHLORIDE SERPL-SCNC: 94 MMOL/L (ref 98–107)
CO2 SERPL-SCNC: 23 MMOL/L (ref 21–32)
CREAT SERPL-MCNC: 8.81 MG/DL (ref 0.5–1.05)
CRP SERPL-MCNC: 5.33 MG/DL
EGFRCR SERPLBLD CKD-EPI 2021: 5 ML/MIN/1.73M*2
ERYTHROCYTE [DISTWIDTH] IN BLOOD BY AUTOMATED COUNT: 17.8 % (ref 11.5–14.5)
GLUCOSE SERPL-MCNC: 97 MG/DL (ref 74–99)
HCT VFR BLD AUTO: 32.6 % (ref 36–46)
HGB BLD-MCNC: 10.4 G/DL (ref 12–16)
INR PPP: 1.1 (ref 0.9–1.1)
MAGNESIUM SERPL-MCNC: 1.7 MG/DL (ref 1.6–2.4)
MCH RBC QN AUTO: 29.4 PG (ref 26–34)
MCHC RBC AUTO-ENTMCNC: 31.9 G/DL (ref 32–36)
MCV RBC AUTO: 92 FL (ref 80–100)
NRBC BLD-RTO: 0 /100 WBCS (ref 0–0)
PHOSPHATE SERPL-MCNC: 7.9 MG/DL (ref 2.5–4.9)
PLATELET # BLD AUTO: 195 X10*3/UL (ref 150–450)
POTASSIUM SERPL-SCNC: 3.9 MMOL/L (ref 3.5–5.3)
PROTHROMBIN TIME: 12.7 SECONDS (ref 9.8–12.8)
RBC # BLD AUTO: 3.54 X10*6/UL (ref 4–5.2)
SODIUM SERPL-SCNC: 134 MMOL/L (ref 136–145)
UFH PPP CHRO-ACNC: 0.3 IU/ML
URATE SERPL-MCNC: 4.5 MG/DL (ref 2.3–6.7)
WBC # BLD AUTO: 5.7 X10*3/UL (ref 4.4–11.3)

## 2024-01-14 PROCEDURE — 2500000004 HC RX 250 GENERAL PHARMACY W/ HCPCS (ALT 636 FOR OP/ED): Performed by: NURSE PRACTITIONER

## 2024-01-14 PROCEDURE — 2500000004 HC RX 250 GENERAL PHARMACY W/ HCPCS (ALT 636 FOR OP/ED): Performed by: INTERNAL MEDICINE

## 2024-01-14 PROCEDURE — 1100000001 HC PRIVATE ROOM DAILY

## 2024-01-14 PROCEDURE — 99232 SBSQ HOSP IP/OBS MODERATE 35: CPT | Performed by: INTERNAL MEDICINE

## 2024-01-14 PROCEDURE — 36415 COLL VENOUS BLD VENIPUNCTURE: CPT | Performed by: INTERNAL MEDICINE

## 2024-01-14 PROCEDURE — 85027 COMPLETE CBC AUTOMATED: CPT | Performed by: NURSE PRACTITIONER

## 2024-01-14 PROCEDURE — 2500000001 HC RX 250 WO HCPCS SELF ADMINISTERED DRUGS (ALT 637 FOR MEDICARE OP): Performed by: PSYCHIATRY & NEUROLOGY

## 2024-01-14 PROCEDURE — 2500000001 HC RX 250 WO HCPCS SELF ADMINISTERED DRUGS (ALT 637 FOR MEDICARE OP): Performed by: NURSE PRACTITIONER

## 2024-01-14 PROCEDURE — 85520 HEPARIN ASSAY: CPT | Performed by: INTERNAL MEDICINE

## 2024-01-14 PROCEDURE — 94760 N-INVAS EAR/PLS OXIMETRY 1: CPT

## 2024-01-14 PROCEDURE — 85610 PROTHROMBIN TIME: CPT | Performed by: NURSE PRACTITIONER

## 2024-01-14 PROCEDURE — 80069 RENAL FUNCTION PANEL: CPT | Performed by: NURSE PRACTITIONER

## 2024-01-14 PROCEDURE — 84550 ASSAY OF BLOOD/URIC ACID: CPT | Performed by: INTERNAL MEDICINE

## 2024-01-14 PROCEDURE — 83735 ASSAY OF MAGNESIUM: CPT | Performed by: NURSE PRACTITIONER

## 2024-01-14 PROCEDURE — 86140 C-REACTIVE PROTEIN: CPT | Performed by: INTERNAL MEDICINE

## 2024-01-14 RX ORDER — ERGOCALCIFEROL 1.25 MG/1
1250 CAPSULE ORAL
Status: DISCONTINUED | OUTPATIENT
Start: 2024-01-15 | End: 2024-01-24 | Stop reason: HOSPADM

## 2024-01-14 RX ORDER — WARFARIN 2.5 MG/1
2.5 TABLET ORAL ONCE
Status: COMPLETED | OUTPATIENT
Start: 2024-01-14 | End: 2024-01-14

## 2024-01-14 RX ADMIN — HEPARIN SODIUM 1100 UNITS/HR: 10000 INJECTION, SOLUTION INTRAVENOUS at 14:41

## 2024-01-14 RX ADMIN — DULOXETINE HYDROCHLORIDE 30 MG: 30 CAPSULE, DELAYED RELEASE ORAL at 09:01

## 2024-01-14 RX ADMIN — MIRTAZAPINE 15 MG: 15 TABLET, FILM COATED ORAL at 21:49

## 2024-01-14 RX ADMIN — CALCITRIOL CAPSULES 0.25 MCG 0.5 MCG: 0.25 CAPSULE ORAL at 09:01

## 2024-01-14 RX ADMIN — HYDROMORPHONE HYDROCHLORIDE 0.5 MG: 1 INJECTION, SOLUTION INTRAMUSCULAR; INTRAVENOUS; SUBCUTANEOUS at 09:02

## 2024-01-14 RX ADMIN — WARFARIN SODIUM 5 MG: 5 TABLET ORAL at 17:14

## 2024-01-14 RX ADMIN — LEVOTHYROXINE SODIUM 25 MCG: 25 TABLET ORAL at 09:02

## 2024-01-14 RX ADMIN — ALLOPURINOL 100 MG: 100 TABLET ORAL at 09:01

## 2024-01-14 RX ADMIN — HYDROMORPHONE HYDROCHLORIDE 0.5 MG: 1 INJECTION, SOLUTION INTRAMUSCULAR; INTRAVENOUS; SUBCUTANEOUS at 21:50

## 2024-01-14 RX ADMIN — SODIUM CHLORIDE, SODIUM LACTATE, CALCIUM CHLORIDE, MAGNESIUM CHLORIDE AND DEXTROSE: 2.5; 538; 448; 18.3; 5.08 INJECTION, SOLUTION INTRAPERITONEAL at 23:06

## 2024-01-14 RX ADMIN — WARFARIN SODIUM 2.5 MG: 2.5 TABLET ORAL at 17:15

## 2024-01-14 RX ADMIN — CARVEDILOL 6.25 MG: 6.25 TABLET, FILM COATED ORAL at 09:01

## 2024-01-14 RX ADMIN — MAGNESIUM OXIDE TAB 400 MG (241.3 MG ELEMENTAL MG) 800 MG: 400 (241.3 MG) TAB at 09:02

## 2024-01-14 RX ADMIN — SEVELAMER CARBONATE 800 MG: 800 TABLET, FILM COATED ORAL at 09:02

## 2024-01-14 RX ADMIN — CARVEDILOL 6.25 MG: 6.25 TABLET, FILM COATED ORAL at 21:50

## 2024-01-14 RX ADMIN — SODIUM CHLORIDE, SODIUM LACTATE, CALCIUM CHLORIDE, MAGNESIUM CHLORIDE AND DEXTROSE: 2.5; 538; 448; 18.3; 5.08 INJECTION, SOLUTION INTRAPERITONEAL at 14:41

## 2024-01-14 RX ADMIN — HYDROMORPHONE HYDROCHLORIDE 0.5 MG: 1 INJECTION, SOLUTION INTRAMUSCULAR; INTRAVENOUS; SUBCUTANEOUS at 17:14

## 2024-01-14 RX ADMIN — ONDANSETRON 4 MG: 2 INJECTION INTRAMUSCULAR; INTRAVENOUS at 10:42

## 2024-01-14 ASSESSMENT — COGNITIVE AND FUNCTIONAL STATUS - GENERAL
TURNING FROM BACK TO SIDE WHILE IN FLAT BAD: A LITTLE
MOVING FROM LYING ON BACK TO SITTING ON SIDE OF FLAT BED WITH BEDRAILS: A LITTLE
DRESSING REGULAR UPPER BODY CLOTHING: A LITTLE
HELP NEEDED FOR BATHING: A LITTLE
STANDING UP FROM CHAIR USING ARMS: A LITTLE
DAILY ACTIVITIY SCORE: 20
DAILY ACTIVITIY SCORE: 20
MOVING TO AND FROM BED TO CHAIR: A LITTLE
TOILETING: A LITTLE
CLIMB 3 TO 5 STEPS WITH RAILING: A LITTLE
STANDING UP FROM CHAIR USING ARMS: A LITTLE
HELP NEEDED FOR BATHING: A LITTLE
DRESSING REGULAR LOWER BODY CLOTHING: A LITTLE
MOBILITY SCORE: 18
CLIMB 3 TO 5 STEPS WITH RAILING: A LITTLE
MOVING FROM LYING ON BACK TO SITTING ON SIDE OF FLAT BED WITH BEDRAILS: A LITTLE
MOBILITY SCORE: 18
MOVING TO AND FROM BED TO CHAIR: A LITTLE
DRESSING REGULAR LOWER BODY CLOTHING: A LITTLE
WALKING IN HOSPITAL ROOM: A LITTLE
DRESSING REGULAR UPPER BODY CLOTHING: A LITTLE
TURNING FROM BACK TO SIDE WHILE IN FLAT BAD: A LITTLE
WALKING IN HOSPITAL ROOM: A LITTLE
TOILETING: A LITTLE

## 2024-01-14 ASSESSMENT — PAIN SCALES - GENERAL
PAINLEVEL_OUTOF10: 8

## 2024-01-14 ASSESSMENT — PAIN - FUNCTIONAL ASSESSMENT
PAIN_FUNCTIONAL_ASSESSMENT: 0-10
PAIN_FUNCTIONAL_ASSESSMENT: 0-10

## 2024-01-14 NOTE — CARE PLAN
The patient's goals for the shift include      The clinical goals for the shift include pt will remain safe during shift      Problem: Fall/Injury  Goal: Not fall by end of shift  Outcome: Progressing  Goal: Be free from injury by end of the shift  Outcome: Progressing  Goal: Verbalize understanding of personal risk factors for fall in the hospital  Outcome: Progressing  Goal: Verbalize understanding of risk factor reduction measures to prevent injury from fall in the home  Outcome: Progressing  Goal: Use assistive devices by end of the shift  Outcome: Progressing  Goal: Pace activities to prevent fatigue by end of the shift  Outcome: Progressing

## 2024-01-14 NOTE — PROGRESS NOTES
"Yoselyn Hernandez is a 63 y.o. female on day 1 of admission presenting with Left leg pain.      Subjective   Patient was seen and examined at bedside this morning, stated that she had a history of \"blood clots\" and was on blood thinners before being taken off.  On review of systems, patient admitted to chest discomfort, lower extremity weakness and denied shortness of breath, cardiac pain, headache, nausea, vomiting, fever, chills, abdominal pain or change in urinary habit.       Objective     Last Recorded Vitals  /83 (BP Location: Right arm, Patient Position: Lying)   Pulse 94   Temp 36.3 °C (97.4 °F) (Oral)   Resp 18   Wt 62.7 kg (138 lb 3.7 oz)   SpO2 98%   Intake/Output last 3 Shifts:    Intake/Output Summary (Last 24 hours) at 1/13/2024 2043  Last data filed at 1/13/2024 1510  Gross per 24 hour   Intake 4156.4 ml   Output 5800 ml   Net -1643.6 ml       Admission Weight  Weight: 54.4 kg (120 lb) (01/12/24 0242)    Daily Weight  01/12/24 : 62.7 kg (138 lb 3.7 oz)    Image Results  Lower extremity venous duplex left  Narrative: Interpreted By:  Manju Sandoval,   STUDY:  [facility] LOWER EXTREMITY VENOUS DUPLEX LEFT;  1/12/2024 6:02 am      INDICATION:  Signs/Symptoms:leg pain, swelling, stopped taking coumadin a few  weeks ago.      COMPARISON:  04/15/2028      ACCESSION NUMBER(S):  [id number]      ORDERING CLINICIAN:  AIRAM WEIR      TECHNIQUE:  Grayscale, color and spectral Doppler sonographic images of the left  lower extremity deep venous system. The right common femoral vein was  imaged for comparison.      FINDINGS:  The common femoral vein and the entire femoral vein are incompletely  compressible, consistent with partially occlusive thrombus. The  popliteal vein is noncompressible, however, color flow is present,  suggesting partially occlusive DVT. Suboptimal visualization of the  calf veins.      The right common femoral vein is patent.      OTHER FINDINGS: 3.7 x 1.2 x 2.8 cm fluid collection " in the popliteal  fossa, most consistent with Baker's cyst. Subcutaneous edema  throughout the imaged right lower extremity, most prominent in the  calf.      Impression: Partially occlusive DVT in the left common femoral and entire length  of the femoral vein. Suspected partially occlusive DVT in the  popliteal vein.      Left lower extremity edema.      Baker's cyst Baker's cyst.              MACRO:  None      Signed by: Manju Sandoval 1/12/2024 6:29 AM  Dictation workstation:   [id number]  XR shoulder left 2+ views, XR forearm left 2 views  Narrative: Interpreted By:  Manju Sandoval,   STUDY:  XR FOREARM LEFT 2 VIEWS; XR SHOULDER LEFT 2+ VIEWS; ;  1/12/2024 4:16  am; 1/12/2024 4:17 am      INDICATION:  Signs/Symptoms:fall, pain.      COMPARISON:  None.      ACCESSION NUMBER(S):  [id number]; [id number]      ORDERING CLINICIAN:  AIRAM WEIR      FINDINGS:  Two views left shoulder and two views left forearm. No acute fracture  or malalignment. Erosive changes at the acromioclavicular joint,  similar to chest radiograph 10/18/2023. No overlying soft tissue  swelling. No elbow joint effusion. Aortic stent graft partially  imaged. Atherosclerotic vascular calcifications noted.      Impression: No acute osseous abnormality.      Erosive changes at the acromioclavicular joint is favored to be  secondary to hyperparathyroidism/chronic renal disease. Infection is  considered less likely, however, please correlate with clinical  history.          MACRO:  None      Signed by: Manju Sandoval 1/12/2024 4:32 AM  Dictation workstation:   [id number]  CT abdomen pelvis wo IV contrast  Narrative: Interpreted By:  Manju Sandoval,   STUDY:  CT ABDOMEN PELVIS WO IV CONTRAST;  1/12/2024 4:09 am      INDICATION:  Signs/Symptoms:fall, buttock pain, L hip pain.      COMPARISON:  11/06/2023      ACCESSION NUMBER(S):  [id number]      ORDERING CLINICIAN:  AIRAM WEIR      TECHNIQUE:  Axial noncontrast CT images of the abdomen  and pelvis with coronal  and sagittal reconstructed images.      FINDINGS:  LOWER CHEST: Stable bibasilar atelectasis or scarring. Partially  imaged stent graft in the descending thoracic aorta. Stable  cardiomegaly BONES: No acute osseous abnormality. Mild diffuse  degenerative disc changes. ABDOMINAL WALL: Diffuse body wall edema.  Periumbilical hernia containing the anterior wall of a segment of  transverse colon. No inflammatory changes in the hernia sac. Fluid  containing supraumbilical ventral hernias.      ABDOMEN:  Lack of intravenous contrast limits evaluation of vessels and solid  organs. LIVER: No focal parenchymal abnormality, within limits of  noncontrast CT. No perihepatic fluid. BILE DUCTS: No biliary  dilatation. GALLBLADDER: Cholecystectomy greater  PANCREAS: No peripancreatic inflammatory stranding or duct dilatation.  SPLEEN: Within normal limits.  ADRENALS: Within normal limits.      KIDNEYS, URETERS, URINARY BLADDER: No hydronephrosis or urinary tract  calculus. Bilateral renal atrophy again noted. Stable right renal  cysts. The urinary bladder is nondistended.      VESSELS: Postsurgical changes again noted including aortic stent  graft with visceral branch bypass grafts. Stable peripherally  calcified fluid collection around the superior mesenteric artery  graft in the left upper quadrant. Vascular coils noted in the left  internal iliac artery. RETROPERITONEUM: No pathologically enlarged  lymph nodes.      PELVIS:      REPRODUCTIVE ORGANS: The uterus is not seen and may be atrophic or  removed.      BOWEL: The stomach is nondistended. No dilated small bowel. The colon  is nondistended appears grossly unremarkable. Mild colonic stool  burden.  Normal appendix. PERITONEUM: Peritoneal dialysis catheter  noted in the left mid abdomen. Small amount of free peritoneal fluid.  No organized fluid collection. No pneumoperitoneum.      Impression: No acute abdominal or pelvic process.      Stable  chronic findings as above.      MACRO:  None      Signed by: Manju Sandoval 1/12/2024 4:25 AM  Dictation workstation:   [id number]      Physical Exam  Constitutional: Alert active, cooperative not in acute distress  Eyes: PERRLA, clear sclera  ENMT: Moist mucosal membranes, no exudate  Head / Neck: Atraumatic, normocephalic, supple neck, JVP not visualized  Lungs: Patent airways, CTABL  Heart: RRR, S1S2, no murmurs appreciated, palpable pulses in all extremities  GI: Soft, NT, ND, bowel sounds present in all quadrants  MSK: Moves all extremities freely, no restriction  of ROM, no joint edema  Extremities: Intact x 4, no peripheral edema  : No Mckenna catheter inserted  Breast: Deferred  Neurological: AAO x 3 to person, place and date, facial muscles symmetrical, sensation intact, strength 4/4, no acute focal neurological deficits appreciated  Psychological: Appropriate mood and behavior    Relevant Results           Scheduled medications  allopurinol, 100 mg, oral, Daily  calcitriol, 0.5 mcg, oral, Daily  carvedilol, 6.25 mg, oral, BID  dextrose 1.5% - LOW calcium 2.5mEq/L 2,000 mL peritoneal dialysate, , intraperitoneal, BID  dextrose 2.5 % - LOW calcium 2.5 mEq/L 2,000 mL peritoneal dialysate, , intraperitoneal, TID  DULoxetine, 30 mg, oral, Daily  levothyroxine, 25 mcg, oral, Daily before breakfast  magnesium oxide, 800 mg, oral, Daily  mirtazapine, 15 mg, oral, Nightly  sevelamer carbonate, 800 mg, oral, TID with meals  warfarin, 5 mg, oral, Daily      Continuous medications  heparin, 0-4,500 Units/hr, Last Rate: 1,100 Units/hr (01/13/24 1632)      PRN medications  PRN medications: acetaminophen, diphenoxylate-atropine, heparin, HYDROmorphone, loperamide, naloxone, ondansetron **OR** ondansetron  Lower extremity venous duplex left    Result Date: 1/12/2024  Interpreted By:  Manju Sandoval, STUDY: [facility] LOWER EXTREMITY VENOUS DUPLEX LEFT;  1/12/2024 6:02 am   INDICATION: Signs/Symptoms:leg pain, swelling,  stopped taking coumadin a few weeks ago.   COMPARISON: 04/15/2028   ACCESSION NUMBER(S): [id number]   ORDERING CLINICIAN: AIRAM WEIR   TECHNIQUE: Grayscale, color and spectral Doppler sonographic images of the left lower extremity deep venous system. The right common femoral vein was imaged for comparison.   FINDINGS: The common femoral vein and the entire femoral vein are incompletely compressible, consistent with partially occlusive thrombus. The popliteal vein is noncompressible, however, color flow is present, suggesting partially occlusive DVT. Suboptimal visualization of the calf veins.   The right common femoral vein is patent.   OTHER FINDINGS: 3.7 x 1.2 x 2.8 cm fluid collection in the popliteal fossa, most consistent with Baker's cyst. Subcutaneous edema throughout the imaged right lower extremity, most prominent in the calf.       Partially occlusive DVT in the left common femoral and entire length of the femoral vein. Suspected partially occlusive DVT in the popliteal vein.   Left lower extremity edema.   Baker's cyst Baker's cyst.       MACRO: None   Signed by: Manju Sandoval 1/12/2024 6:29 AM Dictation workstation:   [id number]    XR shoulder left 2+ views    Result Date: 1/12/2024  Interpreted By:  Manju Sandoval, STUDY: XR FOREARM LEFT 2 VIEWS; XR SHOULDER LEFT 2+ VIEWS; ;  1/12/2024 4:16 am; 1/12/2024 4:17 am   INDICATION: Signs/Symptoms:fall, pain.   COMPARISON: None.   ACCESSION NUMBER(S): [id number]; [id number]   ORDERING CLINICIAN: AIRAM WEIR   FINDINGS: Two views left shoulder and two views left forearm. No acute fracture or malalignment. Erosive changes at the acromioclavicular joint, similar to chest radiograph 10/18/2023. No overlying soft tissue swelling. No elbow joint effusion. Aortic stent graft partially imaged. Atherosclerotic vascular calcifications noted.       No acute osseous abnormality.   Erosive changes at the acromioclavicular joint is favored to be secondary to  hyperparathyroidism/chronic renal disease. Infection is considered less likely, however, please correlate with clinical history.     MACRO: None   Signed by: Manju Sandoval 1/12/2024 4:32 AM Dictation workstation:   [id number]    XR forearm left 2 views    Result Date: 1/12/2024  Interpreted By:  Manju Sandoval, STUDY: XR FOREARM LEFT 2 VIEWS; XR SHOULDER LEFT 2+ VIEWS; ;  1/12/2024 4:16 am; 1/12/2024 4:17 am   INDICATION: Signs/Symptoms:fall, pain.   COMPARISON: None.   ACCESSION NUMBER(S): [id number]; [id number]   ORDERING CLINICIAN: AIRAM WEIR   FINDINGS: Two views left shoulder and two views left forearm. No acute fracture or malalignment. Erosive changes at the acromioclavicular joint, similar to chest radiograph 10/18/2023. No overlying soft tissue swelling. No elbow joint effusion. Aortic stent graft partially imaged. Atherosclerotic vascular calcifications noted.       No acute osseous abnormality.   Erosive changes at the acromioclavicular joint is favored to be secondary to hyperparathyroidism/chronic renal disease. Infection is considered less likely, however, please correlate with clinical history.     MACRO: None   Signed by: Manju Sandoval 1/12/2024 4:32 AM Dictation workstation:   [id number]    CT abdomen pelvis wo IV contrast    Result Date: 1/12/2024  Interpreted By:  Manju Sandoval, STUDY: CT ABDOMEN PELVIS WO IV CONTRAST;  1/12/2024 4:09 am   INDICATION: Signs/Symptoms:fall, buttock pain, L hip pain.   COMPARISON: 11/06/2023   ACCESSION NUMBER(S): [id number]   ORDERING CLINICIAN: AIRAM WEIR   TECHNIQUE: Axial noncontrast CT images of the abdomen and pelvis with coronal and sagittal reconstructed images.   FINDINGS: LOWER CHEST: Stable bibasilar atelectasis or scarring. Partially imaged stent graft in the descending thoracic aorta. Stable cardiomegaly BONES: No acute osseous abnormality. Mild diffuse degenerative disc changes. ABDOMINAL WALL: Diffuse body wall edema. Periumbilical  hernia containing the anterior wall of a segment of transverse colon. No inflammatory changes in the hernia sac. Fluid containing supraumbilical ventral hernias.   ABDOMEN: Lack of intravenous contrast limits evaluation of vessels and solid organs. LIVER: No focal parenchymal abnormality, within limits of noncontrast CT. No perihepatic fluid. BILE DUCTS: No biliary dilatation. GALLBLADDER: Cholecystectomy greater PANCREAS: No peripancreatic inflammatory stranding or duct dilatation. SPLEEN: Within normal limits. ADRENALS: Within normal limits.   KIDNEYS, URETERS, URINARY BLADDER: No hydronephrosis or urinary tract calculus. Bilateral renal atrophy again noted. Stable right renal cysts. The urinary bladder is nondistended.   VESSELS: Postsurgical changes again noted including aortic stent graft with visceral branch bypass grafts. Stable peripherally calcified fluid collection around the superior mesenteric artery graft in the left upper quadrant. Vascular coils noted in the left internal iliac artery. RETROPERITONEUM: No pathologically enlarged lymph nodes.   PELVIS:   REPRODUCTIVE ORGANS: The uterus is not seen and may be atrophic or removed.   BOWEL: The stomach is nondistended. No dilated small bowel. The colon is nondistended appears grossly unremarkable. Mild colonic stool burden.  Normal appendix. PERITONEUM: Peritoneal dialysis catheter noted in the left mid abdomen. Small amount of free peritoneal fluid. No organized fluid collection. No pneumoperitoneum.       No acute abdominal or pelvic process.   Stable chronic findings as above.   MACRO: None   Signed by: Manju Sandoval 1/12/2024 4:25 AM Dictation workstation:   [id number]       Assessment/Plan      63 y.o. female past medical history of ESRD on dialysis, AAA, HFrEF (25% 03/2023) and pancreatitis, PE (03/2023 on warfarin)  presenting with left lower extremity pain as well as left arm pain.  Patient states she has not been feeling well for the last  couple weeks, feeling down, lots of recent life stressors.  Due to this patient reports she was not taking any of medications.  She also missed a PD session     Principal Problem:    Left leg pain      PE/DVT  -Heparin drip started in ER  -Coumadin 5 mg daily, subtherapeutic  -Daily INR check     Gout: Complaining about bilateral wrist pain, tender wrist, but no rubor, calor  -On allopurinol 100 mg daily  -CRP uric acid level check    depression  -Duloxetine 30 mg daily  -Consult psych: Appreciate evaluation and recommendations  -Patient gets upset and stops caring for herself     End-stage renal disease  -Consult nephrology: Appreciate management and recommendation  -Patient on peritoneal dialysis  -Renvela with meals      History of hypertension  -Continue Coreg     HFrEF  -appears euvolemic      DVT prophylaxis-heparin drip bridge to Coumadin     Disposition: Presenting with recurrent DVT, need further management, need bridged to therapeutic INR, discharge pending clinical stability.      Clark Rader, DO

## 2024-01-14 NOTE — PROGRESS NOTES
Yoselyn Hernandez is a 63 y.o. female on day 2 of admission presenting with Left leg pain.      Subjective   Yoselyn Hernandez is a 63 y.o. female with an extensive past medical history including end-stage renal  disease on peritoneal dialysis, hypertension, PE/DVT, hyperlipidemia, type a aortic dissection s/p ascending aorta and arch replacement in 2014,  s/p TEVAR in 2017 who then had a TEVAR extension, abdominal aortic debranching (June of 201), L carotid subclavian bypass, left and right heart failure with an LVEF of 15%, moderate mitral and severe tricuspid regurg, pulmonary hypertension recent staph epi peritonitis in November during admission to [facility] who presents for evaluation left leg and arm discomfort.  Prior to the onset of pain, she suffered 2 falls.  There was no loss of consciousness but she began having pain to her left side.     In the ED, workup was notable for a partially occlusive DVT in the left common femoral and entire length of the femoral vein. Suspected partially occlusive DVT in the popliteal vein.  Of note, she was noted to have complete thrombotic occlusion of grafted bilateral renal arteries last October by CT imaging.  She was placed on heparin drip in the ED. Nephrology is consulted for renal care.       Objective     Peritoneal Dialysis  Peritoneal Dialysis Volume In (ml): 2000 ml  Effluent Volume Out (mL): 3400 ml  Balance This Exchange (mL): 300 ml    Vitals 24HR  Heart Rate:  [85-94]   Temp:  [36.1 °C (97 °F)-36.6 °C (97.9 °F)]   Resp:  [18]   BP: (121-146)/(77-83)   SpO2:  [95 %-99 %]     Intake/Output last 3 Shifts:    Intake/Output Summary (Last 24 hours) at 1/14/2024 1405  Last data filed at 1/14/2024 0009  Gross per 24 hour   Intake 4000 ml   Output 7900 ml   Net -3900 ml       Physical Exam  Constitutional: awake/alert/oriented x3, no distress,  alert and cooperative   ENMT: mucous membranes moist   Head/Neck: Neck supple, no JVD, trachea midline   Respiratory/Thorax: CTAB, normal  breath sounds with  good chest expansion, thorax symmetric   Cardiovascular: Regular, rate and rhythm, ERNIE, normal  S 1 and S 2   Gastrointestinal: soft, no rebound tenderness or  guarding, +BS, no bruits  PD site noted    Genitourinary: no Mckenna   Musculoskeletal: ROM intact, no joint swelling, normal  strength   Extremities: normal extremities, no LE edema   Neurological: alert and oriented x3, no focal deficits   No asterixis   Psychological: Appropriate mood and behavior   Skin: Warm and dry, no lesions to exposed skin     Scheduled Medications  allopurinol, 100 mg, oral, Daily  calcitriol, 0.5 mcg, oral, Daily  carvedilol, 6.25 mg, oral, BID  dextrose 1.5% - LOW calcium 2.5mEq/L 2,000 mL peritoneal dialysate, , intraperitoneal, BID  dextrose 2.5 % - LOW calcium 2.5 mEq/L 2,000 mL peritoneal dialysate, , intraperitoneal, TID  DULoxetine, 30 mg, oral, Daily  [START ON 1/15/2024] ergocalciferol, 1,250 mcg, oral, Weekly  levothyroxine, 25 mcg, oral, Daily before breakfast  magnesium oxide, 800 mg, oral, Daily  mirtazapine, 15 mg, oral, Nightly  sevelamer carbonate, 800 mg, oral, TID with meals  warfarin, 2.5 mg, oral, Once  warfarin, 5 mg, oral, Daily      Continuous medications  heparin, 0-4,500 Units/hr, Last Rate: 1,100 Units/hr (01/14/24 1200)        PRN medications: acetaminophen, diphenoxylate-atropine, heparin, HYDROmorphone, loperamide, naloxone, ondansetron **OR** ondansetron     Relevant Results  Results from last 7 days   Lab Units 01/14/24  0611 01/13/24  0626 01/12/24  0344   WBC AUTO x10*3/uL 5.7 5.4 5.2   HEMOGLOBIN g/dL 10.4* 9.9* 10.3*   HEMATOCRIT % 32.6* 30.3* 31.2*   PLATELETS AUTO x10*3/uL 195 171 146*   NEUTROS PCT AUTO %  --   --  76.7   LYMPHS PCT AUTO %  --   --  14.1   MONOS PCT AUTO %  --   --  7.2   EOS PCT AUTO %  --   --  1.0     Results from last 7 days   Lab Units 01/14/24  0611 01/13/24  0624 01/12/24  0344   SODIUM mmol/L 134* 134* 134*   POTASSIUM mmol/L 3.9 3.6 3.6   CHLORIDE  mmol/L 94* 94* 95*   CO2 mmol/L 23 25 22   BUN mg/dL 52* 54* 53*   CREATININE mg/dL 8.81* 8.74* 9.51*   GLUCOSE mg/dL 97 91 76   CALCIUM mg/dL 8.4* 7.8* 7.8*       Lower extremity venous duplex left   Final Result   Partially occlusive DVT in the left common femoral and entire length   of the femoral vein. Suspected partially occlusive DVT in the   popliteal vein.        Left lower extremity edema.        Baker's cyst Baker's cyst.                  MACRO:   None        Signed by: Manju Sandoval 1/12/2024 6:29 AM   Dictation workstation:   [id number]      XR shoulder left 2+ views   Final Result   No acute osseous abnormality.        Erosive changes at the acromioclavicular joint is favored to be   secondary to hyperparathyroidism/chronic renal disease. Infection is   considered less likely, however, please correlate with clinical   history.             MACRO:   None        Signed by: Manju Sandoval 1/12/2024 4:32 AM   Dictation workstation:   [id number]      XR forearm left 2 views   Final Result   No acute osseous abnormality.        Erosive changes at the acromioclavicular joint is favored to be   secondary to hyperparathyroidism/chronic renal disease. Infection is   considered less likely, however, please correlate with clinical   history.             MACRO:   None        Signed by: Manju Sandoval 1/12/2024 4:32 AM   Dictation workstation:   [id number]      CT abdomen pelvis wo IV contrast   Final Result   No acute abdominal or pelvic process.        Stable chronic findings as above.        MACRO:   None        Signed by: Manju Sandoval 1/12/2024 4:25 AM   Dictation workstation:   [id number]               Assessment/Plan      Yoselyn Hernandez is a 63 y.o. female with an extensive past medical history including end-stage renal  disease on peritoneal dialysis, hypertension, PE/DVT, hyperlipidemia, type a aortic dissection s/p ascending aorta and arch replacement in 2014,  s/p TEVAR in 2017 who then had a TEVAR  extension, abdominal aortic debranching (June of 201), L carotid subclavian bypass, left and right heart failure with an LVEF of 15%, moderate mitral and severe tricuspid regurg, pulmonary hypertension recent staph epi peritonitis in November during admission to [facility] who presents for evaluation left leg and arm discomfort.  Prior to the onset of pain, she suffered 2 falls.  There was no loss of consciousness but she began having pain to her left side.     In the ED, workup was notable for a partially occlusive DVT in the left common femoral and entire length of the femoral vein. Suspected partially occlusive DVT in the popliteal vein.  Of note, she was noted to have complete thrombotic occlusion of grafted bilateral renal arteries last October by CT imaging.  She was placed on heparin drip in the ED. Nephrology is consulted for renal care.     Ms. Hernandez has evidence of chronic occlusive disease.  She is prescribed Coumadin albeit she reports stopping all her medication for the past 2 to 3 weeks.  We had psychiatric see her. Medications have been resumed. She complains of painful upper extremity darkened spots therefore we sent a vitamin D and intact PTH.  Her vitamin D is quite low with significantly elevated parathyroid hormone.  We have her back on a phosphorus binder, I have ordered weekly vitamin D and she is getting the calcitriol.  This will need to follow.  Unfortunately she declined to do her a.m. PD she had been having dry heaves.  She will continue with the remainder of her exchanges today. She is undergoing peritoneal dialysis with 4 daily exchanges interchanging between 1.5 and 2.5% dextrose with a fill volume of 2 L x 4 hours each.  A long overnight dwell of 2.5% dextrose was ordered.  No issues with PD. Nephrology will follow closely with her care.        Principal Problem:    Left leg pain      I spent 35 minutes in the professional and overall care of this patient.      Jose Martin Jacobo, DO

## 2024-01-14 NOTE — NURSING NOTE
Patient is now feeling anxious and requested to be drained now instead of 3 am,  MD stated it is ok to drain now and try again in the morning

## 2024-01-14 NOTE — NURSING NOTE
After PD patient is c/o ABD discomfort , per order it states patient should be drained at 8 am,  patient stated she feels she cant wait that long and can try to make it to be drained at 3 am to give it 4 hours. MD notified and aware        MD ok with patient being drained at 3 and dwelled again at 7am

## 2024-01-14 NOTE — PROGRESS NOTES
Yoselyn Hernandez is a 63 y.o. female on day 2 of admission presenting with Left leg pain.      Subjective   Patient was seen and examined at bedside this morning, stated that her hands are feeling much better, denies any swelling, and I informed her that uric acid levels are within normal range and therefore not experiencing gout but possibly some inflammatory arthritis given elevated CRP level.       Objective     Last Recorded Vitals  /74 (BP Location: Right arm, Patient Position: Lying)   Pulse 82   Temp 36.3 °C (97.3 °F) (Temporal)   Resp 18   Wt 62.7 kg (138 lb 3.7 oz)   SpO2 100%   Intake/Output last 3 Shifts:    Intake/Output Summary (Last 24 hours) at 1/14/2024 1834  Last data filed at 1/14/2024 1820  Gross per 24 hour   Intake 4000 ml   Output 9000 ml   Net -5000 ml       Admission Weight  Weight: 54.4 kg (120 lb) (01/12/24 0242)    Daily Weight  01/12/24 : 62.7 kg (138 lb 3.7 oz)    Image Results  Lower extremity venous duplex left  Narrative: Interpreted By:  Manju Sandoval,   STUDY:  [facility] LOWER EXTREMITY VENOUS DUPLEX LEFT;  1/12/2024 6:02 am      INDICATION:  Signs/Symptoms:leg pain, swelling, stopped taking coumadin a few  weeks ago.      COMPARISON:  04/15/2028      ACCESSION NUMBER(S):  [id number]      ORDERING CLINICIAN:  AIRAM WEIR      TECHNIQUE:  Grayscale, color and spectral Doppler sonographic images of the left  lower extremity deep venous system. The right common femoral vein was  imaged for comparison.      FINDINGS:  The common femoral vein and the entire femoral vein are incompletely  compressible, consistent with partially occlusive thrombus. The  popliteal vein is noncompressible, however, color flow is present,  suggesting partially occlusive DVT. Suboptimal visualization of the  calf veins.      The right common femoral vein is patent.      OTHER FINDINGS: 3.7 x 1.2 x 2.8 cm fluid collection in the popliteal  fossa, most consistent with Baker's cyst. Subcutaneous  edema  throughout the imaged right lower extremity, most prominent in the  calf.      Impression: Partially occlusive DVT in the left common femoral and entire length  of the femoral vein. Suspected partially occlusive DVT in the  popliteal vein.      Left lower extremity edema.      Baker's cyst Baker's cyst.              MACRO:  None      Signed by: Manju Sandoval 1/12/2024 6:29 AM  Dictation workstation:   [id number]  XR shoulder left 2+ views, XR forearm left 2 views  Narrative: Interpreted By:  Manju Sandoval,   STUDY:  XR FOREARM LEFT 2 VIEWS; XR SHOULDER LEFT 2+ VIEWS; ;  1/12/2024 4:16  am; 1/12/2024 4:17 am      INDICATION:  Signs/Symptoms:fall, pain.      COMPARISON:  None.      ACCESSION NUMBER(S):  [id number]; [id number]      ORDERING CLINICIAN:  AIRAM WEIR      FINDINGS:  Two views left shoulder and two views left forearm. No acute fracture  or malalignment. Erosive changes at the acromioclavicular joint,  similar to chest radiograph 10/18/2023. No overlying soft tissue  swelling. No elbow joint effusion. Aortic stent graft partially  imaged. Atherosclerotic vascular calcifications noted.      Impression: No acute osseous abnormality.      Erosive changes at the acromioclavicular joint is favored to be  secondary to hyperparathyroidism/chronic renal disease. Infection is  considered less likely, however, please correlate with clinical  history.          MACRO:  None      Signed by: Manju Sandoval 1/12/2024 4:32 AM  Dictation workstation:   [id number]  CT abdomen pelvis wo IV contrast  Narrative: Interpreted By:  Manju Sandoval,   STUDY:  CT ABDOMEN PELVIS WO IV CONTRAST;  1/12/2024 4:09 am      INDICATION:  Signs/Symptoms:fall, buttock pain, L hip pain.      COMPARISON:  11/06/2023      ACCESSION NUMBER(S):  [id number]      ORDERING CLINICIAN:  AIRAM WEIR      TECHNIQUE:  Axial noncontrast CT images of the abdomen and pelvis with coronal  and sagittal reconstructed images.       FINDINGS:  LOWER CHEST: Stable bibasilar atelectasis or scarring. Partially  imaged stent graft in the descending thoracic aorta. Stable  cardiomegaly BONES: No acute osseous abnormality. Mild diffuse  degenerative disc changes. ABDOMINAL WALL: Diffuse body wall edema.  Periumbilical hernia containing the anterior wall of a segment of  transverse colon. No inflammatory changes in the hernia sac. Fluid  containing supraumbilical ventral hernias.      ABDOMEN:  Lack of intravenous contrast limits evaluation of vessels and solid  organs. LIVER: No focal parenchymal abnormality, within limits of  noncontrast CT. No perihepatic fluid. BILE DUCTS: No biliary  dilatation. GALLBLADDER: Cholecystectomy greater  PANCREAS: No peripancreatic inflammatory stranding or duct dilatation.  SPLEEN: Within normal limits.  ADRENALS: Within normal limits.      KIDNEYS, URETERS, URINARY BLADDER: No hydronephrosis or urinary tract  calculus. Bilateral renal atrophy again noted. Stable right renal  cysts. The urinary bladder is nondistended.      VESSELS: Postsurgical changes again noted including aortic stent  graft with visceral branch bypass grafts. Stable peripherally  calcified fluid collection around the superior mesenteric artery  graft in the left upper quadrant. Vascular coils noted in the left  internal iliac artery. RETROPERITONEUM: No pathologically enlarged  lymph nodes.      PELVIS:      REPRODUCTIVE ORGANS: The uterus is not seen and may be atrophic or  removed.      BOWEL: The stomach is nondistended. No dilated small bowel. The colon  is nondistended appears grossly unremarkable. Mild colonic stool  burden.  Normal appendix. PERITONEUM: Peritoneal dialysis catheter  noted in the left mid abdomen. Small amount of free peritoneal fluid.  No organized fluid collection. No pneumoperitoneum.      Impression: No acute abdominal or pelvic process.      Stable chronic findings as above.      MACRO:  None      Signed by: Manju  Sandoval 1/12/2024 4:25 AM  Dictation workstation:   [id number]      Physical Exam  Constitutional: Alert active, cooperative not in acute distress  Eyes: PERRLA, clear sclera  ENMT: Moist mucosal membranes, no exudate  Head / Neck: Atraumatic, normocephalic, supple neck, JVP not visualized  Lungs: Patent airways, CTABL  Heart: RRR, S1S2, no murmurs appreciated, palpable pulses in all extremities  GI: Soft, NT, ND, bowel sounds present in all quadrants  MSK: Moves all extremities freely, no restriction  of ROM, no joint edema  Extremities: Intact x 4, no peripheral edema  : No Mckenna catheter inserted  Breast: Deferred  Neurological: AAO x 3 to person, place and date, facial muscles symmetrical, sensation intact, strength 4/4, no acute focal neurological deficits appreciated  Psychological: Appropriate mood and behavior     Relevant Results           Scheduled medications  allopurinol, 100 mg, oral, Daily  calcitriol, 0.5 mcg, oral, Daily  carvedilol, 6.25 mg, oral, BID  dextrose 1.5% - LOW calcium 2.5mEq/L 2,000 mL peritoneal dialysate, , intraperitoneal, BID  dextrose 2.5 % - LOW calcium 2.5 mEq/L 2,000 mL peritoneal dialysate, , intraperitoneal, TID  DULoxetine, 30 mg, oral, Daily  [START ON 1/15/2024] ergocalciferol, 1,250 mcg, oral, Weekly  levothyroxine, 25 mcg, oral, Daily before breakfast  magnesium oxide, 800 mg, oral, Daily  mirtazapine, 15 mg, oral, Nightly  sevelamer carbonate, 800 mg, oral, TID with meals  warfarin, 5 mg, oral, Daily      Continuous medications  heparin, 0-4,500 Units/hr, Last Rate: 1,100 Units/hr (01/14/24 1600)      PRN medications  PRN medications: acetaminophen, diphenoxylate-atropine, heparin, HYDROmorphone, loperamide, naloxone, ondansetron **OR** ondansetron      Assessment/Plan      63 y.o. female past medical history of ESRD on dialysis, AAA, HFrEF (25% 03/2023) and pancreatitis, PE (03/2023 on warfarin)  presenting with left lower extremity pain as well as left arm pain.   Patient states she has not been feeling well for the last couple weeks, feeling down, lots of recent life stressors.  Due to this patient reports she was not taking any of medications.  She also missed a PD session      Principal Problem:    Left leg pain    PE/DVT  -Heparin drip started in ER  -Coumadin 5 mg daily, subtherapeutic  -Daily INR check, still subtherapeutic, will increase Coumadin to 7.5 mg today by adding half tablet to daily dose     Gout: Complaining about bilateral wrist pain, tender wrist, but no rubor, calor  -On allopurinol 100 mg daily  -Elevated CRP level, suggestive of inflammatory arthritis of the wrist     Depression  -Duloxetine 30 mg daily  -Consult psych: Appreciate evaluation and recommendations  -Patient gets upset and stops caring for herself     End-stage renal disease  -Consult nephrology: Appreciate management and recommendation  -Patient on peritoneal dialysis  -Renvela with meals      History of hypertension  -Continue Coreg     HFrEF  -appears euvolemic      DVT prophylaxis-heparin drip bridge to Coumadin      Disposition: Presenting with recurrent DVT, need further management, need bridged to therapeutic INR, discharge pending therapeutic INR        Clark Rader, DO

## 2024-01-15 ENCOUNTER — APPOINTMENT (OUTPATIENT)
Dept: CARDIOLOGY | Facility: HOSPITAL | Age: 64
DRG: 299 | End: 2024-01-15
Payer: COMMERCIAL

## 2024-01-15 LAB
ALBUMIN SERPL BCP-MCNC: 2.3 G/DL (ref 3.4–5)
ANION GAP SERPL CALC-SCNC: 20 MMOL/L (ref 10–20)
BUN SERPL-MCNC: 54 MG/DL (ref 6–23)
CALCIUM SERPL-MCNC: 8.4 MG/DL (ref 8.6–10.3)
CHLORIDE SERPL-SCNC: 93 MMOL/L (ref 98–107)
CO2 SERPL-SCNC: 26 MMOL/L (ref 21–32)
CREAT SERPL-MCNC: 9.53 MG/DL (ref 0.5–1.05)
EGFRCR SERPLBLD CKD-EPI 2021: 4 ML/MIN/1.73M*2
ERYTHROCYTE [DISTWIDTH] IN BLOOD BY AUTOMATED COUNT: 17.9 % (ref 11.5–14.5)
GLUCOSE SERPL-MCNC: 104 MG/DL (ref 74–99)
HCT VFR BLD AUTO: 32.3 % (ref 36–46)
HGB BLD-MCNC: 10.4 G/DL (ref 12–16)
INR PPP: 1.9 (ref 0.9–1.1)
MAGNESIUM SERPL-MCNC: 1.8 MG/DL (ref 1.6–2.4)
MCH RBC QN AUTO: 29.9 PG (ref 26–34)
MCHC RBC AUTO-ENTMCNC: 32.2 G/DL (ref 32–36)
MCV RBC AUTO: 93 FL (ref 80–100)
NRBC BLD-RTO: 0 /100 WBCS (ref 0–0)
PHOSPHATE SERPL-MCNC: 8.5 MG/DL (ref 2.5–4.9)
PLATELET # BLD AUTO: 223 X10*3/UL (ref 150–450)
POTASSIUM SERPL-SCNC: 4.9 MMOL/L (ref 3.5–5.3)
PROTHROMBIN TIME: 21.9 SECONDS (ref 9.8–12.8)
RBC # BLD AUTO: 3.48 X10*6/UL (ref 4–5.2)
SODIUM SERPL-SCNC: 134 MMOL/L (ref 136–145)
UFH PPP CHRO-ACNC: 0.4 IU/ML
WBC # BLD AUTO: 5.4 X10*3/UL (ref 4.4–11.3)

## 2024-01-15 PROCEDURE — 1100000001 HC PRIVATE ROOM DAILY

## 2024-01-15 PROCEDURE — 2500000004 HC RX 250 GENERAL PHARMACY W/ HCPCS (ALT 636 FOR OP/ED): Performed by: NURSE PRACTITIONER

## 2024-01-15 PROCEDURE — 2500000004 HC RX 250 GENERAL PHARMACY W/ HCPCS (ALT 636 FOR OP/ED): Performed by: INTERNAL MEDICINE

## 2024-01-15 PROCEDURE — 2500000001 HC RX 250 WO HCPCS SELF ADMINISTERED DRUGS (ALT 637 FOR MEDICARE OP): Performed by: INTERNAL MEDICINE

## 2024-01-15 PROCEDURE — 2500000001 HC RX 250 WO HCPCS SELF ADMINISTERED DRUGS (ALT 637 FOR MEDICARE OP): Performed by: PSYCHIATRY & NEUROLOGY

## 2024-01-15 PROCEDURE — 93005 ELECTROCARDIOGRAM TRACING: CPT

## 2024-01-15 PROCEDURE — 83735 ASSAY OF MAGNESIUM: CPT | Performed by: NURSE PRACTITIONER

## 2024-01-15 PROCEDURE — 85027 COMPLETE CBC AUTOMATED: CPT | Performed by: NURSE PRACTITIONER

## 2024-01-15 PROCEDURE — 2500000001 HC RX 250 WO HCPCS SELF ADMINISTERED DRUGS (ALT 637 FOR MEDICARE OP): Performed by: NURSE PRACTITIONER

## 2024-01-15 PROCEDURE — 85610 PROTHROMBIN TIME: CPT | Performed by: NURSE PRACTITIONER

## 2024-01-15 PROCEDURE — 80069 RENAL FUNCTION PANEL: CPT | Performed by: NURSE PRACTITIONER

## 2024-01-15 PROCEDURE — 36415 COLL VENOUS BLD VENIPUNCTURE: CPT | Performed by: NURSE PRACTITIONER

## 2024-01-15 PROCEDURE — 85520 HEPARIN ASSAY: CPT | Performed by: INTERNAL MEDICINE

## 2024-01-15 PROCEDURE — 99232 SBSQ HOSP IP/OBS MODERATE 35: CPT | Performed by: INTERNAL MEDICINE

## 2024-01-15 RX ORDER — SEVELAMER CARBONATE 800 MG/1
1600 TABLET, FILM COATED ORAL
Status: DISCONTINUED | OUTPATIENT
Start: 2024-01-15 | End: 2024-01-24 | Stop reason: HOSPADM

## 2024-01-15 RX ADMIN — ERGOCALCIFEROL 1250 MCG: 1.25 CAPSULE ORAL at 09:12

## 2024-01-15 RX ADMIN — DULOXETINE HYDROCHLORIDE 30 MG: 30 CAPSULE, DELAYED RELEASE ORAL at 09:12

## 2024-01-15 RX ADMIN — SODIUM CHLORIDE, SODIUM LACTATE, CALCIUM CHLORIDE, MAGNESIUM CHLORIDE AND DEXTROSE: 1.5; 538; 448; 18.3; 5.08 INJECTION, SOLUTION INTRAPERITONEAL at 17:41

## 2024-01-15 RX ADMIN — HEPARIN SODIUM 1100 UNITS/HR: 10000 INJECTION, SOLUTION INTRAVENOUS at 14:28

## 2024-01-15 RX ADMIN — SEVELAMER CARBONATE 1600 MG: 800 TABLET, FILM COATED ORAL at 16:17

## 2024-01-15 RX ADMIN — WARFARIN SODIUM 5 MG: 5 TABLET ORAL at 17:41

## 2024-01-15 RX ADMIN — LEVOTHYROXINE SODIUM 25 MCG: 25 TABLET ORAL at 06:09

## 2024-01-15 RX ADMIN — ONDANSETRON 4 MG: 2 INJECTION INTRAMUSCULAR; INTRAVENOUS at 22:00

## 2024-01-15 RX ADMIN — SODIUM CHLORIDE, SODIUM LACTATE, CALCIUM CHLORIDE, MAGNESIUM CHLORIDE AND DEXTROSE: 2.5; 538; 448; 18.3; 5.08 INJECTION, SOLUTION INTRAPERITONEAL at 21:52

## 2024-01-15 RX ADMIN — HYDROMORPHONE HYDROCHLORIDE 0.5 MG: 1 INJECTION, SOLUTION INTRAMUSCULAR; INTRAVENOUS; SUBCUTANEOUS at 04:22

## 2024-01-15 RX ADMIN — HYDROMORPHONE HYDROCHLORIDE 0.5 MG: 1 INJECTION, SOLUTION INTRAMUSCULAR; INTRAVENOUS; SUBCUTANEOUS at 09:11

## 2024-01-15 RX ADMIN — MAGNESIUM OXIDE TAB 400 MG (241.3 MG ELEMENTAL MG) 800 MG: 400 (241.3 MG) TAB at 09:12

## 2024-01-15 RX ADMIN — CARVEDILOL 6.25 MG: 6.25 TABLET, FILM COATED ORAL at 09:12

## 2024-01-15 RX ADMIN — SEVELAMER CARBONATE 800 MG: 800 TABLET, FILM COATED ORAL at 12:16

## 2024-01-15 RX ADMIN — HYDROMORPHONE HYDROCHLORIDE 0.5 MG: 1 INJECTION, SOLUTION INTRAMUSCULAR; INTRAVENOUS; SUBCUTANEOUS at 21:16

## 2024-01-15 RX ADMIN — ALLOPURINOL 100 MG: 100 TABLET ORAL at 09:12

## 2024-01-15 RX ADMIN — ONDANSETRON 4 MG: 2 INJECTION INTRAMUSCULAR; INTRAVENOUS at 09:13

## 2024-01-15 RX ADMIN — HYDROMORPHONE HYDROCHLORIDE 0.5 MG: 1 INJECTION, SOLUTION INTRAMUSCULAR; INTRAVENOUS; SUBCUTANEOUS at 16:17

## 2024-01-15 RX ADMIN — SODIUM CHLORIDE, SODIUM LACTATE, CALCIUM CHLORIDE, MAGNESIUM CHLORIDE AND DEXTROSE: 1.5; 538; 448; 18.3; 5.08 INJECTION, SOLUTION INTRAPERITONEAL at 09:00

## 2024-01-15 RX ADMIN — CALCITRIOL CAPSULES 0.25 MCG 0.5 MCG: 0.25 CAPSULE ORAL at 09:12

## 2024-01-15 RX ADMIN — MIRTAZAPINE 15 MG: 15 TABLET, FILM COATED ORAL at 21:16

## 2024-01-15 RX ADMIN — CARVEDILOL 6.25 MG: 6.25 TABLET, FILM COATED ORAL at 21:16

## 2024-01-15 RX ADMIN — SEVELAMER CARBONATE 800 MG: 800 TABLET, FILM COATED ORAL at 09:12

## 2024-01-15 RX ADMIN — SODIUM CHLORIDE, SODIUM LACTATE, CALCIUM CHLORIDE, MAGNESIUM CHLORIDE AND DEXTROSE: 2.5; 538; 448; 18.3; 5.08 INJECTION, SOLUTION INTRAPERITONEAL at 12:24

## 2024-01-15 ASSESSMENT — COGNITIVE AND FUNCTIONAL STATUS - GENERAL
TOILETING: A LOT
DAILY ACTIVITIY SCORE: 18
HELP NEEDED FOR BATHING: A LOT
MOBILITY SCORE: 17
MOVING FROM LYING ON BACK TO SITTING ON SIDE OF FLAT BED WITH BEDRAILS: A LITTLE
WALKING IN HOSPITAL ROOM: A LOT
TURNING FROM BACK TO SIDE WHILE IN FLAT BAD: A LITTLE
CLIMB 3 TO 5 STEPS WITH RAILING: A LOT
DAILY ACTIVITIY SCORE: 18
TOILETING: A LOT
MOBILITY SCORE: 14
DRESSING REGULAR UPPER BODY CLOTHING: A LITTLE
WALKING IN HOSPITAL ROOM: A LITTLE
STANDING UP FROM CHAIR USING ARMS: A LITTLE
DRESSING REGULAR LOWER BODY CLOTHING: A LITTLE
STANDING UP FROM CHAIR USING ARMS: A LOT
DRESSING REGULAR LOWER BODY CLOTHING: A LITTLE
MOVING TO AND FROM BED TO CHAIR: A LITTLE
MOVING TO AND FROM BED TO CHAIR: A LOT
MOVING FROM LYING ON BACK TO SITTING ON SIDE OF FLAT BED WITH BEDRAILS: A LITTLE
HELP NEEDED FOR BATHING: A LOT
DRESSING REGULAR UPPER BODY CLOTHING: A LITTLE
TURNING FROM BACK TO SIDE WHILE IN FLAT BAD: A LITTLE
CLIMB 3 TO 5 STEPS WITH RAILING: A LOT

## 2024-01-15 ASSESSMENT — PAIN SCALES - GENERAL
PAINLEVEL_OUTOF10: 8
PAINLEVEL_OUTOF10: 10 - WORST POSSIBLE PAIN
PAINLEVEL_OUTOF10: 8

## 2024-01-15 ASSESSMENT — PAIN - FUNCTIONAL ASSESSMENT
PAIN_FUNCTIONAL_ASSESSMENT: 0-10

## 2024-01-15 NOTE — PROCEDURES
Seen on peritoneal dialysis.  Ms. Hernandez has significant vascular disease, has had many TEVAR procedures, a left carotid-subclavian bypass, has significant systolic heart failure with severe tricuspid regurgitation, pulmonary hypertension.  Staph epidermidis peritonitis last November, then went to rehab.  She has had many issues with diarrhea for years, her adherence on peritoneal dialysis has been fair at best.  A left common femoral vein DVT was noted, partially occlusive in the popliteal.  Complete thrombotic occlusion of the grafted bilateral renal arteries by imaging last October.  Placed on a heparin drip.  She is on Coumadin, turns out she has not been taking it for weeks.  She has had dry heaves, declined some of her exchanges yesterday.  Feeling better overall, I will increase her phosphorus binder when she will get a dose of erythropoietin.

## 2024-01-15 NOTE — NURSING NOTE
"Pt refusing 2000 of PD, patient states she is ok with 1800 of PD dwelling time, due to not being able to tolerate full 2 Liter bag, pt states she feels \"full\" and \"uncomfortable \".  MD Jacobo notified and ok with plan of 1800 of dwelling time.  "

## 2024-01-15 NOTE — CARE PLAN
Patient admitted for DVT, L leg pain    Patient on hep drip with bridge to coumadin, currently INR 1.9    ADOD 1-2 days  DC: home with family/friend, resume with [facility], INR to be checked 5-7 days after dc.

## 2024-01-16 ENCOUNTER — APPOINTMENT (OUTPATIENT)
Dept: CARDIOLOGY | Facility: HOSPITAL | Age: 64
DRG: 299 | End: 2024-01-16
Payer: COMMERCIAL

## 2024-01-16 LAB
ERYTHROCYTE [DISTWIDTH] IN BLOOD BY AUTOMATED COUNT: 17.6 % (ref 11.5–14.5)
HCT VFR BLD AUTO: 32.5 % (ref 36–46)
HGB BLD-MCNC: 10.6 G/DL (ref 12–16)
HOLD SPECIMEN: NORMAL
INR PPP: 3.7 (ref 0.9–1.1)
MCH RBC QN AUTO: 29.9 PG (ref 26–34)
MCHC RBC AUTO-ENTMCNC: 32.6 G/DL (ref 32–36)
MCV RBC AUTO: 92 FL (ref 80–100)
NRBC BLD-RTO: 0 /100 WBCS (ref 0–0)
PLATELET # BLD AUTO: 241 X10*3/UL (ref 150–450)
PROTHROMBIN TIME: 41.9 SECONDS (ref 9.8–12.8)
RBC # BLD AUTO: 3.55 X10*6/UL (ref 4–5.2)
WBC # BLD AUTO: 5.5 X10*3/UL (ref 4.4–11.3)

## 2024-01-16 PROCEDURE — 2500000001 HC RX 250 WO HCPCS SELF ADMINISTERED DRUGS (ALT 637 FOR MEDICARE OP): Performed by: PSYCHIATRY & NEUROLOGY

## 2024-01-16 PROCEDURE — 2500000001 HC RX 250 WO HCPCS SELF ADMINISTERED DRUGS (ALT 637 FOR MEDICARE OP): Performed by: NURSE PRACTITIONER

## 2024-01-16 PROCEDURE — 6350000001 HC RX 635 EPOETIN >10,000 UNITS: Mod: JZ | Performed by: INTERNAL MEDICINE

## 2024-01-16 PROCEDURE — 1100000001 HC PRIVATE ROOM DAILY

## 2024-01-16 PROCEDURE — 2500000004 HC RX 250 GENERAL PHARMACY W/ HCPCS (ALT 636 FOR OP/ED): Performed by: NURSE PRACTITIONER

## 2024-01-16 PROCEDURE — 93005 ELECTROCARDIOGRAM TRACING: CPT

## 2024-01-16 PROCEDURE — 99233 SBSQ HOSP IP/OBS HIGH 50: CPT | Performed by: INTERNAL MEDICINE

## 2024-01-16 PROCEDURE — 36415 COLL VENOUS BLD VENIPUNCTURE: CPT | Performed by: NURSE PRACTITIONER

## 2024-01-16 PROCEDURE — 85610 PROTHROMBIN TIME: CPT | Performed by: NURSE PRACTITIONER

## 2024-01-16 PROCEDURE — 85027 COMPLETE CBC AUTOMATED: CPT | Performed by: NURSE PRACTITIONER

## 2024-01-16 PROCEDURE — 2500000004 HC RX 250 GENERAL PHARMACY W/ HCPCS (ALT 636 FOR OP/ED): Performed by: INTERNAL MEDICINE

## 2024-01-16 PROCEDURE — 2500000001 HC RX 250 WO HCPCS SELF ADMINISTERED DRUGS (ALT 637 FOR MEDICARE OP): Performed by: INTERNAL MEDICINE

## 2024-01-16 RX ORDER — LACTULOSE 10 G/15ML
10 SOLUTION ORAL ONCE
Status: COMPLETED | OUTPATIENT
Start: 2024-01-16 | End: 2024-01-16

## 2024-01-16 RX ADMIN — HYDROMORPHONE HYDROCHLORIDE 0.5 MG: 1 INJECTION, SOLUTION INTRAMUSCULAR; INTRAVENOUS; SUBCUTANEOUS at 10:39

## 2024-01-16 RX ADMIN — SEVELAMER CARBONATE 1600 MG: 800 TABLET, FILM COATED ORAL at 17:03

## 2024-01-16 RX ADMIN — SODIUM CHLORIDE, SODIUM LACTATE, CALCIUM CHLORIDE, MAGNESIUM CHLORIDE AND DEXTROSE: 2.5; 538; 448; 18.3; 5.08 INJECTION, SOLUTION INTRAPERITONEAL at 13:37

## 2024-01-16 RX ADMIN — HYDROMORPHONE HYDROCHLORIDE 0.5 MG: 1 INJECTION, SOLUTION INTRAMUSCULAR; INTRAVENOUS; SUBCUTANEOUS at 14:47

## 2024-01-16 RX ADMIN — HYDROMORPHONE HYDROCHLORIDE 0.5 MG: 1 INJECTION, SOLUTION INTRAMUSCULAR; INTRAVENOUS; SUBCUTANEOUS at 06:17

## 2024-01-16 RX ADMIN — DULOXETINE HYDROCHLORIDE 30 MG: 30 CAPSULE, DELAYED RELEASE ORAL at 09:46

## 2024-01-16 RX ADMIN — EPOETIN ALFA-EPBX 10000 UNITS: 10000 INJECTION, SOLUTION INTRAVENOUS; SUBCUTANEOUS at 09:46

## 2024-01-16 RX ADMIN — LACTULOSE 10 G: 20 SOLUTION ORAL at 21:20

## 2024-01-16 RX ADMIN — ALLOPURINOL 100 MG: 100 TABLET ORAL at 09:46

## 2024-01-16 RX ADMIN — SODIUM CHLORIDE, SODIUM LACTATE, CALCIUM CHLORIDE, MAGNESIUM CHLORIDE AND DEXTROSE: 2.5; 538; 448; 18.3; 5.08 INJECTION, SOLUTION INTRAPERITONEAL at 01:47

## 2024-01-16 RX ADMIN — CALCITRIOL CAPSULES 0.25 MCG 0.5 MCG: 0.25 CAPSULE ORAL at 09:46

## 2024-01-16 RX ADMIN — SODIUM CHLORIDE, SODIUM LACTATE, CALCIUM CHLORIDE, MAGNESIUM CHLORIDE AND DEXTROSE: 1.5; 538; 448; 18.3; 5.08 INJECTION, SOLUTION INTRAPERITONEAL at 09:46

## 2024-01-16 RX ADMIN — MIRTAZAPINE 15 MG: 15 TABLET, FILM COATED ORAL at 20:11

## 2024-01-16 RX ADMIN — CARVEDILOL 6.25 MG: 6.25 TABLET, FILM COATED ORAL at 09:46

## 2024-01-16 RX ADMIN — LEVOTHYROXINE SODIUM 25 MCG: 25 TABLET ORAL at 06:12

## 2024-01-16 RX ADMIN — SODIUM CHLORIDE, SODIUM LACTATE, CALCIUM CHLORIDE, MAGNESIUM CHLORIDE AND DEXTROSE: 2.5; 538; 448; 18.3; 5.08 INJECTION, SOLUTION INTRAPERITONEAL at 18:25

## 2024-01-16 RX ADMIN — MAGNESIUM OXIDE TAB 400 MG (241.3 MG ELEMENTAL MG) 800 MG: 400 (241.3 MG) TAB at 09:46

## 2024-01-16 ASSESSMENT — COGNITIVE AND FUNCTIONAL STATUS - GENERAL
HELP NEEDED FOR BATHING: A LITTLE
DAILY ACTIVITIY SCORE: 19
PERSONAL GROOMING: A LITTLE
DRESSING REGULAR LOWER BODY CLOTHING: A LITTLE
TOILETING: A LITTLE
MOBILITY SCORE: 20
CLIMB 3 TO 5 STEPS WITH RAILING: A LOT
WALKING IN HOSPITAL ROOM: A LITTLE
STANDING UP FROM CHAIR USING ARMS: A LITTLE
DRESSING REGULAR UPPER BODY CLOTHING: A LITTLE

## 2024-01-16 ASSESSMENT — PAIN SCALES - GENERAL
PAINLEVEL_OUTOF10: 8
PAINLEVEL_OUTOF10: 0 - NO PAIN
PAINLEVEL_OUTOF10: 8

## 2024-01-16 ASSESSMENT — PAIN - FUNCTIONAL ASSESSMENT: PAIN_FUNCTIONAL_ASSESSMENT: 0-10

## 2024-01-16 NOTE — CARE PLAN
The patient's goals for the shift include      The clinical goals for the shift include pt maria luz remain safe during shift      Problem: Fall/Injury  Goal: Not fall by end of shift  Outcome: Progressing  Goal: Be free from injury by end of the shift  Outcome: Progressing  Goal: Verbalize understanding of personal risk factors for fall in the hospital  Outcome: Progressing  Goal: Verbalize understanding of risk factor reduction measures to prevent injury from fall in the home  Outcome: Progressing  Goal: Use assistive devices by end of the shift  Outcome: Progressing  Goal: Pace activities to prevent fatigue by end of the shift  Outcome: Progressing

## 2024-01-16 NOTE — PROGRESS NOTES
Yoselyn Hernandez is a 63 y.o. female on day 4 of admission presenting with Left leg pain.      Subjective   Patient was seen and examined at bedside this morning    Objective     Last Recorded Vitals  /66 (BP Location: Right arm, Patient Position: Lying)   Pulse 75   Temp 36.7 °C (98.1 °F) (Temporal)   Resp 16   Wt 62.7 kg (138 lb 3.7 oz)   SpO2 95%   Intake/Output last 3 Shifts:    Intake/Output Summary (Last 24 hours) at 1/16/2024 1423  Last data filed at 1/16/2024 0900  Gross per 24 hour   Intake 8000 ml   Output 8500 ml   Net -500 ml         Admission Weight  Weight: 54.4 kg (120 lb) (01/12/24 0242)    Daily Weight  01/12/24 : 62.7 kg (138 lb 3.7 oz)    Image Results  Lower extremity venous duplex left  Narrative: Interpreted By:  Manju Sandoval,   STUDY:  [facility] US LOWER EXTREMITY VENOUS DUPLEX LEFT;  1/12/2024 6:02 am      INDICATION:  Signs/Symptoms:leg pain, swelling, stopped taking coumadin a few  weeks ago.      COMPARISON:  04/15/2028      ACCESSION NUMBER(S):  [id number]      ORDERING CLINICIAN:  AIRAM WEIR      TECHNIQUE:  Grayscale, color and spectral Doppler sonographic images of the left  lower extremity deep venous system. The right common femoral vein was  imaged for comparison.      FINDINGS:  The common femoral vein and the entire femoral vein are incompletely  compressible, consistent with partially occlusive thrombus. The  popliteal vein is noncompressible, however, color flow is present,  suggesting partially occlusive DVT. Suboptimal visualization of the  calf veins.      The right common femoral vein is patent.      OTHER FINDINGS: 3.7 x 1.2 x 2.8 cm fluid collection in the popliteal  fossa, most consistent with Baker's cyst. Subcutaneous edema  throughout the imaged right lower extremity, most prominent in the  calf.      Impression: Partially occlusive DVT in the left common femoral and entire length  of the femoral vein. Suspected partially occlusive DVT in the  popliteal  vein.      Left lower extremity edema.      Baker's cyst Baker's cyst.              MACRO:  None      Signed by: Manju Sandoval 1/12/2024 6:29 AM  Dictation workstation:   [id number]  XR shoulder left 2+ views, XR forearm left 2 views  Narrative: Interpreted By:  Manju Sandoval,   STUDY:  XR FOREARM LEFT 2 VIEWS; XR SHOULDER LEFT 2+ VIEWS; ;  1/12/2024 4:16  am; 1/12/2024 4:17 am      INDICATION:  Signs/Symptoms:fall, pain.      COMPARISON:  None.      ACCESSION NUMBER(S):  [id number]; [id number]      ORDERING CLINICIAN:  AIRAM WEIR      FINDINGS:  Two views left shoulder and two views left forearm. No acute fracture  or malalignment. Erosive changes at the acromioclavicular joint,  similar to chest radiograph 10/18/2023. No overlying soft tissue  swelling. No elbow joint effusion. Aortic stent graft partially  imaged. Atherosclerotic vascular calcifications noted.      Impression: No acute osseous abnormality.      Erosive changes at the acromioclavicular joint is favored to be  secondary to hyperparathyroidism/chronic renal disease. Infection is  considered less likely, however, please correlate with clinical  history.          MACRO:  None      Signed by: Manju Sandoval 1/12/2024 4:32 AM  Dictation workstation:   [id number]  CT abdomen pelvis wo IV contrast  Narrative: Interpreted By:  Manju Sandoval,   STUDY:  CT ABDOMEN PELVIS WO IV CONTRAST;  1/12/2024 4:09 am      INDICATION:  Signs/Symptoms:fall, buttock pain, L hip pain.      COMPARISON:  11/06/2023      ACCESSION NUMBER(S):  [id number]      ORDERING CLINICIAN:  AIRAM WEIR      TECHNIQUE:  Axial noncontrast CT images of the abdomen and pelvis with coronal  and sagittal reconstructed images.      FINDINGS:  LOWER CHEST: Stable bibasilar atelectasis or scarring. Partially  imaged stent graft in the descending thoracic aorta. Stable  cardiomegaly BONES: No acute osseous abnormality. Mild diffuse  degenerative disc changes. ABDOMINAL WALL:  Diffuse body wall edema.  Periumbilical hernia containing the anterior wall of a segment of  transverse colon. No inflammatory changes in the hernia sac. Fluid  containing supraumbilical ventral hernias.      ABDOMEN:  Lack of intravenous contrast limits evaluation of vessels and solid  organs. LIVER: No focal parenchymal abnormality, within limits of  noncontrast CT. No perihepatic fluid. BILE DUCTS: No biliary  dilatation. GALLBLADDER: Cholecystectomy greater  PANCREAS: No peripancreatic inflammatory stranding or duct dilatation.  SPLEEN: Within normal limits.  ADRENALS: Within normal limits.      KIDNEYS, URETERS, URINARY BLADDER: No hydronephrosis or urinary tract  calculus. Bilateral renal atrophy again noted. Stable right renal  cysts. The urinary bladder is nondistended.      VESSELS: Postsurgical changes again noted including aortic stent  graft with visceral branch bypass grafts. Stable peripherally  calcified fluid collection around the superior mesenteric artery  graft in the left upper quadrant. Vascular coils noted in the left  internal iliac artery. RETROPERITONEUM: No pathologically enlarged  lymph nodes.      PELVIS:      REPRODUCTIVE ORGANS: The uterus is not seen and may be atrophic or  removed.      BOWEL: The stomach is nondistended. No dilated small bowel. The colon  is nondistended appears grossly unremarkable. Mild colonic stool  burden.  Normal appendix. PERITONEUM: Peritoneal dialysis catheter  noted in the left mid abdomen. Small amount of free peritoneal fluid.  No organized fluid collection. No pneumoperitoneum.      Impression: No acute abdominal or pelvic process.      Stable chronic findings as above.      MACRO:  None      Signed by: Manju Sandoval 1/12/2024 4:25 AM  Dictation workstation:   [id number]      Physical Exam  Constitutional: Alert active, cooperative not in acute distress  Eyes: PERRLA, clear sclera  ENMT: Moist mucosal membranes, no exudate  Head / Neck: Atraumatic,  normocephalic, supple neck, JVP not visualized  Lungs: Patent airways, CTABL  Heart: RRR, S1S2, no murmurs appreciated, palpable pulses in all extremities  GI: Soft, NT, ND, bowel sounds present in all quadrants  MSK: Moves all extremities freely, no restriction  of ROM, no joint edema  Extremities: Intact x 4, no peripheral edema  : No Mckenna catheter inserted  Breast: Deferred  Neurological: AAO x 3 to person, place and date, facial muscles symmetrical, sensation intact, strength 4/4, no acute focal neurological deficits appreciated  Psychological: Appropriate mood and behavior    Relevant Results             Scheduled medications  allopurinol, 100 mg, oral, Daily  calcitriol, 0.5 mcg, oral, Daily  carvedilol, 6.25 mg, oral, BID  dextrose 2.5 % - LOW calcium 2.5 mEq/L 2,000 mL peritoneal dialysate, , intraperitoneal, 4x daily  [START ON 1/17/2024] dextrose 4.25 % - LOW calcium 2.5 mEq/L 2,000 mL peritoneal dialysate, , intraperitoneal, q24h  DULoxetine, 30 mg, oral, Daily  epoetin treasure or biosimilar, 10,000 Units, subcutaneous, Weekly  ergocalciferol, 1,250 mcg, oral, Weekly  levothyroxine, 25 mcg, oral, Daily before breakfast  magnesium oxide, 800 mg, oral, Daily  mirtazapine, 15 mg, oral, Nightly  sevelamer carbonate, 1,600 mg, oral, TID with meals  [Held by provider] warfarin, 5 mg, oral, Daily      Continuous medications       PRN medications  PRN medications: acetaminophen, diphenoxylate-atropine, HYDROmorphone, loperamide, naloxone, ondansetron **OR** ondansetron  Lower extremity venous duplex left    Result Date: 1/12/2024  Interpreted By:  Manju Sandoval, STUDY: [facility] LOWER EXTREMITY VENOUS DUPLEX LEFT;  1/12/2024 6:02 am   INDICATION: Signs/Symptoms:leg pain, swelling, stopped taking coumadin a few weeks ago.   COMPARISON: 04/15/2028   ACCESSION NUMBER(S): [id number]   ORDERING CLINICIAN: AIRAM WEIR   TECHNIQUE: Grayscale, color and spectral Doppler sonographic images of the left lower extremity  deep venous system. The right common femoral vein was imaged for comparison.   FINDINGS: The common femoral vein and the entire femoral vein are incompletely compressible, consistent with partially occlusive thrombus. The popliteal vein is noncompressible, however, color flow is present, suggesting partially occlusive DVT. Suboptimal visualization of the calf veins.   The right common femoral vein is patent.   OTHER FINDINGS: 3.7 x 1.2 x 2.8 cm fluid collection in the popliteal fossa, most consistent with Baker's cyst. Subcutaneous edema throughout the imaged right lower extremity, most prominent in the calf.       Partially occlusive DVT in the left common femoral and entire length of the femoral vein. Suspected partially occlusive DVT in the popliteal vein.   Left lower extremity edema.   Baker's cyst Baker's cyst.       MACRO: None   Signed by: Manju Sandoval 1/12/2024 6:29 AM Dictation workstation:   [id number]    XR shoulder left 2+ views    Result Date: 1/12/2024  Interpreted By:  Manju Sandoval, STUDY: XR FOREARM LEFT 2 VIEWS; XR SHOULDER LEFT 2+ VIEWS; ;  1/12/2024 4:16 am; 1/12/2024 4:17 am   INDICATION: Signs/Symptoms:fall, pain.   COMPARISON: None.   ACCESSION NUMBER(S): [id number]; [id number]   ORDERING CLINICIAN: AIRAM WEIR   FINDINGS: Two views left shoulder and two views left forearm. No acute fracture or malalignment. Erosive changes at the acromioclavicular joint, similar to chest radiograph 10/18/2023. No overlying soft tissue swelling. No elbow joint effusion. Aortic stent graft partially imaged. Atherosclerotic vascular calcifications noted.       No acute osseous abnormality.   Erosive changes at the acromioclavicular joint is favored to be secondary to hyperparathyroidism/chronic renal disease. Infection is considered less likely, however, please correlate with clinical history.     MACRO: None   Signed by: Manju Sandoval 1/12/2024 4:32 AM Dictation workstation:   [id number]    XR  forearm left 2 views    Result Date: 1/12/2024  Interpreted By:  Manju Sandoval, STUDY: XR FOREARM LEFT 2 VIEWS; XR SHOULDER LEFT 2+ VIEWS; ;  1/12/2024 4:16 am; 1/12/2024 4:17 am   INDICATION: Signs/Symptoms:fall, pain.   COMPARISON: None.   ACCESSION NUMBER(S): [id number]; [id number]   ORDERING CLINICIAN: AIRAM WEIR   FINDINGS: Two views left shoulder and two views left forearm. No acute fracture or malalignment. Erosive changes at the acromioclavicular joint, similar to chest radiograph 10/18/2023. No overlying soft tissue swelling. No elbow joint effusion. Aortic stent graft partially imaged. Atherosclerotic vascular calcifications noted.       No acute osseous abnormality.   Erosive changes at the acromioclavicular joint is favored to be secondary to hyperparathyroidism/chronic renal disease. Infection is considered less likely, however, please correlate with clinical history.     MACRO: None   Signed by: Manju Sandoval 1/12/2024 4:32 AM Dictation workstation:   [id number]    CT abdomen pelvis wo IV contrast    Result Date: 1/12/2024  Interpreted By:  Manju Sandoval, STUDY: CT ABDOMEN PELVIS WO IV CONTRAST;  1/12/2024 4:09 am   INDICATION: Signs/Symptoms:fall, buttock pain, L hip pain.   COMPARISON: 11/06/2023   ACCESSION NUMBER(S): [id number]   ORDERING CLINICIAN: AIRAM WEIR   TECHNIQUE: Axial noncontrast CT images of the abdomen and pelvis with coronal and sagittal reconstructed images.   FINDINGS: LOWER CHEST: Stable bibasilar atelectasis or scarring. Partially imaged stent graft in the descending thoracic aorta. Stable cardiomegaly BONES: No acute osseous abnormality. Mild diffuse degenerative disc changes. ABDOMINAL WALL: Diffuse body wall edema. Periumbilical hernia containing the anterior wall of a segment of transverse colon. No inflammatory changes in the hernia sac. Fluid containing supraumbilical ventral hernias.   ABDOMEN: Lack of intravenous contrast limits evaluation of vessels and  solid organs. LIVER: No focal parenchymal abnormality, within limits of noncontrast CT. No perihepatic fluid. BILE DUCTS: No biliary dilatation. GALLBLADDER: Cholecystectomy greater PANCREAS: No peripancreatic inflammatory stranding or duct dilatation. SPLEEN: Within normal limits. ADRENALS: Within normal limits.   KIDNEYS, URETERS, URINARY BLADDER: No hydronephrosis or urinary tract calculus. Bilateral renal atrophy again noted. Stable right renal cysts. The urinary bladder is nondistended.   VESSELS: Postsurgical changes again noted including aortic stent graft with visceral branch bypass grafts. Stable peripherally calcified fluid collection around the superior mesenteric artery graft in the left upper quadrant. Vascular coils noted in the left internal iliac artery. RETROPERITONEUM: No pathologically enlarged lymph nodes.   PELVIS:   REPRODUCTIVE ORGANS: The uterus is not seen and may be atrophic or removed.   BOWEL: The stomach is nondistended. No dilated small bowel. The colon is nondistended appears grossly unremarkable. Mild colonic stool burden.  Normal appendix. PERITONEUM: Peritoneal dialysis catheter noted in the left mid abdomen. Small amount of free peritoneal fluid. No organized fluid collection. No pneumoperitoneum.       No acute abdominal or pelvic process.   Stable chronic findings as above.   MACRO: None   Signed by: Manju Sandoval 1/12/2024 4:25 AM Dictation workstation:   [id number]     Assessment/Plan      63 y.o. female past medical history of ESRD on dialysis, AAA, HFrEF (25% 03/2023) and pancreatitis, PE (03/2023 on warfarin)  presenting with left lower extremity pain as well as left arm pain.  Patient states she has not been feeling well for the last couple weeks, feeling down, lots of recent life stressors.  Due to this patient reports she was not taking any of medications.  She also missed a PD session      Principal Problem:    Left leg pain      PE/DVT  -Coumadin 5 mg daily,  subtherapeutic  -INR 3.7 today  -hold coumadin  -dc heparin drip     Gout: Complaining about bilateral wrist pain, tender wrist, but no rubor, calor  -On allopurinol 100 mg daily  -Elevated CRP level, suggestive of inflammatory arthritis of the wrist  -Uric acid level wnl     Depression  -Duloxetine 30 mg daily  -Consult psych: Appreciate evaluation and recommendations  -Patient gets upset and stops caring for herself     End-stage renal disease  -Consult nephrology: Appreciate management and recommendation  -Patient on peritoneal dialysis  -Renvela with meals      History of hypertension  -Continue Coreg     HFrEF  -appears euvolemic      DVT prophylaxis      Disposition: Supratherapeutic INR. Hold coumadin today. Recheck INR in am           Nkechi Antonio MD

## 2024-01-16 NOTE — PROGRESS NOTES
Yoselyn Hernandez is a 63 y.o. female on day 3 of admission presenting with Left leg pain.      Subjective   Patient was seen and examined at bedside this morning, stated that her wrist pain has improved, denies fever, chills, nausea, vomiting       Objective     Last Recorded Vitals  /70 (BP Location: Right arm)   Pulse 64   Temp 36.1 °C (96.9 °F) (Temporal)   Resp 17   Wt 62.7 kg (138 lb 3.7 oz)   SpO2 97%   Intake/Output last 3 Shifts:    Intake/Output Summary (Last 24 hours) at 1/15/2024 2236  Last data filed at 1/15/2024 2151  Gross per 24 hour   Intake 9800 ml   Output 7700 ml   Net 2100 ml       Admission Weight  Weight: 54.4 kg (120 lb) (01/12/24 0242)    Daily Weight  01/12/24 : 62.7 kg (138 lb 3.7 oz)    Image Results  Lower extremity venous duplex left  Narrative: Interpreted By:  Manju Sandoval,   STUDY:  [facility] LOWER EXTREMITY VENOUS DUPLEX LEFT;  1/12/2024 6:02 am      INDICATION:  Signs/Symptoms:leg pain, swelling, stopped taking coumadin a few  weeks ago.      COMPARISON:  04/15/2028      ACCESSION NUMBER(S):  [id number]      ORDERING CLINICIAN:  AIRAM WEIR      TECHNIQUE:  Grayscale, color and spectral Doppler sonographic images of the left  lower extremity deep venous system. The right common femoral vein was  imaged for comparison.      FINDINGS:  The common femoral vein and the entire femoral vein are incompletely  compressible, consistent with partially occlusive thrombus. The  popliteal vein is noncompressible, however, color flow is present,  suggesting partially occlusive DVT. Suboptimal visualization of the  calf veins.      The right common femoral vein is patent.      OTHER FINDINGS: 3.7 x 1.2 x 2.8 cm fluid collection in the popliteal  fossa, most consistent with Baker's cyst. Subcutaneous edema  throughout the imaged right lower extremity, most prominent in the  calf.      Impression: Partially occlusive DVT in the left common femoral and entire length  of the femoral vein.  Suspected partially occlusive DVT in the  popliteal vein.      Left lower extremity edema.      Baker's cyst Baker's cyst.              MACRO:  None      Signed by: aMnju Sandoval 1/12/2024 6:29 AM  Dictation workstation:   [id number]  XR shoulder left 2+ views, XR forearm left 2 views  Narrative: Interpreted By:  Manju Sandoval,   STUDY:  XR FOREARM LEFT 2 VIEWS; XR SHOULDER LEFT 2+ VIEWS; ;  1/12/2024 4:16  am; 1/12/2024 4:17 am      INDICATION:  Signs/Symptoms:fall, pain.      COMPARISON:  None.      ACCESSION NUMBER(S):  [id number]; [id number]      ORDERING CLINICIAN:  AIRAM WEIR      FINDINGS:  Two views left shoulder and two views left forearm. No acute fracture  or malalignment. Erosive changes at the acromioclavicular joint,  similar to chest radiograph 10/18/2023. No overlying soft tissue  swelling. No elbow joint effusion. Aortic stent graft partially  imaged. Atherosclerotic vascular calcifications noted.      Impression: No acute osseous abnormality.      Erosive changes at the acromioclavicular joint is favored to be  secondary to hyperparathyroidism/chronic renal disease. Infection is  considered less likely, however, please correlate with clinical  history.          MACRO:  None      Signed by: Manju Sandoval 1/12/2024 4:32 AM  Dictation workstation:   [id number]  CT abdomen pelvis wo IV contrast  Narrative: Interpreted By:  Manju Sandoval,   STUDY:  CT ABDOMEN PELVIS WO IV CONTRAST;  1/12/2024 4:09 am      INDICATION:  Signs/Symptoms:fall, buttock pain, L hip pain.      COMPARISON:  11/06/2023      ACCESSION NUMBER(S):  [id number]      ORDERING CLINICIAN:  AIRAM WEIR      TECHNIQUE:  Axial noncontrast CT images of the abdomen and pelvis with coronal  and sagittal reconstructed images.      FINDINGS:  LOWER CHEST: Stable bibasilar atelectasis or scarring. Partially  imaged stent graft in the descending thoracic aorta. Stable  cardiomegaly BONES: No acute osseous abnormality. Mild  diffuse  degenerative disc changes. ABDOMINAL WALL: Diffuse body wall edema.  Periumbilical hernia containing the anterior wall of a segment of  transverse colon. No inflammatory changes in the hernia sac. Fluid  containing supraumbilical ventral hernias.      ABDOMEN:  Lack of intravenous contrast limits evaluation of vessels and solid  organs. LIVER: No focal parenchymal abnormality, within limits of  noncontrast CT. No perihepatic fluid. BILE DUCTS: No biliary  dilatation. GALLBLADDER: Cholecystectomy greater  PANCREAS: No peripancreatic inflammatory stranding or duct dilatation.  SPLEEN: Within normal limits.  ADRENALS: Within normal limits.      KIDNEYS, URETERS, URINARY BLADDER: No hydronephrosis or urinary tract  calculus. Bilateral renal atrophy again noted. Stable right renal  cysts. The urinary bladder is nondistended.      VESSELS: Postsurgical changes again noted including aortic stent  graft with visceral branch bypass grafts. Stable peripherally  calcified fluid collection around the superior mesenteric artery  graft in the left upper quadrant. Vascular coils noted in the left  internal iliac artery. RETROPERITONEUM: No pathologically enlarged  lymph nodes.      PELVIS:      REPRODUCTIVE ORGANS: The uterus is not seen and may be atrophic or  removed.      BOWEL: The stomach is nondistended. No dilated small bowel. The colon  is nondistended appears grossly unremarkable. Mild colonic stool  burden.  Normal appendix. PERITONEUM: Peritoneal dialysis catheter  noted in the left mid abdomen. Small amount of free peritoneal fluid.  No organized fluid collection. No pneumoperitoneum.      Impression: No acute abdominal or pelvic process.      Stable chronic findings as above.      MACRO:  None      Signed by: Manju Sandoval 1/12/2024 4:25 AM  Dictation workstation:   [id number]      Physical Exam  Constitutional: Alert active, cooperative not in acute distress  Eyes: PERRLA, clear sclera  ENMT: Moist mucosal  membranes, no exudate  Head / Neck: Atraumatic, normocephalic, supple neck, JVP not visualized  Lungs: Patent airways, CTABL  Heart: RRR, S1S2, no murmurs appreciated, palpable pulses in all extremities  GI: Soft, NT, ND, bowel sounds present in all quadrants  MSK: Moves all extremities freely, no restriction  of ROM, no joint edema  Extremities: Intact x 4, no peripheral edema  : No Mckenna catheter inserted  Breast: Deferred  Neurological: AAO x 3 to person, place and date, facial muscles symmetrical, sensation intact, strength 4/4, no acute focal neurological deficits appreciated  Psychological: Appropriate mood and behavior    Relevant Results             Scheduled medications  allopurinol, 100 mg, oral, Daily  calcitriol, 0.5 mcg, oral, Daily  carvedilol, 6.25 mg, oral, BID  dextrose 1.5% - LOW calcium 2.5mEq/L 2,000 mL peritoneal dialysate, , intraperitoneal, BID  dextrose 2.5 % - LOW calcium 2.5 mEq/L 2,000 mL peritoneal dialysate, , intraperitoneal, TID  DULoxetine, 30 mg, oral, Daily  [START ON 1/16/2024] epoetin treasure or biosimilar, 10,000 Units, subcutaneous, Weekly  ergocalciferol, 1,250 mcg, oral, Weekly  levothyroxine, 25 mcg, oral, Daily before breakfast  magnesium oxide, 800 mg, oral, Daily  mirtazapine, 15 mg, oral, Nightly  sevelamer carbonate, 1,600 mg, oral, TID with meals  warfarin, 5 mg, oral, Daily      Continuous medications  heparin, 0-4,500 Units/hr, Last Rate: 1,100 Units/hr (01/15/24 1500)      PRN medications  PRN medications: acetaminophen, diphenoxylate-atropine, heparin, HYDROmorphone, loperamide, naloxone, ondansetron **OR** ondansetron  Lower extremity venous duplex left    Result Date: 1/12/2024  Interpreted By:  Manju Sandoval, STUDY: [facility] LOWER EXTREMITY VENOUS DUPLEX LEFT;  1/12/2024 6:02 am   INDICATION: Signs/Symptoms:leg pain, swelling, stopped taking coumadin a few weeks ago.   COMPARISON: 04/15/2028   ACCESSION NUMBER(S): [id number]   ORDERING CLINICIAN: AIRAM WEIR    TECHNIQUE: Grayscale, color and spectral Doppler sonographic images of the left lower extremity deep venous system. The right common femoral vein was imaged for comparison.   FINDINGS: The common femoral vein and the entire femoral vein are incompletely compressible, consistent with partially occlusive thrombus. The popliteal vein is noncompressible, however, color flow is present, suggesting partially occlusive DVT. Suboptimal visualization of the calf veins.   The right common femoral vein is patent.   OTHER FINDINGS: 3.7 x 1.2 x 2.8 cm fluid collection in the popliteal fossa, most consistent with Baker's cyst. Subcutaneous edema throughout the imaged right lower extremity, most prominent in the calf.       Partially occlusive DVT in the left common femoral and entire length of the femoral vein. Suspected partially occlusive DVT in the popliteal vein.   Left lower extremity edema.   Baker's cyst Baker's cyst.       MACRO: None   Signed by: Manju Sandoval 1/12/2024 6:29 AM Dictation workstation:   [id number]    XR shoulder left 2+ views    Result Date: 1/12/2024  Interpreted By:  Manju Sandoval, STUDY: XR FOREARM LEFT 2 VIEWS; XR SHOULDER LEFT 2+ VIEWS; ;  1/12/2024 4:16 am; 1/12/2024 4:17 am   INDICATION: Signs/Symptoms:fall, pain.   COMPARISON: None.   ACCESSION NUMBER(S): [id number]; [id number]   ORDERING CLINICIAN: AIRAM WEIR   FINDINGS: Two views left shoulder and two views left forearm. No acute fracture or malalignment. Erosive changes at the acromioclavicular joint, similar to chest radiograph 10/18/2023. No overlying soft tissue swelling. No elbow joint effusion. Aortic stent graft partially imaged. Atherosclerotic vascular calcifications noted.       No acute osseous abnormality.   Erosive changes at the acromioclavicular joint is favored to be secondary to hyperparathyroidism/chronic renal disease. Infection is considered less likely, however, please correlate with clinical history.     MACRO:  None   Signed by: Manju Sandoval 1/12/2024 4:32 AM Dictation workstation:   [id number]    XR forearm left 2 views    Result Date: 1/12/2024  Interpreted By:  Manju Sandoval, STUDY: XR FOREARM LEFT 2 VIEWS; XR SHOULDER LEFT 2+ VIEWS; ;  1/12/2024 4:16 am; 1/12/2024 4:17 am   INDICATION: Signs/Symptoms:fall, pain.   COMPARISON: None.   ACCESSION NUMBER(S): [id number]; [id number]   ORDERING CLINICIAN: AIRAM WEIR   FINDINGS: Two views left shoulder and two views left forearm. No acute fracture or malalignment. Erosive changes at the acromioclavicular joint, similar to chest radiograph 10/18/2023. No overlying soft tissue swelling. No elbow joint effusion. Aortic stent graft partially imaged. Atherosclerotic vascular calcifications noted.       No acute osseous abnormality.   Erosive changes at the acromioclavicular joint is favored to be secondary to hyperparathyroidism/chronic renal disease. Infection is considered less likely, however, please correlate with clinical history.     MACRO: None   Signed by: Manju Sandoval 1/12/2024 4:32 AM Dictation workstation:   [id number]    CT abdomen pelvis wo IV contrast    Result Date: 1/12/2024  Interpreted By:  Manju Sandoval, STUDY: CT ABDOMEN PELVIS WO IV CONTRAST;  1/12/2024 4:09 am   INDICATION: Signs/Symptoms:fall, buttock pain, L hip pain.   COMPARISON: 11/06/2023   ACCESSION NUMBER(S): [id number]   ORDERING CLINICIAN: AIRAM WEIR   TECHNIQUE: Axial noncontrast CT images of the abdomen and pelvis with coronal and sagittal reconstructed images.   FINDINGS: LOWER CHEST: Stable bibasilar atelectasis or scarring. Partially imaged stent graft in the descending thoracic aorta. Stable cardiomegaly BONES: No acute osseous abnormality. Mild diffuse degenerative disc changes. ABDOMINAL WALL: Diffuse body wall edema. Periumbilical hernia containing the anterior wall of a segment of transverse colon. No inflammatory changes in the hernia sac. Fluid containing  supraumbilical ventral hernias.   ABDOMEN: Lack of intravenous contrast limits evaluation of vessels and solid organs. LIVER: No focal parenchymal abnormality, within limits of noncontrast CT. No perihepatic fluid. BILE DUCTS: No biliary dilatation. GALLBLADDER: Cholecystectomy greater PANCREAS: No peripancreatic inflammatory stranding or duct dilatation. SPLEEN: Within normal limits. ADRENALS: Within normal limits.   KIDNEYS, URETERS, URINARY BLADDER: No hydronephrosis or urinary tract calculus. Bilateral renal atrophy again noted. Stable right renal cysts. The urinary bladder is nondistended.   VESSELS: Postsurgical changes again noted including aortic stent graft with visceral branch bypass grafts. Stable peripherally calcified fluid collection around the superior mesenteric artery graft in the left upper quadrant. Vascular coils noted in the left internal iliac artery. RETROPERITONEUM: No pathologically enlarged lymph nodes.   PELVIS:   REPRODUCTIVE ORGANS: The uterus is not seen and may be atrophic or removed.   BOWEL: The stomach is nondistended. No dilated small bowel. The colon is nondistended appears grossly unremarkable. Mild colonic stool burden.  Normal appendix. PERITONEUM: Peritoneal dialysis catheter noted in the left mid abdomen. Small amount of free peritoneal fluid. No organized fluid collection. No pneumoperitoneum.       No acute abdominal or pelvic process.   Stable chronic findings as above.   MACRO: None   Signed by: Manju Sandoval 1/12/2024 4:25 AM Dictation workstation:   [id number]     Assessment/Plan      63 y.o. female past medical history of ESRD on dialysis, AAA, HFrEF (25% 03/2023) and pancreatitis, PE (03/2023 on warfarin)  presenting with left lower extremity pain as well as left arm pain.  Patient states she has not been feeling well for the last couple weeks, feeling down, lots of recent life stressors.  Due to this patient reports she was not taking any of medications.  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  -Uric acid level wnl     Depression  -Duloxetine 30 mg daily  -Consult psych: Appreciate evaluation and recommendations  -Patient gets upset and stops caring for herself     End-stage renal disease  -Consult nephrology: Appreciate management and recommendation  -Patient on peritoneal dialysis  -Renvela with meals      History of hypertension  -Continue Coreg     HFrEF  -appears euvolemic      DVT prophylaxis-heparin drip bridge to Coumadin      Disposition: Presenting with recurrent DVT, need further management, need bridged to therapeutic INR, discharge pending therapeutic INR           Clark Rader, DO

## 2024-01-16 NOTE — PROCEDURES
Seen on dialysis.  I increased her binder, hemoglobin is noted.  Adding a 4.25% solution as she still has edema.

## 2024-01-16 NOTE — CARE PLAN
Patient admitted for L leg pain, +DVT    Met with patient at bedside, explained role of TCC  Patient aware of dc today/tomororow  Aware of new coumadin medication, aware there will be multiple blood draws/checks to monitor and adjust this medication if needed.  Patient agrees that she would like [facility] as AOC and confirms that pcp is Dr Kerr.     ADOD today/tomorrow  DC: home with [facility]

## 2024-01-17 LAB
ALBUMIN SERPL BCP-MCNC: 2.6 G/DL (ref 3.4–5)
ANION GAP SERPL CALC-SCNC: 20 MMOL/L (ref 10–20)
BUN SERPL-MCNC: 44 MG/DL (ref 6–23)
CALCIUM SERPL-MCNC: 8.4 MG/DL (ref 8.6–10.3)
CHLORIDE SERPL-SCNC: 92 MMOL/L (ref 98–107)
CO2 SERPL-SCNC: 25 MMOL/L (ref 21–32)
CREAT SERPL-MCNC: 7.58 MG/DL (ref 0.5–1.05)
EGFRCR SERPLBLD CKD-EPI 2021: 6 ML/MIN/1.73M*2
ERYTHROCYTE [DISTWIDTH] IN BLOOD BY AUTOMATED COUNT: 17.3 % (ref 11.5–14.5)
GLUCOSE SERPL-MCNC: 102 MG/DL (ref 74–99)
HCT VFR BLD AUTO: 35.4 % (ref 36–46)
HGB BLD-MCNC: 12.2 G/DL (ref 12–16)
INR PPP: 5.3 (ref 0.9–1.1)
MCH RBC QN AUTO: 30.3 PG (ref 26–34)
MCHC RBC AUTO-ENTMCNC: 34.5 G/DL (ref 32–36)
MCV RBC AUTO: 88 FL (ref 80–100)
NRBC BLD-RTO: 0.4 /100 WBCS (ref 0–0)
PHOSPHATE SERPL-MCNC: 6.4 MG/DL (ref 2.5–4.9)
PLATELET # BLD AUTO: 242 X10*3/UL (ref 150–450)
POTASSIUM SERPL-SCNC: 3.6 MMOL/L (ref 3.5–5.3)
PROTHROMBIN TIME: 61.1 SECONDS (ref 9.8–12.8)
RBC # BLD AUTO: 4.02 X10*6/UL (ref 4–5.2)
SODIUM SERPL-SCNC: 133 MMOL/L (ref 136–145)
WBC # BLD AUTO: 5.5 X10*3/UL (ref 4.4–11.3)

## 2024-01-17 PROCEDURE — 85027 COMPLETE CBC AUTOMATED: CPT | Performed by: INTERNAL MEDICINE

## 2024-01-17 PROCEDURE — 2500000001 HC RX 250 WO HCPCS SELF ADMINISTERED DRUGS (ALT 637 FOR MEDICARE OP): Performed by: NURSE PRACTITIONER

## 2024-01-17 PROCEDURE — 2500000004 HC RX 250 GENERAL PHARMACY W/ HCPCS (ALT 636 FOR OP/ED): Performed by: INTERNAL MEDICINE

## 2024-01-17 PROCEDURE — 2500000004 HC RX 250 GENERAL PHARMACY W/ HCPCS (ALT 636 FOR OP/ED): Performed by: NURSE PRACTITIONER

## 2024-01-17 PROCEDURE — 85610 PROTHROMBIN TIME: CPT | Performed by: INTERNAL MEDICINE

## 2024-01-17 PROCEDURE — 99233 SBSQ HOSP IP/OBS HIGH 50: CPT | Performed by: INTERNAL MEDICINE

## 2024-01-17 PROCEDURE — 1100000001 HC PRIVATE ROOM DAILY

## 2024-01-17 PROCEDURE — 80069 RENAL FUNCTION PANEL: CPT | Performed by: INTERNAL MEDICINE

## 2024-01-17 PROCEDURE — 2500000001 HC RX 250 WO HCPCS SELF ADMINISTERED DRUGS (ALT 637 FOR MEDICARE OP): Performed by: INTERNAL MEDICINE

## 2024-01-17 PROCEDURE — 2500000001 HC RX 250 WO HCPCS SELF ADMINISTERED DRUGS (ALT 637 FOR MEDICARE OP): Performed by: PSYCHIATRY & NEUROLOGY

## 2024-01-17 PROCEDURE — 36415 COLL VENOUS BLD VENIPUNCTURE: CPT | Performed by: INTERNAL MEDICINE

## 2024-01-17 RX ORDER — LACTULOSE 10 G/15ML
20 SOLUTION ORAL ONCE
Status: COMPLETED | OUTPATIENT
Start: 2024-01-17 | End: 2024-01-17

## 2024-01-17 RX ADMIN — HYDROMORPHONE HYDROCHLORIDE 0.5 MG: 1 INJECTION, SOLUTION INTRAMUSCULAR; INTRAVENOUS; SUBCUTANEOUS at 09:15

## 2024-01-17 RX ADMIN — SODIUM CHLORIDE, SODIUM LACTATE, CALCIUM CHLORIDE, MAGNESIUM CHLORIDE AND DEXTROSE: 4.25; 538; 448; 18.3; 5.08 INJECTION, SOLUTION INTRAPERITONEAL at 09:15

## 2024-01-17 RX ADMIN — CARVEDILOL 6.25 MG: 6.25 TABLET, FILM COATED ORAL at 09:15

## 2024-01-17 RX ADMIN — HYDROMORPHONE HYDROCHLORIDE 0.5 MG: 1 INJECTION, SOLUTION INTRAMUSCULAR; INTRAVENOUS; SUBCUTANEOUS at 13:43

## 2024-01-17 RX ADMIN — ONDANSETRON 4 MG: 2 INJECTION INTRAMUSCULAR; INTRAVENOUS at 13:42

## 2024-01-17 RX ADMIN — SEVELAMER CARBONATE 1600 MG: 800 TABLET, FILM COATED ORAL at 09:15

## 2024-01-17 RX ADMIN — SODIUM CHLORIDE, SODIUM LACTATE, CALCIUM CHLORIDE, MAGNESIUM CHLORIDE AND DEXTROSE: 2.5; 538; 448; 18.3; 5.08 INJECTION, SOLUTION INTRAPERITONEAL at 21:09

## 2024-01-17 RX ADMIN — MAGNESIUM OXIDE TAB 400 MG (241.3 MG ELEMENTAL MG) 800 MG: 400 (241.3 MG) TAB at 09:15

## 2024-01-17 RX ADMIN — MIRTAZAPINE 15 MG: 15 TABLET, FILM COATED ORAL at 21:18

## 2024-01-17 RX ADMIN — ALLOPURINOL 100 MG: 100 TABLET ORAL at 09:15

## 2024-01-17 RX ADMIN — LACTULOSE 20 G: 20 SOLUTION ORAL at 13:37

## 2024-01-17 RX ADMIN — SODIUM CHLORIDE, SODIUM LACTATE, CALCIUM CHLORIDE, MAGNESIUM CHLORIDE AND DEXTROSE: 2.5; 538; 448; 18.3; 5.08 INJECTION, SOLUTION INTRAPERITONEAL at 13:37

## 2024-01-17 RX ADMIN — CALCITRIOL CAPSULES 0.25 MCG 0.5 MCG: 0.25 CAPSULE ORAL at 09:15

## 2024-01-17 RX ADMIN — ONDANSETRON 4 MG: 2 INJECTION INTRAMUSCULAR; INTRAVENOUS at 05:54

## 2024-01-17 RX ADMIN — LEVOTHYROXINE SODIUM 25 MCG: 25 TABLET ORAL at 05:56

## 2024-01-17 RX ADMIN — DULOXETINE HYDROCHLORIDE 30 MG: 30 CAPSULE, DELAYED RELEASE ORAL at 09:15

## 2024-01-17 RX ADMIN — SODIUM CHLORIDE, SODIUM LACTATE, CALCIUM CHLORIDE, MAGNESIUM CHLORIDE AND DEXTROSE: 2.5; 538; 448; 18.3; 5.08 INJECTION, SOLUTION INTRAPERITONEAL at 17:22

## 2024-01-17 RX ADMIN — HYDROMORPHONE HYDROCHLORIDE 0.5 MG: 1 INJECTION, SOLUTION INTRAMUSCULAR; INTRAVENOUS; SUBCUTANEOUS at 00:09

## 2024-01-17 RX ADMIN — SODIUM CHLORIDE, SODIUM LACTATE, CALCIUM CHLORIDE, MAGNESIUM CHLORIDE AND DEXTROSE: 2.5; 538; 448; 18.3; 5.08 INJECTION, SOLUTION INTRAPERITONEAL at 00:09

## 2024-01-17 RX ADMIN — HYDROMORPHONE HYDROCHLORIDE 0.5 MG: 1 INJECTION, SOLUTION INTRAMUSCULAR; INTRAVENOUS; SUBCUTANEOUS at 21:10

## 2024-01-17 ASSESSMENT — PAIN - FUNCTIONAL ASSESSMENT
PAIN_FUNCTIONAL_ASSESSMENT: 0-10
PAIN_FUNCTIONAL_ASSESSMENT: 0-10

## 2024-01-17 ASSESSMENT — COGNITIVE AND FUNCTIONAL STATUS - GENERAL
MOBILITY SCORE: 20
TOILETING: A LITTLE
DAILY ACTIVITIY SCORE: 19
PERSONAL GROOMING: A LITTLE
STANDING UP FROM CHAIR USING ARMS: A LITTLE
WALKING IN HOSPITAL ROOM: A LITTLE
CLIMB 3 TO 5 STEPS WITH RAILING: A LOT
PERSONAL GROOMING: A LITTLE
TOILETING: A LITTLE
STANDING UP FROM CHAIR USING ARMS: A LITTLE
HELP NEEDED FOR BATHING: A LITTLE
HELP NEEDED FOR BATHING: A LITTLE
MOBILITY SCORE: 20
DRESSING REGULAR UPPER BODY CLOTHING: A LITTLE
DAILY ACTIVITIY SCORE: 19
DRESSING REGULAR LOWER BODY CLOTHING: A LITTLE
WALKING IN HOSPITAL ROOM: A LITTLE
DRESSING REGULAR LOWER BODY CLOTHING: A LITTLE
DRESSING REGULAR UPPER BODY CLOTHING: A LITTLE
CLIMB 3 TO 5 STEPS WITH RAILING: A LOT

## 2024-01-17 ASSESSMENT — PAIN SCALES - GENERAL
PAINLEVEL_OUTOF10: 10 - WORST POSSIBLE PAIN
PAINLEVEL_OUTOF10: 7
PAINLEVEL_OUTOF10: 8

## 2024-01-17 ASSESSMENT — PAIN DESCRIPTION - LOCATION: LOCATION: GENERALIZED

## 2024-01-17 NOTE — PROGRESS NOTES
Yoselyn Hernandez is a 63 y.o. female on day 5 of admission presenting with Left leg pain.      Subjective   Patient was seen and examined at bedside this morning    Objective     Last Recorded Vitals  /86 (BP Location: Right arm, Patient Position: Lying)   Pulse 88   Temp 36.3 °C (97.4 °F) (Temporal)   Resp 18   Wt 62.7 kg (138 lb 3.7 oz)   SpO2 93%   Intake/Output last 3 Shifts:    Intake/Output Summary (Last 24 hours) at 1/17/2024 1158  Last data filed at 1/17/2024 0000  Gross per 24 hour   Intake 4000 ml   Output 5200 ml   Net -1200 ml         Admission Weight  Weight: 54.4 kg (120 lb) (01/12/24 0242)    Daily Weight  01/12/24 : 62.7 kg (138 lb 3.7 oz)    Image Results  Lower extremity venous duplex left  Narrative: Interpreted By:  Manju Sandoval,   STUDY:  [facility] LOWER EXTREMITY VENOUS DUPLEX LEFT;  1/12/2024 6:02 am      INDICATION:  Signs/Symptoms:leg pain, swelling, stopped taking coumadin a few  weeks ago.      COMPARISON:  04/15/2028      ACCESSION NUMBER(S):  [id number]      ORDERING CLINICIAN:  AIRAM WEIR      TECHNIQUE:  Grayscale, color and spectral Doppler sonographic images of the left  lower extremity deep venous system. The right common femoral vein was  imaged for comparison.      FINDINGS:  The common femoral vein and the entire femoral vein are incompletely  compressible, consistent with partially occlusive thrombus. The  popliteal vein is noncompressible, however, color flow is present,  suggesting partially occlusive DVT. Suboptimal visualization of the  calf veins.      The right common femoral vein is patent.      OTHER FINDINGS: 3.7 x 1.2 x 2.8 cm fluid collection in the popliteal  fossa, most consistent with Baker's cyst. Subcutaneous edema  throughout the imaged right lower extremity, most prominent in the  calf.      Impression: Partially occlusive DVT in the left common femoral and entire length  of the femoral vein. Suspected partially occlusive DVT in the  popliteal  vein.      Left lower extremity edema.      Baker's cyst Baker's cyst.              MACRO:  None      Signed by: Manju Sandoval 1/12/2024 6:29 AM  Dictation workstation:   [id number]  XR shoulder left 2+ views, XR forearm left 2 views  Narrative: Interpreted By:  Manju Sandoval,   STUDY:  XR FOREARM LEFT 2 VIEWS; XR SHOULDER LEFT 2+ VIEWS; ;  1/12/2024 4:16  am; 1/12/2024 4:17 am      INDICATION:  Signs/Symptoms:fall, pain.      COMPARISON:  None.      ACCESSION NUMBER(S):  [id number]; [id number]      ORDERING CLINICIAN:  AIRAM WEIR      FINDINGS:  Two views left shoulder and two views left forearm. No acute fracture  or malalignment. Erosive changes at the acromioclavicular joint,  similar to chest radiograph 10/18/2023. No overlying soft tissue  swelling. No elbow joint effusion. Aortic stent graft partially  imaged. Atherosclerotic vascular calcifications noted.      Impression: No acute osseous abnormality.      Erosive changes at the acromioclavicular joint is favored to be  secondary to hyperparathyroidism/chronic renal disease. Infection is  considered less likely, however, please correlate with clinical  history.          MACRO:  None      Signed by: Manju Sandoval 1/12/2024 4:32 AM  Dictation workstation:   [id number]  CT abdomen pelvis wo IV contrast  Narrative: Interpreted By:  Manju Sandoval,   STUDY:  CT ABDOMEN PELVIS WO IV CONTRAST;  1/12/2024 4:09 am      INDICATION:  Signs/Symptoms:fall, buttock pain, L hip pain.      COMPARISON:  11/06/2023      ACCESSION NUMBER(S):  [id number]      ORDERING CLINICIAN:  AIRAM WEIR      TECHNIQUE:  Axial noncontrast CT images of the abdomen and pelvis with coronal  and sagittal reconstructed images.      FINDINGS:  LOWER CHEST: Stable bibasilar atelectasis or scarring. Partially  imaged stent graft in the descending thoracic aorta. Stable  cardiomegaly BONES: No acute osseous abnormality. Mild diffuse  degenerative disc changes. ABDOMINAL WALL:  Diffuse body wall edema.  Periumbilical hernia containing the anterior wall of a segment of  transverse colon. No inflammatory changes in the hernia sac. Fluid  containing supraumbilical ventral hernias.      ABDOMEN:  Lack of intravenous contrast limits evaluation of vessels and solid  organs. LIVER: No focal parenchymal abnormality, within limits of  noncontrast CT. No perihepatic fluid. BILE DUCTS: No biliary  dilatation. GALLBLADDER: Cholecystectomy greater  PANCREAS: No peripancreatic inflammatory stranding or duct dilatation.  SPLEEN: Within normal limits.  ADRENALS: Within normal limits.      KIDNEYS, URETERS, URINARY BLADDER: No hydronephrosis or urinary tract  calculus. Bilateral renal atrophy again noted. Stable right renal  cysts. The urinary bladder is nondistended.      VESSELS: Postsurgical changes again noted including aortic stent  graft with visceral branch bypass grafts. Stable peripherally  calcified fluid collection around the superior mesenteric artery  graft in the left upper quadrant. Vascular coils noted in the left  internal iliac artery. RETROPERITONEUM: No pathologically enlarged  lymph nodes.      PELVIS:      REPRODUCTIVE ORGANS: The uterus is not seen and may be atrophic or  removed.      BOWEL: The stomach is nondistended. No dilated small bowel. The colon  is nondistended appears grossly unremarkable. Mild colonic stool  burden.  Normal appendix. PERITONEUM: Peritoneal dialysis catheter  noted in the left mid abdomen. Small amount of free peritoneal fluid.  No organized fluid collection. No pneumoperitoneum.      Impression: No acute abdominal or pelvic process.      Stable chronic findings as above.      MACRO:  None      Signed by: Manju Sandoval 1/12/2024 4:25 AM  Dictation workstation:   [id number]      Physical Exam  Constitutional: Alert active, cooperative not in acute distress  Eyes: PERRLA, clear sclera  ENMT: Moist mucosal membranes, no exudate  Head / Neck: Atraumatic,  normocephalic, supple neck, JVP not visualized  Lungs: Patent airways, CTABL  Heart: RRR, S1S2, no murmurs appreciated, palpable pulses in all extremities  GI: Soft, NT, ND, bowel sounds present in all quadrants  MSK: Moves all extremities freely, no restriction  of ROM, no joint edema  Extremities: Intact x 4, no peripheral edema  : No Mckenna catheter inserted  Breast: Deferred  Neurological: AAO x 3 to person, place and date, facial muscles symmetrical, sensation intact, strength 4/4, no acute focal neurological deficits appreciated  Psychological: Appropriate mood and behavior    Relevant Results             Scheduled medications  allopurinol, 100 mg, oral, Daily  calcitriol, 0.5 mcg, oral, Daily  carvedilol, 6.25 mg, oral, BID  dextrose 2.5 % - LOW calcium 2.5 mEq/L 2,000 mL peritoneal dialysate, , intraperitoneal, 4x daily  dextrose 4.25 % - LOW calcium 2.5 mEq/L 2,000 mL peritoneal dialysate, , intraperitoneal, q24h  DULoxetine, 30 mg, oral, Daily  epoetin treasure or biosimilar, 10,000 Units, subcutaneous, Weekly  ergocalciferol, 1,250 mcg, oral, Weekly  lactulose, 20 g, oral, Once  levothyroxine, 25 mcg, oral, Daily before breakfast  magnesium oxide, 800 mg, oral, Daily  mirtazapine, 15 mg, oral, Nightly  sevelamer carbonate, 1,600 mg, oral, TID with meals  [Held by provider] warfarin, 5 mg, oral, Daily      Continuous medications       PRN medications  PRN medications: acetaminophen, diphenoxylate-atropine, HYDROmorphone, loperamide, naloxone, ondansetron **OR** ondansetron  Lower extremity venous duplex left    Result Date: 1/12/2024  Interpreted By:  Manju Sandoval, STUDY: [facility] LOWER EXTREMITY VENOUS DUPLEX LEFT;  1/12/2024 6:02 am   INDICATION: Signs/Symptoms:leg pain, swelling, stopped taking coumadin a few weeks ago.   COMPARISON: 04/15/2028   ACCESSION NUMBER(S): [id number]   ORDERING CLINICIAN: AIRAM WEIR   TECHNIQUE: Grayscale, color and spectral Doppler sonographic images of the left lower  extremity deep venous system. The right common femoral vein was imaged for comparison.   FINDINGS: The common femoral vein and the entire femoral vein are incompletely compressible, consistent with partially occlusive thrombus. The popliteal vein is noncompressible, however, color flow is present, suggesting partially occlusive DVT. Suboptimal visualization of the calf veins.   The right common femoral vein is patent.   OTHER FINDINGS: 3.7 x 1.2 x 2.8 cm fluid collection in the popliteal fossa, most consistent with Baker's cyst. Subcutaneous edema throughout the imaged right lower extremity, most prominent in the calf.       Partially occlusive DVT in the left common femoral and entire length of the femoral vein. Suspected partially occlusive DVT in the popliteal vein.   Left lower extremity edema.   Baker's cyst Baker's cyst.       MACRO: None   Signed by: Manju Sandoval 1/12/2024 6:29 AM Dictation workstation:   [id number]    XR shoulder left 2+ views    Result Date: 1/12/2024  Interpreted By:  Manju Sandoval, STUDY: XR FOREARM LEFT 2 VIEWS; XR SHOULDER LEFT 2+ VIEWS; ;  1/12/2024 4:16 am; 1/12/2024 4:17 am   INDICATION: Signs/Symptoms:fall, pain.   COMPARISON: None.   ACCESSION NUMBER(S): [id number]; [id number]   ORDERING CLINICIAN: AIRAM WEIR   FINDINGS: Two views left shoulder and two views left forearm. No acute fracture or malalignment. Erosive changes at the acromioclavicular joint, similar to chest radiograph 10/18/2023. No overlying soft tissue swelling. No elbow joint effusion. Aortic stent graft partially imaged. Atherosclerotic vascular calcifications noted.       No acute osseous abnormality.   Erosive changes at the acromioclavicular joint is favored to be secondary to hyperparathyroidism/chronic renal disease. Infection is considered less likely, however, please correlate with clinical history.     MACRO: None   Signed by: Manju Sandoval 1/12/2024 4:32 AM Dictation workstation:    [id number]    XR forearm left 2 views    Result Date: 1/12/2024  Interpreted By:  Manju Sandoval, STUDY: XR FOREARM LEFT 2 VIEWS; XR SHOULDER LEFT 2+ VIEWS; ;  1/12/2024 4:16 am; 1/12/2024 4:17 am   INDICATION: Signs/Symptoms:fall, pain.   COMPARISON: None.   ACCESSION NUMBER(S): [id number]; [id number]   ORDERING CLINICIAN: AIRAM WEIR   FINDINGS: Two views left shoulder and two views left forearm. No acute fracture or malalignment. Erosive changes at the acromioclavicular joint, similar to chest radiograph 10/18/2023. No overlying soft tissue swelling. No elbow joint effusion. Aortic stent graft partially imaged. Atherosclerotic vascular calcifications noted.       No acute osseous abnormality.   Erosive changes at the acromioclavicular joint is favored to be secondary to hyperparathyroidism/chronic renal disease. Infection is considered less likely, however, please correlate with clinical history.     MACRO: None   Signed by: Manju Sandoval 1/12/2024 4:32 AM Dictation workstation:   [id number]    CT abdomen pelvis wo IV contrast    Result Date: 1/12/2024  Interpreted By:  Manju Sandoval, STUDY: CT ABDOMEN PELVIS WO IV CONTRAST;  1/12/2024 4:09 am   INDICATION: Signs/Symptoms:fall, buttock pain, L hip pain.   COMPARISON: 11/06/2023   ACCESSION NUMBER(S): [id number]   ORDERING CLINICIAN: AIRAM WEIR   TECHNIQUE: Axial noncontrast CT images of the abdomen and pelvis with coronal and sagittal reconstructed images.   FINDINGS: LOWER CHEST: Stable bibasilar atelectasis or scarring. Partially imaged stent graft in the descending thoracic aorta. Stable cardiomegaly BONES: No acute osseous abnormality. Mild diffuse degenerative disc changes. ABDOMINAL WALL: Diffuse body wall edema. Periumbilical hernia containing the anterior wall of a segment of transverse colon. No inflammatory changes in the hernia sac. Fluid containing supraumbilical ventral hernias.   ABDOMEN: Lack of intravenous contrast limits  evaluation of vessels and solid organs. LIVER: No focal parenchymal abnormality, within limits of noncontrast CT. No perihepatic fluid. BILE DUCTS: No biliary dilatation. GALLBLADDER: Cholecystectomy greater PANCREAS: No peripancreatic inflammatory stranding or duct dilatation. SPLEEN: Within normal limits. ADRENALS: Within normal limits.   KIDNEYS, URETERS, URINARY BLADDER: No hydronephrosis or urinary tract calculus. Bilateral renal atrophy again noted. Stable right renal cysts. The urinary bladder is nondistended.   VESSELS: Postsurgical changes again noted including aortic stent graft with visceral branch bypass grafts. Stable peripherally calcified fluid collection around the superior mesenteric artery graft in the left upper quadrant. Vascular coils noted in the left internal iliac artery. RETROPERITONEUM: No pathologically enlarged lymph nodes.   PELVIS:   REPRODUCTIVE ORGANS: The uterus is not seen and may be atrophic or removed.   BOWEL: The stomach is nondistended. No dilated small bowel. The colon is nondistended appears grossly unremarkable. Mild colonic stool burden.  Normal appendix. PERITONEUM: Peritoneal dialysis catheter noted in the left mid abdomen. Small amount of free peritoneal fluid. No organized fluid collection. No pneumoperitoneum.       No acute abdominal or pelvic process.   Stable chronic findings as above.   MACRO: None   Signed by: Manju Sandoval 1/12/2024 4:25 AM Dictation workstation:   [id number]     Assessment/Plan      63 y.o. female past medical history of ESRD on dialysis, AAA, HFrEF (25% 03/2023) and pancreatitis, PE (03/2023 on warfarin)  presenting with left lower extremity pain as well as left arm pain.  Patient states she has not been feeling well for the last couple weeks, feeling down, lots of recent life stressors.  Due to this patient reports she was not taking any of medications.  She also missed a PD session      Principal Problem:    Left leg  pain      PE/DVT  -Coumadin 5 mg daily, subtherapeutic  -INR 3.7>>5.3  -hold coumadin  -dc heparin drip     Gout: Complaining about bilateral wrist pain, tender wrist, but no rubor, calor  -On allopurinol 100 mg daily  -Elevated CRP level, suggestive of inflammatory arthritis of the wrist  -Uric acid level wnl     Depression  -Duloxetine 30 mg daily  -Consult psych: Appreciate evaluation and recommendations  -Patient gets upset and stops caring for herself     End-stage renal disease  -Consult nephrology: Appreciate management and recommendation  -Patient on peritoneal dialysis  -Renvela with meals      History of hypertension  -Continue Coreg     HFrEF  -appears euvolemic      DVT prophylaxis      Disposition: Supratherapeutic INR. Hold coumadin today. Recheck INR in am           Nkechi Antonio MD

## 2024-01-17 NOTE — NURSING NOTE
Patient peritoneal cathter  was able to drain, however after draining completed dialysate did not dwell into Abdomen, a new bag was sent up to restart peritoneal dialysis , patient also tried switching positions with no success. Nephrology was called, answering service picked up.    Awaiting call back, MD on call was also notified.     Will attempt midnight dialysis

## 2024-01-17 NOTE — CARE PLAN
The patient's goals for the shift include      The clinical goals for the shift include pt wll remain safe during shift      Problem: Fall/Injury  Goal: Not fall by end of shift  Outcome: Progressing  Goal: Be free from injury by end of the shift  Outcome: Progressing  Goal: Verbalize understanding of personal risk factors for fall in the hospital  Outcome: Progressing  Goal: Verbalize understanding of risk factor reduction measures to prevent injury from fall in the home  Outcome: Progressing  Goal: Use assistive devices by end of the shift  Outcome: Progressing  Goal: Pace activities to prevent fatigue by end of the shift  Outcome: Progressing

## 2024-01-17 NOTE — PROCEDURES
Seen on peritoneal dialysis.  I gave lactulose last night as she was having difficulty, the fluid infused with more ease.  She will get another dose of lactulose today.  Hemoglobin looks good, monitoring the phosphorus and blood pressure.  I will lower the total volume as she was having discomfort, particularly with the 4.25%.

## 2024-01-17 NOTE — NURSING NOTE
Patient was found vaping in room, vape was confiscated . Patient was educated on hospital policy/rules regarding vaping. Attending on call notified

## 2024-01-18 LAB
ANION GAP SERPL CALC-SCNC: 17 MMOL/L (ref 10–20)
BUN SERPL-MCNC: 38 MG/DL (ref 6–23)
CALCIUM SERPL-MCNC: 8.4 MG/DL (ref 8.6–10.3)
CHLORIDE SERPL-SCNC: 95 MMOL/L (ref 98–107)
CO2 SERPL-SCNC: 23 MMOL/L (ref 21–32)
CREAT SERPL-MCNC: 6.86 MG/DL (ref 0.5–1.05)
EGFRCR SERPLBLD CKD-EPI 2021: 6 ML/MIN/1.73M*2
ERYTHROCYTE [DISTWIDTH] IN BLOOD BY AUTOMATED COUNT: 17.8 % (ref 11.5–14.5)
GLUCOSE SERPL-MCNC: 99 MG/DL (ref 74–99)
HCT VFR BLD AUTO: 34.6 % (ref 36–46)
HGB BLD-MCNC: 11.3 G/DL (ref 12–16)
HOLD SPECIMEN: NORMAL
INR PPP: 5.5 (ref 0.9–1.1)
MCH RBC QN AUTO: 30 PG (ref 26–34)
MCHC RBC AUTO-ENTMCNC: 32.7 G/DL (ref 32–36)
MCV RBC AUTO: 92 FL (ref 80–100)
NRBC BLD-RTO: 0 /100 WBCS (ref 0–0)
PLATELET # BLD AUTO: 230 X10*3/UL (ref 150–450)
POTASSIUM SERPL-SCNC: 3.4 MMOL/L (ref 3.5–5.3)
PROTHROMBIN TIME: 62.7 SECONDS (ref 9.8–12.8)
RBC # BLD AUTO: 3.77 X10*6/UL (ref 4–5.2)
SODIUM SERPL-SCNC: 132 MMOL/L (ref 136–145)
WBC # BLD AUTO: 5 X10*3/UL (ref 4.4–11.3)

## 2024-01-18 PROCEDURE — 2500000001 HC RX 250 WO HCPCS SELF ADMINISTERED DRUGS (ALT 637 FOR MEDICARE OP): Performed by: INTERNAL MEDICINE

## 2024-01-18 PROCEDURE — 36415 COLL VENOUS BLD VENIPUNCTURE: CPT | Performed by: INTERNAL MEDICINE

## 2024-01-18 PROCEDURE — 80048 BASIC METABOLIC PNL TOTAL CA: CPT | Performed by: INTERNAL MEDICINE

## 2024-01-18 PROCEDURE — 99233 SBSQ HOSP IP/OBS HIGH 50: CPT | Performed by: INTERNAL MEDICINE

## 2024-01-18 PROCEDURE — 2500000004 HC RX 250 GENERAL PHARMACY W/ HCPCS (ALT 636 FOR OP/ED): Performed by: INTERNAL MEDICINE

## 2024-01-18 PROCEDURE — 2500000001 HC RX 250 WO HCPCS SELF ADMINISTERED DRUGS (ALT 637 FOR MEDICARE OP): Performed by: NURSE PRACTITIONER

## 2024-01-18 PROCEDURE — 85027 COMPLETE CBC AUTOMATED: CPT | Performed by: INTERNAL MEDICINE

## 2024-01-18 PROCEDURE — 1100000001 HC PRIVATE ROOM DAILY

## 2024-01-18 PROCEDURE — 85610 PROTHROMBIN TIME: CPT | Performed by: INTERNAL MEDICINE

## 2024-01-18 PROCEDURE — 2500000004 HC RX 250 GENERAL PHARMACY W/ HCPCS (ALT 636 FOR OP/ED): Performed by: NURSE PRACTITIONER

## 2024-01-18 PROCEDURE — 2500000001 HC RX 250 WO HCPCS SELF ADMINISTERED DRUGS (ALT 637 FOR MEDICARE OP): Performed by: PSYCHIATRY & NEUROLOGY

## 2024-01-18 RX ORDER — WARFARIN 2.5 MG/1
2.5 TABLET ORAL DAILY
Status: DISCONTINUED | OUTPATIENT
Start: 2024-01-18 | End: 2024-01-24 | Stop reason: HOSPADM

## 2024-01-18 RX ORDER — POTASSIUM CHLORIDE 20 MEQ/1
20 TABLET, EXTENDED RELEASE ORAL ONCE
Status: COMPLETED | OUTPATIENT
Start: 2024-01-18 | End: 2024-01-18

## 2024-01-18 RX ADMIN — HYDROMORPHONE HYDROCHLORIDE 0.5 MG: 1 INJECTION, SOLUTION INTRAMUSCULAR; INTRAVENOUS; SUBCUTANEOUS at 01:28

## 2024-01-18 RX ADMIN — CARVEDILOL 6.25 MG: 6.25 TABLET, FILM COATED ORAL at 09:01

## 2024-01-18 RX ADMIN — MIRTAZAPINE 15 MG: 15 TABLET, FILM COATED ORAL at 22:10

## 2024-01-18 RX ADMIN — ONDANSETRON 4 MG: 2 INJECTION INTRAMUSCULAR; INTRAVENOUS at 00:53

## 2024-01-18 RX ADMIN — SODIUM CHLORIDE, SODIUM LACTATE, CALCIUM CHLORIDE, MAGNESIUM CHLORIDE AND DEXTROSE: 2.5; 538; 448; 18.3; 5.08 INJECTION, SOLUTION INTRAPERITONEAL at 01:29

## 2024-01-18 RX ADMIN — SEVELAMER CARBONATE 1600 MG: 800 TABLET, FILM COATED ORAL at 18:45

## 2024-01-18 RX ADMIN — ONDANSETRON 4 MG: 2 INJECTION INTRAMUSCULAR; INTRAVENOUS at 09:01

## 2024-01-18 RX ADMIN — SODIUM CHLORIDE, SODIUM LACTATE, CALCIUM CHLORIDE, MAGNESIUM CHLORIDE AND DEXTROSE: 2.5; 538; 448; 18.3; 5.08 INJECTION, SOLUTION INTRAPERITONEAL at 14:25

## 2024-01-18 RX ADMIN — DULOXETINE HYDROCHLORIDE 30 MG: 30 CAPSULE, DELAYED RELEASE ORAL at 09:02

## 2024-01-18 RX ADMIN — SODIUM CHLORIDE, SODIUM LACTATE, CALCIUM CHLORIDE, MAGNESIUM CHLORIDE AND DEXTROSE: 2.5; 538; 448; 18.3; 5.08 INJECTION, SOLUTION INTRAPERITONEAL at 18:46

## 2024-01-18 RX ADMIN — ERGOCALCIFEROL 1250 MCG: 1.25 CAPSULE ORAL at 09:01

## 2024-01-18 RX ADMIN — SODIUM CHLORIDE, SODIUM LACTATE, CALCIUM CHLORIDE, MAGNESIUM CHLORIDE AND DEXTROSE: 2.5; 538; 448; 18.3; 5.08 INJECTION, SOLUTION INTRAPERITONEAL at 23:28

## 2024-01-18 RX ADMIN — ALLOPURINOL 100 MG: 100 TABLET ORAL at 09:02

## 2024-01-18 RX ADMIN — POTASSIUM CHLORIDE 20 MEQ: 1500 TABLET, EXTENDED RELEASE ORAL at 14:44

## 2024-01-18 RX ADMIN — CALCITRIOL CAPSULES 0.25 MCG 0.5 MCG: 0.25 CAPSULE ORAL at 09:01

## 2024-01-18 RX ADMIN — HYDROMORPHONE HYDROCHLORIDE 0.5 MG: 1 INJECTION, SOLUTION INTRAMUSCULAR; INTRAVENOUS; SUBCUTANEOUS at 09:02

## 2024-01-18 RX ADMIN — MAGNESIUM OXIDE TAB 400 MG (241.3 MG ELEMENTAL MG) 800 MG: 400 (241.3 MG) TAB at 09:01

## 2024-01-18 RX ADMIN — LEVOTHYROXINE SODIUM 25 MCG: 25 TABLET ORAL at 06:44

## 2024-01-18 RX ADMIN — SEVELAMER CARBONATE 1600 MG: 800 TABLET, FILM COATED ORAL at 09:03

## 2024-01-18 RX ADMIN — SODIUM CHLORIDE, SODIUM LACTATE, CALCIUM CHLORIDE, MAGNESIUM CHLORIDE AND DEXTROSE: 4.25; 538; 448; 18.3; 5.08 INJECTION, SOLUTION INTRAPERITONEAL at 08:38

## 2024-01-18 RX ADMIN — HYDROMORPHONE HYDROCHLORIDE 0.5 MG: 1 INJECTION, SOLUTION INTRAMUSCULAR; INTRAVENOUS; SUBCUTANEOUS at 14:44

## 2024-01-18 ASSESSMENT — COGNITIVE AND FUNCTIONAL STATUS - GENERAL
DRESSING REGULAR UPPER BODY CLOTHING: A LITTLE
HELP NEEDED FOR BATHING: A LITTLE
TOILETING: A LITTLE
STANDING UP FROM CHAIR USING ARMS: A LITTLE
CLIMB 3 TO 5 STEPS WITH RAILING: A LOT
WALKING IN HOSPITAL ROOM: A LITTLE
DAILY ACTIVITIY SCORE: 19
DRESSING REGULAR UPPER BODY CLOTHING: A LITTLE
MOBILITY SCORE: 20
DAILY ACTIVITIY SCORE: 19
WALKING IN HOSPITAL ROOM: A LITTLE
HELP NEEDED FOR BATHING: A LITTLE
DRESSING REGULAR LOWER BODY CLOTHING: A LITTLE
STANDING UP FROM CHAIR USING ARMS: A LITTLE
TOILETING: A LITTLE
PERSONAL GROOMING: A LITTLE
CLIMB 3 TO 5 STEPS WITH RAILING: A LOT
DRESSING REGULAR LOWER BODY CLOTHING: A LITTLE
PERSONAL GROOMING: A LITTLE
MOBILITY SCORE: 20

## 2024-01-18 ASSESSMENT — PAIN DESCRIPTION - LOCATION: LOCATION: ABDOMEN

## 2024-01-18 ASSESSMENT — PAIN SCALES - GENERAL
PAINLEVEL_OUTOF10: 5 - MODERATE PAIN
PAINLEVEL_OUTOF10: 10 - WORST POSSIBLE PAIN
PAINLEVEL_OUTOF10: 0 - NO PAIN

## 2024-01-18 ASSESSMENT — PAIN SCALES - WONG BAKER: WONGBAKER_NUMERICALRESPONSE: HURTS WORST

## 2024-01-18 ASSESSMENT — PAIN - FUNCTIONAL ASSESSMENT: PAIN_FUNCTIONAL_ASSESSMENT: 0-10

## 2024-01-18 NOTE — CARE PLAN
The patient's goals for the shift include      The clinical goals for the shift include pt will remain hemodynamically stable    Over the shift, the patient did make progress toward the following goals.

## 2024-01-18 NOTE — PROCEDURES
Seen on peritoneal dialysis.  I sat with her as she was weeping, she is getting tired.  I asked her whether she wanted to continue this, that I would follow her lead if she chose to hold dialysis, as long as she was in the right frame of mind to make that decision.  She voices understanding.  She will get a small dose of potassium chloride, I will lower her fills to 1500 mL.

## 2024-01-18 NOTE — PROGRESS NOTES
Yoselyn Hernandez is a 63 y.o. female on day 6 of admission presenting with Left leg pain.      Subjective   Patient was seen and examined at bedside this morning    Objective     Last Recorded Vitals  /86 (BP Location: Left arm, Patient Position: Lying)   Pulse 90   Temp 36.3 °C (97.3 °F) (Temporal)   Resp 18   Wt 63.9 kg (140 lb 14.4 oz)   SpO2 96%   Intake/Output last 3 Shifts:    Intake/Output Summary (Last 24 hours) at 1/18/2024 1151  Last data filed at 1/18/2024 0630  Gross per 24 hour   Intake 5520 ml   Output 6200 ml   Net -680 ml         Admission Weight  Weight: 54.4 kg (120 lb) (01/12/24 0242)    Daily Weight  01/18/24 : 63.9 kg (140 lb 14.4 oz)    Image Results  Lower extremity venous duplex left  Narrative: Interpreted By:  Manju Sandoval,   STUDY:  [facility] US LOWER EXTREMITY VENOUS DUPLEX LEFT;  1/12/2024 6:02 am      INDICATION:  Signs/Symptoms:leg pain, swelling, stopped taking coumadin a few  weeks ago.      COMPARISON:  04/15/2028      ACCESSION NUMBER(S):  [id number]      ORDERING CLINICIAN:  AIRAM WEIR      TECHNIQUE:  Grayscale, color and spectral Doppler sonographic images of the left  lower extremity deep venous system. The right common femoral vein was  imaged for comparison.      FINDINGS:  The common femoral vein and the entire femoral vein are incompletely  compressible, consistent with partially occlusive thrombus. The  popliteal vein is noncompressible, however, color flow is present,  suggesting partially occlusive DVT. Suboptimal visualization of the  calf veins.      The right common femoral vein is patent.      OTHER FINDINGS: 3.7 x 1.2 x 2.8 cm fluid collection in the popliteal  fossa, most consistent with Baker's cyst. Subcutaneous edema  throughout the imaged right lower extremity, most prominent in the  calf.      Impression: Partially occlusive DVT in the left common femoral and entire length  of the femoral vein. Suspected partially occlusive DVT in the  popliteal  vein.      Left lower extremity edema.      Baker's cyst Baker's cyst.              MACRO:  None      Signed by: Manju Sandoval 1/12/2024 6:29 AM  Dictation workstation:   [id number]  XR shoulder left 2+ views, XR forearm left 2 views  Narrative: Interpreted By:  Manju Sandoval,   STUDY:  XR FOREARM LEFT 2 VIEWS; XR SHOULDER LEFT 2+ VIEWS; ;  1/12/2024 4:16  am; 1/12/2024 4:17 am      INDICATION:  Signs/Symptoms:fall, pain.      COMPARISON:  None.      ACCESSION NUMBER(S):  [id number]; [id number]      ORDERING CLINICIAN:  AIRAM WEIR      FINDINGS:  Two views left shoulder and two views left forearm. No acute fracture  or malalignment. Erosive changes at the acromioclavicular joint,  similar to chest radiograph 10/18/2023. No overlying soft tissue  swelling. No elbow joint effusion. Aortic stent graft partially  imaged. Atherosclerotic vascular calcifications noted.      Impression: No acute osseous abnormality.      Erosive changes at the acromioclavicular joint is favored to be  secondary to hyperparathyroidism/chronic renal disease. Infection is  considered less likely, however, please correlate with clinical  history.          MACRO:  None      Signed by: Manju Sandoval 1/12/2024 4:32 AM  Dictation workstation:   [id number]  CT abdomen pelvis wo IV contrast  Narrative: Interpreted By:  Manju Sandoval,   STUDY:  CT ABDOMEN PELVIS WO IV CONTRAST;  1/12/2024 4:09 am      INDICATION:  Signs/Symptoms:fall, buttock pain, L hip pain.      COMPARISON:  11/06/2023      ACCESSION NUMBER(S):  [id number]      ORDERING CLINICIAN:  AIRAM WEIR      TECHNIQUE:  Axial noncontrast CT images of the abdomen and pelvis with coronal  and sagittal reconstructed images.      FINDINGS:  LOWER CHEST: Stable bibasilar atelectasis or scarring. Partially  imaged stent graft in the descending thoracic aorta. Stable  cardiomegaly BONES: No acute osseous abnormality. Mild diffuse  degenerative disc changes. ABDOMINAL WALL:  Diffuse body wall edema.  Periumbilical hernia containing the anterior wall of a segment of  transverse colon. No inflammatory changes in the hernia sac. Fluid  containing supraumbilical ventral hernias.      ABDOMEN:  Lack of intravenous contrast limits evaluation of vessels and solid  organs. LIVER: No focal parenchymal abnormality, within limits of  noncontrast CT. No perihepatic fluid. BILE DUCTS: No biliary  dilatation. GALLBLADDER: Cholecystectomy greater  PANCREAS: No peripancreatic inflammatory stranding or duct dilatation.  SPLEEN: Within normal limits.  ADRENALS: Within normal limits.      KIDNEYS, URETERS, URINARY BLADDER: No hydronephrosis or urinary tract  calculus. Bilateral renal atrophy again noted. Stable right renal  cysts. The urinary bladder is nondistended.      VESSELS: Postsurgical changes again noted including aortic stent  graft with visceral branch bypass grafts. Stable peripherally  calcified fluid collection around the superior mesenteric artery  graft in the left upper quadrant. Vascular coils noted in the left  internal iliac artery. RETROPERITONEUM: No pathologically enlarged  lymph nodes.      PELVIS:      REPRODUCTIVE ORGANS: The uterus is not seen and may be atrophic or  removed.      BOWEL: The stomach is nondistended. No dilated small bowel. The colon  is nondistended appears grossly unremarkable. Mild colonic stool  burden.  Normal appendix. PERITONEUM: Peritoneal dialysis catheter  noted in the left mid abdomen. Small amount of free peritoneal fluid.  No organized fluid collection. No pneumoperitoneum.      Impression: No acute abdominal or pelvic process.      Stable chronic findings as above.      MACRO:  None      Signed by: Manju Sandoval 1/12/2024 4:25 AM  Dictation workstation:   [id number]      Physical Exam  Constitutional: Alert active, cooperative not in acute distress  Eyes: PERRLA, clear sclera  ENMT: Moist mucosal membranes, no exudate  Head / Neck: Atraumatic,  normocephalic, supple neck, JVP not visualized  Lungs: Patent airways, CTABL  Heart: RRR, S1S2, no murmurs appreciated, palpable pulses in all extremities  GI: Soft, NT, ND, bowel sounds present in all quadrants  MSK: Moves all extremities freely, no restriction  of ROM, no joint edema  Extremities: Intact x 4, no peripheral edema  : No Mckenna catheter inserted  Breast: Deferred  Neurological: AAO x 3 to person, place and date, facial muscles symmetrical, sensation intact, strength 4/4, no acute focal neurological deficits appreciated  Psychological: Appropriate mood and behavior    Relevant Results             Scheduled medications  allopurinol, 100 mg, oral, Daily  calcitriol, 0.5 mcg, oral, Daily  carvedilol, 6.25 mg, oral, BID  dextrose 2.5 % - LOW calcium 2.5 mEq/L 2,000 mL peritoneal dialysate, , intraperitoneal, 4x daily  dextrose 4.25 % - LOW calcium 2.5 mEq/L 2,000 mL peritoneal dialysate, , intraperitoneal, q24h  DULoxetine, 30 mg, oral, Daily  epoetin treasure or biosimilar, 10,000 Units, subcutaneous, Weekly  ergocalciferol, 1,250 mcg, oral, Weekly  levothyroxine, 25 mcg, oral, Daily before breakfast  magnesium oxide, 800 mg, oral, Daily  mirtazapine, 15 mg, oral, Nightly  sevelamer carbonate, 1,600 mg, oral, TID with meals  [Held by provider] warfarin, 2.5 mg, oral, Daily      Continuous medications       PRN medications  PRN medications: acetaminophen, diphenoxylate-atropine, HYDROmorphone, loperamide, naloxone, ondansetron **OR** ondansetron  Lower extremity venous duplex left    Result Date: 1/12/2024  Interpreted By:  Manju Sandoval, STUDY: [facility] LOWER EXTREMITY VENOUS DUPLEX LEFT;  1/12/2024 6:02 am   INDICATION: Signs/Symptoms:leg pain, swelling, stopped taking coumadin a few weeks ago.   COMPARISON: 04/15/2028   ACCESSION NUMBER(S): [id number]   ORDERING CLINICIAN: AIRAM WEIR   TECHNIQUE: Grayscale, color and spectral Doppler sonographic images of the left lower extremity deep venous system.  The right common femoral vein was imaged for comparison.   FINDINGS: The common femoral vein and the entire femoral vein are incompletely compressible, consistent with partially occlusive thrombus. The popliteal vein is noncompressible, however, color flow is present, suggesting partially occlusive DVT. Suboptimal visualization of the calf veins.   The right common femoral vein is patent.   OTHER FINDINGS: 3.7 x 1.2 x 2.8 cm fluid collection in the popliteal fossa, most consistent with Baker's cyst. Subcutaneous edema throughout the imaged right lower extremity, most prominent in the calf.       Partially occlusive DVT in the left common femoral and entire length of the femoral vein. Suspected partially occlusive DVT in the popliteal vein.   Left lower extremity edema.   Baker's cyst Baker's cyst.       MACRO: None   Signed by: Manju Sandoval 1/12/2024 6:29 AM Dictation workstation:   [id number]    XR shoulder left 2+ views    Result Date: 1/12/2024  Interpreted By:  Manju Sandoval, STUDY: XR FOREARM LEFT 2 VIEWS; XR SHOULDER LEFT 2+ VIEWS; ;  1/12/2024 4:16 am; 1/12/2024 4:17 am   INDICATION: Signs/Symptoms:fall, pain.   COMPARISON: None.   ACCESSION NUMBER(S): [id number]; [id number]   ORDERING CLINICIAN: AIRAM WEIR   FINDINGS: Two views left shoulder and two views left forearm. No acute fracture or malalignment. Erosive changes at the acromioclavicular joint, similar to chest radiograph 10/18/2023. No overlying soft tissue swelling. No elbow joint effusion. Aortic stent graft partially imaged. Atherosclerotic vascular calcifications noted.       No acute osseous abnormality.   Erosive changes at the acromioclavicular joint is favored to be secondary to hyperparathyroidism/chronic renal disease. Infection is considered less likely, however, please correlate with clinical history.     MACRO: None   Signed by: Manju Sandoval 1/12/2024 4:32 AM Dictation workstation:   [id number]    XR forearm left 2  views    Result Date: 1/12/2024  Interpreted By:  Manju Sandoval, STUDY: XR FOREARM LEFT 2 VIEWS; XR SHOULDER LEFT 2+ VIEWS; ;  1/12/2024 4:16 am; 1/12/2024 4:17 am   INDICATION: Signs/Symptoms:fall, pain.   COMPARISON: None.   ACCESSION NUMBER(S): [id number]; [id number]   ORDERING CLINICIAN: AIRAM WEIR   FINDINGS: Two views left shoulder and two views left forearm. No acute fracture or malalignment. Erosive changes at the acromioclavicular joint, similar to chest radiograph 10/18/2023. No overlying soft tissue swelling. No elbow joint effusion. Aortic stent graft partially imaged. Atherosclerotic vascular calcifications noted.       No acute osseous abnormality.   Erosive changes at the acromioclavicular joint is favored to be secondary to hyperparathyroidism/chronic renal disease. Infection is considered less likely, however, please correlate with clinical history.     MACRO: None   Signed by: Manju Sandoval 1/12/2024 4:32 AM Dictation workstation:   [id number]    CT abdomen pelvis wo IV contrast    Result Date: 1/12/2024  Interpreted By:  Manju Sandoval, STUDY: CT ABDOMEN PELVIS WO IV CONTRAST;  1/12/2024 4:09 am   INDICATION: Signs/Symptoms:fall, buttock pain, L hip pain.   COMPARISON: 11/06/2023   ACCESSION NUMBER(S): [id number]   ORDERING CLINICIAN: AIRAM WEIR   TECHNIQUE: Axial noncontrast CT images of the abdomen and pelvis with coronal and sagittal reconstructed images.   FINDINGS: LOWER CHEST: Stable bibasilar atelectasis or scarring. Partially imaged stent graft in the descending thoracic aorta. Stable cardiomegaly BONES: No acute osseous abnormality. Mild diffuse degenerative disc changes. ABDOMINAL WALL: Diffuse body wall edema. Periumbilical hernia containing the anterior wall of a segment of transverse colon. No inflammatory changes in the hernia sac. Fluid containing supraumbilical ventral hernias.   ABDOMEN: Lack of intravenous contrast limits evaluation of vessels and solid organs.  LIVER: No focal parenchymal abnormality, within limits of noncontrast CT. No perihepatic fluid. BILE DUCTS: No biliary dilatation. GALLBLADDER: Cholecystectomy greater PANCREAS: No peripancreatic inflammatory stranding or duct dilatation. SPLEEN: Within normal limits. ADRENALS: Within normal limits.   KIDNEYS, URETERS, URINARY BLADDER: No hydronephrosis or urinary tract calculus. Bilateral renal atrophy again noted. Stable right renal cysts. The urinary bladder is nondistended.   VESSELS: Postsurgical changes again noted including aortic stent graft with visceral branch bypass grafts. Stable peripherally calcified fluid collection around the superior mesenteric artery graft in the left upper quadrant. Vascular coils noted in the left internal iliac artery. RETROPERITONEUM: No pathologically enlarged lymph nodes.   PELVIS:   REPRODUCTIVE ORGANS: The uterus is not seen and may be atrophic or removed.   BOWEL: The stomach is nondistended. No dilated small bowel. The colon is nondistended appears grossly unremarkable. Mild colonic stool burden.  Normal appendix. PERITONEUM: Peritoneal dialysis catheter noted in the left mid abdomen. Small amount of free peritoneal fluid. No organized fluid collection. No pneumoperitoneum.       No acute abdominal or pelvic process.   Stable chronic findings as above.   MACRO: None   Signed by: Manju Sandoval 1/12/2024 4:25 AM Dictation workstation:   [id number]     Assessment/Plan      63 y.o. female past medical history of ESRD on dialysis, AAA, HFrEF (25% 03/2023) and pancreatitis, PE (03/2023 on warfarin)  presenting with left lower extremity pain as well as left arm pain.  Patient states she has not been feeling well for the last couple weeks, feeling down, lots of recent life stressors.  Due to this patient reports she was not taking any of medications.  She also missed a PD session      Principal Problem:    Left leg pain      PE/DVT  -Coumadin 5 mg daily, subtherapeutic  -INR  3.7>>5.3>>5.5  -hold coumadin  -dc heparin drip     Gout: Complaining about bilateral wrist pain, tender wrist, but no rubor, calor  -On allopurinol 100 mg daily  -Elevated CRP level, suggestive of inflammatory arthritis of the wrist  -Uric acid level wnl     Depression  -Duloxetine 30 mg daily  -Consult psych: Appreciate evaluation and recommendations  -Patient gets upset and stops caring for herself     End-stage renal disease  -Consult nephrology: Appreciate management and recommendation  -Patient on peritoneal dialysis  -Renvela with meals      History of hypertension  -Continue Coreg     HFrEF  -appears euvolemic      DVT prophylaxis      Disposition: Supratherapeutic INR. Hold coumadin today. Recheck INR in am           Nkechi Antonio MD

## 2024-01-18 NOTE — CARE PLAN
Met with patient during triad rounds with Dr Antonio    Patient confirms dc home with HC at [place]. Dr Kerr pcp. Patient aware INR level too high for dc today.

## 2024-01-19 LAB
ANION GAP SERPL CALC-SCNC: 19 MMOL/L (ref 10–20)
BUN SERPL-MCNC: 38 MG/DL (ref 6–23)
CALCIUM SERPL-MCNC: 8.2 MG/DL (ref 8.6–10.3)
CHLORIDE SERPL-SCNC: 94 MMOL/L (ref 98–107)
CO2 SERPL-SCNC: 24 MMOL/L (ref 21–32)
CREAT SERPL-MCNC: 6.39 MG/DL (ref 0.5–1.05)
EGFRCR SERPLBLD CKD-EPI 2021: 7 ML/MIN/1.73M*2
ERYTHROCYTE [DISTWIDTH] IN BLOOD BY AUTOMATED COUNT: 18 % (ref 11.5–14.5)
GLUCOSE SERPL-MCNC: 101 MG/DL (ref 74–99)
HCT VFR BLD AUTO: 36 % (ref 36–46)
HGB BLD-MCNC: 11.4 G/DL (ref 12–16)
INR PPP: 4.5 (ref 0.9–1.1)
MCH RBC QN AUTO: 29.5 PG (ref 26–34)
MCHC RBC AUTO-ENTMCNC: 31.7 G/DL (ref 32–36)
MCV RBC AUTO: 93 FL (ref 80–100)
NRBC BLD-RTO: 0.4 /100 WBCS (ref 0–0)
PLATELET # BLD AUTO: 239 X10*3/UL (ref 150–450)
POTASSIUM SERPL-SCNC: 3.6 MMOL/L (ref 3.5–5.3)
PROTHROMBIN TIME: 51.6 SECONDS (ref 9.8–12.8)
RBC # BLD AUTO: 3.87 X10*6/UL (ref 4–5.2)
SODIUM SERPL-SCNC: 133 MMOL/L (ref 136–145)
WBC # BLD AUTO: 5.4 X10*3/UL (ref 4.4–11.3)

## 2024-01-19 PROCEDURE — 2500000001 HC RX 250 WO HCPCS SELF ADMINISTERED DRUGS (ALT 637 FOR MEDICARE OP): Performed by: NURSE PRACTITIONER

## 2024-01-19 PROCEDURE — 1100000001 HC PRIVATE ROOM DAILY

## 2024-01-19 PROCEDURE — 80048 BASIC METABOLIC PNL TOTAL CA: CPT | Performed by: INTERNAL MEDICINE

## 2024-01-19 PROCEDURE — 99232 SBSQ HOSP IP/OBS MODERATE 35: CPT | Performed by: INTERNAL MEDICINE

## 2024-01-19 PROCEDURE — 2500000001 HC RX 250 WO HCPCS SELF ADMINISTERED DRUGS (ALT 637 FOR MEDICARE OP): Performed by: PSYCHIATRY & NEUROLOGY

## 2024-01-19 PROCEDURE — 2500000004 HC RX 250 GENERAL PHARMACY W/ HCPCS (ALT 636 FOR OP/ED): Performed by: INTERNAL MEDICINE

## 2024-01-19 PROCEDURE — 2500000004 HC RX 250 GENERAL PHARMACY W/ HCPCS (ALT 636 FOR OP/ED): Performed by: NURSE PRACTITIONER

## 2024-01-19 PROCEDURE — 36415 COLL VENOUS BLD VENIPUNCTURE: CPT | Performed by: INTERNAL MEDICINE

## 2024-01-19 PROCEDURE — 85027 COMPLETE CBC AUTOMATED: CPT | Performed by: INTERNAL MEDICINE

## 2024-01-19 PROCEDURE — 85610 PROTHROMBIN TIME: CPT | Performed by: INTERNAL MEDICINE

## 2024-01-19 RX ORDER — POTASSIUM CHLORIDE 20 MEQ/1
20 TABLET, EXTENDED RELEASE ORAL ONCE
Status: COMPLETED | OUTPATIENT
Start: 2024-01-19 | End: 2024-01-19

## 2024-01-19 RX ADMIN — HYDROMORPHONE HYDROCHLORIDE 0.5 MG: 1 INJECTION, SOLUTION INTRAMUSCULAR; INTRAVENOUS; SUBCUTANEOUS at 18:55

## 2024-01-19 RX ADMIN — SODIUM CHLORIDE, SODIUM LACTATE, CALCIUM CHLORIDE, MAGNESIUM CHLORIDE AND DEXTROSE: 2.5; 538; 448; 18.3; 5.08 INJECTION, SOLUTION INTRAPERITONEAL at 12:58

## 2024-01-19 RX ADMIN — MAGNESIUM OXIDE TAB 400 MG (241.3 MG ELEMENTAL MG) 800 MG: 400 (241.3 MG) TAB at 08:20

## 2024-01-19 RX ADMIN — HYDROMORPHONE HYDROCHLORIDE 0.5 MG: 1 INJECTION, SOLUTION INTRAMUSCULAR; INTRAVENOUS; SUBCUTANEOUS at 10:38

## 2024-01-19 RX ADMIN — ALLOPURINOL 100 MG: 100 TABLET ORAL at 08:20

## 2024-01-19 RX ADMIN — SEVELAMER CARBONATE 1600 MG: 800 TABLET, FILM COATED ORAL at 08:20

## 2024-01-19 RX ADMIN — POTASSIUM CHLORIDE 20 MEQ: 1500 TABLET, EXTENDED RELEASE ORAL at 12:58

## 2024-01-19 RX ADMIN — SEVELAMER CARBONATE 1600 MG: 800 TABLET, FILM COATED ORAL at 12:58

## 2024-01-19 RX ADMIN — MIRTAZAPINE 15 MG: 15 TABLET, FILM COATED ORAL at 21:57

## 2024-01-19 RX ADMIN — HYDROMORPHONE HYDROCHLORIDE 0.5 MG: 1 INJECTION, SOLUTION INTRAMUSCULAR; INTRAVENOUS; SUBCUTANEOUS at 23:26

## 2024-01-19 RX ADMIN — SODIUM CHLORIDE, SODIUM LACTATE, CALCIUM CHLORIDE, MAGNESIUM CHLORIDE AND DEXTROSE: 4.25; 538; 448; 18.3; 5.08 INJECTION, SOLUTION INTRAPERITONEAL at 08:54

## 2024-01-19 RX ADMIN — SODIUM CHLORIDE, SODIUM LACTATE, CALCIUM CHLORIDE, MAGNESIUM CHLORIDE AND DEXTROSE: 2.5; 538; 448; 18.3; 5.08 INJECTION, SOLUTION INTRAPERITONEAL at 23:27

## 2024-01-19 RX ADMIN — CALCITRIOL CAPSULES 0.25 MCG 0.5 MCG: 0.25 CAPSULE ORAL at 08:20

## 2024-01-19 RX ADMIN — SODIUM CHLORIDE, SODIUM LACTATE, CALCIUM CHLORIDE, MAGNESIUM CHLORIDE AND DEXTROSE: 2.5; 538; 448; 18.3; 5.08 INJECTION, SOLUTION INTRAPERITONEAL at 18:00

## 2024-01-19 RX ADMIN — HYDROMORPHONE HYDROCHLORIDE 0.5 MG: 1 INJECTION, SOLUTION INTRAMUSCULAR; INTRAVENOUS; SUBCUTANEOUS at 02:43

## 2024-01-19 RX ADMIN — DULOXETINE HYDROCHLORIDE 30 MG: 30 CAPSULE, DELAYED RELEASE ORAL at 08:20

## 2024-01-19 RX ADMIN — LEVOTHYROXINE SODIUM 25 MCG: 25 TABLET ORAL at 08:20

## 2024-01-19 ASSESSMENT — COGNITIVE AND FUNCTIONAL STATUS - GENERAL
MOBILITY SCORE: 20
PERSONAL GROOMING: A LITTLE
PERSONAL GROOMING: A LITTLE
WALKING IN HOSPITAL ROOM: A LITTLE
CLIMB 3 TO 5 STEPS WITH RAILING: A LOT
TOILETING: A LITTLE
WALKING IN HOSPITAL ROOM: A LITTLE
MOBILITY SCORE: 20
DRESSING REGULAR UPPER BODY CLOTHING: A LITTLE
DRESSING REGULAR UPPER BODY CLOTHING: A LITTLE
DRESSING REGULAR LOWER BODY CLOTHING: A LITTLE
DAILY ACTIVITIY SCORE: 19
HELP NEEDED FOR BATHING: A LITTLE
STANDING UP FROM CHAIR USING ARMS: A LITTLE
TOILETING: A LITTLE
DRESSING REGULAR LOWER BODY CLOTHING: A LITTLE
HELP NEEDED FOR BATHING: A LITTLE
DAILY ACTIVITIY SCORE: 19
CLIMB 3 TO 5 STEPS WITH RAILING: A LOT
STANDING UP FROM CHAIR USING ARMS: A LITTLE

## 2024-01-19 ASSESSMENT — PAIN SCALES - GENERAL
PAINLEVEL_OUTOF10: 8
PAINLEVEL_OUTOF10: 5 - MODERATE PAIN
PAINLEVEL_OUTOF10: 8
PAINLEVEL_OUTOF10: 4
PAINLEVEL_OUTOF10: 10 - WORST POSSIBLE PAIN
PAINLEVEL_OUTOF10: 10 - WORST POSSIBLE PAIN
PAINLEVEL_OUTOF10: 8

## 2024-01-19 ASSESSMENT — PAIN - FUNCTIONAL ASSESSMENT: PAIN_FUNCTIONAL_ASSESSMENT: 0-10

## 2024-01-19 NOTE — CARE PLAN
The patient's goals for the shift include      The clinical goals for the shift include patient will remain free from injuries throughout shift    Over the shift, the patient did not make progress toward the following goals. Barriers to progression include . Recommendations to address these barriers include .

## 2024-01-19 NOTE — PROGRESS NOTES
Yoselyn Hernandez is a 63 y.o. female on day 7 of admission presenting with Left leg pain.      Subjective   Patient was seen and examined at bedside this morning    Objective     Last Recorded Vitals  /80 (BP Location: Left arm, Patient Position: Sitting)   Pulse 91   Temp 36.6 °C (97.9 °F) (Oral)   Resp 16   Wt 63.9 kg (140 lb 14.4 oz)   SpO2 99%   Intake/Output last 3 Shifts:    Intake/Output Summary (Last 24 hours) at 1/19/2024 1146  Last data filed at 1/18/2024 2335  Gross per 24 hour   Intake 5300 ml   Output 5900 ml   Net -600 ml         Admission Weight  Weight: 54.4 kg (120 lb) (01/12/24 0242)    Daily Weight  01/18/24 : 63.9 kg (140 lb 14.4 oz)    Image Results  Lower extremity venous duplex left  Narrative: Interpreted By:  Manju Sandoval,   STUDY:  [facility] US LOWER EXTREMITY VENOUS DUPLEX LEFT;  1/12/2024 6:02 am      INDICATION:  Signs/Symptoms:leg pain, swelling, stopped taking coumadin a few  weeks ago.      COMPARISON:  04/15/2028      ACCESSION NUMBER(S):  [id number]      ORDERING CLINICIAN:  AIRAM WEIR      TECHNIQUE:  Grayscale, color and spectral Doppler sonographic images of the left  lower extremity deep venous system. The right common femoral vein was  imaged for comparison.      FINDINGS:  The common femoral vein and the entire femoral vein are incompletely  compressible, consistent with partially occlusive thrombus. The  popliteal vein is noncompressible, however, color flow is present,  suggesting partially occlusive DVT. Suboptimal visualization of the  calf veins.      The right common femoral vein is patent.      OTHER FINDINGS: 3.7 x 1.2 x 2.8 cm fluid collection in the popliteal  fossa, most consistent with Baker's cyst. Subcutaneous edema  throughout the imaged right lower extremity, most prominent in the  calf.      Impression: Partially occlusive DVT in the left common femoral and entire length  of the femoral vein. Suspected partially occlusive DVT in the  popliteal  vein.      Left lower extremity edema.      Baker's cyst Baker's cyst.              MACRO:  None      Signed by: Manju Sandoval 1/12/2024 6:29 AM  Dictation workstation:   [id number]  XR shoulder left 2+ views, XR forearm left 2 views  Narrative: Interpreted By:  Manju Sandoval,   STUDY:  XR FOREARM LEFT 2 VIEWS; XR SHOULDER LEFT 2+ VIEWS; ;  1/12/2024 4:16  am; 1/12/2024 4:17 am      INDICATION:  Signs/Symptoms:fall, pain.      COMPARISON:  None.      ACCESSION NUMBER(S):  [id number]; [id number]      ORDERING CLINICIAN:  AIRAM WEIR      FINDINGS:  Two views left shoulder and two views left forearm. No acute fracture  or malalignment. Erosive changes at the acromioclavicular joint,  similar to chest radiograph 10/18/2023. No overlying soft tissue  swelling. No elbow joint effusion. Aortic stent graft partially  imaged. Atherosclerotic vascular calcifications noted.      Impression: No acute osseous abnormality.      Erosive changes at the acromioclavicular joint is favored to be  secondary to hyperparathyroidism/chronic renal disease. Infection is  considered less likely, however, please correlate with clinical  history.          MACRO:  None      Signed by: Manju Sandoval 1/12/2024 4:32 AM  Dictation workstation:   [id number]  CT abdomen pelvis wo IV contrast  Narrative: Interpreted By:  Manju Sandoval,   STUDY:  CT ABDOMEN PELVIS WO IV CONTRAST;  1/12/2024 4:09 am      INDICATION:  Signs/Symptoms:fall, buttock pain, L hip pain.      COMPARISON:  11/06/2023      ACCESSION NUMBER(S):  [id number]      ORDERING CLINICIAN:  AIRAM WEIR      TECHNIQUE:  Axial noncontrast CT images of the abdomen and pelvis with coronal  and sagittal reconstructed images.      FINDINGS:  LOWER CHEST: Stable bibasilar atelectasis or scarring. Partially  imaged stent graft in the descending thoracic aorta. Stable  cardiomegaly BONES: No acute osseous abnormality. Mild diffuse  degenerative disc changes. ABDOMINAL WALL:  Diffuse body wall edema.  Periumbilical hernia containing the anterior wall of a segment of  transverse colon. No inflammatory changes in the hernia sac. Fluid  containing supraumbilical ventral hernias.      ABDOMEN:  Lack of intravenous contrast limits evaluation of vessels and solid  organs. LIVER: No focal parenchymal abnormality, within limits of  noncontrast CT. No perihepatic fluid. BILE DUCTS: No biliary  dilatation. GALLBLADDER: Cholecystectomy greater  PANCREAS: No peripancreatic inflammatory stranding or duct dilatation.  SPLEEN: Within normal limits.  ADRENALS: Within normal limits.      KIDNEYS, URETERS, URINARY BLADDER: No hydronephrosis or urinary tract  calculus. Bilateral renal atrophy again noted. Stable right renal  cysts. The urinary bladder is nondistended.      VESSELS: Postsurgical changes again noted including aortic stent  graft with visceral branch bypass grafts. Stable peripherally  calcified fluid collection around the superior mesenteric artery  graft in the left upper quadrant. Vascular coils noted in the left  internal iliac artery. RETROPERITONEUM: No pathologically enlarged  lymph nodes.      PELVIS:      REPRODUCTIVE ORGANS: The uterus is not seen and may be atrophic or  removed.      BOWEL: The stomach is nondistended. No dilated small bowel. The colon  is nondistended appears grossly unremarkable. Mild colonic stool  burden.  Normal appendix. PERITONEUM: Peritoneal dialysis catheter  noted in the left mid abdomen. Small amount of free peritoneal fluid.  No organized fluid collection. No pneumoperitoneum.      Impression: No acute abdominal or pelvic process.      Stable chronic findings as above.      MACRO:  None      Signed by: Manju Sandoval 1/12/2024 4:25 AM  Dictation workstation:   [id number]      Physical Exam  Constitutional: Alert active, cooperative not in acute distress  Eyes: PERRLA, clear sclera  ENMT: Moist mucosal membranes, no exudate  Head / Neck: Atraumatic,  normocephalic, supple neck, JVP not visualized  Lungs: Patent airways, CTABL  Heart: RRR, S1S2, no murmurs appreciated, palpable pulses in all extremities  GI: Soft, NT, ND, bowel sounds present in all quadrants  MSK: Moves all extremities freely, no restriction  of ROM, no joint edema  Extremities: Intact x 4, no peripheral edema  : No Mckenna catheter inserted  Breast: Deferred  Neurological: AAO x 3 to person, place and date, facial muscles symmetrical, sensation intact, strength 4/4, no acute focal neurological deficits appreciated  Psychological: Appropriate mood and behavior    Relevant Results             Scheduled medications  allopurinol, 100 mg, oral, Daily  calcitriol, 0.5 mcg, oral, Daily  carvedilol, 6.25 mg, oral, BID  dextrose 2.5 % - LOW calcium 2.5 mEq/L 2,000 mL peritoneal dialysate, , intraperitoneal, 4x daily  dextrose 4.25 % - LOW calcium 2.5 mEq/L 2,000 mL peritoneal dialysate, , intraperitoneal, q24h  DULoxetine, 30 mg, oral, Daily  epoetin treasure or biosimilar, 10,000 Units, subcutaneous, Weekly  ergocalciferol, 1,250 mcg, oral, Weekly  levothyroxine, 25 mcg, oral, Daily before breakfast  magnesium oxide, 800 mg, oral, Daily  mirtazapine, 15 mg, oral, Nightly  sevelamer carbonate, 1,600 mg, oral, TID with meals  [Held by provider] warfarin, 2.5 mg, oral, Daily      Continuous medications       PRN medications  PRN medications: acetaminophen, diphenoxylate-atropine, HYDROmorphone, loperamide, naloxone, ondansetron **OR** ondansetron  Lower extremity venous duplex left    Result Date: 1/12/2024  Interpreted By:  Manju Sandoval, STUDY: [facility] LOWER EXTREMITY VENOUS DUPLEX LEFT;  1/12/2024 6:02 am   INDICATION: Signs/Symptoms:leg pain, swelling, stopped taking coumadin a few weeks ago.   COMPARISON: 04/15/2028   ACCESSION NUMBER(S): [id number]   ORDERING CLINICIAN: AIRAM WEIR   TECHNIQUE: Grayscale, color and spectral Doppler sonographic images of the left lower extremity deep venous system.  The right common femoral vein was imaged for comparison.   FINDINGS: The common femoral vein and the entire femoral vein are incompletely compressible, consistent with partially occlusive thrombus. The popliteal vein is noncompressible, however, color flow is present, suggesting partially occlusive DVT. Suboptimal visualization of the calf veins.   The right common femoral vein is patent.   OTHER FINDINGS: 3.7 x 1.2 x 2.8 cm fluid collection in the popliteal fossa, most consistent with Baker's cyst. Subcutaneous edema throughout the imaged right lower extremity, most prominent in the calf.       Partially occlusive DVT in the left common femoral and entire length of the femoral vein. Suspected partially occlusive DVT in the popliteal vein.   Left lower extremity edema.   Baker's cyst Baker's cyst.       MACRO: None   Signed by: Manju Sandoval 1/12/2024 6:29 AM Dictation workstation:   [id number]    XR shoulder left 2+ views    Result Date: 1/12/2024  Interpreted By:  Manju Sandoval, STUDY: XR FOREARM LEFT 2 VIEWS; XR SHOULDER LEFT 2+ VIEWS; ;  1/12/2024 4:16 am; 1/12/2024 4:17 am   INDICATION: Signs/Symptoms:fall, pain.   COMPARISON: None.   ACCESSION NUMBER(S): [id number]; [id number]   ORDERING CLINICIAN: AIRAM WEIR   FINDINGS: Two views left shoulder and two views left forearm. No acute fracture or malalignment. Erosive changes at the acromioclavicular joint, similar to chest radiograph 10/18/2023. No overlying soft tissue swelling. No elbow joint effusion. Aortic stent graft partially imaged. Atherosclerotic vascular calcifications noted.       No acute osseous abnormality.   Erosive changes at the acromioclavicular joint is favored to be secondary to hyperparathyroidism/chronic renal disease. Infection is considered less likely, however, please correlate with clinical history.     MACRO: None   Signed by: Manju Sandoval 1/12/2024 4:32 AM Dictation workstation:   [id number]    XR forearm left 2  views    Result Date: 1/12/2024  Interpreted By:  Manju Sandoval, STUDY: XR FOREARM LEFT 2 VIEWS; XR SHOULDER LEFT 2+ VIEWS; ;  1/12/2024 4:16 am; 1/12/2024 4:17 am   INDICATION: Signs/Symptoms:fall, pain.   COMPARISON: None.   ACCESSION NUMBER(S): [id number]; [id number]   ORDERING CLINICIAN: AIRAM WEIR   FINDINGS: Two views left shoulder and two views left forearm. No acute fracture or malalignment. Erosive changes at the acromioclavicular joint, similar to chest radiograph 10/18/2023. No overlying soft tissue swelling. No elbow joint effusion. Aortic stent graft partially imaged. Atherosclerotic vascular calcifications noted.       No acute osseous abnormality.   Erosive changes at the acromioclavicular joint is favored to be secondary to hyperparathyroidism/chronic renal disease. Infection is considered less likely, however, please correlate with clinical history.     MACRO: None   Signed by: Manju Sandoval 1/12/2024 4:32 AM Dictation workstation:   [id number]    CT abdomen pelvis wo IV contrast    Result Date: 1/12/2024  Interpreted By:  Manju Sandoval, STUDY: CT ABDOMEN PELVIS WO IV CONTRAST;  1/12/2024 4:09 am   INDICATION: Signs/Symptoms:fall, buttock pain, L hip pain.   COMPARISON: 11/06/2023   ACCESSION NUMBER(S): [id number]   ORDERING CLINICIAN: AIRAM WEIR   TECHNIQUE: Axial noncontrast CT images of the abdomen and pelvis with coronal and sagittal reconstructed images.   FINDINGS: LOWER CHEST: Stable bibasilar atelectasis or scarring. Partially imaged stent graft in the descending thoracic aorta. Stable cardiomegaly BONES: No acute osseous abnormality. Mild diffuse degenerative disc changes. ABDOMINAL WALL: Diffuse body wall edema. Periumbilical hernia containing the anterior wall of a segment of transverse colon. No inflammatory changes in the hernia sac. Fluid containing supraumbilical ventral hernias.   ABDOMEN: Lack of intravenous contrast limits evaluation of vessels and solid organs.  LIVER: No focal parenchymal abnormality, within limits of noncontrast CT. No perihepatic fluid. BILE DUCTS: No biliary dilatation. GALLBLADDER: Cholecystectomy greater PANCREAS: No peripancreatic inflammatory stranding or duct dilatation. SPLEEN: Within normal limits. ADRENALS: Within normal limits.   KIDNEYS, URETERS, URINARY BLADDER: No hydronephrosis or urinary tract calculus. Bilateral renal atrophy again noted. Stable right renal cysts. The urinary bladder is nondistended.   VESSELS: Postsurgical changes again noted including aortic stent graft with visceral branch bypass grafts. Stable peripherally calcified fluid collection around the superior mesenteric artery graft in the left upper quadrant. Vascular coils noted in the left internal iliac artery. RETROPERITONEUM: No pathologically enlarged lymph nodes.   PELVIS:   REPRODUCTIVE ORGANS: The uterus is not seen and may be atrophic or removed.   BOWEL: The stomach is nondistended. No dilated small bowel. The colon is nondistended appears grossly unremarkable. Mild colonic stool burden.  Normal appendix. PERITONEUM: Peritoneal dialysis catheter noted in the left mid abdomen. Small amount of free peritoneal fluid. No organized fluid collection. No pneumoperitoneum.       No acute abdominal or pelvic process.   Stable chronic findings as above.   MACRO: None   Signed by: Manju Sandoval 1/12/2024 4:25 AM Dictation workstation:   [id number]     Assessment/Plan      63 y.o. female past medical history of ESRD on dialysis, AAA, HFrEF (25% 03/2023) and pancreatitis, PE (03/2023 on warfarin)  presenting with left lower extremity pain as well as left arm pain.  Patient states she has not been feeling well for the last couple weeks, feeling down, lots of recent life stressors.  Due to this patient reports she was not taking any of medications.  She also missed a PD session      Principal Problem:    Left leg pain      PE/DVT  -Coumadin 5 mg daily, subtherapeutic  -INR  3.7>>5.3>>5.5>>4.5  -hold coumadin  -dc heparin drip     Gout: Complaining about bilateral wrist pain, tender wrist, but no rubor, calor  -On allopurinol 100 mg daily  -Elevated CRP level, suggestive of inflammatory arthritis of the wrist  -Uric acid level wnl     Depression  -Duloxetine 30 mg daily  -Consult psych: Appreciate evaluation and recommendations  -Patient gets upset and stops caring for herself     End-stage renal disease  -Consult nephrology: Appreciate management and recommendation  -Patient on peritoneal dialysis  -Renvela with meals      History of hypertension  -Continue Coreg     HFrEF  -appears euvolemic      DVT prophylaxis      Disposition: Supratherapeutic INR. Hold coumadin today. Recheck INR in am           Nkechi Antonio MD

## 2024-01-19 NOTE — PROGRESS NOTES
Care Coordinator Note:    Plan: [facility] referral requested to physician PT/OT/NURSE, needs confirm with patien  Disposition: home with [facility]    Lizzeth CALVILLO, RN [facility]

## 2024-01-19 NOTE — PROCEDURES
Seen on dialysis.  She is looking and feeling better.  We had lowered her volumes, less abdominal distention.  She is hoping to eat a good lunch and dinner.  Monitoring the hemoglobin, phosphorus, and blood pressure.   PHYSICAL EXAM: I have reviewed current vital signs.  GENERAL: NAD, well-nourished; well-developed.  HEAD:  Normocephalic, atraumatic.  EYES: Conjunctiva and sclera clear.  ENT: MMM.  NECK: Supple, FROM.  CHEST/LUNG: Clear to auscultation bilaterally; no wheezes, rales, or rhonchi.  HEART: Regular rate and rhythm, normal S1 and S2; no murmurs, rubs, or gallops.  ABDOMEN: Soft, nontender, nondistended.  EXTREMITIES:  2+ peripheral pulses; FROM.  NEUROLOGY: A&O x 3. Motor 5/5. Sensory intact. No focal neurological deficits. CN II - XII intact. No dysmetria, facial droop, pronator drift.  SKIN: Warm and dry.

## 2024-01-19 NOTE — CARE PLAN
The patient's goals for the shift include      The clinical goals for the shift include pt will remain hemodynamically stable    Over the shift, the patient did not make progress toward the following goals.   Problem: Fall/Injury  Goal: Be free from injury by end of the shift  Outcome: Progressing

## 2024-01-20 LAB
ANION GAP SERPL CALC-SCNC: 18 MMOL/L (ref 10–20)
BUN SERPL-MCNC: 37 MG/DL (ref 6–23)
CALCIUM SERPL-MCNC: 8.2 MG/DL (ref 8.6–10.3)
CHLORIDE SERPL-SCNC: 96 MMOL/L (ref 98–107)
CO2 SERPL-SCNC: 24 MMOL/L (ref 21–32)
CREAT SERPL-MCNC: 6.87 MG/DL (ref 0.5–1.05)
EGFRCR SERPLBLD CKD-EPI 2021: 6 ML/MIN/1.73M*2
ERYTHROCYTE [DISTWIDTH] IN BLOOD BY AUTOMATED COUNT: 18 % (ref 11.5–14.5)
GLUCOSE SERPL-MCNC: 83 MG/DL (ref 74–99)
HCT VFR BLD AUTO: 35.2 % (ref 36–46)
HGB BLD-MCNC: 11.3 G/DL (ref 12–16)
INR PPP: 4 (ref 0.9–1.1)
MCH RBC QN AUTO: 30.1 PG (ref 26–34)
MCHC RBC AUTO-ENTMCNC: 32.1 G/DL (ref 32–36)
MCV RBC AUTO: 94 FL (ref 80–100)
NRBC BLD-RTO: 0 /100 WBCS (ref 0–0)
PLATELET # BLD AUTO: 241 X10*3/UL (ref 150–450)
POTASSIUM SERPL-SCNC: 3.8 MMOL/L (ref 3.5–5.3)
PROTHROMBIN TIME: 45.8 SECONDS (ref 9.8–12.8)
RBC # BLD AUTO: 3.76 X10*6/UL (ref 4–5.2)
SODIUM SERPL-SCNC: 134 MMOL/L (ref 136–145)
WBC # BLD AUTO: 5.3 X10*3/UL (ref 4.4–11.3)

## 2024-01-20 PROCEDURE — 2500000001 HC RX 250 WO HCPCS SELF ADMINISTERED DRUGS (ALT 637 FOR MEDICARE OP): Performed by: PSYCHIATRY & NEUROLOGY

## 2024-01-20 PROCEDURE — 80048 BASIC METABOLIC PNL TOTAL CA: CPT | Performed by: INTERNAL MEDICINE

## 2024-01-20 PROCEDURE — 85027 COMPLETE CBC AUTOMATED: CPT | Performed by: INTERNAL MEDICINE

## 2024-01-20 PROCEDURE — 1100000001 HC PRIVATE ROOM DAILY

## 2024-01-20 PROCEDURE — 2500000004 HC RX 250 GENERAL PHARMACY W/ HCPCS (ALT 636 FOR OP/ED): Performed by: NURSE PRACTITIONER

## 2024-01-20 PROCEDURE — 2500000001 HC RX 250 WO HCPCS SELF ADMINISTERED DRUGS (ALT 637 FOR MEDICARE OP): Performed by: NURSE PRACTITIONER

## 2024-01-20 PROCEDURE — 85610 PROTHROMBIN TIME: CPT | Performed by: INTERNAL MEDICINE

## 2024-01-20 PROCEDURE — 36415 COLL VENOUS BLD VENIPUNCTURE: CPT | Performed by: INTERNAL MEDICINE

## 2024-01-20 PROCEDURE — 99232 SBSQ HOSP IP/OBS MODERATE 35: CPT | Performed by: INTERNAL MEDICINE

## 2024-01-20 PROCEDURE — 2500000004 HC RX 250 GENERAL PHARMACY W/ HCPCS (ALT 636 FOR OP/ED): Performed by: INTERNAL MEDICINE

## 2024-01-20 RX ADMIN — SODIUM CHLORIDE, SODIUM LACTATE, CALCIUM CHLORIDE, MAGNESIUM CHLORIDE AND DEXTROSE: 4.25; 538; 448; 18.3; 5.08 INJECTION, SOLUTION INTRAPERITONEAL at 10:26

## 2024-01-20 RX ADMIN — HYDROMORPHONE HYDROCHLORIDE 0.5 MG: 1 INJECTION, SOLUTION INTRAMUSCULAR; INTRAVENOUS; SUBCUTANEOUS at 14:44

## 2024-01-20 RX ADMIN — HYDROMORPHONE HYDROCHLORIDE 0.5 MG: 1 INJECTION, SOLUTION INTRAMUSCULAR; INTRAVENOUS; SUBCUTANEOUS at 10:15

## 2024-01-20 RX ADMIN — HYDROMORPHONE HYDROCHLORIDE 0.5 MG: 1 INJECTION, SOLUTION INTRAMUSCULAR; INTRAVENOUS; SUBCUTANEOUS at 23:40

## 2024-01-20 RX ADMIN — ONDANSETRON 4 MG: 2 INJECTION INTRAMUSCULAR; INTRAVENOUS at 21:27

## 2024-01-20 RX ADMIN — CARVEDILOL 6.25 MG: 6.25 TABLET, FILM COATED ORAL at 10:16

## 2024-01-20 RX ADMIN — LEVOTHYROXINE SODIUM 25 MCG: 25 TABLET ORAL at 06:07

## 2024-01-20 RX ADMIN — SEVELAMER CARBONATE 1600 MG: 800 TABLET, FILM COATED ORAL at 18:06

## 2024-01-20 RX ADMIN — ALLOPURINOL 100 MG: 100 TABLET ORAL at 10:16

## 2024-01-20 RX ADMIN — MAGNESIUM OXIDE TAB 400 MG (241.3 MG ELEMENTAL MG) 800 MG: 400 (241.3 MG) TAB at 10:16

## 2024-01-20 RX ADMIN — HYDROMORPHONE HYDROCHLORIDE 0.5 MG: 1 INJECTION, SOLUTION INTRAMUSCULAR; INTRAVENOUS; SUBCUTANEOUS at 04:31

## 2024-01-20 RX ADMIN — SEVELAMER CARBONATE 1600 MG: 800 TABLET, FILM COATED ORAL at 14:27

## 2024-01-20 RX ADMIN — ONDANSETRON 4 MG: 2 INJECTION INTRAMUSCULAR; INTRAVENOUS at 14:27

## 2024-01-20 RX ADMIN — HYDROMORPHONE HYDROCHLORIDE 0.5 MG: 1 INJECTION, SOLUTION INTRAMUSCULAR; INTRAVENOUS; SUBCUTANEOUS at 18:35

## 2024-01-20 RX ADMIN — SODIUM CHLORIDE, SODIUM LACTATE, CALCIUM CHLORIDE, MAGNESIUM CHLORIDE AND DEXTROSE: 2.5; 538; 448; 18.3; 5.08 INJECTION, SOLUTION INTRAPERITONEAL at 15:34

## 2024-01-20 RX ADMIN — SEVELAMER CARBONATE 1600 MG: 800 TABLET, FILM COATED ORAL at 10:16

## 2024-01-20 RX ADMIN — SODIUM CHLORIDE, SODIUM LACTATE, CALCIUM CHLORIDE, MAGNESIUM CHLORIDE AND DEXTROSE: 2.5; 538; 448; 18.3; 5.08 INJECTION, SOLUTION INTRAPERITONEAL at 21:24

## 2024-01-20 RX ADMIN — SODIUM CHLORIDE, SODIUM LACTATE, CALCIUM CHLORIDE, MAGNESIUM CHLORIDE AND DEXTROSE: 2.5; 538; 448; 18.3; 5.08 INJECTION, SOLUTION INTRAPERITONEAL at 10:30

## 2024-01-20 RX ADMIN — DULOXETINE HYDROCHLORIDE 30 MG: 30 CAPSULE, DELAYED RELEASE ORAL at 10:18

## 2024-01-20 RX ADMIN — CALCITRIOL CAPSULES 0.25 MCG 0.5 MCG: 0.25 CAPSULE ORAL at 10:16

## 2024-01-20 RX ADMIN — MIRTAZAPINE 15 MG: 15 TABLET, FILM COATED ORAL at 21:25

## 2024-01-20 ASSESSMENT — PAIN SCALES - GENERAL
PAINLEVEL_OUTOF10: 10 - WORST POSSIBLE PAIN
PAINLEVEL_OUTOF10: 10 - WORST POSSIBLE PAIN
PAINLEVEL_OUTOF10: 9
PAINLEVEL_OUTOF10: 5 - MODERATE PAIN
PAINLEVEL_OUTOF10: 9
PAINLEVEL_OUTOF10: 9
PAINLEVEL_OUTOF10: 10 - WORST POSSIBLE PAIN
PAINLEVEL_OUTOF10: 5 - MODERATE PAIN

## 2024-01-20 ASSESSMENT — PAIN - FUNCTIONAL ASSESSMENT
PAIN_FUNCTIONAL_ASSESSMENT: 0-10

## 2024-01-20 ASSESSMENT — COGNITIVE AND FUNCTIONAL STATUS - GENERAL
DAILY ACTIVITIY SCORE: 19
PERSONAL GROOMING: A LITTLE
WALKING IN HOSPITAL ROOM: A LITTLE
MOBILITY SCORE: 20
DRESSING REGULAR UPPER BODY CLOTHING: A LITTLE
DRESSING REGULAR UPPER BODY CLOTHING: A LITTLE
DRESSING REGULAR LOWER BODY CLOTHING: A LITTLE
CLIMB 3 TO 5 STEPS WITH RAILING: A LOT
TOILETING: A LITTLE
DAILY ACTIVITIY SCORE: 19
STANDING UP FROM CHAIR USING ARMS: A LITTLE
DRESSING REGULAR LOWER BODY CLOTHING: A LITTLE
PERSONAL GROOMING: A LITTLE
STANDING UP FROM CHAIR USING ARMS: A LOT
MOVING TO AND FROM BED TO CHAIR: A LITTLE
MOBILITY SCORE: 15
CLIMB 3 TO 5 STEPS WITH RAILING: TOTAL
HELP NEEDED FOR BATHING: A LITTLE
TOILETING: A LITTLE
WALKING IN HOSPITAL ROOM: TOTAL
HELP NEEDED FOR BATHING: A LITTLE

## 2024-01-20 NOTE — PROGRESS NOTES
Yoselyn Hernandez is a 64 y.o. female on day 8 of admission presenting with Left leg pain.      Subjective   Patient was seen and examined at bedside this morning    Objective     Last Recorded Vitals  /76 (BP Location: Left arm, Patient Position: Lying)   Pulse 86   Temp 36.4 °C (97.5 °F) (Temporal)   Resp 18   Wt 63.9 kg (140 lb 14.4 oz)   SpO2 98%   Intake/Output last 3 Shifts:    Intake/Output Summary (Last 24 hours) at 1/20/2024 1023  Last data filed at 1/19/2024 1800  Gross per 24 hour   Intake 4056.35 ml   Output 3500 ml   Net 556.35 ml         Admission Weight  Weight: 54.4 kg (120 lb) (01/12/24 0242)    Daily Weight  01/18/24 : 63.9 kg (140 lb 14.4 oz)    Image Results  Lower extremity venous duplex left  Narrative: Interpreted By:  Manju Sandoval,   STUDY:  [facility] LOWER EXTREMITY VENOUS DUPLEX LEFT;  1/12/2024 6:02 am      INDICATION:  Signs/Symptoms:leg pain, swelling, stopped taking coumadin a few  weeks ago.      COMPARISON:  04/15/2028      ACCESSION NUMBER(S):  [id number]      ORDERING CLINICIAN:  AIRAM WEIR      TECHNIQUE:  Grayscale, color and spectral Doppler sonographic images of the left  lower extremity deep venous system. The right common femoral vein was  imaged for comparison.      FINDINGS:  The common femoral vein and the entire femoral vein are incompletely  compressible, consistent with partially occlusive thrombus. The  popliteal vein is noncompressible, however, color flow is present,  suggesting partially occlusive DVT. Suboptimal visualization of the  calf veins.      The right common femoral vein is patent.      OTHER FINDINGS: 3.7 x 1.2 x 2.8 cm fluid collection in the popliteal  fossa, most consistent with Baker's cyst. Subcutaneous edema  throughout the imaged right lower extremity, most prominent in the  calf.      Impression: Partially occlusive DVT in the left common femoral and entire length  of the femoral vein. Suspected partially occlusive DVT in  the  popliteal vein.      Left lower extremity edema.      Baker's cyst Baker's cyst.              MACRO:  None      Signed by: Manju Sandoval 1/12/2024 6:29 AM  Dictation workstation:   [id number]  XR shoulder left 2+ views, XR forearm left 2 views  Narrative: Interpreted By:  Manju Sandoval,   STUDY:  XR FOREARM LEFT 2 VIEWS; XR SHOULDER LEFT 2+ VIEWS; ;  1/12/2024 4:16  am; 1/12/2024 4:17 am      INDICATION:  Signs/Symptoms:fall, pain.      COMPARISON:  None.      ACCESSION NUMBER(S):  [id number]; [id number]      ORDERING CLINICIAN:  AIRAM WEIR      FINDINGS:  Two views left shoulder and two views left forearm. No acute fracture  or malalignment. Erosive changes at the acromioclavicular joint,  similar to chest radiograph 10/18/2023. No overlying soft tissue  swelling. No elbow joint effusion. Aortic stent graft partially  imaged. Atherosclerotic vascular calcifications noted.      Impression: No acute osseous abnormality.      Erosive changes at the acromioclavicular joint is favored to be  secondary to hyperparathyroidism/chronic renal disease. Infection is  considered less likely, however, please correlate with clinical  history.          MACRO:  None      Signed by: Manju Sandoval 1/12/2024 4:32 AM  Dictation workstation:   [id number]  CT abdomen pelvis wo IV contrast  Narrative: Interpreted By:  Manju Sandoval,   STUDY:  CT ABDOMEN PELVIS WO IV CONTRAST;  1/12/2024 4:09 am      INDICATION:  Signs/Symptoms:fall, buttock pain, L hip pain.      COMPARISON:  11/06/2023      ACCESSION NUMBER(S):  [id number]      ORDERING CLINICIAN:  AIRAM WERI      TECHNIQUE:  Axial noncontrast CT images of the abdomen and pelvis with coronal  and sagittal reconstructed images.      FINDINGS:  LOWER CHEST: Stable bibasilar atelectasis or scarring. Partially  imaged stent graft in the descending thoracic aorta. Stable  cardiomegaly BONES: No acute osseous abnormality. Mild diffuse  degenerative disc changes.  ABDOMINAL WALL: Diffuse body wall edema.  Periumbilical hernia containing the anterior wall of a segment of  transverse colon. No inflammatory changes in the hernia sac. Fluid  containing supraumbilical ventral hernias.      ABDOMEN:  Lack of intravenous contrast limits evaluation of vessels and solid  organs. LIVER: No focal parenchymal abnormality, within limits of  noncontrast CT. No perihepatic fluid. BILE DUCTS: No biliary  dilatation. GALLBLADDER: Cholecystectomy greater  PANCREAS: No peripancreatic inflammatory stranding or duct dilatation.  SPLEEN: Within normal limits.  ADRENALS: Within normal limits.      KIDNEYS, URETERS, URINARY BLADDER: No hydronephrosis or urinary tract  calculus. Bilateral renal atrophy again noted. Stable right renal  cysts. The urinary bladder is nondistended.      VESSELS: Postsurgical changes again noted including aortic stent  graft with visceral branch bypass grafts. Stable peripherally  calcified fluid collection around the superior mesenteric artery  graft in the left upper quadrant. Vascular coils noted in the left  internal iliac artery. RETROPERITONEUM: No pathologically enlarged  lymph nodes.      PELVIS:      REPRODUCTIVE ORGANS: The uterus is not seen and may be atrophic or  removed.      BOWEL: The stomach is nondistended. No dilated small bowel. The colon  is nondistended appears grossly unremarkable. Mild colonic stool  burden.  Normal appendix. PERITONEUM: Peritoneal dialysis catheter  noted in the left mid abdomen. Small amount of free peritoneal fluid.  No organized fluid collection. No pneumoperitoneum.      Impression: No acute abdominal or pelvic process.      Stable chronic findings as above.      MACRO:  None      Signed by: Manju Sandoval 1/12/2024 4:25 AM  Dictation workstation:   [id number]      Physical Exam  Constitutional: Alert active, cooperative not in acute distress  Eyes: PERRLA, clear sclera  ENMT: Moist mucosal membranes, no exudate  Head / Neck:  Atraumatic, normocephalic, supple neck, JVP not visualized  Lungs: Patent airways, CTABL  Heart: RRR, S1S2, no murmurs appreciated, palpable pulses in all extremities  GI: Soft, NT, ND, bowel sounds present in all quadrants  MSK: Moves all extremities freely, no restriction  of ROM, no joint edema  Extremities: Intact x 4, no peripheral edema  : No Mckenna catheter inserted  Breast: Deferred  Neurological: AAO x 3 to person, place and date, facial muscles symmetrical, sensation intact, strength 4/4, no acute focal neurological deficits appreciated  Psychological: Appropriate mood and behavior    Relevant Results             Scheduled medications  allopurinol, 100 mg, oral, Daily  calcitriol, 0.5 mcg, oral, Daily  carvedilol, 6.25 mg, oral, BID  dextrose 2.5 % - LOW calcium 2.5 mEq/L 2,000 mL peritoneal dialysate, , intraperitoneal, 4x daily  dextrose 4.25 % - LOW calcium 2.5 mEq/L 2,000 mL peritoneal dialysate, , intraperitoneal, q24h  DULoxetine, 30 mg, oral, Daily  epoetin treasure or biosimilar, 10,000 Units, subcutaneous, Weekly  ergocalciferol, 1,250 mcg, oral, Weekly  levothyroxine, 25 mcg, oral, Daily before breakfast  magnesium oxide, 800 mg, oral, Daily  mirtazapine, 15 mg, oral, Nightly  sevelamer carbonate, 1,600 mg, oral, TID with meals  [Held by provider] warfarin, 2.5 mg, oral, Daily      Continuous medications       PRN medications  PRN medications: acetaminophen, diphenoxylate-atropine, HYDROmorphone, loperamide, naloxone, ondansetron **OR** ondansetron  Lower extremity venous duplex left    Result Date: 1/12/2024  Interpreted By:  Manju Sandoval, STUDY: [facility] LOWER EXTREMITY VENOUS DUPLEX LEFT;  1/12/2024 6:02 am   INDICATION: Signs/Symptoms:leg pain, swelling, stopped taking coumadin a few weeks ago.   COMPARISON: 04/15/2028   ACCESSION NUMBER(S): [id number]   ORDERING CLINICIAN: AIRAM WEIR   TECHNIQUE: Grayscale, color and spectral Doppler sonographic images of the left lower extremity deep  venous system. The right common femoral vein was imaged for comparison.   FINDINGS: The common femoral vein and the entire femoral vein are incompletely compressible, consistent with partially occlusive thrombus. The popliteal vein is noncompressible, however, color flow is present, suggesting partially occlusive DVT. Suboptimal visualization of the calf veins.   The right common femoral vein is patent.   OTHER FINDINGS: 3.7 x 1.2 x 2.8 cm fluid collection in the popliteal fossa, most consistent with Baker's cyst. Subcutaneous edema throughout the imaged right lower extremity, most prominent in the calf.       Partially occlusive DVT in the left common femoral and entire length of the femoral vein. Suspected partially occlusive DVT in the popliteal vein.   Left lower extremity edema.   Baker's cyst Baker's cyst.       MACRO: None   Signed by: Manju Sandoval 1/12/2024 6:29 AM Dictation workstation:   [id number]    XR shoulder left 2+ views    Result Date: 1/12/2024  Interpreted By:  Manju Sandoval, STUDY: XR FOREARM LEFT 2 VIEWS; XR SHOULDER LEFT 2+ VIEWS; ;  1/12/2024 4:16 am; 1/12/2024 4:17 am   INDICATION: Signs/Symptoms:fall, pain.   COMPARISON: None.   ACCESSION NUMBER(S): [id number]; [id number]   ORDERING CLINICIAN: AIRAM WEIR   FINDINGS: Two views left shoulder and two views left forearm. No acute fracture or malalignment. Erosive changes at the acromioclavicular joint, similar to chest radiograph 10/18/2023. No overlying soft tissue swelling. No elbow joint effusion. Aortic stent graft partially imaged. Atherosclerotic vascular calcifications noted.       No acute osseous abnormality.   Erosive changes at the acromioclavicular joint is favored to be secondary to hyperparathyroidism/chronic renal disease. Infection is considered less likely, however, please correlate with clinical history.     MACRO: None   Signed by: Manju Sandoval 1/12/2024 4:32 AM Dictation workstation:   [id number]    XR forearm  left 2 views    Result Date: 1/12/2024  Interpreted By:  Manju Sandoval, STUDY: XR FOREARM LEFT 2 VIEWS; XR SHOULDER LEFT 2+ VIEWS; ;  1/12/2024 4:16 am; 1/12/2024 4:17 am   INDICATION: Signs/Symptoms:fall, pain.   COMPARISON: None.   ACCESSION NUMBER(S): [id number]; [id number]   ORDERING CLINICIAN: AIRAM WEIR   FINDINGS: Two views left shoulder and two views left forearm. No acute fracture or malalignment. Erosive changes at the acromioclavicular joint, similar to chest radiograph 10/18/2023. No overlying soft tissue swelling. No elbow joint effusion. Aortic stent graft partially imaged. Atherosclerotic vascular calcifications noted.       No acute osseous abnormality.   Erosive changes at the acromioclavicular joint is favored to be secondary to hyperparathyroidism/chronic renal disease. Infection is considered less likely, however, please correlate with clinical history.     MACRO: None   Signed by: Manju Sandoval 1/12/2024 4:32 AM Dictation workstation:   [id number]    CT abdomen pelvis wo IV contrast    Result Date: 1/12/2024  Interpreted By:  Manju Sandoval, STUDY: CT ABDOMEN PELVIS WO IV CONTRAST;  1/12/2024 4:09 am   INDICATION: Signs/Symptoms:fall, buttock pain, L hip pain.   COMPARISON: 11/06/2023   ACCESSION NUMBER(S): [id number]   ORDERING CLINICIAN: AIRAM WEIR   TECHNIQUE: Axial noncontrast CT images of the abdomen and pelvis with coronal and sagittal reconstructed images.   FINDINGS: LOWER CHEST: Stable bibasilar atelectasis or scarring. Partially imaged stent graft in the descending thoracic aorta. Stable cardiomegaly BONES: No acute osseous abnormality. Mild diffuse degenerative disc changes. ABDOMINAL WALL: Diffuse body wall edema. Periumbilical hernia containing the anterior wall of a segment of transverse colon. No inflammatory changes in the hernia sac. Fluid containing supraumbilical ventral hernias.   ABDOMEN: Lack of intravenous contrast limits evaluation of vessels and solid  organs. LIVER: No focal parenchymal abnormality, within limits of noncontrast CT. No perihepatic fluid. BILE DUCTS: No biliary dilatation. GALLBLADDER: Cholecystectomy greater PANCREAS: No peripancreatic inflammatory stranding or duct dilatation. SPLEEN: Within normal limits. ADRENALS: Within normal limits.   KIDNEYS, URETERS, URINARY BLADDER: No hydronephrosis or urinary tract calculus. Bilateral renal atrophy again noted. Stable right renal cysts. The urinary bladder is nondistended.   VESSELS: Postsurgical changes again noted including aortic stent graft with visceral branch bypass grafts. Stable peripherally calcified fluid collection around the superior mesenteric artery graft in the left upper quadrant. Vascular coils noted in the left internal iliac artery. RETROPERITONEUM: No pathologically enlarged lymph nodes.   PELVIS:   REPRODUCTIVE ORGANS: The uterus is not seen and may be atrophic or removed.   BOWEL: The stomach is nondistended. No dilated small bowel. The colon is nondistended appears grossly unremarkable. Mild colonic stool burden.  Normal appendix. PERITONEUM: Peritoneal dialysis catheter noted in the left mid abdomen. Small amount of free peritoneal fluid. No organized fluid collection. No pneumoperitoneum.       No acute abdominal or pelvic process.   Stable chronic findings as above.   MACRO: None   Signed by: Manju Sandoval 1/12/2024 4:25 AM Dictation workstation:   [id number]     Assessment/Plan      63 y.o. female past medical history of ESRD on dialysis, AAA, HFrEF (25% 03/2023) and pancreatitis, PE (03/2023 on warfarin)  presenting with left lower extremity pain as well as left arm pain.  Patient states she has not been feeling well for the last couple weeks, feeling down, lots of recent life stressors.  Due to this patient reports she was not taking any of medications.  She also missed a PD session      Principal Problem:    Left leg pain      PE/DVT  -Coumadin 5 mg daily,  subtherapeutic  -INR 3.7>>5.3>>5.5>>4.5>>4  -hold coumadin  -dc heparin drip     Gout: Complaining about bilateral wrist pain, tender wrist, but no rubor, calor  -On allopurinol 100 mg daily  -Elevated CRP level, suggestive of inflammatory arthritis of the wrist  -Uric acid level wnl     Depression  -Duloxetine 30 mg daily  -Consult psych: Appreciate evaluation and recommendations  -Patient gets upset and stops caring for herself     End-stage renal disease  -Consult nephrology: Appreciate management and recommendation  -Patient on peritoneal dialysis  -Renvela with meals      History of hypertension  -Continue Coreg     HFrEF  -appears euvolemic      DVT prophylaxis      Disposition: Supratherapeutic INR. Hold coumadin today. Recheck INR in am           Nkechi Antonio MD

## 2024-01-20 NOTE — PROCEDURES
Seen on dialysis.  I am very pleased with her labs, her blood pressures.  Catheter looks to be functioning properly.  She has less edema on the one 4.25% exchange per day.  No renal barriers to discharge.

## 2024-01-21 LAB
ANION GAP SERPL CALC-SCNC: 13 MMOL/L (ref 10–20)
BUN SERPL-MCNC: 35 MG/DL (ref 6–23)
CALCIUM SERPL-MCNC: 8.1 MG/DL (ref 8.6–10.3)
CHLORIDE SERPL-SCNC: 95 MMOL/L (ref 98–107)
CO2 SERPL-SCNC: 29 MMOL/L (ref 21–32)
CREAT SERPL-MCNC: 6.84 MG/DL (ref 0.5–1.05)
EGFRCR SERPLBLD CKD-EPI 2021: 6 ML/MIN/1.73M*2
ERYTHROCYTE [DISTWIDTH] IN BLOOD BY AUTOMATED COUNT: 17.9 % (ref 11.5–14.5)
GLUCOSE SERPL-MCNC: 74 MG/DL (ref 74–99)
HCT VFR BLD AUTO: 35.8 % (ref 36–46)
HGB BLD-MCNC: 11.3 G/DL (ref 12–16)
INR PPP: 3.2 (ref 0.9–1.1)
MCH RBC QN AUTO: 29.5 PG (ref 26–34)
MCHC RBC AUTO-ENTMCNC: 31.6 G/DL (ref 32–36)
MCV RBC AUTO: 94 FL (ref 80–100)
NRBC BLD-RTO: 0 /100 WBCS (ref 0–0)
PLATELET # BLD AUTO: 248 X10*3/UL (ref 150–450)
POTASSIUM SERPL-SCNC: 3.7 MMOL/L (ref 3.5–5.3)
PROTHROMBIN TIME: 36.4 SECONDS (ref 9.8–12.8)
RBC # BLD AUTO: 3.83 X10*6/UL (ref 4–5.2)
SODIUM SERPL-SCNC: 133 MMOL/L (ref 136–145)
WBC # BLD AUTO: 5.5 X10*3/UL (ref 4.4–11.3)

## 2024-01-21 PROCEDURE — 2500000004 HC RX 250 GENERAL PHARMACY W/ HCPCS (ALT 636 FOR OP/ED): Performed by: INTERNAL MEDICINE

## 2024-01-21 PROCEDURE — 2500000001 HC RX 250 WO HCPCS SELF ADMINISTERED DRUGS (ALT 637 FOR MEDICARE OP): Performed by: NURSE PRACTITIONER

## 2024-01-21 PROCEDURE — 2500000004 HC RX 250 GENERAL PHARMACY W/ HCPCS (ALT 636 FOR OP/ED): Performed by: NURSE PRACTITIONER

## 2024-01-21 PROCEDURE — 36415 COLL VENOUS BLD VENIPUNCTURE: CPT | Performed by: INTERNAL MEDICINE

## 2024-01-21 PROCEDURE — 99232 SBSQ HOSP IP/OBS MODERATE 35: CPT | Performed by: INTERNAL MEDICINE

## 2024-01-21 PROCEDURE — 85027 COMPLETE CBC AUTOMATED: CPT | Performed by: INTERNAL MEDICINE

## 2024-01-21 PROCEDURE — 2500000001 HC RX 250 WO HCPCS SELF ADMINISTERED DRUGS (ALT 637 FOR MEDICARE OP): Performed by: PSYCHIATRY & NEUROLOGY

## 2024-01-21 PROCEDURE — 85610 PROTHROMBIN TIME: CPT | Performed by: INTERNAL MEDICINE

## 2024-01-21 PROCEDURE — 1100000001 HC PRIVATE ROOM DAILY

## 2024-01-21 PROCEDURE — 80048 BASIC METABOLIC PNL TOTAL CA: CPT | Performed by: INTERNAL MEDICINE

## 2024-01-21 RX ADMIN — SODIUM CHLORIDE, SODIUM LACTATE, CALCIUM CHLORIDE, MAGNESIUM CHLORIDE AND DEXTROSE: 4.25; 538; 448; 18.3; 5.08 INJECTION, SOLUTION INTRAPERITONEAL at 09:25

## 2024-01-21 RX ADMIN — SODIUM CHLORIDE, SODIUM LACTATE, CALCIUM CHLORIDE, MAGNESIUM CHLORIDE AND DEXTROSE: 2.5; 538; 448; 18.3; 5.08 INJECTION, SOLUTION INTRAPERITONEAL at 01:42

## 2024-01-21 RX ADMIN — HYDROMORPHONE HYDROCHLORIDE 0.5 MG: 1 INJECTION, SOLUTION INTRAMUSCULAR; INTRAVENOUS; SUBCUTANEOUS at 12:33

## 2024-01-21 RX ADMIN — ALLOPURINOL 100 MG: 100 TABLET ORAL at 09:58

## 2024-01-21 RX ADMIN — HYDROMORPHONE HYDROCHLORIDE 0.5 MG: 1 INJECTION, SOLUTION INTRAMUSCULAR; INTRAVENOUS; SUBCUTANEOUS at 09:54

## 2024-01-21 RX ADMIN — MIRTAZAPINE 15 MG: 15 TABLET, FILM COATED ORAL at 21:00

## 2024-01-21 RX ADMIN — ONDANSETRON 4 MG: 2 INJECTION INTRAMUSCULAR; INTRAVENOUS at 09:54

## 2024-01-21 RX ADMIN — LEVOTHYROXINE SODIUM 25 MCG: 25 TABLET ORAL at 05:35

## 2024-01-21 RX ADMIN — SODIUM CHLORIDE, SODIUM LACTATE, CALCIUM CHLORIDE, MAGNESIUM CHLORIDE AND DEXTROSE: 2.5; 538; 448; 18.3; 5.08 INJECTION, SOLUTION INTRAPERITONEAL at 13:25

## 2024-01-21 RX ADMIN — HYDROMORPHONE HYDROCHLORIDE 0.5 MG: 1 INJECTION, SOLUTION INTRAMUSCULAR; INTRAVENOUS; SUBCUTANEOUS at 05:35

## 2024-01-21 RX ADMIN — SODIUM CHLORIDE, SODIUM LACTATE, CALCIUM CHLORIDE, MAGNESIUM CHLORIDE AND DEXTROSE: 2.5; 538; 448; 18.3; 5.08 INJECTION, SOLUTION INTRAPERITONEAL at 17:43

## 2024-01-21 RX ADMIN — MAGNESIUM OXIDE TAB 400 MG (241.3 MG ELEMENTAL MG) 800 MG: 400 (241.3 MG) TAB at 09:58

## 2024-01-21 RX ADMIN — HYDROMORPHONE HYDROCHLORIDE 0.5 MG: 1 INJECTION, SOLUTION INTRAMUSCULAR; INTRAVENOUS; SUBCUTANEOUS at 18:54

## 2024-01-21 RX ADMIN — SODIUM CHLORIDE, SODIUM LACTATE, CALCIUM CHLORIDE, MAGNESIUM CHLORIDE AND DEXTROSE: 2.5; 538; 448; 18.3; 5.08 INJECTION, SOLUTION INTRAPERITONEAL at 21:01

## 2024-01-21 RX ADMIN — ONDANSETRON 4 MG: 2 INJECTION INTRAMUSCULAR; INTRAVENOUS at 21:00

## 2024-01-21 RX ADMIN — DULOXETINE HYDROCHLORIDE 30 MG: 30 CAPSULE, DELAYED RELEASE ORAL at 09:57

## 2024-01-21 RX ADMIN — CALCITRIOL CAPSULES 0.25 MCG 0.5 MCG: 0.25 CAPSULE ORAL at 09:58

## 2024-01-21 RX ADMIN — SEVELAMER CARBONATE 1600 MG: 800 TABLET, FILM COATED ORAL at 17:42

## 2024-01-21 ASSESSMENT — PAIN DESCRIPTION - LOCATION
LOCATION: ABDOMEN
LOCATION: ABDOMEN
LOCATION: OTHER (COMMENT)

## 2024-01-21 ASSESSMENT — COGNITIVE AND FUNCTIONAL STATUS - GENERAL
DRESSING REGULAR LOWER BODY CLOTHING: A LOT
WALKING IN HOSPITAL ROOM: A LOT
MOBILITY SCORE: 17
HELP NEEDED FOR BATHING: A LITTLE
DAILY ACTIVITIY SCORE: 19
MOVING TO AND FROM BED TO CHAIR: A LITTLE
PERSONAL GROOMING: A LITTLE
HELP NEEDED FOR BATHING: A LOT
DRESSING REGULAR LOWER BODY CLOTHING: A LITTLE
TOILETING: A LOT
STANDING UP FROM CHAIR USING ARMS: A LOT
TOILETING: A LITTLE
DAILY ACTIVITIY SCORE: 14
CLIMB 3 TO 5 STEPS WITH RAILING: TOTAL
CLIMB 3 TO 5 STEPS WITH RAILING: TOTAL
STANDING UP FROM CHAIR USING ARMS: A LITTLE
DRESSING REGULAR UPPER BODY CLOTHING: A LOT
WALKING IN HOSPITAL ROOM: TOTAL
PERSONAL GROOMING: A LOT
DRESSING REGULAR UPPER BODY CLOTHING: A LITTLE
MOBILITY SCORE: 16

## 2024-01-21 ASSESSMENT — PAIN SCALES - GENERAL
PAINLEVEL_OUTOF10: 8
PAINLEVEL_OUTOF10: 10 - WORST POSSIBLE PAIN
PAINLEVEL_OUTOF10: 4
PAINLEVEL_OUTOF10: 10 - WORST POSSIBLE PAIN
PAINLEVEL_OUTOF10: 3
PAINLEVEL_OUTOF10: 10 - WORST POSSIBLE PAIN

## 2024-01-21 ASSESSMENT — PAIN - FUNCTIONAL ASSESSMENT
PAIN_FUNCTIONAL_ASSESSMENT: 0-10

## 2024-01-21 NOTE — CARE PLAN
Problem: Fall/Injury  Goal: Not fall by end of shift  Outcome: Progressing  Goal: Be free from injury by end of the shift  Outcome: Progressing  Goal: Verbalize understanding of personal risk factors for fall in the hospital  Outcome: Progressing  Goal: Verbalize understanding of risk factor reduction measures to prevent injury from fall in the home  Outcome: Progressing  Goal: Use assistive devices by end of the shift  Outcome: Progressing  Goal: Pace activities to prevent fatigue by end of the shift  Outcome: Progressing   The patient's goals for the shift include      The clinical goals for the shift include Pain control

## 2024-01-21 NOTE — PROGRESS NOTES
Yoselyn Hernandez is a 64 y.o. female on day 9 of admission presenting with Left leg pain.      Subjective   Patient was seen and examined at bedside this morning    Objective     Last Recorded Vitals  /72   Pulse 92   Temp 36.7 °C (98 °F) (Temporal)   Resp 16   Wt 63.9 kg (140 lb 14.4 oz)   SpO2 97%   Intake/Output last 3 Shifts:    Intake/Output Summary (Last 24 hours) at 1/21/2024 1119  Last data filed at 1/20/2024 2214  Gross per 24 hour   Intake 240 ml   Output 3600 ml   Net -3360 ml         Admission Weight  Weight: 54.4 kg (120 lb) (01/12/24 0242)    Daily Weight  01/18/24 : 63.9 kg (140 lb 14.4 oz)    Image Results  Lower extremity venous duplex left  Narrative: Interpreted By:  Manju Sandoval,   STUDY:  [facility] LOWER EXTREMITY VENOUS DUPLEX LEFT;  1/12/2024 6:02 am      INDICATION:  Signs/Symptoms:leg pain, swelling, stopped taking coumadin a few  weeks ago.      COMPARISON:  04/15/2028      ACCESSION NUMBER(S):  [id number]      ORDERING CLINICIAN:  AIRAM WEIR      TECHNIQUE:  Grayscale, color and spectral Doppler sonographic images of the left  lower extremity deep venous system. The right common femoral vein was  imaged for comparison.      FINDINGS:  The common femoral vein and the entire femoral vein are incompletely  compressible, consistent with partially occlusive thrombus. The  popliteal vein is noncompressible, however, color flow is present,  suggesting partially occlusive DVT. Suboptimal visualization of the  calf veins.      The right common femoral vein is patent.      OTHER FINDINGS: 3.7 x 1.2 x 2.8 cm fluid collection in the popliteal  fossa, most consistent with Baker's cyst. Subcutaneous edema  throughout the imaged right lower extremity, most prominent in the  calf.      Impression: Partially occlusive DVT in the left common femoral and entire length  of the femoral vein. Suspected partially occlusive DVT in the  popliteal vein.      Left lower extremity edema.      Baker's  cyst Todd's cyst.              MACRO:  None      Signed by: Manju Sandoval 1/12/2024 6:29 AM  Dictation workstation:   [id number]  XR shoulder left 2+ views, XR forearm left 2 views  Narrative: Interpreted By:  Manju Sandoval,   STUDY:  XR FOREARM LEFT 2 VIEWS; XR SHOULDER LEFT 2+ VIEWS; ;  1/12/2024 4:16  am; 1/12/2024 4:17 am      INDICATION:  Signs/Symptoms:fall, pain.      COMPARISON:  None.      ACCESSION NUMBER(S):  [id number]; [id number]      ORDERING CLINICIAN:  AIRAM WEIR      FINDINGS:  Two views left shoulder and two views left forearm. No acute fracture  or malalignment. Erosive changes at the acromioclavicular joint,  similar to chest radiograph 10/18/2023. No overlying soft tissue  swelling. No elbow joint effusion. Aortic stent graft partially  imaged. Atherosclerotic vascular calcifications noted.      Impression: No acute osseous abnormality.      Erosive changes at the acromioclavicular joint is favored to be  secondary to hyperparathyroidism/chronic renal disease. Infection is  considered less likely, however, please correlate with clinical  history.          MACRO:  None      Signed by: Manju Sandoval 1/12/2024 4:32 AM  Dictation workstation:   [id number]  CT abdomen pelvis wo IV contrast  Narrative: Interpreted By:  Manju Sandoval,   STUDY:  CT ABDOMEN PELVIS WO IV CONTRAST;  1/12/2024 4:09 am      INDICATION:  Signs/Symptoms:fall, buttock pain, L hip pain.      COMPARISON:  11/06/2023      ACCESSION NUMBER(S):  [id number]      ORDERING CLINICIAN:  AIRAM WEIR      TECHNIQUE:  Axial noncontrast CT images of the abdomen and pelvis with coronal  and sagittal reconstructed images.      FINDINGS:  LOWER CHEST: Stable bibasilar atelectasis or scarring. Partially  imaged stent graft in the descending thoracic aorta. Stable  cardiomegaly BONES: No acute osseous abnormality. Mild diffuse  degenerative disc changes. ABDOMINAL WALL: Diffuse body wall edema.  Periumbilical hernia containing  the anterior wall of a segment of  transverse colon. No inflammatory changes in the hernia sac. Fluid  containing supraumbilical ventral hernias.      ABDOMEN:  Lack of intravenous contrast limits evaluation of vessels and solid  organs. LIVER: No focal parenchymal abnormality, within limits of  noncontrast CT. No perihepatic fluid. BILE DUCTS: No biliary  dilatation. GALLBLADDER: Cholecystectomy greater  PANCREAS: No peripancreatic inflammatory stranding or duct dilatation.  SPLEEN: Within normal limits.  ADRENALS: Within normal limits.      KIDNEYS, URETERS, URINARY BLADDER: No hydronephrosis or urinary tract  calculus. Bilateral renal atrophy again noted. Stable right renal  cysts. The urinary bladder is nondistended.      VESSELS: Postsurgical changes again noted including aortic stent  graft with visceral branch bypass grafts. Stable peripherally  calcified fluid collection around the superior mesenteric artery  graft in the left upper quadrant. Vascular coils noted in the left  internal iliac artery. RETROPERITONEUM: No pathologically enlarged  lymph nodes.      PELVIS:      REPRODUCTIVE ORGANS: The uterus is not seen and may be atrophic or  removed.      BOWEL: The stomach is nondistended. No dilated small bowel. The colon  is nondistended appears grossly unremarkable. Mild colonic stool  burden.  Normal appendix. PERITONEUM: Peritoneal dialysis catheter  noted in the left mid abdomen. Small amount of free peritoneal fluid.  No organized fluid collection. No pneumoperitoneum.      Impression: No acute abdominal or pelvic process.      Stable chronic findings as above.      MACRO:  None      Signed by: Manju Sandoval 1/12/2024 4:25 AM  Dictation workstation:   [id number]      Physical Exam  Constitutional: Alert active, cooperative not in acute distress  Eyes: PERRLA, clear sclera  ENMT: Moist mucosal membranes, no exudate  Head / Neck: Atraumatic, normocephalic, supple neck, JVP not visualized  Lungs: Patent  airways, CTABL  Heart: RRR, S1S2, no murmurs appreciated, palpable pulses in all extremities  GI: Soft, NT, ND, bowel sounds present in all quadrants  MSK: Moves all extremities freely, no restriction  of ROM, no joint edema  Extremities: Intact x 4, no peripheral edema  : No Mckenna catheter inserted  Breast: Deferred  Neurological: AAO x 3 to person, place and date, facial muscles symmetrical, sensation intact, strength 4/4, no acute focal neurological deficits appreciated  Psychological: Appropriate mood and behavior    Relevant Results             Scheduled medications  allopurinol, 100 mg, oral, Daily  calcitriol, 0.5 mcg, oral, Daily  carvedilol, 6.25 mg, oral, BID  dextrose 2.5 % - LOW calcium 2.5 mEq/L 2,000 mL peritoneal dialysate, , intraperitoneal, 4x daily  dextrose 4.25 % - LOW calcium 2.5 mEq/L 2,000 mL peritoneal dialysate, , intraperitoneal, q24h  DULoxetine, 30 mg, oral, Daily  epoetin treasure or biosimilar, 10,000 Units, subcutaneous, Weekly  ergocalciferol, 1,250 mcg, oral, Weekly  levothyroxine, 25 mcg, oral, Daily before breakfast  magnesium oxide, 800 mg, oral, Daily  mirtazapine, 15 mg, oral, Nightly  sevelamer carbonate, 1,600 mg, oral, TID with meals  [Held by provider] warfarin, 2.5 mg, oral, Daily      Continuous medications       PRN medications  PRN medications: acetaminophen, diphenoxylate-atropine, HYDROmorphone, loperamide, naloxone, ondansetron **OR** ondansetron  Lower extremity venous duplex left    Result Date: 1/12/2024  Interpreted By:  Manju Sandoval, STUDY: [facility] LOWER EXTREMITY VENOUS DUPLEX LEFT;  1/12/2024 6:02 am   INDICATION: Signs/Symptoms:leg pain, swelling, stopped taking coumadin a few weeks ago.   COMPARISON: 04/15/2028   ACCESSION NUMBER(S): [id number]   ORDERING CLINICIAN: AIRAM WEIR   TECHNIQUE: Grayscale, color and spectral Doppler sonographic images of the left lower extremity deep venous system. The right common femoral vein was imaged for comparison.    FINDINGS: The common femoral vein and the entire femoral vein are incompletely compressible, consistent with partially occlusive thrombus. The popliteal vein is noncompressible, however, color flow is present, suggesting partially occlusive DVT. Suboptimal visualization of the calf veins.   The right common femoral vein is patent.   OTHER FINDINGS: 3.7 x 1.2 x 2.8 cm fluid collection in the popliteal fossa, most consistent with Baker's cyst. Subcutaneous edema throughout the imaged right lower extremity, most prominent in the calf.       Partially occlusive DVT in the left common femoral and entire length of the femoral vein. Suspected partially occlusive DVT in the popliteal vein.   Left lower extremity edema.   Baker's cyst Baker's cyst.       MACRO: None   Signed by: Manju Sandoval 1/12/2024 6:29 AM Dictation workstation:   [id number]    XR shoulder left 2+ views    Result Date: 1/12/2024  Interpreted By:  Manju Sandoval, STUDY: XR FOREARM LEFT 2 VIEWS; XR SHOULDER LEFT 2+ VIEWS; ;  1/12/2024 4:16 am; 1/12/2024 4:17 am   INDICATION: Signs/Symptoms:fall, pain.   COMPARISON: None.   ACCESSION NUMBER(S): [id number]; [id number]   ORDERING CLINICIAN: AIRAM WEIR   FINDINGS: Two views left shoulder and two views left forearm. No acute fracture or malalignment. Erosive changes at the acromioclavicular joint, similar to chest radiograph 10/18/2023. No overlying soft tissue swelling. No elbow joint effusion. Aortic stent graft partially imaged. Atherosclerotic vascular calcifications noted.       No acute osseous abnormality.   Erosive changes at the acromioclavicular joint is favored to be secondary to hyperparathyroidism/chronic renal disease. Infection is considered less likely, however, please correlate with clinical history.     MACRO: None   Signed by: Manju Sandoval 1/12/2024 4:32 AM Dictation workstation:   [id number]    XR forearm left 2 views    Result Date: 1/12/2024  Interpreted By:  Manju Sandoval,  STUDY: XR FOREARM LEFT 2 VIEWS; XR SHOULDER LEFT 2+ VIEWS; ;  1/12/2024 4:16 am; 1/12/2024 4:17 am   INDICATION: Signs/Symptoms:fall, pain.   COMPARISON: None.   ACCESSION NUMBER(S): [id number]; [id number]   ORDERING CLINICIAN: AIRAM WEIR   FINDINGS: Two views left shoulder and two views left forearm. No acute fracture or malalignment. Erosive changes at the acromioclavicular joint, similar to chest radiograph 10/18/2023. No overlying soft tissue swelling. No elbow joint effusion. Aortic stent graft partially imaged. Atherosclerotic vascular calcifications noted.       No acute osseous abnormality.   Erosive changes at the acromioclavicular joint is favored to be secondary to hyperparathyroidism/chronic renal disease. Infection is considered less likely, however, please correlate with clinical history.     MACRO: None   Signed by: Manju Sandoval 1/12/2024 4:32 AM Dictation workstation:   [id number]    CT abdomen pelvis wo IV contrast    Result Date: 1/12/2024  Interpreted By:  Manju Sandoval, STUDY: CT ABDOMEN PELVIS WO IV CONTRAST;  1/12/2024 4:09 am   INDICATION: Signs/Symptoms:fall, buttock pain, L hip pain.   COMPARISON: 11/06/2023   ACCESSION NUMBER(S): [id number]   ORDERING CLINICIAN: AIRAM WEIR   TECHNIQUE: Axial noncontrast CT images of the abdomen and pelvis with coronal and sagittal reconstructed images.   FINDINGS: LOWER CHEST: Stable bibasilar atelectasis or scarring. Partially imaged stent graft in the descending thoracic aorta. Stable cardiomegaly BONES: No acute osseous abnormality. Mild diffuse degenerative disc changes. ABDOMINAL WALL: Diffuse body wall edema. Periumbilical hernia containing the anterior wall of a segment of transverse colon. No inflammatory changes in the hernia sac. Fluid containing supraumbilical ventral hernias.   ABDOMEN: Lack of intravenous contrast limits evaluation of vessels and solid organs. LIVER: No focal parenchymal abnormality, within limits of  noncontrast CT. No perihepatic fluid. BILE DUCTS: No biliary dilatation. GALLBLADDER: Cholecystectomy greater PANCREAS: No peripancreatic inflammatory stranding or duct dilatation. SPLEEN: Within normal limits. ADRENALS: Within normal limits.   KIDNEYS, URETERS, URINARY BLADDER: No hydronephrosis or urinary tract calculus. Bilateral renal atrophy again noted. Stable right renal cysts. The urinary bladder is nondistended.   VESSELS: Postsurgical changes again noted including aortic stent graft with visceral branch bypass grafts. Stable peripherally calcified fluid collection around the superior mesenteric artery graft in the left upper quadrant. Vascular coils noted in the left internal iliac artery. RETROPERITONEUM: No pathologically enlarged lymph nodes.   PELVIS:   REPRODUCTIVE ORGANS: The uterus is not seen and may be atrophic or removed.   BOWEL: The stomach is nondistended. No dilated small bowel. The colon is nondistended appears grossly unremarkable. Mild colonic stool burden.  Normal appendix. PERITONEUM: Peritoneal dialysis catheter noted in the left mid abdomen. Small amount of free peritoneal fluid. No organized fluid collection. No pneumoperitoneum.       No acute abdominal or pelvic process.   Stable chronic findings as above.   MACRO: None   Signed by: Manju Sandoval 1/12/2024 4:25 AM Dictation workstation:   [id number]     Assessment/Plan      63 y.o. female past medical history of ESRD on dialysis, AAA, HFrEF (25% 03/2023) and pancreatitis, PE (03/2023 on warfarin)  presenting with left lower extremity pain as well as left arm pain.  Patient states she has not been feeling well for the last couple weeks, feeling down, lots of recent life stressors.  Due to this patient reports she was not taking any of medications.  She also missed a PD session      Principal Problem:    Left leg pain      PE/DVT  -Coumadin 5 mg daily, subtherapeutic  -INR 3.7>>5.3>>5.5>>4.5>>4>>3.2  -hold coumadin  -dc heparin  drip  -recheck INR in am     Gout: Complaining about bilateral wrist pain, tender wrist, but no rubor, calor  -On allopurinol 100 mg daily  -Elevated CRP level, suggestive of inflammatory arthritis of the wrist  -Uric acid level wnl     Depression  -Duloxetine 30 mg daily  -Consult psych: Appreciate evaluation and recommendations  -Patient gets upset and stops caring for herself     End-stage renal disease  -Consult nephrology: Appreciate management and recommendation  -Patient on peritoneal dialysis  -Renvela with meals      History of hypertension  -Continue Coreg     HFrEF  -appears euvolemic      DVT prophylaxis      Disposition: Supratherapeutic INR. Hold coumadin today. Recheck INR in am           Nkechi Antonio MD

## 2024-01-21 NOTE — PROGRESS NOTES
Seen on dialysis.  Labs are looking good.  She has some discomfort with more than 1500 mL refills, she had accidentally got the full 2 L fill this morning.  She has much less edema, blood pressures are coming down.  She can absolutely be discharged today from the renal perspective.  She will continue PD at home tonight.

## 2024-01-22 ENCOUNTER — APPOINTMENT (OUTPATIENT)
Dept: CARDIOLOGY | Facility: HOSPITAL | Age: 64
DRG: 299 | End: 2024-01-22
Payer: COMMERCIAL

## 2024-01-22 LAB
ANION GAP SERPL CALC-SCNC: 15 MMOL/L (ref 10–20)
BUN SERPL-MCNC: 34 MG/DL (ref 6–23)
CALCIUM SERPL-MCNC: 7.8 MG/DL (ref 8.6–10.3)
CHLORIDE SERPL-SCNC: 96 MMOL/L (ref 98–107)
CO2 SERPL-SCNC: 25 MMOL/L (ref 21–32)
CREAT SERPL-MCNC: 6.97 MG/DL (ref 0.5–1.05)
EGFRCR SERPLBLD CKD-EPI 2021: 6 ML/MIN/1.73M*2
ERYTHROCYTE [DISTWIDTH] IN BLOOD BY AUTOMATED COUNT: 17.8 % (ref 11.5–14.5)
GLUCOSE SERPL-MCNC: 93 MG/DL (ref 74–99)
HCT VFR BLD AUTO: 35.8 % (ref 36–46)
HGB BLD-MCNC: 11.4 G/DL (ref 12–16)
INR PPP: 2.5 (ref 0.9–1.1)
MCH RBC QN AUTO: 30.1 PG (ref 26–34)
MCHC RBC AUTO-ENTMCNC: 31.8 G/DL (ref 32–36)
MCV RBC AUTO: 95 FL (ref 80–100)
NRBC BLD-RTO: 0 /100 WBCS (ref 0–0)
PLATELET # BLD AUTO: 235 X10*3/UL (ref 150–450)
POTASSIUM SERPL-SCNC: 3.4 MMOL/L (ref 3.5–5.3)
PROTHROMBIN TIME: 28.1 SECONDS (ref 9.8–12.8)
RBC # BLD AUTO: 3.79 X10*6/UL (ref 4–5.2)
SODIUM SERPL-SCNC: 133 MMOL/L (ref 136–145)
WBC # BLD AUTO: 5.6 X10*3/UL (ref 4.4–11.3)

## 2024-01-22 PROCEDURE — 2500000004 HC RX 250 GENERAL PHARMACY W/ HCPCS (ALT 636 FOR OP/ED): Performed by: NURSE PRACTITIONER

## 2024-01-22 PROCEDURE — 87070 CULTURE OTHR SPECIMN AEROBIC: CPT | Mod: AHULAB | Performed by: INTERNAL MEDICINE

## 2024-01-22 PROCEDURE — 93005 ELECTROCARDIOGRAM TRACING: CPT

## 2024-01-22 PROCEDURE — 2500000004 HC RX 250 GENERAL PHARMACY W/ HCPCS (ALT 636 FOR OP/ED): Performed by: INTERNAL MEDICINE

## 2024-01-22 PROCEDURE — 80048 BASIC METABOLIC PNL TOTAL CA: CPT | Performed by: INTERNAL MEDICINE

## 2024-01-22 PROCEDURE — 36415 COLL VENOUS BLD VENIPUNCTURE: CPT | Performed by: INTERNAL MEDICINE

## 2024-01-22 PROCEDURE — 2500000001 HC RX 250 WO HCPCS SELF ADMINISTERED DRUGS (ALT 637 FOR MEDICARE OP): Performed by: INTERNAL MEDICINE

## 2024-01-22 PROCEDURE — 85610 PROTHROMBIN TIME: CPT | Performed by: INTERNAL MEDICINE

## 2024-01-22 PROCEDURE — 2500000001 HC RX 250 WO HCPCS SELF ADMINISTERED DRUGS (ALT 637 FOR MEDICARE OP): Performed by: NURSE PRACTITIONER

## 2024-01-22 PROCEDURE — 85027 COMPLETE CBC AUTOMATED: CPT | Performed by: INTERNAL MEDICINE

## 2024-01-22 PROCEDURE — 99232 SBSQ HOSP IP/OBS MODERATE 35: CPT | Performed by: INTERNAL MEDICINE

## 2024-01-22 PROCEDURE — 2500000001 HC RX 250 WO HCPCS SELF ADMINISTERED DRUGS (ALT 637 FOR MEDICARE OP): Performed by: PSYCHIATRY & NEUROLOGY

## 2024-01-22 PROCEDURE — 97162 PT EVAL MOD COMPLEX 30 MIN: CPT | Mod: GP

## 2024-01-22 PROCEDURE — 1100000001 HC PRIVATE ROOM DAILY

## 2024-01-22 PROCEDURE — 87015 SPECIMEN INFECT AGNT CONCNTJ: CPT | Mod: AHULAB | Performed by: INTERNAL MEDICINE

## 2024-01-22 PROCEDURE — 87102 FUNGUS ISOLATION CULTURE: CPT | Mod: AHULAB | Performed by: INTERNAL MEDICINE

## 2024-01-22 RX ORDER — DIPHENOXYLATE HYDROCHLORIDE AND ATROPINE SULFATE 2.5; .025 MG/1; MG/1
1 TABLET ORAL 4 TIMES DAILY PRN
Status: DISCONTINUED | OUTPATIENT
Start: 2024-01-22 | End: 2024-01-24 | Stop reason: HOSPADM

## 2024-01-22 RX ADMIN — SODIUM CHLORIDE, SODIUM LACTATE, CALCIUM CHLORIDE, MAGNESIUM CHLORIDE AND DEXTROSE: 4.25; 538; 448; 18.3; 5.08 INJECTION, SOLUTION INTRAPERITONEAL at 09:26

## 2024-01-22 RX ADMIN — HYDROMORPHONE HYDROCHLORIDE 0.5 MG: 1 INJECTION, SOLUTION INTRAMUSCULAR; INTRAVENOUS; SUBCUTANEOUS at 13:52

## 2024-01-22 RX ADMIN — CARVEDILOL 6.25 MG: 6.25 TABLET, FILM COATED ORAL at 09:27

## 2024-01-22 RX ADMIN — ALLOPURINOL 100 MG: 100 TABLET ORAL at 09:27

## 2024-01-22 RX ADMIN — CALCITRIOL CAPSULES 0.25 MCG 0.5 MCG: 0.25 CAPSULE ORAL at 09:27

## 2024-01-22 RX ADMIN — WARFARIN SODIUM 2.5 MG: 2.5 TABLET ORAL at 17:52

## 2024-01-22 RX ADMIN — ERGOCALCIFEROL 1250 MCG: 1.25 CAPSULE ORAL at 09:27

## 2024-01-22 RX ADMIN — HYDROMORPHONE HYDROCHLORIDE 0.5 MG: 1 INJECTION, SOLUTION INTRAMUSCULAR; INTRAVENOUS; SUBCUTANEOUS at 17:52

## 2024-01-22 RX ADMIN — DULOXETINE HYDROCHLORIDE 30 MG: 30 CAPSULE, DELAYED RELEASE ORAL at 09:27

## 2024-01-22 RX ADMIN — MAGNESIUM OXIDE TAB 400 MG (241.3 MG ELEMENTAL MG) 800 MG: 400 (241.3 MG) TAB at 09:27

## 2024-01-22 RX ADMIN — ONDANSETRON 4 MG: 2 INJECTION INTRAMUSCULAR; INTRAVENOUS at 22:00

## 2024-01-22 RX ADMIN — SEVELAMER CARBONATE 1600 MG: 800 TABLET, FILM COATED ORAL at 17:52

## 2024-01-22 RX ADMIN — SODIUM CHLORIDE, SODIUM LACTATE, CALCIUM CHLORIDE, MAGNESIUM CHLORIDE AND DEXTROSE: 2.5; 538; 448; 18.3; 5.08 INJECTION, SOLUTION INTRAPERITONEAL at 01:18

## 2024-01-22 RX ADMIN — LEVOTHYROXINE SODIUM 25 MCG: 25 TABLET ORAL at 06:05

## 2024-01-22 RX ADMIN — HYDROMORPHONE HYDROCHLORIDE 0.5 MG: 1 INJECTION, SOLUTION INTRAMUSCULAR; INTRAVENOUS; SUBCUTANEOUS at 22:00

## 2024-01-22 RX ADMIN — SODIUM CHLORIDE, SODIUM LACTATE, CALCIUM CHLORIDE, MAGNESIUM CHLORIDE AND DEXTROSE: 2.5; 538; 448; 18.3; 5.08 INJECTION, SOLUTION INTRAPERITONEAL at 22:23

## 2024-01-22 RX ADMIN — HYDROMORPHONE HYDROCHLORIDE 0.5 MG: 1 INJECTION, SOLUTION INTRAMUSCULAR; INTRAVENOUS; SUBCUTANEOUS at 01:18

## 2024-01-22 RX ADMIN — MIRTAZAPINE 15 MG: 15 TABLET, FILM COATED ORAL at 22:00

## 2024-01-22 RX ADMIN — SEVELAMER CARBONATE 1600 MG: 800 TABLET, FILM COATED ORAL at 09:27

## 2024-01-22 RX ADMIN — HYDROMORPHONE HYDROCHLORIDE 0.5 MG: 1 INJECTION, SOLUTION INTRAMUSCULAR; INTRAVENOUS; SUBCUTANEOUS at 08:02

## 2024-01-22 RX ADMIN — SODIUM CHLORIDE, SODIUM LACTATE, CALCIUM CHLORIDE, MAGNESIUM CHLORIDE AND DEXTROSE: 2.5; 538; 448; 18.3; 5.08 INJECTION, SOLUTION INTRAPERITONEAL at 17:52

## 2024-01-22 ASSESSMENT — COGNITIVE AND FUNCTIONAL STATUS - GENERAL
CLIMB 3 TO 5 STEPS WITH RAILING: TOTAL
PERSONAL GROOMING: A LOT
MOVING TO AND FROM BED TO CHAIR: A LITTLE
HELP NEEDED FOR BATHING: A LOT
STANDING UP FROM CHAIR USING ARMS: A LITTLE
DRESSING REGULAR LOWER BODY CLOTHING: A LOT
MOBILITY SCORE: 14
DAILY ACTIVITIY SCORE: 14
WALKING IN HOSPITAL ROOM: A LOT
TURNING FROM BACK TO SIDE WHILE IN FLAT BAD: A LITTLE
WALKING IN HOSPITAL ROOM: A LITTLE
MOBILITY SCORE: 19
STANDING UP FROM CHAIR USING ARMS: A LOT
CLIMB 3 TO 5 STEPS WITH RAILING: A LOT
MOVING TO AND FROM BED TO CHAIR: A LOT
TOILETING: A LOT
DRESSING REGULAR UPPER BODY CLOTHING: A LOT

## 2024-01-22 ASSESSMENT — PAIN SCALES - GENERAL
PAINLEVEL_OUTOF10: 10 - WORST POSSIBLE PAIN

## 2024-01-22 ASSESSMENT — PAIN - FUNCTIONAL ASSESSMENT
PAIN_FUNCTIONAL_ASSESSMENT: 0-10

## 2024-01-22 ASSESSMENT — PAIN DESCRIPTION - DESCRIPTORS
DESCRIPTORS: ACHING;DISCOMFORT;CRAMPING
DESCRIPTORS: ACHING
DESCRIPTORS: ACHING;CRUSHING;POUNDING
DESCRIPTORS: ACHING;DISCOMFORT

## 2024-01-22 ASSESSMENT — ACTIVITIES OF DAILY LIVING (ADL): ADL_ASSISTANCE: INDEPENDENT

## 2024-01-22 NOTE — CARE PLAN
Patient admitted for left leg pain, pos DVT, bridged to coumadin, has been supratheraputic, now therapeutic but feels very deconditioned.    Updates from Dr Antonio that patient is now requesting pt/ot, orders have been placed, then patient will need choiced, will need accepting FOC and will need auth.

## 2024-01-22 NOTE — PROGRESS NOTES
Physical Therapy    Physical Therapy Evaluation    Patient Name: Yoselyn Hernandez  MRN: [id number]  Today's Date: 1/22/2024   Time Calculation  Start Time: 1627  Stop Time: 1640  Time Calculation (min): 13 min    Assessment/Plan   PT Assessment  PT Assessment Results: Decreased endurance, Impaired balance, Decreased mobility, Decreased safety awareness, Pain  Rehab Prognosis: Good  Evaluation/Treatment Tolerance: Patient limited by pain  Medical Staff Made Aware: Yes  Strengths: Capable of completing ADLs semi/independent, Support of extended family/friends  Barriers to Participation: Coping skills, Housing layout  End of Session Communication: Bedside nurse, Care Coordinator  Assessment Comment: PT eval complete. Pt needing no hands on assist for bed mobility, per nursing report, likely not needing mobility assist OOB, however, grossly declined in functional mobility at this time due to pain level and perseveration.  End of Session Patient Position: Bed, 3 rail up, Alarm on  IP OR SWING BED PT PLAN  Inpatient or Swing Bed: Inpatient  PT Plan  Treatment/Interventions: Positioning, Therapeutic exercise, Therapeutic activity, Home exercise program, Endurance training, Strengthening, Neuromuscular re-education, Balance training, Stair training, Gait training, Transfer training, Bed mobility  PT Plan: Skilled PT  PT Frequency: 3 times per week  PT Discharge Recommendations: Low intensity level of continued care  Equipment Recommended upon Discharge: Wheeled walker  PT Recommended Transfer Status: Assist x1  PT - OK to Discharge: Yes (Per PT POC)      Subjective   General Visit Information:  General  Reason for Referral: Impaired Mobility  Referred By: Dr. Antonio  Past Medical History Relevant to Rehab:   Past Medical History:   Diagnosis Date    Other abnormalities of gait and mobility     Gait, antalgic    Personal history of other endocrine, nutritional and metabolic disease     History of hyperlipidemia    Personal  history of other specified conditions     History of shortness of breath     Past Surgical History:   Procedure Laterality Date    COLONOSCOPY  12/05/2017    Complete Colonoscopy    COLONOSCOPY  12/2021    [facility] 12-25    CT ABDOMEN PELVIS ANGIOGRAM W AND/OR WO IV CONTRAST  11/17/2014    CT ABDOMEN PELVIS ANGIOGRAM W AND/OR WO IV CONTRAST 11/17/2014 [facility] CLINICAL LEGACY    CT ABDOMEN PELVIS ANGIOGRAM W AND/OR WO IV CONTRAST  02/01/2018    CT ABDOMEN PELVIS ANGIOGRAM W AND/OR WO IV CONTRAST 2/1/2018 [facility] AIB LEGACY    CT ABDOMEN PELVIS ANGIOGRAM W AND/OR WO IV CONTRAST  02/23/2018    CT ABDOMEN PELVIS ANGIOGRAM W AND/OR WO IV CONTRAST [place] [facility] [place]    CT ABDOMEN PELVIS ANGIOGRAM W AND/OR WO IV CONTRAST  01/10/2019    CT ABDOMEN PELVIS ANGIOGRAM W AND/OR WO IV CONTRAST 1/10/2019 [facility] EMERGENCY LEGACY    CT ABDOMEN PELVIS ANGIOGRAM W AND/OR WO IV CONTRAST  11/25/2019    CT ABDOMEN PELVIS ANGIOGRAM W AND/OR WO IV CONTRAST 11/25/2019 [facility] ANCILLARY LEGACY    CT ABDOMEN PELVIS ANGIOGRAM W AND/OR WO IV CONTRAST  04/30/2021    CT ABDOMEN PELVIS ANGIOGRAM W AND/OR WO IV CONTRAST 4/30/2021 [facility] CLINICAL LEGACY    CT ABDOMEN PELVIS ANGIOGRAM W AND/OR WO IV CONTRAST  01/09/2017    CT ABDOMEN PELVIS ANGIOGRAM W AND/OR WO IV CONTRAST 1/9/2017 [facility] CLINICAL LEGACY    CT ABDOMEN PELVIS ANGIOGRAM W AND/OR WO IV CONTRAST  10/24/2018    CT ABDOMEN PELVIS ANGIOGRAM W AND/OR WO IV CONTRAST 10/24/2018 [facility] EMERGENCY LEGACY    CT ABDOMEN PELVIS ANGIOGRAM W AND/OR WO IV CONTRAST  12/13/2022    CT ABDOMEN PELVIS ANGIOGRAM W AND/OR WO IV CONTRAST 12/13/2022 [facility] LEGACY    CT AORTA AND BILATERAL ILIOFEMORAL RUNOFF ANGIOGRAM W AND/OR WO IV CONTRAST  05/12/2023    CT AORTA AND BILATERAL ILIOFEMORAL RUNOFF ANGIOGRAM W AND/OR WO IV CONTRAST 5[place]12/2023 [facility] [place]    HYSTERECTOMY  01/06/2014    Hysterectomy    IR ANGIOGRAM AORTA ABDOMEN  08/02/2019    IR ANGIOGRAM AORTA ABDOMEN [place]2/[place] [facility] [place]    IR ANGIOGRAM AORTA  ABDOMEN  08/19/2022    IR ANGIOGRAM AORTA ABDOMEN 8[place]19/2022 [facility] [place]    IR ANGIOGRAM AORTA THORACIC  02/21/2018    IR ANGIOGRAM AORTA THORACIC 2/21/2018 [facility] [place]    IR ANGIOGRAM ENDOVASCULAR AORTIC REPAIR  08/19/2022    IR ANGIOGRAM ENDOVASCULAR AORTIC REPAIR 8/19/2022 [facility] [place]    IR INTERVENTION NEURO STENT  08/02/2019    IR INTERVENTION NEURO STENT 8/2/2019 [facility] [place]    OTHER SURGICAL HISTORY  01/20/2017    Aortic Aneurysm Repair Thoracoabdominal       Family/Caregiver Present: Yes  Caregiver Feedback: Half sister into room at end of session, very encouraging  Prior to Session Communication: Bedside nurse  Patient Position Received: Bed, 3 rail up, Alarm on  Preferred Learning Style: auditory, verbal, visual  General Comment: Pt supine in bed when PT entered, cooperative to a point, perseverating on pain, tearful and labile throughout session, tends to respond well to emotional support and redirection.  Home Living:  Home Living  Type of Home: House  Lives With: Alone  Home Adaptive Equipment: Walker rolling or standard, Cane  Home Layout: Multi-level, Laundry second level, Full bath main level, Bed/bath upstairs, Stairs to alternate level with rails  Alternate Level Stairs-Rails: Right  Alternate Level Stairs-Number of Steps: 18  Home Access: Stairs to enter with rails  Entrance Stairs-Rails: Both  Entrance Stairs-Number of Steps: 8  Bathroom Shower/Tub:  (Because of peritoneal dialysis, only sponge bathing)  Bathroom Toilet: Handicapped height  Prior Level of Function:  Prior Function Per Pt/Caregiver Report  Level of [place]: Independent with ADLs and functional transfers, Independent with homemaking with ambulation  Receives Help From: Friends  ADL Assistance: Independent  Homemaking Assistance: Needs assistance (Reports assist from friends and 1/2 sister)  Ambulatory Assistance: Independent (Ambulating with cane, has WW on all floors of home, denies  falls)  Vocational: Retired (Teacher)  Leisure: [employer], TRAVEL  Hand Dominance: Right  Prior Function Comments: Reports mostly MOD I, able to complete own ADLs, some occasional assist for IADLs. Reporting no longer drives, has friend for assist with transport. Orders groceries to door.  Precautions:  Precautions  Hearing/Visual Limitations: Reading glasses, no hearing aides  Medical Precautions: Fall precautions  Precautions Comment: Peritoneal dialysis; LLE DVT+  Vital Signs:       Objective   Pain:  Pain Assessment  Pain Assessment: 0-10  Pain Score: 10 - Worst possible pain  Pain Type: Chronic pain, Intractable pain  Pain Location: Generalized  Pain Descriptors: Aching, Crushing, Pounding  Pain Frequency: Constant/continuous  Clinical Progression: Not changed  Pain Interventions: Repositioned, Distraction, Emotional support  Response to Interventions: Pt reports pain never gone, dilaudid helps for 30 minutes but nothing lasts, scared to go home like this.  Cognition:  Cognition  Overall Cognitive Status: Within Functional Limits  Orientation Level: Oriented X4  Safety/Judgement: Exceptions to WFL  Unable to Self-Monitor and Self-Correct Consistently: Minimal  Insight: Mild  Flexibility of Thought: Reduced flexibility  Planning: Reduced planning skills  Organization: Mildly disorganized    General Assessments:                  Activity Tolerance  Endurance: Tolerates less than 10 min exercise, no significant change in vital signs    Sensation  Sensation Comment: Pt denies numbness/tingling    Strength  Strength Comments: Appears WFL during bed mobility, but pt declines further mobility at this time  Strength  Strength Comments: Appears WFL during bed mobility, but pt declines further mobility at this time    Perception  Perseveration: Perseverates during conversation      Coordination  Movements are Fluid and Coordinated: Yes    Postural Control  Postural Control: Within Functional Limits  Head Control: WFL  Trunk  Control: WFL    Static Sitting Balance  Static Sitting-Balance Support: Feet supported, Bilateral upper extremity supported  Static Sitting-Level of Assistance: Modified independent  Dynamic Sitting Balance  Dynamic Sitting-Balance Support: Feet supported, Bilateral upper extremity supported    Static Standing Balance  Static Standing-Comment/Number of Minutes: Pt declines  Functional Assessments:  Bed Mobility  Bed Mobility: Yes  Bed Mobility 1  Bed Mobility 1: Long sit  Level of Assistance 1: Modified independent  Bed Mobility Comments 1: Pt demo's ability to perform all extremity movement, demo's ease of transfer into [facility], but declines further mobility at this time due to pain. Per report of nursing, was independent with mobility last week, limited by pain only    Transfers  Transfer: No (Pt declines at this time due to pain)    Ambulation/Gait Training  Ambulation/Gait Training Performed: No    Stairs  Stairs: No  Extremity/Trunk Assessments:  RUE   RUE : Exceptions to WFL (Noted full hand,elbow AROM and strength, but shoulder limited to 20 degrees AROM, full PROM with LUE assist. Shoulder MMT </= 2-/5)  LUE   LUE: Within Functional Limits  RLE   RLE : Within Functional Limits  LLE   LLE : Within Functional Limits  Outcome Measures:  [facility] Basic Mobility  Turning from your back to your side while in a flat bed without using bedrails: None  Moving from lying on your back to sitting on the side of a flat bed without using bedrails: None  Moving to and from bed to chair (including a wheelchair): A little  Standing up from a chair using your arms (e.g. wheelchair or bedside chair): A little  To walk in hospital room: A little  Climbing 3-5 steps with railing: A lot  Basic Mobility - Total Score: 19    Encounter Problems       Encounter Problems (Active)       Balance       STG - Maintains dynamic standing balance with upper extremity support At MOD I level, safely, no LOB        Start:  01/22/24    Expected  End:  02/05/24       INTERVENTIONS:  1. Practice standing with minimal support.  2. Educate patient about standing tolerance.  3. Educate patient about independence with gait, transfers, and ADL's.  4. Educate patient about use of assistive device.  5. Educate patient about self-directed care.            Mobility       STG - Patient will ambulate >/= 100', device PRN, Mod I, no LOB       Start:  01/22/24    Expected End:  02/05/24            STG - Patient will ascend and descend a flight of stairs At MOD I level, safely, no LOB, R handrail         Start:  01/22/24    Expected End:  02/05/24            Endurance - Pt to tolerate >/= 20 minutes therex, theract, gait and/or NMR with </= 5 minutes of rest breaks        Start:  01/22/24    Expected End:  02/05/24               Safety       Pt will verbalize and apply safety awareness and precautions 100% of time throughout entire session         Start:  01/22/24    Expected End:  02/05/24               Transfers       STG - Patient will perform bed mobility At MOD I level, safely, no LOB        Start:  01/22/24    Expected End:  02/05/24            STG - Patient will transfer sit to and from stand At MOD I level, safely, no LOB        Start:  01/22/24    Expected End:  02/05/24            STG - Patient will perform car transfer At MOD I level, safely, no LOB        Start:  01/22/24    Expected End:  02/05/24                   Education Documentation  Precautions, taught by Chaz Chauhan PT at 1/22/2024  4:52 PM.  Learner: Patient  Readiness: Nonacceptance  Method: Demonstration, Explanation  Response: Needs Reinforcement    Body Mechanics, taught by Chaz Chauhan PT at 1/22/2024  4:52 PM.  Learner: Patient  Readiness: Nonacceptance  Method: Demonstration, Explanation  Response: Needs Reinforcement    Mobility Training, taught by Chaz Chauhan PT at 1/22/2024  4:52 PM.  Learner: Patient  Readiness: Nonacceptance  Method: Demonstration,  Explanation  Response: Needs Reinforcement    Education Comments  No comments found.

## 2024-01-22 NOTE — PROGRESS NOTES
Yoselyn Hernandez is a 64 y.o. female on day 10 of admission presenting with Left leg pain.      Subjective   Patient was seen and examined at bedside this morning    Objective     Last Recorded Vitals  /72 (BP Location: Left arm, Patient Position: Lying)   Pulse 95   Temp 36.4 °C (97.5 °F) (Temporal)   Resp 16   Wt 63.9 kg (140 lb 14.4 oz)   SpO2 100%   Intake/Output last 3 Shifts:    Intake/Output Summary (Last 24 hours) at 1/22/2024 1301  Last data filed at 1/22/2024 0544  Gross per 24 hour   Intake 4800 ml   Output 8801 ml   Net -4001 ml         Admission Weight  Weight: 54.4 kg (120 lb) (01/12/24 0242)    Daily Weight  01/18/24 : 63.9 kg (140 lb 14.4 oz)    Image Results  Lower extremity venous duplex left  Narrative: Interpreted By:  Manju Sandoval,   STUDY:  [facility] US LOWER EXTREMITY VENOUS DUPLEX LEFT;  1/12/2024 6:02 am      INDICATION:  Signs/Symptoms:leg pain, swelling, stopped taking coumadin a few  weeks ago.      COMPARISON:  04/15/2028      ACCESSION NUMBER(S):  [id number]      ORDERING CLINICIAN:  AIRAM WEIR      TECHNIQUE:  Grayscale, color and spectral Doppler sonographic images of the left  lower extremity deep venous system. The right common femoral vein was  imaged for comparison.      FINDINGS:  The common femoral vein and the entire femoral vein are incompletely  compressible, consistent with partially occlusive thrombus. The  popliteal vein is noncompressible, however, color flow is present,  suggesting partially occlusive DVT. Suboptimal visualization of the  calf veins.      The right common femoral vein is patent.      OTHER FINDINGS: 3.7 x 1.2 x 2.8 cm fluid collection in the popliteal  fossa, most consistent with Baker's cyst. Subcutaneous edema  throughout the imaged right lower extremity, most prominent in the  calf.      Impression: Partially occlusive DVT in the left common femoral and entire length  of the femoral vein. Suspected partially occlusive DVT in the  popliteal  vein.      Left lower extremity edema.      Baker's cyst Baker's cyst.              MACRO:  None      Signed by: Manju Sandoval 1/12/2024 6:29 AM  Dictation workstation:   [id number]  XR shoulder left 2+ views, XR forearm left 2 views  Narrative: Interpreted By:  Manju Sandoval,   STUDY:  XR FOREARM LEFT 2 VIEWS; XR SHOULDER LEFT 2+ VIEWS; ;  1/12/2024 4:16  am; 1/12/2024 4:17 am      INDICATION:  Signs/Symptoms:fall, pain.      COMPARISON:  None.      ACCESSION NUMBER(S):  [id number]; [id number]      ORDERING CLINICIAN:  AIRAM WEIR      FINDINGS:  Two views left shoulder and two views left forearm. No acute fracture  or malalignment. Erosive changes at the acromioclavicular joint,  similar to chest radiograph 10/18/2023. No overlying soft tissue  swelling. No elbow joint effusion. Aortic stent graft partially  imaged. Atherosclerotic vascular calcifications noted.      Impression: No acute osseous abnormality.      Erosive changes at the acromioclavicular joint is favored to be  secondary to hyperparathyroidism/chronic renal disease. Infection is  considered less likely, however, please correlate with clinical  history.          MACRO:  None      Signed by: Manju Sandoval 1/12/2024 4:32 AM  Dictation workstation:   [id number]  CT abdomen pelvis wo IV contrast  Narrative: Interpreted By:  Manju Sandoval,   STUDY:  CT ABDOMEN PELVIS WO IV CONTRAST;  1/12/2024 4:09 am      INDICATION:  Signs/Symptoms:fall, buttock pain, L hip pain.      COMPARISON:  11/06/2023      ACCESSION NUMBER(S):  [id number]      ORDERING CLINICIAN:  AIRAM WEIR      TECHNIQUE:  Axial noncontrast CT images of the abdomen and pelvis with coronal  and sagittal reconstructed images.      FINDINGS:  LOWER CHEST: Stable bibasilar atelectasis or scarring. Partially  imaged stent graft in the descending thoracic aorta. Stable  cardiomegaly BONES: No acute osseous abnormality. Mild diffuse  degenerative disc changes. ABDOMINAL WALL:  Diffuse body wall edema.  Periumbilical hernia containing the anterior wall of a segment of  transverse colon. No inflammatory changes in the hernia sac. Fluid  containing supraumbilical ventral hernias.      ABDOMEN:  Lack of intravenous contrast limits evaluation of vessels and solid  organs. LIVER: No focal parenchymal abnormality, within limits of  noncontrast CT. No perihepatic fluid. BILE DUCTS: No biliary  dilatation. GALLBLADDER: Cholecystectomy greater  PANCREAS: No peripancreatic inflammatory stranding or duct dilatation.  SPLEEN: Within normal limits.  ADRENALS: Within normal limits.      KIDNEYS, URETERS, URINARY BLADDER: No hydronephrosis or urinary tract  calculus. Bilateral renal atrophy again noted. Stable right renal  cysts. The urinary bladder is nondistended.      VESSELS: Postsurgical changes again noted including aortic stent  graft with visceral branch bypass grafts. Stable peripherally  calcified fluid collection around the superior mesenteric artery  graft in the left upper quadrant. Vascular coils noted in the left  internal iliac artery. RETROPERITONEUM: No pathologically enlarged  lymph nodes.      PELVIS:      REPRODUCTIVE ORGANS: The uterus is not seen and may be atrophic or  removed.      BOWEL: The stomach is nondistended. No dilated small bowel. The colon  is nondistended appears grossly unremarkable. Mild colonic stool  burden.  Normal appendix. PERITONEUM: Peritoneal dialysis catheter  noted in the left mid abdomen. Small amount of free peritoneal fluid.  No organized fluid collection. No pneumoperitoneum.      Impression: No acute abdominal or pelvic process.      Stable chronic findings as above.      MACRO:  None      Signed by: Manju Sandoval 1/12/2024 4:25 AM  Dictation workstation:   [id number]      Physical Exam  Constitutional: Alert active, cooperative not in acute distress  Eyes: PERRLA, clear sclera  ENMT: Moist mucosal membranes, no exudate  Head / Neck: Atraumatic,  normocephalic, supple neck, JVP not visualized  Lungs: Patent airways, CTABL  Heart: RRR, S1S2, no murmurs appreciated, palpable pulses in all extremities  GI: Soft, NT, ND, bowel sounds present in all quadrants  MSK: Moves all extremities freely, no restriction  of ROM, no joint edema  Extremities: Intact x 4, no peripheral edema  : No Mckenna catheter inserted  Breast: Deferred  Neurological: AAO x 3 to person, place and date, facial muscles symmetrical, sensation intact, strength 4/4, no acute focal neurological deficits appreciated  Psychological: Appropriate mood and behavior    Relevant Results             Scheduled medications  allopurinol, 100 mg, oral, Daily  calcitriol, 0.5 mcg, oral, Daily  carvedilol, 6.25 mg, oral, BID  dextrose 2.5 % - LOW calcium 2.5 mEq/L 2,000 mL peritoneal dialysate, , intraperitoneal, 4x daily  dextrose 4.25 % - LOW calcium 2.5 mEq/L 2,000 mL peritoneal dialysate, , intraperitoneal, q24h  DULoxetine, 30 mg, oral, Daily  epoetin treasure or biosimilar, 10,000 Units, subcutaneous, Weekly  ergocalciferol, 1,250 mcg, oral, Weekly  levothyroxine, 25 mcg, oral, Daily before breakfast  magnesium oxide, 800 mg, oral, Daily  mirtazapine, 15 mg, oral, Nightly  sevelamer carbonate, 1,600 mg, oral, TID with meals  warfarin, 2.5 mg, oral, Daily      Continuous medications       PRN medications  PRN medications: acetaminophen, diphenoxylate-atropine, HYDROmorphone, naloxone, ondansetron **OR** ondansetron  Lower extremity venous duplex left    Result Date: 1/12/2024  Interpreted By:  aMnju Sandoval, STUDY: [facility] LOWER EXTREMITY VENOUS DUPLEX LEFT;  1/12/2024 6:02 am   INDICATION: Signs/Symptoms:leg pain, swelling, stopped taking coumadin a few weeks ago.   COMPARISON: 04/15/2028   ACCESSION NUMBER(S): [id number]   ORDERING CLINICIAN: AIRAM WEIR   TECHNIQUE: Grayscale, color and spectral Doppler sonographic images of the left lower extremity deep venous system. The right common femoral vein  was imaged for comparison.   FINDINGS: The common femoral vein and the entire femoral vein are incompletely compressible, consistent with partially occlusive thrombus. The popliteal vein is noncompressible, however, color flow is present, suggesting partially occlusive DVT. Suboptimal visualization of the calf veins.   The right common femoral vein is patent.   OTHER FINDINGS: 3.7 x 1.2 x 2.8 cm fluid collection in the popliteal fossa, most consistent with Baker's cyst. Subcutaneous edema throughout the imaged right lower extremity, most prominent in the calf.       Partially occlusive DVT in the left common femoral and entire length of the femoral vein. Suspected partially occlusive DVT in the popliteal vein.   Left lower extremity edema.   Baker's cyst Baker's cyst.       MACRO: None   Signed by: Manju Sandoval 1/12/2024 6:29 AM Dictation workstation:   [id number]    XR shoulder left 2+ views    Result Date: 1/12/2024  Interpreted By:  Manju Sandoval, STUDY: XR FOREARM LEFT 2 VIEWS; XR SHOULDER LEFT 2+ VIEWS; ;  1/12/2024 4:16 am; 1/12/2024 4:17 am   INDICATION: Signs/Symptoms:fall, pain.   COMPARISON: None.   ACCESSION NUMBER(S): [id number]; [id number]   ORDERING CLINICIAN: AIRAM WEIR   FINDINGS: Two views left shoulder and two views left forearm. No acute fracture or malalignment. Erosive changes at the acromioclavicular joint, similar to chest radiograph 10/18/2023. No overlying soft tissue swelling. No elbow joint effusion. Aortic stent graft partially imaged. Atherosclerotic vascular calcifications noted.       No acute osseous abnormality.   Erosive changes at the acromioclavicular joint is favored to be secondary to hyperparathyroidism/chronic renal disease. Infection is considered less likely, however, please correlate with clinical history.     MACRO: None   Signed by: Manju Sandoval 1/12/2024 4:32 AM Dictation workstation:   [id number]    XR forearm left 2 views    Result Date:  1/12/2024  Interpreted By:  Manju Sandoval, STUDY: XR FOREARM LEFT 2 VIEWS; XR SHOULDER LEFT 2+ VIEWS; ;  1/12/2024 4:16 am; 1/12/2024 4:17 am   INDICATION: Signs/Symptoms:fall, pain.   COMPARISON: None.   ACCESSION NUMBER(S): [id number]; [id number]   ORDERING CLINICIAN: AIRAM WEIR   FINDINGS: Two views left shoulder and two views left forearm. No acute fracture or malalignment. Erosive changes at the acromioclavicular joint, similar to chest radiograph 10/18/2023. No overlying soft tissue swelling. No elbow joint effusion. Aortic stent graft partially imaged. Atherosclerotic vascular calcifications noted.       No acute osseous abnormality.   Erosive changes at the acromioclavicular joint is favored to be secondary to hyperparathyroidism/chronic renal disease. Infection is considered less likely, however, please correlate with clinical history.     MACRO: None   Signed by: Manju Sandoval 1/12/2024 4:32 AM Dictation workstation:   [id number]    CT abdomen pelvis wo IV contrast    Result Date: 1/12/2024  Interpreted By:  Manju Sandoval, STUDY: CT ABDOMEN PELVIS WO IV CONTRAST;  1/12/2024 4:09 am   INDICATION: Signs/Symptoms:fall, buttock pain, L hip pain.   COMPARISON: 11/06/2023   ACCESSION NUMBER(S): [id number]   ORDERING CLINICIAN: AIRAM WEIR   TECHNIQUE: Axial noncontrast CT images of the abdomen and pelvis with coronal and sagittal reconstructed images.   FINDINGS: LOWER CHEST: Stable bibasilar atelectasis or scarring. Partially imaged stent graft in the descending thoracic aorta. Stable cardiomegaly BONES: No acute osseous abnormality. Mild diffuse degenerative disc changes. ABDOMINAL WALL: Diffuse body wall edema. Periumbilical hernia containing the anterior wall of a segment of transverse colon. No inflammatory changes in the hernia sac. Fluid containing supraumbilical ventral hernias.   ABDOMEN: Lack of intravenous contrast limits evaluation of vessels and solid organs. LIVER: No focal  parenchymal abnormality, within limits of noncontrast CT. No perihepatic fluid. BILE DUCTS: No biliary dilatation. GALLBLADDER: Cholecystectomy greater PANCREAS: No peripancreatic inflammatory stranding or duct dilatation. SPLEEN: Within normal limits. ADRENALS: Within normal limits.   KIDNEYS, URETERS, URINARY BLADDER: No hydronephrosis or urinary tract calculus. Bilateral renal atrophy again noted. Stable right renal cysts. The urinary bladder is nondistended.   VESSELS: Postsurgical changes again noted including aortic stent graft with visceral branch bypass grafts. Stable peripherally calcified fluid collection around the superior mesenteric artery graft in the left upper quadrant. Vascular coils noted in the left internal iliac artery. RETROPERITONEUM: No pathologically enlarged lymph nodes.   PELVIS:   REPRODUCTIVE ORGANS: The uterus is not seen and may be atrophic or removed.   BOWEL: The stomach is nondistended. No dilated small bowel. The colon is nondistended appears grossly unremarkable. Mild colonic stool burden.  Normal appendix. PERITONEUM: Peritoneal dialysis catheter noted in the left mid abdomen. Small amount of free peritoneal fluid. No organized fluid collection. No pneumoperitoneum.       No acute abdominal or pelvic process.   Stable chronic findings as above.   MACRO: None   Signed by: Manju Sandoval 1/12/2024 4:25 AM Dictation workstation:   [id number]     Assessment/Plan      63 y.o. female past medical history of ESRD on dialysis, AAA, HFrEF (25% 03/2023) and pancreatitis, PE (03/2023 on warfarin)  presenting with left lower extremity pain as well as left arm pain.  Patient states she has not been feeling well for the last couple weeks, feeling down, lots of recent life stressors.  Due to this patient reports she was not taking any of medications.  She also missed a PD session      Principal Problem:    Left leg pain      PE/DVT  -Coumadin 5 mg daily, subtherapeutic  -INR 2.5 today  -resume  coumadin 2.5 mg daily  -recheck INR in am    Chronic diarrhea  -follow with GI as outpatient  -lomotil prn  -advised her to continue outpatient follow up     Gout: Complaining about bilateral wrist pain, tender wrist, but no rubor, calor  -On allopurinol 100 mg daily  -Elevated CRP level, suggestive of inflammatory arthritis of the wrist  -Uric acid level wnl     Depression  -Duloxetine 30 mg daily  -Consult psych: Appreciate evaluation and recommendations  -Patient gets upset and stops caring for herself     End-stage renal disease  -Consult nephrology: Appreciate management and recommendation  -Patient on peritoneal dialysis  -Renvela with meals      History of hypertension  -Continue Coreg     HFrEF  -appears euvolemic      DVT prophylaxis      Disposition: PT/OT eval. Patient now interested in SNF placement           Nkechi Antonio MD

## 2024-01-23 ENCOUNTER — APPOINTMENT (OUTPATIENT)
Dept: CARDIOLOGY | Facility: HOSPITAL | Age: 64
DRG: 299 | End: 2024-01-23
Payer: COMMERCIAL

## 2024-01-23 LAB
ANION GAP SERPL CALC-SCNC: 11 MMOL/L (ref 10–20)
BUN SERPL-MCNC: 34 MG/DL (ref 6–23)
CALCIUM SERPL-MCNC: 7.7 MG/DL (ref 8.6–10.3)
CHLORIDE SERPL-SCNC: 95 MMOL/L (ref 98–107)
CLARITY FLD: CLEAR
CO2 SERPL-SCNC: 31 MMOL/L (ref 21–32)
COLOR FLD: COLORLESS
CREAT SERPL-MCNC: 7.14 MG/DL (ref 0.5–1.05)
EGFRCR SERPLBLD CKD-EPI 2021: 6 ML/MIN/1.73M*2
ERYTHROCYTE [DISTWIDTH] IN BLOOD BY AUTOMATED COUNT: 17.4 % (ref 11.5–14.5)
GLUCOSE SERPL-MCNC: 72 MG/DL (ref 74–99)
HCT VFR BLD AUTO: 34.6 % (ref 36–46)
HGB BLD-MCNC: 11 G/DL (ref 12–16)
INR PPP: 2.4 (ref 0.9–1.1)
MCH RBC QN AUTO: 29.8 PG (ref 26–34)
MCHC RBC AUTO-ENTMCNC: 31.8 G/DL (ref 32–36)
MCV RBC AUTO: 94 FL (ref 80–100)
NRBC BLD-RTO: 0 /100 WBCS (ref 0–0)
PLATELET # BLD AUTO: 222 X10*3/UL (ref 150–450)
POTASSIUM SERPL-SCNC: 3.4 MMOL/L (ref 3.5–5.3)
PROTHROMBIN TIME: 27.8 SECONDS (ref 9.8–12.8)
RBC # BLD AUTO: 3.69 X10*6/UL (ref 4–5.2)
RBC # FLD AUTO: 0 /UL
SODIUM SERPL-SCNC: 134 MMOL/L (ref 136–145)
WBC # BLD AUTO: 5.1 X10*3/UL (ref 4.4–11.3)
WBC # FLD AUTO: 203 /UL

## 2024-01-23 PROCEDURE — 85610 PROTHROMBIN TIME: CPT | Performed by: INTERNAL MEDICINE

## 2024-01-23 PROCEDURE — 89050 BODY FLUID CELL COUNT: CPT | Performed by: INTERNAL MEDICINE

## 2024-01-23 PROCEDURE — 97165 OT EVAL LOW COMPLEX 30 MIN: CPT | Mod: GO

## 2024-01-23 PROCEDURE — 2500000004 HC RX 250 GENERAL PHARMACY W/ HCPCS (ALT 636 FOR OP/ED): Performed by: INTERNAL MEDICINE

## 2024-01-23 PROCEDURE — 2500000005 HC RX 250 GENERAL PHARMACY W/O HCPCS: Performed by: INTERNAL MEDICINE

## 2024-01-23 PROCEDURE — 2500000004 HC RX 250 GENERAL PHARMACY W/ HCPCS (ALT 636 FOR OP/ED): Performed by: NURSE PRACTITIONER

## 2024-01-23 PROCEDURE — 6350000001 HC RX 635 EPOETIN >10,000 UNITS: Mod: JZ | Performed by: INTERNAL MEDICINE

## 2024-01-23 PROCEDURE — 80048 BASIC METABOLIC PNL TOTAL CA: CPT | Performed by: INTERNAL MEDICINE

## 2024-01-23 PROCEDURE — 93005 ELECTROCARDIOGRAM TRACING: CPT

## 2024-01-23 PROCEDURE — 85027 COMPLETE CBC AUTOMATED: CPT | Performed by: INTERNAL MEDICINE

## 2024-01-23 PROCEDURE — 2500000001 HC RX 250 WO HCPCS SELF ADMINISTERED DRUGS (ALT 637 FOR MEDICARE OP): Performed by: NURSE PRACTITIONER

## 2024-01-23 PROCEDURE — 1100000001 HC PRIVATE ROOM DAILY

## 2024-01-23 PROCEDURE — 36415 COLL VENOUS BLD VENIPUNCTURE: CPT | Performed by: INTERNAL MEDICINE

## 2024-01-23 PROCEDURE — 2500000001 HC RX 250 WO HCPCS SELF ADMINISTERED DRUGS (ALT 637 FOR MEDICARE OP): Performed by: PSYCHIATRY & NEUROLOGY

## 2024-01-23 PROCEDURE — 99232 SBSQ HOSP IP/OBS MODERATE 35: CPT | Performed by: INTERNAL MEDICINE

## 2024-01-23 RX ORDER — LIDOCAINE 560 MG/1
1 PATCH PERCUTANEOUS; TOPICAL; TRANSDERMAL DAILY
Status: DISCONTINUED | OUTPATIENT
Start: 2024-01-23 | End: 2024-01-24 | Stop reason: HOSPADM

## 2024-01-23 RX ORDER — WARFARIN 2.5 MG/1
2.5 TABLET ORAL EVERY EVENING
Qty: 30 TABLET | Refills: 0 | Status: SHIPPED | OUTPATIENT
Start: 2024-01-23 | End: 2024-02-07 | Stop reason: HOSPADM

## 2024-01-23 RX ADMIN — DULOXETINE HYDROCHLORIDE 30 MG: 30 CAPSULE, DELAYED RELEASE ORAL at 09:28

## 2024-01-23 RX ADMIN — CARVEDILOL 6.25 MG: 6.25 TABLET, FILM COATED ORAL at 09:28

## 2024-01-23 RX ADMIN — SODIUM CHLORIDE, SODIUM LACTATE, CALCIUM CHLORIDE, MAGNESIUM CHLORIDE AND DEXTROSE: 4.25; 538; 448; 18.3; 5.08 INJECTION, SOLUTION INTRAPERITONEAL at 09:28

## 2024-01-23 RX ADMIN — HYDROMORPHONE HYDROCHLORIDE 0.5 MG: 1 INJECTION, SOLUTION INTRAMUSCULAR; INTRAVENOUS; SUBCUTANEOUS at 09:28

## 2024-01-23 RX ADMIN — ONDANSETRON 4 MG: 2 INJECTION INTRAMUSCULAR; INTRAVENOUS at 21:03

## 2024-01-23 RX ADMIN — SODIUM CHLORIDE, SODIUM LACTATE, CALCIUM CHLORIDE, MAGNESIUM CHLORIDE AND DEXTROSE: 2.5; 538; 448; 18.3; 5.08 INJECTION, SOLUTION INTRAPERITONEAL at 23:52

## 2024-01-23 RX ADMIN — ALLOPURINOL 100 MG: 100 TABLET ORAL at 09:28

## 2024-01-23 RX ADMIN — HYDROMORPHONE HYDROCHLORIDE 0.5 MG: 1 INJECTION, SOLUTION INTRAMUSCULAR; INTRAVENOUS; SUBCUTANEOUS at 21:03

## 2024-01-23 RX ADMIN — EPOETIN ALFA-EPBX 10000 UNITS: 10000 INJECTION, SOLUTION INTRAVENOUS; SUBCUTANEOUS at 09:28

## 2024-01-23 RX ADMIN — SEVELAMER CARBONATE 1600 MG: 800 TABLET, FILM COATED ORAL at 12:06

## 2024-01-23 RX ADMIN — CALCITRIOL CAPSULES 0.25 MCG 0.5 MCG: 0.25 CAPSULE ORAL at 09:28

## 2024-01-23 RX ADMIN — SODIUM CHLORIDE, SODIUM LACTATE, CALCIUM CHLORIDE, MAGNESIUM CHLORIDE AND DEXTROSE: 2.5; 538; 448; 18.3; 5.08 INJECTION, SOLUTION INTRAPERITONEAL at 17:20

## 2024-01-23 RX ADMIN — SEVELAMER CARBONATE 1600 MG: 800 TABLET, FILM COATED ORAL at 17:18

## 2024-01-23 RX ADMIN — MAGNESIUM OXIDE TAB 400 MG (241.3 MG ELEMENTAL MG) 800 MG: 400 (241.3 MG) TAB at 09:28

## 2024-01-23 RX ADMIN — LEVOTHYROXINE SODIUM 25 MCG: 25 TABLET ORAL at 06:09

## 2024-01-23 RX ADMIN — HYDROMORPHONE HYDROCHLORIDE 0.5 MG: 1 INJECTION, SOLUTION INTRAMUSCULAR; INTRAVENOUS; SUBCUTANEOUS at 03:25

## 2024-01-23 RX ADMIN — WARFARIN SODIUM 2.5 MG: 2.5 TABLET ORAL at 17:17

## 2024-01-23 RX ADMIN — LIDOCAINE 1 PATCH: 4 PATCH TOPICAL at 12:06

## 2024-01-23 RX ADMIN — MIRTAZAPINE 15 MG: 15 TABLET, FILM COATED ORAL at 21:03

## 2024-01-23 RX ADMIN — HYDROMORPHONE HYDROCHLORIDE 0.5 MG: 1 INJECTION, SOLUTION INTRAMUSCULAR; INTRAVENOUS; SUBCUTANEOUS at 15:43

## 2024-01-23 ASSESSMENT — COGNITIVE AND FUNCTIONAL STATUS - GENERAL
DRESSING REGULAR LOWER BODY CLOTHING: A LITTLE
TOILETING: A LITTLE
EATING MEALS: A LITTLE
HELP NEEDED FOR BATHING: A LOT
STANDING UP FROM CHAIR USING ARMS: A LITTLE
PERSONAL GROOMING: A LITTLE
MOVING TO AND FROM BED TO CHAIR: A LITTLE
DAILY ACTIVITIY SCORE: 18
PERSONAL GROOMING: A LITTLE
DAILY ACTIVITIY SCORE: 17
DRESSING REGULAR LOWER BODY CLOTHING: A LITTLE
CLIMB 3 TO 5 STEPS WITH RAILING: A LOT
MOBILITY SCORE: 19
DRESSING REGULAR UPPER BODY CLOTHING: A LITTLE
HELP NEEDED FOR BATHING: A LOT
DRESSING REGULAR LOWER BODY CLOTHING: A LITTLE
WALKING IN HOSPITAL ROOM: A LITTLE
DRESSING REGULAR UPPER BODY CLOTHING: A LITTLE
TOILETING: A LITTLE

## 2024-01-23 ASSESSMENT — PAIN - FUNCTIONAL ASSESSMENT
PAIN_FUNCTIONAL_ASSESSMENT: 0-10

## 2024-01-23 ASSESSMENT — PAIN SCALES - GENERAL
PAINLEVEL_OUTOF10: 8
PAINLEVEL_OUTOF10: 10 - WORST POSSIBLE PAIN
PAINLEVEL_OUTOF10: 10 - WORST POSSIBLE PAIN
PAINLEVEL_OUTOF10: 6
PAINLEVEL_OUTOF10: 9
PAINLEVEL_OUTOF10: 10 - WORST POSSIBLE PAIN

## 2024-01-23 ASSESSMENT — PAIN SCALES - WONG BAKER: WONGBAKER_NUMERICALRESPONSE: NO HURT

## 2024-01-23 ASSESSMENT — ACTIVITIES OF DAILY LIVING (ADL): ADL_ASSISTANCE: INDEPENDENT

## 2024-01-23 ASSESSMENT — PAIN DESCRIPTION - DESCRIPTORS: DESCRIPTORS: ACHING;CRAMPING;THROBBING

## 2024-01-23 NOTE — PROCEDURES
Patient undergoing peritoneal dialysis.  She offers multiple complaints.  She reports ongoing loose stool with fecal incontinence.  Now has Lomotil ordered.  She has new onset abdominal pain.  I discussed her PD fluid with her nurse.  Her fluid is clear albeit they did note 1 fibrin clot.  She reports pretty severe abdominal discomfort to touch.  Her PD fluid has been reduced to 1.5 L with each exchange.  I have ordered a cell count and culture to rule out peritonitis.  She is afebrile without leukocytosis.  Blood pressures are trending lower, we may have to hold the 4.25% dextrose exchange in the a.m.  But I will continue her peritoneal dialysis as ordered.  She is on the calcitriol, vitamin D and phosphorus binder.  Nephrology will follow closely with her care.

## 2024-01-23 NOTE — PROGRESS NOTES
"Spiritual Care Visit    Clinical Encounter Type  Visited With: Patient  Routine Visit: Introduction  Continue Visiting: No  Surgical Visit: Post-op  Referral From:  (New referral)  Referral To:     Nondenominational Encounters  Nondenominational Needs: Prayer    Patient Spiritual Care Encounters  Feelings of Loneliness: Fair  Feelings of Hopelessness: Moderate    Assessment: The pt was pleased to have a visit.  Concern: \"I am tired.\" She stated that she is not ready to die, but when God's timing says that it is time for her to leave she will be ready to go.  Awareness: The pt discussed her living situation that is concerning because she is alone. \"I have to do everything by myself and for myself.\"  Support: She has no immediate family to depend upon for support. There are friends that are able to offer limited assistance.  Outcome: After prayer and supportive listening she said that she needed to talk. She was encouraged to continue with her \"self talk.\" She was able to say that things are not as bad they seem, There are problems to confront, but she stated that she wanted to continue to \"carry on.\" \"I am going to be alright.\"   Follow up: There will be another visit scheduled if requested.      Chaplain Lawrence Peña                               "

## 2024-01-23 NOTE — PROGRESS NOTES
Met with patient at bedside. Patient requesting meals on wheels, sw submitted referral. MOW will get into contact with patient.

## 2024-01-23 NOTE — PROGRESS NOTES
Occupational Therapy    Evaluation    Patient Name: Yoselyn Hernandez  MRN: [id number]  Today's Date: 1/23/2024  Time Calculation  Start Time: 1420  Stop Time: 1438  Time Calculation (min): 18 min        Assessment:  Prognosis: Good  Barriers to Discharge: Decreased caregiver support  Evaluation/Treatment Tolerance: Patient limited by fatigue  Medical Staff Made Aware: Yes  End of Session Communication: Bedside nurse  End of Session Patient Position: Bed, 3 rail up, Alarm on  Prognosis: Good  Barriers to Discharge: Decreased caregiver support  Evaluation/Treatment Tolerance: Patient limited by fatigue  Medical Staff Made Aware: Yes  Strengths: Capable of completing ADLs semi/independent  Barriers to Participation: Coping skills, Attitude of self, Housing layout  Plan:  Treatment Interventions: ADL retraining, Functional transfer training  OT Frequency: 2 times per week  OT Discharge Recommendations: Low intensity level of continued care  Equipment Recommended upon Discharge: Wheeled walker  OT Recommended Transfer Status: Minimal assist  OT - OK to Discharge: Yes  Treatment Interventions: ADL retraining, Functional transfer training    Subjective   Current Problem:  1. Left leg pain  Referral to Primary Care - Family Practice    Referral to Occupational Therapy    Referral to Physical Therapy    Referral to Home Health      2. Secondary hyperparathyroidism of renal origin (CMS/HCC)  carvedilol (Coreg) 6.25 mg tablet      3. Depression with anxiety  mirtazapine (Remeron) 15 mg tablet      4. Diarrhea, unspecified type  Creon 24,000-76,000 -120,000 unit capsule      5. Hyperlipidemia, unspecified hyperlipidemia type  atorvastatin (Lipitor) 40 mg tablet      6. Deep vein thrombosis (DVT) of femoral vein, unspecified chronicity, unspecified laterality (CMS/HCC)  warfarin (Coumadin) 2.5 mg tablet    warfarin (Coumadin) 2.5 mg tablet      7. Left arm pain        8. Struck bath tub with fall, initial encounter           General:  General  Reason for Referral: 64 y.o. F admitted for L leg pain  Referred By: Dr. Antonio  Past Medical History Relevant to Rehab: HLD  Family/Caregiver Present: No  Prior to Session Communication: Bedside nurse  Patient Position Received: Bed, 3 rail up, Alarm on  Preferred Learning Style: auditory, verbal, visual  General Comment: Pt supine in bed when OT entered, cooperative to a point, , tearful and labile throughout session, tends to respond well to emotional support and redirection.  Precautions:  Hearing/Visual Limitations: Reading glasses, no hearing aides  Medical Precautions: Fall precautions  Precautions Comment: Peritoneal dialysis; LLE DVT+  Vital Signs:     Pain:  Pain Assessment  Pain Assessment: 0-10  Pain Score: 9  Pain Type: Chronic pain  Pain Location: Generalized    Objective   Cognition:  Overall Cognitive Status: Within Functional Limits  Orientation Level: Oriented X4  Safety/Judgement: Exceptions to WFL  Insight: Mild  Planning: Reduced planning skills  Organization: Mildly disorganized           Home Living:  Type of Home: House  Lives With: Alone  Home Adaptive Equipment: Walker rolling or standard, Cane  Home Layout: Multi-level, Laundry second level, Full bath main level, Bed/bath upstairs, Stairs to alternate level with rails  Alternate Level Stairs-Rails: Right  Alternate Level Stairs-Number of Steps: 18  Home Access: Stairs to enter with rails  Entrance Stairs-Rails: Both  Entrance Stairs-Number of Steps: 8  Bathroom Shower/Tub: Tub/shower unit  Bathroom Toilet: Handicapped height  Bathroom Equipment: Hand-held shower hose  Prior Function:  Level of [place]: Independent with ADLs and functional transfers, Independent with homemaking with ambulation  Receives Help From: Family, Friends  ADL Assistance: Independent  Homemaking Assistance: Needs assistance  Ambulatory Assistance: Independent (Ambulating with cane, has WW on all floors of home, denies falls)  Vocational:  Retired ()  Hand Dominance: Right  Prior Function Comments: Reports mostly MOD I, able to complete own ADLs, some occasional assist for IADLs. Reporting no longer drives, has friend for assist with transport. Orders groceries to door.  IADL History:  Current License: No  Mode of Transportation: Family, Friends  ADL:  LE Dressing Assistance: Stand by  Activity Tolerance:  Endurance: Tolerates less than 10 min exercise, no significant change in vital signs  Bed Mobility/Transfers: Bed Mobility  Bed Mobility: Yes  Bed Mobility 1  Bed Mobility 1: Supine to sitting, Sitting to supine  Level of Assistance 1: Modified independent        Vision:Vision - Basic Assessment  Current Vision: No visual deficits  Sensation:  Sensation Comment: Pt denies numbness/tingling  Strength:  Strength Comments: Appears WFL during bed mobility, but pt declines further mobility at this time  Perception:  Perseveration: Perseverates during ADLs  Coordination:      Hand Function:     Extremities: RUE   RUE : Exceptions to WFL (oted full hand,elbow AROM and strength, but shoulder limited to 20 degrees AROM, full PROM with LUE assist. Shoulder MMT </= 2-/5) and LUE   LUE: Within Functional Limits      Outcome Measures:[facility] Daily Activity  Putting on and taking off regular lower body clothing: A little  Bathing (including washing, rinsing, drying): A lot  Putting on and taking off regular upper body clothing: A little  Toileting, which includes using toilet, bedpan or urinal: A little  Taking care of personal grooming such as brushing teeth: A little  Eating Meals: A little  Daily Activity - Total Score: 17        Education Documentation  Body Mechanics, taught by Anjana Steven OT at 1/23/2024  4:03 PM.  Learner: Patient  Readiness: Acceptance  Method: Explanation  Response: Verbalizes Understanding    ADL Training, taught by Anjana Steven OT at 1/23/2024  4:03 PM.  Learner: Patient  Readiness: Acceptance  Method:  Explanation  Response: Verbalizes Understanding    Education Comments  No comments found.        OP EDUCATION:  Education  Individual(s) Educated: Patient  Risk and Benefits Discussed with Patient/Caregiver/Other: yes  Patient/Caregiver Demonstrated Understanding: yes  Plan of Care Discussed and Agreed Upon: yes  Patient Response to Education: Patient/Caregiver Verbalized Understanding of Information, Patient/Caregiver Performed Return Demonstration of Exercises/Activities, Patient/Caregiver Asked Appropriate Questions    Goals:  Encounter Problems       Encounter Problems (Active)       ADLs       Patient with complete upper body dressing with independent level of assistance donning and doffing all UE clothes with no adaptive equipment while supported sitting       Start:  01/23/24    Expected End:  02/06/24            Patient with complete lower body dressing with modified independent level of assistance donning and doffing all LE clothes  with no adaptive equipment while supported sitting       Start:  01/23/24    Expected End:  02/06/24            Patient will complete daily grooming tasks brushing teeth and washing face/hair with modified independent level of assistance and PRN adaptive equipment while standing.       Start:  01/23/24    Expected End:  02/06/24            Patient will complete toileting including hygiene clothing management/hygiene with stand by assist level of assistance and raised toilet seat, grab bars, and bedside commode.       Start:  01/23/24    Expected End:  02/06/24                 TRANSFERS       Patient will perform bed mobility modified independent level of assistance and bed rails in order to improve safety and independence with mobility       Start:  01/23/24    Expected End:  02/06/24            Patient will complete sit to stand transfer with contact guard assist level of assistance and least restrictive device in order to improve safety and prepare for out of bed mobility.        Start:  01/23/24    Expected End:  02/06/24

## 2024-01-23 NOTE — PROGRESS NOTES
Care Coordinator Note:  Disposition: HOME with [facility] PT/OT/NURSE/HHA,  senior resources provided, SW to follow up to set patient up with meals on wheels    Lizzeth CALVILLO, RN [facility]

## 2024-01-23 NOTE — PROGRESS NOTES
Yoselyn Hernandez is a 64 y.o. female on day 11 of admission presenting with Left leg pain.      Subjective   Yoselyn Hernandez is a 63 y.o. 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  Prior to the onset of pain, she suffered 2 falls.  There was no loss of consciousness but she began having pain to her left side.     In the ED, workup was notable for a partially occlusive DVT in the left common femoral and entire length of the femoral vein. Suspected partially occlusive DVT in the popliteal vein.  Of note, she was noted to have complete thrombotic occlusion of grafted bilateral renal arteries last October by CT imaging.  She was placed on heparin drip in the ED. Nephrology is consulted for renal care.    ANANDA. She now c/o abdominal pain. PD fluid is clear but had 1 fibrin clot.        Objective     Peritoneal Dialysis  Peritoneal Dialysis Volume In (ml): 2000 ml  Effluent Volume Out (mL): 1500 ml  Balance This Exchange (mL): -2000 ml    Vitals 24HR  Heart Rate:  [77-96]   Temp:  [36 °C (96.8 °F)-36.7 °C (98.1 °F)]   Resp:  [18]   BP: ()/(44-73)   SpO2:  [96 %-98 %]     Intake/Output last 3 Shifts:    Intake/Output Summary (Last 24 hours) at 1/23/2024 1641  Last data filed at 1/22/2024 2229  Gross per 24 hour   Intake 4000 ml   Output 1500 ml   Net 2500 ml         Physical Exam  Constitutional: awake/alert/oriented x3, no distress,  alert and cooperative   ENMT: mucous membranes moist    Head/Neck: Neck supple, no JVD, trachea midline   Respiratory/Thorax: CTAB, normal breath sounds with  good chest expansion, thorax symmetric   Cardiovascular: Regular, rate and rhythm, ERNIE, normal  S 1 and S 2   Gastrointestinal: soft, no rebound tenderness or  guarding, +BS, no bruits  PD site noted    Genitourinary: no Mckenna   Musculoskeletal: ROM intact, no joint swelling, normal  strength   Extremities: normal extremities, no LE edema   Neurological: alert and oriented x3, no focal deficits   No asterixis   Psychological: Appropriate mood and behavior   Skin: Warm and dry, no lesions to exposed skin     Scheduled Medications  allopurinol, 100 mg, oral, Daily  calcitriol, 0.5 mcg, oral, Daily  carvedilol, 6.25 mg, oral, BID  dextrose 2.5 % - LOW calcium 2.5 mEq/L 2,000 mL peritoneal dialysate, , intraperitoneal, 4x daily  dextrose 4.25 % - LOW calcium 2.5 mEq/L 2,000 mL peritoneal dialysate, , intraperitoneal, q24h  DULoxetine, 30 mg, oral, Daily  epoetin treasure or biosimilar, 10,000 Units, subcutaneous, Weekly  ergocalciferol, 1,250 mcg, oral, Weekly  levothyroxine, 25 mcg, oral, Daily before breakfast  lidocaine, 1 patch, transdermal, Daily  magnesium oxide, 800 mg, oral, Daily  mirtazapine, 15 mg, oral, Nightly  sevelamer carbonate, 1,600 mg, oral, TID with meals  warfarin, 2.5 mg, oral, Daily      Continuous medications         PRN medications: acetaminophen, diphenoxylate-atropine, HYDROmorphone, naloxone, ondansetron **OR** ondansetron     Relevant Results  Results from last 7 days   Lab Units 01/23/24  0618 01/22/24  0556 01/21/24  0532   WBC AUTO x10*3/uL 5.1 5.6 5.5   HEMOGLOBIN g/dL 11.0* 11.4* 11.3*   HEMATOCRIT % 34.6* 35.8* 35.8*   PLATELETS AUTO x10*3/uL 222 235 248       Results from last 7 days   Lab Units 01/23/24  0618 01/22/24  0556 01/21/24  0532   SODIUM mmol/L 134* 133* 133*   POTASSIUM mmol/L 3.4* 3.4* 3.7   CHLORIDE mmol/L 95* 96* 95*   CO2 mmol/L 31 25 29   BUN mg/dL 34* 34* 35*    CREATININE mg/dL 7.14* 6.97* 6.84*   GLUCOSE mg/dL 72* 93 74   CALCIUM mg/dL 7.7* 7.8* 8.1*         Lower extremity venous duplex left   Final Result   Partially occlusive DVT in the left common femoral and entire length   of the femoral vein. Suspected partially occlusive DVT in the   popliteal vein.        Left lower extremity edema.        Baker's cyst Baker's cyst.                  MACRO:   None        Signed by: Manju Sandoval 1/12/2024 6:29 AM   Dictation workstation:   [id number]      XR shoulder left 2+ views   Final Result   No acute osseous abnormality.        Erosive changes at the acromioclavicular joint is favored to be   secondary to hyperparathyroidism/chronic renal disease. Infection is   considered less likely, however, please correlate with clinical   history.             MACRO:   None        Signed by: Manju Sandoval 1/12/2024 4:32 AM   Dictation workstation:   [id number]      XR forearm left 2 views   Final Result   No acute osseous abnormality.        Erosive changes at the acromioclavicular joint is favored to be   secondary to hyperparathyroidism/chronic renal disease. Infection is   considered less likely, however, please correlate with clinical   history.             MACRO:   None        Signed by: Manju Sandoval 1/12/2024 4:32 AM   Dictation workstation:   [id number]      CT abdomen pelvis wo IV contrast   Final Result   No acute abdominal or pelvic process.        Stable chronic findings as above.        MACRO:   None        Signed by: Manju Sandoval 1/12/2024 4:25 AM   Dictation workstation:   [id number]               Assessment/Plan      Yoselyn Hernandez is a 63 y.o. 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  Prior to the onset of pain, she suffered 2 falls.  There was no loss of consciousness but she began having pain to her left side.     In the ED, workup was notable for a partially occlusive DVT in the left common femoral and entire length of the femoral vein. Suspected partially occlusive DVT in the popliteal vein.  Of note, she was noted to have complete thrombotic occlusion of grafted bilateral renal arteries last October by CT imaging.  She was placed on heparin drip in the ED. Nephrology is consulted for renal care.     Ms. Hernandez has evidence of chronic occlusive disease.  She is prescribed Coumadin albeit she reports stopping all her medication for the past 2 to 3 weeks.  We had psychiatric see her. Medications have been resumed.   Her vitamin D is quite low with significantly elevated parathyroid hormone.  We have her back on a phosphorus binder, I have ordered weekly vitamin D and she is getting the calcitriol.  This will need to followed. She is planned for discharge. She now has acute abdominal pain. I did order a cell count and culture. If this is not suggestive of peritonitis then no renal barriers to discharge.      Principal Problem:    Left leg pain      I spent 35 minutes in the professional and overall care of this patient.      Jose Martin Jacobo, DO

## 2024-01-24 ENCOUNTER — APPOINTMENT (OUTPATIENT)
Dept: CARDIOLOGY | Facility: HOSPITAL | Age: 64
DRG: 299 | End: 2024-01-24
Payer: COMMERCIAL

## 2024-01-24 VITALS
WEIGHT: 140.9 LBS | RESPIRATION RATE: 18 BRPM | HEART RATE: 83 BPM | TEMPERATURE: 98.1 F | HEIGHT: 67 IN | DIASTOLIC BLOOD PRESSURE: 73 MMHG | BODY MASS INDEX: 22.11 KG/M2 | OXYGEN SATURATION: 99 % | SYSTOLIC BLOOD PRESSURE: 108 MMHG

## 2024-01-24 LAB
ALBUMIN SERPL BCP-MCNC: 2.1 G/DL (ref 3.4–5)
ANION GAP SERPL CALC-SCNC: 13 MMOL/L (ref 10–20)
BUN SERPL-MCNC: 32 MG/DL (ref 6–23)
CALCIUM SERPL-MCNC: 8.1 MG/DL (ref 8.6–10.3)
CHLORIDE SERPL-SCNC: 95 MMOL/L (ref 98–107)
CO2 SERPL-SCNC: 32 MMOL/L (ref 21–32)
CREAT SERPL-MCNC: 7.17 MG/DL (ref 0.5–1.05)
EGFRCR SERPLBLD CKD-EPI 2021: 6 ML/MIN/1.73M*2
ERYTHROCYTE [DISTWIDTH] IN BLOOD BY AUTOMATED COUNT: 17.5 % (ref 11.5–14.5)
GLUCOSE SERPL-MCNC: 80 MG/DL (ref 74–99)
HCT VFR BLD AUTO: 37.2 % (ref 36–46)
HGB BLD-MCNC: 11.8 G/DL (ref 12–16)
INR PPP: 2.8 (ref 0.9–1.1)
MCH RBC QN AUTO: 29.6 PG (ref 26–34)
MCHC RBC AUTO-ENTMCNC: 31.7 G/DL (ref 32–36)
MCV RBC AUTO: 94 FL (ref 80–100)
NRBC BLD-RTO: 0 /100 WBCS (ref 0–0)
PHOSPHATE SERPL-MCNC: 5 MG/DL (ref 2.5–4.9)
PLATELET # BLD AUTO: 228 X10*3/UL (ref 150–450)
POTASSIUM SERPL-SCNC: 3.7 MMOL/L (ref 3.5–5.3)
PROTHROMBIN TIME: 31.7 SECONDS (ref 9.8–12.8)
RBC # BLD AUTO: 3.98 X10*6/UL (ref 4–5.2)
SODIUM SERPL-SCNC: 136 MMOL/L (ref 136–145)
WBC # BLD AUTO: 5.1 X10*3/UL (ref 4.4–11.3)

## 2024-01-24 PROCEDURE — 36415 COLL VENOUS BLD VENIPUNCTURE: CPT | Performed by: INTERNAL MEDICINE

## 2024-01-24 PROCEDURE — 2500000004 HC RX 250 GENERAL PHARMACY W/ HCPCS (ALT 636 FOR OP/ED): Performed by: INTERNAL MEDICINE

## 2024-01-24 PROCEDURE — 2500000001 HC RX 250 WO HCPCS SELF ADMINISTERED DRUGS (ALT 637 FOR MEDICARE OP): Performed by: PSYCHIATRY & NEUROLOGY

## 2024-01-24 PROCEDURE — 2500000002 HC RX 250 W HCPCS SELF ADMINISTERED DRUGS (ALT 637 FOR MEDICARE OP, ALT 636 FOR OP/ED): Performed by: INTERNAL MEDICINE

## 2024-01-24 PROCEDURE — 2500000001 HC RX 250 WO HCPCS SELF ADMINISTERED DRUGS (ALT 637 FOR MEDICARE OP): Performed by: NURSE PRACTITIONER

## 2024-01-24 PROCEDURE — 2500000004 HC RX 250 GENERAL PHARMACY W/ HCPCS (ALT 636 FOR OP/ED): Performed by: NURSE PRACTITIONER

## 2024-01-24 PROCEDURE — 85610 PROTHROMBIN TIME: CPT | Performed by: INTERNAL MEDICINE

## 2024-01-24 PROCEDURE — 2500000005 HC RX 250 GENERAL PHARMACY W/O HCPCS: Performed by: INTERNAL MEDICINE

## 2024-01-24 PROCEDURE — 93005 ELECTROCARDIOGRAM TRACING: CPT

## 2024-01-24 PROCEDURE — 80069 RENAL FUNCTION PANEL: CPT | Performed by: INTERNAL MEDICINE

## 2024-01-24 PROCEDURE — 85027 COMPLETE CBC AUTOMATED: CPT | Performed by: INTERNAL MEDICINE

## 2024-01-24 RX ADMIN — HYDROMORPHONE HYDROCHLORIDE 0.5 MG: 1 INJECTION, SOLUTION INTRAMUSCULAR; INTRAVENOUS; SUBCUTANEOUS at 03:20

## 2024-01-24 RX ADMIN — SEVELAMER CARBONATE 1600 MG: 800 TABLET, FILM COATED ORAL at 08:00

## 2024-01-24 RX ADMIN — LEVOTHYROXINE SODIUM 25 MCG: 25 TABLET ORAL at 05:37

## 2024-01-24 RX ADMIN — DIPHENOXYLATE HYDROCHLORIDE AND ATROPINE SULFATE 1 TABLET: 2.5; .025 TABLET ORAL at 09:25

## 2024-01-24 RX ADMIN — MAGNESIUM OXIDE TAB 400 MG (241.3 MG ELEMENTAL MG) 800 MG: 400 (241.3 MG) TAB at 09:00

## 2024-01-24 RX ADMIN — CALCITRIOL CAPSULES 0.25 MCG 0.5 MCG: 0.25 CAPSULE ORAL at 09:00

## 2024-01-24 RX ADMIN — LIDOCAINE 1 PATCH: 4 PATCH TOPICAL at 09:00

## 2024-01-24 RX ADMIN — HYDROMORPHONE HYDROCHLORIDE 0.5 MG: 1 INJECTION, SOLUTION INTRAMUSCULAR; INTRAVENOUS; SUBCUTANEOUS at 09:25

## 2024-01-24 RX ADMIN — DULOXETINE HYDROCHLORIDE 30 MG: 30 CAPSULE, DELAYED RELEASE ORAL at 09:00

## 2024-01-24 RX ADMIN — ALLOPURINOL 100 MG: 100 TABLET ORAL at 09:00

## 2024-01-24 ASSESSMENT — PAIN SCALES - GENERAL
PAINLEVEL_OUTOF10: 9
PAINLEVEL_OUTOF10: 6
PAINLEVEL_OUTOF10: 10 - WORST POSSIBLE PAIN

## 2024-01-24 ASSESSMENT — PAIN - FUNCTIONAL ASSESSMENT
PAIN_FUNCTIONAL_ASSESSMENT: 0-10

## 2024-01-24 ASSESSMENT — PAIN DESCRIPTION - LOCATION: LOCATION: GENERALIZED

## 2024-01-24 NOTE — PROGRESS NOTES
Physical Therapy                 Therapy Communication Note    Patient Name: Yoselyn Hernandez  MRN: [id number]  Today's Date: 1/24/2024     Discipline: Physical Therapy    Missed Visit Reason: Missed Visit Reason:  (pt conversation with RN pt will be discharging shortly this PM)    Missed Time: Attempt    Comment:

## 2024-01-24 NOTE — CARE PLAN
Patient admitted for Patient admitted for left leg pain, pos DVT, bridged to coumadin.    Met with patient at bedside, patient aware that dc was set for yesterday but renal held dc to order a cell count and culture to rule out peritonitis.  Discussed dc plan with patient, and she confirmed she will return home with [facility] and [facility] referral.   Patient c/o diarrhea, per aide, incontinent of bowel and not diarrhea, Dr Rader aware.    ADOD today, active dc order  [facility]

## 2024-01-24 NOTE — PROGRESS NOTES
Yoselyn Hernandez is a 64 y.o. female on day 11 of admission presenting with Left leg pain.      Subjective   Was seen and examined at bedside this morning, with plan to discharge today, unfortunately  cell count lab for nephrology result delayed discharge.  Cell count necessary to rule out peritonitis aspiration is a peritoneal dialysis user for her ESRD.       Objective     Last Recorded Vitals  /58   Pulse 83   Temp 36.1 °C (97 °F)   Resp 17   Wt 63.9 kg (140 lb 14.4 oz)   SpO2 97%   Intake/Output last 3 Shifts:    Intake/Output Summary (Last 24 hours) at 1/23/2024 2303  Last data filed at 1/23/2024 1742  Gross per 24 hour   Intake 3000 ml   Output 4400 ml   Net -1400 ml       Admission Weight  Weight: 54.4 kg (120 lb) (01/12/24 0242)    Daily Weight  01/18/24 : 63.9 kg (140 lb 14.4 oz)    Image Results  Lower extremity venous duplex left  Narrative: Interpreted By:  Manju Sandoval,   STUDY:  [facility] LOWER EXTREMITY VENOUS DUPLEX LEFT;  1/12/2024 6:02 am      INDICATION:  Signs/Symptoms:leg pain, swelling, stopped taking coumadin a few  weeks ago.      COMPARISON:  04/15/2028      ACCESSION NUMBER(S):  [id number]      ORDERING CLINICIAN:  AIRAM WEIR      TECHNIQUE:  Grayscale, color and spectral Doppler sonographic images of the left  lower extremity deep venous system. The right common femoral vein was  imaged for comparison.      FINDINGS:  The common femoral vein and the entire femoral vein are incompletely  compressible, consistent with partially occlusive thrombus. The  popliteal vein is noncompressible, however, color flow is present,  suggesting partially occlusive DVT. Suboptimal visualization of the  calf veins.      The right common femoral vein is patent.      OTHER FINDINGS: 3.7 x 1.2 x 2.8 cm fluid collection in the popliteal  fossa, most consistent with Baker's cyst. Subcutaneous edema  throughout the imaged right lower extremity, most prominent in the  calf.      Impression: Partially  occlusive DVT in the left common femoral and entire length  of the femoral vein. Suspected partially occlusive DVT in the  popliteal vein.      Left lower extremity edema.      Baker's cyst Baker's cyst.              MACRO:  None      Signed by: Manju Sandoval 1/12/2024 6:29 AM  Dictation workstation:   [id number]  XR shoulder left 2+ views, XR forearm left 2 views  Narrative: Interpreted By:  Manju Sandoval,   STUDY:  XR FOREARM LEFT 2 VIEWS; XR SHOULDER LEFT 2+ VIEWS; ;  1/12/2024 4:16  am; 1/12/2024 4:17 am      INDICATION:  Signs/Symptoms:fall, pain.      COMPARISON:  None.      ACCESSION NUMBER(S):  [id number]; [id number]      ORDERING CLINICIAN:  AIRAM WEIR      FINDINGS:  Two views left shoulder and two views left forearm. No acute fracture  or malalignment. Erosive changes at the acromioclavicular joint,  similar to chest radiograph 10/18/2023. No overlying soft tissue  swelling. No elbow joint effusion. Aortic stent graft partially  imaged. Atherosclerotic vascular calcifications noted.      Impression: No acute osseous abnormality.      Erosive changes at the acromioclavicular joint is favored to be  secondary to hyperparathyroidism/chronic renal disease. Infection is  considered less likely, however, please correlate with clinical  history.          MACRO:  None      Signed by: Manju Sandoval 1/12/2024 4:32 AM  Dictation workstation:   [id number]  CT abdomen pelvis wo IV contrast  Narrative: Interpreted By:  Manju Sandoval,   STUDY:  CT ABDOMEN PELVIS WO IV CONTRAST;  1/12/2024 4:09 am      INDICATION:  Signs/Symptoms:fall, buttock pain, L hip pain.      COMPARISON:  11/06/2023      ACCESSION NUMBER(S):  [id number]      ORDERING CLINICIAN:  AIRAM WEIR      TECHNIQUE:  Axial noncontrast CT images of the abdomen and pelvis with coronal  and sagittal reconstructed images.      FINDINGS:  LOWER CHEST: Stable bibasilar atelectasis or scarring. Partially  imaged stent graft in the descending  thoracic aorta. Stable  cardiomegaly BONES: No acute osseous abnormality. Mild diffuse  degenerative disc changes. ABDOMINAL WALL: Diffuse body wall edema.  Periumbilical hernia containing the anterior wall of a segment of  transverse colon. No inflammatory changes in the hernia sac. Fluid  containing supraumbilical ventral hernias.      ABDOMEN:  Lack of intravenous contrast limits evaluation of vessels and solid  organs. LIVER: No focal parenchymal abnormality, within limits of  noncontrast CT. No perihepatic fluid. BILE DUCTS: No biliary  dilatation. GALLBLADDER: Cholecystectomy greater  PANCREAS: No peripancreatic inflammatory stranding or duct dilatation.  SPLEEN: Within normal limits.  ADRENALS: Within normal limits.      KIDNEYS, URETERS, URINARY BLADDER: No hydronephrosis or urinary tract  calculus. Bilateral renal atrophy again noted. Stable right renal  cysts. The urinary bladder is nondistended.      VESSELS: Postsurgical changes again noted including aortic stent  graft with visceral branch bypass grafts. Stable peripherally  calcified fluid collection around the superior mesenteric artery  graft in the left upper quadrant. Vascular coils noted in the left  internal iliac artery. RETROPERITONEUM: No pathologically enlarged  lymph nodes.      PELVIS:      REPRODUCTIVE ORGANS: The uterus is not seen and may be atrophic or  removed.      BOWEL: The stomach is nondistended. No dilated small bowel. The colon  is nondistended appears grossly unremarkable. Mild colonic stool  burden.  Normal appendix. PERITONEUM: Peritoneal dialysis catheter  noted in the left mid abdomen. Small amount of free peritoneal fluid.  No organized fluid collection. No pneumoperitoneum.      Impression: No acute abdominal or pelvic process.      Stable chronic findings as above.      MACRO:  None      Signed by: Manju Sandoval 1/12/2024 4:25 AM  Dictation workstation:   [id number]      Physical Exam    Relevant Results                Assessment/Plan        63 y.o. female past medical history of ESRD on dialysis, AAA, HFrEF (25% 03/2023) and pancreatitis, PE (03/2023 on warfarin)  presenting with left lower extremity pain as well as left arm pain.  Patient states she has not been feeling well for the last couple weeks, feeling down, lots of recent life stressors.  Due to this patient reports she was not taking any of medications.  She also missed a PD session             Principal Problem:    Left leg pain    PE/DVT  -s/p Heparin drip, with INR now therapeutic  -Coumadin 5 mg daily, subtherapeutic  -Daily INR check     Gout: Complaining about bilateral wrist pain, tender wrist, but no rubor, calor  -On allopurinol 100 mg daily  -Elevated CRP level, suggestive of inflammatory arthritis of the wrist  -Uric acid level wnl     Depression  -Duloxetine 30 mg daily  -Consult psych: Appreciate evaluation and recommendations  -Patient gets upset and stops caring for herself     End-stage renal disease  -Consult nephrology: Appreciate management and recommendation  -Patient on peritoneal dialysis  -Renvela with meals   -Cell count ordered to rule out peritonitis as patient complained of abdominal pain     History of hypertension  -Continue Coreg     HFrEF  -appears euvolemic      DVT prophylaxis-heparin drip bridge to Coumadin      Disposition: Presenting with recurrent DVT, need further management, and out of successfully bridged to therapeutic level with heparin drip, patient to be discharged home with home health, PT/OT      Malnutrition Diagnosis Status: New  Malnutrition Diagnosis: Severe malnutrition related to chronic disease or condition  As Evidenced by: prolonged poor intake prior to hospital admit of < 75% of estimated energy needs in > 1 month, mild subcutaneous fat loss and moderate to severe muscle wasting present  I agree with the dietitian's malnutrition diagnosis.         Clark Rader,

## 2024-01-24 NOTE — CONSULTS
Nutrition Assessment Note    Reason for Assessment  Reason for Assessment: Dietitian discretion    Pt admitted for:  Depression with anxiety [F41.8]  Secondary hyperparathyroidism of renal origin (CMS/HCC) [N25.81]  Left leg pain [M79.605]  Left arm pain [M79.602]  Struck bath tub with fall, initial encounter [W18.09XA]  Hyperlipidemia, unspecified hyperlipidemia type [E78.5]  Deep vein thrombosis (DVT) of femoral vein, unspecified chronicity, unspecified laterality (CMS/[facility]) [I82.419]  Diarrhea, unspecified type [R19.7]    Chart reviewed and pt visited.  Per pt poor intake x 1 year only eating 1 meal a day.  # > 1 year ago.    Pt agreeable to supplement while admitted.    Past Medical History:   Diagnosis Date    Other abnormalities of gait and mobility     Gait, antalgic    Personal history of other endocrine, nutritional and metabolic disease     History of hyperlipidemia    Personal history of other specified conditions     History of shortness of breath     Results for orders placed or performed during the hospital encounter of 01/12/24 (from the past 24 hour(s))   AFB Culture/Smear    Specimen: Other (specify in comments); Fluid   Result Value Ref Range    AFB Culture       Culture in progress and will be examined weekly. A result will be issued either when positive or after 8 weeks incubation.    AFB Stain No acid fast bacilli seen    Peritoneal Dialysis Fluid Culture/Smear    Specimen: Peritoneal Dialysis; Fluid   Result Value Ref Range    Gram Stain (3+) Moderate Polymorphonuclear leukocytes     Gram Stain No organisms seen    Fungal Culture/Smear    Specimen: Intra-abdominal Abscess; Fluid   Result Value Ref Range    Fungal Culture/Smear       Culture in progress, a report will be issued when positive or after 2 weeks of incubation.    Fungal Smear No fungal elements seen    CBC   Result Value Ref Range    WBC 5.1 4.4 - 11.3 x10*3/uL    nRBC 0.0 0.0 - 0.0 /100 WBCs    RBC 3.69 (L) 4.00 - 5.20  x10*6/uL    Hemoglobin 11.0 (L) 12.0 - 16.0 g/dL    Hematocrit 34.6 (L) 36.0 - 46.0 %    MCV 94 80 - 100 fL    MCH 29.8 26.0 - 34.0 pg    MCHC 31.8 (L) 32.0 - 36.0 g/dL    RDW 17.4 (H) 11.5 - 14.5 %    Platelets 222 150 - 450 x10*3/uL   Basic Metabolic Panel   Result Value Ref Range    Glucose 72 (L) 74 - 99 mg/dL    Sodium 134 (L) 136 - 145 mmol/L    Potassium 3.4 (L) 3.5 - 5.3 mmol/L    Chloride 95 (L) 98 - 107 mmol/L    Bicarbonate 31 21 - 32 mmol/L    Anion Gap 11 10 - 20 mmol/L    Urea Nitrogen 34 (H) 6 - 23 mg/dL    Creatinine 7.14 (H) 0.50 - 1.05 mg/dL    eGFR 6 (L) >60 mL/min/1.73m*2    Calcium 7.7 (L) 8.6 - 10.3 mg/dL   Protime-INR   Result Value Ref Range    Protime 27.8 (H) 9.8 - 12.8 seconds    INR 2.4 (H) 0.9 - 1.1   Body fluid cell count   Result Value Ref Range    Color, Fluid Colorless Colorless, Straw, Yellow    Clarity, Fluid Clear Clear    WBC, Fluid 203 See Comment /uL    RBC, Fluid 0 0  /uL /uL     Scheduled medications  allopurinol, 100 mg, oral, Daily  calcitriol, 0.5 mcg, oral, Daily  carvedilol, 6.25 mg, oral, BID  dextrose 2.5 % - LOW calcium 2.5 mEq/L 2,000 mL peritoneal dialysate, , intraperitoneal, 4x daily  dextrose 4.25 % - LOW calcium 2.5 mEq/L 2,000 mL peritoneal dialysate, , intraperitoneal, q24h  DULoxetine, 30 mg, oral, Daily  epoetin treasure or biosimilar, 10,000 Units, subcutaneous, Weekly  ergocalciferol, 1,250 mcg, oral, Weekly  levothyroxine, 25 mcg, oral, Daily before breakfast  lidocaine, 1 patch, transdermal, Daily  magnesium oxide, 800 mg, oral, Daily  mirtazapine, 15 mg, oral, Nightly  sevelamer carbonate, 1,600 mg, oral, TID with meals  warfarin, 2.5 mg, oral, Daily      Continuous medications     PRN medications  PRN medications: acetaminophen, diphenoxylate-atropine, HYDROmorphone, naloxone, ondansetron **OR** ondansetron  Dietary Orders (From admission, onward)       Start     Ordered    01/23/24 1956  Oral nutritional supplements  Until discontinued        Question  "Answer Comment   Deliver with All meals    Select supplement: Nepro With Carbsteady        01/23/24 1955 01/20/24 1041  Adult diet Regular  Diet effective now        Comments: [facility]   Question:  Diet type  Answer:  Regular    01/20/24 1041                  History:  Food and Nutrient History  Energy Intake: Poor < 50 %  Food and Nutrient History: 1 meal a day for the past year    Anthropometrics:  Height: 170.2 cm (5' 7\")  Weight: 63.9 kg (140 lb 14.4 oz)  BMI (Calculated): 22.06    Wt Readings from Last 9 Encounters:   01/18/24 63.9 kg (140 lb 14.4 oz)   12/20/23 58.8 kg (129 lb 10.1 oz)   11/19/23 56.7 kg (125 lb)   11/09/23 57 kg (125 lb 10.6 oz)   10/18/23 68 kg (149 lb 14.6 oz)   10/17/23 59.1 kg (130 lb 3.2 oz)   08/31/23 52.2 kg (115 lb)   08/17/23 54.9 kg (121 lb)   05/25/23 62.6 kg (138 lb)     Weight Change  Significant Weight Loss: No    Estimated Energy Needs  Total Energy Estimated Needs (kCal): 1915 kCal  Total Estimated Energy Need per Day (kCal/kg): 2235 kCal/kg  Method for Estimating Needs: 30-35    Estimated Protein Needs  Total Protein Estimated Needs (g): 75 g  Total Protein Estimated Needs (g/kg): 85 g/kg  Method for Estimating Needs: 1.2-1.3    Estimated Fluid Needs  Method for Estimating Needs: 1ml/kcal or per MD    Nutrition Focused Physical Findings:  Subcutaneous Fat Loss  Orbital Fat Pads: Mild-Moderate (slight dark circles and slight hollowing)  Buccal Fat Pads: Mild-Moderate (flat cheeks, minimal bounce)    Muscle Wasting  Temporalis: Mild-Moderate (slight depression)  Pectoralis (Clavicular Region): Severe (protruding prominent clavicle)    Edema  Edema: none    Physical Findings (Nutrition Deficiency/Toxicity)  Skin: Negative     Nutrition Diagnosis   Malnutrition Diagnosis  Patient has Malnutrition Diagnosis: Yes  Diagnosis Status: New  Malnutrition Diagnosis: Severe malnutrition related to chronic disease or condition  As Evidenced by: prolonged poor intake prior to hospital " admit of < 75% of estimated energy needs in > 1 month, mild subcutaneous fat loss and moderate to severe muscle wasting present    Nutrition Interventions/Recommendations   Food and/or Nutrient Delivery Interventions  Meals and Snacks: General healthful diet     Medical Food Supplement: Commercial beverage  Goal: Nepro TID for encouraged intake    Education Documentation  No documentation found.      Nutrition Monitoring and Evaluation   Food and Nutrient Related History  Energy Intake: Estimated energy intake    Fluid Intake: Estimated fluid intake    Amount of Food: Estimated amout of food, Medical food intake    Mealtime Behavior: Limited number of accepted foods    Anthropometrics: Body Composition/Growth/Weight History  Weight Change: Weight gain, Weight loss    Biochemical Data, Medical Tests and Procedures  Electrolyte and Renal Panel: Other (Comment)  Criteria: as clinically indicated    Gastrointestinal Profile: Other (Comment)  Criteria: as clinically indicated    Glucose/Endocrine Profile: Other (Comment)  Criteria: as clinically indicated    Nutritional Anemia Profile: Other (Comment)  Criteria: as clinically indicated    Vitamin Profile: Other (Comment)  Criteria: as clinically indicated    Nutrition Focused Physical Findings  Adipose: Loss of subcutaneous fat    Muscles: Muscle atrophy    Stool output  Urine volume  Overall appearance    Follow Up  Time Spent (min): 60 minutes  Last Date of Nutrition Visit: 01/23/24  Nutrition Follow-Up Needed?: Dietitian to reassess per policy  Follow up Comment: JOSE DAVID POWELL

## 2024-01-24 NOTE — CARE PLAN
The patient's goals for the shift include  pt will remain safe throughout the shift    The clinical goals for the shift include Pt to remain HDS this shift    Over the shift, the patient did not make progress toward the following goals. Barriers to progression include . Recommendations to address these barriers include .

## 2024-01-26 LAB
BACTERIA DIAFP CULT: ABNORMAL
BACTERIA DIAFP CULT: ABNORMAL
GRAM STN SPEC: ABNORMAL
GRAM STN SPEC: ABNORMAL

## 2024-01-30 ENCOUNTER — APPOINTMENT (OUTPATIENT)
Dept: RADIOLOGY | Facility: HOSPITAL | Age: 64
DRG: 919 | End: 2024-01-30
Payer: COMMERCIAL

## 2024-01-30 ENCOUNTER — CLINICAL SUPPORT (OUTPATIENT)
Dept: EMERGENCY MEDICINE | Facility: HOSPITAL | Age: 64
DRG: 919 | End: 2024-01-30
Payer: COMMERCIAL

## 2024-01-30 ENCOUNTER — HOSPITAL ENCOUNTER (INPATIENT)
Facility: HOSPITAL | Age: 64
LOS: 8 days | Discharge: NURSING, REHAB OR LONG-TERM CARE | DRG: 919 | End: 2024-02-07
Attending: EMERGENCY MEDICINE | Admitting: NURSE PRACTITIONER
Payer: COMMERCIAL

## 2024-01-30 DIAGNOSIS — K65.9 PERITONITIS (MULTI): Primary | ICD-10-CM

## 2024-01-30 DIAGNOSIS — B37.0 THRUSH: ICD-10-CM

## 2024-01-30 DIAGNOSIS — N18.6 ESRD (END STAGE RENAL DISEASE) ON DIALYSIS (MULTI): ICD-10-CM

## 2024-01-30 DIAGNOSIS — I26.99 OTHER PULMONARY EMBOLISM WITHOUT ACUTE COR PULMONALE, UNSPECIFIED CHRONICITY (MULTI): ICD-10-CM

## 2024-01-30 DIAGNOSIS — I74.5 ILIAC ARTERY OCCLUSION (MULTI): ICD-10-CM

## 2024-01-30 DIAGNOSIS — Z99.2 PERITONEAL DIALYSIS STATUS (CMS-HCC): ICD-10-CM

## 2024-01-30 DIAGNOSIS — G47.00 INSOMNIA, UNSPECIFIED TYPE: ICD-10-CM

## 2024-01-30 DIAGNOSIS — Z99.2 ESRD (END STAGE RENAL DISEASE) ON DIALYSIS (MULTI): ICD-10-CM

## 2024-01-30 LAB
ALBUMIN SERPL BCP-MCNC: 2.4 G/DL (ref 3.4–5)
ALP SERPL-CCNC: 126 U/L (ref 33–136)
ALT SERPL W P-5'-P-CCNC: 7 U/L (ref 7–45)
ANION GAP SERPL CALC-SCNC: 16 MMOL/L (ref 10–20)
AST SERPL W P-5'-P-CCNC: 20 U/L (ref 9–39)
BASOPHILS # BLD AUTO: 0.01 X10*3/UL (ref 0–0.1)
BASOPHILS NFR BLD AUTO: 0.1 %
BILIRUB SERPL-MCNC: 0.5 MG/DL (ref 0–1.2)
BNP SERPL-MCNC: >5000 PG/ML (ref 0–99)
BUN SERPL-MCNC: 38 MG/DL (ref 6–23)
CALCIUM SERPL-MCNC: 8.3 MG/DL (ref 8.6–10.6)
CARDIAC TROPONIN I PNL SERPL HS: 29 NG/L (ref 0–34)
CHLORIDE SERPL-SCNC: 94 MMOL/L (ref 98–107)
CO2 SERPL-SCNC: 26 MMOL/L (ref 21–32)
CREAT SERPL-MCNC: 7.53 MG/DL (ref 0.5–1.05)
EGFRCR SERPLBLD CKD-EPI 2021: 6 ML/MIN/1.73M*2
EOSINOPHIL # BLD AUTO: 0.08 X10*3/UL (ref 0–0.7)
EOSINOPHIL NFR BLD AUTO: 1.1 %
ERYTHROCYTE [DISTWIDTH] IN BLOOD BY AUTOMATED COUNT: 15.9 % (ref 11.5–14.5)
FLUAV RNA RESP QL NAA+PROBE: NOT DETECTED
FLUBV RNA RESP QL NAA+PROBE: NOT DETECTED
GLUCOSE SERPL-MCNC: 84 MG/DL (ref 74–99)
HCT VFR BLD AUTO: 33.8 % (ref 36–46)
HGB BLD-MCNC: 11.6 G/DL (ref 12–16)
IMM GRANULOCYTES # BLD AUTO: 0.03 X10*3/UL (ref 0–0.7)
IMM GRANULOCYTES NFR BLD AUTO: 0.4 % (ref 0–0.9)
LIPASE SERPL-CCNC: 13 U/L (ref 9–82)
LYMPHOCYTES # BLD AUTO: 0.81 X10*3/UL (ref 1.2–4.8)
LYMPHOCYTES NFR BLD AUTO: 11.1 %
MCH RBC QN AUTO: 29.3 PG (ref 26–34)
MCHC RBC AUTO-ENTMCNC: 34.3 G/DL (ref 32–36)
MCV RBC AUTO: 85 FL (ref 80–100)
MONOCYTES # BLD AUTO: 0.43 X10*3/UL (ref 0.1–1)
MONOCYTES NFR BLD AUTO: 5.9 %
NEUTROPHILS # BLD AUTO: 5.94 X10*3/UL (ref 1.2–7.7)
NEUTROPHILS NFR BLD AUTO: 81.4 %
NRBC BLD-RTO: 0 /100 WBCS (ref 0–0)
PLATELET # BLD AUTO: 265 X10*3/UL (ref 150–450)
POTASSIUM SERPL-SCNC: 3.6 MMOL/L (ref 3.5–5.3)
PROT SERPL-MCNC: 6.3 G/DL (ref 6.4–8.2)
RBC # BLD AUTO: 3.96 X10*6/UL (ref 4–5.2)
SARS-COV-2 RNA RESP QL NAA+PROBE: NOT DETECTED
SODIUM SERPL-SCNC: 132 MMOL/L (ref 136–145)
WBC # BLD AUTO: 7.3 X10*3/UL (ref 4.4–11.3)

## 2024-01-30 PROCEDURE — 71275 CT ANGIOGRAPHY CHEST: CPT | Performed by: RADIOLOGY

## 2024-01-30 PROCEDURE — 71045 X-RAY EXAM CHEST 1 VIEW: CPT

## 2024-01-30 PROCEDURE — 2500000004 HC RX 250 GENERAL PHARMACY W/ HCPCS (ALT 636 FOR OP/ED)

## 2024-01-30 PROCEDURE — 85025 COMPLETE CBC W/AUTO DIFF WBC: CPT | Performed by: EMERGENCY MEDICINE

## 2024-01-30 PROCEDURE — 71045 X-RAY EXAM CHEST 1 VIEW: CPT | Performed by: RADIOLOGY

## 2024-01-30 PROCEDURE — 71275 CT ANGIOGRAPHY CHEST: CPT

## 2024-01-30 PROCEDURE — 87102 FUNGUS ISOLATION CULTURE: CPT

## 2024-01-30 PROCEDURE — 2550000001 HC RX 255 CONTRASTS: Performed by: EMERGENCY MEDICINE

## 2024-01-30 PROCEDURE — 83880 ASSAY OF NATRIURETIC PEPTIDE: CPT | Performed by: EMERGENCY MEDICINE

## 2024-01-30 PROCEDURE — 96361 HYDRATE IV INFUSION ADD-ON: CPT

## 2024-01-30 PROCEDURE — 87636 SARSCOV2 & INF A&B AMP PRB: CPT

## 2024-01-30 PROCEDURE — 96375 TX/PRO/DX INJ NEW DRUG ADDON: CPT

## 2024-01-30 PROCEDURE — 36415 COLL VENOUS BLD VENIPUNCTURE: CPT | Performed by: EMERGENCY MEDICINE

## 2024-01-30 PROCEDURE — 89051 BODY FLUID CELL COUNT: CPT | Performed by: EMERGENCY MEDICINE

## 2024-01-30 PROCEDURE — 83690 ASSAY OF LIPASE: CPT

## 2024-01-30 PROCEDURE — 99223 1ST HOSP IP/OBS HIGH 75: CPT | Performed by: NURSE PRACTITIONER

## 2024-01-30 PROCEDURE — 87015 SPECIMEN INFECT AGNT CONCNTJ: CPT

## 2024-01-30 PROCEDURE — 84484 ASSAY OF TROPONIN QUANT: CPT | Performed by: EMERGENCY MEDICINE

## 2024-01-30 PROCEDURE — 2550000001 HC RX 255 CONTRASTS

## 2024-01-30 PROCEDURE — 74174 CTA ABD&PLVS W/CONTRAST: CPT | Performed by: RADIOLOGY

## 2024-01-30 PROCEDURE — 93005 ELECTROCARDIOGRAM TRACING: CPT

## 2024-01-30 PROCEDURE — 84075 ASSAY ALKALINE PHOSPHATASE: CPT | Performed by: EMERGENCY MEDICINE

## 2024-01-30 PROCEDURE — 80053 COMPREHEN METABOLIC PANEL: CPT | Performed by: EMERGENCY MEDICINE

## 2024-01-30 PROCEDURE — 99285 EMERGENCY DEPT VISIT HI MDM: CPT | Performed by: EMERGENCY MEDICINE

## 2024-01-30 PROCEDURE — 1200000002 HC GENERAL ROOM WITH TELEMETRY DAILY

## 2024-01-30 PROCEDURE — 96374 THER/PROPH/DIAG INJ IV PUSH: CPT

## 2024-01-30 PROCEDURE — 93010 ELECTROCARDIOGRAM REPORT: CPT | Performed by: EMERGENCY MEDICINE

## 2024-01-30 PROCEDURE — 2500000004 HC RX 250 GENERAL PHARMACY W/ HCPCS (ALT 636 FOR OP/ED): Performed by: EMERGENCY MEDICINE

## 2024-01-30 PROCEDURE — 88104 CYTOPATH FL NONGYN SMEARS: CPT | Performed by: EMERGENCY MEDICINE

## 2024-01-30 PROCEDURE — 96376 TX/PRO/DX INJ SAME DRUG ADON: CPT

## 2024-01-30 PROCEDURE — 87186 SC STD MICRODIL/AGAR DIL: CPT

## 2024-01-30 RX ORDER — LOPERAMIDE HYDROCHLORIDE 2 MG/1
2 CAPSULE ORAL 4 TIMES DAILY PRN
Status: DISCONTINUED | OUTPATIENT
Start: 2024-01-30 | End: 2024-02-03

## 2024-01-30 RX ORDER — ONDANSETRON 4 MG/1
4 TABLET, ORALLY DISINTEGRATING ORAL EVERY 8 HOURS PRN
Status: DISCONTINUED | OUTPATIENT
Start: 2024-01-30 | End: 2024-02-07 | Stop reason: HOSPADM

## 2024-01-30 RX ORDER — ONDANSETRON HYDROCHLORIDE 2 MG/ML
4 INJECTION, SOLUTION INTRAVENOUS EVERY 8 HOURS PRN
Status: DISCONTINUED | OUTPATIENT
Start: 2024-01-30 | End: 2024-02-07 | Stop reason: HOSPADM

## 2024-01-30 RX ORDER — HYDROMORPHONE HYDROCHLORIDE 1 MG/ML
0.5 INJECTION, SOLUTION INTRAMUSCULAR; INTRAVENOUS; SUBCUTANEOUS ONCE
Status: DISCONTINUED | OUTPATIENT
Start: 2024-01-30 | End: 2024-01-30

## 2024-01-30 RX ORDER — CARVEDILOL 6.25 MG/1
6.25 TABLET ORAL
Status: DISCONTINUED | OUTPATIENT
Start: 2024-01-31 | End: 2024-02-07 | Stop reason: HOSPADM

## 2024-01-30 RX ORDER — MORPHINE SULFATE 4 MG/ML
4 INJECTION INTRAVENOUS ONCE
Status: COMPLETED | OUTPATIENT
Start: 2024-01-30 | End: 2024-01-30

## 2024-01-30 RX ORDER — LANOLIN ALCOHOL/MO/W.PET/CERES
800 CREAM (GRAM) TOPICAL DAILY
Status: DISCONTINUED | OUTPATIENT
Start: 2024-01-31 | End: 2024-02-07 | Stop reason: HOSPADM

## 2024-01-30 RX ORDER — HYDROMORPHONE HYDROCHLORIDE 1 MG/ML
1 INJECTION, SOLUTION INTRAMUSCULAR; INTRAVENOUS; SUBCUTANEOUS ONCE
Status: COMPLETED | OUTPATIENT
Start: 2024-01-30 | End: 2024-01-30

## 2024-01-30 RX ORDER — SEVELAMER CARBONATE 800 MG/1
800 TABLET, FILM COATED ORAL
Status: DISCONTINUED | OUTPATIENT
Start: 2024-01-31 | End: 2024-02-07 | Stop reason: HOSPADM

## 2024-01-30 RX ORDER — HEPARIN SODIUM 1000 [USP'U]/ML
INJECTION, SOLUTION INTRAVENOUS; SUBCUTANEOUS
Status: COMPLETED
Start: 2024-01-30 | End: 2024-01-30

## 2024-01-30 RX ORDER — ACETAMINOPHEN 160 MG/5ML
650 SOLUTION ORAL EVERY 4 HOURS PRN
Status: DISCONTINUED | OUTPATIENT
Start: 2024-01-30 | End: 2024-02-04

## 2024-01-30 RX ORDER — VANCOMYCIN HYDROCHLORIDE 1 G/20ML
INJECTION, POWDER, LYOPHILIZED, FOR SOLUTION INTRAVENOUS
Status: COMPLETED
Start: 2024-01-30 | End: 2024-01-30

## 2024-01-30 RX ORDER — NYSTATIN 100000 [USP'U]/ML
500000 SUSPENSION ORAL EVERY 8 HOURS SCHEDULED
Status: DISCONTINUED | OUTPATIENT
Start: 2024-01-30 | End: 2024-02-07 | Stop reason: HOSPADM

## 2024-01-30 RX ORDER — MIRTAZAPINE 15 MG/1
15 TABLET, FILM COATED ORAL NIGHTLY
Status: DISCONTINUED | OUTPATIENT
Start: 2024-01-30 | End: 2024-02-07 | Stop reason: HOSPADM

## 2024-01-30 RX ORDER — POLYETHYLENE GLYCOL 3350 17 G/17G
17 POWDER, FOR SOLUTION ORAL DAILY PRN
Status: DISCONTINUED | OUTPATIENT
Start: 2024-01-30 | End: 2024-02-07 | Stop reason: HOSPADM

## 2024-01-30 RX ORDER — LEVOTHYROXINE SODIUM 25 UG/1
25 TABLET ORAL
Status: DISCONTINUED | OUTPATIENT
Start: 2024-01-31 | End: 2024-02-07 | Stop reason: HOSPADM

## 2024-01-30 RX ORDER — HYDROMORPHONE HYDROCHLORIDE 1 MG/ML
0.2 INJECTION, SOLUTION INTRAMUSCULAR; INTRAVENOUS; SUBCUTANEOUS
Status: DISCONTINUED | OUTPATIENT
Start: 2024-01-30 | End: 2024-01-31

## 2024-01-30 RX ORDER — VANCOMYCIN HYDROCHLORIDE 500 MG/10ML
INJECTION, POWDER, LYOPHILIZED, FOR SOLUTION INTRAVENOUS
Status: COMPLETED
Start: 2024-01-30 | End: 2024-01-30

## 2024-01-30 RX ORDER — ACETAMINOPHEN 500 MG
10 TABLET ORAL DAILY PRN
Status: DISCONTINUED | OUTPATIENT
Start: 2024-01-30 | End: 2024-02-07 | Stop reason: HOSPADM

## 2024-01-30 RX ORDER — ATORVASTATIN CALCIUM 40 MG/1
40 TABLET, FILM COATED ORAL NIGHTLY
Status: DISCONTINUED | OUTPATIENT
Start: 2024-01-30 | End: 2024-02-07 | Stop reason: HOSPADM

## 2024-01-30 RX ORDER — CALCITRIOL 0.5 UG/1
0.5 CAPSULE ORAL DAILY
Status: DISCONTINUED | OUTPATIENT
Start: 2024-01-31 | End: 2024-02-07 | Stop reason: HOSPADM

## 2024-01-30 RX ORDER — ACETAMINOPHEN 650 MG/1
650 SUPPOSITORY RECTAL EVERY 4 HOURS PRN
Status: DISCONTINUED | OUTPATIENT
Start: 2024-01-30 | End: 2024-02-04

## 2024-01-30 RX ORDER — CEFTAZIDIME 1 G/1
INJECTION, POWDER, FOR SOLUTION INTRAMUSCULAR; INTRAVENOUS
Status: COMPLETED
Start: 2024-01-30 | End: 2024-01-30

## 2024-01-30 RX ORDER — ACETAMINOPHEN 325 MG/1
650 TABLET ORAL EVERY 4 HOURS PRN
Status: DISCONTINUED | OUTPATIENT
Start: 2024-01-30 | End: 2024-02-04

## 2024-01-30 RX ORDER — HYDROMORPHONE HYDROCHLORIDE 1 MG/ML
0.5 INJECTION, SOLUTION INTRAMUSCULAR; INTRAVENOUS; SUBCUTANEOUS ONCE
Status: COMPLETED | OUTPATIENT
Start: 2024-01-30 | End: 2024-01-30

## 2024-01-30 RX ORDER — FENTANYL CITRATE 50 UG/ML
50 INJECTION, SOLUTION INTRAMUSCULAR; INTRAVENOUS ONCE
Status: COMPLETED | OUTPATIENT
Start: 2024-01-30 | End: 2024-01-30

## 2024-01-30 RX ADMIN — HYDROMORPHONE HYDROCHLORIDE 1 MG: 1 INJECTION, SOLUTION INTRAMUSCULAR; INTRAVENOUS; SUBCUTANEOUS at 17:06

## 2024-01-30 RX ADMIN — IOHEXOL 100 ML: 350 INJECTION, SOLUTION INTRAVENOUS at 16:22

## 2024-01-30 RX ADMIN — VANCOMYCIN HYDROCHLORIDE: 1 INJECTION, POWDER, LYOPHILIZED, FOR SOLUTION INTRAVENOUS at 23:25

## 2024-01-30 RX ADMIN — MORPHINE SULFATE 4 MG: 4 INJECTION INTRAVENOUS at 14:55

## 2024-01-30 RX ADMIN — SODIUM CHLORIDE, POTASSIUM CHLORIDE, SODIUM LACTATE AND CALCIUM CHLORIDE 250 ML: 600; 310; 30; 20 INJECTION, SOLUTION INTRAVENOUS at 20:45

## 2024-01-30 RX ADMIN — FENTANYL CITRATE 50 MCG: 50 INJECTION, SOLUTION INTRAMUSCULAR; INTRAVENOUS at 20:45

## 2024-01-30 RX ADMIN — HYDROMORPHONE HYDROCHLORIDE 0.5 MG: 1 INJECTION, SOLUTION INTRAMUSCULAR; INTRAVENOUS; SUBCUTANEOUS at 15:32

## 2024-01-30 RX ADMIN — VANCOMYCIN HYDROCHLORIDE: 500 INJECTION, POWDER, LYOPHILIZED, FOR SOLUTION INTRAVENOUS at 23:25

## 2024-01-30 RX ADMIN — HEPARIN SODIUM: 1000 INJECTION, SOLUTION INTRAVENOUS; SUBCUTANEOUS at 23:25

## 2024-01-30 RX ADMIN — CEFTAZIDIME: 1 INJECTION, POWDER, FOR SOLUTION INTRAMUSCULAR; INTRAVENOUS at 23:25

## 2024-01-30 ASSESSMENT — ENCOUNTER SYMPTOMS
FLANK PAIN: 0
DYSURIA: 0
VOMITING: 1
ABDOMINAL PAIN: 1
NAUSEA: 1
CHILLS: 0
MYALGIAS: 1
CONSTIPATION: 0
DIARRHEA: 1
PALPITATIONS: 0
HEMATURIA: 0
FEVER: 0
SHORTNESS OF BREATH: 1
FREQUENCY: 0

## 2024-01-30 ASSESSMENT — PAIN - FUNCTIONAL ASSESSMENT
PAIN_FUNCTIONAL_ASSESSMENT: 0-10

## 2024-01-30 ASSESSMENT — LIFESTYLE VARIABLES
HAVE PEOPLE ANNOYED YOU BY CRITICIZING YOUR DRINKING: NO
EVER HAD A DRINK FIRST THING IN THE MORNING TO STEADY YOUR NERVES TO GET RID OF A HANGOVER: NO
EVER FELT BAD OR GUILTY ABOUT YOUR DRINKING: NO
HAVE YOU EVER FELT YOU SHOULD CUT DOWN ON YOUR DRINKING: NO

## 2024-01-30 ASSESSMENT — PAIN DESCRIPTION - LOCATION: LOCATION: ABDOMEN

## 2024-01-30 ASSESSMENT — PAIN SCALES - GENERAL
PAINLEVEL_OUTOF10: 10 - WORST POSSIBLE PAIN
PAINLEVEL_OUTOF10: 7
PAINLEVEL_OUTOF10: 9
PAINLEVEL_OUTOF10: 10 - WORST POSSIBLE PAIN
PAINLEVEL_OUTOF10: 8
PAINLEVEL_OUTOF10: 8
PAINLEVEL_OUTOF10: 10 - WORST POSSIBLE PAIN
PAINLEVEL_OUTOF10: 10 - WORST POSSIBLE PAIN

## 2024-01-30 ASSESSMENT — COLUMBIA-SUICIDE SEVERITY RATING SCALE - C-SSRS
2. HAVE YOU ACTUALLY HAD ANY THOUGHTS OF KILLING YOURSELF?: NO
6. HAVE YOU EVER DONE ANYTHING, STARTED TO DO ANYTHING, OR PREPARED TO DO ANYTHING TO END YOUR LIFE?: NO
1. IN THE PAST MONTH, HAVE YOU WISHED YOU WERE DEAD OR WISHED YOU COULD GO TO SLEEP AND NOT WAKE UP?: NO

## 2024-01-30 NOTE — ED PROVIDER NOTES
HPI   Chief Complaint   Patient presents with    Weakness, Gen    Nausea    Vomiting    Shortness of Breath       HPI  Patient is a 64-year-old woman with past medical history of ESRD, multiple AAA dissections, HFrEF, 25%, 3/23, pancreatitis, pulmonary embolism on warfarin, gout, DVT, hypertension that presents to the emergency room for 2 days of body pain, shortness of breath, nausea, vomiting and overall feeling weak.  Patient states that she was a complete about 75% of her losses today.  Patient is dialysis every single day.  Patient is complaining of pain mostly located on her chest and abdomen.  Patient states that she has been having chronic diarrhea and has been assessment many hospital visits for the chronic diarrhea.  Patient denies hematochezia, hematemesis.  Patient states that she is very worried about about being able to go back home she lives by herself.              Rebecca Coma Scale Score: 15                  Patient History   Past Medical History:   Diagnosis Date    Other abnormalities of gait and mobility     Gait, antalgic    Personal history of other endocrine, nutritional and metabolic disease     History of hyperlipidemia    Personal history of other specified conditions     History of shortness of breath     Past Surgical History:   Procedure Laterality Date    COLONOSCOPY  12/05/2017    Complete Colonoscopy    COLONOSCOPY  12/2021    [facility] 12-25    CT ABDOMEN PELVIS ANGIOGRAM W AND/OR WO IV CONTRAST  11/17/2014    CT ABDOMEN PELVIS ANGIOGRAM W AND/OR WO IV CONTRAST 11/17/2014 [facility] CL[facility]ICAL LEGACY    CT ABDOMEN PELVIS ANGIOGRAM W AND/OR WO IV CONTRAST  02/01/2018    CT ABDOMEN PELVIS ANGIOGRAM W AND/OR WO IV CONTRAST 2/1/2018 [place] [facility] LEGACY    CT ABDOMEN PELVIS ANGIOGRAM W AND/OR WO IV CONTRAST  02/23/2018    CT ABDOMEN PELVIS ANGIOGRAM W AND/OR WO IV CONTRAST 2/23/2018 [facility] INPATIENT LEGACY    CT ABDOMEN PELVIS ANGIOGRAM W AND/OR WO IV CONTRAST  01/10/2019    CT ABDOMEN PELVIS ANGIOGRAM W  AND/OR WO IV CONTRAST 1/10/2019 [facility] EMERGENCY LEGACY    CT ABDOMEN PELVIS ANGIOGRAM W AND/OR WO IV CONTRAST  11/25/2019    CT ABDOMEN PELVIS ANGIOGRAM W AND/OR WO IV CONTRAST 11/25/2019 [facility] ANCILLARY LEGACY    CT ABDOMEN PELVIS ANGIOGRAM W AND/OR WO IV CONTRAST  04/30/2021    CT ABDOMEN PELVIS ANGIOGRAM W AND/OR WO IV CONTRAST 4/30/2021 [facility] CLINICAL LEGACY    CT ABDOMEN PELVIS ANGIOGRAM W AND/OR WO IV CONTRAST  01/09/2017    CT ABDOMEN PELVIS ANGIOGRAM W AND/OR WO IV CONTRAST 1/9/2017 [facility] CLINICAL LEGACY    CT ABDOMEN PELVIS ANGIOGRAM W AND/OR WO IV CONTRAST  10/24/2018    CT ABDOMEN PELVIS ANGIOGRAM W AND/OR WO IV CONTRAST 10/24/2018 [facility] EMERGENCY LEGACY    CT ABDOMEN PELVIS ANGIOGRAM W AND/OR WO IV CONTRAST  12/13/2022    CT ABDOMEN PELVIS ANGIOGRAM W AND/OR WO IV CONTRAST 12/13/2022 [facility] LEGAC[facility]    CT AORTA AND BILATERAL ILIOFEMORAL RUNOFF ANGIOGRAM W AND/OR WO IV CONTRAST  05/12/2023    CT AORTA AND BILATERAL ILIOFEMORAL RUNOFF ANGIOGRAM W AND/OR WO IV CONTRAST 5[place]12/2023 [facility] [place]    HYSTERECTOMY  01/06/2014    Hysterectomy    IR ANGIOGRAM AORTA ABDOMEN  08/02/2019    IR ANGIOGRAM AORTA ABDOMEN [place] [facility] [place] [facility]    IR ANGIOGRAM AORTA ABDOMEN  08/19/2022    IR ANGIOGRAM AORTA ABDOMEN 8/19/2022 [facility] [place]    IR ANGIOGRAM AORTA THORACIC  02/21/2018    IR ANGIOGRAM AORTA THORACIC 2/21/2018 [facility] [place]    IR ANGIOGRAM ENDOVASCULAR AORTIC REPAIR  08/19/2022    IR ANGIOGRAM ENDOVASCULAR AORTIC REPAIR 8[place]19/2022 [facility] [place]    IR INTERVENTION NEURO STENT  08/02/2019    IR INTERVENTION NEURO STENT 8/2/2019 [facility] [place]    OTHER SURGICAL HISTORY  01/20/2017    Aortic Aneurysm Repair Thoracoabdominal     Family History   Problem Relation Name Age of Onset    COPD Mother      Kidney failure Father       Social History     Tobacco Use    Smoking status: Former     Packs/day: 0.25     Years: 20.00     Additional pack years: 0.00     Total pack years: 5.00     Types:  Cigarettes     Quit date:      Years since quittin.0    Smokeless tobacco: Never   Vaping Use    Vaping Use: Never used   Substance Use Topics    Alcohol use: Not Currently    Drug use: Yes     Types: Marijuana       Physical Exam   ED Triage Vitals [24 1215]   Temperature Heart Rate Respirations BP   36.8 °C (98.3 °F) 92 20 91/65      Pulse Ox Temp Source Heart Rate Source Patient Position   97 % Oral -- --      BP Location FiO2 (%)     -- --       Physical Exam  Constitutional:       Appearance: She is well-developed and normal weight.   HENT:      Head: Normocephalic and atraumatic.      Mouth/Throat:      Mouth: Mucous membranes are moist.      Pharynx: Oropharynx is clear.   Eyes:      Pupils: Pupils are equal, round, and reactive to light.   Cardiovascular:      Rate and Rhythm: Normal rate and regular rhythm.   Pulmonary:      Effort: Pulmonary effort is normal. No tachypnea, accessory muscle usage or respiratory distress.      Breath sounds: Normal breath sounds. No decreased breath sounds.   Abdominal:      General: Bowel sounds are normal.      Palpations: Abdomen is soft.      Tenderness: There is abdominal tenderness. There is guarding. There is no rebound.   Musculoskeletal:      Cervical back: Normal range of motion and neck supple.   Skin:     General: Skin is warm and dry.      Capillary Refill: Capillary refill takes less than 2 seconds.   Neurological:      General: No focal deficit present.      Mental Status: She is alert and oriented to person, place, and time.     ED Course & MDM   ED Course as of 24   1727 Creatinine(!): 7.53 [JL]   1821 BNP(!): >5,000 [YG]   2159 EGFR(!): 6 [YG]      ED Course User Index  [JL] Paulie Mcmillan MD  [YG] Flor Dowling MD         Diagnoses as of 24   Peritonitis (CMS/HCC)   Iliac artery occlusion (CMS/HCC)       Medical Decision Making  Patient is a 64-year-old woman with past medical history of ESRD,  multiple AAA dissections, HFrEF, 25%, 3/23, pancreatitis, pulmonary embolism on warfarin, gout, DVT, hypertension that presents to the emergency room for 2 days of body pain, shortness of breath, nausea, vomiting and overall feeling weak.  Patient states that she was a complete about 75% of her losses today.  Patient is dialysis every single day.  Physical exam shows abdomen tender in all 4 quadrants, slightly distended, with some guarding, no rebound tenderness.  That he is afebrile, not complaining of any fevers, cough, congestion, runny nose.  Patient has not been around any sick contacts recently.  Patient is complaining of shortness of breath however lung sounds are clear, satting at 99% on room air, is not tachypneic.    Differential diagnosis includes pneumonia, MI, exacerbation of congestive heart failure, peritonitis, aortic dissection.  CMP shows an elevated creatinine with a decrease in EGFR, negative troponin, negative lipase, elevated BNP. EKG shows a normal sinus rhythm, with some PVCs, left axis deviation, heart rate of 95, , QRS of 90, QTc of 444, no ST elevations, no ST depressions, no T wave inversions.  ECG is similar to previous except for the PVCs.    Patient is in significant moderate pain and was given 4 mg of morphine, 1.5 of Dilaudid, 50 mg of fentanyl over the course of several hours.  Patient slightly hypotensive at 86/C and was given to 50 mL bolus of LR.  CT chest abdomen pelvis shows interval development of occlusion from the left common iliac artery graft supplying the right renal artery, with no visualized blood flow to the right kidney. Interval development of multiple air-fluid levels within multiple ventral hernias.  Given that there is some suspicion for peritonitis due to the air-fluid levels on the CT along with the tenderness to palpation the abdomen, peritoneal dialysis nurse was called and to collect fluid and send cell count.  Collection of fluid from the peritoneal  cavity looks cloudy.  There is concern for SBP. Patient was given intraperitoneal vancomycin, ceftazidime. Patient is vitally stable and will to be admitted to general medicine.    Procedure  Procedures     Flor Dowling MD  Resident  01/30/24 0326

## 2024-01-30 NOTE — ED TRIAGE NOTES
Pt to ED with c/o all over body pain, shortness of breath, nausea with vomiting, feeling faint/weak for 2 days. Pt does dialysis daily, overnight at home. Pt completed last night's treatment.

## 2024-01-30 NOTE — PROGRESS NOTES
I was consulted by MD to speak with patient regarding her ability to go to follow up appointments. I spoke to the patient at bedside. The patient states she relies on friends to take her to doctor appointments. She does not have any living family members. She lives home alone. She states she does peritoneal dialysis daily herself. She stated the reason she came in today was because she could not get all of the fluids off of her. She confirmed that Dr. Gregg Krer is her PCP. She mentioned that her cardiologist retired and she does not know who took over the practice. She has not had a chance to investigate due to being in the hospital. She does not like the care she is currently receiving from him. She states that she does not have a plan of care from his office that she can understand. The patient states she is overwhelmed with all of her medical problems and needs help at home. She is active with [facility], but has not been able to receive a visit from them due to being in the hospital. The patient also states she has been very weak. I asked if she would like to go to a nursing facility for a while. She would like to go to a facility. Provider updated.      Jadiel Miner RN

## 2024-01-30 NOTE — PROGRESS NOTES
Yoselyn Hernandez is a 64 y.o. female on day 0 of admission presenting with No Principal Problem: There is no principal problem currently on the Problem List. Please update the Problem List and refresh..    Subjective   Pt reported she does not feel anyone is acting like she is sick. She reported she is confused and does not feel she can think about a facility when she is sick and does not know why she would get sent somewhere before she is not sick any longer. She reported she did talk with Jadiel and he mentioned it. She reported she is concerned, because her insurance won't cover it. She reported this is what she was told before and she was sent home. She reported she would review the list and agreed to allow SW to leave it at bedside.     Objective   SW met with pt at bedside to follow up on providing her a list of SNF. Pt appeared frustrated with being provided this recommendation, however, she was receptive by the end of the conversation.    Assessment/Plan   SW provided pt with SNF list and explained the process. SW or TCC will follow back up in AM to determine if pt picked a facility for when she is ready for discharge. Care transitions dept will continue to follow.     Vianey Montero, MARTIA, LSW

## 2024-01-31 LAB
ANION GAP SERPL CALC-SCNC: 14 MMOL/L (ref 10–20)
ATRIAL RATE: 95 BPM
BASOPHILS NFR FLD MANUAL: 1 %
BLASTS NFR FLD MANUAL: 0 %
BUN SERPL-MCNC: 39 MG/DL (ref 6–23)
CALCIUM SERPL-MCNC: 7.5 MG/DL (ref 8.6–10.6)
CHLORIDE SERPL-SCNC: 93 MMOL/L (ref 98–107)
CLARITY FLD: ABNORMAL
CO2 SERPL-SCNC: 30 MMOL/L (ref 21–32)
COLOR FLD: ABNORMAL
CREAT SERPL-MCNC: 7.75 MG/DL (ref 0.5–1.05)
EGFRCR SERPLBLD CKD-EPI 2021: 5 ML/MIN/1.73M*2
EOSINOPHIL NFR FLD MANUAL: 6 %
ERYTHROCYTE [DISTWIDTH] IN BLOOD BY AUTOMATED COUNT: 15.9 % (ref 11.5–14.5)
GLUCOSE BLD MANUAL STRIP-MCNC: 99 MG/DL (ref 74–99)
GLUCOSE SERPL-MCNC: 78 MG/DL (ref 74–99)
HCT VFR BLD AUTO: 30.5 % (ref 36–46)
HGB BLD-MCNC: 10.6 G/DL (ref 12–16)
IMMATURE GRANULOCYTES IN FLUID: 0 %
INR PPP: 3 (ref 0.9–1.1)
LYMPHOCYTES NFR FLD MANUAL: 32 %
MCH RBC QN AUTO: 29.7 PG (ref 26–34)
MCHC RBC AUTO-ENTMCNC: 34.8 G/DL (ref 32–36)
MCV RBC AUTO: 85 FL (ref 80–100)
MONOS+MACROS NFR FLD MANUAL: 11 %
NEUTROPHILS NFR FLD MANUAL: 46 %
NRBC BLD-RTO: 0 /100 WBCS (ref 0–0)
OTHER CELLS NFR FLD MANUAL: 4 %
P AXIS: 69 DEGREES
P OFFSET: 203 MS
P ONSET: 132 MS
PATH REVIEW-CELL CT,FLUID: NORMAL
PLASMA CELLS NFR FLD MANUAL: 0 %
PLATELET # BLD AUTO: 244 X10*3/UL (ref 150–450)
POTASSIUM SERPL-SCNC: 4.2 MMOL/L (ref 3.5–5.3)
PR INTERVAL: 162 MS
PROTHROMBIN TIME: 34.6 SECONDS (ref 9.8–12.8)
Q ONSET: 213 MS
QRS COUNT: 15 BEATS
QRS DURATION: 90 MS
QT INTERVAL: 354 MS
QTC CALCULATION(BAZETT): 444 MS
QTC FREDERICIA: 412 MS
R AXIS: -52 DEGREES
RBC # BLD AUTO: 3.57 X10*6/UL (ref 4–5.2)
RBC # FLD AUTO: 1000 /UL
SODIUM SERPL-SCNC: 133 MMOL/L (ref 136–145)
T AXIS: 117 DEGREES
T OFFSET: 390 MS
TOTAL CELLS COUNTED FLD: 100
VENTRICULAR RATE: 95 BPM
WBC # BLD AUTO: 6.5 X10*3/UL (ref 4.4–11.3)
WBC # FLD AUTO: 7776 /UL

## 2024-01-31 PROCEDURE — 2500000004 HC RX 250 GENERAL PHARMACY W/ HCPCS (ALT 636 FOR OP/ED): Performed by: EMERGENCY MEDICINE

## 2024-01-31 PROCEDURE — 36415 COLL VENOUS BLD VENIPUNCTURE: CPT | Performed by: NURSE PRACTITIONER

## 2024-01-31 PROCEDURE — 99221 1ST HOSP IP/OBS SF/LOW 40: CPT

## 2024-01-31 PROCEDURE — 1100000001 HC PRIVATE ROOM DAILY

## 2024-01-31 PROCEDURE — 82947 ASSAY GLUCOSE BLOOD QUANT: CPT

## 2024-01-31 PROCEDURE — 85610 PROTHROMBIN TIME: CPT | Performed by: NURSE PRACTITIONER

## 2024-01-31 PROCEDURE — 2500000004 HC RX 250 GENERAL PHARMACY W/ HCPCS (ALT 636 FOR OP/ED)

## 2024-01-31 PROCEDURE — 2500000001 HC RX 250 WO HCPCS SELF ADMINISTERED DRUGS (ALT 637 FOR MEDICARE OP): Performed by: NURSE PRACTITIONER

## 2024-01-31 PROCEDURE — 2500000001 HC RX 250 WO HCPCS SELF ADMINISTERED DRUGS (ALT 637 FOR MEDICARE OP): Performed by: EMERGENCY MEDICINE

## 2024-01-31 PROCEDURE — 2500000005 HC RX 250 GENERAL PHARMACY W/O HCPCS: Performed by: NURSE PRACTITIONER

## 2024-01-31 PROCEDURE — 90947 DIALYSIS REPEATED EVAL: CPT

## 2024-01-31 PROCEDURE — 2500000004 HC RX 250 GENERAL PHARMACY W/ HCPCS (ALT 636 FOR OP/ED): Performed by: NURSE PRACTITIONER

## 2024-01-31 PROCEDURE — 99232 SBSQ HOSP IP/OBS MODERATE 35: CPT | Performed by: INTERNAL MEDICINE

## 2024-01-31 PROCEDURE — 85027 COMPLETE CBC AUTOMATED: CPT | Performed by: NURSE PRACTITIONER

## 2024-01-31 PROCEDURE — 80048 BASIC METABOLIC PNL TOTAL CA: CPT | Performed by: NURSE PRACTITIONER

## 2024-01-31 PROCEDURE — 2500000004 HC RX 250 GENERAL PHARMACY W/ HCPCS (ALT 636 FOR OP/ED): Performed by: INTERNAL MEDICINE

## 2024-01-31 RX ORDER — HYDROMORPHONE HYDROCHLORIDE 1 MG/ML
0.2 INJECTION, SOLUTION INTRAMUSCULAR; INTRAVENOUS; SUBCUTANEOUS ONCE
Status: COMPLETED | OUTPATIENT
Start: 2024-01-31 | End: 2024-01-31

## 2024-01-31 RX ORDER — NYSTATIN 100000 [USP'U]/ML
5 SUSPENSION ORAL EVERY 6 HOURS SCHEDULED
Status: DISCONTINUED | OUTPATIENT
Start: 2024-01-31 | End: 2024-01-31

## 2024-01-31 RX ORDER — HYDROMORPHONE HYDROCHLORIDE 1 MG/ML
INJECTION, SOLUTION INTRAMUSCULAR; INTRAVENOUS; SUBCUTANEOUS
Status: COMPLETED
Start: 2024-01-31 | End: 2024-01-31

## 2024-01-31 RX ORDER — DIPHENOXYLATE HYDROCHLORIDE AND ATROPINE SULFATE 2.5; .025 MG/1; MG/1
1 TABLET ORAL 4 TIMES DAILY PRN
Status: DISCONTINUED | OUTPATIENT
Start: 2024-01-31 | End: 2024-02-07 | Stop reason: HOSPADM

## 2024-01-31 RX ORDER — HYDROMORPHONE HYDROCHLORIDE 1 MG/ML
0.4 INJECTION, SOLUTION INTRAMUSCULAR; INTRAVENOUS; SUBCUTANEOUS EVERY 4 HOURS PRN
Status: DISCONTINUED | OUTPATIENT
Start: 2024-01-31 | End: 2024-02-01

## 2024-01-31 RX ADMIN — HEPARIN SODIUM: 1000 INJECTION INTRAVENOUS; SUBCUTANEOUS at 18:12

## 2024-01-31 RX ADMIN — Medication 10 MG: at 00:42

## 2024-01-31 RX ADMIN — NYSTATIN 500000 UNITS: 100000 SUSPENSION ORAL at 22:47

## 2024-01-31 RX ADMIN — CALCITRIOL 0.5 MCG: 0.5 CAPSULE ORAL at 08:47

## 2024-01-31 RX ADMIN — SEVELAMER CARBONATE 800 MG: 800 TABLET, FILM COATED ORAL at 08:47

## 2024-01-31 RX ADMIN — MIRTAZAPINE 15 MG: 15 TABLET, FILM COATED ORAL at 20:41

## 2024-01-31 RX ADMIN — HYDROMORPHONE HYDROCHLORIDE 0.2 MG: 1 INJECTION, SOLUTION INTRAMUSCULAR; INTRAVENOUS; SUBCUTANEOUS at 00:15

## 2024-01-31 RX ADMIN — ATORVASTATIN CALCIUM 40 MG: 40 TABLET, FILM COATED ORAL at 20:41

## 2024-01-31 RX ADMIN — HYDROMORPHONE HYDROCHLORIDE 0.2 MG: 1 INJECTION, SOLUTION INTRAMUSCULAR; INTRAVENOUS; SUBCUTANEOUS at 04:01

## 2024-01-31 RX ADMIN — SEVELAMER CARBONATE 800 MG: 800 TABLET, FILM COATED ORAL at 11:43

## 2024-01-31 RX ADMIN — Medication 10 MG: at 20:41

## 2024-01-31 RX ADMIN — HYDROMORPHONE HYDROCHLORIDE 0.4 MG: 1 INJECTION, SOLUTION INTRAMUSCULAR; INTRAVENOUS; SUBCUTANEOUS at 14:50

## 2024-01-31 RX ADMIN — LEVOTHYROXINE SODIUM 25 MCG: 25 TABLET ORAL at 08:47

## 2024-01-31 RX ADMIN — HYDROMORPHONE HYDROCHLORIDE 0.2 MG: 1 INJECTION, SOLUTION INTRAMUSCULAR; INTRAVENOUS; SUBCUTANEOUS at 09:29

## 2024-01-31 RX ADMIN — HYDROMORPHONE HYDROCHLORIDE 0.2 MG: 1 INJECTION, SOLUTION INTRAMUSCULAR; INTRAVENOUS; SUBCUTANEOUS at 10:31

## 2024-01-31 RX ADMIN — VANCOMYCIN HYDROCHLORIDE: 1 INJECTION, POWDER, LYOPHILIZED, FOR SOLUTION INTRAVENOUS at 00:05

## 2024-01-31 RX ADMIN — Medication 800 MG: at 08:47

## 2024-01-31 RX ADMIN — HYDROMORPHONE HYDROCHLORIDE 0.4 MG: 1 INJECTION, SOLUTION INTRAMUSCULAR; INTRAVENOUS; SUBCUTANEOUS at 20:41

## 2024-01-31 RX ADMIN — HEPARIN SODIUM: 1000 INJECTION INTRAVENOUS; SUBCUTANEOUS at 18:11

## 2024-01-31 RX ADMIN — ONDANSETRON 4 MG: 4 TABLET, ORALLY DISINTEGRATING ORAL at 09:33

## 2024-01-31 SDOH — HEALTH STABILITY: MENTAL HEALTH: HOW OFTEN DO YOU HAVE A DRINK CONTAINING ALCOHOL?: NEVER

## 2024-01-31 SDOH — HEALTH STABILITY: MENTAL HEALTH: HOW OFTEN DO YOU HAVE 6 OR MORE DRINKS ON ONE OCCASION?: NEVER

## 2024-01-31 SDOH — SOCIAL STABILITY: SOCIAL INSECURITY: ABUSE: ADULT

## 2024-01-31 SDOH — SOCIAL STABILITY: SOCIAL INSECURITY: ARE YOU OR HAVE YOU BEEN THREATENED OR ABUSED PHYSICALLY, EMOTIONALLY, OR SEXUALLY BY ANYONE?: NO

## 2024-01-31 SDOH — SOCIAL STABILITY: SOCIAL INSECURITY: HAS ANYONE EVER THREATENED TO HURT YOUR FAMILY OR YOUR PETS?: NO

## 2024-01-31 SDOH — SOCIAL STABILITY: SOCIAL INSECURITY: ARE THERE ANY APPARENT SIGNS OF INJURIES/BEHAVIORS THAT COULD BE RELATED TO ABUSE/NEGLECT?: NO

## 2024-01-31 SDOH — SOCIAL STABILITY: SOCIAL INSECURITY: DO YOU FEEL ANYONE HAS EXPLOITED OR TAKEN ADVANTAGE OF YOU FINANCIALLY OR OF YOUR PERSONAL PROPERTY?: NO

## 2024-01-31 SDOH — SOCIAL STABILITY: SOCIAL INSECURITY: WERE YOU ABLE TO COMPLETE ALL THE BEHAVIORAL HEALTH SCREENINGS?: YES

## 2024-01-31 SDOH — SOCIAL STABILITY: SOCIAL INSECURITY: DO YOU FEEL UNSAFE GOING BACK TO THE PLACE WHERE YOU ARE LIVING?: NO

## 2024-01-31 SDOH — HEALTH STABILITY: MENTAL HEALTH: HOW MANY STANDARD DRINKS CONTAINING ALCOHOL DO YOU HAVE ON A TYPICAL DAY?: PATIENT DOES NOT DRINK

## 2024-01-31 SDOH — SOCIAL STABILITY: SOCIAL INSECURITY: DOES ANYONE TRY TO KEEP YOU FROM HAVING/CONTACTING OTHER FRIENDS OR DOING THINGS OUTSIDE YOUR HOME?: NO

## 2024-01-31 SDOH — SOCIAL STABILITY: SOCIAL INSECURITY: HAVE YOU HAD THOUGHTS OF HARMING ANYONE ELSE?: NO

## 2024-01-31 ASSESSMENT — COGNITIVE AND FUNCTIONAL STATUS - GENERAL
TOILETING: A LOT
DRESSING REGULAR UPPER BODY CLOTHING: A LITTLE
HELP NEEDED FOR BATHING: A LITTLE
TURNING FROM BACK TO SIDE WHILE IN FLAT BAD: A LOT
WALKING IN HOSPITAL ROOM: A LOT
HELP NEEDED FOR BATHING: A LITTLE
MOVING TO AND FROM BED TO CHAIR: A LOT
STANDING UP FROM CHAIR USING ARMS: A LOT
TURNING FROM BACK TO SIDE WHILE IN FLAT BAD: A LOT
TOILETING: A LOT
TURNING FROM BACK TO SIDE WHILE IN FLAT BAD: A LOT
MOVING FROM LYING ON BACK TO SITTING ON SIDE OF FLAT BED WITH BEDRAILS: A LITTLE
DAILY ACTIVITIY SCORE: 19
MOVING TO AND FROM BED TO CHAIR: A LOT
PATIENT BASELINE BEDBOUND: NO
MOVING FROM LYING ON BACK TO SITTING ON SIDE OF FLAT BED WITH BEDRAILS: A LITTLE
WALKING IN HOSPITAL ROOM: A LOT
DAILY ACTIVITIY SCORE: 19
MOVING FROM LYING ON BACK TO SITTING ON SIDE OF FLAT BED WITH BEDRAILS: A LITTLE
DRESSING REGULAR LOWER BODY CLOTHING: A LITTLE
MOBILITY SCORE: 12
CLIMB 3 TO 5 STEPS WITH RAILING: TOTAL
DRESSING REGULAR UPPER BODY CLOTHING: A LITTLE
WALKING IN HOSPITAL ROOM: A LOT
CLIMB 3 TO 5 STEPS WITH RAILING: TOTAL
HELP NEEDED FOR BATHING: A LITTLE
DAILY ACTIVITIY SCORE: 19
MOVING TO AND FROM BED TO CHAIR: A LOT
STANDING UP FROM CHAIR USING ARMS: A LOT
DRESSING REGULAR LOWER BODY CLOTHING: A LITTLE
STANDING UP FROM CHAIR USING ARMS: A LOT
TOILETING: A LOT
MOBILITY SCORE: 12
DRESSING REGULAR UPPER BODY CLOTHING: A LITTLE
MOBILITY SCORE: 12
CLIMB 3 TO 5 STEPS WITH RAILING: TOTAL
DRESSING REGULAR LOWER BODY CLOTHING: A LITTLE

## 2024-01-31 ASSESSMENT — ACTIVITIES OF DAILY LIVING (ADL)
HEARING - RIGHT EAR: FUNCTIONAL
LACK_OF_TRANSPORTATION: YES
GROOMING: NEEDS ASSISTANCE
WALKS IN HOME: NEEDS ASSISTANCE
PATIENT'S MEMORY ADEQUATE TO SAFELY COMPLETE DAILY ACTIVITIES?: YES
ADEQUATE_TO_COMPLETE_ADL: YES
TOILETING: NEEDS ASSISTANCE
DRESSING YOURSELF: NEEDS ASSISTANCE
HEARING - LEFT EAR: FUNCTIONAL
FEEDING YOURSELF: NEEDS ASSISTANCE
BATHING: NEEDS ASSISTANCE
JUDGMENT_ADEQUATE_SAFELY_COMPLETE_DAILY_ACTIVITIES: YES

## 2024-01-31 ASSESSMENT — PAIN SCALES - GENERAL
PAINLEVEL_OUTOF10: 2
PAINLEVEL_OUTOF10: 3
PAINLEVEL_OUTOF10: 10 - WORST POSSIBLE PAIN
PAINLEVEL_OUTOF10: 0 - NO PAIN
PAINLEVEL_OUTOF10: 7
PAINLEVEL_OUTOF10: 7

## 2024-01-31 ASSESSMENT — LIFESTYLE VARIABLES
HOW MANY STANDARD DRINKS CONTAINING ALCOHOL DO YOU HAVE ON A TYPICAL DAY: PATIENT DOES NOT DRINK
HOW OFTEN DO YOU HAVE A DRINK CONTAINING ALCOHOL: NEVER
SKIP TO QUESTIONS 9-10: 1
AUDIT-C TOTAL SCORE: 0
AUDIT-C TOTAL SCORE: 0
HOW OFTEN DO YOU HAVE 6 OR MORE DRINKS ON ONE OCCASION: NEVER
AUDIT-C TOTAL SCORE: 0
SKIP TO QUESTIONS 9-10: 1

## 2024-01-31 ASSESSMENT — PATIENT HEALTH QUESTIONNAIRE - PHQ9
2. FEELING DOWN, DEPRESSED OR HOPELESS: NEARLY EVERY DAY
1. LITTLE INTEREST OR PLEASURE IN DOING THINGS: NEARLY EVERY DAY
SUM OF ALL RESPONSES TO PHQ9 QUESTIONS 1 & 2: 6

## 2024-01-31 ASSESSMENT — PAIN - FUNCTIONAL ASSESSMENT: PAIN_FUNCTIONAL_ASSESSMENT: 0-10

## 2024-01-31 NOTE — H&P
History Of Present Illness  Yoselyn Hernandez is a 64 y.o. 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 Apparently, she was only able to complete around 75% of her PD, which she does daily. She reports several hospital visits for diarrhea. She denies any blood in stool or vomit. She lives by herself and is very worried about returning home. On exam in ED43, she reports feeling unchanged from arrival. She reports diffuse abdominal pain, cramping/burning in nature, 10/10. She also reports nausea, but no current vomiting. She denies any chest pain, orthopnea, or increased leg swelling. She notes she was recently seen at [facility] with peritonitis suspected, however was discharged after it was ruled out. She was not given antibiotics at that time.     Past Medical History  Past Medical History:   Diagnosis Date    Other abnormalities of gait and mobility     Gait, antalgic    Personal history of other endocrine, nutritional and metabolic disease     History of hyperlipidemia    Personal history of other specified conditions     History of shortness of breath       Surgical History  Past Surgical History:   Procedure Laterality Date    COLONOSCOPY  12/05/2017    Complete Colonoscopy    COLONOSCOPY  12/2021    rpt 12-25    CT ABDOMEN PELVIS ANGIOGRAM W AND/OR WO IV CONTRAST  11/17/2014    CT ABDOMEN PELVIS ANGIOGRAM W AND/OR WO IV CONTRAST 11/17/2014 [facility] CLINICAL LEGACY    CT ABDOMEN PELVIS ANGIOGRAM W AND/OR WO IV CONTRAST  02/01/2018    CT ABDOMEN PELVIS ANGIOGRAM W AND/OR WO IV CONTRAST 2/1/2018 [facility] LEGACY    CT ABDOMEN PELVIS ANGIOGRAM W AND/OR WO IV CONTRAST  02/23/2018    CT ABDOMEN PELVIS ANGIOGRAM W AND/OR WO IV CONTRAST 2/23/2018 [facility]  INPATIENT LEGACY    CT ABDOMEN PELVIS ANGIOGRAM W AND/OR WO IV CONTRAST  01/10/2019    CT ABDOMEN PELVIS ANGIOGRAM W AND/OR WO IV CONTRAST 1/10/2019 [facility] EMERGENCY LEGACY    CT ABDOMEN PELVIS ANGIOGRAM W AND/OR WO IV CONTRAST  11/25/2019    CT ABDOMEN PELVIS ANGIOGRAM W AND/OR WO IV CONTRAST 11/25/2019 [facility] ANCILLARY LEGACY    CT ABDOMEN PELVIS ANGIOGRAM W AND/OR WO IV CONTRAST  04/30/2021    CT ABDOMEN PELVIS ANGIOGRAM W AND/OR WO IV CONTRAST 4/30/2021 [facility] CLINICAL LEGACY    CT ABDOMEN PELVIS ANGIOGRAM W AND/OR WO IV CONTRAST  01/09/2017    CT ABDOMEN PELVIS ANGIOGRAM W AND/OR WO IV CONTRAST 1/9/2017 [facility] CLINICAL LEGACY    CT ABDOMEN PELVIS ANGIOGRAM W AND/OR WO IV CONTRAST  10/24/2018    CT ABDOMEN PELVIS ANGIOGRAM W AND/OR WO IV CONTRAST 10/24/2018 [facility] EMERGENCY LEGACY    CT ABDOMEN PELVIS ANGIOGRAM W AND/OR WO IV CONTRAST  12/13/2022    CT ABDOMEN PELVIS ANGIOGRAM W AND/OR WO IV CONTRAST 12/13/2022 [facility]    CT AORTA AND BILATERAL ILIOFEMORAL RUNOFF ANGIOGRAM W AND/OR WO IV CONTRAST  05/12/2023    CT AORTA AND BILATERAL ILIOFEMORAL RUNOFF ANGIOGRAM W AND/OR WO IV CONTRAST 5/12/2023 [facility]    HYSTERECTOMY  01/06/2014    Hysterectomy    IR ANGIOGRAM AORTA ABDOMEN  08/02/2019    IR ANGIOGRAM AORTA ABDOMEN 8/2/2019 [facility] INPATIENT LEGACY    IR ANGIOGRAM AORTA ABDOMEN  08/19/2022    IR ANGIOGRAM AORTA ABDOMEN 8/19/2022 [facility] INPATIENT LEGACY    IR ANGIOGRAM AORTA THORACIC  02/21/2018    IR ANGIOGRAM AORTA THORACIC 2/21/2018 [facility] INPATIENT LEGACY    IR ANGIOGRAM ENDOVASCULAR AORTIC REPAIR  08/19/2022    IR ANGIOGRAM ENDOVASCULAR AORTIC REPAIR 8/19/2022 [facility] INPATIENT LEGACY    IR INTERVENTION NEURO STENT  08/02/2019    IR INTERVENTION NEURO STENT [facility]    OTHER SURGICAL HISTORY  01/20/2017    Aortic Aneurysm Repair Thoracoabdominal        Social History  She reports that she quit smoking about 29 years ago. Her smoking use included cigarettes. She has a 5.00 pack-year smoking history.  "She has never used smokeless tobacco. She reports that she does not currently use alcohol. She reports current drug use. Drug: Marijuana.    Family History  Family History   Problem Relation Name Age of Onset    COPD Mother      Kidney failure Father          Allergies  Ace inhibitors, Ciprofloxacin, Gabapentin, and Oxycodone    Review of Systems   Constitutional:  Negative for chills and fever.   Respiratory:  Positive for shortness of breath.    Cardiovascular:  Negative for chest pain and palpitations.   Gastrointestinal:  Positive for abdominal pain, diarrhea, nausea and vomiting. Negative for constipation.   Genitourinary:  Negative for dysuria, flank pain, frequency, hematuria and urgency.   Musculoskeletal:  Positive for myalgias.   All other systems reviewed and are negative.       Physical Exam  Vitals reviewed.   Constitutional:       Appearance: She is underweight.   HENT:      Head: Normocephalic and atraumatic.   Cardiovascular:      Rate and Rhythm: Normal rate and regular rhythm.      Heart sounds: Normal heart sounds.   Pulmonary:      Effort: Pulmonary effort is normal.      Breath sounds: Normal air entry.   Abdominal:      General: There is distension.      Palpations: Abdomen is soft.      Tenderness: There is generalized abdominal tenderness and tenderness in the right upper quadrant, right lower quadrant, left upper quadrant and left lower quadrant.   Musculoskeletal:         General: No deformity.      Left lower leg: Edema (Chronic) present.   Skin:     General: Skin is warm and dry.   Neurological:      General: No focal deficit present.      Mental Status: She is alert and oriented to person, place, and time.   Psychiatric:         Mood and Affect: Mood normal.         Behavior: Behavior normal.          Last Recorded Vitals  Blood pressure 92/70, pulse 98, temperature 37.3 °C (99.1 °F), temperature source Tympanic, resp. rate 16, height 1.702 m (5' 7\"), weight 49.9 kg (110 lb), SpO2 98 " %.    Relevant Results      Lab Results   Component Value Date    WBC 7.3 01/30/2024    HGB 11.6 (L) 01/30/2024    HCT 33.8 (L) 01/30/2024    MCV 85 01/30/2024     01/30/2024     Lab Results   Component Value Date    GLUCOSE 84 01/30/2024    CALCIUM 8.3 (L) 01/30/2024     (L) 01/30/2024    K 3.6 01/30/2024    CO2 26 01/30/2024    CL 94 (L) 01/30/2024    BUN 38 (H) 01/30/2024    CREATININE 7.53 (H) 01/30/2024     CT angio chest abdomen pelvis  Narrative: Interpreted By:  José Miguel Mckee and Bamfo Rose   STUDY:  CT ANGIO CHEST ABDOMEN PELVIS;  1/30/2024 4:32 pm      INDICATION:  Signs/Symptoms:chest and abdominal pain, hx of AAA.      COMPARISON:  CTA chest abdomen pelvis on 11/06/2023  CT abdomen pelvis on 11/12/2024      ACCESSION NUMBER(S):  [id number]      ORDERING CLINICIAN:  XUAN VENEGAS      TECHNIQUE:  Axial non-contrast images of the chest, abdomen, and pelvis with  coronal and sagittal reformatted images. Axial CT images of the  chest, abdomen and pelvis after the intravenous administration of 100  mL Omnipaque 350 using angiographic technique with coronal and  sagittal reformatted images. MIP images were provided and reviewed.  3D reconstructions were created on a separate independent workstation  and reviewed.      FINDINGS:  VASCULATURE:      THORACIC ARTERIES:  Status post aortic endograft repair of an aortic dissection as well  as arch debranching. Similar appearance of a dissection flap  extending into the brachiocephalic and right common carotid arteries.  Similar appearance of a small dissection versus contained penetrating  atheromatous ulcer in the proximal left common carotid artery (series  501, image 93 of 659) with a widely patent bypass from the left  common carotid to the left subclavian artery. The more distal left  common carotid artery is patent.      Redemonstration of proximal descending aortic aneurysm measuring up  to 5.2 cm in largest dimension (series 501, image  140), stable from  11/06/2023.      ABDOMINAL ARTERIES:  The aortic endograft extends from the distal ascending thoracic aorta  to the distal abdominal aorta. The endograft is patent. Within  limitations of no delayed enhancement images, there is no evidence of  endoleak. There is a graft arising from the right common iliac artery  supplying the celiac and superior mesenteric arteries, which remain  widely patent. The proximal inferior mesenteric artery appears  occluded, but distally patent via retrograde flow.          RENAL ARTERIES:  Additionally, there is a graft arising from the left common iliac  artery supplying the bilateral renal arteries. The proximal component  of the graft is incompletely thrombosed with moderate narrowing of  the lumen, which is unchanged from 11/06/2023. The graft supplying  the left renal artery is thrombosed, stable from prior exam. Interval  development of high-grade stenosis/occlusion of the graft supplying  the right renal artery, with no visualized blood flow to the right  kidney.          ABDOMINOPELVIC ARTERIES:  Similar appearance slightly of a small non flow-limiting dissection  involving the right external iliac artery and extending to the right  common femoral artery. Unchanged short segment left common femoral  artery dissection when compared to prior CT with adjacent fluid  collection (series 501, images 544 and 616). Unchanged aneurysmal  dilation of the right internal iliac artery measuring up to 1.5 cm  (series 501, image 461). There is beam hardening from the  embolization coils of left internal iliac artery.      NONVASCULAR FINDINGS:      CT CHEST:      LUNG/PLEURA/LARGE AIRWAYS:  Stable bibasilar atelectasis or scarring. Centrilobular and  paraseptal emphysema with apical bullous changes are again noted.  There is no pleural effusion or pneumothorax.      VESSELS:  Severe atherosclerotic changes are noted of the aorta. Severe  coronary artery calcifications are  present.      HEART:  Stable cardiomegaly with enlargement of the bilateral atria and left  ventricle. No pericardial effusion      MEDIASTINUM AND ALIDA:  No mediastinal, hilar or axillary lymphadenopathy is present. The  esophagus is normal.      CHEST WALL AND LOWER NECK:  Status post median sternotomy. The visualized thyroid gland appears  within normal limits.          CT ABDOMEN/PELVIS:      LIVER:  The liver is normal in size without evidence of focal liver lesions.      BILE DUCTS:  The intrahepatic and extrahepatic ducts are not dilated.      GALLBLADDER:  The gallbladder is surgically absent.      PANCREAS:  Similar mild pancreatic ductal dilation with compression of the  pancreatic tail from the adjacent chronic fluid collection. This  peripherally calcified peripancreatic collection measures 7.0 cm,  stable to mildly decreased from 7.4 cm on prior exam.      SPLEEN:  The spleen is normal in size without focal lesions.      ADRENAL GLANDS:  Bilateral adrenal glands appear normal.      KIDNEYS AND URETERS:  Bilateral renal atrophy is again noted. Stable right renal cysts. No  hydroureteronephrosis or nephroureterolithiasis is identified.      PELVIS:      BLADDER:  The urinary bladder appears normal without abnormal wall thickening.      REPRODUCTIVE ORGANS:  Uterus is not visualized and may be atrophic or surgically absent.      BOWEL:  The stomach is unremarkable. The small and large bowel are normal in  caliber and demonstrate no wall thickening. The appendix is not  definitely visualized. There is however no pericecal stranding or  fluid.          PERITONEUM/RETROPERITONEUM/LYMPH NODES:  Peritoneal dialysis catheter noted in the left lower abdomen. Small  volume free peritoneal fluid is again noted.      BONE AND SOFT TISSUE:  No suspicious osseous lesions are identified. Degenerative discogenic  disease is noted in the lower thoracic and lumbar spine. Multiple  ventral hernias are again noted, with  interval development of  multiple air-fluid levels in comparison to CT on 11/12/2024.      Impression: 1. Interval development of occlusion from the left common iliac  artery graft supplying the right renal artery, with no visualized  blood flow to the right kidney.  2. Interval development of multiple air-fluid levels within multiple  ventral hernias, which may be secondary to iatrogenic air from the  patient's peritoneal dialysis catheter but with bacterial peritonitis  not excluded.  3. Stable postoperative changes of thoracic and abdominal aortic  endograft repair.  4. Stable aneurysmal dilation of the thoracic descending aorta.  5. Stable appearance of dissections involving the right  brachiocephalic artery at its origin and extending into the right  common carotid artery, right external iliac extending to right common  femoral artery, and a short segment of the left femoral artery.      I personally reviewed the images/study and I agree with radiology  resident Dr. Martha Hylton's findings as stated. This study was  interpreted at [place], [place].      MACRO:  Martha Hylton discussed the significance and urgency of this critical  finding by EPIC CHAT with  XUAN VENEGAS on 1/30/2024 at 7:36 pm.  (**-RCF-**) Findings:  There are multiple critical findings. See  findings.                  Signed by: José Miguel Mckee 1/30/2024 8:12 PM  Dictation workstation:   [id number]  XR chest 1 view  Narrative: Interpreted By:  Cali Rodriguez,   STUDY:  XR CHEST 1 VIEW      INDICATION:  Signs/Symptoms:Chest Pain.      COMPARISON:  October 18, 2023      ACCESSION NUMBER(S):  [id number]      ORDERING CLINICIAN:  CAMI LOYA      FINDINGS:  Cardiomegaly with previous aortic repair unchanged.      No consolidation, effusion, edema, or pneumothorax.      Impression: No evidence of acute intrathoracic abnormality.      Signed by: Cali Rodriguez 1/30/2024 3:02 PM  Dictation workstation:    [id number]    ED Medication Administration from 01/30/2024 1203 to 01/30/2024 2307         Date/Time Order Dose Route Action Action by     01/30/2024 1455 EST morphine injection 4 mg 4 mg intravenous Given Luksic, K     01/30/2024 1532 EST HYDROmorphone (Dilaudid) injection 0.5 mg 0.5 mg intravenous Given Luksic, K     01/30/2024 1622 EST iohexol (OMNIPaque) 350 mg iodine/mL solution 100 mL 100 mL intravenous Given NATE Cali     01/30/2024 1706 EST HYDROmorphone (Dilaudid) injection 1 mg 1 mg intravenous Given Vitaly, K     01/30/2024 2020 EST HYDROmorphone (Dilaudid) injection 0.5 mg -- intravenous Canceled Entry LEONARDO Cisneros     01/30/2024 2045 EST fentaNYL PF (Sublimaze) injection 50 mcg 50 mcg intravenous Given Blayne, LEONARDO     01/30/2024 2045 EST lactated Ringer's bolus 250 mL 250 mL intravenous New Bag Blayne, LEONARDO     01/30/2024 2145 EST lactated Ringer's bolus 250 mL 0 mL intravenous Stopped LEONARDO Cisneros          Lab Results   Component Value Date    INR 2.8 (H) 01/24/2024    INR 2.4 (H) 01/23/2024    INR 2.5 (H) 01/22/2024    PROTIME 31.7 (H) 01/24/2024    PROTIME 27.8 (H) 01/23/2024    PROTIME 28.1 (H) 01/22/2024          Assessment/Plan   Principal Problem:    Peritonitis (CMS/HCC)      #Peritonitis  -CTA with multiple air-fluid levels within hernias c/f peritonitis or iatrogenic air  -Peritoneal fluid sample cloudy with WBCs, r/o SBP  -Ceftazidine, Vanco given in ED, continue  -No leukocytosis, afebrile  -NICKY-SOFA 9  -Dilaudid 0.2mg for pain, Zofran for nausea    #Left common iliac occlusion  -Vascular input appreciated  -Patient is anuric  -Clinically insigificant at this time per Vascular    #HFrEF  -Patient low normal BP in ED  -Given 250mL LR in ED  -Daily weights  -Continue home Coreg, hold for SBP < 90    #ESRD on PD  -Consult nephrology for PD management  -Renal dosing where indicated  -Renal diet    #History of DVT/PE  -INR therapeutic  -Continue Warfarin (no dose tonight)  -Follow INR    #Chronic  diarrhea  -Continue lomotil, loperamide           Paco Law, APRN-CNP

## 2024-01-31 NOTE — CONSULTS
"Yoselyn Hernandez   64 y.o.    @WT@  N/Room: [id number]/TR01A/TR01A  DOA: 1/30/2024    REASON FOR CONSULT: ESKD on PD    REQUESTING PHYSICIAN: Brittney Meng DO;Casimiro*  PRIMARY CARE PHYSICIAN: Gregg Kerr MD    ADMISSION DIAGNOSIS:   1. Peritonitis (CMS/HCC)    2. Iliac artery occlusion (CMS/HCC)        ___________________________________________________________________________________  P/C:  Abdominal pain, lethargy, poor appetite  ___________________________________________________________________________________  HPI:  Yoselyn Hernandez is a 64 y.o. female with a past medical history of ESRD on PD, multiple AAA dissections, HFrEF (EF 15-20% 3/23), pancreatitis, PE/DVT on warfarin, HTN, and multiple renal artery grafts with prior ilio-renal bypass who presented to the ED with abdominal pain, gross taste, shortness of breath, nausea, vomiting, poor appetite and generalized weakness for 5 days. She mentions having loose stools for at least a month. She does not recall passing stool in last 24 hrs. She has umbilical hernia as well. She has not been able to carry out PD for a day and had difficulty draining and performing last fill yesterday. Endorses losing weight in last couple of months. Denies of any fever, chest pain, palpitations or dizziness. She is admitted for suspected peritonitis. Nephrology was consulted for ESRD management.    As per chart review, she presented at [facility] on 1/12 for evaluation of left leg pain and left arm after a fall. She requested for dilaudid for pain relief    Of note is her recent admission in Nov 2023 for abdominal pain and diarrhea diagnosed as acute on chronic abdominal pain with chronic intermittent diarrhea, Bacterial peritonitis    ROS: systems reviewed and negative except mentioned in HPI    In the ER: BP 89/70   Pulse 96   Temp 36.7 °C (98.1 °F) (Oral)   Resp 18   Ht 1.702 m (5' 7\")   Wt 49.9 kg (110 lb)   SpO2 97%   BMI 17.23 kg/m²      Past Medical History  She has a " past medical history of Other abnormalities of gait and mobility, Personal history of other endocrine, nutritional and metabolic disease, and Personal history of other specified conditions.    Surgical History  She has a past surgical history that includes Colonoscopy (12/05/2017); Hysterectomy (01/06/2014); Other surgical history (01/20/2017); CT angio abdomen pelvis w and or wo IV IV contrast (11/17/2014); CT angio abdomen pelvis w and or wo IV IV contrast (02/01/2018); CT angio abdomen pelvis w and or wo IV IV contrast (02/23/2018); CT angio abdomen pelvis w and or wo IV IV contrast (01/10/2019); CT angio abdomen pelvis w and or wo IV IV contrast (11/25/2019); CT angio abdomen pelvis w and or wo IV IV contrast (04/30/2021); CT angio abdomen pelvis w and or wo IV IV contrast (01/09/2017); CT angio abdomen pelvis w and or wo IV IV contrast (10/24/2018); IR angiogram aorta thoracic (02/21/2018); IR angiogram aorta abdomen (08/02/2019); IR intervention NEURO stent (08/02/2019); IR angiogram endovascular aortic repair (08/19/2022); IR angiogram aorta abdomen (08/19/2022); CT angio abdomen pelvis w and or wo IV IV contrast (12/13/2022); CT angio aorta and bilateral iliofemoral runoff w and or wo IV contrast (05/12/2023); and Colonoscopy (12/2021).     Social History  She reports that she quit smoking about 29 years ago. Her smoking use included cigarettes. She has a 5.00 pack-year smoking history. She has never used smokeless tobacco. She reports that she does not currently use alcohol. She reports current drug use. Drug: Marijuana.    Family History  Family History   Problem Relation Name Age of Onset    COPD Mother      Kidney failure Father         Meds:   atorvastatin, 40 mg, Nightly  calcitriol, 0.5 mcg, Daily  carvedilol, 6.25 mg, BID with meals  levothyroxine, 25 mcg, Daily before breakfast  magnesium oxide, 800 mg, Daily  mirtazapine, 15 mg, Nightly  nystatin, 500,000 Units, q8h AMARJIT  sevelamer carbonate, 800  mg, TID with meals         acetaminophen, 650 mg, q4h PRN   Or  acetaminophen, 650 mg, q4h PRN   Or  acetaminophen, 650 mg, q4h PRN  HYDROmorphone, 0.4 mg, q4h PRN  loperamide, 2 mg, 4x daily PRN  melatonin, 10 mg, Daily PRN  ondansetron ODT, 4 mg, q8h PRN   Or  ondansetron, 4 mg, q8h PRN  polyethylene glycol, 17 g, Daily PRN      Current Outpatient Medications   Medication Instructions    acetaminophen (TYLENOL) 975 mg, oral, Every 8 hours PRN    allopurinol (ZYLOPRIM) 100 mg, oral, Daily    atorvastatin (Lipitor) 40 mg tablet TAKE 1 TABLET BY MOUTH ONCE DAILY AT BEDTIME    carvedilol (COREG) 6.25 mg, oral, 2 times daily with meals    cholecalciferol, vitamin D3, (VITAMIN D3 ORAL) 1 tablet, oral, Daily    Creon 24,000-76,000 -120,000 unit capsule 3 capsules, oral, 3 times daily with meals    diphenoxylate-atropine (Lomotil) 2.5-0.025 mg tablet 1 tablet, oral, 4 times daily PRN    epoetin treasure (EPOGEN,PROCRIT) 10,000 Units, subcutaneous, Every 14 days    levothyroxine (SYNTHROID, LEVOXYL) 25 mcg, oral, Daily before breakfast    loperamide (IMODIUM) 2 mg, oral, 4 times daily PRN    mirtazapine (REMERON) 15 mg, oral, Nightly    pregabalin (LYRICA) 25 mg, oral, Daily    sevelamer carbonate (RENVELA) 800 mg, oral, 3 times daily with meals, Swallow tablet whole; do not crush, break, or chew.    sevelamer HCl (RENAGEL) 800 mg, oral, 3 times daily with meals    warfarin (Coumadin) 2.5 mg tablet TAKE 1 TABLET BY MOUTH ONCE DAILY    warfarin (COUMADIN) 2.5 mg, oral, Every evening, Take as directed per After Visit Summary.        ___________________________________________________________________________________  VITALS:  Temperature:  [36.7 °C (98.1 °F)-37.3 °C (99.1 °F)] 36.7 °C (98.1 °F)  Heart Rate:  [] 96  Respirations:  [15-22] 18  BP: ()/(57-82) 89/70  FiO2 (%):  [21 %] 21 %   No intake or output data in the 24 hours ending 01/31/24 1349   No intake/output data recorded.  No intake/output data recorded.  "  [unfilled]     PHYSICAL EXAMINATION:  General appearance: Awake and alert, oriented . Tearful   Skin: no apparent rash  Heart: normal S1 and S2, no murmurs heard or friction rub  Lungs: NVB with no wheezing/crackles  Abdomen: soft dry abdominal skin, slightly tender, umbilical hernia, PD catheter area have no oozing or redness, dressing looks untidy   Extremities: trace edema  Neuro: No FND, no asterixis     ___________________________________________________________________________________  INVESTIGATIONS:  Results from last 7 days   Lab Units 01/31/24  0601   WBC AUTO x10*3/uL 6.5   RBC AUTO x10*6/uL 3.57*   HEMOGLOBIN g/dL 10.6*   HEMATOCRIT % 30.5*     Results from last 7 days   Lab Units 01/31/24  0601 01/30/24  1238   SODIUM mmol/L 133* 132*   POTASSIUM mmol/L 4.2 3.6   CHLORIDE mmol/L 93* 94*   CO2 mmol/L 30 26   BUN mg/dL 39* 38*   CREATININE mg/dL 7.75* 7.53*   CALCIUM mg/dL 7.5* 8.3*   BILIRUBIN TOTAL mg/dL  --  0.5   ALT U/L  --  7   AST U/L  --  20         No results found for: \"ALBUR\", \"[id number]\"   Susceptibility data from last 90 days.  Collected Specimen Info Organism Clindamycin Erythromycin Oxacillin Tetracycline Trimethoprim/Sulfamethoxazole Vancomycin   01/22/24 Fluid from Peritoneal Dialysis Staphylococcus epidermidis R R R S R S   11/07/23 Fluid from Peritoneal Dialysis Staphylococcus epidermidis             IMAGING:  ECG 12 lead    Result Date: 1/31/2024  Sinus rhythm with occasional Premature ventricular complexes Possible Left atrial enlargement Left axis deviation ST & T wave abnormality, consider lateral ischemia Abnormal ECG When compared with ECG of 05-NOV-2023 21:37, Significant changes have occurred See ED provider note for full interpretation and clinical correlation Confirmed by Burak Leija ([id number]) on 1/31/2024 4:36:00 AM    CT angio chest abdomen pelvis    Result Date: 1/30/2024  Interpreted By:  José Miguel Mckee and Bamfo Rose STUDY: CT ANGIO CHEST ABDOMEN PELVIS;  1/30/2024 " 4:32 pm   INDICATION: Signs/Symptoms:chest and abdominal pain, hx of AAA.   COMPARISON: CTA chest abdomen pelvis on 11/06/2023 CT abdomen pelvis on 11/12/2024   ACCESSION NUMBER(S): [id number]   ORDERING CLINICIAN: XUAN VENEGAS   TECHNIQUE: Axial non-contrast images of the chest, abdomen, and pelvis with coronal and sagittal reformatted images. Axial CT images of the chest, abdomen and pelvis after the intravenous administration of 100 mL Omnipaque 350 using angiographic technique with coronal and sagittal reformatted images. MIP images were provided and reviewed. 3D reconstructions were created on a separate independent workstation and reviewed.   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 Similar appearance of a dissection flap extending into the brachiocephalic and right common carotid arteries. Similar appearance of a small dissection versus contained penetrating atheromatous ulcer in the proximal left common carotid artery (series 501, image 93 of 659) with a widely patent bypass from the left common carotid to the left subclavian artery. The more distal left common carotid artery is patent.   Redemonstration of proximal descending aortic aneurysm measuring up to 5.2 cm in largest dimension (series 501, image 140), stable from 11/06/2023.   ABDOMINAL ARTERIES: The aortic endograft extends from the distal ascending thoracic aorta to the distal abdominal aorta. The endograft is patent. Within limitations of no delayed enhancement images, there is no evidence of endoleak. There is a graft arising from the right common iliac artery supplying the celiac and superior mesenteric arteries, which remain widely patent. The proximal inferior mesenteric artery appears occluded, but distally patent via retrograde flow.     RENAL ARTERIES: Additionally, there is a graft arising from the left common iliac artery supplying the  bilateral renal arteries. The proximal component of the graft is incompletely thrombosed with moderate narrowing of the lumen, which is unchanged from 11/06/2023. The graft supplying the left renal artery is thrombosed, stable from prior exam. Interval development of high-grade stenosis/occlusion of the graft supplying the right renal artery, with no visualized blood flow to the right kidney.     ABDOMINOPELVIC ARTERIES: Similar appearance slightly of a small non flow-limiting dissection involving the right external iliac artery and extending to the right common femoral artery. Unchanged short segment left common femoral artery dissection when compared to prior CT with adjacent fluid collection (series 501, images 544 and 616). Unchanged aneurysmal dilation of the right internal iliac artery measuring up to 1.5 cm (series 501, image 461). There is beam hardening from the embolization coils of left internal iliac artery.   NONVASCULAR FINDINGS:   CT CHEST:   LUNG/PLEURA/LARGE AIRWAYS: Stable bibasilar atelectasis or scarring. Centrilobular and paraseptal emphysema with apical bullous changes are again noted. There is no pleural effusion or pneumothorax.   VESSELS: Severe atherosclerotic changes are noted of the aorta. Severe coronary artery calcifications are present.   HEART: Stable cardiomegaly with enlargement of the bilateral atria and left ventricle. No pericardial effusion   MEDIASTINUM AND ALIDA: No mediastinal, hilar or axillary lymphadenopathy is present. The esophagus is normal.   CHEST WALL AND LOWER NECK: Status post median sternotomy. The visualized thyroid gland appears within normal limits.     CT ABDOMEN/PELVIS:   LIVER: The liver is normal in size without evidence of focal liver lesions.   BILE DUCTS: The intrahepatic and extrahepatic ducts are not dilated.   GALLBLADDER: The gallbladder is surgically absent.   PANCREAS: Similar mild pancreatic ductal dilation with compression of the pancreatic tail  from the adjacent chronic fluid collection. This peripherally calcified peripancreatic collection measures 7.0 cm, stable to mildly decreased from 7.4 cm on prior exam.   SPLEEN: The spleen is normal in size without focal lesions.   ADRENAL GLANDS: Bilateral adrenal glands appear normal.   KIDNEYS AND URETERS: Bilateral renal atrophy is again noted. Stable right renal cysts. No hydroureteronephrosis or nephroureterolithiasis is identified.   PELVIS:   BLADDER: The urinary bladder appears normal without abnormal wall thickening.   REPRODUCTIVE ORGANS: Uterus is not visualized and may be atrophic or surgically absent.   BOWEL: The stomach is unremarkable. The small and large bowel are normal in caliber and demonstrate no wall thickening. The appendix is not definitely visualized. There is however no pericecal stranding or fluid.     PERITONEUM/RETROPERITONEUM/LYMPH NODES: Peritoneal dialysis catheter noted in the left lower abdomen. Small volume free peritoneal fluid is again noted.   BONE AND SOFT TISSUE: No suspicious osseous lesions are identified. Degenerative discogenic disease is noted in the lower thoracic and lumbar spine. Multiple ventral hernias are again noted, with interval development of multiple air-fluid levels in comparison to CT on 11/12/2024.       1. Interval development of occlusion from the left common iliac artery graft supplying the right renal artery, with no visualized blood flow to the right kidney. 2. Interval development of multiple air-fluid levels within multiple ventral hernias, which may be secondary to iatrogenic air from the patient's peritoneal dialysis catheter but with bacterial peritonitis not excluded. 3. Stable postoperative changes of thoracic and abdominal aortic endograft repair. 4. Stable aneurysmal dilation of the thoracic descending aorta. 5. Stable appearance of dissections involving the right brachiocephalic artery at its origin and extending into the right common  carotid artery, right external iliac extending to right common femoral artery, and a short segment of the left femoral artery.   I personally reviewed the images/study and I agree with radiology resident Dr. Martha Hylton's findings as stated. This study was interpreted at [facility], [place], [place].   MACRO: Martha Hylton discussed the significance and urgency of this critical finding by EPIC CHAT with  XUAN VENEGAS on 1/30/2024 at 7:36 pm. (**-RCF-**) Findings:  There are multiple critical findings. See findings.         Signed by: José Miguel Mckee 1/30/2024 8:12 PM Dictation workstation:   [id number]    XR chest 1 view    Result Date: 1/30/2024  Interpreted By:  Cali Rodriguez, STUDY: XR CHEST 1 VIEW   INDICATION: Signs/Symptoms:Chest Pain.   COMPARISON: October 18, 2023   ACCESSION NUMBER(S): [id number]   ORDERING CLINICIAN: CAMI LOYA   FINDINGS: Cardiomegaly with previous aortic repair unchanged.   No consolidation, effusion, edema, or pneumothorax.       No evidence of acute intrathoracic abnormality.   Signed by: Cali Rodriguez 1/30/2024 3:02 PM Dictation workstation:   [id number]      ___________________________________________________________________________________  ASSESSMENT:  Yoselyn Hernandez is a 64 y.o. female with a past medical history of ESRD on PD, multiple AAA dissections, HFrEF (EF 15-20% 3/23), pancreatitis, PE/DVT on warfarin, HTN, and multiple renal artery grafts with prior ilio-renal bypass who presented to the ED with abdominal pain, gross taste, shortness of breath, nausea, vomiting, poor appetite and generalized weakness for 5 days. She mentions having loose stools for at least a month. She does not recall passing stool in last 24 hrs. She has umbilical hernia as well. She has not been able to carry out PD for a day and had difficulty draining and performing last fill yesterday. Denies of any fever, chest pain, palpitations or dizziness. She is admitted for  suspected peritonitis. Nephrology was consulted for ESRD management.      #ESRD (likely 2/2 HTN/recurring hypovolemic shocks) on PD:  Nephrologist: Dr. Lora   Access: PD catheter  EDW: 53kg  Dialysis duration: 6 exchanges a night, 1400cc fill, Last fill 1200cc, 8200cc total volume, 7 days a week  Urine amount: mentions making almost little to none    #Electrolytes  - K WNL  - mild hyponatremia    #Acid-Base  - HCO3  - PH  - AG    #Anemia  - Hb 10.6 ( at goal)    #MBD  - Ca WNL  - PO4 8.3  - PTH 2923 1/13/24  - sevelamer 800mg TID  - on calcitriol 0.5 mcg OD    #Hemodynamics  - soft pressures 90/70  - at RA with Sat of 97%      # Bacterial Peritonitis  - growing staph epidermidis from Pcx 1/22  - Peritoneal fluid analysis from 1/30 showed cloudy straw colored appearance WBC 7776 with 46 % PMN  - denies any catheter misplacement or dressing issues      ___________________________________________________________________________________  RECOMMENDATIONS:  - In setting of poor appetite need to encourage caloric intake  - will start 6 exchanges a night, 1400cc fill, Last fill 1200cc, 8200cc total volume with vancomycin (25mg/l) and ceftazidime (125mg/l) along with heparin  - Nystatin started along with IP antibiotics  - 0.1% gentamicin at PD catheter site at the time of dressing  - Renal diet  - Renal MVI  - one BM at least in 24 hrs as per PD protocol  - Keep MAP >65 or SBP >90  - Strict I/O monitoring, daily weights, daily BMP  - Will continue to follow    Patient is discussed with the attending.    Argelia Valdez MD  Nephrology Fellow   Daytime / Weekend Renal Pager [phone]  After 7 pm Emergencies [phone] Pager [phone]

## 2024-01-31 NOTE — NURSING NOTE
Peritoneal Dialysis - [facility]    Completed Overnight Therapy   N/a    Full treatment received:               n/a  I-Drain:    - mL  Total UF:   - mL  Combined 24 hour UF:  - mL  Minicap placed:   -  Effluent Color:   -   Cloudy:   Yes, MD aware   Fibrin:   no    Tonight's Therapy   1/31/24    Dialysis cycler primed:   Y  Patient connected:  Y  Therapy started at:  1810  [employer]or:  Y  Dressing changed:  Y  Gent. cream applied:  N- will ask MD to order    Therapy Orders     Total time:  11 hours   Cycles:   5   Fill Volume:  1400 mL   Last fill:   1200 mL   Last fill solution:  Same dextrose   Total volume:  8200 mL     Solution(s):  Dextrose 1.5% Dianeal 2.5 calcium-- 5000 mL (x2)   Additive(s): Vancomycin, heparin, ceftazidime     Last Fill:  Dextrose 1.5% Dianeal 2.5 calcium-- 1200 mL  Additive(s): Vancomycin, heparin, ceftazidime      **Dialysis nurses in house Monday through Friday [id number] to setup and disconnect patients,   please call our office at [phone], follow prompts for after hours paging.    **Machine Trouble Shooting: eyeQ 24 hour technical support available at [phone]     **Bedside nursing staff to disconnect patients as needed outside of office hours.  -Instructions and supplies located on dialysis cart.  -Reference  policy G-595 -->

## 2024-01-31 NOTE — CONSULTS
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 female presenting with weakness, diarrhea abdominal pain and failure to thrive.  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    Her vascular surgery history is detailed as below:  - 2014 TAAD with ascending and arch replacement with 28mm Gelweave, individual reimplantation of great vessels  - 2017 TEVAR with elective coverage of L SCA  - 2018 TEVAR extension down to celiac artery  - 2018 Abdominal aortic visceral debranching (left common iliac bifurcated graft to renals; right common iliac bifurcated graft to celiac/ SMA)  - 2019 L CCA to L SCA bypass with extension of stent graft down to aortic bifurcation  - 2022 TEVAR relining for type 3 endoleak       Past Medical History  She has a past medical history of Other abnormalities of gait and mobility, Personal history of other endocrine, nutritional and metabolic disease, and Personal history of other specified conditions.    Surgical History  She has a past surgical history that includes Colonoscopy (12/05/2017); Hysterectomy (01/06/2014); Other surgical history (01/20/2017); CT angio abdomen pelvis w and or wo IV IV contrast (11/17/2014); CT angio abdomen pelvis w and or wo IV IV contrast (02/01/2018); CT angio abdomen pelvis w and or wo IV IV contrast (02/23/2018); CT angio abdomen pelvis w and or wo IV IV contrast (01/10/2019); CT angio abdomen pelvis w and or wo IV IV contrast (11/25/2019); CT angio abdomen pelvis w and or wo IV IV contrast (04/30/2021); CT angio abdomen pelvis w and or wo IV IV contrast (01/09/2017); CT angio abdomen pelvis w and or wo IV IV contrast (10/24/2018); IR angiogram aorta thoracic (02/21/2018); IR angiogram aorta abdomen (08/02/2019); IR intervention NEURO stent (08/02/2019); IR angiogram endovascular aortic  "repair (08/19/2022); IR angiogram aorta abdomen (08/19/2022); CT angio abdomen pelvis w and or wo IV IV contrast (12/13/2022); CT angio aorta and bilateral iliofemoral runoff w and or wo IV contrast (05/12/2023); and Colonoscopy (12/2021).     Social History  She reports that she quit smoking about 29 years ago. Her smoking use included cigarettes. She has a 5.00 pack-year smoking history. She has never used smokeless tobacco. She reports that she does not currently use alcohol. She reports current drug use. Drug: Marijuana.    Family History  Family History   Problem Relation Name Age of Onset    COPD Mother      Kidney failure Father          Allergies  Ace inhibitors, Ciprofloxacin, Gabapentin, and Oxycodone    Review of Systems  Diarrhea, weakness, difficulty with PD catheter     Physical Exam  Constitutional: no acute distress  Neuro: A/O x4, no gross deficits   Psych: normal affect  HEENT: Normocephalic, atraumatic, no scleral icterus, EOMI  Cardiac: RRR  Pulmonary: unlabored respirations   Abdomen: soft, non distended, non tender, multiple incisional hernias along her midline, PD catheter right lower quadrant, bilateral palpable femorals and palpable DPs  Skin: warm and dry overall   Extremities: moving all four, no swelling noted        Last Recorded Vitals  Blood pressure 92/70, pulse 98, temperature 37.3 °C (99.1 °F), temperature source Tympanic, resp. rate 16, height 1.702 m (5' 7\"), weight 49.9 kg (110 lb), SpO2 98 %.    Relevant Results  CT angio chest abdomen pelvis    Result Date: 1/30/2024  Interpreted By:  José Miguel Mckee and Bamfo Rose STUDY: CT ANGIO CHEST ABDOMEN PELVIS;  1/30/2024 4:32 pm   INDICATION: Signs/Symptoms:chest and abdominal pain, hx of AAA.   COMPARISON: CTA chest abdomen pelvis on 11/06/2023 CT abdomen pelvis on 11/12/2024   ACCESSION NUMBER(S): [id number]   ORDERING CLINICIAN: XUAN VENEGAS   TECHNIQUE: Axial non-contrast images of the chest, abdomen, and pelvis with coronal " and sagittal reformatted images. Axial CT images of the chest, abdomen and pelvis after the intravenous administration of 100 mL Omnipaque 350 using angiographic technique with coronal and sagittal reformatted images. MIP images were provided and reviewed. 3D reconstructions were created on a separate independent workstation and reviewed.   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 Similar appearance of a dissection flap extending into the brachiocephalic and right common carotid arteries. Similar appearance of a small dissection versus contained penetrating atheromatous ulcer in the proximal left common carotid artery (series 501, image 93 of 659) with a widely patent bypass from the left common carotid to the left subclavian artery. The more distal left common carotid artery is patent.   Redemonstration of proximal descending aortic aneurysm measuring up to 5.2 cm in largest dimension (series 501, image 140), stable from 11/06/2023.   ABDOMINAL ARTERIES: The aortic endograft extends from the distal ascending thoracic aorta to the distal abdominal aorta. The endograft is patent. Within limitations of no delayed enhancement images, there is no evidence of endoleak. There is a graft arising from the right common iliac artery supplying the celiac and superior mesenteric arteries, which remain widely patent. The proximal inferior mesenteric artery appears occluded, but distally patent via retrograde flow.     RENAL ARTERIES: Additionally, there is a graft arising from the left common iliac artery supplying the bilateral renal arteries. The proximal component of the graft is incompletely thrombosed with moderate narrowing of the lumen, which is unchanged from 11/06/2023. The graft supplying the left renal artery is thrombosed, stable from prior exam. Interval development of high-grade stenosis/occlusion of the graft supplying the  right renal artery, with no visualized blood flow to the right kidney.     ABDOMINOPELVIC ARTERIES: Similar appearance slightly of a small non flow-limiting dissection involving the right external iliac artery and extending to the right common femoral artery. Unchanged short segment left common femoral artery dissection when compared to prior CT with adjacent fluid collection (series 501, images 544 and 616). Unchanged aneurysmal dilation of the right internal iliac artery measuring up to 1.5 cm (series 501, image 461). There is beam hardening from the embolization coils of left internal iliac artery.   NONVASCULAR FINDINGS:   CT CHEST:   LUNG/PLEURA/LARGE AIRWAYS: Stable bibasilar atelectasis or scarring. Centrilobular and paraseptal emphysema with apical bullous changes are again noted. There is no pleural effusion or pneumothorax.   VESSELS: Severe atherosclerotic changes are noted of the aorta. Severe coronary artery calcifications are present.   HEART: Stable cardiomegaly with enlargement of the bilateral atria and left ventricle. No pericardial effusion   MEDIASTINUM AND ALIDA: No mediastinal, hilar or axillary lymphadenopathy is present. The esophagus is normal.   CHEST WALL AND LOWER NECK: Status post median sternotomy. The visualized thyroid gland appears within normal limits.     CT ABDOMEN/PELVIS:   LIVER: The liver is normal in size without evidence of focal liver lesions.   BILE DUCTS: The intrahepatic and extrahepatic ducts are not dilated.   GALLBLADDER: The gallbladder is surgically absent.   PANCREAS: Similar mild pancreatic ductal dilation with compression of the pancreatic tail from the adjacent chronic fluid collection. This peripherally calcified peripancreatic collection measures 7.0 cm, stable to mildly decreased from 7.4 cm on prior exam.   SPLEEN: The spleen is normal in size without focal lesions.   ADRENAL GLANDS: Bilateral adrenal glands appear normal.   KIDNEYS AND URETERS: Bilateral  renal atrophy is again noted. Stable right renal cysts. No hydroureteronephrosis or nephroureterolithiasis is identified.   PELVIS:   BLADDER: The urinary bladder appears normal without abnormal wall thickening.   REPRODUCTIVE ORGANS: Uterus is not visualized and may be atrophic or surgically absent.   BOWEL: The stomach is unremarkable. The small and large bowel are normal in caliber and demonstrate no wall thickening. The appendix is not definitely visualized. There is however no pericecal stranding or fluid.     PERITONEUM/RETROPERITONEUM/LYMPH NODES: Peritoneal dialysis catheter noted in the left lower abdomen. Small volume free peritoneal fluid is again noted.   BONE AND SOFT TISSUE: No suspicious osseous lesions are identified. Degenerative discogenic disease is noted in the lower thoracic and lumbar spine. Multiple ventral hernias are again noted, with interval development of multiple air-fluid levels in comparison to CT on 11/12/2024.       1. Interval development of occlusion from the left common iliac artery graft supplying the right renal artery, with no visualized blood flow to the right kidney. 2. Interval development of multiple air-fluid levels within multiple ventral hernias, which may be secondary to iatrogenic air from the patient's peritoneal dialysis catheter but with bacterial peritonitis not excluded. 3. Stable postoperative changes of thoracic and abdominal aortic endograft repair. 4. Stable aneurysmal dilation of the thoracic descending aorta. 5. Stable appearance of dissections involving the right brachiocephalic artery at its origin and extending into the right common carotid artery, right external iliac extending to right common femoral artery, and a short segment of the left femoral artery.   I personally reviewed the images/study and I agree with radiology resident Dr. Martha Hylton's findings as stated. This study was interpreted at [facility],  [place].   MACRO: Martha Hylton discussed the significance and urgency of this critical finding by EPIC CHAT with  XUAN VENEGAS on 1/30/2024 at 7:36 pm. (**-RCF-**) Findings:  There are multiple critical findings. See findings.         Signed by: José Miguel Mckee 1/30/2024 8:12 PM Dictation workstation:   [id number]    XR chest 1 view    Result Date: 1/30/2024  Interpreted By:  Cali Rodriguez, STUDY: XR CHEST 1 VIEW   INDICATION: Signs/Symptoms:Chest Pain.   COMPARISON: October 18, 2023   ACCESSION NUMBER(S): [id number]   ORDERING CLINICIAN: CAMI LOYA   FINDINGS: Cardiomegaly with previous aortic repair unchanged.   No consolidation, effusion, edema, or pneumothorax.       No evidence of acute intrathoracic abnormality.   Signed by: aCli Rodriguez 1/30/2024 3:02 PM Dictation workstation:   [id number]        Assessment/Plan     Yoselyn Hernandez is a 64 y.o. female presenting with weakness, diarrhea abdominal pain and failure to thrive.  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  She has a history of TAA ED with ascending and arch replacement in 2014 with reimplantation of the great vessels.  Furthermore she had a TEVAR in 2017 with coverage of her left SCA.  Given ongoing dissection and aneurysmal disease, she had a TEVAR extension down to her visceral vessels.  Which then required an additional procedure with aortic visceral to branching and creation of bifurcated left common iliac graft to supply her renal vessels and right common iliac bifurcated graft to supply her visceral vessels.  In 2019 she had carotid subclavian bypass with stent graft extension to the aortic bifurcation.  3 years later she developed a type III endoleak requiring TEVAR relining.   Reviewing her CTAs over the last several months, she has a known thrombosis of the left renal artery graft.  There was partial thrombosis on the right side in November 2023.  In terms of her  renal function, she has been on peritoneal dialysis for several years and is currently doing daily peritoneal dialysis.  She is anuric.  As such complete thrombosis of the right renal artery graft is clinically insignificant as her kidneys are not contributing to her renal function.    Recommendations:  No need for further imaging at this time  No acute vascular surgery intervention indicated at this time  Okay to continue her home medications including anticoagulation and antiplatelet agents  Follow-up outpatient with Dr. Will    Vascular surgery will sign off    Discussed with vascular surgery fellow Dr. Mercado and attending physician Dr. Nicolette Sarmiento MD  Vascular surgery: [id number]

## 2024-01-31 NOTE — SIGNIFICANT EVENT
Vascular Surgery Fellow Note:  Full note to follow from surgical resident.    Consulted on this 63 yo F for a finding on CTA today of complete thrombosis of the right renal artery limb from a prior ilio-renal bypass.     Brief vascular surgical history:  - 2014 TAAD with ascending and arch replacement with 28mm Gelweave, individual reimplantation of great vessels  - 2017 TEVAR with elective coverage of L SCA  - 2018 TEVAR extension down to celiac artery  - 2018 Abdominal aortic visceral debranching (left common iliac bifurcated graft to renals; right common iliac bifurcated graft to celiac/ SMA)  - 2019 L CCA to L SCA bypass with extension of stent graft down to aortic bifurcation  - 2022 TEVAR relining for type 3 endoleak    The left renal graft has been thrombosed for some time. On CTA from November 2023, the right renal graft was partially thrombosed. Now completely thrombosed. Regardless, patient has been dependent on PD for several years and at this time performs daily PD. She does not make any urine. So the thrombosis of the right renal graft is clinically insignificant at this time. The mesenteric graft is widely patent on CTA.     She can follow up outpatient with Dr. Will (previously was a patient of Dr. Romero, who has left ).     Jayden Mercado MD  Vascular Surgery Fellow  Service Pager: [phone]

## 2024-01-31 NOTE — NURSING NOTE
Called to see Ms. Hernandez for c/o abdominal pain.  States began in AM with difficulty draining and performing last fill.  Stopped last drain approximately care home through (700ml).  Pt. Was able to bypass to last fill but only able to instill approximately 7ml.  Ms. Hernandez's peritoneal catheter was accessed with a Fersenius adapter.  No effluent initially until Ms. Hernandez pressed on abdominal hernias.  States this is common practice at home an she always begins her therapy by pressing on these hernias.  Effluent was cloudy yellow with fibrin.  Unable to recall any break in technique with her PD therapy.  Cell count and cultures obtained and patient loaded with 30mg/kg Vancomycin (1500mg), 1GM ceftazidime, and 750 units of heparin in a 1.5 Dextrose bag of 1500ml.  long-term through instillation pt. Began c/o abdominal pain that was not alleviated by stopping the instillation.  The pt. Requested to continue the fill and this RN slowed the rate.  Ms. Hernandez tolerated all 1500ml at 00:03 on 1/31 and verbalized understanding it is to dwell for 6-8 hours.  Cell count and cultures pending.  Pt. Preferred exit site not be assessed in the ED.  Please contact [phone] or [phone] #[phone] for any questions and/or concerns.

## 2024-01-31 NOTE — PROGRESS NOTES
I spoke to the patient at bedside and asked her if she picked any facilities from the SNF list. The patient picked [facility] in [place] and [facility]. Referrals sent to facilities. The patient stated anywhere in the [place] area is fine.      Jadiel Miner RN

## 2024-01-31 NOTE — PROGRESS NOTES
Pharmacy Medication History Review    Yoselyn Hernandez is a 64 y.o. female admitted for Peritonitis (CMS/Formerly [facility]). Pharmacy reviewed the patient's [id number] medications and allergies for accuracy.    The list below reflects the updated PTA list. Comments regarding how patient may be taking medications differently can be found in the Admit Orders Activity  Prior to Admission Medications   Prescriptions  Informant Patient Reported? Taking?   Creon 24,000-76,000 -120,000 unit capsule  Self Yes Yes   Sig: Take 3 capsules by mouth 3 times a day with meals.      allopurinol (Zyloprim) 100 mg tablet  Self Yes Yes   Sig: Take 1 tablet (100 mg) by mouth once daily.   atorvastatin (Lipitor) 40 mg tablet  Self Yes Yes   Sig: TAKE 1 TABLET BY MOUTH ONCE DAILY AT BEDTIME   carvedilol (Coreg) 6.25 mg tablet  Self Yes Yes   Sig: Take 1 tablet (6.25 mg) by mouth 2 times a day with meals.   cholecalciferol, vitamin D3, (VITAMIN D3 ORAL)  Self Yes Yes   Sig: Take 1 tablet by mouth once daily.   diphenoxylate-atropine (Lomotil) 2.5-0.025 mg tablet  Self Yes Yes   Sig: Take 1 tablet by mouth 4 times a day as needed for diarrhea.  **Last Filled 12/20/23 for 30 Day Supply   epoetin treasure (Epogen,Procrit) 10,000 unit/mL injection  Self Yes Yes   Sig: Inject 1 mL (10,000 Units) under the skin every 14 (fourteen) days.  **Patient states she gets this injection at the clinic    levothyroxine (Synthroid, Levoxyl) 25 mcg tablet  Self Yes Yes   Sig: Take 1 tablet (25 mcg) by mouth once daily in the morning.   Take before meals.**Patient states she takes this medication but   Last Fill date was 11/19/23 for 30 Day Supply   loperamide (Imodium) 2 mg capsule  Self Yes Yes   Sig: Take 1 capsule (2 mg) by mouth 4 times a day as needed for diarrhea.   mirtazapine (Remeron) 15 mg tablet  Self Yes Yes   Sig: Take 1 tablet (15 mg) by mouth once daily at bedtime.      sevelamer (Renagel) 800 mg tablet  Self Yes Yes   Sig: Take 1 tablet (800 mg) by mouth  3 times a day with meals.      warfarin (Coumadin) 2.5 mg tablet  Self Yes Yes   Sig: TAKE 1 TABLET BY MOUTH ONCE DAILY      Facility-Administered Medications: None        The list below reflects the updated allergy list. Please review each documented allergy for additional clarification and justification.  Allergies  Reviewed by Merritt Garza CPhT on 1/31/2024        Severity Reactions Comments    Ace Inhibitors High Angioedema, Other, Swelling, Unknown     Ciprofloxacin High GI intolerance, Nausea Only, Diarrhea     Gabapentin Not Specified Unknown Jerking head movements    Oxycodone Low Itching             Patient accepts M2B at discharge. Pharmacy has been updated to [facility].    Sources used to complete the med history include:  Patient interviewed about medications taking  [facility] fill history reviewed  [facility] Ambulatory medication list reviewed  Care Everywhere, OHIP Summarization of Episode Note reviewed  [place]     Below are additional concerns with the patient's PTA list.  None    ---------------------------------  Merritt Garza CPhT  Transitions of Care Technician  Medication reconciliation complete  Please reach out via [employer] Secure Chat for questions,   or if no response call sourceasy or [employer].  [facility]

## 2024-02-01 LAB
ALBUMIN SERPL BCP-MCNC: 2.1 G/DL (ref 3.4–5)
ALBUMIN SERPL BCP-MCNC: 2.2 G/DL (ref 3.4–5)
ANION GAP SERPL CALC-SCNC: 12 MMOL/L (ref 10–20)
ANION GAP SERPL CALC-SCNC: 17 MMOL/L (ref 10–20)
BASOPHILS NFR FLD MANUAL: 0 %
BLASTS NFR FLD MANUAL: 0 %
BUN SERPL-MCNC: 36 MG/DL (ref 6–23)
BUN SERPL-MCNC: 39 MG/DL (ref 6–23)
CALCIUM SERPL-MCNC: 7.7 MG/DL (ref 8.6–10.6)
CALCIUM SERPL-MCNC: 7.8 MG/DL (ref 8.6–10.6)
CHLORIDE SERPL-SCNC: 91 MMOL/L (ref 98–107)
CHLORIDE SERPL-SCNC: 93 MMOL/L (ref 98–107)
CLARITY FLD: CLEAR
CO2 SERPL-SCNC: 25 MMOL/L (ref 21–32)
CO2 SERPL-SCNC: 32 MMOL/L (ref 21–32)
COLOR FLD: COLORLESS
CREAT SERPL-MCNC: 7.27 MG/DL (ref 0.5–1.05)
CREAT SERPL-MCNC: 7.8 MG/DL (ref 0.5–1.05)
EGFRCR SERPLBLD CKD-EPI 2021: 5 ML/MIN/1.73M*2
EGFRCR SERPLBLD CKD-EPI 2021: 6 ML/MIN/1.73M*2
EOSINOPHIL NFR FLD MANUAL: 0 %
ERYTHROCYTE [DISTWIDTH] IN BLOOD BY AUTOMATED COUNT: 16.5 % (ref 11.5–14.5)
GLUCOSE SERPL-MCNC: 85 MG/DL (ref 74–99)
GLUCOSE SERPL-MCNC: 95 MG/DL (ref 74–99)
HCT VFR BLD AUTO: 30 % (ref 36–46)
HGB BLD-MCNC: 9.8 G/DL (ref 12–16)
IMMATURE GRANULOCYTES IN FLUID: 0 %
INR PPP: 2.2 (ref 0.9–1.1)
LYMPHOCYTES NFR FLD MANUAL: 20 %
MAGNESIUM SERPL-MCNC: 1.88 MG/DL (ref 1.6–2.4)
MCH RBC QN AUTO: 29.1 PG (ref 26–34)
MCHC RBC AUTO-ENTMCNC: 32.7 G/DL (ref 32–36)
MCV RBC AUTO: 89 FL (ref 80–100)
MONOS+MACROS NFR FLD MANUAL: 31 %
NEUTROPHILS NFR FLD MANUAL: 43 %
NRBC BLD-RTO: 0 /100 WBCS (ref 0–0)
OTHER CELLS NFR FLD MANUAL: 6 %
PHOSPHATE SERPL-MCNC: 4.9 MG/DL (ref 2.5–4.9)
PHOSPHATE SERPL-MCNC: 5.2 MG/DL (ref 2.5–4.9)
PLASMA CELLS NFR FLD MANUAL: 0 %
PLATELET # BLD AUTO: 230 X10*3/UL (ref 150–450)
POTASSIUM SERPL-SCNC: 3.2 MMOL/L (ref 3.5–5.3)
POTASSIUM SERPL-SCNC: 3.4 MMOL/L (ref 3.5–5.3)
PROTHROMBIN TIME: 25 SECONDS (ref 9.8–12.8)
RBC # BLD AUTO: 3.37 X10*6/UL (ref 4–5.2)
RBC # FLD AUTO: 15 /UL
SODIUM SERPL-SCNC: 132 MMOL/L (ref 136–145)
SODIUM SERPL-SCNC: 132 MMOL/L (ref 136–145)
TOTAL CELLS COUNTED FLD: 100
WBC # BLD AUTO: 6.1 X10*3/UL (ref 4.4–11.3)
WBC # FLD AUTO: 73 /UL

## 2024-02-01 PROCEDURE — 2500000001 HC RX 250 WO HCPCS SELF ADMINISTERED DRUGS (ALT 637 FOR MEDICARE OP): Performed by: INTERNAL MEDICINE

## 2024-02-01 PROCEDURE — 2500000001 HC RX 250 WO HCPCS SELF ADMINISTERED DRUGS (ALT 637 FOR MEDICARE OP): Performed by: EMERGENCY MEDICINE

## 2024-02-01 PROCEDURE — 2500000004 HC RX 250 GENERAL PHARMACY W/ HCPCS (ALT 636 FOR OP/ED)

## 2024-02-01 PROCEDURE — 80069 RENAL FUNCTION PANEL: CPT | Performed by: INTERNAL MEDICINE

## 2024-02-01 PROCEDURE — 2500000001 HC RX 250 WO HCPCS SELF ADMINISTERED DRUGS (ALT 637 FOR MEDICARE OP): Performed by: NURSE PRACTITIONER

## 2024-02-01 PROCEDURE — 88104 CYTOPATH FL NONGYN SMEARS: CPT

## 2024-02-01 PROCEDURE — 99232 SBSQ HOSP IP/OBS MODERATE 35: CPT | Performed by: INTERNAL MEDICINE

## 2024-02-01 PROCEDURE — 85610 PROTHROMBIN TIME: CPT | Performed by: INTERNAL MEDICINE

## 2024-02-01 PROCEDURE — 36415 COLL VENOUS BLD VENIPUNCTURE: CPT | Performed by: INTERNAL MEDICINE

## 2024-02-01 PROCEDURE — 2500000004 HC RX 250 GENERAL PHARMACY W/ HCPCS (ALT 636 FOR OP/ED): Performed by: INTERNAL MEDICINE

## 2024-02-01 PROCEDURE — 36415 COLL VENOUS BLD VENIPUNCTURE: CPT

## 2024-02-01 PROCEDURE — 89051 BODY FLUID CELL COUNT: CPT

## 2024-02-01 PROCEDURE — 83735 ASSAY OF MAGNESIUM: CPT | Performed by: INTERNAL MEDICINE

## 2024-02-01 PROCEDURE — 1100000001 HC PRIVATE ROOM DAILY

## 2024-02-01 PROCEDURE — 2500000004 HC RX 250 GENERAL PHARMACY W/ HCPCS (ALT 636 FOR OP/ED): Performed by: NURSE PRACTITIONER

## 2024-02-01 PROCEDURE — 3E1M39Z IRRIGATION OF PERITONEAL CAVITY USING DIALYSATE, PERCUTANEOUS APPROACH: ICD-10-PCS | Performed by: INTERNAL MEDICINE

## 2024-02-01 PROCEDURE — 80069 RENAL FUNCTION PANEL: CPT

## 2024-02-01 PROCEDURE — 85027 COMPLETE CBC AUTOMATED: CPT | Performed by: INTERNAL MEDICINE

## 2024-02-01 RX ORDER — GENTAMICIN SULFATE 1 MG/G
CREAM TOPICAL EVERY 24 HOURS
Status: DISCONTINUED | OUTPATIENT
Start: 2024-02-01 | End: 2024-02-07 | Stop reason: HOSPADM

## 2024-02-01 RX ORDER — HYDROMORPHONE HYDROCHLORIDE 1 MG/ML
0.4 INJECTION, SOLUTION INTRAMUSCULAR; INTRAVENOUS; SUBCUTANEOUS
Status: DISCONTINUED | OUTPATIENT
Start: 2024-02-01 | End: 2024-02-04

## 2024-02-01 RX ORDER — GENTAMICIN SULFATE 1 MG/G
CREAM TOPICAL 3 TIMES DAILY
Status: DISCONTINUED | OUTPATIENT
Start: 2024-02-01 | End: 2024-02-01

## 2024-02-01 RX ADMIN — RENO CAPS 1 CAPSULE: 100; 1.5; 1.7; 20; 10; 1; 150; 5; 6 CAPSULE ORAL at 08:11

## 2024-02-01 RX ADMIN — HEPARIN SODIUM: 1000 INJECTION INTRAVENOUS; SUBCUTANEOUS at 16:30

## 2024-02-01 RX ADMIN — HYDROMORPHONE HYDROCHLORIDE 0.4 MG: 1 INJECTION, SOLUTION INTRAMUSCULAR; INTRAVENOUS; SUBCUTANEOUS at 08:48

## 2024-02-01 RX ADMIN — CARVEDILOL 6.25 MG: 6.25 TABLET, FILM COATED ORAL at 18:01

## 2024-02-01 RX ADMIN — HYDROMORPHONE HYDROCHLORIDE 0.4 MG: 1 INJECTION, SOLUTION INTRAMUSCULAR; INTRAVENOUS; SUBCUTANEOUS at 22:32

## 2024-02-01 RX ADMIN — MIRTAZAPINE 15 MG: 15 TABLET, FILM COATED ORAL at 22:31

## 2024-02-01 RX ADMIN — LEVOTHYROXINE SODIUM 25 MCG: 25 TABLET ORAL at 05:35

## 2024-02-01 RX ADMIN — CARVEDILOL 6.25 MG: 6.25 TABLET, FILM COATED ORAL at 08:11

## 2024-02-01 RX ADMIN — SEVELAMER CARBONATE 800 MG: 800 TABLET, FILM COATED ORAL at 13:34

## 2024-02-01 RX ADMIN — HYDROMORPHONE HYDROCHLORIDE 0.4 MG: 1 INJECTION, SOLUTION INTRAMUSCULAR; INTRAVENOUS; SUBCUTANEOUS at 18:01

## 2024-02-01 RX ADMIN — HYDROMORPHONE HYDROCHLORIDE 0.4 MG: 1 INJECTION, SOLUTION INTRAMUSCULAR; INTRAVENOUS; SUBCUTANEOUS at 01:08

## 2024-02-01 RX ADMIN — NYSTATIN 500000 UNITS: 100000 SUSPENSION ORAL at 14:23

## 2024-02-01 RX ADMIN — Medication 800 MG: at 08:11

## 2024-02-01 RX ADMIN — NYSTATIN 500000 UNITS: 100000 SUSPENSION ORAL at 05:35

## 2024-02-01 RX ADMIN — ATORVASTATIN CALCIUM 40 MG: 40 TABLET, FILM COATED ORAL at 22:30

## 2024-02-01 RX ADMIN — CALCITRIOL 0.5 MCG: 0.5 CAPSULE ORAL at 08:11

## 2024-02-01 RX ADMIN — Medication 10 MG: at 22:30

## 2024-02-01 RX ADMIN — SEVELAMER CARBONATE 800 MG: 800 TABLET, FILM COATED ORAL at 08:11

## 2024-02-01 RX ADMIN — HYDROMORPHONE HYDROCHLORIDE 0.4 MG: 1 INJECTION, SOLUTION INTRAMUSCULAR; INTRAVENOUS; SUBCUTANEOUS at 13:34

## 2024-02-01 RX ADMIN — SEVELAMER CARBONATE 800 MG: 800 TABLET, FILM COATED ORAL at 18:00

## 2024-02-01 RX ADMIN — HEPARIN SODIUM: 1000 INJECTION INTRAVENOUS; SUBCUTANEOUS at 16:29

## 2024-02-01 ASSESSMENT — COGNITIVE AND FUNCTIONAL STATUS - GENERAL
MOVING FROM LYING ON BACK TO SITTING ON SIDE OF FLAT BED WITH BEDRAILS: A LITTLE
HELP NEEDED FOR BATHING: A LITTLE
DRESSING REGULAR UPPER BODY CLOTHING: A LITTLE
DRESSING REGULAR LOWER BODY CLOTHING: A LITTLE
DAILY ACTIVITIY SCORE: 19
MOVING TO AND FROM BED TO CHAIR: A LOT
TURNING FROM BACK TO SIDE WHILE IN FLAT BAD: A LOT
WALKING IN HOSPITAL ROOM: A LOT
CLIMB 3 TO 5 STEPS WITH RAILING: TOTAL
STANDING UP FROM CHAIR USING ARMS: A LOT
MOBILITY SCORE: 12
TOILETING: A LOT

## 2024-02-01 ASSESSMENT — PAIN SCALES - GENERAL
PAINLEVEL_OUTOF10: 0 - NO PAIN
PAINLEVEL_OUTOF10: 10 - WORST POSSIBLE PAIN
PAINLEVEL_OUTOF10: 10 - WORST POSSIBLE PAIN

## 2024-02-01 ASSESSMENT — PAIN DESCRIPTION - LOCATION: LOCATION: ABDOMEN

## 2024-02-01 ASSESSMENT — ACTIVITIES OF DAILY LIVING (ADL): LACK_OF_TRANSPORTATION: NO

## 2024-02-01 ASSESSMENT — PAIN DESCRIPTION - ORIENTATION: ORIENTATION: LEFT

## 2024-02-01 ASSESSMENT — PAIN - FUNCTIONAL ASSESSMENT: PAIN_FUNCTIONAL_ASSESSMENT: 0-10

## 2024-02-01 ASSESSMENT — PAIN SCALES - WONG BAKER: WONGBAKER_NUMERICALRESPONSE: NO HURT

## 2024-02-01 NOTE — NURSING NOTE
Peritoneal Dialysis - [facility]    Completed Overnight Therapy   1/31/24    Full treatment received:               Y  I-Drain:    999 mL  Total UF:   53 mL  Combined 24 hour UF:  -448 mL  Minicap placed:   Y  Effluent Color:   Yellow   Cloudy:   Y   Fibrin:   Y    Tonight's Therapy   2/1/24    Dialysis cycler primed:   y  Patient connected:  y  Therapy started at:  1604  [employer] Adaptor:  Y  Dressing changed:  y  Gent. cream applied:  No, not available MD notified    Therapy Orders     Total time:  11 hours   Cycles:   5   Fill Volume:  1400 mL   Last fill:   1200 mL   Last fill solution:  Same   Total volume:  8200 mL     Solution(s):  Dextrose 1.5% Dianeal 2.5 calcium-- 5000 mL (x2)   Additive(s): Vancomycin/Ceftazidime/Heparin           Last Fill:  Same      **Dialysis nurses in house Monday through Friday 3777-2203 to setup and disconnect patients,   please call our office at [phone], follow prompts for after hours paging.    **Machine Trouble Shooting: [employer] 24 hour technical support available at [phone]     **Bedside nursing staff to disconnect patients as needed outside of office hours.  -Instructions and supplies located on dialysis cart.  -Reference  policy [id number] -->

## 2024-02-01 NOTE — PROGRESS NOTES
Yoselyn Hernandez   64 [address]    @@  [facility]/Room: 81311611/2021/2021-A    Subjective:   Pt still having abd pain today however improved slightly. Diarrhea is improving. Pt had good appetite today and had a large breakfast.     Objective:     Meds:   atorvastatin, 40 mg, Nightly  B complex-vitamin C-folic acid, 1 capsule, Daily  calcitriol, 0.5 mcg, Daily  carvedilol, 6.25 mg, BID with meals  dextrose 1.5% - LOW calcium 2.5mEq/L 2,000 mL with heparin 1,000 Units, cefTAZidime 250 mg, vancomycin 50 mg peritoneal dialysate, , q24h  dextrose 1.5% - LOW calcium 2.5mEq/L 5,000 mL with heparin 2,500 Units, cefTAZidime 625 mg, vancomycin 125 mg peritoneal dialysate, , q24h  dextrose 1.5% - LOW calcium 2.5mEq/L 5,000 mL with heparin 2,500 Units, cefTAZidime 625 mg, vancomycin 125 mg peritoneal dialysate, , q24h  levothyroxine, 25 mcg, Daily before breakfast  magnesium oxide, 800 mg, Daily  mirtazapine, 15 mg, Nightly  nystatin, 500,000 Units, q8h AMARJIT  sevelamer carbonate, 800 mg, TID with meals         acetaminophen, 650 mg, q4h PRN   Or  acetaminophen, 650 mg, q4h PRN   Or  acetaminophen, 650 mg, q4h PRN  diphenoxylate-atropine, 1 tablet, 4x daily PRN  HYDROmorphone, 0.4 mg, q4h PRN  loperamide, 2 mg, 4x daily PRN  melatonin, 10 mg, Daily PRN  ondansetron ODT, 4 mg, q8h PRN   Or  ondansetron, 4 mg, q8h PRN  polyethylene glycol, 17 g, Daily PRN        Vitals:    02/01/24 1135   BP: 112/58   Pulse: 94   Resp: 18   Temp: 36.7 °C (98.1 °F)   SpO2: 99%        No intake or output data in the 24 hours ending 02/01/24 1508    General appearance: no distress  Eyes: non-icteric  Skin: no apparent rash  Abdomen: soft, nt/nd  Extremities: No edema bilat  Neuro: No FND  Access: PD catheter     Blood Labs:  Results for orders placed or performed during the hospital encounter of 01/30/24 (from the past 24 hour(s))   POCT GLUCOSE   Result Value Ref Range    POCT Glucose 99 74 - 99 mg/dL   Renal Function Panel   Result Value Ref Range    Glucose 85  74 - 99 mg/dL    Sodium 132 (L) 136 - 145 mmol/L    Potassium 3.4 (L) 3.5 - 5.3 mmol/L    Chloride 93 (L) 98 - 107 mmol/L    Bicarbonate 25 21 - 32 mmol/L    Anion Gap 17 10 - 20 mmol/L    Urea Nitrogen 36 (H) 6 - 23 mg/dL    Creatinine 7.27 (H) 0.50 - 1.05 mg/dL    eGFR 6 (L) >60 mL/min/1.73m*2    Calcium 7.8 (L) 8.6 - 10.6 mg/dL    Phosphorus 4.9 2.5 - 4.9 mg/dL    Albumin 2.2 (L) 3.4 - 5.0 g/dL   Body Fluid Cell Count   Result Value Ref Range    Color, Fluid Colorless Colorless, Straw, Yellow    Clarity, Fluid Clear Clear    WBC, Fluid 73 See Comment /uL    RBC, Fluid 15 0  /uL /uL   Body Fluid Differential   Result Value Ref Range    Neutrophils %, Manual, Fluid 43 <25 % %    Lymphocytes %, Manual, Fluid 20 <75 % %    Mono/Macrophages %, Manual, Fluid 31 <70 % %    Eosinophils %, Manual, Fluid 0 0 % %    Basophils %, Manual, Fluid 0 0 % %    Immature Granulocytes %, Manual, Fluid 0 0 % %    Blasts %, Manual, Fluid 0 0 % %    Unclassified Cells %, Manual, Fluid 6 (H) 0 % %    Plasma Cells %, Manual, Fluid 0 0 % %    Total Cells Counted, Fluid 100    CBC   Result Value Ref Range    WBC 6.1 4.4 - 11.3 x10*3/uL    nRBC 0.0 0.0 - 0.0 /100 WBCs    RBC 3.37 (L) 4.00 - 5.20 x10*6/uL    Hemoglobin 9.8 (L) 12.0 - 16.0 g/dL    Hematocrit 30.0 (L) 36.0 - 46.0 %    MCV 89 80 - 100 fL    MCH 29.1 26.0 - 34.0 pg    MCHC 32.7 32.0 - 36.0 g/dL    RDW 16.5 (H) 11.5 - 14.5 %    Platelets 230 150 - 450 x10*3/uL   Renal Function Panel   Result Value Ref Range    Glucose 95 74 - 99 mg/dL    Sodium 132 (L) 136 - 145 mmol/L    Potassium 3.2 (L) 3.5 - 5.3 mmol/L    Chloride 91 (L) 98 - 107 mmol/L    Bicarbonate 32 21 - 32 mmol/L    Anion Gap 12 10 - 20 mmol/L    Urea Nitrogen 39 (H) 6 - 23 mg/dL    Creatinine 7.80 (H) 0.50 - 1.05 mg/dL    eGFR 5 (L) >60 mL/min/1.73m*2    Calcium 7.7 (L) 8.6 - 10.6 mg/dL    Phosphorus 5.2 (H) 2.5 - 4.9 mg/dL    Albumin 2.1 (L) 3.4 - 5.0 g/dL   Magnesium   Result Value Ref Range    Magnesium 1.88 1.60 -  2.40 mg/dL   Protime-INR   Result Value Ref Range    Protime 25.0 (H) 9.8 - 12.8 seconds    INR 2.2 (H) 0.9 - 1.1            Yoselyn Hernandez is a 64 y.o. female with a past medical history of ESRD on PD, multiple AAA dissections, HFrEF (EF 15-20% 3/23), pancreatitis, PE/DVT on warfarin, HTN, and multiple renal artery grafts with prior ilio-renal bypass who presented to the ED with abdominal pain, generalized weakness. She is admitted for suspected peritonitis. Nephrology was consulted for ESRD management.        #ESRD on PD:  Nephrologist: Dr. Lora   Access: PD catheter  EDW: 53kg  Dialysis duration: 6 exchanges a night, 1400cc fill, Last fill 1200cc, 8200cc total volume, 7 days a week  Urine amount: very little        #Anemia  - Hb 9-10     #MBD  - sevelamer 800mg TID  - on calcitriol 0.5 mcg OD     # Bacterial Peritonitis  - staph epidermidis from Pcx 1/22  - Peritoneal fluid analysis from 1/30 showed cloudy straw colored appearance WBC 7776 with 46 % PMN however this was from her last fill throughout the day        ___________________________________________________________________________________  RECOMMENDATIONS:  - cell count collected from PD fluid today   - PD tonight - D 1.5%, 1400cc fill, Last fill 1200cc, 8200cc total volume with vancomycin (25mg/l) and ceftazidime (125mg/l) along with heparin  ->will continue D 1.5% given her hypotension however if BP improves and diarrhea resolves will return to her D 1.5, 2.5 home regimen   - Continue Nystatin swish and swallow   - 0.1% gentamicin at PD catheter site at the time of dressing  - Renal diet  - Renal MVI  - one BM at least in 24 hrs as per PD protocol  - Keep MAP >65 or SBP >90  - Strict I/O monitoring, daily weights, daily BMP  - Will continue to follow     Patient discussed with the attending.         Ta Seay DO  Nephrology Fellow   Daytime / Weekend Renal Pager [phone]  After 7 pm Emergencies [phone] Pager [phone]

## 2024-02-01 NOTE — CARE PLAN
Problem: Pain - Adult  Goal: Verbalizes/displays adequate comfort level or baseline comfort level  Outcome: Progressing   The patient's goals for the shift include      The clinical goals for the shift include Pt will be monitor while on PD during this shift

## 2024-02-01 NOTE — PROGRESS NOTES
02/01/24 0941   Discharge Planning   Living Arrangements Alone   Support Systems Family members   Assistance Needed Pt will likely need SNF at discharge, await PT/OT evaluations.   Type of Residence Private residence   Do you have animals or pets at home? No   Who is requesting discharge planning? Patient   Home or Post Acute Services Post acute facilities (Rehab/SNF/etc)   Type of Post Acute Facility Services Skilled nursing   Patient expects to be discharged to: SNF pending therapy recommendations   Does the patient need discharge transport arranged? Yes   RoundTrip coordination needed? Yes   Has discharge transport been arranged? No   Financial Resource Strain   How hard is it for you to pay for the very basics like food, housing, medical care, and heating? Not hard   Housing Stability   In the last 12 months, was there a time when you were not able to pay the mortgage or rent on time? N   In the last 12 months, was there a time when you did not have a steady place to sleep or slept in a shelter (including now)? N   Transportation Needs   In the past 12 months, has lack of transportation kept you from medical appointments or from getting medications? no   In the past 12 months, has lack of transportation kept you from meetings, work, or from getting things needed for daily living? No     Assessment Note:  Met with pt and introduced myself as care coordinator and member of the Care Transitions team for discharge planning.   Pt does not feel safe at home due to her weakness and inability to care for herself.  Pt was recently discharged from [facility] last week. At that time, a referral was sent for Meals on Wheels program.  Pt was driving to [employer] when she felt well but most recently, pt called friends for a ride to [facility] [employer].  Pt's address, phone number and contact information was verified.  Pt is agreeable to SNF at discharge, await PT/OT evaluations.  Pt states she already provided 2 SNF preferences and ok with  "referrals to SNFs near [place].  Pt does not have any other questions/concerns at this time.     Previous Home Care: Pt was recently discharged home with [facility] for RN, PT, OT and HHA  but was too weak to open the door for them.  DME: PD equipment ([facility]), rollator, wheeled walker, cane and shower chair (says she needs a new one). Pt purchased out of pocket.  Pharmacy: [facility] [place] and [facility].  Falls: Pt recently \"slid\" off of the couch.  PCP:  Gregg Kerr (cannot recall date of last appt.  Peritoneal Dialysis: Pt is active with [facility] for equipment.  Pt has a  nephrologist but cannot recall the name.    Thea Parks MSN, RN-BC  Transitional Care Coordinator (TCC)  [phone]   "

## 2024-02-01 NOTE — PROGRESS NOTES
"Yoselyn Hernandez is a 64 y.o. female on day 2 of admission presenting with Peritonitis (CMS/HCC).    Subjective   Patient was seen and examined at bedside. She admitted to abdominal pain. 10/10 recurrent and worsening from last week. She admitted to chills, no fever    Objective     Physical Exam  Constitutional: sitting in bed with no distress  HEENT: moist oral mucosa  Neck: No JVD  Lungs: Clear to auscultation bilaterally, no rhonchi   Cardiac: regular rate and rhythm, S1 and S2 present, no rubs, no murmurs, no gallops  Abdomen: bowel sounds present, tender to light palpation  Ext: No cyanosis, no clubbing, no edema     Last Recorded Vitals  Blood pressure 127/84, pulse 103, temperature 36.9 °C (98.4 °F), resp. rate 16, height 1.702 m (5' 7\"), weight 49.9 kg (110 lb), SpO2 97 %.  Intake/Output last 3 Shifts:  No intake/output data recorded.    Relevant Results  Scheduled medications  atorvastatin, 40 mg, oral, Nightly  B complex-vitamin C-folic acid, 1 capsule, oral, Daily  calcitriol, 0.5 mcg, oral, Daily  carvedilol, 6.25 mg, oral, BID with meals  dextrose 1.5% - LOW calcium 2.5mEq/L 2,000 mL with heparin 1,000 Units, cefTAZidime 250 mg, vancomycin 50 mg peritoneal dialysate, , intraperitoneal, q24h  dextrose 1.5% - LOW calcium 2.5mEq/L 5,000 mL with heparin 2,500 Units, cefTAZidime 625 mg, vancomycin 125 mg peritoneal dialysate, , intraperitoneal, q24h  dextrose 1.5% - LOW calcium 2.5mEq/L 5,000 mL with heparin 2,500 Units, cefTAZidime 625 mg, vancomycin 125 mg peritoneal dialysate, , intraperitoneal, q24h  levothyroxine, 25 mcg, oral, Daily before breakfast  magnesium oxide, 800 mg, oral, Daily  mirtazapine, 15 mg, oral, Nightly  nystatin, 500,000 Units, oral, q8h AMARJIT  sevelamer carbonate, 800 mg, oral, TID with meals      Continuous medications     PRN medications  PRN medications: acetaminophen **OR** acetaminophen **OR** acetaminophen, diphenoxylate-atropine, HYDROmorphone, loperamide, melatonin, ondansetron " ODT **OR** ondansetron, polyethylene glycol       Malnutrition          I agree with the dietitian's malnutrition diagnosis.      Assessment/Plan   Yoselyn Hernandez is a 64 y.o. female with a past medical history of ESRD on PD, multiple AAA dissections, HFrEF (EF 15-20% 3/23), pancreatitis, PE/DVT on warfarin, HTN, and multiple renal artery grafts with prior ilio-renal bypass who presented to the ED with myalgias (mostly chest and abdomen), shortness of breath, nausea, vomiting, and generalized weakness concerning for peritonitis     PLAN:  Possible Peritonitis        1. Nephrology consulted and appreciate recs       2. Dialysis tonight with peritoneal antibiotics       3. Continue pain control          2. Chronic Left common iliac occlusion/Hx of DVT/PE       1. Appreciate Vascular input appreciated      2. Continue with coumadin     3. HFrEF      1. Continue home Coreg,    4. Chronic diarrhea     1. Continue lomotil, loperamide    Disposition: Patient admitted for abdominal pain concerning for Peritonitis in the setting of PD. Nephrology following. Will monitor for improvement. Anticipated dc > 2 days      I spent 40 minutes in the professional and overall care of this patient.      Brittney Meng DO

## 2024-02-01 NOTE — PROGRESS NOTES
Yoselyn Hernandez is a 64 y.o. female on day 2 of admission presenting with Peritonitis (CMS/HCC).    Subjective   Careport reviewed; noted that [facility] is unable to accept at discharge. Additional referrals were sent in [facility] per patients request. Awaiting PT/OT evaluation but anticipating that SNF will be recommended.     SW will continue to follow.    -Radha SANDHU MA, LSW  [phone] or [facility]  Care Transitions

## 2024-02-02 LAB
ALBUMIN SERPL BCP-MCNC: 2.1 G/DL (ref 3.4–5)
ANION GAP SERPL CALC-SCNC: 14 MMOL/L (ref 10–20)
BASOPHILS NFR FLD MANUAL: 0 %
BLASTS NFR FLD MANUAL: 0 %
BUN SERPL-MCNC: 34 MG/DL (ref 6–23)
CALCIUM SERPL-MCNC: 7.8 MG/DL (ref 8.6–10.6)
CHLORIDE SERPL-SCNC: 93 MMOL/L (ref 98–107)
CLARITY FLD: CLEAR
CO2 SERPL-SCNC: 28 MMOL/L (ref 21–32)
COLOR FLD: COLORLESS
CREAT SERPL-MCNC: 6.77 MG/DL (ref 0.5–1.05)
EGFRCR SERPLBLD CKD-EPI 2021: 6 ML/MIN/1.73M*2
EOSINOPHIL NFR FLD MANUAL: 1 %
ERYTHROCYTE [DISTWIDTH] IN BLOOD BY AUTOMATED COUNT: 16.7 % (ref 11.5–14.5)
GLUCOSE SERPL-MCNC: 69 MG/DL (ref 74–99)
HCT VFR BLD AUTO: 32 % (ref 36–46)
HGB BLD-MCNC: 10.6 G/DL (ref 12–16)
IMMATURE GRANULOCYTES IN FLUID: 0 %
INR PPP: 1.7 (ref 0.9–1.1)
LYMPHOCYTES NFR FLD MANUAL: 16 %
MAGNESIUM SERPL-MCNC: 1.82 MG/DL (ref 1.6–2.4)
MCH RBC QN AUTO: 30.2 PG (ref 26–34)
MCHC RBC AUTO-ENTMCNC: 33.1 G/DL (ref 32–36)
MCV RBC AUTO: 91 FL (ref 80–100)
MONOS+MACROS NFR FLD MANUAL: 55 %
NEUTROPHILS NFR FLD MANUAL: 28 %
NRBC BLD-RTO: 0 /100 WBCS (ref 0–0)
OTHER CELLS NFR FLD MANUAL: 0 %
PATH REVIEW-CELL CT,FLUID: NORMAL
PHOSPHATE SERPL-MCNC: 4.7 MG/DL (ref 2.5–4.9)
PLASMA CELLS NFR FLD MANUAL: 0 %
PLATELET # BLD AUTO: 254 X10*3/UL (ref 150–450)
POTASSIUM SERPL-SCNC: 3.2 MMOL/L (ref 3.5–5.3)
PROTHROMBIN TIME: 19.5 SECONDS (ref 9.8–12.8)
RBC # BLD AUTO: 3.51 X10*6/UL (ref 4–5.2)
RBC # FLD AUTO: 3 /UL
SODIUM SERPL-SCNC: 132 MMOL/L (ref 136–145)
TOTAL CELLS COUNTED FLD: 100
WBC # BLD AUTO: 5.4 X10*3/UL (ref 4.4–11.3)
WBC # FLD AUTO: 32 /UL

## 2024-02-02 PROCEDURE — 2500000001 HC RX 250 WO HCPCS SELF ADMINISTERED DRUGS (ALT 637 FOR MEDICARE OP): Performed by: INTERNAL MEDICINE

## 2024-02-02 PROCEDURE — 99232 SBSQ HOSP IP/OBS MODERATE 35: CPT | Performed by: INTERNAL MEDICINE

## 2024-02-02 PROCEDURE — 97116 GAIT TRAINING THERAPY: CPT | Mod: GP

## 2024-02-02 PROCEDURE — 36415 COLL VENOUS BLD VENIPUNCTURE: CPT | Performed by: INTERNAL MEDICINE

## 2024-02-02 PROCEDURE — 2500000001 HC RX 250 WO HCPCS SELF ADMINISTERED DRUGS (ALT 637 FOR MEDICARE OP): Performed by: NURSE PRACTITIONER

## 2024-02-02 PROCEDURE — 2500000004 HC RX 250 GENERAL PHARMACY W/ HCPCS (ALT 636 FOR OP/ED)

## 2024-02-02 PROCEDURE — 84100 ASSAY OF PHOSPHORUS: CPT | Performed by: INTERNAL MEDICINE

## 2024-02-02 PROCEDURE — 90947 DIALYSIS REPEATED EVAL: CPT

## 2024-02-02 PROCEDURE — 97161 PT EVAL LOW COMPLEX 20 MIN: CPT | Mod: GP

## 2024-02-02 PROCEDURE — 89051 BODY FLUID CELL COUNT: CPT

## 2024-02-02 PROCEDURE — 83735 ASSAY OF MAGNESIUM: CPT | Performed by: INTERNAL MEDICINE

## 2024-02-02 PROCEDURE — 1100000001 HC PRIVATE ROOM DAILY

## 2024-02-02 PROCEDURE — 2500000001 HC RX 250 WO HCPCS SELF ADMINISTERED DRUGS (ALT 637 FOR MEDICARE OP): Performed by: EMERGENCY MEDICINE

## 2024-02-02 PROCEDURE — 2500000004 HC RX 250 GENERAL PHARMACY W/ HCPCS (ALT 636 FOR OP/ED): Performed by: NURSE PRACTITIONER

## 2024-02-02 PROCEDURE — 2500000004 HC RX 250 GENERAL PHARMACY W/ HCPCS (ALT 636 FOR OP/ED): Performed by: INTERNAL MEDICINE

## 2024-02-02 PROCEDURE — 97530 THERAPEUTIC ACTIVITIES: CPT | Mod: GP

## 2024-02-02 PROCEDURE — 85610 PROTHROMBIN TIME: CPT | Performed by: INTERNAL MEDICINE

## 2024-02-02 PROCEDURE — 85027 COMPLETE CBC AUTOMATED: CPT | Performed by: INTERNAL MEDICINE

## 2024-02-02 RX ADMIN — HYDROMORPHONE HYDROCHLORIDE 0.4 MG: 1 INJECTION, SOLUTION INTRAMUSCULAR; INTRAVENOUS; SUBCUTANEOUS at 06:22

## 2024-02-02 RX ADMIN — HEPARIN SODIUM: 1000 INJECTION INTRAVENOUS; SUBCUTANEOUS at 16:55

## 2024-02-02 RX ADMIN — HYDROMORPHONE HYDROCHLORIDE 0.4 MG: 1 INJECTION, SOLUTION INTRAMUSCULAR; INTRAVENOUS; SUBCUTANEOUS at 02:33

## 2024-02-02 RX ADMIN — NYSTATIN 500000 UNITS: 100000 SUSPENSION ORAL at 14:18

## 2024-02-02 RX ADMIN — Medication 800 MG: at 08:39

## 2024-02-02 RX ADMIN — RENO CAPS 1 CAPSULE: 100; 1.5; 1.7; 20; 10; 1; 150; 5; 6 CAPSULE ORAL at 08:39

## 2024-02-02 RX ADMIN — SEVELAMER CARBONATE 800 MG: 800 TABLET, FILM COATED ORAL at 11:04

## 2024-02-02 RX ADMIN — LEVOTHYROXINE SODIUM 25 MCG: 25 TABLET ORAL at 06:22

## 2024-02-02 RX ADMIN — ATORVASTATIN CALCIUM 40 MG: 40 TABLET, FILM COATED ORAL at 20:54

## 2024-02-02 RX ADMIN — SEVELAMER CARBONATE 800 MG: 800 TABLET, FILM COATED ORAL at 16:10

## 2024-02-02 RX ADMIN — NYSTATIN 500000 UNITS: 100000 SUSPENSION ORAL at 06:22

## 2024-02-02 RX ADMIN — HEPARIN SODIUM: 1000 INJECTION INTRAVENOUS; SUBCUTANEOUS at 16:54

## 2024-02-02 RX ADMIN — GENTAMICIN SULFATE: 1 CREAM TOPICAL at 16:45

## 2024-02-02 RX ADMIN — MIRTAZAPINE 15 MG: 15 TABLET, FILM COATED ORAL at 20:54

## 2024-02-02 RX ADMIN — SEVELAMER CARBONATE 800 MG: 800 TABLET, FILM COATED ORAL at 08:39

## 2024-02-02 RX ADMIN — CALCITRIOL 0.5 MCG: 0.5 CAPSULE ORAL at 08:39

## 2024-02-02 RX ADMIN — HYDROMORPHONE HYDROCHLORIDE 0.4 MG: 1 INJECTION, SOLUTION INTRAMUSCULAR; INTRAVENOUS; SUBCUTANEOUS at 11:04

## 2024-02-02 RX ADMIN — HYDROMORPHONE HYDROCHLORIDE 0.4 MG: 1 INJECTION, SOLUTION INTRAMUSCULAR; INTRAVENOUS; SUBCUTANEOUS at 20:54

## 2024-02-02 RX ADMIN — HYDROMORPHONE HYDROCHLORIDE 0.4 MG: 1 INJECTION, SOLUTION INTRAMUSCULAR; INTRAVENOUS; SUBCUTANEOUS at 16:05

## 2024-02-02 ASSESSMENT — COGNITIVE AND FUNCTIONAL STATUS - GENERAL
MOVING FROM LYING ON BACK TO SITTING ON SIDE OF FLAT BED WITH BEDRAILS: A LITTLE
MOVING TO AND FROM BED TO CHAIR: A LOT
CLIMB 3 TO 5 STEPS WITH RAILING: TOTAL
DRESSING REGULAR UPPER BODY CLOTHING: A LITTLE
TOILETING: A LOT
DRESSING REGULAR LOWER BODY CLOTHING: A LITTLE
TURNING FROM BACK TO SIDE WHILE IN FLAT BAD: A LITTLE
STANDING UP FROM CHAIR USING ARMS: A LOT
WALKING IN HOSPITAL ROOM: A LITTLE
DAILY ACTIVITIY SCORE: 19
STANDING UP FROM CHAIR USING ARMS: A LITTLE
MOBILITY SCORE: 18
DRESSING REGULAR LOWER BODY CLOTHING: A LITTLE
TOILETING: A LOT
CLIMB 3 TO 5 STEPS WITH RAILING: TOTAL
DRESSING REGULAR UPPER BODY CLOTHING: A LITTLE
TURNING FROM BACK TO SIDE WHILE IN FLAT BAD: A LITTLE
CLIMB 3 TO 5 STEPS WITH RAILING: A LITTLE
MOVING TO AND FROM BED TO CHAIR: A LITTLE
HELP NEEDED FOR BATHING: A LITTLE
MOVING FROM LYING ON BACK TO SITTING ON SIDE OF FLAT BED WITH BEDRAILS: A LITTLE
MOBILITY SCORE: 16
WALKING IN HOSPITAL ROOM: A LITTLE
STANDING UP FROM CHAIR USING ARMS: A LITTLE
WALKING IN HOSPITAL ROOM: A LOT
MOVING FROM LYING ON BACK TO SITTING ON SIDE OF FLAT BED WITH BEDRAILS: A LITTLE
HELP NEEDED FOR BATHING: A LITTLE
MOVING TO AND FROM BED TO CHAIR: A LITTLE
TURNING FROM BACK TO SIDE WHILE IN FLAT BAD: A LOT
DAILY ACTIVITIY SCORE: 19
MOBILITY SCORE: 12

## 2024-02-02 ASSESSMENT — PAIN SCALES - GENERAL
PAINLEVEL_OUTOF10: 10 - WORST POSSIBLE PAIN
PAINLEVEL_OUTOF10: 0 - NO PAIN

## 2024-02-02 ASSESSMENT — PAIN - FUNCTIONAL ASSESSMENT
PAIN_FUNCTIONAL_ASSESSMENT: 0-10
PAIN_FUNCTIONAL_ASSESSMENT: 0-10

## 2024-02-02 ASSESSMENT — PAIN SCALES - WONG BAKER: WONGBAKER_NUMERICALRESPONSE: NO HURT

## 2024-02-02 NOTE — PROGRESS NOTES
Physical Therapy                 Therapy Communication Note    Patient Name: Yoselyn Hernandez  MRN: [id number]  Today's Date: 2/2/2024     Discipline: Physical Therapy    Missed Visit Reason: Missed Visit Reason:  (per discussion with bedside nurse garry Denise currently connected to peritoneal dialysis, should be finished this afternoon.  Will plan to assess mobility this afternoon as feasible.)    Missed Time: Attempt      02/02/24 at 10:32 AM - Ayla Lomeli PT

## 2024-02-02 NOTE — PROGRESS NOTES
"Yoselyn Hernandez is a 64 y.o. female on day 2 of admission presenting with Peritonitis (CMS/HCC).    Subjective   Patient was seen and examined at bedside. Patient continues to admit to abdominal pain. No fever, no chills    Objective     Physical Exam  Constitutional: sitting in bed with no distress  HEENT: moist oral mucosa  Neck: No JVD  Lungs: Clear to auscultation bilaterally, no rhonchi   Cardiac: regular rate and rhythm, S1 and S2 present, no rubs, no murmurs, no gallops  Abdomen: bowel sounds present, tender to light palpation  Ext: No cyanosis, no clubbing, no edema   Last Recorded Vitals  Blood pressure 109/65, pulse 93, temperature 36.7 °C (98.1 °F), temperature source Tympanic, resp. rate 18, height 1.702 m (5' 7\"), weight 49.9 kg (110 lb), SpO2 98 %.  Intake/Output last 3 Shifts:  No intake/output data recorded.    Relevant Results  Scheduled medications  atorvastatin, 40 mg, oral, Nightly  B complex-vitamin C-folic acid, 1 capsule, oral, Daily  calcitriol, 0.5 mcg, oral, Daily  carvedilol, 6.25 mg, oral, BID with meals  dextrose 1.5% - LOW calcium 2.5mEq/L 2,000 mL with heparin 1,000 Units, cefTAZidime 250 mg, vancomycin 50 mg peritoneal dialysate, , intraperitoneal, q24h  dextrose 1.5% - LOW calcium 2.5mEq/L 5,000 mL with heparin 2,500 Units, cefTAZidime 625 mg, vancomycin 125 mg peritoneal dialysate, , intraperitoneal, q24h  dextrose 1.5% - LOW calcium 2.5mEq/L 5,000 mL with heparin 2,500 Units, cefTAZidime 625 mg, vancomycin 125 mg peritoneal dialysate, , intraperitoneal, q24h  gentamicin, , Topical, q24h  levothyroxine, 25 mcg, oral, Daily before breakfast  magnesium oxide, 800 mg, oral, Daily  mirtazapine, 15 mg, oral, Nightly  nystatin, 500,000 Units, oral, q8h AAMRJIT  sevelamer carbonate, 800 mg, oral, TID with meals      Continuous medications     PRN medications  PRN medications: acetaminophen **OR** acetaminophen **OR** acetaminophen, diphenoxylate-atropine, HYDROmorphone, loperamide, melatonin, " ondansetron ODT **OR** ondansetron, polyethylene glycol       Malnutrition          I agree with the dietitian's malnutrition diagnosis.      Assessment/Plan   Yoselyn Hernandez is a 64 y.o. female with a past medical history of ESRD on PD, multiple AAA dissections, HFrEF (EF 15-20% 3/23), pancreatitis, PE/DVT on warfarin, HTN, and multiple renal artery grafts with prior ilio-renal bypass who presented to the ED with myalgias (mostly chest and abdomen), shortness of breath, nausea, vomiting, and generalized weakness concerning for peritonitis      PLAN:  ESRD Possible Peritonitis        1. Nephrology consulted and appreciate recs       2.  PD tonight        3. Continue pain control          2. Chronic Left common iliac occlusion/Hx of DVT/PE       1. Appreciate Vascular input appreciated      2. Continue with coumadin      3. HFrEF      1. Continue home Coreg,     4. Chronic diarrhea     1. Continue lomotil, loperamide     Disposition: Patient admitted for abdominal pain concerning for Peritonitis in the setting of PD. Nephrology following. Will monitor for improvement. Anticipated dc > 2 days. Patient is requesting SNF       I spent 35 minutes in the professional and overall care of this patient.      Brittney Meng DO

## 2024-02-02 NOTE — CARE PLAN
The patient's goals for the shift include      The clinical goals for the shift include Pt. will verbalize improved pain by end of shift    Over the shift, the patient did not make progress toward the following goals. Barriers to progression include. Recommendations to address these barriers include.    Problem: Pain - Adult  Goal: Verbalizes/displays adequate comfort level or baseline comfort level  Outcome: Progressing     Problem: Fall/Injury  Goal: Not fall by end of shift  Outcome: Progressing  Goal: Be free from injury by end of the shift  Outcome: Progressing  Goal: Verbalize understanding of personal risk factors for fall in the hospital  Outcome: Progressing  Goal: Verbalize understanding of risk factor reduction measures to prevent injury from fall in the home  Outcome: Progressing  Goal: Use assistive devices by end of the shift  Outcome: Progressing  Goal: Pace activities to prevent fatigue by end of the shift  Outcome: Progressing     Problem: Chronic Conditions and Co-morbidities  Goal: Patient's chronic conditions and co-morbidity symptoms are monitored and maintained or improved  Outcome: Progressing     Problem: Safety - Adult  Goal: Free from fall injury  Outcome: Progressing

## 2024-02-02 NOTE — PROGRESS NOTES
Transitional Care Coordination Progress Note:  PLAN: Waiting on nephrology to clear her peritonitis.     PAYOR: Sathish - commercial    DISPO: Patient requesting SNF as she states she is so weak and can't take care of herself. PT/OT order placed. Message sent in TCC communication columns. ADOD early next week.     SUPPORT/CONTACT: Friend, Liliana, [phone]    Anita Benavides RN, [facility]

## 2024-02-02 NOTE — PROGRESS NOTES
Yoselyn Hernandez   64 [address]    @@  N/Room: 86383535/2021/2021-A    Subjective:   Pt states abd pain improving today. Appetite is improving. Still has loose stools but it is slowly improving.     Objective:     Meds:   atorvastatin, 40 mg, Nightly  B complex-vitamin C-folic acid, 1 capsule, Daily  calcitriol, 0.5 mcg, Daily  carvedilol, 6.25 mg, BID with meals  dextrose 1.5% - LOW calcium 2.5mEq/L 2,000 mL with heparin 1,000 Units, cefTAZidime 250 mg, vancomycin 50 mg peritoneal dialysate, , q24h  dextrose 1.5% - LOW calcium 2.5mEq/L 5,000 mL with heparin 2,500 Units, cefTAZidime 625 mg, vancomycin 125 mg peritoneal dialysate, , q24h  dextrose 1.5% - LOW calcium 2.5mEq/L 5,000 mL with heparin 2,500 Units, cefTAZidime 625 mg, vancomycin 125 mg peritoneal dialysate, , q24h  gentamicin, , q24h  levothyroxine, 25 mcg, Daily before breakfast  magnesium oxide, 800 mg, Daily  mirtazapine, 15 mg, Nightly  nystatin, 500,000 Units, q8h AMARJIT  sevelamer carbonate, 800 mg, TID with meals         acetaminophen, 650 mg, q4h PRN   Or  acetaminophen, 650 mg, q4h PRN   Or  acetaminophen, 650 mg, q4h PRN  diphenoxylate-atropine, 1 tablet, 4x daily PRN  HYDROmorphone, 0.4 mg, q3h PRN  loperamide, 2 mg, 4x daily PRN  melatonin, 10 mg, Daily PRN  ondansetron ODT, 4 mg, q8h PRN   Or  ondansetron, 4 mg, q8h PRN  polyethylene glycol, 17 g, Daily PRN        Vitals:    02/02/24 1240   BP: 83/60   Pulse: 80   Resp: 18   Temp: 36.4 °C (97.5 °F)   SpO2: 99%          Intake/Output Summary (Last 24 hours) at 2/2/2024 1347  Last data filed at 2/2/2024 0900  Gross per 24 hour   Intake 8200 ml   Output 7959 ml   Net 241 ml       General appearance: no distress  Eyes: non-icteric  Skin: no apparent rash  Abdomen: soft, nt/nd  Extremities: No edema bilat  Neuro: No FND  Access: PD catheter     Blood Labs:  Results for orders placed or performed during the hospital encounter of 01/30/24 (from the past 24 hour(s))   CBC   Result Value Ref Range    WBC 5.4  4.4 - 11.3 x10*3/uL    nRBC 0.0 0.0 - 0.0 /100 WBCs    RBC 3.51 (L) 4.00 - 5.20 x10*6/uL    Hemoglobin 10.6 (L) 12.0 - 16.0 g/dL    Hematocrit 32.0 (L) 36.0 - 46.0 %    MCV 91 80 - 100 fL    MCH 30.2 26.0 - 34.0 pg    MCHC 33.1 32.0 - 36.0 g/dL    RDW 16.7 (H) 11.5 - 14.5 %    Platelets 254 150 - 450 x10*3/uL   Renal Function Panel   Result Value Ref Range    Glucose 69 (L) 74 - 99 mg/dL    Sodium 132 (L) 136 - 145 mmol/L    Potassium 3.2 (L) 3.5 - 5.3 mmol/L    Chloride 93 (L) 98 - 107 mmol/L    Bicarbonate 28 21 - 32 mmol/L    Anion Gap 14 10 - 20 mmol/L    Urea Nitrogen 34 (H) 6 - 23 mg/dL    Creatinine 6.77 (H) 0.50 - 1.05 mg/dL    eGFR 6 (L) >60 mL/min/1.73m*2    Calcium 7.8 (L) 8.6 - 10.6 mg/dL    Phosphorus 4.7 2.5 - 4.9 mg/dL    Albumin 2.1 (L) 3.4 - 5.0 g/dL   Magnesium   Result Value Ref Range    Magnesium 1.82 1.60 - 2.40 mg/dL   Protime-INR   Result Value Ref Range    Protime 19.5 (H) 9.8 - 12.8 seconds    INR 1.7 (H) 0.9 - 1.1   Body Fluid Cell Count   Result Value Ref Range    Color, Fluid Colorless Colorless, Straw, Yellow    Clarity, Fluid Clear Clear    WBC, Fluid 32 See Comment /uL    RBC, Fluid 3 0  /uL /uL   Body Fluid Differential   Result Value Ref Range    Neutrophils %, Manual, Fluid 28 <25 % %    Lymphocytes %, Manual, Fluid 16 <75 % %    Mono/Macrophages %, Manual, Fluid 55 <70 % %    Eosinophils %, Manual, Fluid 1 0 % %    Basophils %, Manual, Fluid 0 0 % %    Immature Granulocytes %, Manual, Fluid 0 0 % %    Blasts %, Manual, Fluid 0 0 % %    Unclassified Cells %, Manual, Fluid 0 0 % %    Plasma Cells %, Manual, Fluid 0 0 % %    Total Cells Counted, Fluid 100             Yoselyn Hernandez is a 64 y.o. female with a past medical history of ESRD on PD, multiple AAA dissections, HFrEF (EF 15-20% 3/23), pancreatitis, PE/DVT on warfarin, HTN, and multiple renal artery grafts with prior ilio-renal bypass who presented to the ED with abdominal pain, generalized weakness. She is admitted for  suspected peritonitis. Nephrology was consulted for ESRD management.        #ESRD on PD:  Nephrologist: Dr. Lora   Access: PD catheter  EDW: 53kg (admitted at 49kg)   Dialysis duration: 6 exchanges a night, 1400cc fill, Last fill 1200cc, 8200cc total volume, 7 days a week  Urine amount: very little        #Anemia  - Hb 9-10     #MBD  - sevelamer 800mg TID  - on calcitriol 0.5 mcg OD     # Bacterial Peritonitis  - staph epidermidis from Pcx 1/22  - Peritoneal fluid analysis from 1/30 showed cloudy straw colored appearance WBC 7776 with 46 % PMN however this was from her last fill throughout the day  -Fluid today yellow and clear. WBC 32 (2/2) . Peritoneal fluid cx 1/30 NGTD         ___________________________________________________________________________________  RECOMMENDATIONS:  - cell count collected   - PD tonight - D 1.5%, 1400cc fill, Last fill 1200cc, 8200cc total volume with vancomycin (25mg/l) and ceftazidime (125mg/l) along with heparin  - Continue Nystatin swish and swallow   - 0.1% gentamicin at PD catheter site at the time of dressing  - one BM at least in 24 hrs as per PD protocol  - Keep MAP >65 or SBP >90  - Strict I/O monitoring, daily weights, daily BMP  - Will continue to follow  - replete potassium as needed      Patient discussed with the attending.         Ta Seay DO  Nephrology Fellow   Daytime / Weekend Renal Pager [phone]  After 7 pm Emergencies [phone] Pager [phone]

## 2024-02-02 NOTE — PROGRESS NOTES
Occupational Therapy                 Therapy Communication Note    Patient Name: Yoselyn Hernandez  MRN: [id number]  Today's Date: 2/2/2024     Discipline: Occupational Therapy    Missed Visit Reason: Missed Visit Reason:  (Pt unavailable for OT evaluation; connected to peritoneal dialysis)

## 2024-02-02 NOTE — PROGRESS NOTES
Yoselyn Hernandez is a 64 y.o. female on day 3 of admission presenting with Peritonitis (CMS/HCC).    Subjective   Per MD, patient may be medically cleared for discharge over the weekend. Patient receives PD through [facility] which is presenting as a barrier to finding an accepting SNF. Patient awaiting therapy evaluations (requested for today) but has reported that she is too weak to return home.     Discussed with colleagues and referrals sent to [facility], [facility] and [facility] as they are able to accommodate PD. Will monitor [employer] for responses and follow up with patient.     SW will continue to follow.    UPDATE 1350 [facility] is the only accepting facility that has PD onsite; awaiting PT/OT evaluation to confirm that patient will be recommended for moderate intensity at discharge. Patient will also have 20% copay for her skilled time at SNF.    SW will continue to follow.    -Radha SANDHU MA, LSW  [phone] or [facility] Secure Chat  Care Transitions

## 2024-02-02 NOTE — NURSING NOTE
Peritoneal Dialysis - CCPD    Completed Overnight Therapy   2/1/24    Full treatment received:               Y  I-Drain:    801 mL  Total UF:   158 mL  Combined 24 hour UF:  -241 mL  Minicap placed:   Y  Effluent Color:   Yellow   Cloudy:   N   Fibrin:   N    Tonight's Therapy   2/2/24    Dialysis cycler primed:   Y  Patient connected:  Y  Therapy started at:  1655  [employer] Adaptor:  Y  Dressing changed:  Y  Gent. cream applied:  Y    Therapy Orders     Total time:  11 hours   Cycles:   5   Fill Volume:  1400 mL   Last fill:   1200 mL   Last fill solution:  Same   Total volume:  8200 mL     Solution(s):  Dextrose 1.5% Dianeal 2.5 calcium-- 5000 mL (x2)   Additive(s): Vancomycin/Ceftazidime/Heparin           Last Fill:  Same      **Dialysis nurses in house Monday through Friday 4221-5935 to setup and disconnect patients,   please call our office at [phone], follow prompts for after hours paging.    **Machine Trouble Shooting: [employer] 24 hour technical support available at [phone]     **Bedside nursing staff to disconnect patients as needed outside of office hours.  -Instructions and supplies located on dialysis cart.  -Reference  policy [id number] -->

## 2024-02-02 NOTE — PROGRESS NOTES
Physical Therapy    Physical Therapy Evaluation & Treatment    Patient Name: Yoselyn Hernandez  MRN: [id number]  Today's Date: 2/2/2024   Time Calculation  Start Time: 1515  Stop Time: 1612  Time Calculation (min): 57 min    Assessment/Plan   PT Assessment  PT Assessment Results: Decreased strength, Impaired balance, Decreased mobility, Decreased endurance  Rehab Prognosis: Good  End of Session Communication: Bedside nurse  Assessment Comment: Pt demos decreased endurance, balance and strength.  Needs to be fully (I) in all self care and mobility to safely return home. Will benefit from continued work with skilled PT to progress towards return to (I) functional mobility.  End of Session Patient Position: Bed, 2 rail up, Alarm off, caregiver present (seated EOB with needs in reach at end of session)   IP OR SWING BED PT PLAN  Inpatient or Swing Bed: Inpatient  PT Plan  Treatment/Interventions: Bed mobility, Transfer training, Gait training, Stair training, Balance training, Endurance training, Therapeutic exercise  PT Plan: Skilled PT  PT Frequency: 3 times per week  PT Discharge Recommendations: Moderate intensity level of continued care  Equipment Recommended upon Discharge: Wheeled walker  PT Recommended Transfer Status: Assist x1  PT - OK to Discharge: Yes (POC/goals/discharge intensity rec created)    Subjective     General Visit Information:  General  Reason for Referral: myalgias, peritonitis  Past Medical History Relevant to Rehab: ESRD on daily PD, AAA dissections s/p repairs, HF, pancreatitis, PE/DVT  Missed Visit: Yes  Missed Visit Reason:  (per discussion with bedside nurse Camelia, pt currently connected to peritoneal dialysis, should be finished this afternoon.  Will plan to assess mobility this afternoon as feasible.)  Prior to Session Communication: Bedside nurse  General Comment: Pt initially required encourgement to complete mobility, though as session progressed felt more up to trying more and showing how  she really moves.  Completes all OOB transfers with CGA, wall walks in room short distance. Will continue to follow.  Home Living:  Home Living  Type of Home:  ([facility])  Lives With: Alone  Home Adaptive Equipment: Cane  Home Layout: Multi-level, Bed/bath upstairs, Stairs to alternate level with rails  Alternate Level Stairs-Number of Steps: 12  Home Access: Stairs to enter with rails  Entrance Stairs-Number of Steps: 8  Prior Level of Function:  Prior Function Per Pt/Caregiver Report  Receives Help From: Family, Friends  ADL Assistance:  (reported able to mostly complete all tasks however everything is more effortfull and time consuming.)  Ambulatory Assistance:  (household ambulator wall/furniture walking, reports infrequent community distances recently, does have cane that she will use)  Precautions:  Precautions  Hearing/Visual Limitations: WFL  Medical Precautions: Fall precautions     Objective   Pain:  Pain Assessment  Pain Assessment: 0-10  Pain Score:  (does not give # rating)  Pain Location: Abdomen  Pain Interventions:  (received pain meds at end of session)  Cognition:  Cognition  Arousal/Alertness: Appropriate responses to stimuli  Orientation Level: Oriented X4  Cognition Comments: somewhat emotionally labile, quickly goes from feeling down and tearful to joyfully 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               Coordination  Movements are Fluid and Coordinated: Yes    Postural Control  Postural Control: Within Functional Limits, cues to correct increasing forward flexed posture as fatigued or with increased pain given prn.     Static Sitting Balance  Static Sitting-Balance Support: Feet supported  Static  Sitting-Level of Assistance: Distant supervision, Independent  Dynamic Sitting Balance  Dynamic Sitting-Balance Support: Feet supported, Bilateral upper extremity supported  Dynamic Sitting-Balance:  (lateral scoots)  Dynamic Sitting-Comments: CGA    Static Standing Balance  Static Standing-Balance Support: Bilateral upper extremity supported  Static Standing-Level of Assistance: Contact guard  Dynamic Standing Balance  Dynamic Standing-Balance Support: Bilateral upper extremity supported  Dynamic Standing-Comments: CGA  Functional Assessments:  Bed Mobility  Bed Mobility: Yes  Bed Mobility 1  Bed Mobility 1: Supine to sitting  Level of Assistance 1: Minimum assistance  Bed Mobility Comments 1: increased time and effort to complete, HOB slightly raised  Bed Mobility 2  Bed Mobility  2: Scooting (lateral scooting)  Level of Assistance 2: Contact guard, Moderate verbal cues  Bed Mobility Comments 2: x2    Transfers  Transfer: Yes  Transfer 1  Transfer From 1: Bed to  Transfer to 1: Stand  Transfer Level of Assistance 1: Minimum assistance, Minimal verbal cues (x1)  Trials/Comments 1: pushed off bed rail, increased time to increase trunk extension and stand fully upright  Transfers 2  Transfer From 2: Stand to  Transfer to 2: Bed  Transfer Level of Assistance 2: Contact guard, Minimal verbal cues  Trials/Comments 2: assist with controlling back to bed    Ambulation/Gait Training  Ambulation/Gait Training Performed: Yes  Ambulation/Gait Training 1  Surface 1: Level tile  Device 1:  (bed rail)  Assistance 1: Contact guard, Minimal verbal cues  Quality of Gait 1: Forward flexed posture, Decreased step length, Shuffling gait  Comments/Distance (ft) 1: side steps to HOB     Extremity/Trunk Assessments:  RLE   RLE : Exceptions to WFL  Strength RLE  RLE Overall Strength: Greater than or equal to 3/5 as evidenced by functional mobility  LLE   LLE : Exceptions to WFL  Strength LLE  LLE Overall Strength: Greater than or equal to  "3/5 as evidenced by functional mobility  Treatments:  Ambulation/Gait Training  Ambulation/Gait Training Performed: Yes  Ambulation/Gait Training 2  Surface 2: Level tile  Device 2:  (furniture/wall walked)  Assistance 2: Contact guard, Minimum assistance, Minimal verbal cues  Quality of Gait 2: Forward flexed posture, Inconsistent stride length  Comments/Distance (ft) 2: pt wanting to demo her \"true\" self, declines to use AD but will hold onto walls, place back against wall and laterally step, taking standing rest breaks againts wall as needed (completes ~5 ft x 4).  Increased time and effort to complete.  Transfers  Transfer: Yes  Transfers 3  Transfer From 3: Bed to  Transfer to 3: Stand  Transfer Device 3: Cane (bed rail)  Transfer Level of Assistance 3: Contact guard, Minimal verbal cues  Transfers 4  Transfer From 4: Stand to  Transfer to 4: Bed  Transfer Level of Assistance 4: Minimum assistance (x1)  Trials/Comments 4: decreased control as rushing  Outcome Measures:  [facility] Basic Mobility  Turning from your back to your side while in a flat bed without using bedrails: A little  Moving from lying on your back to sitting on the side of a flat bed without using bedrails: A little  Moving to and from bed to chair (including a wheelchair): A little  Standing up from a chair using your arms (e.g. wheelchair or bedside chair): A little  To walk in hospital room: A little  Climbing 3-5 steps with railing: Total  Basic Mobility - Total Score: 16    Tinetti  Sitting Balance: Steady, safe  Arises: Unable without help  Attempts to Arise: Unable without help  Immediate Standing Balance (First 5 Seconds): Steady but uses walker or other support  Standing Balance: Steady but wide stance, uses cane or other support  Nudged: Staggers, grabs, catches self  Eyes Closed: Unsteady  Turned 360 Degrees: Steadiness: Unsteady (Grabs, staggers)  Turned 360 Degrees: Continuity of Steps: Discontinuous steps  Sitting Down: Uses arms or " not a smooth motion  Balance Score: 5  Initiation of Gait: No hesitancy  Step Height: R Swing Foot: Right foot complete clears floor  Step Length: R Swing Foot: Passes left stance foot  Step Height: L Swing Foot: Left foot complete clears floor  Step Length: L Swing Foot: Passes right stance foot  Step Symmetry: Right and left step appear equal  Step Continuity: Stopping or discontinuity between steps  Path: Marked deviation  Trunk: Marked sway or uses walking aid  Walking Time: Heels almost touching while walking  Gait Score: 7  Total Score: 12    Encounter Problems       Encounter Problems (Active)       Balance       Tinetti > 18 to demo improvements in overall balance        Start:  02/02/24    Expected End:  02/16/24               Mobility       STG - Patient will ambulate with LRAD and CGA-> close supervision 25 ft x 4.       Start:  02/02/24    Expected End:  02/16/24            As feasibly safe to attempt to simulate home setup, navigate 8+ steps with rail with min Ax1.        Start:  02/02/24    Expected End:  02/16/24                        Strength       Will complete (B) LE therex to improve strength.       Start:  02/02/24    Expected End:  02/16/24               Transfers       STG - Patient will transfer sit to and from stand supervision.        Start:  02/02/24    Expected End:  02/16/24                   Education Documentation  Mobility Training, taught by Ayla Lomeli, PT at 2/2/2024  5:16 PM.  Learner: Patient  Readiness: Acceptance  Method: Explanation, Demonstration  Response: Verbalizes Understanding, Needs Reinforcement  Comment: safety with mobility        02/02/24 at 5:18 PM - Ayla Lomeli, PT

## 2024-02-03 LAB
ALBUMIN SERPL BCP-MCNC: 2 G/DL (ref 3.4–5)
ANION GAP SERPL CALC-SCNC: 15 MMOL/L (ref 10–20)
BACTERIA DIAFP CULT: ABNORMAL
BUN SERPL-MCNC: 36 MG/DL (ref 6–23)
CALCIUM SERPL-MCNC: 7.6 MG/DL (ref 8.6–10.6)
CHLORIDE SERPL-SCNC: 94 MMOL/L (ref 98–107)
CO2 SERPL-SCNC: 27 MMOL/L (ref 21–32)
CREAT SERPL-MCNC: 6.9 MG/DL (ref 0.5–1.05)
EGFRCR SERPLBLD CKD-EPI 2021: 6 ML/MIN/1.73M*2
ERYTHROCYTE [DISTWIDTH] IN BLOOD BY AUTOMATED COUNT: 16.7 % (ref 11.5–14.5)
GLUCOSE SERPL-MCNC: 81 MG/DL (ref 74–99)
GRAM STN SPEC: ABNORMAL
GRAM STN SPEC: ABNORMAL
HCT VFR BLD AUTO: 34.1 % (ref 36–46)
HGB BLD-MCNC: 10.5 G/DL (ref 12–16)
INR PPP: 1.4 (ref 0.9–1.1)
MAGNESIUM SERPL-MCNC: 2.01 MG/DL (ref 1.6–2.4)
MCH RBC QN AUTO: 29.2 PG (ref 26–34)
MCHC RBC AUTO-ENTMCNC: 30.8 G/DL (ref 32–36)
MCV RBC AUTO: 95 FL (ref 80–100)
NRBC BLD-RTO: 0 /100 WBCS (ref 0–0)
PHOSPHATE SERPL-MCNC: 4.6 MG/DL (ref 2.5–4.9)
PLATELET # BLD AUTO: 241 X10*3/UL (ref 150–450)
POTASSIUM SERPL-SCNC: 3.7 MMOL/L (ref 3.5–5.3)
PROTHROMBIN TIME: 15.6 SECONDS (ref 9.8–12.8)
RBC # BLD AUTO: 3.59 X10*6/UL (ref 4–5.2)
SODIUM SERPL-SCNC: 132 MMOL/L (ref 136–145)
WBC # BLD AUTO: 5.3 X10*3/UL (ref 4.4–11.3)

## 2024-02-03 PROCEDURE — 2500000004 HC RX 250 GENERAL PHARMACY W/ HCPCS (ALT 636 FOR OP/ED): Performed by: INTERNAL MEDICINE

## 2024-02-03 PROCEDURE — 83735 ASSAY OF MAGNESIUM: CPT | Performed by: INTERNAL MEDICINE

## 2024-02-03 PROCEDURE — 2500000004 HC RX 250 GENERAL PHARMACY W/ HCPCS (ALT 636 FOR OP/ED): Performed by: NURSE PRACTITIONER

## 2024-02-03 PROCEDURE — 85610 PROTHROMBIN TIME: CPT | Performed by: INTERNAL MEDICINE

## 2024-02-03 PROCEDURE — 36415 COLL VENOUS BLD VENIPUNCTURE: CPT | Performed by: INTERNAL MEDICINE

## 2024-02-03 PROCEDURE — 2500000001 HC RX 250 WO HCPCS SELF ADMINISTERED DRUGS (ALT 637 FOR MEDICARE OP): Performed by: NURSE PRACTITIONER

## 2024-02-03 PROCEDURE — 1100000001 HC PRIVATE ROOM DAILY

## 2024-02-03 PROCEDURE — 85027 COMPLETE CBC AUTOMATED: CPT | Performed by: INTERNAL MEDICINE

## 2024-02-03 PROCEDURE — 90947 DIALYSIS REPEATED EVAL: CPT

## 2024-02-03 PROCEDURE — 80069 RENAL FUNCTION PANEL: CPT | Performed by: INTERNAL MEDICINE

## 2024-02-03 PROCEDURE — 2500000001 HC RX 250 WO HCPCS SELF ADMINISTERED DRUGS (ALT 637 FOR MEDICARE OP): Performed by: INTERNAL MEDICINE

## 2024-02-03 PROCEDURE — 99232 SBSQ HOSP IP/OBS MODERATE 35: CPT | Performed by: INTERNAL MEDICINE

## 2024-02-03 PROCEDURE — 90945 DIALYSIS ONE EVALUATION: CPT | Performed by: INTERNAL MEDICINE

## 2024-02-03 PROCEDURE — 2500000001 HC RX 250 WO HCPCS SELF ADMINISTERED DRUGS (ALT 637 FOR MEDICARE OP): Performed by: EMERGENCY MEDICINE

## 2024-02-03 RX ORDER — WARFARIN 2.5 MG/1
2.5 TABLET ORAL DAILY
Status: DISCONTINUED | OUTPATIENT
Start: 2024-02-04 | End: 2024-02-04

## 2024-02-03 RX ORDER — SENNOSIDES 8.6 MG/1
2 TABLET ORAL DAILY
Status: DISCONTINUED | OUTPATIENT
Start: 2024-02-03 | End: 2024-02-07 | Stop reason: HOSPADM

## 2024-02-03 RX ADMIN — MIRTAZAPINE 15 MG: 15 TABLET, FILM COATED ORAL at 20:45

## 2024-02-03 RX ADMIN — HYDROMORPHONE HYDROCHLORIDE 0.4 MG: 1 INJECTION, SOLUTION INTRAMUSCULAR; INTRAVENOUS; SUBCUTANEOUS at 14:40

## 2024-02-03 RX ADMIN — HYDROMORPHONE HYDROCHLORIDE 0.4 MG: 1 INJECTION, SOLUTION INTRAMUSCULAR; INTRAVENOUS; SUBCUTANEOUS at 20:47

## 2024-02-03 RX ADMIN — HYDROMORPHONE HYDROCHLORIDE 0.4 MG: 1 INJECTION, SOLUTION INTRAMUSCULAR; INTRAVENOUS; SUBCUTANEOUS at 08:48

## 2024-02-03 RX ADMIN — CALCITRIOL 0.5 MCG: 0.5 CAPSULE ORAL at 09:01

## 2024-02-03 RX ADMIN — SEVELAMER CARBONATE 800 MG: 800 TABLET, FILM COATED ORAL at 17:57

## 2024-02-03 RX ADMIN — HYDROMORPHONE HYDROCHLORIDE 0.4 MG: 1 INJECTION, SOLUTION INTRAMUSCULAR; INTRAVENOUS; SUBCUTANEOUS at 04:48

## 2024-02-03 RX ADMIN — SEVELAMER CARBONATE 800 MG: 800 TABLET, FILM COATED ORAL at 09:01

## 2024-02-03 RX ADMIN — RENO CAPS 1 CAPSULE: 100; 1.5; 1.7; 20; 10; 1; 150; 5; 6 CAPSULE ORAL at 09:01

## 2024-02-03 RX ADMIN — HYDROMORPHONE HYDROCHLORIDE 0.4 MG: 1 INJECTION, SOLUTION INTRAMUSCULAR; INTRAVENOUS; SUBCUTANEOUS at 01:14

## 2024-02-03 RX ADMIN — HYDROMORPHONE HYDROCHLORIDE 0.4 MG: 1 INJECTION, SOLUTION INTRAMUSCULAR; INTRAVENOUS; SUBCUTANEOUS at 17:53

## 2024-02-03 RX ADMIN — LEVOTHYROXINE SODIUM 25 MCG: 25 TABLET ORAL at 05:26

## 2024-02-03 RX ADMIN — ATORVASTATIN CALCIUM 40 MG: 40 TABLET, FILM COATED ORAL at 20:45

## 2024-02-03 RX ADMIN — CARVEDILOL 6.25 MG: 6.25 TABLET, FILM COATED ORAL at 17:54

## 2024-02-03 RX ADMIN — NYSTATIN 500000 UNITS: 100000 SUSPENSION ORAL at 22:57

## 2024-02-03 RX ADMIN — Medication 800 MG: at 09:01

## 2024-02-03 ASSESSMENT — PAIN - FUNCTIONAL ASSESSMENT
PAIN_FUNCTIONAL_ASSESSMENT: 0-10

## 2024-02-03 ASSESSMENT — COGNITIVE AND FUNCTIONAL STATUS - GENERAL
TOILETING: A LOT
DAILY ACTIVITIY SCORE: 19
MOVING FROM LYING ON BACK TO SITTING ON SIDE OF FLAT BED WITH BEDRAILS: A LITTLE
MOVING FROM LYING ON BACK TO SITTING ON SIDE OF FLAT BED WITH BEDRAILS: A LITTLE
DAILY ACTIVITIY SCORE: 21
DRESSING REGULAR UPPER BODY CLOTHING: A LITTLE
MOVING TO AND FROM BED TO CHAIR: A LITTLE
MOBILITY SCORE: 15
CLIMB 3 TO 5 STEPS WITH RAILING: A LOT
WALKING IN HOSPITAL ROOM: A LOT
CLIMB 3 TO 5 STEPS WITH RAILING: A LOT
PERSONAL GROOMING: A LITTLE
TURNING FROM BACK TO SIDE WHILE IN FLAT BAD: A LITTLE
TOILETING: A LOT
MOVING TO AND FROM BED TO CHAIR: A LITTLE
STANDING UP FROM CHAIR USING ARMS: A LOT
MOBILITY SCORE: 16
DRESSING REGULAR LOWER BODY CLOTHING: A LITTLE
STANDING UP FROM CHAIR USING ARMS: A LITTLE
TURNING FROM BACK TO SIDE WHILE IN FLAT BAD: A LITTLE
WALKING IN HOSPITAL ROOM: A LOT
DRESSING REGULAR LOWER BODY CLOTHING: A LITTLE

## 2024-02-03 ASSESSMENT — PAIN SCALES - GENERAL
PAINLEVEL_OUTOF10: 8
PAINLEVEL_OUTOF10: 7
PAINLEVEL_OUTOF10: 9
PAINLEVEL_OUTOF10: 9
PAINLEVEL_OUTOF10: 4
PAINLEVEL_OUTOF10: 6
PAINLEVEL_OUTOF10: 10 - WORST POSSIBLE PAIN
PAINLEVEL_OUTOF10: 5 - MODERATE PAIN
PAINLEVEL_OUTOF10: 9

## 2024-02-03 ASSESSMENT — PAIN DESCRIPTION - LOCATION
LOCATION: ABDOMEN

## 2024-02-03 ASSESSMENT — PAIN DESCRIPTION - ORIENTATION: ORIENTATION: RIGHT;LEFT

## 2024-02-03 NOTE — NURSING NOTE
Patient asked RN to document: Patient states there is too much being taken off during PD treatment. She states she is in severe pain during treatment and she believes it to be because there is too much being taken off.

## 2024-02-03 NOTE — PROGRESS NOTES
Yoselyn Hernandez   64 [address]    @@  [facility]/Room: [id number]/2021/2021-A    Subjective:   Pt states she has intermittent abd pain but overall is improving.  Appetite is improving. No BM today, diarrhea has resolved.     Objective:     Meds:   atorvastatin, 40 mg, Nightly  B complex-vitamin C-folic acid, 1 capsule, Daily  calcitriol, 0.5 mcg, Daily  carvedilol, 6.25 mg, BID with meals  dextrose 1.5% - LOW calcium 2.5mEq/L 2,000 mL with heparin 1,000 Units, cefTAZidime 250 mg, vancomycin 50 mg peritoneal dialysate, , q24h  dextrose 1.5% - LOW calcium 2.5mEq/L 5,000 mL with heparin 2,500 Units, cefTAZidime 625 mg, vancomycin 125 mg peritoneal dialysate, , q24h  dextrose 1.5% - LOW calcium 2.5mEq/L 5,000 mL with heparin 2,500 Units, cefTAZidime 625 mg, vancomycin 125 mg peritoneal dialysate, , q24h  gentamicin, , q24h  levothyroxine, 25 mcg, Daily before breakfast  magnesium oxide, 800 mg, Daily  mirtazapine, 15 mg, Nightly  nystatin, 500,000 Units, q8h AMARJIT  sevelamer carbonate, 800 mg, TID with meals         acetaminophen, 650 mg, q4h PRN   Or  acetaminophen, 650 mg, q4h PRN   Or  acetaminophen, 650 mg, q4h PRN  diphenoxylate-atropine, 1 tablet, 4x daily PRN  HYDROmorphone, 0.4 mg, q3h PRN  loperamide, 2 mg, 4x daily PRN  melatonin, 10 mg, Daily PRN  ondansetron ODT, 4 mg, q8h PRN   Or  ondansetron, 4 mg, q8h PRN  polyethylene glycol, 17 g, Daily PRN        Vitals:    02/03/24 1242   BP: 98/63   Pulse: 92   Resp: 16   Temp: 36.9 °C (98.4 °F)   SpO2: 100%        No intake or output data in the 24 hours ending 02/03/24 1517      General appearance: no distress  Eyes: non-icteric  Skin: no apparent rash  Abdomen: soft, nt/nd  Extremities: No edema bilat  Neuro: No FND  Access: PD catheter     Blood Labs:  Results for orders placed or performed during the hospital encounter of 01/30/24 (from the past 24 hour(s))   CBC   Result Value Ref Range    WBC 5.3 4.4 - 11.3 x10*3/uL    nRBC 0.0 0.0 - 0.0 /100 WBCs    RBC 3.59 (L) 4.00 - 5.20  x10*6/uL    Hemoglobin 10.5 (L) 12.0 - 16.0 g/dL    Hematocrit 34.1 (L) 36.0 - 46.0 %    MCV 95 80 - 100 fL    MCH 29.2 26.0 - 34.0 pg    MCHC 30.8 (L) 32.0 - 36.0 g/dL    RDW 16.7 (H) 11.5 - 14.5 %    Platelets 241 150 - 450 x10*3/uL   Renal Function Panel   Result Value Ref Range    Glucose 81 74 - 99 mg/dL    Sodium 132 (L) 136 - 145 mmol/L    Potassium 3.7 3.5 - 5.3 mmol/L    Chloride 94 (L) 98 - 107 mmol/L    Bicarbonate 27 21 - 32 mmol/L    Anion Gap 15 10 - 20 mmol/L    Urea Nitrogen 36 (H) 6 - 23 mg/dL    Creatinine 6.90 (H) 0.50 - 1.05 mg/dL    eGFR 6 (L) >60 mL/min/1.73m*2    Calcium 7.6 (L) 8.6 - 10.6 mg/dL    Phosphorus 4.6 2.5 - 4.9 mg/dL    Albumin 2.0 (L) 3.4 - 5.0 g/dL   Magnesium   Result Value Ref Range    Magnesium 2.01 1.60 - 2.40 mg/dL   Protime-INR   Result Value Ref Range    Protime 15.6 (H) 9.8 - 12.8 seconds    INR 1.4 (H) 0.9 - 1.1            Yoselyn Hernandez is a 64 y.o. female with a past medical history of ESRD on PD, multiple AAA dissections, HFrEF (EF 15-20% 3/23), pancreatitis, PE/DVT on warfarin, HTN, and multiple renal artery grafts with prior ilio-renal bypass who presented to the ED with abdominal pain, generalized weakness. She is admitted for suspected peritonitis. Nephrology was consulted for ESRD management.        #ESRD on PD:  Nephrologist: Dr. Lora   Access: PD catheter  EDW: 53kg (admitted at 49kg)   Dialysis duration: 6 exchanges a night, 1400cc fill, Last fill 1200cc, 8200cc total volume, 7 days a week  Urine amount: very little        #Anemia  - Hb 9-10     #MBD  - sevelamer 800mg TID  - on calcitriol 0.5 mcg OD     # Bacterial Peritonitis  - staph epidermidis from Pcx 1/22  - Peritoneal fluid analysis from 1/30 showed cloudy straw colored appearance WBC 7776 with 46 % PMN however this was from her last fill throughout the day  -Fluid today yellow and clear. WBC 32 (2/2) . Peritoneal fluid cx 1/30 few staph epi         ___________________________________________________________________________________  RECOMMENDATIONS:  - PD tonight - D 1.5%, 1400cc fill, Last fill 1200cc, 8200cc total volume with vancomycin (25mg/l) and ceftazidime (125mg/l) along with heparin  - Continue Nystatin swish and swallow   - repeat cell count and fluid cx   - 0.1% gentamicin at PD catheter site at the time of dressing  - one BM at least in 24 hrs as per PD protocol - please start bowel regimen if pt does not have BM today  - Keep MAP >65 or SBP >90  - Strict I/O monitoring, daily weights, daily BMP  - Will continue to follow  - replete potassium as needed      Patient discussed with the attending.         Ta Seay DO  Nephrology Fellow   Daytime / Weekend Renal Pager [phone]  After 7 pm Emergencies [phone] Pager [phone]

## 2024-02-03 NOTE — PROGRESS NOTES
"Yoselyn Hernandez is a 64 y.o. female on day 3 of admission presenting with Peritonitis (CMS/HCC).    Subjective   Patient was seen and examined at bedside. Patient abdominal pain much improved today. No fever, no chills, no chest pain, no dyspnea     Objective     Physical Exam  Constitutional: sitting in bed with no distress  HEENT: moist oral mucosa  Neck: No JVD  Lungs: Clear to auscultation bilaterally, no rhonchi   Cardiac: regular rate and rhythm, S1 and S2 present, no rubs, no murmurs, no gallops  Abdomen: bowel sounds present, tender to light palpation  Ext: No cyanosis, no clubbing, no edema     Last Recorded Vitals  Blood pressure 83/60, pulse 80, temperature 36.4 °C (97.5 °F), temperature source Tympanic, resp. rate 18, height 1.702 m (5' 7\"), weight 49.9 kg (110 lb), SpO2 99 %.  Intake/Output last 3 Shifts:  I/O last 3 completed shifts:  In: 8440 (169.2 mL/kg) [P.O.:240; Other:8200]  Out: 7959 (159.5 mL/kg) [Other:7959]  Weight: 49.9 kg     Relevant Results  Scheduled medications  atorvastatin, 40 mg, oral, Nightly  B complex-vitamin C-folic acid, 1 capsule, oral, Daily  calcitriol, 0.5 mcg, oral, Daily  carvedilol, 6.25 mg, oral, BID with meals  dextrose 1.5% - LOW calcium 2.5mEq/L 2,000 mL with heparin 1,000 Units, cefTAZidime 250 mg, vancomycin 50 mg peritoneal dialysate, , intraperitoneal, q24h  dextrose 1.5% - LOW calcium 2.5mEq/L 5,000 mL with heparin 2,500 Units, cefTAZidime 625 mg, vancomycin 125 mg peritoneal dialysate, , intraperitoneal, q24h  dextrose 1.5% - LOW calcium 2.5mEq/L 5,000 mL with heparin 2,500 Units, cefTAZidime 625 mg, vancomycin 125 mg peritoneal dialysate, , intraperitoneal, q24h  gentamicin, , Topical, q24h  levothyroxine, 25 mcg, oral, Daily before breakfast  magnesium oxide, 800 mg, oral, Daily  mirtazapine, 15 mg, oral, Nightly  nystatin, 500,000 Units, oral, q8h AMARJIT  sevelamer carbonate, 800 mg, oral, TID with meals      Continuous medications     PRN medications  PRN " medications: acetaminophen **OR** acetaminophen **OR** acetaminophen, diphenoxylate-atropine, HYDROmorphone, loperamide, melatonin, ondansetron ODT **OR** ondansetron, polyethylene glycol       Malnutrition          I agree with the dietitian's malnutrition diagnosis.      Assessment/Plan   Yoselyn Hernandez is a 64 y.o. female with a past medical history of ESRD on PD, multiple AAA dissections, HFrEF (EF 15-20% 3/23), pancreatitis, PE/DVT on warfarin, HTN, and multiple renal artery grafts with prior ilio-renal bypass who presented to the ED with myalgias (mostly chest and abdomen), shortness of breath, nausea, vomiting, and generalized weakness concerning for peritonitis      PLAN:  ESRD Possible Peritonitis        1. Nephrology consulted and appreciate recs       2.  PD tonight        3. Continue pain control          2. Chronic Left common iliac occlusion/Hx of DVT/PE       1. Appreciate Vascular input appreciated      2. Continue with coumadin      3. HFrEF      1. Continue home Coreg,     4. Chronic diarrhea     1. Continue lomotil, loperamide     Disposition: Patient admitted for abdominal pain concerning for Peritonitis in the setting of PD. Nephrology following. Will monitor for improvement. Anticipated dc > 2 days. Patient is requesting SNF         I spent 35 minutes in the professional and overall care of this patient.      Brittney Meng DO

## 2024-02-03 NOTE — CARE PLAN
[facility] ADOD.  Per Dr. Evin Meng she will speak with Nephrologist and let me know.  UPDATE:  Dr. Evin Meng states that patient with discharge with intraperitoneal antibiotics.  Dr. Rahman to decide on which ones.  Will update facility.

## 2024-02-03 NOTE — CARE PLAN
Problem: Pain - Adult  Goal: Verbalizes/displays adequate comfort level or baseline comfort level  Outcome: Progressing   The patient's goals for the shift include      The clinical goals for the shift include Pt will have improved pain during this shift

## 2024-02-03 NOTE — NURSING NOTE
Peritoneal Dialysis - CCPD    Completed Overnight Therapy   2/2/24    Full treatment received:               N - Patient disconnected herself before initial drain  I-Drain:    838 mL  Total UF:   -3 mL  Combined 24 hour UF:  -365 mL  Minicap placed:   Y  Effluent Color:   Yellow   Cloudy:   N   Fibrin:   N    Tonight's Therapy   2/3/24    Dialysis cycler primed:   Y  Patient connected:  Y  Therapy started at:  1635  [employer] Adaptor:  Y  Dressing changed:  Y  Gent. cream applied:  Y    Therapy Orders     Total time:  11 hours   Cycles:   5   Fill Volume:  1400 mL   Last fill:   1200 mL   Last fill solution:  Same   Total volume:  8200 mL     Solution(s):  Dextrose 1.5% Dianeal 2.5 calcium-- 5000 mL (x2)   Additive(s): Vancomycin/Ceftazidime/Heparin           Last Fill:  Same      **Dialysis nurses in house Monday through Friday 3546-8555 to setup and disconnect patients,   please call our office at [phone], follow prompts for after hours paging.    **Machine Trouble Shooting: [employer] 24 hour technical support available at [phone]     **Bedside nursing staff to disconnect patients as needed outside of office hours.  -Instructions and supplies located on dialysis cart.  -Reference  policy [id number] -->

## 2024-02-03 NOTE — PROGRESS NOTES
SW spoke with patient about discharge plan.  Ms. Hernandez is agreeable to SNF placement.  SW advised patient that so far only two SNF's have agreed to accept her.  Per [facility] staff, patient will have a 20% copay.  The second accepting SNF is [facility] [place].  It's unclear at this time if a copay is required at [facility].  Ms. Hernandez will most likely be required to have a family member bring her cycler machine from home to the SNF.  [facility] Nurse will need to order PD supplies for SNF.  [facility] may require additional training for SNF staff.  Ms. Hernandez stated she would prefer not to have to pay a copay due to the financial burden that would place on her.  She is open to considering west side SNF if copay is not required.  Primary team will follow-up on Monday    Akilah AMEZQUITA

## 2024-02-03 NOTE — CARE PLAN
The patient's goals for the shift include      The clinical goals for the shift include Patient will rate pain as tolerable during shift    Patient continues to c/o abdominal pain with PD treatment. Patient receiving Dilaudid for pain. Patient with PD treatment currently running. One BM during shift. VS stable

## 2024-02-04 LAB
ALBUMIN SERPL BCP-MCNC: 2.1 G/DL (ref 3.4–5)
ANION GAP SERPL CALC-SCNC: 15 MMOL/L (ref 10–20)
BUN SERPL-MCNC: 34 MG/DL (ref 6–23)
CALCIUM SERPL-MCNC: 7.9 MG/DL (ref 8.6–10.6)
CHLORIDE SERPL-SCNC: 96 MMOL/L (ref 98–107)
CO2 SERPL-SCNC: 26 MMOL/L (ref 21–32)
CREAT SERPL-MCNC: 6.74 MG/DL (ref 0.5–1.05)
EGFRCR SERPLBLD CKD-EPI 2021: 6 ML/MIN/1.73M*2
ERYTHROCYTE [DISTWIDTH] IN BLOOD BY AUTOMATED COUNT: 16.7 % (ref 11.5–14.5)
GLUCOSE SERPL-MCNC: 78 MG/DL (ref 74–99)
HCT VFR BLD AUTO: 32.5 % (ref 36–46)
HGB BLD-MCNC: 10.9 G/DL (ref 12–16)
INR PPP: 1.2 (ref 0.9–1.1)
MAGNESIUM SERPL-MCNC: 2.05 MG/DL (ref 1.6–2.4)
MCH RBC QN AUTO: 30.5 PG (ref 26–34)
MCHC RBC AUTO-ENTMCNC: 33.5 G/DL (ref 32–36)
MCV RBC AUTO: 91 FL (ref 80–100)
NRBC BLD-RTO: 0 /100 WBCS (ref 0–0)
PHOSPHATE SERPL-MCNC: 4.3 MG/DL (ref 2.5–4.9)
PLATELET # BLD AUTO: 249 X10*3/UL (ref 150–450)
POTASSIUM SERPL-SCNC: 3 MMOL/L (ref 3.5–5.3)
PROTHROMBIN TIME: 13.5 SECONDS (ref 9.8–12.8)
RBC # BLD AUTO: 3.57 X10*6/UL (ref 4–5.2)
SODIUM SERPL-SCNC: 134 MMOL/L (ref 136–145)
WBC # BLD AUTO: 5.4 X10*3/UL (ref 4.4–11.3)

## 2024-02-04 PROCEDURE — 99232 SBSQ HOSP IP/OBS MODERATE 35: CPT | Performed by: INTERNAL MEDICINE

## 2024-02-04 PROCEDURE — 36415 COLL VENOUS BLD VENIPUNCTURE: CPT | Performed by: INTERNAL MEDICINE

## 2024-02-04 PROCEDURE — 2500000001 HC RX 250 WO HCPCS SELF ADMINISTERED DRUGS (ALT 637 FOR MEDICARE OP): Performed by: EMERGENCY MEDICINE

## 2024-02-04 PROCEDURE — 2500000001 HC RX 250 WO HCPCS SELF ADMINISTERED DRUGS (ALT 637 FOR MEDICARE OP): Performed by: INTERNAL MEDICINE

## 2024-02-04 PROCEDURE — 1100000001 HC PRIVATE ROOM DAILY

## 2024-02-04 PROCEDURE — 85027 COMPLETE CBC AUTOMATED: CPT | Performed by: INTERNAL MEDICINE

## 2024-02-04 PROCEDURE — 2500000001 HC RX 250 WO HCPCS SELF ADMINISTERED DRUGS (ALT 637 FOR MEDICARE OP): Performed by: NURSE PRACTITIONER

## 2024-02-04 PROCEDURE — 2500000004 HC RX 250 GENERAL PHARMACY W/ HCPCS (ALT 636 FOR OP/ED): Performed by: NURSE PRACTITIONER

## 2024-02-04 PROCEDURE — 83735 ASSAY OF MAGNESIUM: CPT | Performed by: INTERNAL MEDICINE

## 2024-02-04 PROCEDURE — 85610 PROTHROMBIN TIME: CPT | Performed by: INTERNAL MEDICINE

## 2024-02-04 PROCEDURE — 80069 RENAL FUNCTION PANEL: CPT | Performed by: INTERNAL MEDICINE

## 2024-02-04 PROCEDURE — 90947 DIALYSIS REPEATED EVAL: CPT

## 2024-02-04 PROCEDURE — 2500000004 HC RX 250 GENERAL PHARMACY W/ HCPCS (ALT 636 FOR OP/ED): Performed by: INTERNAL MEDICINE

## 2024-02-04 RX ORDER — HYDROMORPHONE HYDROCHLORIDE 2 MG/1
2 TABLET ORAL EVERY 6 HOURS PRN
Status: DISCONTINUED | OUTPATIENT
Start: 2024-02-04 | End: 2024-02-05

## 2024-02-04 RX ORDER — ACETAMINOPHEN 325 MG/1
975 TABLET ORAL 3 TIMES DAILY PRN
Status: DISCONTINUED | OUTPATIENT
Start: 2024-02-04 | End: 2024-02-07 | Stop reason: HOSPADM

## 2024-02-04 RX ORDER — POTASSIUM CHLORIDE 20 MEQ/1
40 TABLET, EXTENDED RELEASE ORAL 2 TIMES DAILY
Status: COMPLETED | OUTPATIENT
Start: 2024-02-04 | End: 2024-02-05

## 2024-02-04 RX ORDER — CHOLECALCIFEROL (VITAMIN D3) 25 MCG
1000 TABLET ORAL DAILY
Status: CANCELLED | OUTPATIENT
Start: 2024-02-04

## 2024-02-04 RX ORDER — HYDROMORPHONE HYDROCHLORIDE 2 MG/1
2 TABLET ORAL ONCE
Status: COMPLETED | OUTPATIENT
Start: 2024-02-04 | End: 2024-02-04

## 2024-02-04 RX ORDER — WARFARIN SODIUM 5 MG/1
5 TABLET ORAL DAILY
Status: DISCONTINUED | OUTPATIENT
Start: 2024-02-04 | End: 2024-02-07 | Stop reason: HOSPADM

## 2024-02-04 RX ADMIN — SEVELAMER CARBONATE 800 MG: 800 TABLET, FILM COATED ORAL at 14:26

## 2024-02-04 RX ADMIN — ATORVASTATIN CALCIUM 40 MG: 40 TABLET, FILM COATED ORAL at 20:34

## 2024-02-04 RX ADMIN — HYDROMORPHONE HYDROCHLORIDE 0.4 MG: 1 INJECTION, SOLUTION INTRAMUSCULAR; INTRAVENOUS; SUBCUTANEOUS at 00:31

## 2024-02-04 RX ADMIN — CARVEDILOL 6.25 MG: 6.25 TABLET, FILM COATED ORAL at 08:01

## 2024-02-04 RX ADMIN — SEVELAMER CARBONATE 800 MG: 800 TABLET, FILM COATED ORAL at 08:01

## 2024-02-04 RX ADMIN — WARFARIN SODIUM 5 MG: 5 TABLET ORAL at 18:31

## 2024-02-04 RX ADMIN — SEVELAMER CARBONATE 800 MG: 800 TABLET, FILM COATED ORAL at 17:42

## 2024-02-04 RX ADMIN — HYDROMORPHONE HYDROCHLORIDE 2 MG: 2 TABLET ORAL at 17:42

## 2024-02-04 RX ADMIN — CARVEDILOL 6.25 MG: 6.25 TABLET, FILM COATED ORAL at 17:42

## 2024-02-04 RX ADMIN — RENO CAPS 1 CAPSULE: 100; 1.5; 1.7; 20; 10; 1; 150; 5; 6 CAPSULE ORAL at 08:01

## 2024-02-04 RX ADMIN — CALCITRIOL 0.5 MCG: 0.5 CAPSULE ORAL at 08:01

## 2024-02-04 RX ADMIN — HYDROMORPHONE HYDROCHLORIDE 0.4 MG: 1 INJECTION, SOLUTION INTRAMUSCULAR; INTRAVENOUS; SUBCUTANEOUS at 07:57

## 2024-02-04 RX ADMIN — MIRTAZAPINE 15 MG: 15 TABLET, FILM COATED ORAL at 20:34

## 2024-02-04 RX ADMIN — Medication 800 MG: at 08:01

## 2024-02-04 RX ADMIN — HYDROMORPHONE HYDROCHLORIDE 0.4 MG: 1 INJECTION, SOLUTION INTRAMUSCULAR; INTRAVENOUS; SUBCUTANEOUS at 14:19

## 2024-02-04 RX ADMIN — HYDROMORPHONE HYDROCHLORIDE 0.4 MG: 1 INJECTION, SOLUTION INTRAMUSCULAR; INTRAVENOUS; SUBCUTANEOUS at 11:18

## 2024-02-04 RX ADMIN — SENNOSIDES 17.2 MG: 8.6 TABLET, FILM COATED ORAL at 08:01

## 2024-02-04 RX ADMIN — NYSTATIN 500000 UNITS: 100000 SUSPENSION ORAL at 14:23

## 2024-02-04 RX ADMIN — HYDROMORPHONE HYDROCHLORIDE 2 MG: 2 TABLET ORAL at 21:18

## 2024-02-04 RX ADMIN — LEVOTHYROXINE SODIUM 25 MCG: 25 TABLET ORAL at 06:48

## 2024-02-04 RX ADMIN — HYDROMORPHONE HYDROCHLORIDE 0.4 MG: 1 INJECTION, SOLUTION INTRAMUSCULAR; INTRAVENOUS; SUBCUTANEOUS at 04:37

## 2024-02-04 RX ADMIN — POTASSIUM CHLORIDE 40 MEQ: 1500 TABLET, EXTENDED RELEASE ORAL at 20:35

## 2024-02-04 ASSESSMENT — PAIN DESCRIPTION - ORIENTATION
ORIENTATION: RIGHT
ORIENTATION: RIGHT

## 2024-02-04 ASSESSMENT — COGNITIVE AND FUNCTIONAL STATUS - GENERAL
WALKING IN HOSPITAL ROOM: A LOT
PERSONAL GROOMING: A LITTLE
DAILY ACTIVITIY SCORE: 19
TURNING FROM BACK TO SIDE WHILE IN FLAT BAD: A LITTLE
DRESSING REGULAR UPPER BODY CLOTHING: A LITTLE
MOBILITY SCORE: 16
DRESSING REGULAR LOWER BODY CLOTHING: A LITTLE
CLIMB 3 TO 5 STEPS WITH RAILING: A LOT
STANDING UP FROM CHAIR USING ARMS: A LITTLE
MOVING FROM LYING ON BACK TO SITTING ON SIDE OF FLAT BED WITH BEDRAILS: A LITTLE
MOVING TO AND FROM BED TO CHAIR: A LITTLE
TOILETING: A LOT

## 2024-02-04 ASSESSMENT — PAIN DESCRIPTION - LOCATION
LOCATION: ABDOMEN

## 2024-02-04 ASSESSMENT — PAIN SCALES - GENERAL
PAINLEVEL_OUTOF10: 3
PAINLEVEL_OUTOF10: 0 - NO PAIN
PAINLEVEL_OUTOF10: 3
PAINLEVEL_OUTOF10: 8
PAINLEVEL_OUTOF10: 10 - WORST POSSIBLE PAIN
PAINLEVEL_OUTOF10: 8
PAINLEVEL_OUTOF10: 10 - WORST POSSIBLE PAIN

## 2024-02-04 ASSESSMENT — PAIN - FUNCTIONAL ASSESSMENT
PAIN_FUNCTIONAL_ASSESSMENT: 0-10

## 2024-02-04 NOTE — PROGRESS NOTES
"Yoselyn Hernandez is a 64 y.o. female on day 5 of admission presenting with Peritonitis (CMS/HCC).    Subjective   Patient was seen and examined at bedside. Patient admitted to abdominal pain improvement. Her pain was rated 5/10. She denied fever, chills, chest pain     Objective     Physical Exam:   Constitutional: sitting in bed with no distress  HEENT: moist oral mucosa  Neck: No JVD  Lungs: Clear to auscultation bilaterally, no rhonchi   Cardiac: regular rate and rhythm, S1 and S2 present, no rubs, no murmurs, no gallops  Abdomen: bowel sounds present, tender to light palpation. PD catheter in place and stable   Ext: No cyanosis, no clubbing, no edema     Last Recorded Vitals  Blood pressure 90/58, pulse 80, temperature 36.9 °C (98.4 °F), resp. rate 18, height 1.702 m (5' 7\"), weight 55 kg (121 lb 4.1 oz), SpO2 96 %.  Intake/Output last 3 Shifts:  I/O last 3 completed shifts:  In: 8200 (149.6 mL/kg) [Other:8200]  Out: 8197 (149.6 mL/kg) [Other:8197]  Weight: 54.8 kg     Relevant Results  Scheduled medications  atorvastatin, 40 mg, oral, Nightly  B complex-vitamin C-folic acid, 1 capsule, oral, Daily  calcitriol, 0.5 mcg, oral, Daily  carvedilol, 6.25 mg, oral, BID with meals  dextrose 1.5% - LOW calcium 2.5mEq/L 2,000 mL with heparin 1,000 Units, cefTAZidime 250 mg, vancomycin 50 mg peritoneal dialysate, , intraperitoneal, q24h  dextrose 1.5% - LOW calcium 2.5mEq/L 5,000 mL with heparin 2,500 Units, cefTAZidime 625 mg, vancomycin 125 mg peritoneal dialysate, , intraperitoneal, q24h  dextrose 1.5% - LOW calcium 2.5mEq/L 5,000 mL with heparin 2,500 Units, cefTAZidime 625 mg, vancomycin 125 mg peritoneal dialysate, , intraperitoneal, q24h  gentamicin, , Topical, q24h  levothyroxine, 25 mcg, oral, Daily before breakfast  magnesium oxide, 800 mg, oral, Daily  mirtazapine, 15 mg, oral, Nightly  nystatin, 500,000 Units, oral, q8h [facility]  sennosides, 2 tablet, oral, Daily  sevelamer carbonate, 800 mg, oral, TID with " meals  warfarin, 2.5 mg, oral, Daily      Continuous medications     PRN medications  PRN medications: acetaminophen, diphenoxylate-atropine, HYDROmorphone, melatonin, ondansetron ODT **OR** ondansetron, polyethylene glycol         Malnutrition          I agree with the dietitian's malnutrition diagnosis.      Assessment/Plan   Yoselyn Hernandez is a 64 y.o. female with a past medical history of ESRD on PD, multiple AAA dissections, HFrEF (EF 15-20% 3/23), pancreatitis, PE/DVT on warfarin, HTN, and multiple renal artery grafts with prior ilio-renal bypass who presented to the ED with myalgias (mostly chest and abdomen), shortness of breath, nausea, vomiting, and generalized weakness concerning for peritonitis      PLAN:  ESRD and PD Peritonitis        1. Nephrology consulted and appreciate recs       2. Continue pain control and intraperitoneal antibiotics          2. Chronic Left common iliac occlusion/Hx of DVT/PE       1. Appreciate Vascular input appreciated      2. coumadin increased to 5 mg oral daily      3. HFrEF      1. Continue home Coreg 6.25 mg BID      4. Chronic diarrhea     1. Continue lomotil as need it for diarrhea      2. So far none reported      5. Hypokalemia       1. Replaced with 2 doses bid x one day     6. Physical deconditioning      1. PT recs SNF      Disposition: Patient admitted for abdominal pain concerning for Peritonitis in the setting of PD. Nephrology following. Anticipated discharge > 2 days. Pending antibiotics duration from nephro    I spent 35 minutes in the professional and overall care of this patient.      Brittney Meng DO

## 2024-02-04 NOTE — CARE PLAN
The patient's goals for the shift include      The clinical goals for the shift include patient will rate pain as tolerableduring this shift

## 2024-02-04 NOTE — NURSING NOTE
Peritoneal Dialysis - [facility]    Completed Overnight Therapy   2/3/24    Full treatment received:               N - Patient stopped therapy before last fill, 2nd night in row.  I-Drain:    55 mL  Total UF:   -225 mL  Combined 24 hour UF:  -170 mL  Minicap placed:   Y  Effluent Color:   Yellow   Cloudy:   N   Fibrin:   N    Tonight's Therapy   2/4/24    Dialysis cycler primed:   Y  Patient connected:  Y  Therapy started at:  1740  [employer]or:  Y  Dressing changed:  Y  Gent. cream applied:  Y    Therapy Orders     Total time:  11 hours   Cycles:   5   Fill Volume:  1400 mL   Last fill:   1200 mL   Last fill solution:  Same   Total volume:  8200 mL     Solution(s):  Dextrose 1.5% Dianeal 2.5 calcium-- 5000 mL (x2)   Additive(s): Vancomycin/Ceftazidime/Heparin           Last Fill:  Same      **Dialysis nurses in house Monday through Friday [phone] to setup and disconnect patients,   please call our office at [phone], follow prompts for after hours paging.    **Machine Trouble Shooting: [employer] 24 hour technical support available at [phone]     **Bedside nursing staff to disconnect patients as needed outside of office hours.  -Instructions and supplies located on dialysis cart.  -Reference  policy [id number] -->

## 2024-02-04 NOTE — PROGRESS NOTES
"Yoselyn Hernandez is a 64 y.o. female on day 4 of admission presenting with Peritonitis (CMS/HCC).    Subjective   Patient was seen and examined at bedside. Patient was sad today because of insurance issues with the facility. Otherwise she continues to feel abdominal pain improvement. No diarrhea     Objective     Physical Exam  Constitutional: sitting in bed with no distress  HEENT: moist oral mucosa  Neck: No JVD  Lungs: Clear to auscultation bilaterally, no rhonchi   Cardiac: regular rate and rhythm, S1 and S2 present, no rubs, no murmurs, no gallops  Abdomen: bowel sounds present, tender to light palpation  Ext: No cyanosis, no clubbing, no edema   Last Recorded Vitals  Blood pressure 97/69, pulse 89, temperature 36.9 °C (98.4 °F), resp. rate 18, height 1.702 m (5' 7\"), weight 50.3 kg (110 lb 14.3 oz), SpO2 100 %.  Intake/Output last 3 Shifts:  I/O last 3 completed shifts:  In: 21251 (330.8 mL/kg) [P.O.:240; Other:00218]  Out: 15502 (321.2 mL/kg) [Other:91470]  Weight: 50.3 kg     Relevant Results  Scheduled medications  atorvastatin, 40 mg, oral, Nightly  B complex-vitamin C-folic acid, 1 capsule, oral, Daily  calcitriol, 0.5 mcg, oral, Daily  carvedilol, 6.25 mg, oral, BID with meals  dextrose 1.5% - LOW calcium 2.5mEq/L 2,000 mL with heparin 1,000 Units, cefTAZidime 250 mg, vancomycin 50 mg peritoneal dialysate, , intraperitoneal, q24h  dextrose 1.5% - LOW calcium 2.5mEq/L 5,000 mL with heparin 2,500 Units, cefTAZidime 625 mg, vancomycin 125 mg peritoneal dialysate, , intraperitoneal, q24h  dextrose 1.5% - LOW calcium 2.5mEq/L 5,000 mL with heparin 2,500 Units, cefTAZidime 625 mg, vancomycin 125 mg peritoneal dialysate, , intraperitoneal, q24h  gentamicin, , Topical, q24h  levothyroxine, 25 mcg, oral, Daily before breakfast  magnesium oxide, 800 mg, oral, Daily  mirtazapine, 15 mg, oral, Nightly  nystatin, 500,000 Units, oral, q8h [facility]  sevelamer carbonate, 800 mg, oral, TID with meals  [START ON 2/4/2024] " warfarin, 2.5 mg, oral, Daily      Continuous medications     PRN medications  PRN medications: acetaminophen **OR** acetaminophen **OR** acetaminophen, diphenoxylate-atropine, HYDROmorphone, loperamide, melatonin, ondansetron ODT **OR** ondansetron, polyethylene glycol       Malnutrition          I agree with the dietitian's malnutrition diagnosis.      Assessment/Plan   Yoselyn Hernandez is a 64 y.o. female with a past medical history of ESRD on PD, multiple AAA dissections, HFrEF (EF 15-20% 3/23), pancreatitis, PE/DVT on warfarin, HTN, and multiple renal artery grafts with prior ilio-renal bypass who presented to the ED with myalgias (mostly chest and abdomen), shortness of breath, nausea, vomiting, and generalized weakness concerning for peritonitis      PLAN:  ESRD and PD Peritonitis        1. Nephrology consulted and appreciate recs       2. Continue pain control and intraperitoneal antibiotics          2. Chronic Left common iliac occlusion/Hx of DVT/PE       1. Appreciate Vascular input appreciated      2. Continue with coumadin and will increase dose tomorrow      3. HFrEF      1. Continue home Coreg 6.25 mg BID      4. Chronic diarrhea     1. Continue lomotil as need it      2. So far none reported      5. Physical deconditioning      1. PT recs SNF     Disposition: Patient admitted for abdominal pain concerning for Peritonitis in the setting of PD. Nephrology following. Anticipated discharge > 2 days. Pending antibiotics duration from nephro.       I spent 35 minutes in the professional and overall care of this patient.       Brittney Meng DO

## 2024-02-05 LAB
ALBUMIN SERPL BCP-MCNC: 2 G/DL (ref 3.4–5)
ANION GAP SERPL CALC-SCNC: 14 MMOL/L (ref 10–20)
BASOPHILS NFR FLD MANUAL: 0 %
BLASTS NFR FLD MANUAL: 0 %
BUN SERPL-MCNC: 32 MG/DL (ref 6–23)
CALCIUM SERPL-MCNC: 8.1 MG/DL (ref 8.6–10.6)
CHLORIDE SERPL-SCNC: 96 MMOL/L (ref 98–107)
CLARITY FLD: CLEAR
CO2 SERPL-SCNC: 27 MMOL/L (ref 21–32)
COLOR FLD: COLORLESS
CREAT SERPL-MCNC: 6.6 MG/DL (ref 0.5–1.05)
EGFRCR SERPLBLD CKD-EPI 2021: 7 ML/MIN/1.73M*2
EOSINOPHIL NFR FLD MANUAL: 0 %
ERYTHROCYTE [DISTWIDTH] IN BLOOD BY AUTOMATED COUNT: 16.5 % (ref 11.5–14.5)
GLUCOSE SERPL-MCNC: 80 MG/DL (ref 74–99)
HCT VFR BLD AUTO: 32.2 % (ref 36–46)
HGB BLD-MCNC: 10.4 G/DL (ref 12–16)
IMMATURE GRANULOCYTES IN FLUID: 0 %
INR PPP: 1.1 (ref 0.9–1.1)
LYMPHOCYTES NFR FLD MANUAL: 54 %
MAGNESIUM SERPL-MCNC: 2.03 MG/DL (ref 1.6–2.4)
MCH RBC QN AUTO: 29.1 PG (ref 26–34)
MCHC RBC AUTO-ENTMCNC: 32.3 G/DL (ref 32–36)
MCV RBC AUTO: 90 FL (ref 80–100)
MONOS+MACROS NFR FLD MANUAL: 34 %
NEUTROPHILS NFR FLD MANUAL: 12 %
NRBC BLD-RTO: 0 /100 WBCS (ref 0–0)
OTHER CELLS NFR FLD MANUAL: 0 %
PHOSPHATE SERPL-MCNC: 4.3 MG/DL (ref 2.5–4.9)
PLASMA CELLS NFR FLD MANUAL: 0 %
PLATELET # BLD AUTO: 235 X10*3/UL (ref 150–450)
POTASSIUM SERPL-SCNC: 3.7 MMOL/L (ref 3.5–5.3)
PROTHROMBIN TIME: 12.8 SECONDS (ref 9.8–12.8)
RBC # BLD AUTO: 3.58 X10*6/UL (ref 4–5.2)
RBC # FLD AUTO: 15 /UL
SODIUM SERPL-SCNC: 133 MMOL/L (ref 136–145)
TOTAL CELLS COUNTED FLD: 100
WBC # BLD AUTO: 5.5 X10*3/UL (ref 4.4–11.3)
WBC # FLD AUTO: 23 /UL

## 2024-02-05 PROCEDURE — 90945 DIALYSIS ONE EVALUATION: CPT | Performed by: INTERNAL MEDICINE

## 2024-02-05 PROCEDURE — 2500000002 HC RX 250 W HCPCS SELF ADMINISTERED DRUGS (ALT 637 FOR MEDICARE OP, ALT 636 FOR OP/ED): Performed by: INTERNAL MEDICINE

## 2024-02-05 PROCEDURE — 2500000001 HC RX 250 WO HCPCS SELF ADMINISTERED DRUGS (ALT 637 FOR MEDICARE OP): Performed by: EMERGENCY MEDICINE

## 2024-02-05 PROCEDURE — 2500000001 HC RX 250 WO HCPCS SELF ADMINISTERED DRUGS (ALT 637 FOR MEDICARE OP): Performed by: INTERNAL MEDICINE

## 2024-02-05 PROCEDURE — 80069 RENAL FUNCTION PANEL: CPT | Performed by: INTERNAL MEDICINE

## 2024-02-05 PROCEDURE — 2500000004 HC RX 250 GENERAL PHARMACY W/ HCPCS (ALT 636 FOR OP/ED): Performed by: NURSE PRACTITIONER

## 2024-02-05 PROCEDURE — 36415 COLL VENOUS BLD VENIPUNCTURE: CPT | Performed by: INTERNAL MEDICINE

## 2024-02-05 PROCEDURE — 85027 COMPLETE CBC AUTOMATED: CPT | Performed by: INTERNAL MEDICINE

## 2024-02-05 PROCEDURE — 2500000001 HC RX 250 WO HCPCS SELF ADMINISTERED DRUGS (ALT 637 FOR MEDICARE OP): Performed by: NURSE PRACTITIONER

## 2024-02-05 PROCEDURE — 2500000004 HC RX 250 GENERAL PHARMACY W/ HCPCS (ALT 636 FOR OP/ED): Performed by: INTERNAL MEDICINE

## 2024-02-05 PROCEDURE — 2500000004 HC RX 250 GENERAL PHARMACY W/ HCPCS (ALT 636 FOR OP/ED)

## 2024-02-05 PROCEDURE — 99233 SBSQ HOSP IP/OBS HIGH 50: CPT | Performed by: INTERNAL MEDICINE

## 2024-02-05 PROCEDURE — 87070 CULTURE OTHR SPECIMN AEROBIC: CPT | Performed by: INTERNAL MEDICINE

## 2024-02-05 PROCEDURE — 85610 PROTHROMBIN TIME: CPT | Performed by: INTERNAL MEDICINE

## 2024-02-05 PROCEDURE — 83735 ASSAY OF MAGNESIUM: CPT | Performed by: INTERNAL MEDICINE

## 2024-02-05 PROCEDURE — 89051 BODY FLUID CELL COUNT: CPT

## 2024-02-05 PROCEDURE — 1100000001 HC PRIVATE ROOM DAILY

## 2024-02-05 PROCEDURE — 97165 OT EVAL LOW COMPLEX 30 MIN: CPT | Mod: GO

## 2024-02-05 RX ORDER — NYSTATIN 100000 [USP'U]/ML
500000 SUSPENSION ORAL EVERY 8 HOURS SCHEDULED
Start: 2024-02-05 | End: 2024-03-27 | Stop reason: HOSPADM

## 2024-02-05 RX ORDER — ACETAMINOPHEN, DIPHENHYDRAMINE HCL, PHENYLEPHRINE HCL 325; 25; 5 MG/1; MG/1; MG/1
10 TABLET ORAL DAILY PRN
Refills: 0
Start: 2024-02-05

## 2024-02-05 RX ORDER — GENTAMICIN SULFATE 1 MG/G
CREAM TOPICAL EVERY 24 HOURS
Start: 2024-02-05

## 2024-02-05 RX ORDER — WARFARIN SODIUM 5 MG/1
TABLET ORAL
Start: 2024-02-05 | End: 2024-02-19 | Stop reason: HOSPADM

## 2024-02-05 RX ORDER — ACETAMINOPHEN 325 MG/1
975 TABLET ORAL 3 TIMES DAILY PRN
Qty: 30 TABLET | Refills: 0 | Status: ON HOLD
Start: 2024-02-05 | End: 2024-06-06

## 2024-02-05 RX ORDER — HYDROMORPHONE HYDROCHLORIDE 2 MG/1
3 TABLET ORAL EVERY 4 HOURS PRN
Qty: 10 TABLET | Refills: 0
Start: 2024-02-05 | End: 2024-02-07 | Stop reason: SDUPTHER

## 2024-02-05 RX ORDER — POLYETHYLENE GLYCOL 3350 17 G/17G
17 POWDER, FOR SOLUTION ORAL DAILY PRN
Start: 2024-02-05

## 2024-02-05 RX ORDER — HYDROMORPHONE HYDROCHLORIDE 2 MG/1
3 TABLET ORAL EVERY 4 HOURS PRN
Status: DISCONTINUED | OUTPATIENT
Start: 2024-02-05 | End: 2024-02-07 | Stop reason: HOSPADM

## 2024-02-05 RX ORDER — SEVELAMER CARBONATE 800 MG/1
800 TABLET, FILM COATED ORAL
Start: 2024-02-05

## 2024-02-05 RX ORDER — ONDANSETRON 4 MG/1
4 TABLET, ORALLY DISINTEGRATING ORAL EVERY 8 HOURS PRN
Qty: 20 TABLET | Refills: 0
Start: 2024-02-05 | End: 2024-03-27 | Stop reason: HOSPADM

## 2024-02-05 RX ORDER — SENNOSIDES 8.6 MG/1
2 TABLET ORAL DAILY PRN
Start: 2024-02-05

## 2024-02-05 RX ADMIN — Medication 800 MG: at 08:50

## 2024-02-05 RX ADMIN — RENO CAPS 1 CAPSULE: 100; 1.5; 1.7; 20; 10; 1; 150; 5; 6 CAPSULE ORAL at 08:49

## 2024-02-05 RX ADMIN — CALCITRIOL 0.5 MCG: 0.5 CAPSULE ORAL at 08:50

## 2024-02-05 RX ADMIN — HEPARIN SODIUM: 1000 INJECTION INTRAVENOUS; SUBCUTANEOUS at 10:15

## 2024-02-05 RX ADMIN — MIRTAZAPINE 15 MG: 15 TABLET, FILM COATED ORAL at 20:02

## 2024-02-05 RX ADMIN — HYDROMORPHONE HYDROCHLORIDE 2 MG: 2 TABLET ORAL at 11:30

## 2024-02-05 RX ADMIN — HYDROMORPHONE HYDROCHLORIDE 3 MG: 2 TABLET ORAL at 23:57

## 2024-02-05 RX ADMIN — LEVOTHYROXINE SODIUM 25 MCG: 25 TABLET ORAL at 06:00

## 2024-02-05 RX ADMIN — Medication 10 MG: at 19:51

## 2024-02-05 RX ADMIN — HYDROMORPHONE HYDROCHLORIDE 3 MG: 2 TABLET ORAL at 19:50

## 2024-02-05 RX ADMIN — HEPARIN SODIUM: 1000 INJECTION INTRAVENOUS; SUBCUTANEOUS at 15:05

## 2024-02-05 RX ADMIN — SEVELAMER CARBONATE 800 MG: 800 TABLET, FILM COATED ORAL at 08:50

## 2024-02-05 RX ADMIN — POTASSIUM CHLORIDE 40 MEQ: 1500 TABLET, EXTENDED RELEASE ORAL at 08:50

## 2024-02-05 RX ADMIN — WARFARIN SODIUM 5 MG: 5 TABLET ORAL at 19:53

## 2024-02-05 RX ADMIN — GENTAMICIN SULFATE: 1 CREAM TOPICAL at 16:45

## 2024-02-05 RX ADMIN — ATORVASTATIN CALCIUM 40 MG: 40 TABLET, FILM COATED ORAL at 20:02

## 2024-02-05 RX ADMIN — NYSTATIN 500000 UNITS: 100000 SUSPENSION ORAL at 05:20

## 2024-02-05 RX ADMIN — DIPHENOXYLATE HYDROCHLORIDE AND ATROPINE SULFATE 1 TABLET: 2.5; .025 TABLET ORAL at 08:00

## 2024-02-05 RX ADMIN — HYDROMORPHONE HYDROCHLORIDE 2 MG: 2 TABLET ORAL at 04:50

## 2024-02-05 ASSESSMENT — PAIN SCALES - GENERAL
PAINLEVEL_OUTOF10: 10 - WORST POSSIBLE PAIN
PAINLEVEL_OUTOF10: 5 - MODERATE PAIN
PAINLEVEL_OUTOF10: 10 - WORST POSSIBLE PAIN
PAINLEVEL_OUTOF10: 8

## 2024-02-05 ASSESSMENT — COGNITIVE AND FUNCTIONAL STATUS - GENERAL
DAILY ACTIVITIY SCORE: 19
WALKING IN HOSPITAL ROOM: A LOT
DRESSING REGULAR LOWER BODY CLOTHING: A LITTLE
MOVING TO AND FROM BED TO CHAIR: A LITTLE
DAILY ACTIVITIY SCORE: 17
STANDING UP FROM CHAIR USING ARMS: A LITTLE
DRESSING REGULAR UPPER BODY CLOTHING: A LITTLE
STANDING UP FROM CHAIR USING ARMS: A LITTLE
MOVING TO AND FROM BED TO CHAIR: A LITTLE
PERSONAL GROOMING: A LITTLE
DRESSING REGULAR LOWER BODY CLOTHING: A LITTLE
STANDING UP FROM CHAIR USING ARMS: A LITTLE
DRESSING REGULAR LOWER BODY CLOTHING: A LOT
MOBILITY SCORE: 16
HELP NEEDED FOR BATHING: A LOT
MOVING TO AND FROM BED TO CHAIR: A LITTLE
TURNING FROM BACK TO SIDE WHILE IN FLAT BAD: A LITTLE
CLIMB 3 TO 5 STEPS WITH RAILING: A LOT
HELP NEEDED FOR BATHING: A LOT
DRESSING REGULAR LOWER BODY CLOTHING: A LOT
CLIMB 3 TO 5 STEPS WITH RAILING: A LOT
WALKING IN HOSPITAL ROOM: A LOT
DAILY ACTIVITIY SCORE: 22
TOILETING: A LITTLE
TURNING FROM BACK TO SIDE WHILE IN FLAT BAD: A LITTLE
TOILETING: A LITTLE
MOVING FROM LYING ON BACK TO SITTING ON SIDE OF FLAT BED WITH BEDRAILS: A LITTLE
PERSONAL GROOMING: A LITTLE
TOILETING: A LOT
MOBILITY SCORE: 16
WALKING IN HOSPITAL ROOM: A LOT
PERSONAL GROOMING: A LITTLE
CLIMB 3 TO 5 STEPS WITH RAILING: A LOT
TURNING FROM BACK TO SIDE WHILE IN FLAT BAD: A LITTLE
MOBILITY SCORE: 16
MOVING FROM LYING ON BACK TO SITTING ON SIDE OF FLAT BED WITH BEDRAILS: A LITTLE
DRESSING REGULAR UPPER BODY CLOTHING: A LITTLE
DAILY ACTIVITIY SCORE: 17
TOILETING: A LITTLE
MOVING FROM LYING ON BACK TO SITTING ON SIDE OF FLAT BED WITH BEDRAILS: A LITTLE
DRESSING REGULAR UPPER BODY CLOTHING: A LITTLE

## 2024-02-05 ASSESSMENT — PAIN - FUNCTIONAL ASSESSMENT
PAIN_FUNCTIONAL_ASSESSMENT: 0-10
PAIN_FUNCTIONAL_ASSESSMENT: 0-10

## 2024-02-05 ASSESSMENT — PAIN DESCRIPTION - LOCATION
LOCATION: ABDOMEN
LOCATION: ABDOMEN

## 2024-02-05 ASSESSMENT — ACTIVITIES OF DAILY LIVING (ADL)
ADL_ASSISTANCE: INDEPENDENT
BATHING_ASSISTANCE: MODERATE

## 2024-02-05 ASSESSMENT — PAIN SCALES - WONG BAKER: WONGBAKER_NUMERICALRESPONSE: HURTS EVEN MORE

## 2024-02-05 NOTE — CARE PLAN
The patient's goals for the shift include      The clinical goals for the shift include patient will have a tolerable pain level during this shift

## 2024-02-05 NOTE — NURSING NOTE
..Peritoneal Dialysis - [facility]    Completed Overnight Therapy   Yes    Full treatment received:               Y  I-Drain:    14 mL  Total UF:   175 mL  Combined 24 hour UF:  189 mL  Minicap placed:   Y  Effluent Color:   Yellow clear   Cloudy:   N   Fibrin:   N  Pt has 3+ edema in BLE    Tonight's Therapy         Dialysis cycler primed:     Patient connected:    Therapy started at:    [employer]:    Dressing changed:    Gent. cream applied:      Therapy Orders     Total time:   hours   Cycles:      Fill Volume:   mL   Last fill:    mL   Last fill solution:     Total volume:   mL     Solution(s):  Dextrose .5% Dianeal  calcium--  mL (x)   Additive(s):         Dextrose .5% Dianeal  calcium--  mL (x)   Additive(s):      Last Fill:  Dextrose .5% Dianeal  calcium--  mL (x)   Additive(s):       **Dialysis nurses in house Monday through Friday [phone] to setup and disconnect patients,   please call our office at [phone], follow prompts for after hours paging.    **Machine Trouble Shooting: [employer] 24 hour technical support available at [phone]     **Bedside nursing staff to disconnect patients as needed outside of office hours.  -Instructions and supplies located on dialysis cart.  -Reference  policy [id number] -->

## 2024-02-05 NOTE — PROGRESS NOTES
Occupational Therapy    Evaluation    Patient Name: Yoselyn Hernandez  MRN: [id number]  Today's Date: 2/5/2024  Time Calculation  Start Time: 1125  Stop Time: 1139  Time Calculation (min): 14 min        Assessment:  Prognosis: Good  Barriers to Discharge: Decreased caregiver support  Evaluation/Treatment Tolerance: Patient limited by pain, Patient limited by fatigue  Medical Staff Made Aware: Yes  End of Session Communication: Bedside nurse  End of Session Patient Position:  (sitting EOB, physician and RN present in room)  OT Assessment Results: Decreased ADL status, Decreased endurance, Decreased functional mobility  Prognosis: Good  Barriers to Discharge: Decreased caregiver support  Evaluation/Treatment Tolerance: Patient limited by pain, Patient limited by fatigue  Medical Staff Made Aware: Yes  Strengths: Attitude of self, Ability to acquire knowledge  Barriers to Participation: Support of Caregivers  Plan:  Treatment Interventions: ADL retraining, Functional transfer training, Patient/family training, Equipment evaluation/education, Compensatory technique education  OT Frequency: 2 times per week  OT Discharge Recommendations: Moderate intensity level of continued care  Equipment Recommended upon Discharge: Wheeled walker  OT Recommended Transfer Status: Assist of 1  OT - OK to Discharge:  (eval complete)  Treatment Interventions: ADL retraining, Functional transfer training, Patient/family training, Equipment evaluation/education, Compensatory technique education    Subjective   Current Problem:  1. Peritonitis (CMS/HCC)        2. Iliac artery occlusion (CMS/HCC)          General:  General  Reason for Referral: myalgias, peritonitis  Past Medical History Relevant to Rehab: ESRD on daily PD, AAA dissections s/p repairs, HF, pancreatitis, PE/DVT  Family/Caregiver Present: No  Prior to Session Communication: Bedside nurse  Patient Position Received: Bed, 3 rail up, Alarm off, not on at start of session  Preferred  "Learning Style: verbal, visual  General Comment: Pt initially required encouragement but agreeable to therapy. Limited by pain  Precautions:  Medical Precautions: Fall precautions    Pain:  Pain Assessment  Pain Assessment: 0-10  Pain Score:  (\"12/10\")  Pain Type: Chronic pain  Pain Location: Abdomen  Pain Interventions: Medication (See MAR) (RN medicated during session)    Objective   Cognition:  Overall Cognitive Status: Within Functional Limits  Arousal/Alertness: Appropriate responses to stimuli  Orientation Level: Oriented X4  Following Commands: Follows all commands and directions without difficulty  Safety Judgment: Decreased awareness of need for safety precautions  Problem Solving: Assistance required to identify errors made  Insight: Mild           Home Living:  Type of Home:  ([facility])  Lives With: Alone  Home Adaptive Equipment: Cane  Home Layout: Multi-level, Bed/bath upstairs, Stairs to alternate level with rails  Alternate Level Stairs-Number of Steps: 12  Home Access: Stairs to enter with rails  Entrance Stairs-Number of Steps: 8  Prior Function:  Level of [place]: Independent with ADLs and functional transfers  Receives Help From: Family, Friends  ADL Assistance: Independent  Ambulatory Assistance: Independent (with cane)  Vocational: Retired    ADL:  Eating Assistance: Independent  Grooming Assistance:  (set-up)  Grooming Deficit:  (anticipated, pt declined to complete this session)  Bathing Assistance: Moderate  Bathing Deficit:  (anticipated for LB)  UE Dressing Assistance: Stand by  UE Dressing Deficit:  (gown)  LE Dressing Assistance: Moderate  LE Dressing Deficit:  (don shoes, pt with increased LB edema at this time)  Toileting Assistance with Device: Stand by  Toileting Deficit:  (anticipated)  Functional Assistance:  (CGA)  ADL Comments: To take few lateral steps along EOB toward HOB +FWW  Activity Tolerance:  Endurance: Tolerates 10 - 20 min exercise with multiple rests  Bed " Mobility/Transfers: Bed Mobility  Bed Mobility: Yes  Bed Mobility 1  Bed Mobility 1: Supine to sitting  Level of Assistance 1: Close supervision  Bed Mobility Comments 1: HOB elevated, verbal cueing for mechanics    Transfers  Transfer: Yes  Transfer 1  Transfer From 1: Bed to  Transfer to 1: Sit, Stand  Technique 1: Sit to stand, Stand to sit  Transfer Device 1: Walker  Transfer Level of Assistance 1: Contact guard  Trials/Comments 1: Bed height elevated to assist, instructions for hand placement     Vision:Vision - Basic Assessment  Current Vision: No visual deficits    Strength:  Strength Comments: BUE strength grossly WFL  Perception:  Inattention/Neglect: Appears intact  Initiation: Appears intact  Motor Planning: Appears intact  Perseveration: Not present  Coordination:  Movements are Fluid and Coordinated: Yes   Hand Function:  Gross Grasp: Functional  Coordination: Functional  Extremities: RUE   RUE : Within Functional Limits and LUE   LUE: Within Functional Limits    Outcome Measures:[facility] Daily Activity  Putting on and taking off regular lower body clothing: A lot  Bathing (including washing, rinsing, drying): A lot  Putting on and taking off regular upper body clothing: A little  Toileting, which includes using toilet, bedpan or urinal: A little  Taking care of personal grooming such as brushing teeth: A little  Eating Meals: None  Daily Activity - Total Score: 17    Brief Confusion Assessment Method (bCAM)  Feature 1: Altered Mental Status or Fluctuating Course: No  CAM Result: CAM -    Education Documentation  Body Mechanics, taught by Caren Valdez OT at 2/5/2024  2:03 PM.  Learner: Patient  Readiness: Acceptance  Method: Explanation, Demonstration  Response: Verbalizes Understanding, Demonstrated Understanding, Needs Reinforcement    ADL Training, taught by Caren Valdez OT at 2/5/2024  2:03 PM.  Learner: Patient  Readiness: Acceptance  Method: Explanation, Demonstration  Response:  Verbalizes Understanding, Demonstrated Understanding, Needs Reinforcement    EDUCATION:  Education  Individual(s) Educated: Patient  Education Provided: Diagnosis & Precautions, Fall precautons, Risk and benefits of OT discussed with patient or other  Patient Response to Education: Patient/Caregiver Verbalized Understanding of Information    Goals:  Encounter Problems       Encounter Problems (Active)       ADLs       Patient with complete lower body dressing with modified independent level of assistance donning and doffing all LE clothes  with PRN adaptive equipment (Progressing)       Start:  02/05/24    Expected End:  02/19/24            Patient will complete daily grooming tasks with modified independent level of assistance and PRN adaptive equipment while standing. (Progressing)       Start:  02/05/24    Expected End:  02/19/24            Patient will complete toileting including hygiene clothing management/hygiene with independent level of assistance. (Progressing)       Start:  02/05/24    Expected End:  02/19/24               BALANCE       Pt will maintain dynamic standing balance during ADL task with modified independent level of assistance in order to demonstrate decreased risk of falling and improved postural control. (Progressing)       Start:  02/05/24    Expected End:  02/19/24                 MOBILITY       Patient will perform Functional mobility Household distances/Community Distances with modified independent level of assistance and least restrictive device in order to improve safety and functional mobility. (Progressing)       Start:  02/05/24    Expected End:  02/19/24                 TRANSFERS       Patient will complete functional transfer to chair/commode with least restrictive device with modified independent level of assistance. (Progressing)       Start:  02/05/24    Expected End:  02/19/24

## 2024-02-05 NOTE — NURSING NOTE
Tonight's Therapy   2/5/24     Dialysis cycler primed:                  Y  Patient connected:                        Y  Therapy started at:                        1700  [employer]or:                        Y  Dressing changed:                        Y  Gent. cream applied:                     Y     Therapy Orders                Total time:                        11 hours                Cycles:                               5                Fill Volume:                      1400 mL                Last fill:                              1200 mL                Last fill solution:               Same                Total volume:                   8200 mL                   Solution(s):         Dextrose 1.5% Dianeal 2.5 calcium-- 5000 mL (x1)              Additive(s):         Vancomycin/Ceftazidime/Heparin     Solution(s):         Dextrose 2.5% Dianeal 2.5 calcium-- 5000 mL (x1)              Additive(s):         Vancomycin/Ceftazidime/Heparin                                                                        Last Fill:               Same        **Dialysis nurses in house Monday through Friday [phone] to setup and disconnect patients,   please call our office at [phone], follow prompts for after hours paging.     **Machine Trouble Shooting: [employer] 24 hour technical support available at [phone]      **Bedside nursing staff to disconnect patients as needed outside of office hours.  -Instructions and supplies located on dialysis cart.  -Reference  policy [id number] -->                                  Revision History

## 2024-02-05 NOTE — PROGRESS NOTES
"Yoselyn Hernandez is a 64 y.o. female on day 6 of admission presenting with Peritonitis (CMS/HCC).    Subjective   Patient currently sitting up at bedside.  No signs of distress.  Claims that she still gets \"significant abdominal pain\" and needs better pain control.  Per nursing patient has tolerated diet without any vomiting.      Objective     General: Lying in bed without distress.  Cooperative.  Skin: No rashes ulcerations.  HEENT: Sclera is white.  Mucous membranes moist.  Neck: Supple.  No JVD.  Cardiac: Regular rate and rhythm, S1/S2 normal.  Lungs: Clear to auscultation bilaterally, no wheezing or crackles, no accessory muscle use at rest.  Abdomen: bowel sounds present, tender to light palpation. PD catheter in place.  Extremities: No cyanosis.  No lower extremity edema.  Neurologic: Alert and oriented x3.  No focal deficits.  Psychiatric: Appropriate mood and behavior.  Currently no agitation.    Last Recorded Vitals  Blood pressure 95/64, pulse 90, temperature 37.7 °C (99.9 °F), temperature source Temporal, resp. rate 18, height 1.702 m (5' 7\"), weight 55 kg (121 lb 4.1 oz), SpO2 100 %.  On room air.    Intake/Output last 3 Shifts:  I/O last 3 completed shifts:  In: 8200 (149.1 mL/kg) [Other:8200]  Out: 8030 (146 mL/kg) [Other:8030]  Weight: 55 kg     Relevant Results  atorvastatin, 40 mg, oral, Nightly  B complex-vitamin C-folic acid, 1 capsule, oral, Daily  calcitriol, 0.5 mcg, oral, Daily  [Held by provider] carvedilol, 6.25 mg, oral, BID with meals  dextrose 1.5% - LOW calcium 2.5mEq/L 2,000 mL with heparin 1,000 Units, cefTAZidime 250 mg, vancomycin 50 mg peritoneal dialysate, , intraperitoneal, q24h  dextrose 1.5% - LOW calcium 2.5mEq/L 5,000 mL with heparin 2,500 Units, cefTAZidime 625 mg, vancomycin 125 mg peritoneal dialysate, , intraperitoneal, q24h  dextrose 1.5% - LOW calcium 2.5mEq/L 5,000 mL with heparin 2,500 Units, cefTAZidime 625 mg, vancomycin 125 mg peritoneal dialysate, , intraperitoneal, " q24h  [START ON 2/6/2024] dextrose 1.5% - LOW calcium 2.5mEq/L 5,000 mL with heparin 2,500 Units, cefTAZidime 625 mg, vancomycin 125 mg peritoneal dialysate, , intraperitoneal, q24h  dextrose 2.5 % - LOW calcium 2.5 mEq/L 5,000 mL with heparin 2,500 Units, cefTAZidime 625 mg, vancomycin 125 mg peritoneal dialysate, , intraperitoneal, q24h  gentamicin, , Topical, q24h  levothyroxine, 25 mcg, oral, Daily before breakfast  magnesium oxide, 800 mg, oral, Daily  mirtazapine, 15 mg, oral, Nightly  nystatin, 500,000 Units, oral, q8h AMARJIT  sennosides, 2 tablet, oral, Daily  sevelamer carbonate, 800 mg, oral, TID with meals  warfarin, 5 mg, oral, Daily           PRN medications: acetaminophen, diphenoxylate-atropine, HYDROmorphone, melatonin, ondansetron ODT **OR** ondansetron, polyethylene glycol     Results for orders placed or performed during the hospital encounter of 01/30/24 (from the past 24 hour(s))   Protime-INR   Result Value Ref Range    Protime 12.8 9.8 - 12.8 seconds    INR 1.1 0.9 - 1.1   CBC   Result Value Ref Range    WBC 5.5 4.4 - 11.3 x10*3/uL    nRBC 0.0 0.0 - 0.0 /100 WBCs    RBC 3.58 (L) 4.00 - 5.20 x10*6/uL    Hemoglobin 10.4 (L) 12.0 - 16.0 g/dL    Hematocrit 32.2 (L) 36.0 - 46.0 %    MCV 90 80 - 100 fL    MCH 29.1 26.0 - 34.0 pg    MCHC 32.3 32.0 - 36.0 g/dL    RDW 16.5 (H) 11.5 - 14.5 %    Platelets 235 150 - 450 x10*3/uL   Renal Function Panel   Result Value Ref Range    Glucose 80 74 - 99 mg/dL    Sodium 133 (L) 136 - 145 mmol/L    Potassium 3.7 3.5 - 5.3 mmol/L    Chloride 96 (L) 98 - 107 mmol/L    Bicarbonate 27 21 - 32 mmol/L    Anion Gap 14 10 - 20 mmol/L    Urea Nitrogen 32 (H) 6 - 23 mg/dL    Creatinine 6.60 (H) 0.50 - 1.05 mg/dL    eGFR 7 (L) >60 mL/min/1.73m*2    Calcium 8.1 (L) 8.6 - 10.6 mg/dL    Phosphorus 4.3 2.5 - 4.9 mg/dL    Albumin 2.0 (L) 3.4 - 5.0 g/dL   Magnesium   Result Value Ref Range    Magnesium 2.03 1.60 - 2.40 mg/dL           Hospital Course:  Yoselyn Hernandez is a 64 y.o.  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  Patient has had multiple admissions for this previously.  Nephrology consulted.     Assessment and plan:    ESRD and PD Peritonitis   -Nephrology consulted and appreciate recs  -Continue pain control and intraperitoneal antibiotics   -Patient continues to complain of significant severe pain although I am somewhat suspicious of the subjective report without any other objective indications of severe pain.  Patient has been frequently in the hospital complaining of same.  Patient is tolerating diet without any nausea or vomiting.  Since we are treating the patient for peritonitis for now we will give patient benefit of the doubt, increase her oral Dilaudid to 3 mg and frequency to every 4 hours as needed.  I have already informed the patient that this is for short-term use and she should not expect or planned this to be a long-term treatment plan.         Chronic Left common iliac occlusion/Hx of DVT/PE   -coumadin increased to 5 mg oral daily   -Last INR 1.1.  Recheck INR tomorrow.     Chronic HFrEF  -Continue home Coreg 6.25 mg BID.  -Continue peritoneal dialysis.     Chronic diarrhea  -Chronic issue for patient which she brings up frequently.  Patient has seen GI outpatient.  -Continue lomotil as need it for diarrhea   -This of course conflicts with nephrology is requested patient have at least 1 bowel movement a day.  Will change her MiraLAX and Senokot to as needed instead of scheduled.  Otherwise patient will be consulted taking laxatives and Lomotil.     Hypokalemia   -Resolved.     Physical deconditioning  -PT/OT recommend skilled nursing  facility.  Waiting on placement.    Disposition: Monitor INR, patient otherwise medically stable for discharge to skilled nursing facility.  Waiting for placement.    Assessment/Plan       Carl Wiley MD

## 2024-02-05 NOTE — PROGRESS NOTES
Yoselyn Hernandez   64 [address]    @@  N/Room: 16226224/2021/2021-A    Subjective:   Pt states she has intermittent abd pain and feels her pain regimen isnt adequately treating her pain. Pain is mostly localized to her abdomen. Appetite is good. No N/V.     Objective:     Meds:   atorvastatin, 40 mg, Nightly  B complex-vitamin C-folic acid, 1 capsule, Daily  calcitriol, 0.5 mcg, Daily  [Held by provider] carvedilol, 6.25 mg, BID with meals  dextrose 1.5% - LOW calcium 2.5mEq/L 2,000 mL with heparin 1,000 Units, cefTAZidime 250 mg, vancomycin 50 mg peritoneal dialysate, , q24h  dextrose 1.5% - LOW calcium 2.5mEq/L 5,000 mL with heparin 2,500 Units, cefTAZidime 625 mg, vancomycin 125 mg peritoneal dialysate, , q24h  dextrose 1.5% - LOW calcium 2.5mEq/L 5,000 mL with heparin 2,500 Units, cefTAZidime 625 mg, vancomycin 125 mg peritoneal dialysate, , q24h  dextrose 2.5 % - LOW calcium 2.5 mEq/L 5,000 mL with heparin 2,500 Units, cefTAZidime 625 mg, vancomycin 125 mg peritoneal dialysate, , q24h  dextrose 2.5 % - LOW calcium 2.5 mEq/L 5,000 mL with heparin 2,500 Units, cefTAZidime 625 mg, vancomycin 125 mg peritoneal dialysate, , q24h  gentamicin, , q24h  levothyroxine, 25 mcg, Daily before breakfast  magnesium oxide, 800 mg, Daily  mirtazapine, 15 mg, Nightly  nystatin, 500,000 Units, q8h AMARJIT  sennosides, 2 tablet, Daily  sevelamer carbonate, 800 mg, TID with meals  warfarin, 5 mg, Daily         acetaminophen, 975 mg, TID PRN  diphenoxylate-atropine, 1 tablet, 4x daily PRN  HYDROmorphone, 2 mg, q6h PRN  melatonin, 10 mg, Daily PRN  ondansetron ODT, 4 mg, q8h PRN   Or  ondansetron, 4 mg, q8h PRN  polyethylene glycol, 17 g, Daily PRN        Vitals:    02/05/24 0859   BP: 88/66   Pulse: 86   Resp:    Temp:    SpO2:           Intake/Output Summary (Last 24 hours) at 2/5/2024 1255  Last data filed at 2/5/2024 1000  Gross per 24 hour   Intake 8200 ml   Output 8219 ml   Net -19 ml         General appearance: no distress  Eyes:  non-icteric  Skin: no apparent rash  Abdomen: soft, nt/nd, mild TTP diffusely   Extremities: 1+ pitting edema bilat  Neuro: No FND  Access: PD catheter     Blood Labs:  Results for orders placed or performed during the hospital encounter of 01/30/24 (from the past 24 hour(s))   Protime-INR   Result Value Ref Range    Protime 12.8 9.8 - 12.8 seconds    INR 1.1 0.9 - 1.1   CBC   Result Value Ref Range    WBC 5.5 4.4 - 11.3 x10*3/uL    nRBC 0.0 0.0 - 0.0 /100 WBCs    RBC 3.58 (L) 4.00 - 5.20 x10*6/uL    Hemoglobin 10.4 (L) 12.0 - 16.0 g/dL    Hematocrit 32.2 (L) 36.0 - 46.0 %    MCV 90 80 - 100 fL    MCH 29.1 26.0 - 34.0 pg    MCHC 32.3 32.0 - 36.0 g/dL    RDW 16.5 (H) 11.5 - 14.5 %    Platelets 235 150 - 450 x10*3/uL   Renal Function Panel   Result Value Ref Range    Glucose 80 74 - 99 mg/dL    Sodium 133 (L) 136 - 145 mmol/L    Potassium 3.7 3.5 - 5.3 mmol/L    Chloride 96 (L) 98 - 107 mmol/L    Bicarbonate 27 21 - 32 mmol/L    Anion Gap 14 10 - 20 mmol/L    Urea Nitrogen 32 (H) 6 - 23 mg/dL    Creatinine 6.60 (H) 0.50 - 1.05 mg/dL    eGFR 7 (L) >60 mL/min/1.73m*2    Calcium 8.1 (L) 8.6 - 10.6 mg/dL    Phosphorus 4.3 2.5 - 4.9 mg/dL    Albumin 2.0 (L) 3.4 - 5.0 g/dL   Magnesium   Result Value Ref Range    Magnesium 2.03 1.60 - 2.40 mg/dL            Yoselyn Hernandez is a 64 y.o. female with a past medical history of ESRD on PD, multiple AAA dissections, HFrEF (EF 15-20% 3/23), pancreatitis, PE/DVT on warfarin, HTN, and multiple renal artery grafts with prior ilio-renal bypass who presented to the ED with abdominal pain, generalized weakness. She is admitted for suspected peritonitis. Nephrology was consulted for ESRD management.        #ESRD on PD:  Nephrologist: Dr. Lora   Access: PD catheter  EDW: 53kg (admitted at 49kg)   Dialysis duration: 6 exchanges a night, 1400cc fill, Last fill 1200cc, 8200cc total volume, 7 days a week  Urine amount: very little        #Anemia  - Hb 9-10     #MBD  - sevelamer 800mg TID  - on  calcitriol 0.5 mcg OD     # Bacterial Peritonitis  - staph epidermidis from Pcx 1/22  - Peritoneal fluid analysis from 1/30 showed cloudy straw colored appearance WBC 7776 with 46 % PMN however this was from her last fill throughout the day  -Fluid today yellow and clear. WBC 32 (2/2) . Peritoneal fluid cx 1/30 few staph epi        ___________________________________________________________________________________  RECOMMENDATIONS:  - PD tonight -1400cc fill, Last fill 1200cc, 8200cc total volume with vancomycin (25mg/l) and ceftazidime (125mg/l) along with heparin. Will alternate 1.5D, 2.5D given increased LE edema and weight gain to attempt further UF.  - Hypotensive so recommend holding carvedilol to prevent further hypotension with fluid removal   - Continue Nystatin swish and swallow   - Will repeat peritoneal cell count and fluid cx   - 0.1% gentamicin at PD catheter site at the time of dressing  - one BM at least in 24 hrs as per PD protocol   - Keep MAP >65 or SBP >90  - Strict I/O monitoring, daily weights, daily BMP  - Will continue to follow  - If patient is to be discharged to SNF will need SW assistance to confirm her nursing facility can accommodate PD and also administer intraperitoneal abx. Will need to complete a 14 day course of IP abx.      Patient discussed with the attending.         Ta Seay DO  Nephrology Fellow   Daytime / Weekend Renal Pager [phone]  After 7 pm Emergencies [phone] Pager [phone]

## 2024-02-05 NOTE — PROGRESS NOTES
Yoselyn Hernandez is a 64 y.o. female on day 6 of admission presenting with Peritonitis (CMS/HCC).    Subjective   Met with patient at bedside to further discuss discharge planning; advised [facility] and [facility] to [facility] her PD. Patient states that [facility] is her F[facility]. Discussed that facility is stating that she has a 20% copay for her skilled stay and she is in agreement.    Update provided to [facility] notifying them that they are FOC; updated PT note requested.     MD updated; requested completed [place] for 7000.    SW will continue to follow.    -Radha SANDHU MA, LSW  [phone] or [facility] Secure Chat  Care Transitions

## 2024-02-06 LAB
INR PPP: 1.6 (ref 0.9–1.1)
PROTHROMBIN TIME: 17.8 SECONDS (ref 9.8–12.8)

## 2024-02-06 PROCEDURE — 85610 PROTHROMBIN TIME: CPT | Performed by: INTERNAL MEDICINE

## 2024-02-06 PROCEDURE — 97116 GAIT TRAINING THERAPY: CPT | Mod: GP,CQ

## 2024-02-06 PROCEDURE — 2500000001 HC RX 250 WO HCPCS SELF ADMINISTERED DRUGS (ALT 637 FOR MEDICARE OP): Performed by: EMERGENCY MEDICINE

## 2024-02-06 PROCEDURE — 99233 SBSQ HOSP IP/OBS HIGH 50: CPT | Performed by: INTERNAL MEDICINE

## 2024-02-06 PROCEDURE — 2500000004 HC RX 250 GENERAL PHARMACY W/ HCPCS (ALT 636 FOR OP/ED): Performed by: INTERNAL MEDICINE

## 2024-02-06 PROCEDURE — 2500000001 HC RX 250 WO HCPCS SELF ADMINISTERED DRUGS (ALT 637 FOR MEDICARE OP): Performed by: INTERNAL MEDICINE

## 2024-02-06 PROCEDURE — 97530 THERAPEUTIC ACTIVITIES: CPT | Mod: GP,CQ

## 2024-02-06 PROCEDURE — 36415 COLL VENOUS BLD VENIPUNCTURE: CPT | Performed by: INTERNAL MEDICINE

## 2024-02-06 PROCEDURE — 2500000001 HC RX 250 WO HCPCS SELF ADMINISTERED DRUGS (ALT 637 FOR MEDICARE OP): Performed by: NURSE PRACTITIONER

## 2024-02-06 PROCEDURE — 2500000004 HC RX 250 GENERAL PHARMACY W/ HCPCS (ALT 636 FOR OP/ED): Performed by: NURSE PRACTITIONER

## 2024-02-06 PROCEDURE — 1100000001 HC PRIVATE ROOM DAILY

## 2024-02-06 PROCEDURE — 99232 SBSQ HOSP IP/OBS MODERATE 35: CPT | Performed by: INTERNAL MEDICINE

## 2024-02-06 RX ADMIN — HEPARIN SODIUM: 1000 INJECTION INTRAVENOUS; SUBCUTANEOUS at 15:15

## 2024-02-06 RX ADMIN — NYSTATIN 500000 UNITS: 100000 SUSPENSION ORAL at 21:58

## 2024-02-06 RX ADMIN — HYDROMORPHONE HYDROCHLORIDE 3 MG: 2 TABLET ORAL at 18:39

## 2024-02-06 RX ADMIN — WARFARIN SODIUM 5 MG: 5 TABLET ORAL at 18:38

## 2024-02-06 RX ADMIN — HYDROMORPHONE HYDROCHLORIDE 3 MG: 2 TABLET ORAL at 09:02

## 2024-02-06 RX ADMIN — ATORVASTATIN CALCIUM 40 MG: 40 TABLET, FILM COATED ORAL at 21:57

## 2024-02-06 RX ADMIN — SENNOSIDES 17.2 MG: 8.6 TABLET, FILM COATED ORAL at 09:02

## 2024-02-06 RX ADMIN — NYSTATIN 500000 UNITS: 100000 SUSPENSION ORAL at 05:28

## 2024-02-06 RX ADMIN — SEVELAMER CARBONATE 800 MG: 800 TABLET, FILM COATED ORAL at 11:57

## 2024-02-06 RX ADMIN — SEVELAMER CARBONATE 800 MG: 800 TABLET, FILM COATED ORAL at 09:02

## 2024-02-06 RX ADMIN — GENTAMICIN SULFATE: 1 CREAM TOPICAL at 16:45

## 2024-02-06 RX ADMIN — CALCITRIOL 0.5 MCG: 0.5 CAPSULE ORAL at 09:02

## 2024-02-06 RX ADMIN — HYDROMORPHONE HYDROCHLORIDE 3 MG: 2 TABLET ORAL at 04:17

## 2024-02-06 RX ADMIN — MIRTAZAPINE 15 MG: 15 TABLET, FILM COATED ORAL at 21:57

## 2024-02-06 RX ADMIN — LEVOTHYROXINE SODIUM 25 MCG: 25 TABLET ORAL at 04:18

## 2024-02-06 RX ADMIN — HEPARIN SODIUM: 1000 INJECTION INTRAVENOUS; SUBCUTANEOUS at 13:00

## 2024-02-06 RX ADMIN — Medication 800 MG: at 09:02

## 2024-02-06 RX ADMIN — RENO CAPS 1 CAPSULE: 100; 1.5; 1.7; 20; 10; 1; 150; 5; 6 CAPSULE ORAL at 09:02

## 2024-02-06 ASSESSMENT — COGNITIVE AND FUNCTIONAL STATUS - GENERAL
DRESSING REGULAR UPPER BODY CLOTHING: A LITTLE
CLIMB 3 TO 5 STEPS WITH RAILING: A LOT
DAILY ACTIVITIY SCORE: 17
MOVING FROM LYING ON BACK TO SITTING ON SIDE OF FLAT BED WITH BEDRAILS: A LITTLE
DRESSING REGULAR LOWER BODY CLOTHING: A LOT
MOBILITY SCORE: 17
PERSONAL GROOMING: A LITTLE
MOBILITY SCORE: 17
HELP NEEDED FOR BATHING: A LOT
MOBILITY SCORE: 16
TOILETING: A LITTLE
TURNING FROM BACK TO SIDE WHILE IN FLAT BAD: A LITTLE
MOVING FROM LYING ON BACK TO SITTING ON SIDE OF FLAT BED WITH BEDRAILS: A LITTLE
TOILETING: A LITTLE
DRESSING REGULAR LOWER BODY CLOTHING: A LOT
CLIMB 3 TO 5 STEPS WITH RAILING: A LOT
MOVING TO AND FROM BED TO CHAIR: A LITTLE
PERSONAL GROOMING: A LITTLE
WALKING IN HOSPITAL ROOM: A LOT
STANDING UP FROM CHAIR USING ARMS: A LITTLE
WALKING IN HOSPITAL ROOM: A LITTLE
TURNING FROM BACK TO SIDE WHILE IN FLAT BAD: A LITTLE
WALKING IN HOSPITAL ROOM: A LITTLE
TURNING FROM BACK TO SIDE WHILE IN FLAT BAD: A LITTLE
MOVING TO AND FROM BED TO CHAIR: A LITTLE
MOVING TO AND FROM BED TO CHAIR: A LITTLE
STANDING UP FROM CHAIR USING ARMS: A LITTLE
CLIMB 3 TO 5 STEPS WITH RAILING: A LOT
DAILY ACTIVITIY SCORE: 17
STANDING UP FROM CHAIR USING ARMS: A LITTLE
HELP NEEDED FOR BATHING: A LOT
MOVING FROM LYING ON BACK TO SITTING ON SIDE OF FLAT BED WITH BEDRAILS: A LITTLE
DRESSING REGULAR UPPER BODY CLOTHING: A LITTLE

## 2024-02-06 ASSESSMENT — PAIN DESCRIPTION - DESCRIPTORS: DESCRIPTORS: ACHING

## 2024-02-06 ASSESSMENT — PAIN SCALES - GENERAL: PAINLEVEL_OUTOF10: 7

## 2024-02-06 ASSESSMENT — PAIN - FUNCTIONAL ASSESSMENT: PAIN_FUNCTIONAL_ASSESSMENT: 0-10

## 2024-02-06 NOTE — PROGRESS NOTES
"Yoselyn Hernandez is a 64 y.o. female on day 7 of admission presenting with Peritonitis (CMS/HCC).    Subjective   Patient was lying in the bed without distress.  She reported that this morning she felt \"a golf ball sized mass\" popped out of her left side of her neck that was causing \"significant pain\".  By the time I saw her though this mass was gone.      Objective     General: Lying in bed without distress.  Cooperative.  Skin: No rashes ulcerations.  HEENT: Sclera is white.  Mucous membranes moist.  Evaluated her neck and there is no significant wounds noted or masses felt.  Neck: Supple.  No JVD.  Cardiac: Regular rate and rhythm, S1/S2 normal.  Lungs: Clear to auscultation bilaterally, no wheezing or crackles, no accessory muscle use at rest.  Abdomen: bowel sounds present, tender to light palpation. PD catheter in place.  Extremities: No cyanosis.  No lower extremity edema.  Neurologic: Alert and oriented x3.  No focal deficits.  Psychiatric: Appropriate mood and behavior.  Currently no agitation.    Last Recorded Vitals  Blood pressure 115/64, pulse 93, temperature 36.6 °C (97.9 °F), temperature source Temporal, resp. rate 18, height 1.702 m (5' 7\"), weight 55 kg (121 lb 4.1 oz), SpO2 100 %.  On room air.    Intake/Output last 3 Shifts:  I/O last 3 completed shifts:  In: - (0 mL/kg)   Out: 189 (3.4 mL/kg) [Other:189]  Weight: 55 kg     Relevant Results  atorvastatin, 40 mg, oral, Nightly  B complex-vitamin C-folic acid, 1 capsule, oral, Daily  calcitriol, 0.5 mcg, oral, Daily  [Held by provider] carvedilol, 6.25 mg, oral, BID with meals  dextrose 2.5 % - LOW calcium 2.5 mEq/L 5,000 mL with heparin 2,500 Units, vancomycin 125 mg peritoneal dialysate, , intraperitoneal, q24h  dextrose 2.5 % - LOW calcium 2.5 mEq/L 5,000 mL with heparin 2,500 Units, vancomycin 125 mg peritoneal dialysate, , intraperitoneal, q24h  gentamicin, , Topical, q24h  levothyroxine, 25 mcg, oral, Daily before breakfast  magnesium oxide, 800 " mg, oral, Daily  mirtazapine, 15 mg, oral, Nightly  nystatin, 500,000 Units, oral, q8h AMARJIT  sennosides, 2 tablet, oral, Daily  sevelamer carbonate, 800 mg, oral, TID with meals  warfarin, 5 mg, oral, Daily           PRN medications: acetaminophen, diphenoxylate-atropine, HYDROmorphone, melatonin, ondansetron ODT **OR** ondansetron, polyethylene glycol     Results for orders placed or performed during the hospital encounter of 01/30/24 (from the past 24 hour(s))   Peritoneal Dialysis Fluid Culture/Smear    Specimen: Peritoneal Dialysis; Fluid   Result Value Ref Range    Peritoneal Dialysis Fluid Culture No growth to date     Gram Stain No polymorphonuclear leukocytes seen     Gram Stain No organisms seen    Body Fluid Cell Count   Result Value Ref Range    Color, Fluid Colorless Colorless, Straw, Yellow    Clarity, Fluid Clear Clear    WBC, Fluid 23 See Comment /uL    RBC, Fluid 15 0  /uL /uL   Body Fluid Differential   Result Value Ref Range    Neutrophils %, Manual, Fluid 12 <25 % %    Lymphocytes %, Manual, Fluid 54 <75 % %    Mono/Macrophages %, Manual, Fluid 34 <70 % %    Eosinophils %, Manual, Fluid 0 0 % %    Basophils %, Manual, Fluid 0 0 % %    Immature Granulocytes %, Manual, Fluid 0 0 % %    Blasts %, Manual, Fluid 0 0 % %    Unclassified Cells %, Manual, Fluid 0 0 % %    Plasma Cells %, Manual, Fluid 0 0 % %    Total Cells Counted, Fluid 100    Protime-INR   Result Value Ref Range    Protime 17.8 (H) 9.8 - 12.8 seconds    INR 1.6 (H) 0.9 - 1.1           Hospital Course:  Yoselyn Hernandez is a 64 y.o. 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  Patient has had multiple admissions  for this previously.  Nephrology consulted.  Fluid obtained from abdomen grew out staph epidermis.  Patient placed on intraperitoneal vancomycin and ceftazidime initially.  Eventually transition over to intraperitoneal vancomycin only.  Patient is recommended for total 14 days of intraperitoneal antibiotics.  PT/OT saw the patient and recommend skilled nursing facility.     Assessment and plan:    ESRD and PD Peritonitis   -Nephrology consulted and appreciate recs  -Continue pain control and intraperitoneal antibiotics   -Patient continues to complain of significant severe pain although I am somewhat suspicious of the subjective report without any other objective indications of severe pain.  Patient is tolerating diet without any nausea or vomiting.  Since we are treating the patient for peritonitis for now we will give patient benefit of the doubt.  Continue oral Dilaudid 3 mg every 4 hours as needed.  Would not increase any further.  I have already informed the patient that this is for short-term use and she should not expect or planned this to be a long-term treatment plan.          Chronic Left common iliac occlusion/Hx of DVT/PE   -coumadin increased to 5 mg oral daily   -Last INR 1.6.  Recheck INR tomorrow.     Chronic HFrEF  -Holding home Coreg 6.25 mg BID due to low normal blood pressure and need some room for peritoneal dialysis.  -Continue peritoneal dialysis.     Chronic diarrhea  -Chronic issue for patient which she brings up frequently.  Patient has seen GI outpatient previously.  -Continue lomotil as need it for diarrhea   -This of course conflicts with nephrology is requested patient have at least 1 bowel movement a day.  Will change her MiraLAX to as needed, for now can continue Senokot as scheduled and monitor bowel movements.  Would like to avoid patient taking stool softeners and laxatives and then Lomotil at the same time.     Hypokalemia   -Resolved.     Physical deconditioning  -PT/OT recommend  skilled nursing facility.  Waiting on placement.    Disposition: Monitor INR, patient otherwise medically stable for discharge to skilled nursing facility.  Waiting for placement.    Assessment/Plan       Carl Wiley MD

## 2024-02-06 NOTE — PROGRESS NOTES
Nephrology Follow-up Note   Patient ID: Yoselyn Hernandez is a 64 y.o. female.   Admitted for :   Chief Complaint   Patient presents with    Weakness, Gen    Nausea    Vomiting    Shortness of Breath      Evaluation of patient on dialysis at [facility] [place] [facility]  [address]  [facility] [address] on 1/30/2024     Subjective:   Feels OK, no c/o CP/SOB/F/C/Abd pain ; one BP daily   No c/o related to PD except would like more UF because of ankle swelling ; awaiting rehab placement     Patient Active Problem List   Diagnosis    Asthma    Cardiomyopathy (CMS/HCC)    Depression with anxiety    Coronary artery calcification seen on CT scan    Diarrhea    Dissection of aorta (CMS/HCC)    ESRD (end stage renal disease) on dialysis (CMS/HCC)    Hyperlipemia    Peritoneal dialysis status (CMS/HCC)    Thoraco abdominal aneurysm (CMS/HCC)    Chronic obstructive pulmonary disease, unspecified COPD type (CMS/HCC)    DVT (deep venous thrombosis) (CMS/HCC)    Other specified coagulation defects (CMS/HCC)    Secondary hyperparathyroidism of renal origin (CMS/HCC)    Opioid dependence, uncomplicated (CMS/HCC)    Pleural effusion    Fluid excess    Abdominal pain    Abdominal pain, RUQ (right upper quadrant)    Abnormal cardiovascular stress test    Abnormal mammogram    Abnormal renal function    Abnormal weight loss    Acquired hypothyroidism    Acute bronchitis with bronchospasm    Acute colitis    Colitis    Acute duodenitis    Acute on chronic systolic heart failure (CMS/HCC)    Anemia    Antalgic gait    Atypical chest pain    Carotid bruit    Chronic deep vein thrombosis (DVT) of lower extremity (CMS/HCC)    Chronic pain    Clostridium difficile colitis    Contact with and (suspected) exposure to covid-19    Deficiency of macronutrients    Dehydration    Diaphragmatic hernia without obstruction or gangrene    Dilated pancreatic duct    Dissection of carotid artery (CMS/HCC)    Dizziness    Do not  resuscitate    Ecchymosis    Elevated blood pressure reading    Enterocolitis due to Clostridium difficile, recurrent    Fall    Fracture of calcaneus    Talus fracture    Gastritis    GERD (gastroesophageal reflux disease)    Peritonitis (CMS/[facility])    Heart failure (CMS/[facility])    History of elevated lipids    Sanchez sign present    Sanchez's reflex positive    Hyperkalemia    Hyperreflexia of lower extremity    Hyperreflexia    Hypertension    Peripheral vascular disease (CMS/HCC)    Hypertensive heart disease without congestive heart failure    Hypocalcemia    Intractable vomiting with nausea    Iron deficiency anemia, unspecified    Irritable bowel syndrome with diarrhea    Irritable bowel syndrome    Left inguinal hernia    Leg pain    Pain of lower extremity    Symptom of leg swelling    Aortic valve regurgitation    Muscle strain    Muscle weakness (generalized)    Neck pain    Nicotine dependence    Numbness    Occlusion of artery    Other and unspecified ventral hernia with obstruction, without gangrene    Other disorders of plasma-protein metabolism, not elsewhere classified    Other disorders resulting from impaired renal tubular function    Postphlebitic syndrome    Primary snoring    Pulmonary embolism (CMS/[facility])    Radicular pain    Sciatica, left side    Sciatica, right side    Rectal hemorrhage    Seizure (CMS/[facility])    Shortness of breath    Syncope and collapse    Systemic inflammatory response syndrome (SIRS) (CMS/[facility])    Low platelet count (CMS/[facility])    Toxic encephalopathy    Tubular adenoma of colon    Acute UTI    Urinary tract infection    Age-related physical debility    Left leg pain     Scheduled medications:  atorvastatin, 40 mg, oral, Nightly  B complex-vitamin C-folic acid, 1 capsule, oral, Daily  calcitriol, 0.5 mcg, oral, Daily  [Held by provider] carvedilol, 6.25 mg, oral, BID with meals  dextrose 1.5% - LOW calcium 2.5mEq/L 2,000 mL with heparin 1,000 Units, cefTAZidime 250 mg, vancomycin  50 mg peritoneal dialysate, , intraperitoneal, q24h  dextrose 1.5% - LOW calcium 2.5mEq/L 5,000 mL with heparin 2,500 Units, cefTAZidime 625 mg, vancomycin 125 mg peritoneal dialysate, , intraperitoneal, q24h  dextrose 1.5% - LOW calcium 2.5mEq/L 5,000 mL with heparin 2,500 Units, cefTAZidime 625 mg, vancomycin 125 mg peritoneal dialysate, , intraperitoneal, q24h  dextrose 2.5 % - LOW calcium 2.5 mEq/L 5,000 mL with heparin 2,500 Units, cefTAZidime 625 mg, vancomycin 125 mg peritoneal dialysate, , intraperitoneal, q24h  [START ON 2/7/2024] dextrose 2.5 % - LOW calcium 2.5 mEq/L 5,000 mL with heparin 2,500 Units, cefTAZidime 625 mg, vancomycin 125 mg peritoneal dialysate, , intraperitoneal, q24h  gentamicin, , Topical, q24h  levothyroxine, 25 mcg, oral, Daily before breakfast  magnesium oxide, 800 mg, oral, Daily  mirtazapine, 15 mg, oral, Nightly  nystatin, 500,000 Units, oral, q8h AMARJIT  sennosides, 2 tablet, oral, Daily  sevelamer carbonate, 800 mg, oral, TID with meals  warfarin, 5 mg, oral, Daily         PRN medications: acetaminophen, diphenoxylate-atropine, HYDROmorphone, melatonin, ondansetron ODT **OR** ondansetron, polyethylene glycol     Heart Rate:  [84-95]   Temp:  [36.3 °C (97.3 °F)-37.7 °C (99.9 °F)]   Resp:  [16-18]   BP: ()/(64-74)   SpO2:  [98 %-100 %]    Weight: 49.9 kg (110 lb)   Gen: alert, NAD  HEENT: NC/AT  Neck: supple, no JVD   Pulm: clear ant b/l   CVS: RRR, no rub  Abd: S/NT/ND  LE: no edema , no cyanosis   Neuro: no asterixis   Dialysis acces:  PD exit site clean - dry packing      Lab Results   Component Value Date    WBC 5.5 02/05/2024    HGB 10.4 (L) 02/05/2024    HCT 32.2 (L) 02/05/2024    MCV 90 02/05/2024     02/05/2024     Lab Results   Component Value Date    GLUCOSE 80 02/05/2024    CALCIUM 8.1 (L) 02/05/2024     (L) 02/05/2024    K 3.7 02/05/2024    CO2 27 02/05/2024    CL 96 (L) 02/05/2024    BUN 32 (H) 02/05/2024    CREATININE 6.60 (H) 02/05/2024     Results  from last 72 hours   Lab Units 02/05/24  0925   ALBUMIN g/dL 2.0*   GLUCOSE mg/dL 80   HEMOGLOBIN g/dL 10.4*   WBC AUTO x10*3/uL 5.5      Results from last 72 hours   Lab Units 02/05/24  0925   SODIUM mmol/L 133*   POTASSIUM mmol/L 3.7   CO2 mmol/L 27   BUN mg/dL 32*   CREATININE mg/dL 6.60*   PHOSPHORUS mg/dL 4.3   CALCIUM mg/dL 8.1*        Assessment   ESRD-PD admitted with erika arreguin peritonits     Plan   Plan PD per submitted orders, all 2.5% Dextrose to allow more UF   MBD - Phosphorus binder: continue Sevelamer  Anemia of CKD: Hg OK - q 2 weeks DIALLO   Access: no issues ;  Continue Nystatin swish and swallow ; normal K diet; ensure daily BM; add protein boost supplements if not already on it;   BP: acceptable during treatment   Daily renal MVI   Plan to complete 2 weeks IP Abx   Trend repeat PD fluid cell count and cultures today     Marlyn Rahman MD MPH

## 2024-02-06 NOTE — NURSING NOTE
Peritoneal Dialysis CCPD    Tonight's Therapy   2/6/24     Dialysis cycler primed:                  Y  Patient connected:                        Y  Therapy started at:                        1550  [employer]or:                        Y  Dressing changed:                        Y  Gent. cream applied:                     Y     Therapy Orders                Total time:                        11 hours                Cycles:                               5                Fill Volume:                      1400 mL                Last fill:                              1200 mL                Last fill solution:               Same                Total volume:                   8200 mL                   Solution(s):         Dextrose 2.5% Dianeal 2.5 calcium-- 5000 mL (x1)              Additive(s):         Vancomycin/Heparin                                                                                                      Dextrose 2.5% Dianeal 2.5 calcium-- 5000 mL (x1)              Additive(s):         Vancomycin/Heparin                   Last Fill:               Same        **Dialysis nurses in house Monday through Friday 0099-7205 to setup and disconnect patients,   please call our office at [phone], follow prompts for after hours paging.     **Machine Trouble Shooting: Transmit 24 hour technical support available at [phone]      **Bedside nursing staff to disconnect patients as needed outside of office hours.  -Instructions and supplies located on dialysis cart.  -Reference  policy [id number] -->

## 2024-02-06 NOTE — CARE PLAN
The patient's goals for the shift include      The clinical goals for the shift include patient will rate pain as tolerable by the end of shift    Over the shift, the patient did not make progress toward the following goals. Barriers to progression include. Recommendations to address these barriers include.    Problem: Pain - Adult  Goal: Verbalizes/displays adequate comfort level or baseline comfort level  Outcome: Progressing     Problem: Safety - Adult  Goal: Free from fall injury  Outcome: Progressing     Problem: Fall/Injury  Goal: Not fall by end of shift  Outcome: Progressing  Goal: Be free from injury by end of the shift  Outcome: Progressing  Goal: Verbalize understanding of personal risk factors for fall in the hospital  Outcome: Progressing  Goal: Verbalize understanding of risk factor reduction measures to prevent injury from fall in the home  Outcome: Progressing  Goal: Use assistive devices by end of the shift  Outcome: Progressing  Goal: Pace activities to prevent fatigue by end of the shift  Outcome: Progressing

## 2024-02-06 NOTE — PROGRESS NOTES
Yoselyn Hernandez is a 64 y.o. female on day 7 of admission presenting with Peritonitis (CMS/HCC).    Subjective   Met with patient at bedside and updated her that [facility] is requesting that she bring her PD Cycler machine to the facility. Patient expressed understanding and stated that she would work on finding someone to bring it to her.     Advised by primary MD that patient will need to continue abx during PD at discharge; facility updated who confirmed that they can accommodate but will prescriptions for this. MD updated.    SW will continue to follow. Updated PT note requested for precert. 7000 completed in [facility].    UPDATE 1350 Updated PT note sent to SNF via [facility] and requested for them to initiate precert.    SW will continue to follow.    UPDATE 1700 Provided antibiotic script for patient that she will receive during PD; sent to SNF in [facility].    -Radha SANDHU MA, LSW  [phone] or [facility] Secure Chat  Care Transitions

## 2024-02-06 NOTE — NURSING NOTE
Peritoneal Dialysis - CCPD    Completed Overnight Therapy   2/5/24    Full treatment received:               Y  I-Drain:    797 mL  Total UF:   879 mL  Combined 24 hour UF:  476 mL  Minicap placed:   Y  Effluent Color:   Yellow   Cloudy:   N   Fibrin:   N    Tonight's Therapy   2/6/24    Dialysis cycler primed:   -  Patient connected:  -  Therapy started at:  -  [employer]:  Y  Dressing changed:  -  Gent. cream applied:  -    Therapy Orders     Total time:  11 hours   Cycles:   5   Fill Volume:  1400 mL   Last fill:   1200 mL   Last fill solution:  Same   Total volume:  8200 mL     Solution(s):  Dextrose 1.5% Dianeal 2.5 calcium-- 5000 mL (x1)   Additive(s): Vancomycin/Ceftazidime/Heparin           Dextrose 2.5% Dianeal 2.5 calcium-- 5000 mL (x1)   Additive(s): Vancomycin/Ceftazidime/Heparin     Last Fill:  Same      **Dialysis nurses in house Monday through Friday 3094-2278 to setup and disconnect patients,   please call our office at [phone], follow prompts for after hours paging.    **Machine Trouble Shooting: [employer] 24 hour technical support available at [phone]     **Bedside nursing staff to disconnect patients as needed outside of office hours.  -Instructions and supplies located on dialysis cart.  -Reference  policy [id number] -->

## 2024-02-06 NOTE — PROGRESS NOTES
Physical Therapy    Physical Therapy Treatment    Patient Name: Yoselyn Hernandez  MRN: [id number]  Today's Date: 2/6/2024  Time Calculation  Start Time: 1301  Stop Time: 1324  Time Calculation (min): 23 min       Assessment/Plan   PT Assessment  End of Session Communication: Bedside nurse  Assessment Comment: Pt demos decreased endurance, balance and strength.  Needs to be fully (I) in all self care and mobility to safely return home. Will benefit from continued work with skilled PT to progress towards return to (I) functional mobility.  PT Plan  Inpatient/Swing Bed or Outpatient: Inpatient  PT Plan  Treatment/Interventions: Bed mobility, Transfer training, Gait training, Stair training, Balance training, Endurance training, Therapeutic exercise  PT Plan: Skilled PT  PT Frequency: 3 times per week  PT Discharge Recommendations: Moderate intensity level of continued care  Equipment Recommended upon Discharge: Wheeled walker  PT Recommended Transfer Status: Assist x1  PT - OK to Discharge: Yes (POC/goals/discharge intensity rec created)      General Visit Information:   PT  Visit  PT Received On: 02/06/24  General  Reason for Referral: myalgias, peritonitis  Past Medical History Relevant to Rehab: ESRD on daily PD, AAA dissections s/p repairs, HF, pancreatitis, PE/DVT  Family/Caregiver Present: No  Prior to Session Communication: Bedside nurse  Patient Position Received: Bed, 3 rail up, Alarm off, not on at start of session  General Comment: Pt. supine in bed upon arrival. Pt. joking around stating that she wasnt going to participate but pt. was willing. (Increased time needed as pt. is very talkative.)    Subjective   Precautions:  Precautions  Medical Precautions: Fall precautions  Vital Signs:  Vital Signs  Heart Rate: 101  SpO2: 99 %    Objective   Pain:     Cognition:  Cognition  Overall Cognitive Status: Within Functional Limits  Postural Control:  Static Sitting Balance  Static Sitting-Balance Support: Feet  "supported  Static Sitting-Level of Assistance: Close supervision  Static Standing Balance  Static Standing-Balance Support: Bilateral upper extremity supported  Static Standing-Level of Assistance: Contact guard (CGA/min)  Extremity/Trunk Assessments:                      Activity Tolerance:     Treatments:  Bed Mobility  Bed Mobility: Yes  Bed Mobility 1  Bed Mobility 1: Supine to sitting  Level of Assistance 1:  (SBA)  Bed Mobility 2  Bed Mobility  2: Scooting  Level of Assistance 2:  (SBA)    Ambulation/Gait Training  Ambulation/Gait Training Performed: Yes  Ambulation/Gait Training 1  Surface 1:  (Pt. amb. 35' using a wh. walker and min assist. Pt. stops during walk and flexes forward over walker - without warning or explanation. When asked pt. stated that she was SOB and her back was hurting. Encouraged pt. to return to upright.)  Comments/Distance (ft) 1: Encouraged pt. to amb. back to room as pt. briefly stated that she was too SOB- Pt. able to return to bed  with sp02 at 99% Pt. states \"it always says that but i feel i cant breath\".  Transfers  Transfer: Yes  Transfer 1  Transfer From 1: Bed to  Transfer to 1: Stand  Trials/Comments 1: Pt. seated EOB requesting to tamar her wig before leaving room. Increased time conversing before pt. willing to stand and ambulate.  Transfers 2  Transfer From 2: Stand to  Transfer to 2: Bed  Transfer Level of Assistance 2: Contact guard, Minimum assistance  Trials/Comments 2: Assist to control descent - pt. moving very slowly before sitting down. Pt. holding her right ue -pt. responded that her right ue \"doesnt work\" but pt. demonstrated the ability to use her right ue thru out the session.    Outcome Measures:  [facility] Basic Mobility  Turning from your back to your side while in a flat bed without using bedrails: A little  Moving from lying on your back to sitting on the side of a flat bed without using bedrails: A little  Moving to and from bed to chair (including a " wheelchair): A little  Standing up from a chair using your arms (e.g. wheelchair or bedside chair): A little  To walk in hospital room: A little  Climbing 3-5 steps with railing: A lot  Basic Mobility - Total Score: 17    Education Documentation  Body Mechanics, taught by Camelia Rodriguez PTA at 2/6/2024  1:35 PM.  Learner: Patient  Readiness: Acceptance  Method: Demonstration, Explanation  Response: Needs Reinforcement    Mobility Training, taught by Camelia Rodriguez PTA at 2/6/2024  1:35 PM.  Learner: Patient  Readiness: Acceptance  Method: Demonstration, Explanation  Response: Needs Reinforcement    Education Comments  No comments found.        OP EDUCATION:       Encounter Problems       Encounter Problems (Active)       Balance       Tinetti > 18 to demo improvements in overall balance        Start:  02/02/24    Expected End:  02/16/24               Mobility       STG - Patient will ambulate with LRAD and CGA-> close supervision 25 ft x 4. (Progressing)       Start:  02/02/24    Expected End:  02/16/24            As feasibly safe to attempt to simulate home setup, navigate 8+ steps with rail with min Ax1.        Start:  02/02/24    Expected End:  02/16/24               Pain - Adult          Strength       Will complete (B) LE therex to improve strength.       Start:  02/02/24    Expected End:  02/16/24               Transfers       STG - Patient will transfer sit to and from stand supervision.  (Progressing)       Start:  02/02/24    Expected End:  02/16/24

## 2024-02-07 VITALS
RESPIRATION RATE: 18 BRPM | OXYGEN SATURATION: 99 % | DIASTOLIC BLOOD PRESSURE: 79 MMHG | TEMPERATURE: 97.7 F | WEIGHT: 121.25 LBS | SYSTOLIC BLOOD PRESSURE: 112 MMHG | HEART RATE: 100 BPM | BODY MASS INDEX: 19.03 KG/M2 | HEIGHT: 67 IN

## 2024-02-07 LAB
ALBUMIN SERPL BCP-MCNC: 2.1 G/DL (ref 3.4–5)
ANION GAP SERPL CALC-SCNC: 14 MMOL/L (ref 10–20)
BACTERIA DIAFP CULT: ABNORMAL
BACTERIA DIAFP CULT: ABNORMAL
BUN SERPL-MCNC: 36 MG/DL (ref 6–23)
CALCIUM SERPL-MCNC: 8.4 MG/DL (ref 8.6–10.6)
CHLORIDE SERPL-SCNC: 100 MMOL/L (ref 98–107)
CO2 SERPL-SCNC: 27 MMOL/L (ref 21–32)
CREAT SERPL-MCNC: 6.13 MG/DL (ref 0.5–1.05)
EGFRCR SERPLBLD CKD-EPI 2021: 7 ML/MIN/1.73M*2
GLUCOSE SERPL-MCNC: 95 MG/DL (ref 74–99)
GRAM STN SPEC: ABNORMAL
INR PPP: 1.9 (ref 0.9–1.1)
PHOSPHATE SERPL-MCNC: 4.2 MG/DL (ref 2.5–4.9)
POTASSIUM SERPL-SCNC: 3.6 MMOL/L (ref 3.5–5.3)
PROTHROMBIN TIME: 21.7 SECONDS (ref 9.8–12.8)
SODIUM SERPL-SCNC: 137 MMOL/L (ref 136–145)

## 2024-02-07 PROCEDURE — 36415 COLL VENOUS BLD VENIPUNCTURE: CPT | Performed by: INTERNAL MEDICINE

## 2024-02-07 PROCEDURE — 85610 PROTHROMBIN TIME: CPT | Performed by: INTERNAL MEDICINE

## 2024-02-07 PROCEDURE — 80069 RENAL FUNCTION PANEL: CPT | Performed by: INTERNAL MEDICINE

## 2024-02-07 PROCEDURE — 99239 HOSP IP/OBS DSCHRG MGMT >30: CPT | Performed by: INTERNAL MEDICINE

## 2024-02-07 PROCEDURE — 2500000001 HC RX 250 WO HCPCS SELF ADMINISTERED DRUGS (ALT 637 FOR MEDICARE OP): Performed by: INTERNAL MEDICINE

## 2024-02-07 PROCEDURE — 2500000004 HC RX 250 GENERAL PHARMACY W/ HCPCS (ALT 636 FOR OP/ED): Performed by: INTERNAL MEDICINE

## 2024-02-07 PROCEDURE — 2500000004 HC RX 250 GENERAL PHARMACY W/ HCPCS (ALT 636 FOR OP/ED): Performed by: NURSE PRACTITIONER

## 2024-02-07 PROCEDURE — 2500000001 HC RX 250 WO HCPCS SELF ADMINISTERED DRUGS (ALT 637 FOR MEDICARE OP): Performed by: NURSE PRACTITIONER

## 2024-02-07 RX ORDER — HYDROMORPHONE HYDROCHLORIDE 2 MG/1
3 TABLET ORAL EVERY 4 HOURS PRN
Qty: 20 TABLET | Refills: 0 | Status: SHIPPED | OUTPATIENT
Start: 2024-02-07 | End: 2024-02-19 | Stop reason: HOSPADM

## 2024-02-07 RX ORDER — HYDROMORPHONE HYDROCHLORIDE 2 MG/1
3 TABLET ORAL EVERY 4 HOURS PRN
Qty: 20 TABLET | Refills: 0 | Status: SHIPPED | OUTPATIENT
Start: 2024-02-07 | End: 2024-02-07 | Stop reason: SDUPTHER

## 2024-02-07 RX ADMIN — RENO CAPS 1 CAPSULE: 100; 1.5; 1.7; 20; 10; 1; 150; 5; 6 CAPSULE ORAL at 08:14

## 2024-02-07 RX ADMIN — LEVOTHYROXINE SODIUM 25 MCG: 25 TABLET ORAL at 08:14

## 2024-02-07 RX ADMIN — HYDROMORPHONE HYDROCHLORIDE 3 MG: 2 TABLET ORAL at 16:36

## 2024-02-07 RX ADMIN — HYDROMORPHONE HYDROCHLORIDE 3 MG: 2 TABLET ORAL at 08:14

## 2024-02-07 RX ADMIN — SEVELAMER CARBONATE 800 MG: 800 TABLET, FILM COATED ORAL at 18:39

## 2024-02-07 RX ADMIN — CALCITRIOL 0.5 MCG: 0.5 CAPSULE ORAL at 08:14

## 2024-02-07 RX ADMIN — SEVELAMER CARBONATE 800 MG: 800 TABLET, FILM COATED ORAL at 08:14

## 2024-02-07 RX ADMIN — Medication 800 MG: at 08:14

## 2024-02-07 RX ADMIN — WARFARIN SODIUM 5 MG: 5 TABLET ORAL at 18:39

## 2024-02-07 ASSESSMENT — PAIN - FUNCTIONAL ASSESSMENT
PAIN_FUNCTIONAL_ASSESSMENT: 0-10

## 2024-02-07 ASSESSMENT — COGNITIVE AND FUNCTIONAL STATUS - GENERAL
WALKING IN HOSPITAL ROOM: A LITTLE
MOBILITY SCORE: 17
PERSONAL GROOMING: A LITTLE
CLIMB 3 TO 5 STEPS WITH RAILING: A LOT
DRESSING REGULAR UPPER BODY CLOTHING: A LITTLE
DAILY ACTIVITIY SCORE: 17
TURNING FROM BACK TO SIDE WHILE IN FLAT BAD: A LITTLE
HELP NEEDED FOR BATHING: A LOT
MOVING TO AND FROM BED TO CHAIR: A LITTLE
TOILETING: A LITTLE
STANDING UP FROM CHAIR USING ARMS: A LITTLE
MOVING FROM LYING ON BACK TO SITTING ON SIDE OF FLAT BED WITH BEDRAILS: A LITTLE
DRESSING REGULAR LOWER BODY CLOTHING: A LOT

## 2024-02-07 ASSESSMENT — PAIN SCALES - GENERAL
PAINLEVEL_OUTOF10: 8
PAINLEVEL_OUTOF10: 5 - MODERATE PAIN
PAINLEVEL_OUTOF10: 4
PAINLEVEL_OUTOF10: 4
PAINLEVEL_OUTOF10: 8
PAINLEVEL_OUTOF10: 4

## 2024-02-07 ASSESSMENT — PAIN DESCRIPTION - LOCATION
LOCATION: ABDOMEN
LOCATION: ABDOMEN

## 2024-02-07 NOTE — DISCHARGE SUMMARY
Discharge Diagnosis  Bacterial peritonitis associated with peritoneal dialysis  2.   Physical deconditioning    Issues Requiring Follow-Up  Follow-up with your nephrologist to evaluate the bacterial peritonitis.  Monitor INR at skilled nursing facility.    Test Results Pending At Discharge    Hospital Course  Yoselyn Hernandez is a 64 y.o. 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  Patient has had multiple admissions for this previously.  Nephrology consulted.  Fluid obtained from abdomen grew out staph epidermis.  Patient placed on intraperitoneal vancomycin and ceftazidime initially.  Eventually transition over to intraperitoneal vancomycin only.  Patient is recommended for total 14 days of intraperitoneal antibiotics.  PT/OT saw the patient and recommend skilled nursing facility.  During her stay patient frequently complained about abdominal pain.  Hemodynamically was stable and vitals appeared to be normal.  She remained afebrile and there was no significant leukocytosis.  Assuming for now that she seems to be clinically improving with intraperitoneal antibiotics.  PT/OT saw the patient and recommended skilled nursing facility.  Patient agreeable.  During hospitalization INR on home warfarin dose appears to be low.  Dosage increased mildly to 5 mg.  Last INR 1.9 and skilled nursing facility should continue monitoring.  In regards to pain medications I have informed the patient that pain medication is only for short-term control in the setting of an acute illness.  The pain medication will be set to stop on the same date as the treatment for the bacterial peritonitis is done.  Patient  advised to follow-up with her outpatient nephrologist.     Assessment and plan:     ESRD and PD Peritonitis   -Nephrology consulted and has recommended 14 days of intraperitoneal antibiotics.  End date 2/14.  -Patient continues to complain of significant severe pain although I am somewhat suspicious of the subjective report without any other objective indications of severe pain.  Patient is tolerating diet without any nausea or vomiting.  Patient is afebrile without any leukocytosis.  Since we are treating the patient for peritonitis for now we will give patient benefit of the doubt.  Continue oral Dilaudid 3 mg every 4 hours as needed.  Would not increase any further.  I have already informed the patient that this is for short-term use and she should not expect or planned this to be a long-term treatment plan.  Pain control should stop on the last day of treatment of the peritonitis.         Chronic Left common iliac occlusion/Hx of DVT/PE   -Warfarin increased to 5 mg oral daily  -Last INR 1.9.  Recommend skilled nursing facility continue monitoring INR and titrating warfarin.     Chronic HFrEF  -Discontinued home Coreg 6.25 mg BID due to low normal blood pressure and need some room for peritoneal dialysis.  Even without the blood pressure medication systolic blood pressure normally only 90s to 100s.  -Continue peritoneal dialysis.     Chronic diarrhea  -Chronic issue for patient which she brings up frequently.  Patient has seen GI outpatient previously.  No specific diagnosis at this time.  -Continue lomotil as need it for diarrhea which was given to her by GI.  -This of course conflicts with nephrology is requested patient have at least 1 bowel movement a day.  Changed her MiraLAX to as needed.  Continue Senokot as scheduled but patient will intermittently refuse.  I have advised the patient that she needs to have at least 1 bowel movement a day as per nephrology recommendations, even if it is loose.      Hypokalemia   -Resolved.     Physical deconditioning  -PT/OT recommend skilled nursing facility.  Patient has been approved.    Time spent caring for patient and coordinating discharge total 35 minutes.    Pertinent Physical Exam At Time of Discharge  General: Lying in bed without distress.  Cooperative.  Skin: No rashes ulcerations.  HEENT: Sclera is white.  Mucous membranes moist.  Evaluated her neck and there is no significant wounds noted or masses felt.  Neck: Supple.  No JVD.  Cardiac: Regular rate and rhythm, S1/S2 normal.  Lungs: Clear to auscultation bilaterally, no wheezing or crackles, no accessory muscle use at rest.  Abdomen: bowel sounds present, tender to light palpation. PD catheter in place.  Extremities: No cyanosis.  No lower extremity edema.  Neurologic: Alert and oriented x3.  No focal deficits.  Psychiatric: Appropriate mood and behavior.  Currently no agitation.    Home Medications     Medication List      START taking these medications     B complex-vitamin C-folic acid 1 mg capsule; Commonly known as:   Nephrocaps; Take 1 capsule by mouth once daily. Do not start before   February 6, 2024.   * dextrose 2.5 % - LOW calcium 2.5 mEq/L Ca 2.5 mEq/L- Mg 0.5 mEq/L   solution 5,000 mL with heparin 1,000 unit/mL solution 2,500 Units,   vancomycin 1,000 mg recon soln 125 mg; Inject 5,000 mL into the abdomen /   abdominal cavity once every 24 hours. dextrose 2.5 % - LOW calcium 2.5   mEq/L Ca 2.5 mEq/L- Mg 0.5 mEq/L solution 5,000 mL with heparin 1,000   unit/mL solution 2,500 Units, vancomycin 1,000 mg recon soln 125 mg   * dextrose 2.5 % - LOW calcium 2.5 mEq/L Ca 2.5 mEq/L- Mg 0.5 mEq/L   solution 5,000 mL with heparin 1,000 unit/mL solution 2,500 Units,   vancomycin 1,000 mg recon soln 125 mg; Inject 5,000 mL into the abdomen /   abdominal cavity once every 24 hours for 7 days. dextrose 2.5 % - LOW   calcium 2.5 mEq/L Ca 2.5 mEq/L- Mg 0.5 mEq/L solution 5,000 mL with   heparin 1,000 unit/mL  solution 2,500 Units, vancomycin 1,000 mg recon soln   125 mg.   gentamicin 0.1 % cream; Commonly known as: Garamycin; Apply topically   once every 24 hours.   HYDROmorphone 2 mg tablet; Commonly known as: Dilaudid; Take 1.5 tablets   (3 mg) by mouth every 4 hours if needed for severe pain (7 - 10) for up to   7 days.   melatonin 10 mg tablet; Take 1 tablet (10 mg) by mouth once daily as   needed for sleep.   nystatin 100,000 unit/mL suspension; Commonly known as: Mycostatin; Take   5 mL (500,000 Units) by mouth every 8 hours.   ondansetron ODT 4 mg disintegrating tablet; Commonly known as:   Zofran-ODT; Take 1 tablet (4 mg) by mouth every 8 hours if needed for   nausea.   polyethylene glycol 17 gram packet; Commonly known as: Glycolax,   Miralax; Take 17 g by mouth once daily as needed (constipation).   sennosides 8.6 mg tablet; Commonly known as: Senokot; Take 2 tablets   (17.2 mg) by mouth once daily as needed for constipation.   sevelamer carbonate 800 mg tablet; Commonly known as: Renvela; Take 1   tablet (800 mg) by mouth 3 times a day with meals. Swallow tablet whole;   do not crush, break, or chew.; Replaces: sevelamer HCl 800 mg tablet  * This list has 2 medication(s) that are the same as other medications   prescribed for you. Read the directions carefully, and ask your doctor or   other care provider to review them with you.     CHANGE how you take these medications     acetaminophen 325 mg tablet; Commonly known as: Tylenol; Take 3 tablets   (975 mg) by mouth 3 times a day as needed (pain or fever).; What changed:   when to take this   warfarin 5 mg tablet; Commonly known as: Coumadin; Take as directed per   After Visit Summary.; What changed: medication strength, how much to take,   how to take this, additional instructions, Another medication with the   same name was removed. Continue taking this medication, and follow the   directions you see here.     CONTINUE taking these medications     allopurinol  100 mg tablet; Commonly known as: Zyloprim; Take 1 tablet   (100 mg) by mouth once daily.   atorvastatin 40 mg tablet; Commonly known as: Lipitor; TAKE 1 TABLET BY   MOUTH ONCE DAILY AT BEDTIME   Creon 24,000-76,000 -120,000 unit capsule; Generic drug:   lipase-protease-amylase; Take 3 capsules by mouth 3 times a day with   meals.   diphenoxylate-atropine 2.5-0.025 mg tablet; Commonly known as: Lomotil;   Take 1 tablet by mouth 4 times a day as needed for diarrhea.   epoetin treasure 10,000 unit/mL injection; Commonly known as: Epogen,Procrit   levothyroxine 25 mcg tablet; Commonly known as: Synthroid, Levoxyl; Take   1 tablet (25 mcg) by mouth once daily in the morning. Take before meals.   mirtazapine 15 mg tablet; Commonly known as: Remeron; Take 1 tablet (15   mg) by mouth once daily at bedtime.   VITAMIN D3 ORAL     STOP taking these medications     carvedilol 6.25 mg tablet; Commonly known as: Coreg   loperamide 2 mg capsule; Commonly known as: Imodium   pregabalin 25 mg capsule; Commonly known as: Lyrica   sevelamer HCl 800 mg tablet; Commonly known as: Renagel; Replaced by:   sevelamer carbonate 800 mg tablet       Outpatient Follow-Up  Future Appointments   Date Time Provider Department Center   2/28/2024  2:00 PM TADEO Diamond-CNP [id number] Tru Wiley MD

## 2024-02-07 NOTE — CARE PLAN
The clinical goals for the shift include Patient will rate pain at or below 4 out of 10, by the end of this shift.    Patient remained free from injuries during this shift.    Patient is in bed: Supine, HOB elevated, call light within reach.    Plan of care is ongoing.        Problem: Pain - Adult  Goal: Verbalizes/displays adequate comfort level or baseline comfort level  Outcome: Progressing     Problem: Safety - Adult  Goal: Free from fall injury  Outcome: Progressing     Problem: Discharge Planning  Goal: Discharge to home or other facility with appropriate resources  Outcome: Progressing     Problem: Chronic Conditions and Co-morbidities  Goal: Patient's chronic conditions and co-morbidity symptoms are monitored and maintained or improved  Outcome: Progressing     Problem: Fall/Injury  Goal: Not fall by end of shift  Outcome: Progressing  Goal: Be free from injury by end of the shift  Outcome: Progressing  Goal: Verbalize understanding of personal risk factors for fall in the hospital  Outcome: Progressing  Goal: Verbalize understanding of risk factor reduction measures to prevent injury from fall in the home  Outcome: Progressing  Goal: Use assistive devices by end of the shift  Outcome: Progressing  Goal: Pace activities to prevent fatigue by end of the shift  Outcome: Progressing

## 2024-02-07 NOTE — PROGRESS NOTES
Yoselyn Hernandez is a 64 y.o. female on day 8 of admission presenting with Peritonitis (CMS/HCC).    Subjective   [facility] that patients authorization for [facility] was approved through 2/13. Primary MD updated who confirmed that patient is medically cleared for discharge.    Met with patient at bedside and discussed that authorization was approved and inquired if patient was able to find someone to bring her PD Cycler machine. Patient stated that she has not yet called anybody but would work on it. Discussed that someone needs to bring machine to hospital or SNF as soon as possible.     MD/Bedside RN updated.    SW will continue to follow.    UPDATE 1140 Received update from bedside RN that patients friend is able to drop machine off to SNF this afternoon. Confirmed with facility that they are able to accommodate this and will work on getting  once it is dropped off. Further, they confirmed that they are able to accept patient this evening.    Transportation requested in [employer] for 6:00pm ; received confirmed  time of 7:30pm.    SNF [facility] in [facility] and sent completed [place] and AVS.     -Radah SANDHU, MA, LSW  [phone] or [facility] Secure Chat  Care Transitions

## 2024-02-08 ENCOUNTER — CLINICAL SUPPORT (OUTPATIENT)
Dept: EMERGENCY MEDICINE | Facility: HOSPITAL | Age: 64
End: 2024-02-08
Payer: COMMERCIAL

## 2024-02-08 ENCOUNTER — APPOINTMENT (OUTPATIENT)
Dept: RADIOLOGY | Facility: HOSPITAL | Age: 64
End: 2024-02-08
Payer: COMMERCIAL

## 2024-02-08 ENCOUNTER — HOSPITAL ENCOUNTER (OUTPATIENT)
Facility: HOSPITAL | Age: 64
Setting detail: OBSERVATION
Discharge: NURSING, REHAB OR LONG-TERM CARE | End: 2024-02-19
Attending: STUDENT IN AN ORGANIZED HEALTH CARE EDUCATION/TRAINING PROGRAM | Admitting: NURSE PRACTITIONER
Payer: COMMERCIAL

## 2024-02-08 DIAGNOSIS — I50.20 HFREF (HEART FAILURE WITH REDUCED EJECTION FRACTION) (MULTI): ICD-10-CM

## 2024-02-08 DIAGNOSIS — I82.4Y9 DEEP VEIN THROMBOSIS (DVT) OF PROXIMAL LOWER EXTREMITY, UNSPECIFIED CHRONICITY, UNSPECIFIED LATERALITY (MULTI): ICD-10-CM

## 2024-02-08 DIAGNOSIS — T85.71XS: Primary | ICD-10-CM

## 2024-02-08 DIAGNOSIS — Z99.2 PERITONEAL DIALYSIS STATUS (CMS-HCC): ICD-10-CM

## 2024-02-08 DIAGNOSIS — S99.921A TOE INJURY, RIGHT, INITIAL ENCOUNTER: ICD-10-CM

## 2024-02-08 PROBLEM — I25.84 CALCIFICATION OF CORONARY ARTERY: Status: ACTIVE | Noted: 2023-03-08

## 2024-02-08 PROBLEM — E86.0 DEHYDRATION: Status: RESOLVED | Noted: 2022-10-28 | Resolved: 2024-02-08

## 2024-02-08 PROBLEM — J20.9 ACUTE BRONCHITIS WITH BRONCHOSPASM: Status: RESOLVED | Noted: 2024-01-12 | Resolved: 2024-02-08

## 2024-02-08 PROBLEM — N25.81 HYPERPARATHYROIDISM DUE TO RENAL INSUFFICIENCY (MULTI): Status: ACTIVE | Noted: 2021-06-02

## 2024-02-08 PROBLEM — K62.5 RECTAL HEMORRHAGE: Status: RESOLVED | Noted: 2024-01-12 | Resolved: 2024-02-08

## 2024-02-08 PROBLEM — R92.8 ABNORMAL MAMMOGRAM: Status: RESOLVED | Noted: 2024-01-12 | Resolved: 2024-02-08

## 2024-02-08 PROBLEM — I10 HYPERTENSION: Status: RESOLVED | Noted: 2021-06-02 | Resolved: 2024-02-08

## 2024-02-08 PROBLEM — R94.39 ABNORMAL CARDIOVASCULAR STRESS TEST: Status: RESOLVED | Noted: 2022-08-03 | Resolved: 2024-02-08

## 2024-02-08 PROBLEM — R55 SYNCOPE AND COLLAPSE: Status: RESOLVED | Noted: 2023-08-08 | Resolved: 2024-02-08

## 2024-02-08 PROBLEM — G92.9 TOXIC ENCEPHALOPATHY: Status: RESOLVED | Noted: 2023-03-08 | Resolved: 2024-02-08

## 2024-02-08 PROBLEM — A04.72 CLOSTRIDIUM DIFFICILE COLITIS: Status: RESOLVED | Noted: 2023-03-05 | Resolved: 2024-02-08

## 2024-02-08 PROBLEM — R63.4 ABNORMAL WEIGHT LOSS: Status: RESOLVED | Noted: 2024-01-12 | Resolved: 2024-02-08

## 2024-02-08 PROBLEM — R54 AGE-RELATED PHYSICAL DEBILITY: Status: RESOLVED | Noted: 2023-03-08 | Resolved: 2024-02-08

## 2024-02-08 PROBLEM — J90 PLEURAL EFFUSION: Status: RESOLVED | Noted: 2023-10-18 | Resolved: 2024-02-08

## 2024-02-08 PROBLEM — K29.80 ACUTE DUODENITIS: Status: RESOLVED | Noted: 2024-01-12 | Resolved: 2024-02-08

## 2024-02-08 PROBLEM — R65.10 SYSTEMIC INFLAMMATORY RESPONSE SYNDROME (SIRS) (MULTI): Status: RESOLVED | Noted: 2024-01-12 | Resolved: 2024-02-08

## 2024-02-08 PROBLEM — R03.0 ELEVATED BLOOD PRESSURE READING: Status: RESOLVED | Noted: 2024-01-12 | Resolved: 2024-02-08

## 2024-02-08 PROBLEM — A04.71 ENTEROCOLITIS DUE TO CLOSTRIDIUM DIFFICILE, RECURRENT: Status: RESOLVED | Noted: 2022-07-21 | Resolved: 2024-02-08

## 2024-02-08 PROBLEM — R42 DIZZINESS: Status: RESOLVED | Noted: 2024-01-12 | Resolved: 2024-02-08

## 2024-02-08 PROBLEM — R10.9 ABDOMINAL PAIN: Status: RESOLVED | Noted: 2023-11-06 | Resolved: 2024-02-08

## 2024-02-08 PROBLEM — N18.6 ESRD (END STAGE RENAL DISEASE) (MULTI): Status: ACTIVE | Noted: 2024-02-08

## 2024-02-08 PROBLEM — M54.10 RADICULAR PAIN: Status: RESOLVED | Noted: 2024-01-12 | Resolved: 2024-02-08

## 2024-02-08 PROBLEM — R09.89 CAROTID BRUIT: Status: RESOLVED | Noted: 2024-01-12 | Resolved: 2024-02-08

## 2024-02-08 PROBLEM — J44.9 CHRONIC OBSTRUCTIVE PULMONARY DISEASE (MULTI): Status: ACTIVE | Noted: 2022-10-03

## 2024-02-08 PROBLEM — K86.9 DISORDER OF PANCREATIC DUCT (HHS-HCC): Status: ACTIVE | Noted: 2023-08-08

## 2024-02-08 PROBLEM — Z20.822 CONTACT WITH AND (SUSPECTED) EXPOSURE TO COVID-19: Status: RESOLVED | Noted: 2023-03-08 | Resolved: 2024-02-08

## 2024-02-08 PROBLEM — R26.89 ANTALGIC GAIT: Status: RESOLVED | Noted: 2024-01-12 | Resolved: 2024-02-08

## 2024-02-08 PROBLEM — R58 ECCHYMOSIS: Status: RESOLVED | Noted: 2024-01-12 | Resolved: 2024-02-08

## 2024-02-08 PROBLEM — R19.7 DIARRHEA: Status: RESOLVED | Noted: 2023-08-17 | Resolved: 2024-02-08

## 2024-02-08 PROBLEM — I87.009 POSTPHLEBITIC SYNDROME: Status: RESOLVED | Noted: 2024-01-12 | Resolved: 2024-02-08

## 2024-02-08 PROBLEM — E87.5 HYPERKALEMIA: Status: RESOLVED | Noted: 2022-07-30 | Resolved: 2024-02-08
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
## 2024-02-08 PROBLEM — Z86.39 HISTORY OF ELEVATED LIPIDS: Status: RESOLVED | Noted: 2024-01-12 | Resolved: 2024-02-08

## 2024-02-08 PROBLEM — D69.6 LOW PLATELET COUNT (CMS-HCC): Status: RESOLVED | Noted: 2022-07-30 | Resolved: 2024-02-08

## 2024-02-08 PROBLEM — K52.9 COLITIS: Status: RESOLVED | Noted: 2024-01-12 | Resolved: 2024-02-08

## 2024-02-08 PROBLEM — M79.89 SYMPTOM OF LEG SWELLING: Status: RESOLVED | Noted: 2024-01-12 | Resolved: 2024-02-08

## 2024-02-08 PROBLEM — F32.A DEPRESSION, UNSPECIFIED: Status: ACTIVE | Noted: 2022-10-28

## 2024-02-08 PROBLEM — I82.509 CHRONIC DEEP VEIN THROMBOSIS (DVT) OF LOWER EXTREMITY (MULTI): Status: RESOLVED | Noted: 2022-12-19 | Resolved: 2024-02-08

## 2024-02-08 PROBLEM — R06.02 SHORTNESS OF BREATH: Status: RESOLVED | Noted: 2021-09-13 | Resolved: 2024-02-08

## 2024-02-08 PROBLEM — I26.09 OTHER PULMONARY EMBOLISM WITH ACUTE COR PULMONALE (MULTI): Status: ACTIVE | Noted: 2023-03-08

## 2024-02-08 PROBLEM — D68.8 OTHER SPECIFIED COAGULATION DEFECTS (MULTI): Status: RESOLVED | Noted: 2023-10-17 | Resolved: 2024-02-08

## 2024-02-08 PROBLEM — E78.5 HYPERLIPIDEMIA: Status: ACTIVE | Noted: 2023-03-08

## 2024-02-08 PROBLEM — R29.2: Status: RESOLVED | Noted: 2024-01-12 | Resolved: 2024-02-08

## 2024-02-08 PROBLEM — N39.0 ACUTE UTI: Status: RESOLVED | Noted: 2024-01-12 | Resolved: 2024-02-08

## 2024-02-08 PROBLEM — N18.6 END STAGE RENAL DISEASE (MULTI): Status: RESOLVED | Noted: 2021-06-01 | Resolved: 2024-02-08

## 2024-02-08 PROBLEM — E83.51 HYPOCALCEMIA: Status: RESOLVED | Noted: 2023-10-05 | Resolved: 2024-02-08

## 2024-02-08 PROBLEM — E87.70 FLUID EXCESS: Status: RESOLVED | Noted: 2023-10-18 | Resolved: 2024-02-08

## 2024-02-08 PROBLEM — R07.89 ATYPICAL CHEST PAIN: Status: RESOLVED | Noted: 2024-01-12 | Resolved: 2024-02-08

## 2024-02-08 PROBLEM — R06.83 PRIMARY SNORING: Status: RESOLVED | Noted: 2024-01-12 | Resolved: 2024-02-08

## 2024-02-08 PROBLEM — R29.2 HYPERREFLEXIA: Status: RESOLVED | Noted: 2024-01-12 | Resolved: 2024-02-08

## 2024-02-08 PROBLEM — I25.10 CALCIFICATION OF CORONARY ARTERY: Status: ACTIVE | Noted: 2023-03-08

## 2024-02-08 PROBLEM — R10.11 ABDOMINAL PAIN, RUQ (RIGHT UPPER QUADRANT): Status: RESOLVED | Noted: 2023-11-06 | Resolved: 2024-02-08

## 2024-02-08 PROBLEM — K44.9 DIAPHRAGMATIC HERNIA WITHOUT OBSTRUCTION OR GANGRENE: Status: RESOLVED | Noted: 2023-08-02 | Resolved: 2024-02-08

## 2024-02-08 PROBLEM — N39.0 URINARY TRACT INFECTION: Status: RESOLVED | Noted: 2024-01-12 | Resolved: 2024-02-08

## 2024-02-08 PROBLEM — E63.9 DEFICIENCY OF MACRONUTRIENTS: Status: RESOLVED | Noted: 2023-04-17 | Resolved: 2024-02-08

## 2024-02-08 PROBLEM — I70.8: Status: ACTIVE | Noted: 2024-01-12

## 2024-02-08 PROBLEM — N28.9 ABNORMAL RENAL FUNCTION: Status: RESOLVED | Noted: 2024-01-12 | Resolved: 2024-02-08

## 2024-02-08 PROBLEM — G89.29 CHRONIC PAIN: Status: RESOLVED | Noted: 2022-10-03 | Resolved: 2024-02-08

## 2024-02-08 PROBLEM — K52.9 ACUTE COLITIS: Status: RESOLVED | Noted: 2024-01-12 | Resolved: 2024-02-08

## 2024-02-08 PROBLEM — R20.0 NUMBNESS: Status: RESOLVED | Noted: 2024-01-12 | Resolved: 2024-02-08

## 2024-02-08 PROBLEM — R11.2 INTRACTABLE VOMITING WITH NAUSEA: Status: RESOLVED | Noted: 2024-01-12 | Resolved: 2024-02-08

## 2024-02-08 PROBLEM — K58.9 IRRITABLE BOWEL SYNDROME: Status: RESOLVED | Noted: 2022-12-19 | Resolved: 2024-02-08

## 2024-02-08 PROBLEM — N18.6 END STAGE RENAL DISEASE (MULTI): Status: ACTIVE | Noted: 2021-06-01

## 2024-02-08 LAB
ALBUMIN SERPL BCP-MCNC: 2.2 G/DL (ref 3.4–5)
ALP SERPL-CCNC: 94 U/L (ref 33–136)
ALT SERPL W P-5'-P-CCNC: 21 U/L (ref 7–45)
ANION GAP SERPL CALC-SCNC: 14 MMOL/L (ref 10–20)
AST SERPL W P-5'-P-CCNC: 26 U/L (ref 9–39)
ATRIAL RATE: 92 BPM
BACTERIA DIAFP CULT: NORMAL
BASOPHILS # BLD AUTO: 0.02 X10*3/UL (ref 0–0.1)
BASOPHILS NFR BLD AUTO: 0.3 %
BILIRUB SERPL-MCNC: 0.2 MG/DL (ref 0–1.2)
BUN SERPL-MCNC: 46 MG/DL (ref 6–23)
CALCIUM SERPL-MCNC: 8.2 MG/DL (ref 8.6–10.6)
CHLORIDE SERPL-SCNC: 99 MMOL/L (ref 98–107)
CO2 SERPL-SCNC: 25 MMOL/L (ref 21–32)
CREAT SERPL-MCNC: 6.93 MG/DL (ref 0.5–1.05)
EGFRCR SERPLBLD CKD-EPI 2021: 6 ML/MIN/1.73M*2
EOSINOPHIL # BLD AUTO: 0.11 X10*3/UL (ref 0–0.7)
EOSINOPHIL NFR BLD AUTO: 1.9 %
ERYTHROCYTE [DISTWIDTH] IN BLOOD BY AUTOMATED COUNT: 16.5 % (ref 11.5–14.5)
GLUCOSE SERPL-MCNC: 76 MG/DL (ref 74–99)
GRAM STN SPEC: NORMAL
GRAM STN SPEC: NORMAL
HCT VFR BLD AUTO: 29.1 % (ref 36–46)
HGB BLD-MCNC: 10.3 G/DL (ref 12–16)
IMM GRANULOCYTES # BLD AUTO: 0.08 X10*3/UL (ref 0–0.7)
IMM GRANULOCYTES NFR BLD AUTO: 1.4 % (ref 0–0.9)
LYMPHOCYTES # BLD AUTO: 0.93 X10*3/UL (ref 1.2–4.8)
LYMPHOCYTES NFR BLD AUTO: 16.3 %
MAGNESIUM SERPL-MCNC: 2.03 MG/DL (ref 1.6–2.4)
MCH RBC QN AUTO: 30 PG (ref 26–34)
MCHC RBC AUTO-ENTMCNC: 35.4 G/DL (ref 32–36)
MCV RBC AUTO: 85 FL (ref 80–100)
MONOCYTES # BLD AUTO: 0.44 X10*3/UL (ref 0.1–1)
MONOCYTES NFR BLD AUTO: 7.7 %
NEUTROPHILS # BLD AUTO: 4.14 X10*3/UL (ref 1.2–7.7)
NEUTROPHILS NFR BLD AUTO: 72.4 %
NRBC BLD-RTO: 0 /100 WBCS (ref 0–0)
P AXIS: 67 DEGREES
P OFFSET: 207 MS
P ONSET: 148 MS
PHOSPHATE SERPL-MCNC: 4.6 MG/DL (ref 2.5–4.9)
PLATELET # BLD AUTO: 239 X10*3/UL (ref 150–450)
POTASSIUM SERPL-SCNC: 4.9 MMOL/L (ref 3.5–5.3)
PR INTERVAL: 152 MS
PROT SERPL-MCNC: 5.4 G/DL (ref 6.4–8.2)
Q ONSET: 224 MS
QRS COUNT: 15 BEATS
QRS DURATION: 88 MS
QT INTERVAL: 390 MS
QTC CALCULATION(BAZETT): 482 MS
QTC FREDERICIA: 449 MS
R AXIS: -53 DEGREES
RBC # BLD AUTO: 3.43 X10*6/UL (ref 4–5.2)
SODIUM SERPL-SCNC: 133 MMOL/L (ref 136–145)
T AXIS: 124 DEGREES
T OFFSET: 419 MS
VENTRICULAR RATE: 92 BPM
WBC # BLD AUTO: 5.7 X10*3/UL (ref 4.4–11.3)

## 2024-02-08 PROCEDURE — 93010 ELECTROCARDIOGRAM REPORT: CPT | Performed by: STUDENT IN AN ORGANIZED HEALTH CARE EDUCATION/TRAINING PROGRAM

## 2024-02-08 PROCEDURE — 71046 X-RAY EXAM CHEST 2 VIEWS: CPT

## 2024-02-08 PROCEDURE — G0378 HOSPITAL OBSERVATION PER HR: HCPCS

## 2024-02-08 PROCEDURE — 90947 DIALYSIS REPEATED EVAL: CPT

## 2024-02-08 PROCEDURE — 2500000001 HC RX 250 WO HCPCS SELF ADMINISTERED DRUGS (ALT 637 FOR MEDICARE OP): Performed by: STUDENT IN AN ORGANIZED HEALTH CARE EDUCATION/TRAINING PROGRAM

## 2024-02-08 PROCEDURE — 36415 COLL VENOUS BLD VENIPUNCTURE: CPT

## 2024-02-08 PROCEDURE — 2500000004 HC RX 250 GENERAL PHARMACY W/ HCPCS (ALT 636 FOR OP/ED): Performed by: NURSE PRACTITIONER

## 2024-02-08 PROCEDURE — 99285 EMERGENCY DEPT VISIT HI MDM: CPT | Mod: 25 | Performed by: STUDENT IN AN ORGANIZED HEALTH CARE EDUCATION/TRAINING PROGRAM

## 2024-02-08 PROCEDURE — 99285 EMERGENCY DEPT VISIT HI MDM: CPT | Performed by: STUDENT IN AN ORGANIZED HEALTH CARE EDUCATION/TRAINING PROGRAM

## 2024-02-08 PROCEDURE — 2500000001 HC RX 250 WO HCPCS SELF ADMINISTERED DRUGS (ALT 637 FOR MEDICARE OP): Performed by: NURSE PRACTITIONER

## 2024-02-08 PROCEDURE — 93005 ELECTROCARDIOGRAM TRACING: CPT | Mod: 59

## 2024-02-08 PROCEDURE — 85025 COMPLETE CBC W/AUTO DIFF WBC: CPT

## 2024-02-08 PROCEDURE — 2500000004 HC RX 250 GENERAL PHARMACY W/ HCPCS (ALT 636 FOR OP/ED): Performed by: INTERNAL MEDICINE

## 2024-02-08 PROCEDURE — 2500000004 HC RX 250 GENERAL PHARMACY W/ HCPCS (ALT 636 FOR OP/ED)

## 2024-02-08 PROCEDURE — 80053 COMPREHEN METABOLIC PANEL: CPT

## 2024-02-08 PROCEDURE — 99223 1ST HOSP IP/OBS HIGH 75: CPT | Performed by: NURSE PRACTITIONER

## 2024-02-08 PROCEDURE — 84100 ASSAY OF PHOSPHORUS: CPT

## 2024-02-08 PROCEDURE — 83735 ASSAY OF MAGNESIUM: CPT

## 2024-02-08 RX ORDER — CHOLECALCIFEROL (VITAMIN D3) 25 MCG
1000 TABLET ORAL DAILY
Status: DISCONTINUED | OUTPATIENT
Start: 2024-02-08 | End: 2024-02-19 | Stop reason: HOSPADM

## 2024-02-08 RX ORDER — VANCOMYCIN HYDROCHLORIDE 1 G/20ML
INJECTION, POWDER, LYOPHILIZED, FOR SOLUTION INTRAVENOUS
Status: COMPLETED
Start: 2024-02-08 | End: 2024-02-08

## 2024-02-08 RX ORDER — ALLOPURINOL 100 MG/1
100 TABLET ORAL DAILY
Status: DISCONTINUED | OUTPATIENT
Start: 2024-02-08 | End: 2024-02-19 | Stop reason: HOSPADM

## 2024-02-08 RX ORDER — DIPHENOXYLATE HYDROCHLORIDE AND ATROPINE SULFATE 2.5; .025 MG/1; MG/1
1 TABLET ORAL 4 TIMES DAILY PRN
Status: DISCONTINUED | OUTPATIENT
Start: 2024-02-08 | End: 2024-02-19 | Stop reason: HOSPADM

## 2024-02-08 RX ORDER — HYDROMORPHONE HYDROCHLORIDE 2 MG/1
TABLET ORAL
Status: DISPENSED
Start: 2024-02-08 | End: 2024-02-09

## 2024-02-08 RX ORDER — ACETAMINOPHEN 325 MG/1
975 TABLET ORAL 3 TIMES DAILY PRN
Status: DISCONTINUED | OUTPATIENT
Start: 2024-02-08 | End: 2024-02-19 | Stop reason: HOSPADM

## 2024-02-08 RX ORDER — ACETAMINOPHEN 500 MG
10 TABLET ORAL DAILY PRN
Status: DISCONTINUED | OUTPATIENT
Start: 2024-02-08 | End: 2024-02-19 | Stop reason: HOSPADM

## 2024-02-08 RX ORDER — WARFARIN SODIUM 5 MG/1
5 TABLET ORAL DAILY
Status: DISCONTINUED | OUTPATIENT
Start: 2024-02-08 | End: 2024-02-11

## 2024-02-08 RX ORDER — SENNOSIDES 8.6 MG/1
2 TABLET ORAL DAILY PRN
Status: DISCONTINUED | OUTPATIENT
Start: 2024-02-08 | End: 2024-02-19 | Stop reason: HOSPADM

## 2024-02-08 RX ORDER — ATORVASTATIN CALCIUM 40 MG/1
40 TABLET, FILM COATED ORAL NIGHTLY
Status: DISCONTINUED | OUTPATIENT
Start: 2024-02-08 | End: 2024-02-19 | Stop reason: HOSPADM

## 2024-02-08 RX ORDER — NYSTATIN 100000 [USP'U]/ML
500000 SUSPENSION ORAL EVERY 8 HOURS SCHEDULED
Status: DISCONTINUED | OUTPATIENT
Start: 2024-02-08 | End: 2024-02-19 | Stop reason: HOSPADM

## 2024-02-08 RX ORDER — MIRTAZAPINE 15 MG/1
15 TABLET, FILM COATED ORAL NIGHTLY
Status: DISCONTINUED | OUTPATIENT
Start: 2024-02-08 | End: 2024-02-19 | Stop reason: HOSPADM

## 2024-02-08 RX ORDER — LEVOTHYROXINE SODIUM 25 UG/1
25 TABLET ORAL
Status: DISCONTINUED | OUTPATIENT
Start: 2024-02-09 | End: 2024-02-19 | Stop reason: HOSPADM

## 2024-02-08 RX ORDER — POLYETHYLENE GLYCOL 3350 17 G/17G
17 POWDER, FOR SOLUTION ORAL DAILY PRN
Status: DISCONTINUED | OUTPATIENT
Start: 2024-02-08 | End: 2024-02-19 | Stop reason: HOSPADM

## 2024-02-08 RX ORDER — ONDANSETRON 4 MG/1
4 TABLET, ORALLY DISINTEGRATING ORAL EVERY 8 HOURS PRN
Status: DISCONTINUED | OUTPATIENT
Start: 2024-02-08 | End: 2024-02-19 | Stop reason: HOSPADM

## 2024-02-08 RX ORDER — HYDROMORPHONE HYDROCHLORIDE 2 MG/1
3 TABLET ORAL EVERY 4 HOURS PRN
Status: DISCONTINUED | OUTPATIENT
Start: 2024-02-08 | End: 2024-02-14

## 2024-02-08 RX ORDER — GENTAMICIN SULFATE 1 MG/G
CREAM TOPICAL EVERY 24 HOURS
Status: DISCONTINUED | OUTPATIENT
Start: 2024-02-08 | End: 2024-02-19 | Stop reason: HOSPADM

## 2024-02-08 RX ORDER — SEVELAMER CARBONATE 800 MG/1
800 TABLET, FILM COATED ORAL
Status: DISCONTINUED | OUTPATIENT
Start: 2024-02-08 | End: 2024-02-19 | Stop reason: HOSPADM

## 2024-02-08 RX ORDER — HYDROMORPHONE HYDROCHLORIDE 2 MG/1
3 TABLET ORAL ONCE
Status: COMPLETED | OUTPATIENT
Start: 2024-02-08 | End: 2024-02-08

## 2024-02-08 RX ADMIN — MIRTAZAPINE 15 MG: 15 TABLET, FILM COATED ORAL at 20:29

## 2024-02-08 RX ADMIN — ATORVASTATIN CALCIUM 40 MG: 40 TABLET, FILM COATED ORAL at 20:29

## 2024-02-08 RX ADMIN — ALLOPURINOL 100 MG: 100 TABLET ORAL at 18:40

## 2024-02-08 RX ADMIN — NEPHROCAP 1 CAPSULE: 1 CAP ORAL at 18:40

## 2024-02-08 RX ADMIN — HEPARIN SODIUM: 1000 INJECTION INTRAVENOUS; SUBCUTANEOUS at 16:40

## 2024-02-08 RX ADMIN — Medication 1000 UNITS: at 18:40

## 2024-02-08 RX ADMIN — VANCOMYCIN HYDROCHLORIDE 250 MG: 1 INJECTION, POWDER, LYOPHILIZED, FOR SOLUTION INTRAVENOUS at 17:20

## 2024-02-08 RX ADMIN — WARFARIN SODIUM 5 MG: 5 TABLET ORAL at 18:40

## 2024-02-08 RX ADMIN — HYDROMORPHONE HYDROCHLORIDE 3 MG: 2 TABLET ORAL at 14:02

## 2024-02-08 RX ADMIN — HYDROMORPHONE HYDROCHLORIDE 3 MG: 2 TABLET ORAL at 17:22

## 2024-02-08 RX ADMIN — SEVELAMER CARBONATE 800 MG: 800 TABLET, FILM COATED ORAL at 18:40

## 2024-02-08 ASSESSMENT — PAIN DESCRIPTION - PROGRESSION: CLINICAL_PROGRESSION: NOT CHANGED

## 2024-02-08 ASSESSMENT — ENCOUNTER SYMPTOMS
WHEEZING: 0
CONSTITUTIONAL NEGATIVE: 1
RESPIRATORY NEGATIVE: 1
MUSCULOSKELETAL NEGATIVE: 1
EYES NEGATIVE: 1
GASTROINTESTINAL NEGATIVE: 1
CARDIOVASCULAR NEGATIVE: 1
NEUROLOGICAL NEGATIVE: 1

## 2024-02-08 ASSESSMENT — LIFESTYLE VARIABLES
EVER HAD A DRINK FIRST THING IN THE MORNING TO STEADY YOUR NERVES TO GET RID OF A HANGOVER: NO
HAVE YOU EVER FELT YOU SHOULD CUT DOWN ON YOUR DRINKING: NO
EVER FELT BAD OR GUILTY ABOUT YOUR DRINKING: NO
HAVE PEOPLE ANNOYED YOU BY CRITICIZING YOUR DRINKING: NO

## 2024-02-08 ASSESSMENT — COLUMBIA-SUICIDE SEVERITY RATING SCALE - C-SSRS
1. IN THE PAST MONTH, HAVE YOU WISHED YOU WERE DEAD OR WISHED YOU COULD GO TO SLEEP AND NOT WAKE UP?: NO
6. HAVE YOU EVER DONE ANYTHING, STARTED TO DO ANYTHING, OR PREPARED TO DO ANYTHING TO END YOUR LIFE?: NO
2. HAVE YOU ACTUALLY HAD ANY THOUGHTS OF KILLING YOURSELF?: NO
6. HAVE YOU EVER DONE ANYTHING, STARTED TO DO ANYTHING, OR PREPARED TO DO ANYTHING TO END YOUR LIFE?: NO
1. IN THE PAST MONTH, HAVE YOU WISHED YOU WERE DEAD OR WISHED YOU COULD GO TO SLEEP AND NOT WAKE UP?: NO
2. HAVE YOU ACTUALLY HAD ANY THOUGHTS OF KILLING YOURSELF?: NO

## 2024-02-08 ASSESSMENT — PAIN - FUNCTIONAL ASSESSMENT
PAIN_FUNCTIONAL_ASSESSMENT: 0-10
PAIN_FUNCTIONAL_ASSESSMENT: 0-10

## 2024-02-08 ASSESSMENT — PAIN SCALES - GENERAL
PAINLEVEL_OUTOF10: 0 - NO PAIN
PAINLEVEL_OUTOF10: 0 - NO PAIN
PAINLEVEL_OUTOF10: 10 - WORST POSSIBLE PAIN
PAINLEVEL_OUTOF10: 6

## 2024-02-08 NOTE — PROGRESS NOTES
Yoselyn Hernandez is a 64 y.o. female on day 0 of admission presenting with No Principal Problem: There is no principal problem currently on the Problem List. Please update the Problem List and refresh..    Subjective   Patient was discharged to [facility] SNF last evening; the facility was aware of the need for PD and reported that they would handle everything needed to set this up. Further, they were aware of the need for antibiotics during PD and paper script had been sent to them.    SW was told yesterday that the patients friend would deliver machine to SNF around 3pm; advised by SNF at 4pm that they still had not received. Bedside RN spoke to patient who stated that the friend was on the way there, the machine was dropped off at 5pm. Notified SNF via [facility] that patients PD Cycler machine be delivered around 5pm yesterday and inquired if they would still be able to accept. There was no answer at 5:30pm so bedside RN/MD were updated and informed that RN would inquire during [facility] report.    Received update at 6:30pm that [facility] was called and the SNF RN stated that the machine was there but they had not yet been trained on PD. She advised bedside RN that patient could still be discharged to their facility last evening. Inquired if discharge should be delayed but was informed patient could still go.    SW received update this morning in [facility] that the company that was coming to train their employees was unavailable until Friday and that patient would need to be readmitted.     Patient can return to [facility] once her PD has been confirmed; referral was built and sent to determine if new authorization will be needed. Please follow up in [facility] as appropriate.     SW manager was updated regarding above. Dr. Wiley aware of above.    -Radha Weinberg [facility] City[facility]ONARD[facility], MA, LSW  [phone] or [employer] Secure Chat  Care Transitions

## 2024-02-08 NOTE — ED TRIAGE NOTES
Pt to ED via transport from nursing facility in [place] with c/c needs peritoneal dialysis. Pt says she was discharged from  yesterday after inpt treatment for peritonitis. Says plan was for nurse to come to facility this morning to teach staff how to administer dialysis. Nurse didn't come this morning. Pt says staff would not administer any of her meds (pain meds, antibx) until nurse arrived. Last PD was 2 days ago.

## 2024-02-08 NOTE — ED PROVIDER NOTES
"HPI   Chief Complaint   Patient presents with    needs peritoneal dialysis       HPI  Patient is a 64-year-old woman with past medical history of ESRD, multiple AAA dissections, HFrEF, 25%, 3/23, pancreatitis, pulmonary embolism on warfarin, gout, DVT, hypertension that presents to the emergency room after discharging herself from SNF since they were unable to perform peritoneal dialysis and did not receive pain medications. Pt states she was discharged from hospital yesterday 2/7 for SBP and was discharged to SNF, she is supposed to get peritoneal dialysis nightly and missed this on 2/7 as the SNF did not know how to perform peritoneal dialysis. The dialysis nurse did not show up this morning and pt states facility was refusing to give her pain medication so she signed out from the facility because of pain. She usually performs her own peritoneal dialysis nightly. She states last pain medication received at 4:30pm on 2/7/24. She states her abdominal pain is chronic and not like it was when she was admitted for SBP \"it is much improved.' She endorses mild chest pain and SOB which she states are typical of when she misses dialysis. Also has had leg edema the past few weeks. She is anuric at baseline and has chronic diarrhea. Denies HA, n/v, constipation, hematochezia, f/c, exquisite abdominal tenderness.                  [place] Coma Scale Score: 15                     Patient History   Past Medical History:   Diagnosis Date    CHF (congestive heart failure) (CMS/[facility])     COPD (chronic obstructive pulmonary disease) (CMS/[facility])     ESRD (end stage renal disease) (CMS/[facility])     Hypertension     Other abnormalities of gait and mobility     Gait, antalgic    PE (pulmonary thromboembolism) (CMS/[facility])     Personal history of other endocrine, nutritional and metabolic disease     History of hyperlipidemia    Personal history of other specified conditions     History of shortness of breath     Past Surgical History:   Procedure " Laterality Date    COLONOSCOPY  12/05/2017    Complete Colonoscopy    COLONOSCOPY  12/2021    rpt 12-25    CT ABDOMEN PELVIS ANGIOGRAM W AND/OR WO IV CONTRAST  11/17/2014    CT ABDOMEN PELVIS ANGIOGRAM W AND/OR WO IV CONTRAST 11/17/2014 [facility] CLINICAL LEGACY    CT ABDOMEN PELVIS ANGIOGRAM W AND/OR WO IV CONTRAST  02/01/2018    CT ABDOMEN PELVIS ANGIOGRAM W AND/OR WO IV CONTRAST 2/1/2018 [facility] AIB LEGACY    CT ABDOMEN PELVIS ANGIOGRAM W AND/OR WO IV CONTRAST  02/23/2018    CT ABDOMEN PELVIS ANGIOGRAM W AND/OR WO IV CONTRAST 2/23/2018 [facility] INPATIENT LEGACY    CT ABDOMEN PELVIS ANGIOGRAM W AND/OR WO IV CONTRAST  01/10/2019    CT ABDOMEN PELVIS ANGIOGRAM W AND/OR WO IV CONTRAST 1/10/2019 [facility] EMERGENCY LEGACY    CT ABDOMEN PELVIS ANGIOGRAM W AND/OR WO IV CONTRAST  11/25/2019    CT ABDOMEN PELVIS ANGIOGRAM W AND/OR WO IV CONTRAST 11/25/2019 [facility] ANCILLARY LEGACY    CT ABDOMEN PELVIS ANGIOGRAM W AND/OR WO IV CONTRAST  04/30/2021    CT ABDOMEN PELVIS ANGIOGRAM W AND/OR WO IV CONTRAST 4/30/2021 [facility] CLINICAL LEGACY    CT ABDOMEN PELVIS ANGIOGRAM W AND/OR WO IV CONTRAST  01/09/2017    CT ABDOMEN PELVIS ANGIOGRAM W AND/OR WO IV CONTRAST 1/9/2017 [facility] CLINICAL LEGACY    CT ABDOMEN PELVIS ANGIOGRAM W AND/OR WO IV CONTRAST  10/24/2018    CT ABDOMEN PELVIS ANGIOGRAM W AND/OR WO IV CONTRAST 10/24/2018 [facility] EMERGENCY LEGACY    CT ABDOMEN PELVIS ANGIOGRAM W AND/OR WO IV CONTRAST  12/13/2022    CT ABDOMEN PELVIS ANGIOGRAM W AND/OR WO IV CONTRAST 12/13/2022 DOCTOR OFFICE LEGACY    CT AORTA AND BILATERAL ILIOFEMORAL RUNOFF ANGIOGRAM W AND/OR WO IV CONTRAST  05/12/2023    CT AORTA AND BILATERAL ILIOFEMORAL RUNOFF ANGIOGRAM W AND/OR WO IV CONTRAST 5/12/2023 [facility] CT    HYSTERECTOMY  01/06/2014    Hysterectomy    IR ANGIOGRAM AORTA ABDOMEN  08/02/2019    IR ANGIOGRAM AORTA ABDOMEN 8/2/2019 [facility] INPATIENT LEGACY    IR ANGIOGRAM AORTA ABDOMEN  08/19/2022    IR ANGIOGRAM AORTA ABDOMEN 8/19/2022 [facility] INPATIENT LEGACY    IR ANGIOGRAM AORTA THORACIC   2018    IR ANGIOGRAM AORTA THORACIC 2018 [facility] INPATIENT LEGACY    IR ANGIOGRAM ENDOVASCULAR AORTIC REPAIR  2022    IR ANGIOGRAM ENDOVASCULAR AORTIC REPAIR 2022 [facility] INPATIENT LEGACY    IR INTERVENTION NEURO STENT  2019    IR INTERVENTION NEURO STENT 2019 [facility] INPATIENT LEGACY    OTHER SURGICAL HISTORY  2017    Aortic Aneurysm Repair Thoracoabdominal     Family History   Problem Relation Name Age of Onset    COPD Mother      Kidney failure Father       Social History     Tobacco Use    Smoking status: Former     Packs/day: 0.25     Years: 20.00     Additional pack years: 0.00     Total pack years: 5.00     Types: Cigarettes     Quit date:      Years since quittin.1    Smokeless tobacco: Never   Vaping Use    Vaping Use: Never used   Substance Use Topics    Alcohol use: Not Currently    Drug use: Yes     Types: Marijuana       Physical Exam   ED Triage Vitals [24 1317]   Temperature Heart Rate Respirations BP   36.6 °C (97.9 °F) 91 16 132/54      Pulse Ox Temp Source Heart Rate Source Patient Position   98 % Temporal -- --      BP Location FiO2 (%)     -- --       Physical Exam  Constitutional:       Appearance: Normal appearance. She is ill-appearing.      Comments: Thin, resting in bed   HENT:      Head: Normocephalic and atraumatic.   Eyes:      Conjunctiva/sclera: Conjunctivae normal.   Cardiovascular:      Rate and Rhythm: Normal rate and regular rhythm.      Heart sounds: Normal heart sounds.   Pulmonary:      Effort: Pulmonary effort is normal.      Breath sounds: Normal breath sounds.   Abdominal:      General: Abdomen is flat.      Palpations: Abdomen is soft.      Tenderness: There is abdominal tenderness. There is no guarding or rebound.   Musculoskeletal:         General: Normal range of motion.      Cervical back: Normal range of motion.      Right lower leg: Edema present.      Left lower leg: Edema present.   Skin:     General: Skin is warm and dry.    Neurological:      General: No focal deficit present.      Mental Status: She is alert.         ED Course & MDM   ED Course as of 02/08/24 1757   u Feb 08, 2024   1357 ECG 12 lead  EKG in NSR, no ST abnormalities. No peaked T waves  [KE]   1457 PHOSPHORUS: 4.6  Phos and Mag WNL [KE]   1457 Comprehensive metabolic panel(!)  CMP with CR and BUN at pt's CKD baseline, no hypo/hyperkalemia [KE]   1753 Senior Staffing note:    The patient was seen with the randy resident.  I agree with the plan.  Patient is a 64-year-old woman with a past medical history of ESRD, multiple abdominal a aortic and thoracic aortic dissection s/p repair, presenting to the emergency department today due to abdominal discomfort.  Patient was recently admitted for peritonitis in the setting of peritoneal dialysis.  She was discharged to SNF for rehabilitation as she is weak and also due to needing prolonged vancomycin via her peritoneal dialysis.  The patient states that at the SNF, they were not able to give her her dialysis due to not having the appropriate equipment.  She brought her own dialysis machine however they also did not have the appropriate dialysis fluid containing the antibiotic needed to treat her.  Given that she was not able to get her dialysis at the skilled nursing facility, they were refusing to give her her pain medication, unclear why.  As such, she had her friend pick her up from the skilled nursing facility and bring her here.  She is in a significant amount of pain.  Given the concerns for her ability to get the care that this required nursing facility, will admit to medicine for coordination of the care that is required.    EKG shows a normal rate and rhythm, normal axis, normal intervals. And normal ST and T wave pattern with no evidence of acute ischemia or other acute findings [SC]      ED Course User Index  [KE] Shae Go MD  [SC] Nickie Bain MD         Diagnoses as of 02/08/24 9147    Dialysis-associated peritonitis, sequela (CMS/HCC)       Medical Decision Making  Patient is a 64-year-old woman with past medical history of ESRD, multiple AAA dissections, HFrEF, 25%, 3/23, pancreatitis, pulmonary embolism on warfarin, gout, DVT, hypertension that presents to the emergency room after discharging herself from SNF since they were unable to perform peritoneal dialysis and did not receive pain medications. Pt appears at her baseline, was recently evaluated and discharged 2/7/24 presenting today due to an issue with SNF she was discharged to. Plan to administer medications according to discharge summary for pain with dilaudid 3mg Q4 hours and peritoneal dialysis. Workup for missed dialysis session including cbc, cmp, mag, phos, EKG and CXR as pt is volume overloaded. Results as above in ED course. Pt requires admission to inpatient medicine to arrange for dialysis at SNF per our inpatient dialysis team. Pt requires treatment with vancomycin in dialysate and pain management.    Procedure  Procedures            Shae Go MD  Resident  02/08/24 1105       Nickie Bain MD  Resident  02/08/24 2323

## 2024-02-08 NOTE — H&P
HPI     Ms Hernandez is a 64y female with PMHx: ESRD, multiple AAA dissections, HFrEF, 25%, 3/23, pancreatitis, pulmonary embolism on warfarin, gout, DVT, hypertension  who presented to the ED from [facility] where she was discharged yesterday from our service for continued peritoneal dialysis w/antibiotics for peritonitis.  The facility was suddenly unable to accommodate patient and her peritoneal dialysis and needed to send her back to the ED.  Patient missed her dialysis last night and per patient she was not being given her pain meds and so she asked them to send her back to the ED.  She currently denies any symptoms and is to be admitted as observation for SBP.  Discussed plan of care with patient and she was in agreement--- explained we would be continuing to follow the same orders/meds she was discharged with yesterday.    ROS/EXAM     Review of Systems   Constitutional: Negative.    HENT: Negative.     Eyes: Negative.    Respiratory: Negative.  Negative for wheezing.    Cardiovascular: Negative.    Gastrointestinal: Negative.    Genitourinary: Negative.    Musculoskeletal: Negative.    Skin: Negative.    Neurological: Negative.    All other systems reviewed and are negative.    Physical Exam  HENT:      Head: Normocephalic and atraumatic.      Nose: Nose normal.      Mouth/Throat:      Mouth: Mucous membranes are moist.      Pharynx: Oropharynx is clear.   Eyes:      Conjunctiva/sclera: Conjunctivae normal.   Cardiovascular:      Rate and Rhythm: Normal rate and regular rhythm.      Pulses: Normal pulses.      Heart sounds: Normal heart sounds.   Pulmonary:      Effort: Pulmonary effort is normal.      Breath sounds: Normal breath sounds.   Abdominal:      General: Abdomen is flat. Bowel sounds are normal.      Palpations: Abdomen is soft.      Comments: Peritoneal dialysis cath in place   Musculoskeletal:         General: Normal range of motion.      Cervical back: Normal range of motion and neck  supple.   Skin:     General: Skin is warm and dry.      Capillary Refill: Capillary refill takes less than 2 seconds.   Neurological:      Mental Status: She is alert.   Psychiatric:         Mood and Affect: Mood normal.         Thought Content: Thought content normal.         Judgment: Judgment normal.         Vitals/LABS/RESULTS     Last Recorded Vitals  Blood pressure 132/54, pulse 91, temperature 36.6 °C (97.9 °F), temperature source Temporal, resp. rate 16, SpO2 98 %.  Intake/Output last 3 Shifts:  No intake/output data recorded.    Relevant Results  Lab Results   Component Value Date    WBC 5.7 02/08/2024    HGB 10.3 (L) 02/08/2024    HCT 29.1 (L) 02/08/2024    MCV 85 02/08/2024     02/08/2024      Lab Results   Component Value Date    GLUCOSE 76 02/08/2024    CALCIUM 8.2 (L) 02/08/2024     (L) 02/08/2024    K 4.9 02/08/2024    CO2 25 02/08/2024    CL 99 02/08/2024    BUN 46 (H) 02/08/2024    CREATININE 6.93 (H) 02/08/2024     XR chest 2 views    Result Date: 2/8/2024  Interpreted By:  Haile Santos, STUDY: Chest dated  2/8/2024.   INDICATION: Signs/Symptoms:CKD, missed dialysis, volume overload   COMPARISON: Chest dated 01/30/2024.   ACCESSION NUMBER(S): [id number]   ORDERING CLINICIAN: MARCUS BUSCH   TECHNIQUE: One view of the chest.   FINDINGS: There is prominence of the central pulmonary vasculature marisa and pulmonary interstitium. This may be mildly more conspicuous than the prior exam. There is minimal blunting of the left costophrenic angle posteriorly. No pneumothorax  is evident. The cardiomediastinal silhouette is  enlarged but similar to the prior exam.Surgical changes are seen of the mediastinum.Degenerative change is seen of the spine and shoulders.       1. Findings suggestive of a degree of pulmonary edema/CHF. 2. Trace left pleural effusion.   MACRO: None   Signed by: Haile Santos 2/8/2024 2:37 PM Dictation workstation:   [id number]    ECG 12 lead    Result Date:  1/31/2024  Sinus rhythm with occasional Premature ventricular complexes Possible Left atrial enlargement Left axis deviation ST & T wave abnormality, consider lateral ischemia Abnormal ECG When compared with ECG of 05-NOV-2023 21:37, Significant changes have occurred See ED provider note for full interpretation and clinical correlation Confirmed by Burak Leija ([id number]) on 1/31/2024 4:36:00 AM    CT angio chest abdomen pelvis    Result Date: 1/30/2024  Interpreted By:  José Miguel Mckee and Bamfo Rose STUDY: CT ANGIO CHEST ABDOMEN PELVIS;  1/30/2024 4:32 pm   INDICATION: Signs/Symptoms:chest and abdominal pain, hx of AAA.   COMPARISON: CTA chest abdomen pelvis on 11/06/2023 CT abdomen pelvis on 11/12/2024   ACCESSION NUMBER(S): [id number]   ORDERING CLINICIAN: XUAN VENEGAS   TECHNIQUE: Axial non-contrast images of the chest, abdomen, and pelvis with coronal and sagittal reformatted images. Axial CT images of the chest, abdomen and pelvis after the intravenous administration of 100 mL Omnipaque 350 using angiographic technique with coronal and sagittal reformatted images. MIP images were provided and reviewed. 3D reconstructions were created on a separate independent workstation and reviewed.   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 Similar appearance of a dissection flap extending into the brachiocephalic and right common carotid arteries. Similar appearance of a small dissection versus contained penetrating atheromatous ulcer in the proximal left common carotid artery (series 501, image 93 of 659) with a widely patent bypass from the left common carotid to the left subclavian artery. The more distal left common carotid artery is patent.   Redemonstration of proximal descending aortic aneurysm measuring up to 5.2 cm in largest dimension (series 501, image 140), stable from 11/06/2023.   ABDOMINAL ARTERIES: The aortic  endograft extends from the distal ascending thoracic aorta to the distal abdominal aorta. The endograft is patent. Within limitations of no delayed enhancement images, there is no evidence of endoleak. There is a graft arising from the right common iliac artery supplying the celiac and superior mesenteric arteries, which remain widely patent. The proximal inferior mesenteric artery appears occluded, but distally patent via retrograde flow.     RENAL ARTERIES: Additionally, there is a graft arising from the left common iliac artery supplying the bilateral renal arteries. The proximal component of the graft is incompletely thrombosed with moderate narrowing of the lumen, which is unchanged from 11/06/2023. The graft supplying the left renal artery is thrombosed, stable from prior exam. Interval development of high-grade stenosis/occlusion of the graft supplying the right renal artery, with no visualized blood flow to the right kidney.     ABDOMINOPELVIC ARTERIES: Similar appearance slightly of a small non flow-limiting dissection involving the right external iliac artery and extending to the right common femoral artery. Unchanged short segment left common femoral artery dissection when compared to prior CT with adjacent fluid collection (series 501, images 544 and 616). Unchanged aneurysmal dilation of the right internal iliac artery measuring up to 1.5 cm (series 501, image 461). There is beam hardening from the embolization coils of left internal iliac artery.   NONVASCULAR FINDINGS:   CT CHEST:   LUNG/PLEURA/LARGE AIRWAYS: Stable bibasilar atelectasis or scarring. Centrilobular and paraseptal emphysema with apical bullous changes are again noted. There is no pleural effusion or pneumothorax.   VESSELS: Severe atherosclerotic changes are noted of the aorta. Severe coronary artery calcifications are present.   HEART: Stable cardiomegaly with enlargement of the bilateral atria and left ventricle. No pericardial  effusion   MEDIASTINUM AND ALIDA: No mediastinal, hilar or axillary lymphadenopathy is present. The esophagus is normal.   CHEST WALL AND LOWER NECK: Status post median sternotomy. The visualized thyroid gland appears within normal limits.     CT ABDOMEN/PELVIS:   LIVER: The liver is normal in size without evidence of focal liver lesions.   BILE DUCTS: The intrahepatic and extrahepatic ducts are not dilated.   GALLBLADDER: The gallbladder is surgically absent.   PANCREAS: Similar mild pancreatic ductal dilation with compression of the pancreatic tail from the adjacent chronic fluid collection. This peripherally calcified peripancreatic collection measures 7.0 cm, stable to mildly decreased from 7.4 cm on prior exam.   SPLEEN: The spleen is normal in size without focal lesions.   ADRENAL GLANDS: Bilateral adrenal glands appear normal.   KIDNEYS AND URETERS: Bilateral renal atrophy is again noted. Stable right renal cysts. No hydroureteronephrosis or nephroureterolithiasis is identified.   PELVIS:   BLADDER: The urinary bladder appears normal without abnormal wall thickening.   REPRODUCTIVE ORGANS: Uterus is not visualized and may be atrophic or surgically absent.   BOWEL: The stomach is unremarkable. The small and large bowel are normal in caliber and demonstrate no wall thickening. The appendix is not definitely visualized. There is however no pericecal stranding or fluid.     PERITONEUM/RETROPERITONEUM/LYMPH NODES: Peritoneal dialysis catheter noted in the left lower abdomen. Small volume free peritoneal fluid is again noted.   BONE AND SOFT TISSUE: No suspicious osseous lesions are identified. Degenerative discogenic disease is noted in the lower thoracic and lumbar spine. Multiple ventral hernias are again noted, with interval development of multiple air-fluid levels in comparison to CT on 11/12/2024.       1. Interval development of occlusion from the left common iliac artery graft supplying the right renal  artery, with no visualized blood flow to the right kidney. 2. Interval development of multiple air-fluid levels within multiple ventral hernias, which may be secondary to iatrogenic air from the patient's peritoneal dialysis catheter but with bacterial peritonitis not excluded. 3. Stable postoperative changes of thoracic and abdominal aortic endograft repair. 4. Stable aneurysmal dilation of the thoracic descending aorta. 5. Stable appearance of dissections involving the right brachiocephalic artery at its origin and extending into the right common carotid artery, right external iliac extending to right common femoral artery, and a short segment of the left femoral artery.   I personally reviewed the images/study and I agree with radiology resident Dr. Martha Hylton's findings as stated. This study was interpreted at [facility], [place], [place].   MACRO: Martha Hylton discussed the significance and urgency of this critical finding by EPIC CHAT with  XUAN VENEGAS on 1/30/2024 at 7:36 pm. (**-RCF-**) Findings:  There are multiple critical findings. See findings.         Signed by: José Miguel Mckee 1/30/2024 8:12 PM Dictation workstation:   [id number]    XR chest 1 view    Result Date: 1/30/2024  Interpreted By:  Cali Rodriguez, STUDY: XR CHEST 1 VIEW   INDICATION: Signs/Symptoms:Chest Pain.   COMPARISON: October 18, 2023   ACCESSION NUMBER(S): [id number]   ORDERING CLINICIAN: CAMI LOYA   FINDINGS: Cardiomegaly with previous aortic repair unchanged.   No consolidation, effusion, edema, or pneumothorax.       No evidence of acute intrathoracic abnormality.   Signed by: Cali Rodriguez 1/30/2024 3:02 PM Dictation workstation:   [id number]    Lower extremity venous duplex left    Result Date: 1/12/2024  Interpreted By:  Manju Sandoval, [facility];  1/12/2024 6:02 am   INDICATION: Signs/Symptoms:leg pain, swelling, stopped taking coumadin a few weeks  ago.   COMPARISON: 04/15/2028   ACCESSION NUMBER(S): [id number]   ORDERING CLINICIAN: AIRAM WEIR   TECHNIQUE: Grayscale, color and spectral Doppler sonographic images of the left lower extremity deep venous system. The right common femoral vein was imaged for comparison.   FINDINGS: The common femoral vein and the entire femoral vein are incompletely compressible, consistent with partially occlusive thrombus. The popliteal vein is noncompressible, however, color flow is present, suggesting partially occlusive DVT. Suboptimal visualization of the calf veins.   The right common femoral vein is patent.   OTHER FINDINGS: 3.7 x 1.2 x 2.8 cm fluid collection in the popliteal fossa, most consistent with Baker's cyst. Subcutaneous edema throughout the imaged right lower extremity, most prominent in the calf.       Partially occlusive DVT in the left common femoral and entire length of the femoral vein. Suspected partially occlusive DVT in the popliteal vein.   Left lower extremity edema.   Baker's cyst Baker's cyst.       MACRO: None   Signed by: Manju Sandoval 1/12/2024 6:29 AM Dictation workstation:   [id number]    XR shoulder left 2+ views    Result Date: 1/12/2024  Interpreted By:  Manju Sandoval, STUDY: XR FOREARM LEFT 2 VIEWS; XR SHOULDER LEFT 2+ VIEWS; ;  1/12/2024 4:16 am; 1/12/2024 4:17 am   INDICATION: Signs/Symptoms:fall, pain.   COMPARISON: None.   ACCESSION NUMBER(S): [id number]; [id number]   ORDERING CLINICIAN: AIRAM WEIR   FINDINGS: Two views left shoulder and two views left forearm. No acute fracture or malalignment. Erosive changes at the acromioclavicular joint, similar to chest radiograph 10/18/2023. No overlying soft tissue swelling. No elbow joint effusion. Aortic stent graft partially imaged. Atherosclerotic vascular calcifications noted.       No acute osseous abnormality.   Erosive changes at the acromioclavicular joint is favored to be secondary to hyperparathyroidism/chronic renal  disease. Infection is considered less likely, however, please correlate with clinical history.     MACRO: None   Signed by: Manju Sandoval 1/12/2024 4:32 AM Dictation workstation:   [id number]    XR forearm left 2 views    Result Date: 1/12/2024  Interpreted By:  Manju Sandoval, STUDY: XR FOREARM LEFT 2 VIEWS; XR SHOULDER LEFT 2+ VIEWS; ;  1/12/2024 4:16 am; 1/12/2024 4:17 am   INDICATION: Signs/Symptoms:fall, pain.   COMPARISON: None.   ACCESSION NUMBER(S): [id number]; [id number]   ORDERING CLINICIAN: AIRAM WEIR   FINDINGS: Two views left shoulder and two views left forearm. No acute fracture or malalignment. Erosive changes at the acromioclavicular joint, similar to chest radiograph 10/18/2023. No overlying soft tissue swelling. No elbow joint effusion. Aortic stent graft partially imaged. Atherosclerotic vascular calcifications noted.       No acute osseous abnormality.   Erosive changes at the acromioclavicular joint is favored to be secondary to hyperparathyroidism/chronic renal disease. Infection is considered less likely, however, please correlate with clinical history.     MACRO: None   Signed by: Manju Sandoval 1/12/2024 4:32 AM Dictation workstation:   [id number]    CT abdomen pelvis wo IV contrast    Result Date: 1/12/2024  Interpreted By:  Manju Sandoval, STUDY: CT ABDOMEN PELVIS WO IV CONTRAST;  1/12/2024 4:09 am   INDICATION: Signs/Symptoms:fall, buttock pain, L hip pain.   COMPARISON: 11/06/2023   ACCESSION NUMBER(S): [id number]   ORDERING CLINICIAN: AIRAM WEIR   TECHNIQUE: Axial noncontrast CT images of the abdomen and pelvis with coronal and sagittal reconstructed images.   FINDINGS: LOWER CHEST: Stable bibasilar atelectasis or scarring. Partially imaged stent graft in the descending thoracic aorta. Stable cardiomegaly BONES: No acute osseous abnormality. Mild diffuse degenerative disc changes. ABDOMINAL WALL: Diffuse body wall edema. Periumbilical hernia containing the anterior wall  of a segment of transverse colon. No inflammatory changes in the hernia sac. Fluid containing supraumbilical ventral hernias.   ABDOMEN: Lack of intravenous contrast limits evaluation of vessels and solid organs. LIVER: No focal parenchymal abnormality, within limits of noncontrast CT. No perihepatic fluid. BILE DUCTS: No biliary dilatation. GALLBLADDER: Cholecystectomy greater PANCREAS: No peripancreatic inflammatory stranding or duct dilatation. SPLEEN: Within normal limits. ADRENALS: Within normal limits.   KIDNEYS, URETERS, URINARY BLADDER: No hydronephrosis or urinary tract calculus. Bilateral renal atrophy again noted. Stable right renal cysts. The urinary bladder is nondistended.   VESSELS: Postsurgical changes again noted including aortic stent graft with visceral branch bypass grafts. Stable peripherally calcified fluid collection around the superior mesenteric artery graft in the left upper quadrant. Vascular coils noted in the left internal iliac artery. RETROPERITONEUM: No pathologically enlarged lymph nodes.   PELVIS:   REPRODUCTIVE ORGANS: The uterus is not seen and may be atrophic or removed.   BOWEL: The stomach is nondistended. No dilated small bowel. The colon is nondistended appears grossly unremarkable. Mild colonic stool burden.  Normal appendix. PERITONEUM: Peritoneal dialysis catheter noted in the left mid abdomen. Small amount of free peritoneal fluid. No organized fluid collection. No pneumoperitoneum.       No acute abdominal or pelvic process.   Stable chronic findings as above.   MACRO: None   Signed by: Manju Sandoval 1/12/2024 4:25 AM Dictation workstation:   [id number]      Medications     Scheduled medications  allopurinol, 100 mg, oral, Daily  atorvastatin, 40 mg, oral, Nightly  B complex-vitamin C-folic acid, 1 capsule, oral, Daily  cholecalciferol, 1,000 Units, oral, Daily  dextrose 1.5% - LOW calcium 2.5mEq/L 5,000 mL with heparin 2,500 Units, vancomycin 125 mg peritoneal  dialysate, , intraperitoneal, q24h  dextrose 2.5 % - LOW calcium 2.5 mEq/L 5,000 mL with heparin 2,500 Units, vancomycin 125 mg peritoneal dialysate, , intraperitoneal, q24h  gentamicin, , Topical, q24h  [START ON 2/9/2024] levothyroxine, 25 mcg, oral, Daily before breakfast  lipase-protease-amylase, 3 capsule, oral, TID with meals  mirtazapine, 15 mg, oral, Nightly  nystatin, 500,000 Units, oral, q8h AMARJIT  sevelamer carbonate, 800 mg, oral, TID with meals  warfarin, 5 mg, oral, Daily      Continuous medications     PRN medications  PRN medications: acetaminophen, diphenoxylate-atropine, HYDROmorphone, melatonin, ondansetron ODT, polyethylene glycol, sennosides    PLAN     Ms Hernandez is a 64y female with PMHx: ESRD, multiple AAA dissections, HFrEF, 25%, 3/23, pancreatitis, pulmonary embolism on warfarin, gout, DVT, hypertension  who presented to the ED from [facility] where she was discharged yesterday from our service for continued peritoneal dialysis w/antibiotics for peritonitis.  The facility was suddenly unable to accommodate patient and her peritoneal dialysis and needed to send her back to the ED.  Patient missed her dialysis last night and per patient she was not being given her pain meds and so she asked them to send her back to the ED.  She currently denies any symptoms and is to be admitted as observation for SBP.  Discussed plan of care with patient and she was in agreement--- explained we would be continuing to follow the same orders/meds she was discharged with yesterday.    Assessment/Plan:  ESRD and PD Peritonitis   -Nephrology consulted and appreciate recs  -Continue pain control and intraperitoneal antibiotics   - Continue oral Dilaudid 3 mg every 4 hours as needed.  Would not increase any further.  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     Chronic HFrEF  -BP had  been soft so currently no HF meds to make room for soft Bps and dialysis  -Continue peritoneal dialysis.     Chronic diarrhea  -Chronic issue  Patient has seen GI outpatient previously.  -Continue lomotil as need it for diarrhea   -Per Nephrology notes on last admit-- patient needs to have at least one BM a day--- will continue home Miralax and Senna as prn with an order to give if no BM by the afternoon.        Physical deconditioning  -PT/OT recommend skilled nursing facility.  Waiting on placement.    Hypothyroid  -c/w home levothyroxine 25mcg daily    Gout  -c/w home allopurinol 100mg daily    Chronic pancreatitis  -c/w home creon 24,000/76,000/120,000 3 caps three times a day w/meals    Fluids: monitor and replete as needed  Electrolytes: monitor and replete as needed  Nutrition: Regular diet   GI prophylaxis: as needed  DVT prophylaxis: SCD      Disposition:  Admitted for above diagnoses. Plan per above. Anticipate observation stay.      Kaylyn Payne, APRN-CNP    I spent 75 minutes in the professional and overall care of this patient.

## 2024-02-08 NOTE — NURSING NOTE
Peritoneal Dialysis - [facility]    Tonight's Therapy   2/8/24    Dialysis cycler primed:   Y  Patient connected:  Y  Therapy started at:  1810  [employer]:  Y  Dressing changed:  Y  Gent. cream applied:  Y    Therapy Orders     Total time:  11 hours   Cycles:   5   Fill Volume:  1400 mL   Last fill:   1200 mL   Last fill solution:  Same   Total volume:  8200 mL     Solution(s):  Dextrose 1.5% Dianeal 2.5 calcium-- 5000 mL (x1)   Additive(s): Vancomycin/Heparin           Dextrose 2.5% Dianeal 2.5 calcium-- 5000 mL (x1)   Additive(s): Vancomycin/Heparin     Last Fill:  Same      **Dialysis nurses in house Monday through Friday 6420-4587 to setup and disconnect patients,   please call our office at [phone], follow prompts for after hours paging.    **Machine Trouble Shooting: [employer] 24 hour technical support available at [phone]     **Bedside nursing staff to disconnect patients as needed outside of office hours.  -Instructions and supplies located on dialysis cart.  -Reference  policy G-595 -->

## 2024-02-09 LAB
ALBUMIN SERPL BCP-MCNC: 2.1 G/DL (ref 3.4–5)
ANION GAP SERPL CALC-SCNC: 16 MMOL/L (ref 10–20)
BUN SERPL-MCNC: 42 MG/DL (ref 6–23)
CALCIUM SERPL-MCNC: 8.4 MG/DL (ref 8.6–10.6)
CHLORIDE SERPL-SCNC: 99 MMOL/L (ref 98–107)
CO2 SERPL-SCNC: 25 MMOL/L (ref 21–32)
CREAT SERPL-MCNC: 6.55 MG/DL (ref 0.5–1.05)
EGFRCR SERPLBLD CKD-EPI 2021: 7 ML/MIN/1.73M*2
ERYTHROCYTE [DISTWIDTH] IN BLOOD BY AUTOMATED COUNT: 16.3 % (ref 11.5–14.5)
GLUCOSE SERPL-MCNC: 106 MG/DL (ref 74–99)
HCT VFR BLD AUTO: 27.9 % (ref 36–46)
HGB BLD-MCNC: 9.6 G/DL (ref 12–16)
INR PPP: 3.3 (ref 0.9–1.1)
MCH RBC QN AUTO: 29.2 PG (ref 26–34)
MCHC RBC AUTO-ENTMCNC: 34.4 G/DL (ref 32–36)
MCV RBC AUTO: 85 FL (ref 80–100)
NRBC BLD-RTO: 0 /100 WBCS (ref 0–0)
PHOSPHATE SERPL-MCNC: 4.3 MG/DL (ref 2.5–4.9)
PLATELET # BLD AUTO: 249 X10*3/UL (ref 150–450)
POTASSIUM SERPL-SCNC: 4.1 MMOL/L (ref 3.5–5.3)
PROTHROMBIN TIME: 37.3 SECONDS (ref 9.8–12.8)
RBC # BLD AUTO: 3.29 X10*6/UL (ref 4–5.2)
SODIUM SERPL-SCNC: 136 MMOL/L (ref 136–145)
WBC # BLD AUTO: 6.1 X10*3/UL (ref 4.4–11.3)

## 2024-02-09 PROCEDURE — 85610 PROTHROMBIN TIME: CPT | Performed by: NURSE PRACTITIONER

## 2024-02-09 PROCEDURE — 85027 COMPLETE CBC AUTOMATED: CPT | Performed by: NURSE PRACTITIONER

## 2024-02-09 PROCEDURE — G0378 HOSPITAL OBSERVATION PER HR: HCPCS

## 2024-02-09 PROCEDURE — 80069 RENAL FUNCTION PANEL: CPT | Performed by: NURSE PRACTITIONER

## 2024-02-09 PROCEDURE — 2500000004 HC RX 250 GENERAL PHARMACY W/ HCPCS (ALT 636 FOR OP/ED): Performed by: INTERNAL MEDICINE

## 2024-02-09 PROCEDURE — 2500000002 HC RX 250 W HCPCS SELF ADMINISTERED DRUGS (ALT 637 FOR MEDICARE OP, ALT 636 FOR OP/ED): Performed by: NURSE PRACTITIONER

## 2024-02-09 PROCEDURE — 2500000001 HC RX 250 WO HCPCS SELF ADMINISTERED DRUGS (ALT 637 FOR MEDICARE OP): Performed by: NURSE PRACTITIONER

## 2024-02-09 PROCEDURE — 99232 SBSQ HOSP IP/OBS MODERATE 35: CPT | Performed by: INTERNAL MEDICINE

## 2024-02-09 PROCEDURE — 2500000004 HC RX 250 GENERAL PHARMACY W/ HCPCS (ALT 636 FOR OP/ED): Performed by: NURSE PRACTITIONER

## 2024-02-09 PROCEDURE — 36415 COLL VENOUS BLD VENIPUNCTURE: CPT | Performed by: NURSE PRACTITIONER

## 2024-02-09 RX ORDER — HYDROXYZINE HYDROCHLORIDE 25 MG/1
25 TABLET, FILM COATED ORAL ONCE
Status: COMPLETED | OUTPATIENT
Start: 2024-02-09 | End: 2024-02-10

## 2024-02-09 RX ADMIN — HEPARIN SODIUM: 1000 INJECTION INTRAVENOUS; SUBCUTANEOUS at 18:33

## 2024-02-09 RX ADMIN — PANCRELIPASE 3 CAPSULE: 24000; 76000; 120000 CAPSULE, DELAYED RELEASE PELLETS ORAL at 08:25

## 2024-02-09 RX ADMIN — NYSTATIN 500000 UNITS: 100000 SUSPENSION ORAL at 22:00

## 2024-02-09 RX ADMIN — NYSTATIN 500000 UNITS: 100000 SUSPENSION ORAL at 13:31

## 2024-02-09 RX ADMIN — ALLOPURINOL 100 MG: 100 TABLET ORAL at 08:25

## 2024-02-09 RX ADMIN — Medication 1000 UNITS: at 08:26

## 2024-02-09 RX ADMIN — LEVOTHYROXINE SODIUM 25 MCG: 25 TABLET ORAL at 08:26

## 2024-02-09 RX ADMIN — HEPARIN SODIUM: 1000 INJECTION INTRAVENOUS; SUBCUTANEOUS at 16:40

## 2024-02-09 RX ADMIN — PANCRELIPASE 3 CAPSULE: 24000; 76000; 120000 CAPSULE, DELAYED RELEASE PELLETS ORAL at 13:31

## 2024-02-09 RX ADMIN — HYDROMORPHONE HYDROCHLORIDE 3 MG: 2 TABLET ORAL at 19:12

## 2024-02-09 RX ADMIN — SEVELAMER CARBONATE 800 MG: 800 TABLET, FILM COATED ORAL at 08:25

## 2024-02-09 RX ADMIN — SENNOSIDES 17.2 MG: 8.6 TABLET, FILM COATED ORAL at 08:27

## 2024-02-09 RX ADMIN — HYDROMORPHONE HYDROCHLORIDE 3 MG: 2 TABLET ORAL at 00:31

## 2024-02-09 RX ADMIN — NEPHROCAP 1 CAPSULE: 1 CAP ORAL at 08:26

## 2024-02-09 RX ADMIN — SEVELAMER CARBONATE 800 MG: 800 TABLET, FILM COATED ORAL at 13:31

## 2024-02-09 RX ADMIN — HYDROMORPHONE HYDROCHLORIDE 3 MG: 2 TABLET ORAL at 08:22

## 2024-02-09 RX ADMIN — ACETAMINOPHEN 975 MG: 325 TABLET ORAL at 08:24

## 2024-02-09 ASSESSMENT — PAIN SCALES - GENERAL
PAINLEVEL_OUTOF10: 2
PAINLEVEL_OUTOF10: 4
PAINLEVEL_OUTOF10: 3
PAINLEVEL_OUTOF10: 10 - WORST POSSIBLE PAIN
PAINLEVEL_OUTOF10: 0 - NO PAIN
PAINLEVEL_OUTOF10: 10 - WORST POSSIBLE PAIN
PAINLEVEL_OUTOF10: 9

## 2024-02-09 ASSESSMENT — PAIN DESCRIPTION - PROGRESSION: CLINICAL_PROGRESSION: GRADUALLY IMPROVING

## 2024-02-09 ASSESSMENT — PAIN SCALES - PAIN ASSESSMENT IN ADVANCED DEMENTIA (PAINAD)
TOTALSCORE: 4
FACIALEXPRESSION: SAD, FRIGHTENED, FROWN
CONSOLABILITY: NO NEED TO CONSOLE
NEGVOCALIZATION: OCCASIONAL MOAN/GROAN, LOW SPEECH, NEGATIVE/DISAPPROVING QUALITY
BREATHING: OCCASIONAL LABORED BREATHING, SHORT PERIOD OF HYPERVENTILATION
BODYLANGUAGE: TENSE, DISTRESSED PACING, FIDGETING

## 2024-02-09 ASSESSMENT — PAIN - FUNCTIONAL ASSESSMENT
PAIN_FUNCTIONAL_ASSESSMENT: 0-10

## 2024-02-09 ASSESSMENT — PAIN DESCRIPTION - LOCATION
LOCATION: ABDOMEN

## 2024-02-09 NOTE — NURSING NOTE
Peritoneal Dialysis - [facility]    Completed Overnight Therapy   2/8/24    Full treatment received:               Y  I-Drain:    500 mL  Total UF:   411 mL  Combined 24 hour UF:  -289 mL  Minicap placed:   Y  Effluent Color:   Yellow   Cloudy:   N   Fibrin:   N    Tonight's Therapy   2/9/24    Dialysis cycler primed:   Y  Patient connected:  Y  Therapy started at:  1615  [employer]:  Y  Dressing changed:  Y  Gent. cream applied:  N    Therapy Orders     Total time:  11 hours   Cycles:   5   Fill Volume:  1400 mL   Last fill:   1200 mL   Last fill solution:  Same   Total volume:  8200 mL     Solution(s):  Dextrose 1.5% Dianeal 2.5 calcium-- 5000 mL (x1)   Additive(s): Vancomycin/Heparin           Dextrose 2.5% Dianeal 2.5 calcium-- 5000 mL (x1)   Additive(s): Vancomycin/Heparin     Last Fill:  Same      **Dialysis nurses in house Monday through Friday 3931-4802 to setup and disconnect patients,   please call our office at [phone], follow prompts for after hours paging.    **Machine Trouble Shooting: [employer] 24 hour technical support available at [phone]     **Bedside nursing staff to disconnect patients as needed outside of office hours.  -Instructions and supplies located on dialysis cart.  -Reference  policy G-595 -->

## 2024-02-09 NOTE — CONSULTS
Inpatient consult to Nephrology  Consult performed by: Sonya Lazaro MD  Consult ordered by: Christine Godoy DO      NEPHROLOGY NEW CONSULT NOTE   Patient ID: Yoselyn Hernandez is a 64 y.o. female.     Reason for consult: ESRD-HD    Chief Complaint   Patient presents with    needs peritoneal dialysis        HPI  Yoselyn Hernandez is a 64 y.o. female   - With past medical Hx as below   - Admitted for continued peritoneal dialysis, recently discharged, but SNF was not ready to do PD.  - Nephrology was consulted for ESRD management     Past Medical History:   Diagnosis Date    CHF (congestive heart failure) (CMS/[facility])     COPD (chronic obstructive pulmonary disease) (CMS/[facility]ly [facility])     ESRD (end stage renal disease) (CMS/Formerly [facility])     Hypertension     Other abnormalities of gait and mobility     Gait, antalgic    PE (pulmonary thromboembolism) (CMS/[facility])     Personal history of other endocrine, nutritional and metabolic disease     History of hyperlipidemia    Personal history of other specified conditions     History of shortness of breath         Family History   Problem Relation Name Age of Onset    COPD Mother      Kidney failure Father           Allergies   Allergen Reactions    Ace Inhibitors Angioedema, Other, Swelling and Unknown    Ciprofloxacin GI intolerance, Nausea Only and Diarrhea    Gabapentin Unknown     Jerking head movements    Oxycodone Itching        Scheduled medications  allopurinol, 100 mg, oral, Daily  atorvastatin, 40 mg, oral, Nightly  B complex-vitamin C-folic acid, 1 capsule, oral, Daily  cholecalciferol, 1,000 Units, oral, Daily  dextrose 1.5% - LOW calcium 2.5mEq/L 5,000 mL with heparin 2,500 Units, vancomycin 125 mg peritoneal dialysate, , intraperitoneal, q24h  dextrose 2.5 % - LOW calcium 2.5 mEq/L 5,000 mL with heparin 2,500 Units, vancomycin 125 mg peritoneal dialysate, , intraperitoneal, q24h  gentamicin, , Topical, q24h  levothyroxine, 25 mcg, oral, Daily before breakfast  lipase-protease-amylase, 3  capsule, oral, TID with meals  mirtazapine, 15 mg, oral, Nightly  nystatin, 500,000 Units, oral, q8h AMARJIT  sevelamer carbonate, 800 mg, oral, TID with meals  warfarin, 5 mg, oral, Daily      Continuous medications     PRN medications  PRN medications: acetaminophen, diphenoxylate-atropine, HYDROmorphone, melatonin, ondansetron ODT, polyethylene glycol, sennosides       Heart Rate:  []   Temperature:  [36.6 °C (97.9 °F)-37.1 °C (98.8 °F)]   Respirations:  [15-18]   BP: (114-135)/(54-97)   Pulse Ox:  [93 %-100 %]        General appearance: Awake and alert, oriented, . No distress  HEENT: supple, moist oral mucosa, no mouth ulcers  Neck: No JVD  Skin: no apparent rash  Heart: heart sounds 1 & 2 present and normal, no murmurs heard or friction rub  Lungs: Adequate air entry,  no wheezing/crackles  Abdomen: soft, non tender, no masses palpated, no flank tenderness  Extremities: No  edema, no joint swelling,             Results from last 72 hours   Lab Units 02/09/24  0550   SODIUM mmol/L 136   POTASSIUM mmol/L 4.1   CO2 mmol/L 25   BUN mg/dL 42*   CREATININE mg/dL 6.55*   PHOSPHORUS mg/dL 4.3   CALCIUM mg/dL 8.4*   ALBUMIN g/dL 2.1*   GLUCOSE mg/dL 106*   WBC AUTO x10*3/uL 6.1        A/P  ESRD-HD admitted with PD., recent episode of peritonitis, improving.  Await discharge planning to facility that can do PD.  Continue PD, and IP vancomycin.    Sonya Lazaro MD

## 2024-02-09 NOTE — ED NOTES
Pt resting comfortably and arouses to  verbal stimuli. Pt continues on cardiac monitor and pulse ox. Call light w/in reach. No distress noted. Awaiting bed on floor.      Su Rice RN  02/09/24 [id number]

## 2024-02-09 NOTE — PROGRESS NOTES
Yoselyn Hernandez is a 64 y.o. female on day 1 of admission presenting with End stage renal disease (CMS/HCC).    Subjective   Patient lying in the bed in the ED. same symptoms as before.  Nothing new.      Objective     General: Lying in bed without distress.  Cooperative.  Skin: No rashes ulcerations.  HEENT: Sclera is white.  Mucous membranes moist.  Evaluated her neck and there is no significant wounds noted or masses felt.  Neck: Supple.  No JVD.  Cardiac: Regular rate and rhythm, S1/S2 normal.  Lungs: Clear to auscultation bilaterally, no wheezing or crackles, no accessory muscle use at rest.  Abdomen: bowel sounds present, mildly tender to light palpation. PD catheter in place.  Extremities: No cyanosis.  No lower extremity edema.  Neurologic: Alert and oriented x3.  No focal deficits.  Psychiatric: Appropriate mood and behavior.  Currently no agitation.    Last Recorded Vitals  Blood pressure (!) 135/91, pulse 87, temperature 37.1 °C (98.8 °F), resp. rate 18, SpO2 100 %.  On room air.    Intake/Output last 3 Shifts:  No intake/output data recorded.    Relevant Results  allopurinol, 100 mg, oral, Daily  atorvastatin, 40 mg, oral, Nightly  B complex-vitamin C-folic acid, 1 capsule, oral, Daily  cholecalciferol, 1,000 Units, oral, Daily  dextrose 1.5% - LOW calcium 2.5mEq/L 5,000 mL with heparin 2,500 Units, vancomycin 125 mg peritoneal dialysate, , intraperitoneal, q24h  dextrose 2.5 % - LOW calcium 2.5 mEq/L 5,000 mL with heparin 2,500 Units, vancomycin 125 mg peritoneal dialysate, , intraperitoneal, q24h  gentamicin, , Topical, q24h  levothyroxine, 25 mcg, oral, Daily before breakfast  lipase-protease-amylase, 3 capsule, oral, TID with meals  mirtazapine, 15 mg, oral, Nightly  nystatin, 500,000 Units, oral, q8h AMARJIT  sevelamer carbonate, 800 mg, oral, TID with meals  warfarin, 5 mg, oral, Daily           PRN medications: acetaminophen, diphenoxylate-atropine, HYDROmorphone, melatonin, ondansetron ODT,  polyethylene glycol, sennosides     Results for orders placed or performed during the hospital encounter of 02/08/24 (from the past 24 hour(s))   CBC and Auto Differential   Result Value Ref Range    WBC 5.7 4.4 - 11.3 x10*3/uL    nRBC 0.0 0.0 - 0.0 /100 WBCs    RBC 3.43 (L) 4.00 - 5.20 x10*6/uL    Hemoglobin 10.3 (L) 12.0 - 16.0 g/dL    Hematocrit 29.1 (L) 36.0 - 46.0 %    MCV 85 80 - 100 fL    MCH 30.0 26.0 - 34.0 pg    MCHC 35.4 32.0 - 36.0 g/dL    RDW 16.5 (H) 11.5 - 14.5 %    Platelets 239 150 - 450 x10*3/uL    Neutrophils % 72.4 40.0 - 80.0 %    Immature Granulocytes %, Automated 1.4 (H) 0.0 - 0.9 %    Lymphocytes % 16.3 13.0 - 44.0 %    Monocytes % 7.7 2.0 - 10.0 %    Eosinophils % 1.9 0.0 - 6.0 %    Basophils % 0.3 0.0 - 2.0 %    Neutrophils Absolute 4.14 1.20 - 7.70 x10*3/uL    Immature Granulocytes Absolute, Automated 0.08 0.00 - 0.70 x10*3/uL    Lymphocytes Absolute 0.93 (L) 1.20 - 4.80 x10*3/uL    Monocytes Absolute 0.44 0.10 - 1.00 x10*3/uL    Eosinophils Absolute 0.11 0.00 - 0.70 x10*3/uL    Basophils Absolute 0.02 0.00 - 0.10 x10*3/uL   Comprehensive metabolic panel   Result Value Ref Range    Glucose 76 74 - 99 mg/dL    Sodium 133 (L) 136 - 145 mmol/L    Potassium 4.9 3.5 - 5.3 mmol/L    Chloride 99 98 - 107 mmol/L    Bicarbonate 25 21 - 32 mmol/L    Anion Gap 14 10 - 20 mmol/L    Urea Nitrogen 46 (H) 6 - 23 mg/dL    Creatinine 6.93 (H) 0.50 - 1.05 mg/dL    eGFR 6 (L) >60 mL/min/1.73m*2    Calcium 8.2 (L) 8.6 - 10.6 mg/dL    Albumin 2.2 (L) 3.4 - 5.0 g/dL    Alkaline Phosphatase 94 33 - 136 U/L    Total Protein 5.4 (L) 6.4 - 8.2 g/dL    AST 26 9 - 39 U/L    Bilirubin, Total 0.2 0.0 - 1.2 mg/dL    ALT 21 7 - 45 U/L   Magnesium   Result Value Ref Range    Magnesium 2.03 1.60 - 2.40 mg/dL   Phosphorus   Result Value Ref Range    Phosphorus 4.6 2.5 - 4.9 mg/dL   ECG 12 lead   Result Value Ref Range    Ventricular Rate 92 BPM    Atrial Rate 92 BPM    GA Interval 152 ms    QRS Duration 88 ms    QT Interval  390 ms    QTC Calculation(Bazett) 482 ms    P Axis 67 degrees    R Axis -53 degrees    T Axis 124 degrees    QRS Count 15 beats    Q Onset 224 ms    P Onset 148 ms    P Offset 207 ms    T Offset 419 ms    QTC Fredericia 449 ms   Renal Function Panel   Result Value Ref Range    Glucose 106 (H) 74 - 99 mg/dL    Sodium 136 136 - 145 mmol/L    Potassium 4.1 3.5 - 5.3 mmol/L    Chloride 99 98 - 107 mmol/L    Bicarbonate 25 21 - 32 mmol/L    Anion Gap 16 10 - 20 mmol/L    Urea Nitrogen 42 (H) 6 - 23 mg/dL    Creatinine 6.55 (H) 0.50 - 1.05 mg/dL    eGFR 7 (L) >60 mL/min/1.73m*2    Calcium 8.4 (L) 8.6 - 10.6 mg/dL    Phosphorus 4.3 2.5 - 4.9 mg/dL    Albumin 2.1 (L) 3.4 - 5.0 g/dL   CBC   Result Value Ref Range    WBC 6.1 4.4 - 11.3 x10*3/uL    nRBC 0.0 0.0 - 0.0 /100 WBCs    RBC 3.29 (L) 4.00 - 5.20 x10*6/uL    Hemoglobin 9.6 (L) 12.0 - 16.0 g/dL    Hematocrit 27.9 (L) 36.0 - 46.0 %    MCV 85 80 - 100 fL    MCH 29.2 26.0 - 34.0 pg    MCHC 34.4 32.0 - 36.0 g/dL    RDW 16.3 (H) 11.5 - 14.5 %    Platelets 249 150 - 450 x10*3/uL   Protime-INR   Result Value Ref Range    Protime 37.3 (H) 9.8 - 12.8 seconds    INR 3.3 (H) 0.9 - 1.1           Hospital Course:  Yoselyn Hernandez is a 64 y.o. 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  Patient has had multiple admissions for this previously.  Nephrology consulted.  Fluid obtained from abdomen grew out staph epidermis.  Patient placed on intraperitoneal vancomycin and ceftazidime initially.  Eventually transition over to intraperitoneal vancomycin only.  Patient is recommended for total 14 days of intraperitoneal antibiotics.  PT/OT saw the patient and  recommend skilled nursing facility.  Initially sent patient to the skilled nursing facility after they informed us they were ready on 2/7.  Patient came back on 2/8 because the facility apparently did not have the PD supplies.     Assessment and plan:    ESRD and PD Peritonitis   -Nephrology consulted and appreciate recs  -Continue pain control and intraperitoneal antibiotics   -Patient continues to complain of significant severe pain although I am somewhat suspicious of the subjective report without any other objective indications of severe pain.  Patient is tolerating diet without any nausea or vomiting.  Since we are treating the patient for peritonitis for now we will give patient benefit of the doubt.  Continue oral Dilaudid 3 mg every 4 hours as needed.  Would not increase any further.  I have already informed the patient that this is for short-term use and she should not expect or planned this to be a long-term treatment plan.          Chronic Left common iliac occlusion/Hx of DVT/PE   -coumadin increased to 5 mg oral daily   -Last INR 3.3.  Hold warfarin.  Recheck INR tomorrow.     Chronic HFrEF  -Holding home Coreg 6.25 mg BID due to low normal blood pressure and need some room for peritoneal dialysis.  -Continue peritoneal dialysis.     Chronic diarrhea  -Chronic issue for patient which she brings up frequently.  Patient has seen GI outpatient previously.  -Continue lomotil as need it for diarrhea   -This of course conflicts with nephrology is requested patient have at least 1 bowel movement a day.  Will change her MiraLAX to as needed, for now can continue Senokot as scheduled and monitor bowel movements.  Would like to avoid patient taking stool softeners and laxatives and then Lomotil at the same time.     Hypokalemia   -Resolved.     Physical deconditioning  -PT/OT recommend skilled nursing facility.      Disposition: Need to confirm that facility has the PD supplies before discharge  again.      Carl Wiley MD

## 2024-02-09 NOTE — PROGRESS NOTES
Per Dr. Mayfield review of CTA gated coronary arteries done 10/19/20    IMPRESSION:  1.         Mild luminal irregularities noted in the proximal LAD and proximal LCx with a coronary artery calcium score of 11 Agatston Units.  2.         Normal aorta, pericardium and cardiac morphology within limitations of CT technique.    CONCLUSION:    1. Subpleural nodular soft tissue density projecting into the left upper lobe may reflect an enlarged internal mammary chain lymph node.  The lesion measures minimally larger compared to the previous exam.  A PET-CT is recommended for further evaluation.  2. Stable 3 x 2 mm left upper lobe lung nodule.  Follow-up as per Fleischner criteria recommended. Guidelines from the Fleischner Society for the follow-up and management of newly detected indeterminate pulmonary nodules in persons >34 years old depend   on nodule size (average of length and width) and underlying risk factors (including smoking and other risk factors).  Please consider the following recommendations after clinical assessment of risk factors. For =<4 mm nodules:  In low risk patients, no   follow-up needed.  In high risk patients, follow-up CT at 12 months; if unchanged, no further follow-up.  3. Please also refer to the cardiologist's report.          Plan:  CTA results are good with minimal plaque and low calcium score.   Follow up with PCP regarding lung nodules     Pharmacy Medication History Review    Yoselyn Hernandez is a 64 y.o. female admitted for End stage renal disease (CMS/[facility]). Pharmacy reviewed the patient's [id number] medications and allergies for accuracy.    The list below reflects the updated PTA list. Comments regarding how patient may be taking medications differently can be found in the Admit Orders Activity  Prior to Admission Medications   Prescriptions  Informant Patient Reported? Taking?   B complex-vitamin C-folic acid (Nephrocaps) 1 mg capsule  Self, Other Yes Yes   Sig: Take 1 capsule by mouth once daily.    Creon 24,000-76,000 -120,000 unit capsule  Self, Other Yes Yes   Sig: Take 3 capsules by mouth 3 times a day with meals.   HYDROmorphone (Dilaudid) 2 mg tablet  Self, Other Yes Yes   Sig: Take 1.5 tablets (3 mg) by mouth every 4 hours if needed for severe pain    acetaminophen (Tylenol) 325 mg tablet  Self, Other Yes Yes   Sig: Take 3 tablets (975 mg) by mouth 3 times a day as needed (pain or fever).   allopurinol (Zyloprim) 100 mg tablet  Self, Other Yes Yes   Sig: Take 1 tablet (100 mg) by mouth once daily.   atorvastatin (Lipitor) 40 mg tablet  Self, Other Yes Yes   Sig: TAKE 1 TABLET BY MOUTH ONCE DAILY AT BEDTIME   cholecalciferol, vitamin D3, (VITAMIN D3 ORAL)  Self, Other Yes Yes   Sig: Take 1 tablet by mouth once daily.   diphenoxylate-atropine (Lomotil) 2.5-0.025 mg tablet  Self, Other Yes Yes   Sig: Take 1 tablet by mouth 4 times a day as needed for diarrhea.   epoetin treasure (Epogen,Procrit) 10,000 unit/mL injection  Self, Other Yes Yes   Sig: Inject 1 mL (10,000 Units) under the skin every 14 (fourteen) days.   gentamicin (Garamycin) 0.1 % cream  Self, Other Yes Yes   Sig: Apply topically once every 24 hours.   levothyroxine (Synthroid, Levoxyl) 25 mcg tablet  Self, Other Yes Yes   Sig: Take 1 tablet (25 mcg) by mouth once daily in the morning.   Take before meals. **No Recent Fill History, Last Filled 11/19/23   for a 30 Day Supply    melatonin 10 mg tablet  Self, Other Yes Yes   Sig: Take 1 tablet (10 mg) by mouth once daily as needed for sleep.   mirtazapine (Remeron) 15 mg tablet  Self, Other Yes Yes   Sig: Take 1 tablet (15 mg) by mouth once daily at bedtime.   nystatin (Mycostatin) 100,000 unit/mL suspension  Self, Other Yes Yes   Sig: Take 5 mL (500,000 Units) by mouth every 8 hours.   ondansetron ODT (Zofran-ODT) 4 mg disintegrating tablet  Self, Other Yes Yes   Sig: Take 1 tablet (4 mg) by mouth every 8 hours if needed for nausea.   polyethylene glycol (Glycolax, Miralax) 17 gram packet  Self, Other Yes Yes   Sig: Take 17 g by mouth once daily as needed (constipation).   sennosides (Senokot) 8.6 mg tablet  Self, Other Yes Yes   Sig: Take 2 tablets (17.2 mg) by mouth once daily as needed for constipation.   sevelamer carbonate (Renvela) 800 mg tablet  Self, Other Yes Yes   Sig: Take 1 tablet (800 mg) by mouth 3 times a day with meals.   Swallow tablet whole; do not crush, break, or chew.   warfarin (Coumadin) 5 mg tablet  Self, Other Yes Yes   Sig: Take 1 tablet (5 mg) by mouth once daily.   Take as directed per After Visit Summary.      Facility-Administered Medications: None        The list below reflects the updated allergy list. Please review each documented allergy for additional clarification and justification.  Allergies  Reviewed by Merritt Garza CPhT on 2/9/2024        Severity Reactions Comments    Ace Inhibitors High Angioedema, Other, Swelling, Unknown     Ciprofloxacin High GI intolerance, Nausea Only, Diarrhea     Gabapentin Not Specified Unknown Jerking head movements    Oxycodone Low Itching             Patient accepts M2B at discharge. Pharmacy has been updated to [facility].    Sources used to complete the med history include:  Patient interviewed about medications taking  Last 2 Left Pharmacy Fill history reviewed  [facility] Amb[facility] medication list reviewed  Care Everywhere, OHIP Summarization of Episode Note  reviewed  [place]  2/7/24  Discharge Summary medication list reviewed    Below are additional concerns with the patient's PTA list.  None    ---------------------------------  Merritt Garza CPhT  Transitions of Care Technician  Medication reconciliation complete  Please reach out via [employer] Secure Chat for questions,   or if no response call [email or web address] or [email or web address].  [facility]

## 2024-02-09 NOTE — ED NOTES
Assuming pt care. Pt resp easy/non  labored. Skin warm/dry. Pt continues on cardiac monitor and pulse ox. No distress noted. Call light w/in reach.      Su Rice RN  02/08/24 [id number]

## 2024-02-10 LAB
INR PPP: 3.5 (ref 0.9–1.1)
PROTHROMBIN TIME: 40.5 SECONDS (ref 9.8–12.8)

## 2024-02-10 PROCEDURE — 36415 COLL VENOUS BLD VENIPUNCTURE: CPT | Performed by: INTERNAL MEDICINE

## 2024-02-10 PROCEDURE — G0378 HOSPITAL OBSERVATION PER HR: HCPCS

## 2024-02-10 PROCEDURE — 2500000005 HC RX 250 GENERAL PHARMACY W/O HCPCS: Performed by: NURSE PRACTITIONER

## 2024-02-10 PROCEDURE — 2500000001 HC RX 250 WO HCPCS SELF ADMINISTERED DRUGS (ALT 637 FOR MEDICARE OP): Performed by: NURSE PRACTITIONER

## 2024-02-10 PROCEDURE — 97161 PT EVAL LOW COMPLEX 20 MIN: CPT | Mod: GP

## 2024-02-10 PROCEDURE — 99232 SBSQ HOSP IP/OBS MODERATE 35: CPT | Performed by: INTERNAL MEDICINE

## 2024-02-10 PROCEDURE — 2500000002 HC RX 250 W HCPCS SELF ADMINISTERED DRUGS (ALT 637 FOR MEDICARE OP, ALT 636 FOR OP/ED): Performed by: NURSE PRACTITIONER

## 2024-02-10 PROCEDURE — 85610 PROTHROMBIN TIME: CPT | Performed by: INTERNAL MEDICINE

## 2024-02-10 PROCEDURE — 97165 OT EVAL LOW COMPLEX 30 MIN: CPT | Mod: GO

## 2024-02-10 PROCEDURE — 2500000004 HC RX 250 GENERAL PHARMACY W/ HCPCS (ALT 636 FOR OP/ED): Performed by: INTERNAL MEDICINE

## 2024-02-10 PROCEDURE — 2500000004 HC RX 250 GENERAL PHARMACY W/ HCPCS (ALT 636 FOR OP/ED): Performed by: NURSE PRACTITIONER

## 2024-02-10 PROCEDURE — 2500000001 HC RX 250 WO HCPCS SELF ADMINISTERED DRUGS (ALT 637 FOR MEDICARE OP): Performed by: STUDENT IN AN ORGANIZED HEALTH CARE EDUCATION/TRAINING PROGRAM

## 2024-02-10 RX ADMIN — SEVELAMER CARBONATE 800 MG: 800 TABLET, FILM COATED ORAL at 17:13

## 2024-02-10 RX ADMIN — HEPARIN SODIUM: 1000 INJECTION INTRAVENOUS; SUBCUTANEOUS at 20:30

## 2024-02-10 RX ADMIN — NYSTATIN 500000 UNITS: 100000 SUSPENSION ORAL at 05:14

## 2024-02-10 RX ADMIN — HYDROMORPHONE HYDROCHLORIDE 3 MG: 2 TABLET ORAL at 14:11

## 2024-02-10 RX ADMIN — NEPHROCAP 1 CAPSULE: 1 CAP ORAL at 08:26

## 2024-02-10 RX ADMIN — Medication 1000 UNITS: at 08:26

## 2024-02-10 RX ADMIN — Medication 10 MG: at 20:47

## 2024-02-10 RX ADMIN — MIRTAZAPINE 15 MG: 15 TABLET, FILM COATED ORAL at 00:18

## 2024-02-10 RX ADMIN — HYDROMORPHONE HYDROCHLORIDE 3 MG: 2 TABLET ORAL at 00:19

## 2024-02-10 RX ADMIN — ATORVASTATIN CALCIUM 40 MG: 40 TABLET, FILM COATED ORAL at 00:19

## 2024-02-10 RX ADMIN — HYDROMORPHONE HYDROCHLORIDE 3 MG: 2 TABLET ORAL at 20:51

## 2024-02-10 RX ADMIN — HYDROMORPHONE HYDROCHLORIDE 3 MG: 2 TABLET ORAL at 08:36

## 2024-02-10 RX ADMIN — LEVOTHYROXINE SODIUM 25 MCG: 25 TABLET ORAL at 08:26

## 2024-02-10 RX ADMIN — ALLOPURINOL 100 MG: 100 TABLET ORAL at 08:26

## 2024-02-10 RX ADMIN — ONDANSETRON 4 MG: 4 TABLET, ORALLY DISINTEGRATING ORAL at 14:20

## 2024-02-10 RX ADMIN — PANCRELIPASE 3 CAPSULE: 24000; 76000; 120000 CAPSULE, DELAYED RELEASE PELLETS ORAL at 08:37

## 2024-02-10 RX ADMIN — MIRTAZAPINE 15 MG: 15 TABLET, FILM COATED ORAL at 20:47

## 2024-02-10 RX ADMIN — ATORVASTATIN CALCIUM 40 MG: 40 TABLET, FILM COATED ORAL at 20:47

## 2024-02-10 RX ADMIN — NYSTATIN 500000 UNITS: 100000 SUSPENSION ORAL at 20:47

## 2024-02-10 RX ADMIN — HYDROXYZINE HYDROCHLORIDE 25 MG: 25 TABLET, FILM COATED ORAL at 00:18

## 2024-02-10 SDOH — SOCIAL STABILITY: SOCIAL INSECURITY: DO YOU FEEL UNSAFE GOING BACK TO THE PLACE WHERE YOU ARE LIVING?: NO

## 2024-02-10 SDOH — SOCIAL STABILITY: SOCIAL INSECURITY: ARE YOU OR HAVE YOU BEEN THREATENED OR ABUSED PHYSICALLY, EMOTIONALLY, OR SEXUALLY BY ANYONE?: NO

## 2024-02-10 SDOH — SOCIAL STABILITY: SOCIAL INSECURITY: HAS ANYONE EVER THREATENED TO HURT YOUR FAMILY OR YOUR PETS?: NO

## 2024-02-10 SDOH — SOCIAL STABILITY: SOCIAL INSECURITY: DO YOU FEEL ANYONE HAS EXPLOITED OR TAKEN ADVANTAGE OF YOU FINANCIALLY OR OF YOUR PERSONAL PROPERTY?: NO

## 2024-02-10 SDOH — SOCIAL STABILITY: SOCIAL INSECURITY: ABUSE: ADULT

## 2024-02-10 SDOH — SOCIAL STABILITY: SOCIAL INSECURITY: HAVE YOU HAD THOUGHTS OF HARMING ANYONE ELSE?: NO

## 2024-02-10 SDOH — SOCIAL STABILITY: SOCIAL INSECURITY: DOES ANYONE TRY TO KEEP YOU FROM HAVING/CONTACTING OTHER FRIENDS OR DOING THINGS OUTSIDE YOUR HOME?: NO

## 2024-02-10 SDOH — SOCIAL STABILITY: SOCIAL INSECURITY: ARE THERE ANY APPARENT SIGNS OF INJURIES/BEHAVIORS THAT COULD BE RELATED TO ABUSE/NEGLECT?: NO

## 2024-02-10 ASSESSMENT — COGNITIVE AND FUNCTIONAL STATUS - GENERAL
MOVING TO AND FROM BED TO CHAIR: A LITTLE
HELP NEEDED FOR BATHING: A LOT
PERSONAL GROOMING: A LITTLE
DRESSING REGULAR UPPER BODY CLOTHING: A LITTLE
STANDING UP FROM CHAIR USING ARMS: A LITTLE
CLIMB 3 TO 5 STEPS WITH RAILING: TOTAL
PATIENT BASELINE BEDBOUND: NO
TOILETING: A LITTLE
MOBILITY SCORE: 16
CLIMB 3 TO 5 STEPS WITH RAILING: A LOT
DRESSING REGULAR LOWER BODY CLOTHING: TOTAL
WALKING IN HOSPITAL ROOM: A LOT
TOILETING: A LITTLE
MOBILITY SCORE: 18
DAILY ACTIVITIY SCORE: 22
WALKING IN HOSPITAL ROOM: A LITTLE
MOBILITY SCORE: 18
TURNING FROM BACK TO SIDE WHILE IN FLAT BAD: A LITTLE
PERSONAL GROOMING: A LITTLE
PERSONAL GROOMING: A LITTLE
MOVING FROM LYING ON BACK TO SITTING ON SIDE OF FLAT BED WITH BEDRAILS: A LITTLE
STANDING UP FROM CHAIR USING ARMS: A LOT
DAILY ACTIVITIY SCORE: 22
DAILY ACTIVITIY SCORE: 16
TOILETING: A LITTLE
WALKING IN HOSPITAL ROOM: A LOT
CLIMB 3 TO 5 STEPS WITH RAILING: A LOT
STANDING UP FROM CHAIR USING ARMS: A LOT

## 2024-02-10 ASSESSMENT — ACTIVITIES OF DAILY LIVING (ADL)
GROOMING: INDEPENDENT
LACK_OF_TRANSPORTATION: NO
JUDGMENT_ADEQUATE_SAFELY_COMPLETE_DAILY_ACTIVITIES: YES
TOILETING: NEEDS ASSISTANCE
GROOMING: INDEPENDENT
BATHING: NEEDS ASSISTANCE
FEEDING YOURSELF: INDEPENDENT
HEARING - RIGHT EAR: FUNCTIONAL
ADL_ASSISTANCE: INDEPENDENT
WALKS IN HOME: NEEDS ASSISTANCE
TOILETING: NEEDS ASSISTANCE
BATHING_ASSISTANCE: MINIMAL
ASSISTIVE_DEVICE: CANE;WALKER
ADL_ASSISTANCE: INDEPENDENT
ASSISTIVE_DEVICE: CANE;WALKER
PATIENT'S MEMORY ADEQUATE TO SAFELY COMPLETE DAILY ACTIVITIES?: YES
WALKS IN HOME: NEEDS ASSISTANCE
PATIENT'S MEMORY ADEQUATE TO SAFELY COMPLETE DAILY ACTIVITIES?: YES
HEARING - RIGHT EAR: FUNCTIONAL
BATHING: NEEDS ASSISTANCE
FEEDING YOURSELF: INDEPENDENT
ADEQUATE_TO_COMPLETE_ADL: YES
DRESSING YOURSELF: INDEPENDENT
HEARING - LEFT EAR: FUNCTIONAL
JUDGMENT_ADEQUATE_SAFELY_COMPLETE_DAILY_ACTIVITIES: YES
ADEQUATE_TO_COMPLETE_ADL: YES
HEARING - LEFT EAR: FUNCTIONAL

## 2024-02-10 ASSESSMENT — LIFESTYLE VARIABLES
AUDIT-C TOTAL SCORE: 0
HOW OFTEN DO YOU HAVE A DRINK CONTAINING ALCOHOL: NEVER
SUBSTANCE_ABUSE_PAST_12_MONTHS: YES
HOW MANY STANDARD DRINKS CONTAINING ALCOHOL DO YOU HAVE ON A TYPICAL DAY: PATIENT DOES NOT DRINK
SKIP TO QUESTIONS 9-10: 1
HOW OFTEN DO YOU HAVE 6 OR MORE DRINKS ON ONE OCCASION: NEVER
AUDIT-C TOTAL SCORE: 0

## 2024-02-10 ASSESSMENT — PAIN - FUNCTIONAL ASSESSMENT
PAIN_FUNCTIONAL_ASSESSMENT: 0-10

## 2024-02-10 ASSESSMENT — PAIN SCALES - GENERAL
PAINLEVEL_OUTOF10: 4
PAINLEVEL_OUTOF10: 9
PAINLEVEL_OUTOF10: 10 - WORST POSSIBLE PAIN
PAINLEVEL_OUTOF10: 8
PAINLEVEL_OUTOF10: 5 - MODERATE PAIN
PAINLEVEL_OUTOF10: 10 - WORST POSSIBLE PAIN

## 2024-02-10 ASSESSMENT — PAIN DESCRIPTION - PROGRESSION: CLINICAL_PROGRESSION: GRADUALLY IMPROVING

## 2024-02-10 ASSESSMENT — PATIENT HEALTH QUESTIONNAIRE - PHQ9
2. FEELING DOWN, DEPRESSED OR HOPELESS: SEVERAL DAYS
1. LITTLE INTEREST OR PLEASURE IN DOING THINGS: SEVERAL DAYS
SUM OF ALL RESPONSES TO PHQ9 QUESTIONS 1 & 2: 2

## 2024-02-10 NOTE — PROGRESS NOTES
Physical Therapy    Physical Therapy Evaluation    Patient Name: Yoselyn Hernandez  MRN: [id number]  Today's Date: 2/10/2024   Time Calculation  Start Time: 1403  Stop Time: 1419  Time Calculation (min): 16 min    Assessment/Plan   PT Assessment  PT Assessment Results: Decreased strength, Decreased range of motion, Decreased endurance, Impaired balance, Decreased mobility  Rehab Prognosis: Good  End of Session Communication: Bedside nurse  Assessment Comment: Pt currently requiring increased assist for bed mobility, transfers and gait. Pt also with increased pain during session limiting mobility and pt wth nausea at end of the session limiting progression this date. RN notified at end of session. Pt would benefit from moderate intenstiy therapy at this date to progress towards goals, will conitnue to follow while in house.  End of Session Patient Position: Bed, 1 rail up, Alarm off, not on at start of session (call light in reach, sister at bedside)  IP OR SWING BED PT PLAN  Inpatient or Swing Bed: Inpatient  PT Plan  Treatment/Interventions: Bed mobility, Transfer training, Gait training, Stair training, Balance training, Neuromuscular re-education, Strengthening, Endurance training, Range of motion, Therapeutic exercise, Therapeutic activity, Home exercise program, Positioning, Postural re-education  PT Plan: Skilled PT  PT Frequency: 3 times per week  PT Discharge Recommendations: Moderate intensity level of continued care  PT Recommended Transfer Status: Assist x1  PT - OK to Discharge:  (when medically stable)      Subjective   General Visit Information:  General  Reason for Referral: readmit for weakness, abdominal pain, SOB. SNF unable to perform PD so readmitted to   Past Medical History Relevant to Rehab: ESRD on daily PD, AAA dissections s/p repairs, HF, pancreatitis, PE/DVT  Family/Caregiver Present: Yes  Caregiver Feedback: sister present and supportive  Co-Treatment: OT  Co-Treatment Reason: to maximize  "patient session and to facilitate DC planning  Prior to Session Communication: Bedside nurse  Patient Position Received:  (ED cot, 1 rail up)  General Comment: Pt supine in bed, agreeable to PT evaluation  Home Living:  Home Living  Type of Home:  ([facility])  Lives With: Alone  Home Adaptive Equipment: Cane  Home Layout: Multi-level, Bed/bath upstairs, Stairs to alternate level with rails  Alternate Level Stairs-Number of Steps: 12  Home Access: Stairs to enter with rails  Entrance Stairs-Number of Steps: 8  Home Living Comments: Admitted from SNF  Prior Level of Function:  Prior Function Per Pt/Caregiver Report  Level of [place]: Independent with ADLs and functional transfers (MOD I with ambulating household distances, needs furniture support/SC)  Receives Help From: Family, Friends  ADL Assistance: Independent  Homemaking Assistance: Needs assistance  Ambulatory Assistance:  (MOD I with cane/furniture support household distances)  Precautions:  Precautions  Medical Precautions: Fall precautions  Vital Signs:  Vital Signs  Heart Rate:  (stable throughout session)  Pulse Ox:  (stable throughout session)    Objective   Pain:  Pain Assessment  Pain Assessment: 0-10  Pain Score:  (\"12/10\" progressed to \"13/10\" seated EOB)  Pain Type: Acute pain  Pain Location: Abdomen  Pain Interventions: Medication (See MAR)  Pt became nauseous at end of session limiting session this date, RN notified and pt provided with emesis bag at end of session once returned to supine positioning.   Cognition:  Cognition  Overall Cognitive Status: Within Functional Limits    General Assessments:     Activity Tolerance  Endurance: Tolerates less than 10 min exercise, no significant change in vital signs    Sensation  Light Touch: No apparent deficits    Strength  Strength Comments: RLE >/= 3/5, L DF 3/5, L knee extension 3-/5, L hip flexion 3-/5  Strength  Strength Comments: RLE >/= 3/5, L DF 3/5, L knee extension 3-/5, L hip flexion 3-/5   "         Static Sitting Balance  Static Sitting-Balance Support: Bilateral upper extremity supported, Feet supported  Static Sitting-Level of Assistance: Contact guard    Static Standing Balance  Static Standing-Balance Support: Bilateral upper extremity supported  Static Standing-Level of Assistance: Contact guard (x2)  Functional Assessments:  Bed Mobility  Bed Mobility: Yes  Bed Mobility 1  Bed Mobility 1: Supine to sitting, Sitting to supine  Level of Assistance 1: Contact guard  Bed Mobility Comments 1: cues for UE/LE placement, sequencing, breathing; required increased time d/t pain and HOB elevated    Transfers  Transfer: Yes  Transfer 1  Transfer From 1: Sit to, Stand to  Transfer to 1: Sit, Stand  Technique 1: Sit to stand, Stand to sit  Transfer Device 1: Walker  Transfer Level of Assistance 1: Contact guard  Trials/Comments 1: cues for UE/LE placement, sequencing, proper FWW use    Ambulation/Gait Training  Ambulation/Gait Training Performed: Yes  Ambulation/Gait Training 1  Surface 1: Level tile  Device 1: Rolling walker  Assistance 1: Contact guard  Quality of Gait 1: Antalgic, Decreased step length (decreased B step length, flexed trunk posture)  Comments/Distance (ft) 1: x2 side steps toward HOB (cues for upright posture, sequencing, proper FWW use, increased step height)    Stairs  Stairs: No    Outcome Measures:  [facility] Basic Mobility  Turning from your back to your side while in a flat bed without using bedrails: A little  Moving from lying on your back to sitting on the side of a flat bed without using bedrails: A little  Moving to and from bed to chair (including a wheelchair): A little  Standing up from a chair using your arms (e.g. wheelchair or bedside chair): A little  To walk in hospital room: A little  Climbing 3-5 steps with railing: Total  Basic Mobility - Total Score: 16    Encounter Problems       Encounter Problems (Active)       Balance       STG - Maintains static standing balance with  upper extremity support x2 minutes with SBA  (Progressing)       Start:  02/10/24    Expected End:  02/24/24               Mobility       STG - Patient will ambulate x50ft with SBA and LRAD  (Progressing)       Start:  02/10/24    Expected End:  02/24/24            STG - Patient will ambulate up and down a curb/step with LRAD and min Ax1 (Progressing)       Start:  02/10/24    Expected End:  02/24/24               Transfers       STG - Patient will perform bed mobility with SBA  (Progressing)       Start:  02/10/24    Expected End:  02/24/24            STG - Patient will transfer sit to and from stand with SBA and LRAD  (Progressing)       Start:  02/10/24    Expected End:  02/24/24                   Education Documentation  Body Mechanics, taught by Qi Collazo PT at 2/10/2024  3:44 PM.  Learner: Family, Patient  Readiness: Acceptance  Method: Explanation  Response: Verbalizes Understanding, Needs Reinforcement    Mobility Training, taught by Qi Collazo PT at 2/10/2024  3:44 PM.  Learner: Family, Patient  Readiness: Acceptance  Method: Explanation  Response: Verbalizes Understanding, Needs Reinforcement    Education Comments  No comments found.        Qi Collazo PT, DPT

## 2024-02-10 NOTE — PROGRESS NOTES
"Yoselyn Hernandez is a 64 y.o. female on day 1 of admission presenting with End stage renal disease (CMS/HCC).    Subjective   Patient lying in the bed in the ED. No change in symptoms.  Tolerating diet.      Objective     General: Lying in bed without distress.  Cooperative.  Skin: No rashes ulcerations.  HEENT: Sclera is white.  Mucous membranes moist.  Evaluated her neck and there is no significant wounds noted or masses felt.  Neck: Supple.  No JVD.  Cardiac: Regular rate and rhythm, S1/S2 normal.  Lungs: Clear to auscultation bilaterally, no wheezing or crackles, no accessory muscle use at rest.  Abdomen: bowel sounds present, mildly tender to light palpation. PD catheter in place.  Extremities: No cyanosis.  No lower extremity edema.  Neurologic: Alert and oriented x3.  No focal deficits.  Psychiatric: Appropriate mood and behavior.  Currently no agitation.    Last Recorded Vitals  Blood pressure 127/68, pulse 89, temperature 37.3 °C (99.1 °F), temperature source Temporal, resp. rate 16, height 1.702 m (5' 7\"), weight 55 kg (121 lb 4.1 oz), SpO2 98 %.  On room air.    Intake/Output last 3 Shifts:  I/O last 3 completed shifts:  In: 8200 (149.1 mL/kg) [Other:8200]  Out: 7911 (143.8 mL/kg) [Other:7911]  Weight: 55 kg     Relevant Results  allopurinol, 100 mg, oral, Daily  atorvastatin, 40 mg, oral, Nightly  B complex-vitamin C-folic acid, 1 capsule, oral, Daily  cholecalciferol, 1,000 Units, oral, Daily  dextrose 1.5% - LOW calcium 2.5mEq/L 5,000 mL with heparin 2,500 Units, vancomycin 125 mg peritoneal dialysate, , intraperitoneal, q24h  dextrose 2.5 % - LOW calcium 2.5 mEq/L 5,000 mL with heparin 2,500 Units, vancomycin 125 mg peritoneal dialysate, , intraperitoneal, q24h  gentamicin, , Topical, q24h  levothyroxine, 25 mcg, oral, Daily before breakfast  lipase-protease-amylase, 3 capsule, oral, TID with meals  mirtazapine, 15 mg, oral, Nightly  nystatin, 500,000 Units, oral, q8h AMARJIT  sevelamer carbonate, 800 mg, " oral, TID with meals  [Held by provider] warfarin, 5 mg, oral, Daily           PRN medications: acetaminophen, diphenoxylate-atropine, HYDROmorphone, melatonin, ondansetron ODT, polyethylene glycol, sennosides     Results for orders placed or performed during the hospital encounter of 02/08/24 (from the past 24 hour(s))   Protime-INR   Result Value Ref Range    Protime 40.5 (H) 9.8 - 12.8 seconds    INR 3.5 (H) 0.9 - 1.1           Hospital Course:  Yoselyn Hernandez is a 64 y.o. 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  Patient has had multiple admissions for this previously.  Nephrology consulted.  Fluid obtained from abdomen grew out staph epidermis.  Patient placed on intraperitoneal vancomycin and ceftazidime initially.  Eventually transition over to intraperitoneal vancomycin only.  Patient is recommended for total 14 days of intraperitoneal antibiotics.  PT/OT saw the patient and recommend skilled nursing facility.  Initially sent patient to the skilled nursing facility after they informed us they were ready on 2/7.  Patient came back on 2/8 because the facility apparently did not have the PD supplies.     Assessment and plan:    ESRD and PD Peritonitis   -Nephrology consulted and following.  -Continue pain control and intraperitoneal antibiotics.  -Patient continues to complain of significant severe pain although I am somewhat suspicious of the subjective report without any other objective indications of severe pain.  Patient is tolerating diet without any nausea or vomiting.  Since we are treating the patient for peritonitis for now we will continue with pain control.  Continue  oral Dilaudid 3 mg every 4 hours as needed.  Would not increase any further.  I have already informed the patient that this is for short-term use and she should not expect or planned this to be a long-term treatment plan.  Pain control will stop when peritonitis treatment is done.         Chronic Left common iliac occlusion/Hx of DVT/PE   -Warfarin 5 mg oral daily currently on hold.  -Last INR 3.5.  Recheck INR tomorrow.     Chronic HFrEF  -Had discontinued home Coreg 6.25 mg BID due to low normal blood pressure and need some room for peritoneal dialysis.  -Systolic blood pressure 120s 130s.  -Continue peritoneal dialysis.     Chronic diarrhea  -Chronic issue for patient which she brings up frequently.  Patient has seen GI outpatient previously.  -Continue lomotil as need it for diarrhea   -This of course conflicts with nephrology is requested patient have at least 1 bowel movement a day.  Will change her MiraLAX to as needed, for now can continue Senokot as scheduled and monitor bowel movements.  Would like to avoid patient taking stool softeners and laxatives and then Lomotil at the same time.     Hypokalemia   -Resolved.     Physical deconditioning  -PT/OT reevaluation needed for placement again to skilled nursing facility.  Patient states she wishes to talk to  about a new facility because she does not want to go back to the facility that she just went to.    Disposition: Wait for  to talk to patient about places.  Will need to see if there is a lot of options for patient.  Waiting for insurance pre-CERT again.  Waiting for confirmation that what ever facility she chooses has received the supplies.      Carl Wiley MD

## 2024-02-10 NOTE — PROGRESS NOTES
Occupational Therapy    Evaluation    Patient Name: Yoselyn Hernandez  MRN: [id number]  Today's Date: 2/10/2024  Time Calculation  Start Time: 1402  Stop Time: 1418  Time Calculation (min): 16 min    Assessment  IP OT Assessment  OT Assessment: difficulty with ADLs/transfers  Medical Staff Made Aware: Yes  End of Session Communication: Bedside nurse  Plan:  Treatment Interventions: ADL retraining, Functional transfer training  OT Frequency: 3 times per week  OT Discharge Recommendations: Moderate intensity level of continued care  OT Recommended Transfer Status: Assist of 1  OT - OK to Discharge: Yes (when medically appropraiate)    Subjective   Current Problem:  1. Dialysis-associated peritonitis, sequela (CMS/HCC)          General:  General  Reason for Referral: readmit for weakness, abdominal pain, SOB. SNF unable to perform PD so readmitted to   Past Medical History Relevant to Rehab: ESRD on daily PD, AAA dissections s/p repairs, HF, pancreatitis, PE/DVT  Family/Caregiver Present: Yes  Caregiver Feedback: sister present and supportive  Co-Treatment: PT  Co-Treatment Reason: to facilitate d/c planning and due to high levels of pain  Prior to Session Communication: Bedside nurse  Patient Position Received:  (ED bed/cot)  Preferred Learning Style: verbal, visual  General Comment: Pt agreeable to therapy eval, reports high level of pain, RN informed and medicated pt for pain during session. Pt then experiencing nausea, RN informed. Pt able to stand and take a few sidesteps, recommend mod intensity therapy at d/c.  Precautions:  Medical Precautions: Fall precautions  Vital Signs:     Pain:  Pain Assessment  Pain Assessment: 0-10  Pain Score: 10 - Worst possible pain  Pain Interventions: Medication (See MAR)    Objective   Cognition:  Overall Cognitive Status: Within Functional Limits           Home Living:  Type of Home:  ([facility])  Lives With: Alone  Home Adaptive Equipment: Cane  Home Layout: Multi-level, Bed/bath  upstairs, Stairs to alternate level with rails  Alternate Level Stairs-Number of Steps: 12  Home Access: Stairs to enter with rails  Entrance Stairs-Number of Steps: 8   Prior Function:  Level of [place]: Independent with ADLs and functional transfers  Receives Help From: Family, Friends  ADL Assistance: Independent  Homemaking Assistance: Needs assistance  Ambulatory Assistance: Independent (household ambulator wall/furniture walking, reports infrequent community distances recently, does have cane that she will us)  Prior Function Comments: Increased time and effort to complete ADLs due to pain  IADL History:     ADL:  Eating Assistance: Independent  Eating Deficit:  (anticipated)  Grooming Assistance: Stand by  Grooming Deficit:  (anticipated at seated level)  Bathing Assistance: Minimal  Bathing Deficit:  (anticipated)  UE Dressing Assistance: Minimal  UE Dressing Deficit:  (anticipated)  LE Dressing Assistance: Total  LE Dressing Deficit:  (donning socks due to pain)  Toileting Assistance with Device: Stand by  Toileting Deficit:  (anticipated)  Activity Tolerance:  Endurance: Tolerates less than 10 min exercise, no significant change in vital signs  Bed Mobility/Transfers: Bed Mobility  Bed Mobility: Yes  Bed Mobility 1  Bed Mobility 1: Supine to sitting, Sitting to supine  Level of Assistance 1: Contact guard  Bed Mobility Comments 1: increased time to complete, pain limiting    Transfers  Transfer: Yes  Transfer 1  Technique 1: Sit to stand, Stand to sit  Transfer Device 1: Walker  Transfer Level of Assistance 1: Contact guard  Trials/Comments 1: pain limited transfers    Sitting Balance:  Static Sitting Balance  Static Sitting-Balance Support: Feet supported, No upper extremity supported  Static Sitting-Level of Assistance: Distant supervision  Static Sitting-Comment/Number of Minutes: >10 minutes  Standing Balance:  Static Standing Balance  Static Standing-Balance Support: Bilateral upper extremity  supported  Static Standing-Level of Assistance: Contact guard  Static Standing-Comment/Number of Minutes: walker  Dynamic Standing Balance  Dynamic Standing-Balance Support: Bilateral upper extremity supported  Dynamic Standing-Comments: sidesteps with walker CGA    Vision: Vision - Basic Assessment  Current Vision: No visual deficits  Sensation:  Light Touch: No apparent deficits  Strength:  Strength Comments: BUE strength grossly WFL, pt reports deficits with R shoulder last several months  Perception:     Coordination:  Movements are Fluid and Coordinated: Yes   Hand Function:  Hand Function  Gross Grasp: Functional  Coordination: Functional  Extremities: RUE   RUE :  (decreased AROM of shoulder,  PROM WFL) and LUE   LUE: Within Functional Limits      Outcome Measures: [facility] Daily Activity  Putting on and taking off regular lower body clothing: Total  Bathing (including washing, rinsing, drying): A lot  Putting on and taking off regular upper body clothing: A little  Toileting, which includes using toilet, bedpan or urinal: A little  Taking care of personal grooming such as brushing teeth: A little  Eating Meals: None  Daily Activity - Total Score: 16      Education Documentation  Home Exercise Program, taught by Christine Cunha OT at 2/10/2024  3:13 PM.  Learner: Patient  Readiness: Acceptance  Method: Explanation  Response: Verbalizes Understanding    Precautions, taught by Christine Cunha OT at 2/10/2024  3:13 PM.  Learner: Patient  Readiness: Acceptance  Method: Explanation  Response: Verbalizes Understanding    ADL Training, taught by Christine Cunha OT at 2/10/2024  3:13 PM.  Learner: Patient  Readiness: Acceptance  Method: Explanation  Response: Verbalizes Understanding    Education Comments  No comments found.      Goals:   Encounter Problems       Encounter Problems (Active)       ADLs       Patient with complete upper body dressing with set-up level of assistance  (Progressing)       Start:  02/10/24     Expected End:  02/24/24            Patient with complete lower body dressing with set-up level of assistance  (Progressing)       Start:  02/10/24    Expected End:  02/24/24            Patient will complete daily grooming tasks brushing teeth and washing face/hair with set-up level of assistance and PRN adaptive equipment while standing. (Progressing)       Start:  02/10/24    Expected End:  02/24/24            Patient will complete toileting including hygiene clothing management/hygiene with set-up level of assistance. (Progressing)       Start:  02/10/24    Expected End:  02/24/24               MOBILITY       Patient will perform Functional mobility min Household distances/Community Distances with supervision level of assistance and least restrictive device in order to improve safety and functional mobility. (Progressing)       Start:  02/10/24    Expected End:  02/24/24               TRANSFERS       Patient will perform bed mobility independent level of assistance in order to improve safety and independence with mobility (Progressing)       Start:  02/10/24    Expected End:  02/24/24            Patient will complete sit to stand transfer with supervision level of assistance and least restrictive device in order to improve safety and prepare for out of bed mobility. (Progressing)       Start:  02/10/24    Expected End:  02/24/24

## 2024-02-10 NOTE — PROGRESS NOTES
ED SOCIAL WORK NOTE:     Yoselyn Hernandez is a 64 y.o. female on day 0 of admission presenting with End stage renal disease (CMS/HCC).  Patient is admitted for placement, after returning due to current SNF's inability to accommodate patient's medical needs.  Patient has chosen to look at other SNF's at this time, stating she doesn't feel comfortable at this time.  SW presented choice list for patient, in the event the plan changes.       Anticipated Plan: Determine   ADOD:  selam ALMONTE LCSW

## 2024-02-11 LAB
INR PPP: 2.4 (ref 0.9–1.1)
PROTHROMBIN TIME: 27.1 SECONDS (ref 9.8–12.8)

## 2024-02-11 PROCEDURE — 99232 SBSQ HOSP IP/OBS MODERATE 35: CPT | Performed by: INTERNAL MEDICINE

## 2024-02-11 PROCEDURE — 2500000004 HC RX 250 GENERAL PHARMACY W/ HCPCS (ALT 636 FOR OP/ED): Performed by: NURSE PRACTITIONER

## 2024-02-11 PROCEDURE — 2500000002 HC RX 250 W HCPCS SELF ADMINISTERED DRUGS (ALT 637 FOR MEDICARE OP, ALT 636 FOR OP/ED): Performed by: INTERNAL MEDICINE

## 2024-02-11 PROCEDURE — 36415 COLL VENOUS BLD VENIPUNCTURE: CPT | Performed by: INTERNAL MEDICINE

## 2024-02-11 PROCEDURE — 2500000002 HC RX 250 W HCPCS SELF ADMINISTERED DRUGS (ALT 637 FOR MEDICARE OP, ALT 636 FOR OP/ED): Performed by: NURSE PRACTITIONER

## 2024-02-11 PROCEDURE — 2500000001 HC RX 250 WO HCPCS SELF ADMINISTERED DRUGS (ALT 637 FOR MEDICARE OP): Performed by: NURSE PRACTITIONER

## 2024-02-11 PROCEDURE — 2500000004 HC RX 250 GENERAL PHARMACY W/ HCPCS (ALT 636 FOR OP/ED): Performed by: INTERNAL MEDICINE

## 2024-02-11 PROCEDURE — 85610 PROTHROMBIN TIME: CPT | Performed by: INTERNAL MEDICINE

## 2024-02-11 PROCEDURE — G0378 HOSPITAL OBSERVATION PER HR: HCPCS

## 2024-02-11 RX ORDER — WARFARIN 4 MG/1
4 TABLET ORAL DAILY
Status: DISCONTINUED | OUTPATIENT
Start: 2024-02-11 | End: 2024-02-18

## 2024-02-11 RX ADMIN — HYDROMORPHONE HYDROCHLORIDE 3 MG: 2 TABLET ORAL at 17:05

## 2024-02-11 RX ADMIN — NYSTATIN 500000 UNITS: 100000 SUSPENSION ORAL at 09:29

## 2024-02-11 RX ADMIN — SENNOSIDES 17.2 MG: 8.6 TABLET, FILM COATED ORAL at 14:46

## 2024-02-11 RX ADMIN — MIRTAZAPINE 15 MG: 15 TABLET, FILM COATED ORAL at 20:13

## 2024-02-11 RX ADMIN — GENTAMICIN SULFATE: 1 CREAM TOPICAL at 17:06

## 2024-02-11 RX ADMIN — ATORVASTATIN CALCIUM 40 MG: 40 TABLET, FILM COATED ORAL at 20:13

## 2024-02-11 RX ADMIN — HYDROMORPHONE HYDROCHLORIDE 3 MG: 2 TABLET ORAL at 02:11

## 2024-02-11 RX ADMIN — PANCRELIPASE 3 CAPSULE: 24000; 76000; 120000 CAPSULE, DELAYED RELEASE PELLETS ORAL at 09:28

## 2024-02-11 RX ADMIN — HYDROMORPHONE HYDROCHLORIDE 3 MG: 2 TABLET ORAL at 11:43

## 2024-02-11 RX ADMIN — HYDROMORPHONE HYDROCHLORIDE 3 MG: 2 TABLET ORAL at 21:43

## 2024-02-11 RX ADMIN — ALLOPURINOL 100 MG: 100 TABLET ORAL at 10:00

## 2024-02-11 RX ADMIN — LEVOTHYROXINE SODIUM 25 MCG: 25 TABLET ORAL at 09:14

## 2024-02-11 RX ADMIN — NYSTATIN 500000 UNITS: 100000 SUSPENSION ORAL at 21:43

## 2024-02-11 RX ADMIN — NYSTATIN 500000 UNITS: 100000 SUSPENSION ORAL at 14:46

## 2024-02-11 RX ADMIN — Medication 1000 UNITS: at 09:28

## 2024-02-11 RX ADMIN — NEPHROCAP 1 CAPSULE: 1 CAP ORAL at 09:00

## 2024-02-11 RX ADMIN — SEVELAMER CARBONATE 800 MG: 800 TABLET, FILM COATED ORAL at 17:05

## 2024-02-11 RX ADMIN — HYDROMORPHONE HYDROCHLORIDE 3 MG: 2 TABLET ORAL at 06:08

## 2024-02-11 RX ADMIN — WARFARIN SODIUM 4 MG: 4 TABLET ORAL at 17:05

## 2024-02-11 RX ADMIN — PANCRELIPASE 3 CAPSULE: 24000; 76000; 120000 CAPSULE, DELAYED RELEASE PELLETS ORAL at 11:42

## 2024-02-11 RX ADMIN — SEVELAMER CARBONATE 800 MG: 800 TABLET, FILM COATED ORAL at 09:27

## 2024-02-11 RX ADMIN — SEVELAMER CARBONATE 800 MG: 800 TABLET, FILM COATED ORAL at 11:43

## 2024-02-11 RX ADMIN — HEPARIN SODIUM: 1000 INJECTION INTRAVENOUS; SUBCUTANEOUS at 17:06

## 2024-02-11 RX ADMIN — PANCRELIPASE 3 CAPSULE: 24000; 76000; 120000 CAPSULE, DELAYED RELEASE PELLETS ORAL at 17:05

## 2024-02-11 ASSESSMENT — COGNITIVE AND FUNCTIONAL STATUS - GENERAL
CLIMB 3 TO 5 STEPS WITH RAILING: A LOT
MOBILITY SCORE: 18
DAILY ACTIVITIY SCORE: 22
TOILETING: A LITTLE
DAILY ACTIVITIY SCORE: 22
CLIMB 3 TO 5 STEPS WITH RAILING: A LOT
STANDING UP FROM CHAIR USING ARMS: A LOT
PERSONAL GROOMING: A LITTLE
STANDING UP FROM CHAIR USING ARMS: A LOT
TOILETING: A LITTLE
WALKING IN HOSPITAL ROOM: A LOT
MOBILITY SCORE: 18
PERSONAL GROOMING: A LITTLE
WALKING IN HOSPITAL ROOM: A LOT

## 2024-02-11 ASSESSMENT — PAIN - FUNCTIONAL ASSESSMENT
PAIN_FUNCTIONAL_ASSESSMENT: 0-10

## 2024-02-11 ASSESSMENT — PAIN SCALES - GENERAL
PAINLEVEL_OUTOF10: 9
PAINLEVEL_OUTOF10: 10 - WORST POSSIBLE PAIN
PAINLEVEL_OUTOF10: 3
PAINLEVEL_OUTOF10: 8
PAINLEVEL_OUTOF10: 7
PAINLEVEL_OUTOF10: 10 - WORST POSSIBLE PAIN

## 2024-02-11 ASSESSMENT — PAIN DESCRIPTION - DESCRIPTORS: DESCRIPTORS: ACHING;DISCOMFORT

## 2024-02-11 ASSESSMENT — PAIN DESCRIPTION - ORIENTATION: ORIENTATION: RIGHT

## 2024-02-11 ASSESSMENT — PAIN DESCRIPTION - LOCATION: LOCATION: ABDOMEN

## 2024-02-11 NOTE — PROGRESS NOTES
Therapy notes zane sent to SNF to start precert.  The supplies for her PD has not arrived at the SNF so far.  Will need precert to return to [facility].  KJ Camacho

## 2024-02-11 NOTE — PROGRESS NOTES
"Yoselyn Hernandez is a 64 y.o. female on day 3 of admission presenting with End stage renal disease (CMS/HCC).    Subjective   Patient sitting up in the bed on the side 55. No change in symptoms.  Tolerating diet.  Patient appears anxious when talking about going to a skilled nursing facility.      Objective     General: Lying in bed without distress.  Cooperative.  Skin: No rashes ulcerations.  HEENT: Sclera is white.  Mucous membranes moist.  Evaluated her neck and there is no significant wounds noted or masses felt.  Neck: Supple.  No JVD.  Cardiac: Regular rate and rhythm, S1/S2 normal.  Lungs: Clear to auscultation bilaterally, no wheezing or crackles, no accessory muscle use at rest.  Abdomen: bowel sounds present, mildly tender to light palpation. PD catheter in place.  Extremities: No cyanosis.  No lower extremity edema.  Neurologic: Alert and oriented x3.  No focal deficits.  Psychiatric: Appropriate mood and behavior.  Currently no agitation.    Last Recorded Vitals  Blood pressure 113/75, pulse 87, temperature 36.5 °C (97.7 °F), resp. rate 18, height 1.702 m (5' 7\"), weight 55 kg (121 lb 4.1 oz), SpO2 98 %.  On room air.    Intake/Output last 3 Shifts:  I/O last 3 completed shifts:  In: 9240 (168 mL/kg) [P.O.:240; Other:9000]  Out: - (0 mL/kg)   Weight: 55 kg     Relevant Results  allopurinol, 100 mg, oral, Daily  atorvastatin, 40 mg, oral, Nightly  B complex-vitamin C-folic acid, 1 capsule, oral, Daily  cholecalciferol, 1,000 Units, oral, Daily  dextrose 1.5% - LOW calcium 2.5mEq/L 5,000 mL with heparin 2,500 Units, vancomycin 125 mg peritoneal dialysate, , intraperitoneal, q24h  dextrose 2.5 % - LOW calcium 2.5 mEq/L 5,000 mL with heparin 2,500 Units, vancomycin 125 mg peritoneal dialysate, , intraperitoneal, q24h  gentamicin, , Topical, q24h  levothyroxine, 25 mcg, oral, Daily before breakfast  lipase-protease-amylase, 3 capsule, oral, TID with meals  mirtazapine, 15 mg, oral, Nightly  nystatin, 500,000 " Units, oral, q8h AMARJIT  sevelamer carbonate, 800 mg, oral, TID with meals  [Held by provider] warfarin, 5 mg, oral, Daily           PRN medications: acetaminophen, diphenoxylate-atropine, HYDROmorphone, melatonin, ondansetron ODT, polyethylene glycol, sennosides     Results for orders placed or performed during the hospital encounter of 02/08/24 (from the past 24 hour(s))   Protime-INR   Result Value Ref Range    Protime 27.1 (H) 9.8 - 12.8 seconds    INR 2.4 (H) 0.9 - 1.1           Hospital Course:  Yoselyn Hernandez is a 64 y.o. 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  Patient has had multiple admissions for this previously.  Nephrology consulted.  Fluid obtained from abdomen grew out staph epidermis.  Patient placed on intraperitoneal vancomycin and ceftazidime initially.  Eventually transition over to intraperitoneal vancomycin only.  Patient is recommended for total 14 days of intraperitoneal antibiotics.  PT/OT saw the patient and recommend skilled nursing facility.  Initially sent patient to the skilled nursing facility after they informed us they were ready on 2/7.  Patient came back on 2/8 because the facility apparently did not have the PD supplies.     Assessment and plan:    ESRD and PD Peritonitis   -Nephrology consulted and following.  -Continue pain control and intraperitoneal antibiotics.  -Patient continues to complain of significant severe pain although I am somewhat suspicious of the subjective report without any other objective indications of severe pain.  Patient is tolerating diet without any nausea or vomiting.  Since we are treating the patient for peritonitis for  now we will continue with pain control.  Continue oral Dilaudid 3 mg every 4 hours as needed.  Would not increase any further.  I have already informed the patient that this is for short-term use and she should not expect or planned this to be a long-term treatment plan.  Pain control will stop when peritonitis treatment is done.  -Initial end date for intraperitoneal antibiotics was 2/14 due to patient missing 1 dose new end date 2/15.         Chronic Left common iliac occlusion/Hx of DVT/PE   -Restart warfarin at 4 mg.  -Last INR 2.4.  Recheck INR tomorrow.     Chronic HFrEF  -Had discontinued home Coreg 6.25 mg BID due to low normal blood pressure and need some room for peritoneal dialysis.  -Systolic blood pressure 120s 130s.  -Continue peritoneal dialysis.     Chronic diarrhea  -Chronic issue for patient which she brings up frequently.  Patient has seen GI outpatient previously.  -Continue lomotil as need it for diarrhea   -This of course conflicts with nephrology is requested patient have at least 1 bowel movement a day.  Continue MiraLAX daily as needed, Senokot as as needed, Lomotil as needed.     Hypokalemia   -Resolved.     Physical deconditioning  -PT/OT reevaluation needed for placement again to skilled nursing facility.  TCC and  to work with patient about discharge to facility with PD capabilities.    Disposition: Wait for placement to skilled nursing facility that can also do peritoneal dialysis.  Will then also need to wait for the facility to get the supplies.      Carl Wiley MD

## 2024-02-11 NOTE — PROGRESS NOTES
Discharge Planning Note:    Yoselyn Hrenandez is a 64 y.o. female on day 0 of admission presenting with End stage renal disease (CMS/HCC).       Called and spoke with this person regarding discharge and SNF placement. She hasn't looked at the SNF list and unwilling to return to [facility] in [place]. She says they have no staff and they weren't prepared for her on admission. She would like to stay her until 2/15 when her IV ATB is complete and complete PT/OT at home. TCC will send referrals to other SNF that accept PD and make them aware of her needs for acceptance. Precert from [facility] insurance  is still needed for admission, even the [place]. Patient says she lives alone but able to handle her own PD dialysis which she's done for 2 yrs,  its the IV ATB she needs assistance with. She says her friend is going to get her machine from the SNF says she doesn't trust the SNF. Team made aware of the above.                 Cristal Vizcaino RN TCC

## 2024-02-12 LAB
FUNGUS SPEC CULT: NORMAL
FUNGUS SPEC FUNGUS STN: NORMAL
INR PPP: 1.8 (ref 0.9–1.1)
PROTHROMBIN TIME: 20.3 SECONDS (ref 9.8–12.8)

## 2024-02-12 PROCEDURE — 36415 COLL VENOUS BLD VENIPUNCTURE: CPT | Performed by: INTERNAL MEDICINE

## 2024-02-12 PROCEDURE — 99232 SBSQ HOSP IP/OBS MODERATE 35: CPT | Performed by: INTERNAL MEDICINE

## 2024-02-12 PROCEDURE — 2500000001 HC RX 250 WO HCPCS SELF ADMINISTERED DRUGS (ALT 637 FOR MEDICARE OP): Performed by: NURSE PRACTITIONER

## 2024-02-12 PROCEDURE — 2500000004 HC RX 250 GENERAL PHARMACY W/ HCPCS (ALT 636 FOR OP/ED): Performed by: INTERNAL MEDICINE

## 2024-02-12 PROCEDURE — 2500000002 HC RX 250 W HCPCS SELF ADMINISTERED DRUGS (ALT 637 FOR MEDICARE OP, ALT 636 FOR OP/ED): Performed by: NURSE PRACTITIONER

## 2024-02-12 PROCEDURE — 2500000004 HC RX 250 GENERAL PHARMACY W/ HCPCS (ALT 636 FOR OP/ED): Performed by: NURSE PRACTITIONER

## 2024-02-12 PROCEDURE — 85610 PROTHROMBIN TIME: CPT | Performed by: INTERNAL MEDICINE

## 2024-02-12 PROCEDURE — 2500000002 HC RX 250 W HCPCS SELF ADMINISTERED DRUGS (ALT 637 FOR MEDICARE OP, ALT 636 FOR OP/ED): Performed by: INTERNAL MEDICINE

## 2024-02-12 PROCEDURE — G0378 HOSPITAL OBSERVATION PER HR: HCPCS

## 2024-02-12 RX ADMIN — HYDROMORPHONE HYDROCHLORIDE 3 MG: 2 TABLET ORAL at 06:09

## 2024-02-12 RX ADMIN — SENNOSIDES 17.2 MG: 8.6 TABLET, FILM COATED ORAL at 08:57

## 2024-02-12 RX ADMIN — POLYETHYLENE GLYCOL 3350 17 G: 17 POWDER, FOR SOLUTION ORAL at 08:58

## 2024-02-12 RX ADMIN — HEPARIN SODIUM: 1000 INJECTION INTRAVENOUS; SUBCUTANEOUS at 17:20

## 2024-02-12 RX ADMIN — PANCRELIPASE 3 CAPSULE: 24000; 76000; 120000 CAPSULE, DELAYED RELEASE PELLETS ORAL at 13:10

## 2024-02-12 RX ADMIN — WARFARIN SODIUM 4 MG: 4 TABLET ORAL at 17:19

## 2024-02-12 RX ADMIN — NYSTATIN 500000 UNITS: 100000 SUSPENSION ORAL at 21:37

## 2024-02-12 RX ADMIN — LEVOTHYROXINE SODIUM 25 MCG: 25 TABLET ORAL at 08:58

## 2024-02-12 RX ADMIN — SEVELAMER CARBONATE 800 MG: 800 TABLET, FILM COATED ORAL at 08:58

## 2024-02-12 RX ADMIN — HYDROMORPHONE HYDROCHLORIDE 3 MG: 2 TABLET ORAL at 13:09

## 2024-02-12 RX ADMIN — SEVELAMER CARBONATE 800 MG: 800 TABLET, FILM COATED ORAL at 17:20

## 2024-02-12 RX ADMIN — PANCRELIPASE 3 CAPSULE: 24000; 76000; 120000 CAPSULE, DELAYED RELEASE PELLETS ORAL at 17:19

## 2024-02-12 RX ADMIN — MIRTAZAPINE 15 MG: 15 TABLET, FILM COATED ORAL at 21:30

## 2024-02-12 RX ADMIN — ATORVASTATIN CALCIUM 40 MG: 40 TABLET, FILM COATED ORAL at 21:30

## 2024-02-12 RX ADMIN — HYDROMORPHONE HYDROCHLORIDE 3 MG: 2 TABLET ORAL at 02:08

## 2024-02-12 RX ADMIN — GENTAMICIN SULFATE: 1 CREAM TOPICAL at 17:20

## 2024-02-12 RX ADMIN — NYSTATIN 500000 UNITS: 100000 SUSPENSION ORAL at 06:09

## 2024-02-12 RX ADMIN — HYDROMORPHONE HYDROCHLORIDE 3 MG: 2 TABLET ORAL at 17:19

## 2024-02-12 RX ADMIN — HYDROMORPHONE HYDROCHLORIDE 3 MG: 2 TABLET ORAL at 21:30

## 2024-02-12 RX ADMIN — SEVELAMER CARBONATE 800 MG: 800 TABLET, FILM COATED ORAL at 13:10

## 2024-02-12 RX ADMIN — NEPHROCAP 1 CAPSULE: 1 CAP ORAL at 08:58

## 2024-02-12 RX ADMIN — PANCRELIPASE 3 CAPSULE: 24000; 76000; 120000 CAPSULE, DELAYED RELEASE PELLETS ORAL at 08:58

## 2024-02-12 RX ADMIN — ALLOPURINOL 100 MG: 100 TABLET ORAL at 08:58

## 2024-02-12 RX ADMIN — Medication 1000 UNITS: at 08:58

## 2024-02-12 ASSESSMENT — COGNITIVE AND FUNCTIONAL STATUS - GENERAL
STANDING UP FROM CHAIR USING ARMS: A LITTLE
CLIMB 3 TO 5 STEPS WITH RAILING: A LITTLE
DAILY ACTIVITIY SCORE: 24
WALKING IN HOSPITAL ROOM: A LITTLE
MOBILITY SCORE: 21

## 2024-02-12 ASSESSMENT — PAIN - FUNCTIONAL ASSESSMENT
PAIN_FUNCTIONAL_ASSESSMENT: 0-10
PAIN_FUNCTIONAL_ASSESSMENT: 0-10

## 2024-02-12 ASSESSMENT — PAIN SCALES - GENERAL
PAINLEVEL_OUTOF10: 7
PAINLEVEL_OUTOF10: 8
PAINLEVEL_OUTOF10: 3
PAINLEVEL_OUTOF10: 9
PAINLEVEL_OUTOF10: 10 - WORST POSSIBLE PAIN
PAINLEVEL_OUTOF10: 3
PAINLEVEL_OUTOF10: 9

## 2024-02-12 ASSESSMENT — PAIN DESCRIPTION - DESCRIPTORS
DESCRIPTORS: BURNING;DISCOMFORT
DESCRIPTORS: DISCOMFORT;ACHING

## 2024-02-12 ASSESSMENT — PAIN DESCRIPTION - LOCATION: LOCATION: ABDOMEN

## 2024-02-12 NOTE — PROGRESS NOTES
"Yoselyn Hernandez is a 64 y.o. female on day 3 of admission presenting with End stage renal disease (CMS/HCC).    Subjective   Patient sitting up in the bed on the side 55.  Patient had informed yesterday that she was still having bowel movements.  Today now she states she has not had a bowel movement since she has been on [place] 55.      Objective     General: Lying in bed without distress.  Cooperative.  Skin: No rashes ulcerations.  HEENT: Sclera is white.  Mucous membranes moist.  Evaluated her neck and there is no significant wounds noted or masses felt.  Neck: Supple.  No JVD.  Cardiac: Regular rate and rhythm, S1/S2 normal.  Lungs: Clear to auscultation bilaterally, no wheezing or crackles, no accessory muscle use at rest.  Abdomen: bowel sounds present, mildly tender to light palpation. PD catheter in place.  Some mild distention of abdomen.  Extremities: No cyanosis.  No lower extremity edema.  Neurologic: Alert and oriented x3.  No focal deficits.  Psychiatric: Appropriate mood and behavior.  Currently no agitation.    Last Recorded Vitals  Blood pressure 105/64, pulse 96, temperature 36.4 °C (97.5 °F), resp. rate 16, height 1.702 m (5' 7\"), weight 55 kg (121 lb 4.1 oz), SpO2 96 %.  On room air.    Intake/Output last 3 Shifts:  I/O last 3 completed shifts:  In: 9240 (168 mL/kg) [P.O.:240; Other:9000]  Out: 214 (3.9 mL/kg) [Other:214]  Weight: 55 kg     Relevant Results  allopurinol, 100 mg, oral, Daily  atorvastatin, 40 mg, oral, Nightly  B complex-vitamin C-folic acid, 1 capsule, oral, Daily  cholecalciferol, 1,000 Units, oral, Daily  dextrose 1.5% - LOW calcium 2.5mEq/L 5,000 mL with heparin 2,500 Units, vancomycin 125 mg peritoneal dialysate, , intraperitoneal, q24h  dextrose 2.5 % - LOW calcium 2.5 mEq/L 5,000 mL with heparin 2,500 Units, vancomycin 125 mg peritoneal dialysate, , intraperitoneal, q24h  gentamicin, , Topical, q24h  levothyroxine, 25 mcg, oral, Daily before " breakfast  lipase-protease-amylase, 3 capsule, oral, TID with meals  mirtazapine, 15 mg, oral, Nightly  nystatin, 500,000 Units, oral, q8h AMARJIT  sevelamer carbonate, 800 mg, oral, TID with meals  warfarin, 4 mg, oral, Daily           PRN medications: acetaminophen, diphenoxylate-atropine, HYDROmorphone, melatonin, ondansetron ODT, polyethylene glycol, sennosides     Results for orders placed or performed during the hospital encounter of 02/08/24 (from the past 24 hour(s))   Protime-INR   Result Value Ref Range    Protime 20.3 (H) 9.8 - 12.8 seconds    INR 1.8 (H) 0.9 - 1.1           Hospital Course:  Yoselyn Hernandez is a 64 y.o. 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  Patient has had multiple admissions for this previously.  Nephrology consulted.  Fluid obtained from abdomen grew out staph epidermis.  Patient placed on intraperitoneal vancomycin and ceftazidime initially.  Eventually transition over to intraperitoneal vancomycin only.  Patient is recommended for total 14 days of intraperitoneal antibiotics.  PT/OT saw the patient and recommend skilled nursing facility.  Initially sent patient to the skilled nursing facility after they informed us they were ready on 2/7.  Patient came back on 2/8 because the facility apparently did not have the PD supplies.     Assessment and plan:    ESRD and PD Peritonitis   -Nephrology consulted and following.  -Continue pain control and intraperitoneal antibiotics.  -Patient continues to complain of significant severe pain although I am somewhat suspicious of the subjective report without any other objective indications of severe pain.  Patient is  tolerating diet without any nausea or vomiting.  Since we are treating the patient for peritonitis for now we will continue with pain control.  Continue oral Dilaudid 3 mg every 4 hours as needed.  Would not increase any further.  I have already informed the patient that this is for short-term use and she should not expect or planned this to be a long-term treatment plan.  Pain control will stop when peritonitis treatment is done.  -Initial end date for intraperitoneal antibiotics was 2/14 due to patient missing 1 dose, new end date 2/15.         Chronic Left common iliac occlusion/Hx of DVT/PE   -Restart warfarin at 4 mg.  -Last INR 2.4.  Recheck INR tomorrow.     Chronic HFrEF  -Had discontinued home Coreg 6.25 mg BID due to low normal blood pressure and need some room for peritoneal dialysis.  -Systolic blood pressure 120s 130s.  -Continue peritoneal dialysis.     Chronic diarrhea  -Chronic issue for patient which she brings up frequently.  Patient has seen GI outpatient previously.  -Continue lomotil as need it for significant diarrhea.  -This of course conflicts with nephrology is requested patient have at least 1 bowel movement a day.  Continue MiraLAX daily as needed, Senokot as as needed, Lomotil as needed.  -Patient advised today that she needs to have a bowel movement today otherwise we need to start decreasing the opiates which may be contributing to the constipation.     Hypokalemia   -Resolved.     Physical deconditioning  -PT/OT reevaluation done.  Patient now stating she wants a new facility.  TCC and  notified and working on the situation.    Disposition: Wait for placement to skilled nursing facility that can also do peritoneal dialysis.  Will then also need to wait for the facility to get the supplies.      Carl Wiley MD

## 2024-02-12 NOTE — CARE PLAN
The patient's goals for the shift include      Problem: Pain  Goal: Takes deep breaths with improved pain control throughout the shift  Outcome: Progressing  Goal: Turns in bed with improved pain control throughout the shift  Outcome: Progressing     Problem: Fall/Injury  Goal: Not fall by end of shift  Outcome: Progressing  Goal: Be free from injury by end of the shift  Outcome: Progressing     The clinical goals for the shift include Patient will remain free from falls and injury throughout shift    Patient remained free from falls and injury throughout shift. Patient complaints of pain were managed with PRN medications as prescribed. Patient currently resting comfortably with stable vital signs.

## 2024-02-12 NOTE — PROGRESS NOTES
Yoselyn Hernandez is a 64 y.o. female on day 0 of admission presenting with End stage renal disease (CMS/[facility]).    Subjective   Met with patient at bedside to further discuss discharge planning; patient was previously discharged to [facility] but she was sent back as they had not completed their PD training. Patient states that she does not want to return to previous facility and would like to discharge to another SNF. Patient stated that liaison from [facility] came and she would like to discharge there.     Updated provided to [facility] that they are FOC; currently awaiting updated therapy notes for precert.    Call placed to patients PD Nurse Anamaria ([phone]); advised her that patient is now requesting to discharge to [facility]. Advised that they will coordinate training with new SNF once authorization has been approved. Also advised that patient will need to take her PD cycler machine and supplies from home to facility. Will update Anamaria once authorization has been obtained.    MD/TCC/Bedside RN updated of above.    -Radha SANDHU, MA, LSW  [phone] or [facility]  Care Transitions

## 2024-02-13 LAB
ALBUMIN SERPL BCP-MCNC: 2.4 G/DL (ref 3.4–5)
ANION GAP SERPL CALC-SCNC: 13 MMOL/L (ref 10–20)
BUN SERPL-MCNC: 36 MG/DL (ref 6–23)
CALCIUM SERPL-MCNC: 8.2 MG/DL (ref 8.6–10.6)
CHLORIDE SERPL-SCNC: 97 MMOL/L (ref 98–107)
CO2 SERPL-SCNC: 31 MMOL/L (ref 21–32)
CREAT SERPL-MCNC: 6.13 MG/DL (ref 0.5–1.05)
EGFRCR SERPLBLD CKD-EPI 2021: 7 ML/MIN/1.73M*2
GLUCOSE SERPL-MCNC: 98 MG/DL (ref 74–99)
INR PPP: 2 (ref 0.9–1.1)
PHOSPHATE SERPL-MCNC: 4.9 MG/DL (ref 2.5–4.9)
POTASSIUM SERPL-SCNC: 4.2 MMOL/L (ref 3.5–5.3)
PROTHROMBIN TIME: 22.3 SECONDS (ref 9.8–12.8)
SODIUM SERPL-SCNC: 137 MMOL/L (ref 136–145)

## 2024-02-13 PROCEDURE — 2500000004 HC RX 250 GENERAL PHARMACY W/ HCPCS (ALT 636 FOR OP/ED): Performed by: NURSE PRACTITIONER

## 2024-02-13 PROCEDURE — 90947 DIALYSIS REPEATED EVAL: CPT | Performed by: INTERNAL MEDICINE

## 2024-02-13 PROCEDURE — 2500000004 HC RX 250 GENERAL PHARMACY W/ HCPCS (ALT 636 FOR OP/ED): Performed by: INTERNAL MEDICINE

## 2024-02-13 PROCEDURE — G0378 HOSPITAL OBSERVATION PER HR: HCPCS

## 2024-02-13 PROCEDURE — 85610 PROTHROMBIN TIME: CPT | Performed by: INTERNAL MEDICINE

## 2024-02-13 PROCEDURE — 80069 RENAL FUNCTION PANEL: CPT | Performed by: INTERNAL MEDICINE

## 2024-02-13 PROCEDURE — 97116 GAIT TRAINING THERAPY: CPT | Mod: GP,CQ

## 2024-02-13 PROCEDURE — 97530 THERAPEUTIC ACTIVITIES: CPT | Mod: GO

## 2024-02-13 PROCEDURE — 2500000001 HC RX 250 WO HCPCS SELF ADMINISTERED DRUGS (ALT 637 FOR MEDICARE OP): Performed by: NURSE PRACTITIONER

## 2024-02-13 PROCEDURE — 2500000002 HC RX 250 W HCPCS SELF ADMINISTERED DRUGS (ALT 637 FOR MEDICARE OP, ALT 636 FOR OP/ED): Performed by: NURSE PRACTITIONER

## 2024-02-13 PROCEDURE — 36415 COLL VENOUS BLD VENIPUNCTURE: CPT | Performed by: INTERNAL MEDICINE

## 2024-02-13 PROCEDURE — 97110 THERAPEUTIC EXERCISES: CPT | Mod: GP,CQ

## 2024-02-13 PROCEDURE — 97530 THERAPEUTIC ACTIVITIES: CPT | Mod: GP,CQ

## 2024-02-13 PROCEDURE — 99232 SBSQ HOSP IP/OBS MODERATE 35: CPT | Performed by: INTERNAL MEDICINE

## 2024-02-13 PROCEDURE — 2500000002 HC RX 250 W HCPCS SELF ADMINISTERED DRUGS (ALT 637 FOR MEDICARE OP, ALT 636 FOR OP/ED): Performed by: INTERNAL MEDICINE

## 2024-02-13 RX ADMIN — ALLOPURINOL 100 MG: 100 TABLET ORAL at 09:18

## 2024-02-13 RX ADMIN — HEPARIN SODIUM: 1000 INJECTION INTRAVENOUS; SUBCUTANEOUS at 17:36

## 2024-02-13 RX ADMIN — NYSTATIN 500000 UNITS: 100000 SUSPENSION ORAL at 21:15

## 2024-02-13 RX ADMIN — NEPHROCAP 1 CAPSULE: 1 CAP ORAL at 09:19

## 2024-02-13 RX ADMIN — GENTAMICIN SULFATE: 1 CREAM TOPICAL at 17:46

## 2024-02-13 RX ADMIN — Medication 1000 UNITS: at 09:19

## 2024-02-13 RX ADMIN — ATORVASTATIN CALCIUM 40 MG: 40 TABLET, FILM COATED ORAL at 21:15

## 2024-02-13 RX ADMIN — HYDROMORPHONE HYDROCHLORIDE 3 MG: 2 TABLET ORAL at 11:25

## 2024-02-13 RX ADMIN — HYDROMORPHONE HYDROCHLORIDE 3 MG: 2 TABLET ORAL at 06:26

## 2024-02-13 RX ADMIN — SEVELAMER CARBONATE 800 MG: 800 TABLET, FILM COATED ORAL at 18:39

## 2024-02-13 RX ADMIN — MIRTAZAPINE 15 MG: 15 TABLET, FILM COATED ORAL at 21:15

## 2024-02-13 RX ADMIN — PANCRELIPASE 3 CAPSULE: 24000; 76000; 120000 CAPSULE, DELAYED RELEASE PELLETS ORAL at 18:39

## 2024-02-13 RX ADMIN — HYDROMORPHONE HYDROCHLORIDE 3 MG: 2 TABLET ORAL at 16:42

## 2024-02-13 RX ADMIN — WARFARIN SODIUM 4 MG: 4 TABLET ORAL at 18:39

## 2024-02-13 RX ADMIN — LEVOTHYROXINE SODIUM 25 MCG: 25 TABLET ORAL at 06:26

## 2024-02-13 RX ADMIN — HYDROMORPHONE HYDROCHLORIDE 3 MG: 2 TABLET ORAL at 21:15

## 2024-02-13 ASSESSMENT — PAIN SCALES - GENERAL
PAINLEVEL_OUTOF10: 9
PAINLEVEL_OUTOF10: 10 - WORST POSSIBLE PAIN
PAINLEVEL_OUTOF10: 2
PAINLEVEL_OUTOF10: 10 - WORST POSSIBLE PAIN

## 2024-02-13 ASSESSMENT — COGNITIVE AND FUNCTIONAL STATUS - GENERAL
WALKING IN HOSPITAL ROOM: A LITTLE
CLIMB 3 TO 5 STEPS WITH RAILING: A LITTLE
CLIMB 3 TO 5 STEPS WITH RAILING: A LITTLE
MOVING FROM LYING ON BACK TO SITTING ON SIDE OF FLAT BED WITH BEDRAILS: A LITTLE
DAILY ACTIVITIY SCORE: 16
PERSONAL GROOMING: A LITTLE
MOVING TO AND FROM BED TO CHAIR: A LITTLE
TOILETING: A LOT
MOVING TO AND FROM BED TO CHAIR: A LITTLE
TURNING FROM BACK TO SIDE WHILE IN FLAT BAD: A LITTLE
PERSONAL GROOMING: A LITTLE
MOBILITY SCORE: 18
MOVING FROM LYING ON BACK TO SITTING ON SIDE OF FLAT BED WITH BEDRAILS: A LITTLE
DRESSING REGULAR UPPER BODY CLOTHING: A LITTLE
TURNING FROM BACK TO SIDE WHILE IN FLAT BAD: A LITTLE
HELP NEEDED FOR BATHING: A LOT
DRESSING REGULAR LOWER BODY CLOTHING: A LOT
MOBILITY SCORE: 18
STANDING UP FROM CHAIR USING ARMS: A LITTLE
DRESSING REGULAR LOWER BODY CLOTHING: A LOT
DAILY ACTIVITIY SCORE: 16
HELP NEEDED FOR BATHING: A LOT
STANDING UP FROM CHAIR USING ARMS: A LITTLE
WALKING IN HOSPITAL ROOM: A LITTLE
TOILETING: A LOT
DRESSING REGULAR UPPER BODY CLOTHING: A LITTLE

## 2024-02-13 ASSESSMENT — PAIN - FUNCTIONAL ASSESSMENT
PAIN_FUNCTIONAL_ASSESSMENT: 0-10

## 2024-02-13 ASSESSMENT — PAIN DESCRIPTION - LOCATION: LOCATION: ABDOMEN

## 2024-02-13 NOTE — CARE PLAN
The patient's goals for the shift include      Problem: Pain  Goal: Takes deep breaths with improved pain control throughout the shift  Outcome: Progressing  Goal: Turns in bed with improved pain control throughout the shift  Outcome: Progressing  Goal: Performs ADL's with improved pain control throughout shift  Outcome: Progressing     Problem: Fall/Injury  Goal: Not fall by end of shift  Outcome: Progressing  Goal: Be free from injury by end of the shift  Outcome: Progressing     The clinical goals for the shift include Patient will remain free from falls and injury throughout shift    Patient remained free from falls and injury throughout shift. Patient attempted to have a bowel movement but was not successful. Patient currently resting comfortably with stable vital signs. Patient medicated with PRN medications for pain as prescribed.

## 2024-02-13 NOTE — PROGRESS NOTES
"Yoselyn Hernandez is a 64 y.o. female on day 3 of admission presenting with End stage renal disease (CMS/HCC).    Subjective   Patient sitting up in the bed on the side 55.  Patient reports BM today and confirmed with bedside nursing.      Objective     General: Lying in bed without distress.  Cooperative.  Skin: No rashes ulcerations.  HEENT: Sclera is white.  Mucous membranes moist.  Evaluated her neck and there is no significant wounds noted or masses felt.  Neck: Supple.  No JVD.  Cardiac: Regular rate and rhythm, S1/S2 normal.  Lungs: Clear to auscultation bilaterally, no wheezing or crackles, no accessory muscle use at rest.  Abdomen: bowel sounds present, mildly tender to light palpation. PD catheter in place.  Some mild distention of abdomen.  Extremities: No cyanosis.  No lower extremity edema.  Neurologic: Alert and oriented x3.  No focal deficits.  Psychiatric: Appropriate mood and behavior.  Currently no agitation.    Last Recorded Vitals  Blood pressure 113/72, pulse 104, temperature 36.9 °C (98.4 °F), resp. rate 16, height 1.702 m (5' 7\"), weight 55 kg (121 lb 4.1 oz), SpO2 98 %.  On room air.    Intake/Output last 3 Shifts:  I/O last 3 completed shifts:  In: - (0 mL/kg)   Out: 214 (3.9 mL/kg) [Other:214]  Weight: 55 kg     Relevant Results  allopurinol, 100 mg, oral, Daily  atorvastatin, 40 mg, oral, Nightly  B complex-vitamin C-folic acid, 1 capsule, oral, Daily  cholecalciferol, 1,000 Units, oral, Daily  dextrose 2.5 % - LOW calcium 2.5 mEq/L 5,000 mL with heparin 2,500 Units, vancomycin 125 mg peritoneal dialysate, , intraperitoneal, q24h  dextrose 2.5 % - LOW calcium 2.5 mEq/L 5,000 mL with heparin 2,500 Units, vancomycin 125 mg peritoneal dialysate, , intraperitoneal, q24h  gentamicin, , Topical, q24h  levothyroxine, 25 mcg, oral, Daily before breakfast  lipase-protease-amylase, 3 capsule, oral, TID with meals  mirtazapine, 15 mg, oral, Nightly  nystatin, 500,000 Units, oral, q8h AMARJIT  sevelamer " carbonate, 800 mg, oral, TID with meals  warfarin, 4 mg, oral, Daily           PRN medications: acetaminophen, diphenoxylate-atropine, HYDROmorphone, melatonin, ondansetron ODT, polyethylene glycol, sennosides     Results for orders placed or performed during the hospital encounter of 02/08/24 (from the past 24 hour(s))   Protime-INR   Result Value Ref Range    Protime 22.3 (H) 9.8 - 12.8 seconds    INR 2.0 (H) 0.9 - 1.1   Renal Function Panel   Result Value Ref Range    Glucose 98 74 - 99 mg/dL    Sodium 137 136 - 145 mmol/L    Potassium 4.2 3.5 - 5.3 mmol/L    Chloride 97 (L) 98 - 107 mmol/L    Bicarbonate 31 21 - 32 mmol/L    Anion Gap 13 10 - 20 mmol/L    Urea Nitrogen 36 (H) 6 - 23 mg/dL    Creatinine 6.13 (H) 0.50 - 1.05 mg/dL    eGFR 7 (L) >60 mL/min/1.73m*2    Calcium 8.2 (L) 8.6 - 10.6 mg/dL    Phosphorus 4.9 2.5 - 4.9 mg/dL    Albumin 2.4 (L) 3.4 - 5.0 g/dL           Hospital Course:  Yoselyn Hernandez is a 64 y.o. 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  Patient has had multiple admissions for this previously.  Nephrology consulted.  Fluid obtained from abdomen grew out staph epidermis.  Patient placed on intraperitoneal vancomycin and ceftazidime initially.  Eventually transition over to intraperitoneal vancomycin only.  Patient is recommended for total 14 days of intraperitoneal antibiotics.  PT/OT saw the patient and recommend skilled nursing facility.  Initially sent patient to the skilled nursing facility after they informed us they were ready on 2/7.  Patient came back on 2/8 because the facility apparently did not have the PD supplies. Patient requesting different  facility.     Assessment and plan:    ESRD and PD Peritonitis   -Nephrology consulted and following.  -Continue pain control and intraperitoneal antibiotics.  -Patient continues to complain of significant severe pain although I am somewhat suspicious of the subjective report without any other objective indications of severe pain.  Patient is tolerating diet without any nausea or vomiting.  Since we are treating the patient for peritonitis for now we will continue with pain control.  Continue oral Dilaudid 3 mg every 4 hours as needed.  Would not increase any further.  I have already informed the patient that this is for short-term use and she should not expect or planned this to be a long-term treatment plan.  Pain control will stop when peritonitis treatment is done. Pain meds should stop when done with peritonitis treatment.  -Initial end date for intraperitoneal antibiotics was 2/14 due to patient missing 1 dose, new end date 2/15.         Chronic Left common iliac occlusion/Hx of DVT/PE   -Continue warfarin 4 mg.  -Last INR 2.0.  Continue INR monitoring.     Chronic HFrEF  -Had discontinued home Coreg 6.25 mg BID due to low normal blood pressure and need some room for peritoneal dialysis.  -Systolic blood pressure 110s.  -Continue peritoneal dialysis.     Chronic diarrhea  -Chronic issue for patient which she brings up frequently.  Patient has seen GI outpatient previously.  -Continue lomotil as need it for significant diarrhea.  -This of course conflicts with nephrology is requested patient have at least 1 bowel movement a day.  Continue MiraLAX daily as needed, Senokot as as needed, Lomotil as needed.  -Patient advised today that she needs to have a bowel movement today otherwise we need to start decreasing the opiates which may be contributing to the constipation.     Hypokalemia   -Resolved.     Physical deconditioning  -PT/OT reevaluation done.  Patient now stating she wants a new facility.  TCC and social  worker notified and working on the situation.    Disposition: Wait for placement to skilled nursing facility that can also do peritoneal dialysis.  Will then also need to wait for the facility to get the supplies.      Carl Wiley MD

## 2024-02-13 NOTE — PROGRESS NOTES
Occupational Therapy    Occupational Therapy Treatment    Name: Yoselyn Hernandez  MRN: [id number]  : 1960  Date: 24  Time Calculation  Start Time:   Stop Time: 8  Time Calculation (min): 10 min    Assessment:  OT Assessment: difficulty with ADLs/transfers  Prognosis: Good  Barriers to Discharge: Decreased caregiver support  Evaluation/Treatment Tolerance: Patient limited by pain, Patient limited by fatigue  Medical Staff Made Aware: Yes  End of Session Communication: Bedside nurse  End of Session Patient Position: Up in chair, Alarm off, not on at start of session  Plan:  Treatment Interventions: ADL retraining, Endurance training, Equipment evaluation/education, Compensatory technique education, UE strengthening/ROM, Functional transfer training  OT Frequency: 3 times per week  OT Discharge Recommendations: Moderate intensity level of continued care  Equipment Recommended upon Discharge: Wheeled walker  OT Recommended Transfer Status: Assist of 1  OT - OK to Discharge: Yes (when medically appropraiate)    Subjective   Previous Visit Info:  OT Last Visit  OT Received On: 24  General:  General  Reason for Referral: readmit for weakness, abdominal pain, SOB. SNF unable to perform PD so readmitted to   Past Medical History Relevant to Rehab: ESRD on daily PD, AAA dissections s/p repairs, HF, pancreatitis, PE/DVT  Family/Caregiver Present: No  Prior to Session Communication: Bedside nurse  Patient Position Received: Bed, 3 rail up, Alarm off, not on at start of session  General Comment: EOB upon arrival  Precautions:  Medical Precautions: Fall precautions  Vitals:     Pain Assessment:  Pain Assessment  Pain Assessment: 0-10  Pain Score: 10 - Worst possible pain  Pain Location: Generalized     Objective   Activities of Daily Living:         LE Dressing  LE Dressing:  (max A tamar socks EOB)        Bed Mobility/Transfers:      Transfer 1  Transfer Level of Assistance 1:  (sit/stand mod A, bed to  chair mod A HHA pt on phone call declined further mob)     Outcome Measures:  [facility] Daily Activity  Putting on and taking off regular lower body clothing: A lot  Bathing (including washing, rinsing, drying): A lot  Putting on and taking off regular upper body clothing: A little  Toileting, which includes using toilet, bedpan or urinal: A lot  Taking care of personal grooming such as brushing teeth: A little  Eating Meals: None  Daily Activity - Total Score: 16        Education Documentation  Home Exercise Program, taught by Veronica Torres OT at 2/13/2024  2:54 PM.  Learner: Patient  Readiness: Acceptance  Method: Explanation  Response: Verbalizes Understanding, Needs Reinforcement    Precautions, taught by Veronica Torres OT at 2/13/2024  2:54 PM.  Learner: Patient  Readiness: Acceptance  Method: Explanation  Response: Verbalizes Understanding, Needs Reinforcement    ADL Training, taught by Veronica Torres OT at 2/13/2024  2:54 PM.  Learner: Patient  Readiness: Acceptance  Method: Explanation  Response: Verbalizes Understanding, Needs Reinforcement    Education Comments  No comments found.      Goals:  Encounter Problems       Encounter Problems (Active)       ADLs       Patient with complete upper body dressing with set-up level of assistance  (Progressing)       Start:  02/10/24    Expected End:  02/24/24            Patient with complete lower body dressing with set-up level of assistance  (Progressing)       Start:  02/10/24    Expected End:  02/24/24            Patient will complete daily grooming tasks brushing teeth and washing face/hair with set-up level of assistance and PRN adaptive equipment while standing. (Progressing)       Start:  02/10/24    Expected End:  02/24/24            Patient will complete toileting including hygiene clothing management/hygiene with set-up level of assistance. (Progressing)       Start:  02/10/24    Expected End:  02/24/24                 MOBILITY       Patient will perform  Functional mobility min Household distances/Community Distances with supervision level of assistance and least restrictive device in order to improve safety and functional mobility. (Progressing)       Start:  02/10/24    Expected End:  02/24/24                 TRANSFERS       Patient will perform bed mobility independent level of assistance in order to improve safety and independence with mobility (Progressing)       Start:  02/10/24    Expected End:  02/24/24            Patient will complete sit to stand transfer with supervision level of assistance and least restrictive device in order to improve safety and prepare for out of bed mobility. (Progressing)       Start:  02/10/24    Expected End:  02/24/24                    27-Jul-2018 04:47

## 2024-02-13 NOTE — PROGRESS NOTES
Yoselyn Hernandez is a 64 y.o. female on day 0 of admission presenting with End stage renal disease (CMS/HCC).    Subjective   Patient was seen by PT this morning and continue to recommend moderate intensity at discharge. Updated clinicals were sent to [facility] via [facility] and requested that precert be initiated. SNF will need to be trained on PD prior to patient discharging. Will discuss with patient on getting her machine to new SNF and some of her supplies.     SW will continue to follow.    -Radha SANDHU, MA, LSW  [phone] or [facility] Secure Chat  Care Transitions

## 2024-02-13 NOTE — PROGRESS NOTES
Physical Therapy    Physical Therapy Treatment    Patient Name: Yoselyn Hernandez  MRN: [id number]  Today's Date: 2/13/2024  Time Calculation  Start Time: 0830  Stop Time: 0900  Time Calculation (min): 30 min       Assessment/Plan   PT Assessment  PT Assessment Results: Decreased strength, Decreased range of motion, Decreased endurance, Impaired balance, Decreased mobility  End of Session Communication: Bedside nurse  Assessment Comment: Pt could benefit from mod intensity level PT to address decreased strength, balance, and mobility endurance.  End of Session Patient Position: Bed, 2 rail up, Alarm off, not on at start of session  PT Plan  Inpatient/Swing Bed or Outpatient: Inpatient  PT Plan  Treatment/Interventions: Bed mobility, Transfer training, Gait training, Stair training, Balance training, Neuromuscular re-education, Strengthening, Endurance training, Range of motion, Therapeutic exercise, Therapeutic activity, Home exercise program, Positioning, Postural re-education  PT Plan: Skilled PT  PT Frequency: 3 times per week  PT Discharge Recommendations: Moderate intensity level of continued care  PT Recommended Transfer Status: Assist x1  PT - OK to Discharge:  (when medically stable)      General Visit Information:   PT  Visit  PT Received On: 02/13/24  Response to Previous Treatment: Patient reporting fatigue but able to participate.  General  Reason for Referral: readmit for weakness, abdominal pain, SOB. SNF unable to perform PD so readmitted to   Family/Caregiver Present: No  Prior to Session Communication: Bedside nurse  Patient Position Received: Bed, 2 rail up, Alarm off, not on at start of session (HOB elevated)  General Comment: Pt seated at EOB upon arrival, requires a lot of motivation for patient to participate in PT. Initially pt very talkative/rambling appearing to delay task at hand.  Pt reported she was 'not feeling up for it'. However, still cooperated for PT.    Subjective    Precautions:  Precautions  Medical Precautions: Fall precautions  Vital Signs:       Objective   Pain:  Pain Assessment  Pain Score:  (Unrated; Pt reported discomfort but didn't rate pain.)  Cognition:  Cognition  Overall Cognitive Status: Within Functional Limits  Orientation Level: Oriented X4  Postural Control:  Static Sitting Balance  Static Sitting-Balance Support: Bilateral upper extremity supported, Feet supported  Static Sitting-Level of Assistance: Close supervision  Static Standing Balance  Static Standing-Balance Support: Bilateral upper extremity supported  Static Standing-Level of Assistance: Contact guard  Static Standing-Comment/Number of Minutes: Initially Pt is unsteady and demos shakiness. Regains control after standing for a short while.  Extremity/Trunk Assessments:    Activity Tolerance:  Activity Tolerance  Endurance: Decreased endurance as Pt. Requires several rest breaks in between there ex , no significant change in vital signs  Activity Tolerance Comments: Pt will c/o of discomfort in stomach region due to premorbid stomach condition- Requires frequent breaks in between interventions.  Treatments:  Therapeutic Exercise  Therapeutic Exercise Performed: Yes  Therapeutic Exercise Activity 1: Pt performed AP, seated marches, LAQ x 15.    Therapeutic Activity  Therapeutic Activity Performed: Yes  Therapeutic Activity 1: Pt sat at EOB before standing to get used to being upright without back support.  Therapeutic Activity 2: Pt stood before ambulating to get used to being OOB and improve proprioception due to decreased recent mobilization.  Therapeutic Activity 3: Pt performed step ups x 3 on weight scale in order to prep for stair climbing- bilat ue support and cga for safety.  Bed Mobility  Bed Mobility: Yes  Bed Mobility 1  Bed Mobility 1: Scooting  Level of Assistance 1: Close supervision  Bed Mobility 2  Bed Mobility  2: Sitting to supine  Level of Assistance 2: Close  supervision    Ambulation/Gait Training  Ambulation/Gait Training Performed: Yes  Ambulation/Gait Training 1  Surface 1: Level tile  Device 1: Rolling walker  Assistance 1: Contact guard  Quality of Gait 1: Decreased step length  Comments/Distance (ft) 1: 15ft x 2, requires multiple breaks in between.  Transfers  Transfer: Yes  Transfer 1  Transfer From 1: Sit to, Stand to  Transfer to 1: Sit, Stand  Technique 1: Sit to stand, Stand to sit  Transfer Device 1: Walker  Transfer Level of Assistance 1: Contact guard  Trials/Comments 1: Requires cues to maintain upright position by looking up and extending hips.  Transfers 2  Transfer From 2: Stand to  Transfer to 2: Bed  Transfer Level of Assistance 2: Contact guard  Trials/Comments 2: Needs reminders to lower self slowly.    Outcome Measures:  [facility] Basic Mobility  Turning from your back to your side while in a flat bed without using bedrails: A little  Moving from lying on your back to sitting on the side of a flat bed without using bedrails: A little  Moving to and from bed to chair (including a wheelchair): A little  Standing up from a chair using your arms (e.g. wheelchair or bedside chair): A little  To walk in hospital room: A little  Climbing 3-5 steps with railing: A lot  Basic Mobility - Total Score: 17    Education Documentation  Body Mechanics, taught by NAIDA Krishna at 2/13/2024  9:44 AM.  Learner: Patient  Readiness: Acceptance  Method: Explanation  Response: Needs Reinforcement    Mobility Training, taught by NAIDA Krishna at 2/13/2024  9:44 AM.  Learner: Patient  Readiness: Acceptance  Method: Explanation  Response: Needs Reinforcement    Education Comments  No comments found.        OP EDUCATION:       Encounter Problems       Encounter Problems (Active)       Balance       STG - Maintains static standing balance with upper extremity support x2 minutes with SBA  (Progressing)       Start:  02/10/24    Expected End:  02/24/24                Mobility       STG - Patient will ambulate x50ft with SBA and LRAD  (Progressing)       Start:  02/10/24    Expected End:  02/24/24            STG - Patient will ambulate up and down a curb/step with LRAD and min Ax1 (Progressing)       Start:  02/10/24    Expected End:  02/24/24               Transfers       STG - Patient will perform bed mobility with SBA  (Progressing)       Start:  02/10/24    Expected End:  02/24/24            STG - Patient will transfer sit to and from stand with SBA and LRAD  (Progressing)       Start:  02/10/24    Expected End:  02/24/24                 NAIDA DOS SANTOS

## 2024-02-14 LAB
INR PPP: 2.4 (ref 0.9–1.1)
PROTHROMBIN TIME: 27.3 SECONDS (ref 9.8–12.8)

## 2024-02-14 PROCEDURE — 90945 DIALYSIS ONE EVALUATION: CPT | Performed by: INTERNAL MEDICINE

## 2024-02-14 PROCEDURE — G0378 HOSPITAL OBSERVATION PER HR: HCPCS

## 2024-02-14 PROCEDURE — 2500000001 HC RX 250 WO HCPCS SELF ADMINISTERED DRUGS (ALT 637 FOR MEDICARE OP): Performed by: INTERNAL MEDICINE

## 2024-02-14 PROCEDURE — 2500000001 HC RX 250 WO HCPCS SELF ADMINISTERED DRUGS (ALT 637 FOR MEDICARE OP): Performed by: NURSE PRACTITIONER

## 2024-02-14 PROCEDURE — 36415 COLL VENOUS BLD VENIPUNCTURE: CPT | Performed by: INTERNAL MEDICINE

## 2024-02-14 PROCEDURE — 2500000004 HC RX 250 GENERAL PHARMACY W/ HCPCS (ALT 636 FOR OP/ED): Performed by: INTERNAL MEDICINE

## 2024-02-14 PROCEDURE — 85610 PROTHROMBIN TIME: CPT | Performed by: INTERNAL MEDICINE

## 2024-02-14 PROCEDURE — 2500000002 HC RX 250 W HCPCS SELF ADMINISTERED DRUGS (ALT 637 FOR MEDICARE OP, ALT 636 FOR OP/ED): Performed by: NURSE PRACTITIONER

## 2024-02-14 PROCEDURE — 99232 SBSQ HOSP IP/OBS MODERATE 35: CPT | Performed by: INTERNAL MEDICINE

## 2024-02-14 PROCEDURE — 2500000002 HC RX 250 W HCPCS SELF ADMINISTERED DRUGS (ALT 637 FOR MEDICARE OP, ALT 636 FOR OP/ED): Performed by: INTERNAL MEDICINE

## 2024-02-14 PROCEDURE — 2500000004 HC RX 250 GENERAL PHARMACY W/ HCPCS (ALT 636 FOR OP/ED): Performed by: NURSE PRACTITIONER

## 2024-02-14 RX ORDER — HYDROMORPHONE HYDROCHLORIDE 2 MG/1
2 TABLET ORAL EVERY 4 HOURS PRN
Status: DISCONTINUED | OUTPATIENT
Start: 2024-02-14 | End: 2024-02-16

## 2024-02-14 RX ADMIN — SENNOSIDES 17.2 MG: 8.6 TABLET, FILM COATED ORAL at 10:16

## 2024-02-14 RX ADMIN — HYDROMORPHONE HYDROCHLORIDE 3 MG: 2 TABLET ORAL at 01:17

## 2024-02-14 RX ADMIN — HEPARIN SODIUM: 1000 INJECTION INTRAVENOUS; SUBCUTANEOUS at 20:55

## 2024-02-14 RX ADMIN — HEPARIN SODIUM: 1000 INJECTION INTRAVENOUS; SUBCUTANEOUS at 20:54

## 2024-02-14 RX ADMIN — HYDROMORPHONE HYDROCHLORIDE 2 MG: 2 TABLET ORAL at 10:16

## 2024-02-14 RX ADMIN — HYDROMORPHONE HYDROCHLORIDE 2 MG: 2 TABLET ORAL at 18:28

## 2024-02-14 RX ADMIN — NEPHROCAP 1 CAPSULE: 1 CAP ORAL at 10:17

## 2024-02-14 RX ADMIN — PANCRELIPASE 3 CAPSULE: 24000; 76000; 120000 CAPSULE, DELAYED RELEASE PELLETS ORAL at 18:28

## 2024-02-14 RX ADMIN — MIRTAZAPINE 15 MG: 15 TABLET, FILM COATED ORAL at 20:54

## 2024-02-14 RX ADMIN — HYDROMORPHONE HYDROCHLORIDE 2 MG: 2 TABLET ORAL at 14:52

## 2024-02-14 RX ADMIN — ATORVASTATIN CALCIUM 40 MG: 40 TABLET, FILM COATED ORAL at 20:54

## 2024-02-14 RX ADMIN — PANCRELIPASE 3 CAPSULE: 24000; 76000; 120000 CAPSULE, DELAYED RELEASE PELLETS ORAL at 10:16

## 2024-02-14 RX ADMIN — ALLOPURINOL 100 MG: 100 TABLET ORAL at 10:17

## 2024-02-14 RX ADMIN — SEVELAMER CARBONATE 800 MG: 800 TABLET, FILM COATED ORAL at 18:28

## 2024-02-14 RX ADMIN — WARFARIN SODIUM 4 MG: 4 TABLET ORAL at 18:28

## 2024-02-14 RX ADMIN — HYDROMORPHONE HYDROCHLORIDE 2 MG: 2 TABLET ORAL at 23:56

## 2024-02-14 RX ADMIN — HYDROMORPHONE HYDROCHLORIDE 3 MG: 2 TABLET ORAL at 06:25

## 2024-02-14 RX ADMIN — Medication 1000 UNITS: at 10:16

## 2024-02-14 RX ADMIN — LEVOTHYROXINE SODIUM 25 MCG: 25 TABLET ORAL at 06:26

## 2024-02-14 RX ADMIN — SEVELAMER CARBONATE 800 MG: 800 TABLET, FILM COATED ORAL at 10:17

## 2024-02-14 ASSESSMENT — PAIN SCALES - GENERAL
PAINLEVEL_OUTOF10: 10 - WORST POSSIBLE PAIN
PAINLEVEL_OUTOF10: 10 - WORST POSSIBLE PAIN
PAINLEVEL_OUTOF10: 9
PAINLEVEL_OUTOF10: 10 - WORST POSSIBLE PAIN
PAINLEVEL_OUTOF10: 6
PAINLEVEL_OUTOF10: 9

## 2024-02-14 ASSESSMENT — PAIN - FUNCTIONAL ASSESSMENT
PAIN_FUNCTIONAL_ASSESSMENT: 0-10

## 2024-02-14 ASSESSMENT — PAIN DESCRIPTION - LOCATION: LOCATION: ABDOMEN

## 2024-02-14 NOTE — PROGRESS NOTES
Yoselyn Hernandez   64 y.o.    @@  [facility]/Room: 20094744/5511/5511-A    Subjective:   Pt feeling well today. No abd pain.     Objective:     Meds:   allopurinol, 100 mg, Daily  atorvastatin, 40 mg, Nightly  B complex-vitamin C-folic acid, 1 capsule, Daily  cholecalciferol, 1,000 Units, Daily  dextrose 2.5 % - LOW calcium 2.5 mEq/L 5,000 mL with heparin 2,500 Units, vancomycin 125 mg peritoneal dialysate, , q24h  dextrose 2.5 % - LOW calcium 2.5 mEq/L 5,000 mL with heparin 2,500 Units, vancomycin 125 mg peritoneal dialysate, , q24h  gentamicin, , q24h  levothyroxine, 25 mcg, Daily before breakfast  lipase-protease-amylase, 3 capsule, TID with meals  mirtazapine, 15 mg, Nightly  nystatin, 500,000 Units, q8h AMARJIT  sevelamer carbonate, 800 mg, TID with meals  warfarin, 4 mg, Daily         acetaminophen, 975 mg, TID PRN  diphenoxylate-atropine, 1 tablet, 4x daily PRN  HYDROmorphone, 2 mg, q4h PRN  melatonin, 10 mg, Daily PRN  ondansetron ODT, 4 mg, q8h PRN  polyethylene glycol, 17 g, Daily PRN  sennosides, 2 tablet, Daily PRN        Vitals:    02/14/24 0618   BP: 104/61   Pulse: 101   Resp: 16   Temp: 36 °C (96.8 °F)   SpO2: 95%        No intake or output data in the 24 hours ending 02/14/24 1505      General appearance: no distress  Abdomen: soft, nt/nd  Extremities: trace edema bilat  Neuro: No FND  Access: PD catheter     Blood Labs:  Results for orders placed or performed during the hospital encounter of 02/08/24 (from the past 24 hour(s))   Protime-INR   Result Value Ref Range    Protime 27.3 (H) 9.8 - 12.8 seconds    INR 2.4 (H) 0.9 - 1.1            Yoselyn Hernandez is a 64 y.o. female with a past medical history of ESRD on PD, multiple AAA dissections, HFrEF (EF 15-20% 3/23), pancreatitis, PE/DVT on warfarin, HTN, and multiple renal artery grafts with prior ilio-renal bypass who presented to the ED with abdominal pain, generalized weakness. She is admitted for suspected peritonitis. Nephrology was consulted for ESRD  management.        #ESRD on PD:  Nephrologist: Dr. Lora   Access: PD catheter  EDW: 53kg (admitted at 49kg)   Dialysis duration: 6 exchanges a night, 1400cc fill, Last fill 1200cc, 8200cc total volume, 7 days a week  Urine amount: very little        #Anemia  - Hb 9-10     #MBD  - sevelamer 800mg TID  - on calcitriol 0.5 mcg OD     # Bacterial Peritonitis  - staph epidermidis from Pcx 1/22  - Peritoneal fluid analysis from 1/30 showed cloudy straw colored appearance WBC 7776 with 46 % PMN -> improved to WBC 23 on last cell count   -PD Cx 2/5 NGTD  -started IP abx vancomycin on 1/31  -, clear yellow effluent      ___________________________________________________________________________________  RECOMMENDATIONS:  - PD tonight -1400cc fill, Last fill 1200cc, 8200cc total volume with vancomycin (25mg/l) - continue 2.5D, edema improving today.   - Continue Nystatin swish and swallow while on abx  - 0.1% gentamicin at PD catheter site at the time of dressing  - one BM at least in 24 hrs as per PD protocol   - Last day for intraperitoneal abx tonight to complete 14 day course      Patient discussed with the attending.         Ta Seay DO  Nephrology Fellow   Daytime / Weekend Renal Pager [phone]  After 7 pm Emergencies [phone] Pager [phone]

## 2024-02-14 NOTE — PROGRESS NOTES
"Yoselyn Hernandez is a 64 y.o. female on day 3 of admission presenting with End stage renal disease (CMS/HCC).    Subjective   Seen and evaluated at bedside. Agreeable to decreasing dilaudid dose. Pain free during eval      Objective     General: sitting at side of bed comfortable   Skin: No rashes ulcerations.  HEENT: Sclera is white.  Mucous membranes moist.  Evaluated her neck and there is no significant wounds noted or masses felt.  Neck: Supple.  No JVD.  Cardiac: Regular rate and rhythm, S1/S2 normal.  Lungs: Clear to auscultation bilaterally, no wheezing or crackles, no accessory muscle use at rest.  Abdomen: bowel sounds present, no tenderness . PD catheter in place.  Some mild distention of abdomen.  Extremities: No cyanosis.  No lower extremity edema.  Neurologic: Alert and oriented x3.  No focal deficits.  Psychiatric: Appropriate mood and behavior.  Currently no agitation.    Last Recorded Vitals  Blood pressure 104/61, pulse 101, temperature 36 °C (96.8 °F), resp. rate 16, height 1.702 m (5' 7\"), weight 55 kg (121 lb 4.1 oz), SpO2 95 %.  On room air.    Intake/Output last 3 Shifts:  No intake/output data recorded.    Relevant Results  allopurinol, 100 mg, oral, Daily  atorvastatin, 40 mg, oral, Nightly  B complex-vitamin C-folic acid, 1 capsule, oral, Daily  cholecalciferol, 1,000 Units, oral, Daily  dextrose 2.5 % - LOW calcium 2.5 mEq/L 5,000 mL with heparin 2,500 Units, vancomycin 125 mg peritoneal dialysate, , intraperitoneal, q24h  dextrose 2.5 % - LOW calcium 2.5 mEq/L 5,000 mL with heparin 2,500 Units, vancomycin 125 mg peritoneal dialysate, , intraperitoneal, q24h  gentamicin, , Topical, q24h  levothyroxine, 25 mcg, oral, Daily before breakfast  lipase-protease-amylase, 3 capsule, oral, TID with meals  mirtazapine, 15 mg, oral, Nightly  nystatin, 500,000 Units, oral, q8h AMARJIT  sevelamer carbonate, 800 mg, oral, TID with meals  warfarin, 4 mg, oral, Daily           PRN medications: acetaminophen, " diphenoxylate-atropine, HYDROmorphone, melatonin, ondansetron ODT, polyethylene glycol, sennosides     Results for orders placed or performed during the hospital encounter of 02/08/24 (from the past 24 hour(s))   Protime-INR   Result Value Ref Range    Protime 27.3 (H) 9.8 - 12.8 seconds    INR 2.4 (H) 0.9 - 1.1           Hospital Course:  Yoselyn Hernandez is a 64 y.o. 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  Patient has had multiple admissions for this previously.  Nephrology consulted.  Fluid obtained from abdomen grew out staph epidermis.  Patient placed on intraperitoneal vancomycin and ceftazidime initially.  Eventually transition over to intraperitoneal vancomycin only.  Patient is recommended for total 14 days of intraperitoneal antibiotics.  PT/OT saw the patient and recommend skilled nursing facility.  Initially sent patient to the skilled nursing facility after they informed us they were ready on 2/7.  Patient came back on 2/8 because the facility apparently did not have the PD supplies. Patient requesting different facility.     Assessment and plan:    ESRD and PD Peritonitis   -Nephrology consulted and following.  -Continue pain control and intraperitoneal antibiotics.  -Decrease dilaudid from 3 to 2 q 4 hours. Will decrease further this evening/tomorrow.   -Initial end date for intraperitoneal antibiotics was 2/14 due to patient missing 1 dose, new end date 2/15.         Chronic Left common iliac occlusion/Hx of DVT/PE   -Continue warfarin 4 mg.  -Last INR 2.4.  Continue INR monitoring.     Chronic HFrEF  -Had discontinued home Coreg 6.25 mg BID due to low normal blood  pressure and need some room for peritoneal dialysis.  -Systolic blood pressure 110s.  -Continue peritoneal dialysis.     Chronic diarrhea  - Follow with GI.     Hypokalemia   -Resolved.     Physical deconditioning  -PT/OT reevaluation done.  Patient now stating she wants a new facility.  TCC and  notified and working on the situation.          Kathy Lizama MD

## 2024-02-14 NOTE — CARE PLAN
The patient's goals for the shift include      Problem: Pain  Goal: Turns in bed with improved pain control throughout the shift  Outcome: Progressing  Goal: Performs ADL's with improved pain control throughout shift  Outcome: Progressing  Goal: Free from acute confusion related to pain meds throughout the shift  Outcome: Progressing     Problem: Fall/Injury  Goal: Not fall by end of shift  Outcome: Progressing  Goal: Use assistive devices by end of the shift  Outcome: Progressing     The clinical goals for the shift include Patient will remain free from falls and injury throughout shift    Patient remained free from falls and injury throughout shift. Patient currently resting comfortably. Patient complaints of pain were managed with PRN pain medication as prescribed.

## 2024-02-15 PROCEDURE — 2500000002 HC RX 250 W HCPCS SELF ADMINISTERED DRUGS (ALT 637 FOR MEDICARE OP, ALT 636 FOR OP/ED): Performed by: STUDENT IN AN ORGANIZED HEALTH CARE EDUCATION/TRAINING PROGRAM

## 2024-02-15 PROCEDURE — 2500000002 HC RX 250 W HCPCS SELF ADMINISTERED DRUGS (ALT 637 FOR MEDICARE OP, ALT 636 FOR OP/ED): Performed by: INTERNAL MEDICINE

## 2024-02-15 PROCEDURE — 2500000001 HC RX 250 WO HCPCS SELF ADMINISTERED DRUGS (ALT 637 FOR MEDICARE OP): Performed by: NURSE PRACTITIONER

## 2024-02-15 PROCEDURE — 2500000002 HC RX 250 W HCPCS SELF ADMINISTERED DRUGS (ALT 637 FOR MEDICARE OP, ALT 636 FOR OP/ED)

## 2024-02-15 PROCEDURE — G0378 HOSPITAL OBSERVATION PER HR: HCPCS

## 2024-02-15 PROCEDURE — 2500000002 HC RX 250 W HCPCS SELF ADMINISTERED DRUGS (ALT 637 FOR MEDICARE OP, ALT 636 FOR OP/ED): Performed by: NURSE PRACTITIONER

## 2024-02-15 PROCEDURE — 2500000004 HC RX 250 GENERAL PHARMACY W/ HCPCS (ALT 636 FOR OP/ED): Performed by: INTERNAL MEDICINE

## 2024-02-15 PROCEDURE — 2500000004 HC RX 250 GENERAL PHARMACY W/ HCPCS (ALT 636 FOR OP/ED): Performed by: NURSE PRACTITIONER

## 2024-02-15 PROCEDURE — 99232 SBSQ HOSP IP/OBS MODERATE 35: CPT | Performed by: INTERNAL MEDICINE

## 2024-02-15 PROCEDURE — 2500000001 HC RX 250 WO HCPCS SELF ADMINISTERED DRUGS (ALT 637 FOR MEDICARE OP): Performed by: INTERNAL MEDICINE

## 2024-02-15 PROCEDURE — 94640 AIRWAY INHALATION TREATMENT: CPT

## 2024-02-15 RX ORDER — QUETIAPINE FUMARATE 25 MG/1
25 TABLET, FILM COATED ORAL 2 TIMES DAILY PRN
Status: DISCONTINUED | OUTPATIENT
Start: 2024-02-15 | End: 2024-02-19 | Stop reason: HOSPADM

## 2024-02-15 RX ORDER — IPRATROPIUM BROMIDE AND ALBUTEROL SULFATE 2.5; .5 MG/3ML; MG/3ML
SOLUTION RESPIRATORY (INHALATION)
Status: COMPLETED
Start: 2024-02-15 | End: 2024-02-15

## 2024-02-15 RX ORDER — IPRATROPIUM BROMIDE AND ALBUTEROL SULFATE 2.5; .5 MG/3ML; MG/3ML
3 SOLUTION RESPIRATORY (INHALATION) EVERY 6 HOURS PRN
Status: DISCONTINUED | OUTPATIENT
Start: 2024-02-15 | End: 2024-02-19 | Stop reason: HOSPADM

## 2024-02-15 RX ORDER — HYDROMORPHONE HYDROCHLORIDE 2 MG/1
1 TABLET ORAL EVERY 6 HOURS PRN
Status: CANCELLED | OUTPATIENT
Start: 2024-02-15

## 2024-02-15 RX ADMIN — HYDROMORPHONE HYDROCHLORIDE 2 MG: 2 TABLET ORAL at 09:10

## 2024-02-15 RX ADMIN — HYDROMORPHONE HYDROCHLORIDE 2 MG: 2 TABLET ORAL at 17:44

## 2024-02-15 RX ADMIN — PANCRELIPASE 3 CAPSULE: 24000; 76000; 120000 CAPSULE, DELAYED RELEASE PELLETS ORAL at 17:44

## 2024-02-15 RX ADMIN — Medication 1000 UNITS: at 09:04

## 2024-02-15 RX ADMIN — HEPARIN SODIUM: 1000 INJECTION INTRAVENOUS; SUBCUTANEOUS at 18:03

## 2024-02-15 RX ADMIN — MIRTAZAPINE 15 MG: 15 TABLET, FILM COATED ORAL at 21:00

## 2024-02-15 RX ADMIN — SEVELAMER CARBONATE 800 MG: 800 TABLET, FILM COATED ORAL at 17:44

## 2024-02-15 RX ADMIN — QUETIAPINE FUMARATE 25 MG: 25 TABLET ORAL at 21:00

## 2024-02-15 RX ADMIN — NEPHROCAP 1 CAPSULE: 1 CAP ORAL at 09:04

## 2024-02-15 RX ADMIN — ALLOPURINOL 100 MG: 100 TABLET ORAL at 09:04

## 2024-02-15 RX ADMIN — WARFARIN SODIUM 4 MG: 4 TABLET ORAL at 18:26

## 2024-02-15 RX ADMIN — LEVOTHYROXINE SODIUM 25 MCG: 25 TABLET ORAL at 09:04

## 2024-02-15 RX ADMIN — QUETIAPINE FUMARATE 25 MG: 25 TABLET ORAL at 03:47

## 2024-02-15 RX ADMIN — GENTAMICIN SULFATE: 1 CREAM TOPICAL at 16:35

## 2024-02-15 RX ADMIN — HEPARIN SODIUM: 1000 INJECTION INTRAVENOUS; SUBCUTANEOUS at 18:02

## 2024-02-15 RX ADMIN — IPRATROPIUM BROMIDE AND ALBUTEROL SULFATE 3 ML: 2.5; .5 SOLUTION RESPIRATORY (INHALATION) at 22:01

## 2024-02-15 RX ADMIN — ATORVASTATIN CALCIUM 40 MG: 40 TABLET, FILM COATED ORAL at 21:00

## 2024-02-15 RX ADMIN — PANCRELIPASE 3 CAPSULE: 24000; 76000; 120000 CAPSULE, DELAYED RELEASE PELLETS ORAL at 09:04

## 2024-02-15 RX ADMIN — NYSTATIN 500000 UNITS: 100000 SUSPENSION ORAL at 14:00

## 2024-02-15 RX ADMIN — IPRATROPIUM BROMIDE AND ALBUTEROL SULFATE 3 ML: .5; 3 SOLUTION RESPIRATORY (INHALATION) at 22:01

## 2024-02-15 RX ADMIN — SEVELAMER CARBONATE 800 MG: 800 TABLET, FILM COATED ORAL at 09:04

## 2024-02-15 ASSESSMENT — PAIN SCALES - GENERAL
PAINLEVEL_OUTOF10: 2
PAINLEVEL_OUTOF10: 7

## 2024-02-15 ASSESSMENT — PAIN - FUNCTIONAL ASSESSMENT: PAIN_FUNCTIONAL_ASSESSMENT: 0-10

## 2024-02-15 NOTE — PROGRESS NOTES
Physical Therapy                 Therapy Communication Note    Patient Name: Yoselyn Hernandez  MRN: [id number]  Today's Date: 2/15/2024     Discipline: Physical Therapy    Missed Visit Reason: Missed Visit Reason:  (Attempted follow up however pt. complaining of too much pain. Spoke with RN who stated that pt. is not yet due for pains.)    Missed Time: Attempt    Comment:

## 2024-02-15 NOTE — CONSULTS
"Nutrition Initial Assessment:   Nutrition Assessment    Reason for Assessment: Dietitian discretion, Length of stay    Patient is a 64 y.o. female presenting with: End Stage Renal Disease. PMH of ESRD, multiple AAA dissections, HFrEF, 25%, 3/23, pancreatitis, pulmonary embolism on warfarin, gout, DVT, hypertension ESRD, multiple AAA dissections, HFrEF, 25%, 3/23, pancreatitis, pulmonary embolism on warfarin, gout, DVT, hypertension       Nutrition History:  Energy Intake:  (No documented intakes to assess)  Food and Nutrient History: Attempted to meet with Pt (x3) Pt unavailable with other healthcare provider at time of visits. Per chart review Pt wit good appetite, no nausea or vomiting. No diarrhea or constipation reported. Pt has been ordering 2-3 meals/day. Food Allergies/Intolerances:  None  GI Symptoms: None  Oral Problems: None       Anthropometrics:  Height: 170.2 cm (5' 7.01\")   Weight: 55 kg (121 lb 4.1 oz)   BMI (Calculated): 18.99  IBW/kg (Dietitian Calculated): 61.4 kg  Percent of IBW: 90 %       Weight History:   Wt Readings from Last 15 Encounters:   02/10/24 55 kg (121 lb 4.1 oz)   02/05/24 55 kg (121 lb 4.1 oz)   01/18/24 63.9 kg (140 lb 14.4 oz)   12/20/23 58.8 kg (129 lb 10.1 oz)   11/19/23 56.7 kg (125 lb)   11/09/23 57 kg (125 lb 10.6 oz)   10/18/23 68 kg (149 lb 14.6 oz)   10/17/23 59.1 kg (130 lb 3.2 oz)   08/31/23 52.2 kg (115 lb)   08/17/23 54.9 kg (121 lb)   05/25/23 62.6 kg (138 lb)   09/19/22 58.5 kg (129 lb 0.1 oz)   08/12/22 59.4 kg (131 lb)   07/13/22 54.5 kg (120 lb 2.4 oz)   06/20/22 49.9 kg (110 lb 0.3 oz)    Weight: 55 kg (121 lb 4.1 oz)     Weight Change %:  Weight History / % Weight Change: No significant weight changes noted, weight fluctuations may be expected due to fluid shifts  Significant Weight Loss: No    Nutrition Focused Physical Exam Findings:  defer: Will assess on Follow up. Pt unavailable during attempts (x3)  Subcutaneous Fat Loss:      Muscle Wasting:     Edema:   "   Physical Findings:       Nutrition Significant Labs: Reviewed  BMP Trend:   Results from last 7 days   Lab Units 02/13/24  0534 02/09/24  0550 02/08/24  1408   GLUCOSE mg/dL 98 106* 76   CALCIUM mg/dL 8.2* 8.4* 8.2*   SODIUM mmol/L 137 136 133*   POTASSIUM mmol/L 4.2 4.1 4.9   CO2 mmol/L 31 25 25   CHLORIDE mmol/L 97* 99 99   BUN mg/dL 36* 42* 46*   CREATININE mg/dL 6.13* 6.55* 6.93*    , BG POCT trend:    , Renal Lab Trend:   Results from last 7 days   Lab Units 02/13/24  0534 02/09/24  0550 02/08/24  1408   POTASSIUM mmol/L 4.2 4.1 4.9   PHOSPHORUS mg/dL 4.9 4.3 4.6   SODIUM mmol/L 137 136 133*   MAGNESIUM mg/dL  --   --  2.03   EGFR mL/min/1.73m*2 7* 7* 6*   BUN mg/dL 36* 42* 46*   CREATININE mg/dL 6.13* 6.55* 6.93*        Nutrition Specific Medications: All med's reviewed noting- atorvastatin, b complex vitamins, cholecalciferol, dexterous 2.5%, levothyroxine, lipase-protease- amylase, mirtazapine, nystatin, sevelamer, warfarin      I/O:   Last BM Date: 02/15/24; Stool Appearance: Formed (02/15/24 0400)        Dietary Orders (From admission, onward)       Start     Ordered    02/11/24 0835  Adult diet Regular  Diet effective now        Question:  Diet type  Answer:  Regular    02/11/24 0834                     Estimated Needs:   Total Energy Estimated Needs (kCal): 1900 kCal  Method for Estimating Needs: 35kcals / kg of ABW  Total Protein Estimated Needs (g): 83 g  Method for Estimating Needs: 1.5 gm/kg of ABW  Total Fluid Estimated Needs (mL): 1900 mL  Method for Estimating Needs: 1mL/kcal or per team        Nutrition Diagnosis   Malnutrition Diagnosis  Patient has Malnutrition Diagnosis:  (Limited nutrition information to determine. Pt is at risk for malnutrition)    Nutrition Diagnosis  Patient has Nutrition Diagnosis: Yes  Diagnosis Status (1): New  Nutrition Diagnosis 1: Increased nutrient needs  Related to (1): ESRD on PD  As Evidenced by (1): PT is under IBW, BMI of 18, need of ONS       Nutrition  Interventions/Recommendations         Nutrition Prescription:  Individualized Nutrition Prescription Provided for : 3902-8389 kcals/day, 83 gm PRO        Nutrition Interventions:   Interventions: Meals and snacks, Medical food supplement  Goal: Continue adult diet regular as tolerated  Medical Food Supplement: Commercial beverage  Goal: Nepro BID provides 420 kcals and 19 gm PRO per serving   Please document Meal intake percent      Nutrition Education: N/A         Nutrition Monitoring and Evaluation   Food/Nutrient Related History Monitoring  Monitoring and Evaluation Plan: Amount of food  Amount of Food: Estimated amount of food, Medical food intake  Criteria: >50% intake of meals and supplements    Body Composition/Growth/Weight History  Monitoring and Evaluation Plan: Weight  Criteria: No unplanned significant weight changes    Biochemical Data, Medical Tests and Procedures  Monitoring and Evaluation Plan: Electrolyte/renal panel  Criteria: WNL for ESRD on PD            Time Spent/Follow-up Reminder:   Time Spent (min): 45 minutes  Last Date of Nutrition Visit: 02/15/24  Nutrition Follow-Up Needed?: Dietitian to reassess per policy

## 2024-02-15 NOTE — CARE PLAN
The patient's goals for the shift include    Problem: Pain  Goal: Takes deep breaths with improved pain control throughout the shift  Outcome: Progressing  Goal: Turns in bed with improved pain control throughout the shift  Outcome: Progressing  Goal: Walks with improved pain control throughout the shift  Outcome: Progressing  Goal: Performs ADL's with improved pain control throughout shift  Outcome: Progressing  Goal: Participates in PT with improved pain control throughout the shift  Outcome: Progressing  Goal: Free from opioid side effects throughout the shift  Outcome: Progressing  Goal: Free from acute confusion related to pain meds throughout the shift  Outcome: Progressing     Problem: Fall/Injury  Goal: Not fall by end of shift  Outcome: Progressing  Goal: Be free from injury by end of the shift  Outcome: Progressing  Goal: Verbalize understanding of personal risk factors for fall in the hospital  Outcome: Progressing  Goal: Verbalize understanding of risk factor reduction measures to prevent injury from fall in the home  Outcome: Progressing  Goal: Use assistive devices by end of the shift  Outcome: Progressing  Goal: Pace activities to prevent fatigue by end of the shift  Outcome: Progressing       The clinical goals for the shift include pt will remain safe and free from injury throughout shift

## 2024-02-15 NOTE — PROGRESS NOTES
"Yoselyn Hernandez is a 64 y.o. female on day 3 of admission presenting with End stage renal disease (CMS/HCC).    Subjective   Seen and evaluated at bedside. Discussed decreasing dilaudid further which she is agreeable . Spent a significant part of the encounter listening and providing support for the patient as she explained her social issues includigin concern about her finances and being unable to go to the bank. However does not want a  to come speak to her about them.       Objective     General: sitting at side of bed comfortable   Skin: No rashes ulcerations.  Neck: Supple.  No JVD.  Cardiac: Regular rate and rhythm, S1/S2 normal.  Lungs: Clear to auscultation bilaterally, no wheezing or crackles, no accessory muscle use at rest.  Abdomen: bowel sounds present, no tenderness . PD catheter in place.  Some mild distention of abdomen.  Extremities: No cyanosis.  No lower extremity edema.  Neurologic: Alert and oriented x3.  No focal deficits.  Psychiatric: Appropriate mood and behavior.  Currently no agitation.    Last Recorded Vitals  Blood pressure 91/64, pulse 98, temperature 36.5 °C (97.7 °F), resp. rate 20, height 1.702 m (5' 7\"), weight 55 kg (121 lb 4.1 oz), SpO2 100 %.  On room air.    Intake/Output last 3 Shifts:  No intake/output data recorded.    Relevant Results  allopurinol, 100 mg, oral, Daily  atorvastatin, 40 mg, oral, Nightly  B complex-vitamin C-folic acid, 1 capsule, oral, Daily  cholecalciferol, 1,000 Units, oral, Daily  dextrose 2.5 % - LOW calcium 2.5 mEq/L 5,000 mL with heparin 2,500 Units, vancomycin 125 mg peritoneal dialysate, , intraperitoneal, q24h  dextrose 2.5 % - LOW calcium 2.5 mEq/L 5,000 mL with heparin 2,500 Units, vancomycin 125 mg peritoneal dialysate, , intraperitoneal, q24h  gentamicin, , Topical, q24h  levothyroxine, 25 mcg, oral, Daily before breakfast  lipase-protease-amylase, 3 capsule, oral, TID with meals  mirtazapine, 15 mg, oral, Nightly  nystatin, 500,000 " Units, oral, q8h AMARJIT  sevelamer carbonate, 800 mg, oral, TID with meals  warfarin, 4 mg, oral, Daily           PRN medications: acetaminophen, diphenoxylate-atropine, HYDROmorphone, melatonin, ondansetron ODT, polyethylene glycol, QUEtiapine, sennosides     No results found for this or any previous visit (from the past 24 hour(s)).          Hospital Course:  Yoselyn Hernandez is a 64 y.o. 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  Patient has had multiple admissions for this previously.  Nephrology consulted.  Fluid obtained from abdomen grew out staph epidermis.  Patient placed on intraperitoneal vancomycin and ceftazidime initially.  Eventually transition over to intraperitoneal vancomycin only.  Patient is recommended for total 14 days of intraperitoneal antibiotics.  PT/OT saw the patient and recommend skilled nursing facility.  Initially sent patient to the skilled nursing facility after they informed us they were ready on 2/7.  Patient came back on 2/8 because the facility apparently did not have the PD supplies. Patient requesting different facility.     Assessment and plan:    ESRD and PD Peritonitis   -Nephrology consulted and following.  -Continue pain control and intraperitoneal antibiotics.  -Decrease dilaudid to 1 q 6 hrs.   -Initial end date for intraperitoneal antibiotics was 2/14 due to patient missing 1 dose, new end date 2/15 (today)         Chronic Left common iliac occlusion/Hx of DVT/PE   -Continue warfarin 4 mg.     Chronic HFrEF  -Had discontinued home Coreg 6.25 mg BID due to low normal blood pressure and need some room for peritoneal dialysis.  -Systolic blood  pressure 110s.  -Continue peritoneal dialysis.     Chronic diarrhea  - Follow with GI.     Hypokalemia   -Resolved.     Physical deconditioning  -PT/OT reevaluation done.  Patient now stating she wants a new facility.  TCC and  notified and working on the situation.          Kahty Lizama MD

## 2024-02-16 PROCEDURE — G0378 HOSPITAL OBSERVATION PER HR: HCPCS

## 2024-02-16 PROCEDURE — 2500000004 HC RX 250 GENERAL PHARMACY W/ HCPCS (ALT 636 FOR OP/ED): Performed by: NURSE PRACTITIONER

## 2024-02-16 PROCEDURE — 2500000002 HC RX 250 W HCPCS SELF ADMINISTERED DRUGS (ALT 637 FOR MEDICARE OP, ALT 636 FOR OP/ED): Performed by: NURSE PRACTITIONER

## 2024-02-16 PROCEDURE — 97530 THERAPEUTIC ACTIVITIES: CPT | Mod: GP,CQ

## 2024-02-16 PROCEDURE — 2500000002 HC RX 250 W HCPCS SELF ADMINISTERED DRUGS (ALT 637 FOR MEDICARE OP, ALT 636 FOR OP/ED): Performed by: INTERNAL MEDICINE

## 2024-02-16 PROCEDURE — 90945 DIALYSIS ONE EVALUATION: CPT | Performed by: INTERNAL MEDICINE

## 2024-02-16 PROCEDURE — 2500000004 HC RX 250 GENERAL PHARMACY W/ HCPCS (ALT 636 FOR OP/ED): Performed by: INTERNAL MEDICINE

## 2024-02-16 PROCEDURE — 90947 DIALYSIS REPEATED EVAL: CPT

## 2024-02-16 PROCEDURE — 99231 SBSQ HOSP IP/OBS SF/LOW 25: CPT | Performed by: INTERNAL MEDICINE

## 2024-02-16 PROCEDURE — 2500000001 HC RX 250 WO HCPCS SELF ADMINISTERED DRUGS (ALT 637 FOR MEDICARE OP): Performed by: NURSE PRACTITIONER

## 2024-02-16 PROCEDURE — 2500000001 HC RX 250 WO HCPCS SELF ADMINISTERED DRUGS (ALT 637 FOR MEDICARE OP): Performed by: INTERNAL MEDICINE

## 2024-02-16 RX ORDER — HYDROMORPHONE HYDROCHLORIDE 2 MG/1
1 TABLET ORAL EVERY 6 HOURS PRN
Status: DISCONTINUED | OUTPATIENT
Start: 2024-02-16 | End: 2024-02-18

## 2024-02-16 RX ADMIN — HEPARIN SODIUM: 1000 INJECTION INTRAVENOUS; SUBCUTANEOUS at 16:45

## 2024-02-16 RX ADMIN — PANCRELIPASE 3 CAPSULE: 24000; 76000; 120000 CAPSULE, DELAYED RELEASE PELLETS ORAL at 09:35

## 2024-02-16 RX ADMIN — PANCRELIPASE 3 CAPSULE: 24000; 76000; 120000 CAPSULE, DELAYED RELEASE PELLETS ORAL at 14:57

## 2024-02-16 RX ADMIN — HYDROMORPHONE HYDROCHLORIDE 2 MG: 2 TABLET ORAL at 14:57

## 2024-02-16 RX ADMIN — LEVOTHYROXINE SODIUM 25 MCG: 25 TABLET ORAL at 06:11

## 2024-02-16 RX ADMIN — NEPHROCAP 1 CAPSULE: 1 CAP ORAL at 09:35

## 2024-02-16 RX ADMIN — HYDROMORPHONE HYDROCHLORIDE 2 MG: 2 TABLET ORAL at 19:42

## 2024-02-16 RX ADMIN — Medication 10 MG: at 20:55

## 2024-02-16 RX ADMIN — GENTAMICIN SULFATE: 1 CREAM TOPICAL at 16:01

## 2024-02-16 RX ADMIN — ATORVASTATIN CALCIUM 40 MG: 40 TABLET, FILM COATED ORAL at 20:57

## 2024-02-16 RX ADMIN — SEVELAMER CARBONATE 800 MG: 800 TABLET, FILM COATED ORAL at 09:35

## 2024-02-16 RX ADMIN — HYDROMORPHONE HYDROCHLORIDE 2 MG: 2 TABLET ORAL at 09:35

## 2024-02-16 RX ADMIN — ALLOPURINOL 100 MG: 100 TABLET ORAL at 09:35

## 2024-02-16 RX ADMIN — HYDROMORPHONE HYDROCHLORIDE 2 MG: 2 TABLET ORAL at 03:01

## 2024-02-16 RX ADMIN — NYSTATIN 500000 UNITS: 100000 SUSPENSION ORAL at 20:55

## 2024-02-16 RX ADMIN — MIRTAZAPINE 15 MG: 15 TABLET, FILM COATED ORAL at 20:55

## 2024-02-16 RX ADMIN — WARFARIN SODIUM 4 MG: 4 TABLET ORAL at 18:30

## 2024-02-16 RX ADMIN — Medication 1000 UNITS: at 09:35

## 2024-02-16 RX ADMIN — POLYETHYLENE GLYCOL 3350 17 G: 17 POWDER, FOR SOLUTION ORAL at 09:35

## 2024-02-16 RX ADMIN — SEVELAMER CARBONATE 800 MG: 800 TABLET, FILM COATED ORAL at 14:57

## 2024-02-16 RX ADMIN — SENNOSIDES 17.2 MG: 8.6 TABLET, FILM COATED ORAL at 02:59

## 2024-02-16 ASSESSMENT — COGNITIVE AND FUNCTIONAL STATUS - GENERAL
DAILY ACTIVITIY SCORE: 16
DRESSING REGULAR UPPER BODY CLOTHING: A LITTLE
WALKING IN HOSPITAL ROOM: A LITTLE
MOBILITY SCORE: 17
CLIMB 3 TO 5 STEPS WITH RAILING: A LOT
PERSONAL GROOMING: A LITTLE
CLIMB 3 TO 5 STEPS WITH RAILING: A LOT
TOILETING: A LOT
STANDING UP FROM CHAIR USING ARMS: A LITTLE
MOBILITY SCORE: 17
MOVING FROM LYING ON BACK TO SITTING ON SIDE OF FLAT BED WITH BEDRAILS: A LITTLE
WALKING IN HOSPITAL ROOM: A LITTLE
MOVING TO AND FROM BED TO CHAIR: A LITTLE
TURNING FROM BACK TO SIDE WHILE IN FLAT BAD: A LITTLE
STANDING UP FROM CHAIR USING ARMS: A LITTLE
MOVING TO AND FROM BED TO CHAIR: A LITTLE
MOVING FROM LYING ON BACK TO SITTING ON SIDE OF FLAT BED WITH BEDRAILS: A LITTLE
TURNING FROM BACK TO SIDE WHILE IN FLAT BAD: A LITTLE
HELP NEEDED FOR BATHING: A LOT
DRESSING REGULAR LOWER BODY CLOTHING: A LOT

## 2024-02-16 ASSESSMENT — PAIN - FUNCTIONAL ASSESSMENT
PAIN_FUNCTIONAL_ASSESSMENT: 0-10

## 2024-02-16 ASSESSMENT — PAIN SCALES - GENERAL
PAINLEVEL_OUTOF10: 8
PAINLEVEL_OUTOF10: 8
PAINLEVEL_OUTOF10: 10 - WORST POSSIBLE PAIN

## 2024-02-16 ASSESSMENT — PAIN DESCRIPTION - LOCATION: LOCATION: ABDOMEN

## 2024-02-16 NOTE — PROGRESS NOTES
Yoselyn Hernandez is a 64 y.o. female on day 0 of admission presenting with End stage renal disease (CMS/HCC).    Subjective   Met with patient yesterday at the bedside along with TCC to further discuss discharge plan. Discussed that [facility] will have PD training on Monday and then patient can admit to their facility. Further, discussed with patient that she will need to coordinate someone to bring her PD machine + supplies to facility. Patient became upset and stated that she has limited support and unsure if she will have someone that can bring the machine. Patient further questioned why the facility does not have a PD machine for her to use and discussed that it is normal protocol for patients to take their own machines. Patient stated that she would work on finding someone to get her machine to SNF.     SW will continue to follow.    -Radha SANDHU MA, LSW  [phone] or [facility] Secure Chat  Care Transitions

## 2024-02-16 NOTE — PROGRESS NOTES
"Yoselyn Hernandez is a 64 y.o. female on day 3 of admission presenting with End stage renal disease (CMS/HCC).    Subjective   Seen and evaluated at bedside.  No active complaints during evaluation.      Objective     General: sitting at side of bed comfortable   Skin: No rashes ulcerations.  Neck: Supple.  No JVD.  Cardiac: Regular rate and rhythm, S1/S2 normal.  Lungs: Clear to auscultation bilaterally, no wheezing or crackles, no accessory muscle use at rest.  Abdomen: bowel sounds present, no tenderness . PD catheter in place.  Some mild distention of abdomen.  Extremities: No cyanosis.  No lower extremity edema.  Neurologic: Alert and oriented x3.  No focal deficits.  Psychiatric: Appropriate mood and behavior.  Currently no agitation.    Last Recorded Vitals  Blood pressure 109/64, pulse 89, temperature 36.6 °C (97.9 °F), resp. rate 17, height 1.702 m (5' 7.01\"), weight 55 kg (121 lb 4.1 oz), SpO2 94 %.  On room air.    Intake/Output last 3 Shifts:  I/O last 3 completed shifts:  In: 720 (13.1 mL/kg) [P.O.:720]  Out: 718 (13.1 mL/kg) [Other:718]  Weight: 55 kg     Relevant Results  allopurinol, 100 mg, oral, Daily  atorvastatin, 40 mg, oral, Nightly  B complex-vitamin C-folic acid, 1 capsule, oral, Daily  cholecalciferol, 1,000 Units, oral, Daily  dextrose 2.5 % - LOW calcium 2.5 mEq/L 5,000 mL with heparin 2,500 Units peritoneal dialysate, , intraperitoneal, q24h  dextrose 2.5 % - LOW calcium 2.5 mEq/L 5,000 mL with heparin 2,500 Units peritoneal dialysate, , intraperitoneal, q24h  gentamicin, , Topical, q24h  levothyroxine, 25 mcg, oral, Daily before breakfast  lipase-protease-amylase, 3 capsule, oral, TID with meals  mirtazapine, 15 mg, oral, Nightly  nystatin, 500,000 Units, oral, q8h AMARJIT  sevelamer carbonate, 800 mg, oral, TID with meals  warfarin, 4 mg, oral, Daily           PRN medications: acetaminophen, diphenoxylate-atropine, HYDROmorphone, ipratropium-albuteroL, melatonin, ondansetron ODT, polyethylene " glycol, QUEtiapine, sennosides     No results found for this or any previous visit (from the past 24 hour(s)).          Hospital Course:  Yoselyn Hernandez is a 64 y.o. 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  Patient has had multiple admissions for this previously.  Nephrology consulted.  Fluid obtained from abdomen grew out staph epidermis.  Patient placed on intraperitoneal vancomycin and ceftazidime initially.  Eventually transition over to intraperitoneal vancomycin only.  Patient is recommended for total 14 days of intraperitoneal antibiotics.  PT/OT saw the patient and recommend skilled nursing facility.  Initially sent patient to the skilled nursing facility after they informed us they were ready on 2/7.  Patient came back on 2/8 because the facility apparently did not have the PD supplies. Patient requesting different facility.     Assessment and plan:    ESRD and PD Peritonitis   -Nephrology consulted and following.  -Continue pain control and intraperitoneal antibiotics.  -Dilaudid will continue to be decreased  -Completed intraperitoneal antibiotics on 2/15/2024.         Chronic Left common iliac occlusion/Hx of DVT/PE   -Continue warfarin 4 mg.     Chronic HFrEF  -Had discontinued home Coreg 6.25 mg BID due to low normal blood pressure and need some room for peritoneal dialysis.  -Systolic blood pressure 110s.  -Continue peritoneal dialysis.     Chronic diarrhea  - Follow with GI.     Hypokalemia   -Resolved.     Physical deconditioning        Patient is medically ready for discharge.  She has been accepted to the facility however they are undergoing peritoneal dialysis  training and will not be able to be discharged prior to Monday per CM          Kathy Lizama MD

## 2024-02-16 NOTE — PROGRESS NOTES
Yoselyn Hernandez   64 y.o.    @@  [facility]/Room: [id number]/5511/5511-A    Subjective:   Pt feeling well today. No abd pain. Has not had a BM in 2 days so trying to ambulate more.     Objective:     Meds:   allopurinol, 100 mg, Daily  atorvastatin, 40 mg, Nightly  B complex-vitamin C-folic acid, 1 capsule, Daily  cholecalciferol, 1,000 Units, Daily  dextrose 1.5% - LOW calcium 2.5mEq/L 5,000 mL with heparin 2,500 Units peritoneal dialysate, , q24h  dextrose 2.5 % - LOW calcium 2.5 mEq/L 5,000 mL with heparin 2,500 Units peritoneal dialysate, , q24h  gentamicin, , q24h  levothyroxine, 25 mcg, Daily before breakfast  lipase-protease-amylase, 3 capsule, TID with meals  mirtazapine, 15 mg, Nightly  nystatin, 500,000 Units, q8h AMARJIT  sevelamer carbonate, 800 mg, TID with meals  warfarin, 4 mg, Daily         acetaminophen, 975 mg, TID PRN  diphenoxylate-atropine, 1 tablet, 4x daily PRN  HYDROmorphone, 2 mg, q4h PRN  ipratropium-albuteroL, 3 mL, q6h PRN  melatonin, 10 mg, Daily PRN  ondansetron ODT, 4 mg, q8h PRN  polyethylene glycol, 17 g, Daily PRN  QUEtiapine, 25 mg, BID PRN  sennosides, 2 tablet, Daily PRN        Vitals:    02/16/24 0609   BP: 109/64   Pulse: 89   Resp: 17   Temp: 36.6 °C (97.9 °F)   SpO2: 94%          Intake/Output Summary (Last 24 hours) at 2/16/2024 1302  Last data filed at 2/16/2024 0600  Gross per 24 hour   Intake 720 ml   Output --   Net 720 ml         General appearance: no distress  Abdomen: soft, nt/nd  Extremities: 1+ pitting edema bilat  Neuro: No FND  Access: PD catheter     Blood Labs:  No results found for this or any previous visit (from the past 24 hour(s)).           Yoselyn Hernandez is a 64 y.o. female with a past medical history of ESRD on PD, multiple AAA dissections, HFrEF (EF 15-20% 3/23), pancreatitis, PE/DVT on warfarin, HTN, and multiple renal artery grafts with prior ilio-renal bypass who presented to the ED with abdominal pain, generalized weakness. She is admitted for suspected  peritonitis. Nephrology was consulted for ESRD management.        #ESRD on PD:  Nephrologist: Dr. Lora   Access: PD catheter  EDW: 53kg (admitted at 49kg)   Dialysis duration: 6 exchanges a night, 1400cc fill, Last fill 1200cc, 8200cc total volume, 7 days a week  Urine amount: very little        #Anemia  - Hb 9-10     #MBD  - sevelamer 800mg TID  - on calcitriol 0.5 mcg OD     # Bacterial Peritonitis  - staph epidermidis from Pcx 1/22  - Peritoneal fluid analysis from 1/30 showed cloudy straw colored appearance WBC 7776 with 46 % PMN -> improved to WBC 23 on last cell count   -PD Cx 2/5 NGTD  -completed course of IP abx vancomycin on 1/31 - 2/14  -UF 280ml, clear yellow effluent      ___________________________________________________________________________________  RECOMMENDATIONS:  - PD tonight -1400cc fill, Last fill 1200cc, 8200cc total volume with vancomycin (25mg/l) - Increased LE edema so plan for 2.5D tonight   - Continue Nystatin swish and swallow while on abx  - 0.1% gentamicin at PD catheter site at the time of dressing  - one BM at least in 24 hrs as per PD protocol - continue bowel regimen and encouraged ambulation        Patient discussed with the attending.         Ta Seay DO  Nephrology Fellow   Daytime / Weekend Renal Pager [phone]  After 7 pm Emergencies [phone] Pager [phone]

## 2024-02-16 NOTE — PROGRESS NOTES
Physical Therapy    Physical Therapy Treatment    Patient Name: Yoselyn Hernandez  MRN: [id number]  Today's Date: 2/16/2024  Time Calculation  Start Time: 1152  Stop Time: 1214  Time Calculation (min): 22 min       Assessment/Plan   PT Assessment  End of Session Communication: Bedside nurse  Assessment Comment: Pt could benefit from mod intensity level PT to address decreased strength, balance, and mobility endurance.  End of Session Patient Position:  (EOB end of session.)  PT Plan  Inpatient/Swing Bed or Outpatient: Inpatient  PT Plan  Treatment/Interventions: Bed mobility, Transfer training, Gait training, Stair training, Balance training, Neuromuscular re-education, Strengthening, Endurance training, Range of motion, Therapeutic exercise, Therapeutic activity, Home exercise program, Positioning, Postural re-education  PT Plan: Skilled PT  PT Frequency: 3 times per week  PT Discharge Recommendations: Moderate intensity level of continued care  PT Recommended Transfer Status: Assist x1  PT - OK to Discharge:  (when medically stable)      General Visit Information:   PT  Visit  PT Received On: 02/16/24  Response to Previous Treatment: Patient with no complaints from previous session.  General  Reason for Referral: readmit for weakness, abdominal pain, SOB. SNF unable to perform PD so readmitted to   Past Medical History Relevant to Rehab: ESRD on daily PD, AAA dissections s/p repairs, HF, pancreatitis, PE/DVT  Family/Caregiver Present: No  Prior to Session Communication: Bedside nurse  Patient Position Received:  (Seated EOb)  General Comment: EOB upon arrival. Pt. distraught regarding all that she is going thru stating that she feels overwhelmed. Pt. complains of pain in abdomen and swelling in feet. RN aware of both complaints  and pt. received pain meds already this morning.After extensive conversation before and after session pt. able to demonstrate the ability to amb. using a wh. walker and min  assist.    Subjective   Precautions:  Precautions  Medical Precautions: Fall precautions  Vital Signs:       Objective   Pain:  Pain Assessment  Pain Assessment: 0-10  Pain Score: 8  Pain Location: Abdomen  Pain Interventions: Medication (See MAR)  Cognition:  Cognition  Overall Cognitive Status: Within Functional Limits  Cognition Comments: Slightly labile this session when discussing current status and the feeling of being overwhelmed with little support.  Postural Control:  Static Sitting Balance  Static Sitting-Balance Support: Bilateral upper extremity supported, Feet supported  Static Sitting-Level of Assistance: Close supervision  Static Standing Balance  Static Standing-Balance Support: Bilateral upper extremity supported  Static Standing-Level of Assistance: Contact guard  Static Standing-Comment/Number of Minutes: Pt. more stable today vs last session upon standing.  Extremity/Trunk Assessments:                      Activity Tolerance:  Activity Tolerance  Endurance: Decreased tolerance for upright activites  Treatments:  Ambulation/Gait Training  Ambulation/Gait Training Performed: Yes  Ambulation/Gait Training 1  Surface 1:  (Pt. amb. 20' x 2 using a wh. walker and cga- Cues for walker positioning as pt. allows feet to lag behind walker.)  Transfers  Transfer: Yes  Transfer 1  Transfer From 1: Sit to, Stand to  Transfer to 1: Sit, Stand  Technique 1:  (CGA  to steady. Cues for hand placement prior to standing)    Outcome Measures:  [facility] Basic Mobility  Turning from your back to your side while in a flat bed without using bedrails: A little  Moving from lying on your back to sitting on the side of a flat bed without using bedrails: A little  Moving to and from bed to chair (including a wheelchair): A little  Standing up from a chair using your arms (e.g. wheelchair or bedside chair): A little  To walk in hospital room: A little  Climbing 3-5 steps with railing: A lot  Basic Mobility - Total Score:  17    Education Documentation  Mobility Training, taught by Camelia Rodriguez PTA at 2/16/2024 12:29 PM.  Learner: Patient  Readiness: Acceptance  Method: Explanation, Demonstration  Response: Needs Reinforcement    Education Comments  No comments found.        OP EDUCATION:       Encounter Problems       Encounter Problems (Active)       Balance       STG - Maintains static standing balance with upper extremity support x2 minutes with SBA  (Progressing)       Start:  02/10/24    Expected End:  02/24/24               Mobility       STG - Patient will ambulate x50ft with SBA and LRAD  (Progressing)       Start:  02/10/24    Expected End:  02/24/24            STG - Patient will ambulate up and down a curb/step with LRAD and min Ax1 (Progressing)       Start:  02/10/24    Expected End:  02/24/24               Transfers       STG - Patient will perform bed mobility with SBA  (Progressing)       Start:  02/10/24    Expected End:  02/24/24            STG - Patient will transfer sit to and from stand with SBA and LRAD  (Progressing)       Start:  02/10/24    Expected End:  02/24/24

## 2024-02-17 LAB
ANION GAP SERPL CALC-SCNC: 16 MMOL/L (ref 10–20)
BASOPHILS # BLD MANUAL: 0 X10*3/UL (ref 0–0.1)
BASOPHILS NFR BLD MANUAL: 0 %
BUN SERPL-MCNC: 39 MG/DL (ref 6–23)
CALCIUM SERPL-MCNC: 8.7 MG/DL (ref 8.6–10.6)
CHLORIDE SERPL-SCNC: 98 MMOL/L (ref 98–107)
CO2 SERPL-SCNC: 25 MMOL/L (ref 21–32)
CREAT SERPL-MCNC: 6.2 MG/DL (ref 0.5–1.05)
EGFRCR SERPLBLD CKD-EPI 2021: 7 ML/MIN/1.73M*2
EOSINOPHIL # BLD MANUAL: 0.14 X10*3/UL (ref 0–0.7)
EOSINOPHIL NFR BLD MANUAL: 2.6 %
ERYTHROCYTE [DISTWIDTH] IN BLOOD BY AUTOMATED COUNT: 17.4 % (ref 11.5–14.5)
ERYTHROCYTE [DISTWIDTH] IN BLOOD BY AUTOMATED COUNT: 17.8 % (ref 11.5–14.5)
GLUCOSE SERPL-MCNC: 80 MG/DL (ref 74–99)
HCT VFR BLD AUTO: 27.8 % (ref 36–46)
HCT VFR BLD AUTO: 28.4 % (ref 36–46)
HGB BLD-MCNC: 9.2 G/DL (ref 12–16)
HGB BLD-MCNC: 9.6 G/DL (ref 12–16)
IMM GRANULOCYTES # BLD AUTO: 0.02 X10*3/UL (ref 0–0.7)
IMM GRANULOCYTES NFR BLD AUTO: 0.4 % (ref 0–0.9)
INR PPP: 3.9 (ref 0.9–1.1)
INR PPP: 4.1 (ref 0.9–1.1)
LYMPHOCYTES # BLD MANUAL: 0.9 X10*3/UL (ref 1.2–4.8)
LYMPHOCYTES NFR BLD MANUAL: 17.4 %
MCH RBC QN AUTO: 28.9 PG (ref 26–34)
MCH RBC QN AUTO: 30.6 PG (ref 26–34)
MCHC RBC AUTO-ENTMCNC: 33.1 G/DL (ref 32–36)
MCHC RBC AUTO-ENTMCNC: 33.8 G/DL (ref 32–36)
MCV RBC AUTO: 87 FL (ref 80–100)
MCV RBC AUTO: 90 FL (ref 80–100)
MONOCYTES # BLD MANUAL: 0 X10*3/UL (ref 0.1–1)
MONOCYTES NFR BLD MANUAL: 0 %
NEUTS SEG # BLD MANUAL: 4.16 X10*3/UL (ref 1.2–7)
NEUTS SEG NFR BLD MANUAL: 80 %
NRBC BLD-RTO: 0 /100 WBCS (ref 0–0)
NRBC BLD-RTO: 0 /100 WBCS (ref 0–0)
OVALOCYTES BLD QL SMEAR: ABNORMAL
PLATELET # BLD AUTO: 213 X10*3/UL (ref 150–450)
PLATELET # BLD AUTO: 248 X10*3/UL (ref 150–450)
POTASSIUM SERPL-SCNC: 3.8 MMOL/L (ref 3.5–5.3)
PROTHROMBIN TIME: 45.1 SECONDS (ref 9.8–12.8)
PROTHROMBIN TIME: 47.1 SECONDS (ref 9.8–12.8)
RBC # BLD AUTO: 3.14 X10*6/UL (ref 4–5.2)
RBC # BLD AUTO: 3.18 X10*6/UL (ref 4–5.2)
RBC MORPH BLD: ABNORMAL
SODIUM SERPL-SCNC: 135 MMOL/L (ref 136–145)
TOTAL CELLS COUNTED BLD: 115
WBC # BLD AUTO: 5.2 X10*3/UL (ref 4.4–11.3)
WBC # BLD AUTO: 5.5 X10*3/UL (ref 4.4–11.3)

## 2024-02-17 PROCEDURE — 85610 PROTHROMBIN TIME: CPT | Performed by: INTERNAL MEDICINE

## 2024-02-17 PROCEDURE — 85027 COMPLETE CBC AUTOMATED: CPT | Performed by: INTERNAL MEDICINE

## 2024-02-17 PROCEDURE — 2500000002 HC RX 250 W HCPCS SELF ADMINISTERED DRUGS (ALT 637 FOR MEDICARE OP, ALT 636 FOR OP/ED): Performed by: NURSE PRACTITIONER

## 2024-02-17 PROCEDURE — 2500000004 HC RX 250 GENERAL PHARMACY W/ HCPCS (ALT 636 FOR OP/ED): Performed by: INTERNAL MEDICINE

## 2024-02-17 PROCEDURE — 2500000001 HC RX 250 WO HCPCS SELF ADMINISTERED DRUGS (ALT 637 FOR MEDICARE OP): Performed by: INTERNAL MEDICINE

## 2024-02-17 PROCEDURE — 80048 BASIC METABOLIC PNL TOTAL CA: CPT | Performed by: INTERNAL MEDICINE

## 2024-02-17 PROCEDURE — G0378 HOSPITAL OBSERVATION PER HR: HCPCS

## 2024-02-17 PROCEDURE — 2500000002 HC RX 250 W HCPCS SELF ADMINISTERED DRUGS (ALT 637 FOR MEDICARE OP, ALT 636 FOR OP/ED): Performed by: STUDENT IN AN ORGANIZED HEALTH CARE EDUCATION/TRAINING PROGRAM

## 2024-02-17 PROCEDURE — 36415 COLL VENOUS BLD VENIPUNCTURE: CPT | Performed by: INTERNAL MEDICINE

## 2024-02-17 PROCEDURE — 2500000004 HC RX 250 GENERAL PHARMACY W/ HCPCS (ALT 636 FOR OP/ED): Performed by: NURSE PRACTITIONER

## 2024-02-17 PROCEDURE — 2500000001 HC RX 250 WO HCPCS SELF ADMINISTERED DRUGS (ALT 637 FOR MEDICARE OP): Performed by: NURSE PRACTITIONER

## 2024-02-17 PROCEDURE — 85007 BL SMEAR W/DIFF WBC COUNT: CPT | Performed by: INTERNAL MEDICINE

## 2024-02-17 PROCEDURE — 99232 SBSQ HOSP IP/OBS MODERATE 35: CPT | Performed by: INTERNAL MEDICINE

## 2024-02-17 RX ADMIN — SEVELAMER CARBONATE 800 MG: 800 TABLET, FILM COATED ORAL at 13:44

## 2024-02-17 RX ADMIN — HYDROMORPHONE HYDROCHLORIDE 1 MG: 2 TABLET ORAL at 00:06

## 2024-02-17 RX ADMIN — LEVOTHYROXINE SODIUM 25 MCG: 25 TABLET ORAL at 09:09

## 2024-02-17 RX ADMIN — ATORVASTATIN CALCIUM 40 MG: 40 TABLET, FILM COATED ORAL at 20:53

## 2024-02-17 RX ADMIN — HYDROMORPHONE HYDROCHLORIDE 1 MG: 2 TABLET ORAL at 06:05

## 2024-02-17 RX ADMIN — HYDROMORPHONE HYDROCHLORIDE 1 MG: 2 TABLET ORAL at 20:53

## 2024-02-17 RX ADMIN — NEPHROCAP 1 CAPSULE: 1 CAP ORAL at 09:10

## 2024-02-17 RX ADMIN — NYSTATIN 500000 UNITS: 100000 SUSPENSION ORAL at 06:05

## 2024-02-17 RX ADMIN — QUETIAPINE FUMARATE 25 MG: 25 TABLET ORAL at 20:53

## 2024-02-17 RX ADMIN — PANCRELIPASE 3 CAPSULE: 24000; 76000; 120000 CAPSULE, DELAYED RELEASE PELLETS ORAL at 09:10

## 2024-02-17 RX ADMIN — HEPARIN SODIUM: 1000 INJECTION INTRAVENOUS; SUBCUTANEOUS at 20:54

## 2024-02-17 RX ADMIN — HYDROMORPHONE HYDROCHLORIDE 1 MG: 2 TABLET ORAL at 13:26

## 2024-02-17 RX ADMIN — Medication 1000 UNITS: at 09:10

## 2024-02-17 RX ADMIN — PANCRELIPASE 3 CAPSULE: 24000; 76000; 120000 CAPSULE, DELAYED RELEASE PELLETS ORAL at 13:43

## 2024-02-17 RX ADMIN — QUETIAPINE FUMARATE 25 MG: 25 TABLET ORAL at 09:09

## 2024-02-17 RX ADMIN — SEVELAMER CARBONATE 800 MG: 800 TABLET, FILM COATED ORAL at 09:10

## 2024-02-17 RX ADMIN — ALLOPURINOL 100 MG: 100 TABLET ORAL at 09:09

## 2024-02-17 RX ADMIN — MIRTAZAPINE 15 MG: 15 TABLET, FILM COATED ORAL at 20:53

## 2024-02-17 ASSESSMENT — COGNITIVE AND FUNCTIONAL STATUS - GENERAL
DRESSING REGULAR LOWER BODY CLOTHING: A LITTLE
TOILETING: A LOT
WALKING IN HOSPITAL ROOM: A LITTLE
CLIMB 3 TO 5 STEPS WITH RAILING: A LOT
TURNING FROM BACK TO SIDE WHILE IN FLAT BAD: A LITTLE
STANDING UP FROM CHAIR USING ARMS: A LITTLE
HELP NEEDED FOR BATHING: A LITTLE
DAILY ACTIVITIY SCORE: 19
MOBILITY SCORE: 18
MOVING TO AND FROM BED TO CHAIR: A LITTLE
DRESSING REGULAR UPPER BODY CLOTHING: A LITTLE

## 2024-02-17 ASSESSMENT — PAIN SCALES - GENERAL
PAINLEVEL_OUTOF10: 9
PAINLEVEL_OUTOF10: 8
PAINLEVEL_OUTOF10: 7
PAINLEVEL_OUTOF10: 8

## 2024-02-17 ASSESSMENT — PAIN - FUNCTIONAL ASSESSMENT
PAIN_FUNCTIONAL_ASSESSMENT: 0-10
PAIN_FUNCTIONAL_ASSESSMENT: 0-10

## 2024-02-17 NOTE — CARE PLAN
The patient's goals for the shift include      The clinical goals for the shift include Patient will have tolerable pain this shift and remain free from falls      Problem: Pain  Goal: Takes deep breaths with improved pain control throughout the shift  Outcome: Progressing     Problem: Pain  Goal: Turns in bed with improved pain control throughout the shift  Outcome: Progressing     Problem: Pain  Goal: Walks with improved pain control throughout the shift  Outcome: Progressing     Problem: Pain  Goal: Performs ADL's with improved pain control throughout shift  Outcome: Progressing     Problem: Pain  Goal: Participates in PT with improved pain control throughout the shift  Outcome: Progressing     Problem: Pain  Goal: Free from acute confusion related to pain meds throughout the shift  Outcome: Progressing     Problem: Fall/Injury  Goal: Not fall by end of shift  Outcome: Progressing  Goal: Be free from injury by end of the shift  Outcome: Progressing  Goal: Verbalize understanding of personal risk factors for fall in the hospital  Outcome: Progressing  Goal: Verbalize understanding of risk factor reduction measures to prevent injury from fall in the home  Outcome: Progressing  Goal: Use assistive devices by end of the shift  Outcome: Progressing  Goal: Pace activities to prevent fatigue by end of the shift  Outcome: Progressing     Problem: Fall/Injury  Goal: Be free from injury by end of the shift  Outcome: Progressing     Problem: Fall/Injury  Goal: Verbalize understanding of personal risk factors for fall in the hospital  Outcome: Progressing     Problem: Fall/Injury  Goal: Verbalize understanding of risk factor reduction measures to prevent injury from fall in the home  Outcome: Progressing     Problem: Fall/Injury  Goal: Use assistive devices by end of the shift  Outcome: Progressing     Problem: Fall/Injury  Goal: Pace activities to prevent fatigue by end of the shift  Outcome: Progressing

## 2024-02-17 NOTE — CARE PLAN
Problem: Pain  Goal: Takes deep breaths with improved pain control throughout the shift  Outcome: Progressing  Goal: Turns in bed with improved pain control throughout the shift  Outcome: Progressing  Goal: Walks with improved pain control throughout the shift  Outcome: Progressing  Goal: Performs ADL's with improved pain control throughout shift  Outcome: Progressing  Goal: Participates in PT with improved pain control throughout the shift  Outcome: Progressing  Goal: Free from opioid side effects throughout the shift  Outcome: Progressing  Goal: Free from acute confusion related to pain meds throughout the shift  Outcome: Progressing     Problem: Fall/Injury  Goal: Not fall by end of shift  Outcome: Progressing  Goal: Be free from injury by end of the shift  Outcome: Progressing  Goal: Verbalize understanding of personal risk factors for fall in the hospital  Outcome: Progressing  Goal: Verbalize understanding of risk factor reduction measures to prevent injury from fall in the home  Outcome: Progressing  Goal: Use assistive devices by end of the shift  Outcome: Progressing  Goal: Pace activities to prevent fatigue by end of the shift  Outcome: Progressing   The patient's goals for the shift include      The clinical goals for the shift include Patient will have tolerable pain this shift and remain free from falls

## 2024-02-17 NOTE — PROGRESS NOTES
"Yoselyn Hernandez is a 64 y.o. female on day 3 of admission presenting with End stage renal disease (CMS/HCC).    Subjective   Seen and evaluated at bedside.  No active complaints during evaluation. However continues to feel anxious past few days about the things that need to be done on dc, offered to speak to SW past few days however she declined      Objective     General: sitting at side of bed comfortable   Skin: No rashes ulcerations.  Neck: Supple.  No JVD.  Cardiac: Regular rate and rhythm, S1/S2 normal.  Lungs: Clear to auscultation bilaterally, no wheezing or crackles, no accessory muscle use at rest.  Abdomen: bowel sounds present, no tenderness . PD catheter in place.  Some mild distention of abdomen.  Extremities: No cyanosis.  No lower extremity edema.  Neurologic: Alert and oriented x3.  No focal deficits.  Psychiatric: Appropriate mood and behavior.  Currently no agitation.    Last Recorded Vitals  Blood pressure 110/73, pulse 93, temperature 36.7 °C (98.1 °F), resp. rate 17, height 1.702 m (5' 7.01\"), weight 55 kg (121 lb 4.1 oz), SpO2 98 %.  On room air.    Intake/Output last 3 Shifts:  I/O last 3 completed shifts:  In: 1200 (21.8 mL/kg) [P.O.:1200]  Out: 676 (12.3 mL/kg) [Other:676]  Weight: 55 kg     Relevant Results  allopurinol, 100 mg, oral, Daily  atorvastatin, 40 mg, oral, Nightly  B complex-vitamin C-folic acid, 1 capsule, oral, Daily  cholecalciferol, 1,000 Units, oral, Daily  dextrose 2.5 % - LOW calcium 2.5 mEq/L 5,000 mL with heparin 2,500 Units peritoneal dialysate, , intraperitoneal, q24h  dextrose 2.5 % - LOW calcium 2.5 mEq/L 5,000 mL with heparin 2,500 Units peritoneal dialysate, , intraperitoneal, q24h  gentamicin, , Topical, q24h  levothyroxine, 25 mcg, oral, Daily before breakfast  lipase-protease-amylase, 3 capsule, oral, TID with meals  mirtazapine, 15 mg, oral, Nightly  nystatin, 500,000 Units, oral, q8h AMARJIT  sevelamer carbonate, 800 mg, oral, TID with meals  warfarin, 4 mg, oral, " Daily           PRN medications: acetaminophen, diphenoxylate-atropine, HYDROmorphone, ipratropium-albuteroL, melatonin, ondansetron ODT, polyethylene glycol, QUEtiapine, sennosides     Results for orders placed or performed during the hospital encounter of 02/08/24 (from the past 24 hour(s))   Protime-INR   Result Value Ref Range    Protime 47.1 (H) 9.8 - 12.8 seconds    INR 4.1 (H) 0.9 - 1.1   CBC   Result Value Ref Range    WBC 5.5 4.4 - 11.3 x10*3/uL    nRBC 0.0 0.0 - 0.0 /100 WBCs    RBC 3.18 (L) 4.00 - 5.20 x10*6/uL    Hemoglobin 9.2 (L) 12.0 - 16.0 g/dL    Hematocrit 27.8 (L) 36.0 - 46.0 %    MCV 87 80 - 100 fL    MCH 28.9 26.0 - 34.0 pg    MCHC 33.1 32.0 - 36.0 g/dL    RDW 17.4 (H) 11.5 - 14.5 %    Platelets 248 150 - 450 x10*3/uL             Hospital Course:  Yoselyn Hernandez is a 64 y.o. 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  Patient has had multiple admissions for this previously.  Nephrology consulted.  Fluid obtained from abdomen grew out staph epidermis.  Patient placed on intraperitoneal vancomycin and ceftazidime initially.  Eventually transition over to intraperitoneal vancomycin only.  Patient is recommended for total 14 days of intraperitoneal antibiotics.  PT/OT saw the patient and recommend skilled nursing facility.  Initially sent patient to the skilled nursing facility after they informed us they were ready on 2/7.  Patient came back on 2/8 because the facility apparently did not have the PD supplies. Patient requesting different facility.     Assessment and plan:    ESRD and PD Peritonitis   -Nephrology consulted and following.  -Continue pain control and  intraperitoneal antibiotics.  -Dilaudid will be discontinued tomorrow  -Completed intraperitoneal antibiotics on 2/15/2024.         Chronic Left common iliac occlusion/Hx of DVT/PE   Supratherapeutic INR with no signs of bleeding  Hold coumadin today      Chronic HFrEF, not in exacerbation   Not on any GDMT. No echo in chart. OP follow up.     Chronic diarrhea  - Follow with GI.     Hypokalemia   -Resolved.     Physical deconditioning        Patient is medically ready for discharge.  She has been accepted to the facility however they are undergoing peritoneal dialysis training and will not be able to be discharged prior to Monday per CM          Kathy Lizama MD

## 2024-02-18 LAB
ANION GAP SERPL CALC-SCNC: 14 MMOL/L (ref 10–20)
BASOPHILS # BLD AUTO: 0.04 X10*3/UL (ref 0–0.1)
BASOPHILS NFR BLD AUTO: 0.7 %
BUN SERPL-MCNC: 43 MG/DL (ref 6–23)
CALCIUM SERPL-MCNC: 8.7 MG/DL (ref 8.6–10.6)
CHLORIDE SERPL-SCNC: 98 MMOL/L (ref 98–107)
CO2 SERPL-SCNC: 29 MMOL/L (ref 21–32)
CREAT SERPL-MCNC: 6.18 MG/DL (ref 0.5–1.05)
EGFRCR SERPLBLD CKD-EPI 2021: 7 ML/MIN/1.73M*2
EOSINOPHIL # BLD AUTO: 0.22 X10*3/UL (ref 0–0.7)
EOSINOPHIL NFR BLD AUTO: 3.8 %
ERYTHROCYTE [DISTWIDTH] IN BLOOD BY AUTOMATED COUNT: 17.6 % (ref 11.5–14.5)
GLUCOSE SERPL-MCNC: 91 MG/DL (ref 74–99)
HCT VFR BLD AUTO: 26.9 % (ref 36–46)
HGB BLD-MCNC: 9.1 G/DL (ref 12–16)
IMM GRANULOCYTES # BLD AUTO: 0.02 X10*3/UL (ref 0–0.7)
IMM GRANULOCYTES NFR BLD AUTO: 0.3 % (ref 0–0.9)
INR PPP: 2.9 (ref 0.9–1.1)
LYMPHOCYTES # BLD AUTO: 1.28 X10*3/UL (ref 1.2–4.8)
LYMPHOCYTES NFR BLD AUTO: 22 %
MCH RBC QN AUTO: 29.6 PG (ref 26–34)
MCHC RBC AUTO-ENTMCNC: 33.8 G/DL (ref 32–36)
MCV RBC AUTO: 88 FL (ref 80–100)
MONOCYTES # BLD AUTO: 0.5 X10*3/UL (ref 0.1–1)
MONOCYTES NFR BLD AUTO: 8.6 %
NEUTROPHILS # BLD AUTO: 3.77 X10*3/UL (ref 1.2–7.7)
NEUTROPHILS NFR BLD AUTO: 64.6 %
NRBC BLD-RTO: 0 /100 WBCS (ref 0–0)
PLATELET # BLD AUTO: 237 X10*3/UL (ref 150–450)
POTASSIUM SERPL-SCNC: 3.7 MMOL/L (ref 3.5–5.3)
PROTHROMBIN TIME: 32.6 SECONDS (ref 9.8–12.8)
RBC # BLD AUTO: 3.07 X10*6/UL (ref 4–5.2)
SODIUM SERPL-SCNC: 137 MMOL/L (ref 136–145)
WBC # BLD AUTO: 5.8 X10*3/UL (ref 4.4–11.3)

## 2024-02-18 PROCEDURE — 85610 PROTHROMBIN TIME: CPT | Performed by: INTERNAL MEDICINE

## 2024-02-18 PROCEDURE — 2500000001 HC RX 250 WO HCPCS SELF ADMINISTERED DRUGS (ALT 637 FOR MEDICARE OP): Performed by: NURSE PRACTITIONER

## 2024-02-18 PROCEDURE — 99232 SBSQ HOSP IP/OBS MODERATE 35: CPT | Performed by: INTERNAL MEDICINE

## 2024-02-18 PROCEDURE — 2500000002 HC RX 250 W HCPCS SELF ADMINISTERED DRUGS (ALT 637 FOR MEDICARE OP, ALT 636 FOR OP/ED): Performed by: STUDENT IN AN ORGANIZED HEALTH CARE EDUCATION/TRAINING PROGRAM

## 2024-02-18 PROCEDURE — 2500000001 HC RX 250 WO HCPCS SELF ADMINISTERED DRUGS (ALT 637 FOR MEDICARE OP): Performed by: INTERNAL MEDICINE

## 2024-02-18 PROCEDURE — 2500000002 HC RX 250 W HCPCS SELF ADMINISTERED DRUGS (ALT 637 FOR MEDICARE OP, ALT 636 FOR OP/ED): Performed by: INTERNAL MEDICINE

## 2024-02-18 PROCEDURE — G0378 HOSPITAL OBSERVATION PER HR: HCPCS

## 2024-02-18 PROCEDURE — 36415 COLL VENOUS BLD VENIPUNCTURE: CPT | Performed by: INTERNAL MEDICINE

## 2024-02-18 PROCEDURE — 2500000004 HC RX 250 GENERAL PHARMACY W/ HCPCS (ALT 636 FOR OP/ED): Performed by: NURSE PRACTITIONER

## 2024-02-18 PROCEDURE — 2500000004 HC RX 250 GENERAL PHARMACY W/ HCPCS (ALT 636 FOR OP/ED): Performed by: INTERNAL MEDICINE

## 2024-02-18 PROCEDURE — 85025 COMPLETE CBC W/AUTO DIFF WBC: CPT | Performed by: INTERNAL MEDICINE

## 2024-02-18 PROCEDURE — 2500000002 HC RX 250 W HCPCS SELF ADMINISTERED DRUGS (ALT 637 FOR MEDICARE OP, ALT 636 FOR OP/ED): Performed by: NURSE PRACTITIONER

## 2024-02-18 PROCEDURE — 82374 ASSAY BLOOD CARBON DIOXIDE: CPT | Performed by: INTERNAL MEDICINE

## 2024-02-18 RX ORDER — WARFARIN 3 MG/1
3 TABLET ORAL DAILY
Status: DISCONTINUED | OUTPATIENT
Start: 2024-02-18 | End: 2024-02-19 | Stop reason: HOSPADM

## 2024-02-18 RX ADMIN — WARFARIN SODIUM 3 MG: 3 TABLET ORAL at 17:57

## 2024-02-18 RX ADMIN — Medication 10 MG: at 22:11

## 2024-02-18 RX ADMIN — QUETIAPINE FUMARATE 25 MG: 25 TABLET ORAL at 08:53

## 2024-02-18 RX ADMIN — HEPARIN SODIUM: 1000 INJECTION INTRAVENOUS; SUBCUTANEOUS at 17:27

## 2024-02-18 RX ADMIN — PANCRELIPASE 3 CAPSULE: 24000; 76000; 120000 CAPSULE, DELAYED RELEASE PELLETS ORAL at 17:57

## 2024-02-18 RX ADMIN — ALLOPURINOL 100 MG: 100 TABLET ORAL at 11:23

## 2024-02-18 RX ADMIN — PANCRELIPASE 3 CAPSULE: 24000; 76000; 120000 CAPSULE, DELAYED RELEASE PELLETS ORAL at 08:37

## 2024-02-18 RX ADMIN — SENNOSIDES 17.2 MG: 8.6 TABLET, FILM COATED ORAL at 22:14

## 2024-02-18 RX ADMIN — NEPHROCAP 1 CAPSULE: 1 CAP ORAL at 08:37

## 2024-02-18 RX ADMIN — LEVOTHYROXINE SODIUM 25 MCG: 25 TABLET ORAL at 06:19

## 2024-02-18 RX ADMIN — GENTAMICIN SULFATE: 1 CREAM TOPICAL at 17:27

## 2024-02-18 RX ADMIN — QUETIAPINE FUMARATE 25 MG: 25 TABLET ORAL at 22:11

## 2024-02-18 RX ADMIN — NYSTATIN 500000 UNITS: 100000 SUSPENSION ORAL at 22:17

## 2024-02-18 RX ADMIN — HYDROMORPHONE HYDROCHLORIDE 1 MG: 2 TABLET ORAL at 06:18

## 2024-02-18 RX ADMIN — Medication 1000 UNITS: at 08:37

## 2024-02-18 RX ADMIN — SEVELAMER CARBONATE 800 MG: 800 TABLET, FILM COATED ORAL at 17:57

## 2024-02-18 RX ADMIN — POLYETHYLENE GLYCOL 3350 17 G: 17 POWDER, FOR SOLUTION ORAL at 16:59

## 2024-02-18 RX ADMIN — SEVELAMER CARBONATE 800 MG: 800 TABLET, FILM COATED ORAL at 08:37

## 2024-02-18 RX ADMIN — ATORVASTATIN CALCIUM 40 MG: 40 TABLET, FILM COATED ORAL at 22:11

## 2024-02-18 RX ADMIN — MIRTAZAPINE 15 MG: 15 TABLET, FILM COATED ORAL at 22:11

## 2024-02-18 ASSESSMENT — COGNITIVE AND FUNCTIONAL STATUS - GENERAL
CLIMB 3 TO 5 STEPS WITH RAILING: A LITTLE
MOBILITY SCORE: 20
DAILY ACTIVITIY SCORE: 22
WALKING IN HOSPITAL ROOM: A LITTLE
MOVING TO AND FROM BED TO CHAIR: A LITTLE
TOILETING: A LITTLE
DRESSING REGULAR LOWER BODY CLOTHING: A LITTLE
STANDING UP FROM CHAIR USING ARMS: A LITTLE

## 2024-02-18 ASSESSMENT — PAIN DESCRIPTION - LOCATION: LOCATION: ABDOMEN

## 2024-02-18 ASSESSMENT — PAIN SCALES - GENERAL
PAINLEVEL_OUTOF10: 9
PAINLEVEL_OUTOF10: 10 - WORST POSSIBLE PAIN

## 2024-02-18 ASSESSMENT — PAIN - FUNCTIONAL ASSESSMENT
PAIN_FUNCTIONAL_ASSESSMENT: 0-10
PAIN_FUNCTIONAL_ASSESSMENT: 0-10

## 2024-02-18 NOTE — PROGRESS NOTES
"Yoselyn Hernandez is a 64 y.o. female on day 3 of admission presenting with End stage renal disease (CMS/HCC).    Subjective   Seen and evaluated at bedside.  No active complaints during evaluation.       Objective     General: sitting at side of bed comfortable   Skin: No rashes ulcerations.  Neck: Supple.  No JVD.  Cardiac: Regular rate and rhythm, S1/S2 normal.  Lungs: Clear to auscultation bilaterally, no wheezing or crackles, no accessory muscle use at rest.  Abdomen: bowel sounds present, no tenderness . PD catheter in place.   Extremities: No cyanosis.  No lower extremity edema.  Neurologic: Alert and oriented x3.  No focal deficits.  Psychiatric: Appropriate mood and behavior.  Currently no agitation.    Last Recorded Vitals  Blood pressure 102/61, pulse 96, temperature 36.7 °C (98.1 °F), resp. rate 16, height 1.702 m (5' 7.01\"), weight 55 kg (121 lb 4.1 oz), SpO2 99 %.  On room air.    Intake/Output last 3 Shifts:  I/O last 3 completed shifts:  In: 480 (8.7 mL/kg) [P.O.:480]  Out: 677 (12.3 mL/kg) [Other:676; Stool:1]  Weight: 55 kg     Relevant Results  allopurinol, 100 mg, oral, Daily  atorvastatin, 40 mg, oral, Nightly  B complex-vitamin C-folic acid, 1 capsule, oral, Daily  cholecalciferol, 1,000 Units, oral, Daily  dextrose 2.5 % - LOW calcium 2.5 mEq/L 5,000 mL with heparin 2,500 Units peritoneal dialysate, , intraperitoneal, q24h  dextrose 2.5 % - LOW calcium 2.5 mEq/L 5,000 mL with heparin 2,500 Units peritoneal dialysate, , intraperitoneal, q24h  gentamicin, , Topical, q24h  levothyroxine, 25 mcg, oral, Daily before breakfast  lipase-protease-amylase, 3 capsule, oral, TID with meals  mirtazapine, 15 mg, oral, Nightly  nystatin, 500,000 Units, oral, q8h AMARJIT  sevelamer carbonate, 800 mg, oral, TID with meals  warfarin, 3 mg, oral, Daily           PRN medications: acetaminophen, diphenoxylate-atropine, ipratropium-albuteroL, melatonin, ondansetron ODT, polyethylene glycol, QUEtiapine, sennosides "     Results for orders placed or performed during the hospital encounter of 02/08/24 (from the past 24 hour(s))   Protime-INR   Result Value Ref Range    Protime 45.1 (H) 9.8 - 12.8 seconds    INR 3.9 (H) 0.9 - 1.1   CBC and Auto Differential   Result Value Ref Range    WBC 5.2 4.4 - 11.3 x10*3/uL    nRBC 0.0 0.0 - 0.0 /100 WBCs    RBC 3.14 (L) 4.00 - 5.20 x10*6/uL    Hemoglobin 9.6 (L) 12.0 - 16.0 g/dL    Hematocrit 28.4 (L) 36.0 - 46.0 %    MCV 90 80 - 100 fL    MCH 30.6 26.0 - 34.0 pg    MCHC 33.8 32.0 - 36.0 g/dL    RDW 17.8 (H) 11.5 - 14.5 %    Platelets 213 150 - 450 x10*3/uL    Immature Granulocytes %, Automated 0.4 0.0 - 0.9 %    Immature Granulocytes Absolute, Automated 0.02 0.00 - 0.70 x10*3/uL   Manual Differential   Result Value Ref Range    Neutrophils %, Manual 80.0 40.0 - 80.0 %    Lymphocytes %, Manual 17.4 13.0 - 44.0 %    Monocytes %, Manual 0.0 2.0 - 10.0 %    Eosinophils %, Manual 2.6 0.0 - 6.0 %    Basophils %, Manual 0.0 0.0 - 2.0 %    Seg Neutrophils Absolute, Manual 4.16 1.20 - 7.00 x10*3/uL    Lymphocytes Absolute, Manual 0.90 (L) 1.20 - 4.80 x10*3/uL    Monocytes Absolute, Manual 0.00 (L) 0.10 - 1.00 x10*3/uL    Eosinophils Absolute, Manual 0.14 0.00 - 0.70 x10*3/uL    Basophils Absolute, Manual 0.00 0.00 - 0.10 x10*3/uL    Total Cells Counted 115     RBC Morphology See Below     Ovalocytes Few    Protime-INR   Result Value Ref Range    Protime 32.6 (H) 9.8 - 12.8 seconds    INR 2.9 (H) 0.9 - 1.1   CBC and Auto Differential   Result Value Ref Range    WBC 5.8 4.4 - 11.3 x10*3/uL    nRBC 0.0 0.0 - 0.0 /100 WBCs    RBC 3.07 (L) 4.00 - 5.20 x10*6/uL    Hemoglobin 9.1 (L) 12.0 - 16.0 g/dL    Hematocrit 26.9 (L) 36.0 - 46.0 %    MCV 88 80 - 100 fL    MCH 29.6 26.0 - 34.0 pg    MCHC 33.8 32.0 - 36.0 g/dL    RDW 17.6 (H) 11.5 - 14.5 %    Platelets 237 150 - 450 x10*3/uL    Neutrophils % 64.6 40.0 - 80.0 %    Immature Granulocytes %, Automated 0.3 0.0 - 0.9 %    Lymphocytes % 22.0 13.0 - 44.0 %     Monocytes % 8.6 2.0 - 10.0 %    Eosinophils % 3.8 0.0 - 6.0 %    Basophils % 0.7 0.0 - 2.0 %    Neutrophils Absolute 3.77 1.20 - 7.70 x10*3/uL    Immature Granulocytes Absolute, Automated 0.02 0.00 - 0.70 x10*3/uL    Lymphocytes Absolute 1.28 1.20 - 4.80 x10*3/uL    Monocytes Absolute 0.50 0.10 - 1.00 x10*3/uL    Eosinophils Absolute 0.22 0.00 - 0.70 x10*3/uL    Basophils Absolute 0.04 0.00 - 0.10 x10*3/uL   Basic Metabolic Panel   Result Value Ref Range    Glucose 91 74 - 99 mg/dL    Sodium 137 136 - 145 mmol/L    Potassium 3.7 3.5 - 5.3 mmol/L    Chloride 98 98 - 107 mmol/L    Bicarbonate 29 21 - 32 mmol/L    Anion Gap 14 10 - 20 mmol/L    Urea Nitrogen 43 (H) 6 - 23 mg/dL    Creatinine 6.18 (H) 0.50 - 1.05 mg/dL    eGFR 7 (L) >60 mL/min/1.73m*2    Calcium 8.7 8.6 - 10.6 mg/dL             Hospital Course:  Yoselyn Hernandez is a 64 y.o. 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  Patient has had multiple admissions for this previously.  Nephrology consulted.  Fluid obtained from abdomen grew out staph epidermis.  Patient placed on intraperitoneal vancomycin and ceftazidime initially.  Eventually transition over to intraperitoneal vancomycin only.  Patient is recommended for total 14 days of intraperitoneal antibiotics.  PT/OT saw the patient and recommend skilled nursing facility.  Initially sent patient to the skilled nursing facility after they informed us they were ready on 2/7.  Patient came back on 2/8 because the facility apparently did not have the PD supplies. Patient requesting different facility.     Assessment and plan:    ESRD and PD Peritonitis   -Nephrology consulted and  following.  -Dilaudid discontinued today.  -Completed intraperitoneal antibiotics on 2/15/2024.         Chronic Left common iliac occlusion/Hx of DVT/PE   Supratherapeutic INR with no signs of bleeding on 2/17  Coumadin held yesterday and today INR 2.9 will restart at coumadin 3 mg instead of 4 mg     Chronic HFrEF, not in exacerbation   Not on any GDMT. No echo in chart. OP follow up. Referral placed    Chronic diarrhea  - Follow with GI.     Hypokalemia   -Resolved.     Physical deconditioning        Patient is medically ready for discharge.  She has been accepted to the facility however they are undergoing peritoneal dialysis training and will not be able to be discharged prior to Monday per CM          Kathy Lizama MD

## 2024-02-19 ENCOUNTER — APPOINTMENT (OUTPATIENT)
Dept: RADIOLOGY | Facility: HOSPITAL | Age: 64
End: 2024-02-19
Payer: COMMERCIAL

## 2024-02-19 VITALS
DIASTOLIC BLOOD PRESSURE: 75 MMHG | HEIGHT: 67 IN | BODY MASS INDEX: 19.03 KG/M2 | TEMPERATURE: 98.8 F | SYSTOLIC BLOOD PRESSURE: 116 MMHG | RESPIRATION RATE: 18 BRPM | HEART RATE: 101 BPM | WEIGHT: 121.25 LBS | OXYGEN SATURATION: 100 %

## 2024-02-19 LAB
ANION GAP SERPL CALC-SCNC: 15 MMOL/L (ref 10–20)
BASOPHILS # BLD AUTO: 0.04 X10*3/UL (ref 0–0.1)
BASOPHILS NFR BLD AUTO: 0.8 %
BUN SERPL-MCNC: 43 MG/DL (ref 6–23)
CALCIUM SERPL-MCNC: 9 MG/DL (ref 8.6–10.6)
CHLORIDE SERPL-SCNC: 98 MMOL/L (ref 98–107)
CO2 SERPL-SCNC: 27 MMOL/L (ref 21–32)
CREAT SERPL-MCNC: 6 MG/DL (ref 0.5–1.05)
EGFRCR SERPLBLD CKD-EPI 2021: 7 ML/MIN/1.73M*2
EOSINOPHIL # BLD AUTO: 0.21 X10*3/UL (ref 0–0.7)
EOSINOPHIL NFR BLD AUTO: 4 %
ERYTHROCYTE [DISTWIDTH] IN BLOOD BY AUTOMATED COUNT: 17.9 % (ref 11.5–14.5)
FUNGUS SPEC CULT: NORMAL
FUNGUS SPEC FUNGUS STN: NORMAL
GLUCOSE SERPL-MCNC: 99 MG/DL (ref 74–99)
HCT VFR BLD AUTO: 28 % (ref 36–46)
HGB BLD-MCNC: 9.1 G/DL (ref 12–16)
IMM GRANULOCYTES # BLD AUTO: 0.02 X10*3/UL (ref 0–0.7)
IMM GRANULOCYTES NFR BLD AUTO: 0.4 % (ref 0–0.9)
INR PPP: 2.5 (ref 0.9–1.1)
LYMPHOCYTES # BLD AUTO: 1.06 X10*3/UL (ref 1.2–4.8)
LYMPHOCYTES NFR BLD AUTO: 20.3 %
MCH RBC QN AUTO: 29.2 PG (ref 26–34)
MCHC RBC AUTO-ENTMCNC: 32.5 G/DL (ref 32–36)
MCV RBC AUTO: 90 FL (ref 80–100)
MONOCYTES # BLD AUTO: 0.34 X10*3/UL (ref 0.1–1)
MONOCYTES NFR BLD AUTO: 6.5 %
NEUTROPHILS # BLD AUTO: 3.54 X10*3/UL (ref 1.2–7.7)
NEUTROPHILS NFR BLD AUTO: 68 %
NRBC BLD-RTO: 0 /100 WBCS (ref 0–0)
PLATELET # BLD AUTO: 231 X10*3/UL (ref 150–450)
POTASSIUM SERPL-SCNC: 3.7 MMOL/L (ref 3.5–5.3)
PROTHROMBIN TIME: 28.7 SECONDS (ref 9.8–12.8)
RBC # BLD AUTO: 3.12 X10*6/UL (ref 4–5.2)
SODIUM SERPL-SCNC: 136 MMOL/L (ref 136–145)
WBC # BLD AUTO: 5.2 X10*3/UL (ref 4.4–11.3)

## 2024-02-19 PROCEDURE — 85610 PROTHROMBIN TIME: CPT | Performed by: INTERNAL MEDICINE

## 2024-02-19 PROCEDURE — 2500000001 HC RX 250 WO HCPCS SELF ADMINISTERED DRUGS (ALT 637 FOR MEDICARE OP): Performed by: INTERNAL MEDICINE

## 2024-02-19 PROCEDURE — 73630 X-RAY EXAM OF FOOT: CPT | Mod: RT

## 2024-02-19 PROCEDURE — 80048 BASIC METABOLIC PNL TOTAL CA: CPT | Performed by: INTERNAL MEDICINE

## 2024-02-19 PROCEDURE — 99239 HOSP IP/OBS DSCHRG MGMT >30: CPT | Performed by: INTERNAL MEDICINE

## 2024-02-19 PROCEDURE — 36415 COLL VENOUS BLD VENIPUNCTURE: CPT | Performed by: INTERNAL MEDICINE

## 2024-02-19 PROCEDURE — 73630 X-RAY EXAM OF FOOT: CPT | Mod: RIGHT SIDE | Performed by: RADIOLOGY

## 2024-02-19 PROCEDURE — 85025 COMPLETE CBC W/AUTO DIFF WBC: CPT | Performed by: INTERNAL MEDICINE

## 2024-02-19 PROCEDURE — 2500000002 HC RX 250 W HCPCS SELF ADMINISTERED DRUGS (ALT 637 FOR MEDICARE OP, ALT 636 FOR OP/ED): Performed by: INTERNAL MEDICINE

## 2024-02-19 PROCEDURE — 2500000002 HC RX 250 W HCPCS SELF ADMINISTERED DRUGS (ALT 637 FOR MEDICARE OP, ALT 636 FOR OP/ED): Performed by: NURSE PRACTITIONER

## 2024-02-19 PROCEDURE — 90945 DIALYSIS ONE EVALUATION: CPT | Performed by: INTERNAL MEDICINE

## 2024-02-19 PROCEDURE — 2500000004 HC RX 250 GENERAL PHARMACY W/ HCPCS (ALT 636 FOR OP/ED): Performed by: NURSE PRACTITIONER

## 2024-02-19 PROCEDURE — G0378 HOSPITAL OBSERVATION PER HR: HCPCS

## 2024-02-19 PROCEDURE — 2500000001 HC RX 250 WO HCPCS SELF ADMINISTERED DRUGS (ALT 637 FOR MEDICARE OP): Performed by: NURSE PRACTITIONER

## 2024-02-19 PROCEDURE — 2500000002 HC RX 250 W HCPCS SELF ADMINISTERED DRUGS (ALT 637 FOR MEDICARE OP, ALT 636 FOR OP/ED): Performed by: STUDENT IN AN ORGANIZED HEALTH CARE EDUCATION/TRAINING PROGRAM

## 2024-02-19 RX ORDER — LIDOCAINE 40 MG/G
CREAM TOPICAL 4 TIMES DAILY
Status: DISCONTINUED | OUTPATIENT
Start: 2024-02-19 | End: 2024-02-19 | Stop reason: HOSPADM

## 2024-02-19 RX ORDER — WARFARIN 3 MG/1
TABLET ORAL
Start: 2024-02-19 | End: 2024-03-27 | Stop reason: HOSPADM

## 2024-02-19 RX ORDER — LIDOCAINE 40 MG/G
CREAM TOPICAL 4 TIMES DAILY
Status: ON HOLD
Start: 2024-02-19 | End: 2024-06-10

## 2024-02-19 RX ADMIN — LIDOCAINE 4%: 4 CREAM TOPICAL at 13:55

## 2024-02-19 RX ADMIN — SEVELAMER CARBONATE 800 MG: 800 TABLET, FILM COATED ORAL at 17:26

## 2024-02-19 RX ADMIN — LEVOTHYROXINE SODIUM 25 MCG: 25 TABLET ORAL at 06:33

## 2024-02-19 RX ADMIN — ALLOPURINOL 100 MG: 100 TABLET ORAL at 09:12

## 2024-02-19 RX ADMIN — WARFARIN SODIUM 3 MG: 3 TABLET ORAL at 17:26

## 2024-02-19 RX ADMIN — PANCRELIPASE 3 CAPSULE: 24000; 76000; 120000 CAPSULE, DELAYED RELEASE PELLETS ORAL at 09:12

## 2024-02-19 RX ADMIN — NEPHROCAP 1 CAPSULE: 1 CAP ORAL at 09:12

## 2024-02-19 RX ADMIN — SEVELAMER CARBONATE 800 MG: 800 TABLET, FILM COATED ORAL at 09:12

## 2024-02-19 RX ADMIN — QUETIAPINE FUMARATE 25 MG: 25 TABLET ORAL at 09:12

## 2024-02-19 RX ADMIN — Medication 1000 UNITS: at 09:12

## 2024-02-19 RX ADMIN — SENNOSIDES 17.2 MG: 8.6 TABLET, FILM COATED ORAL at 09:14

## 2024-02-19 RX ADMIN — PANCRELIPASE 3 CAPSULE: 24000; 76000; 120000 CAPSULE, DELAYED RELEASE PELLETS ORAL at 17:26

## 2024-02-19 ASSESSMENT — COGNITIVE AND FUNCTIONAL STATUS - GENERAL
MOVING TO AND FROM BED TO CHAIR: A LITTLE
CLIMB 3 TO 5 STEPS WITH RAILING: A LITTLE
DAILY ACTIVITIY SCORE: 24
DAILY ACTIVITIY SCORE: 24
CLIMB 3 TO 5 STEPS WITH RAILING: A LITTLE
MOBILITY SCORE: 23
MOBILITY SCORE: 22

## 2024-02-19 ASSESSMENT — PAIN - FUNCTIONAL ASSESSMENT
PAIN_FUNCTIONAL_ASSESSMENT: 0-10
PAIN_FUNCTIONAL_ASSESSMENT: 0-10

## 2024-02-19 ASSESSMENT — PAIN SCALES - GENERAL
PAINLEVEL_OUTOF10: 0 - NO PAIN
PAINLEVEL_OUTOF10: 9
PAINLEVEL_OUTOF10: 0 - NO PAIN
PAINLEVEL_OUTOF10: 0 - NO PAIN

## 2024-02-19 NOTE — PROGRESS NOTES
Spiritual Care Visit    Clinical Encounter Type  Visited With: Patient  Routine Visit: Introduction  Continue Visiting: Yes  Referral From: Patient  Referral To:     Jewish Encounters  Jewish Needs: Prayer    Values/Beliefs  Cultural Requests During Hospitalization: none noted  Spiritual Requests During Hospitalization: prayer, support, listening         Patient Spiritual Care Encounters  Suffering Severity: Moderate  Fear Level: Moderate  Feelings of Loneliness: Moderate  Feelings of Hopelessness: Fair  Coping: Often demonstrated              PC-7 Assessment (Level of Unmet Needs)  Existential Struggle: Some  Spiritual/Jewish Struggle: Some  Legacy: Some  Relationships: Some  Fear of Death/Dying: Some  Values/Medical Decision Making: Some  Ritual/Other: Further assess  PC-7 Score: 6    SDAT (Spiritual Distress Assessment Tool)  Need for Life Balance: No evidence of unmet spiritual need  Need for Connection: Some evidence of unmet spiritual need  Need for Values Acknowledgement: Some evidence of unmet spiritual need  Need to Maintain Control: Some evidence of unmet spiritual need  Need to Maintain Identity: Some evidence of unmet spiritual need  SDAT Score: 4  SDAT Average Score: 0.8    Taxonomy  Intended Effects: Convey a calming presence, Demonstrate caring and concern, Lessen anxiety, Lessen someone's feelings of isolation, Helping someone feel comforted  Methods: Demonstrate acceptance, Encourage self care, Encourage sharing of feelings, Encourage story-telling, Offer spiritual/Protestant support, Offer emotional support  Interventions: Active listening, Ask guided questions, Assist with identifying strengths, Discuss coping mechanisms with someone, Facilitate life review, Prayer for healing    Initial spiritual care visit with patient this afternoon. This  is familiar with patient from previous admissions. Pt was tearful during the beginning of our time together. Pt said she really does  "not have anyone who supports her and she feels \"all alone.\" After we talked for a bit, patient was able to identify people who do support her and who are there for her. Pt struggles with her chronic illness and her lack of independence and ability to \"go and do.\" Pt really needed to talk and to get things off her chest.  provided a non anxious, supportive, comforting presence. After patient was able to talk and to share her feelings, she said \"I feel so much better.\" Chaplaincy will continue to follow.      "

## 2024-02-19 NOTE — PROGRESS NOTES
Yoselyn Hernandez is a 64 y.o. female on day 0 of admission presenting with End stage renal disease (CMS/HCC).    Subjective   Patient continues to be medically ready for discharge and plan is for her to go to [facility].     Received update from SNF that patients family member dropped off her PD Cycler Machine and solution. They confirmed that they are having PD training this afternoon from 1:00pm-3:00pm and would be able to accept the patient this evening.     Call placed to patients PD nurse Anamaria @ [facility] ([phone]) and confirmed that they are doing training with SNF this afternoon and confirmed that they are able to accept patient this evening.    Transportation requested in [employer] and received confirmed transportation time of 6:00pm. Blue form completed and provided to unit secretary. Completed [place] sent to SNF via [facility].     MD/TCC updated.    -Radha SANDHU MA, LSW  [phone] or [facility] Secure Chat  Care Transitions

## 2024-02-19 NOTE — DISCHARGE SUMMARY
Discharge Diagnosis  Bacterial peritonitis in peritoneal dialysis patient    Issues Requiring Follow-Up  Continue wound care of right great toe with skilled nursing facility.    Test Results Pending At Discharge  None.    Hospital Course  Yoselyn Hernandez is a 64 y.o. 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  Patient has had multiple admissions for this previously.  Nephrology consulted.  Fluid obtained from abdomen grew out staph epidermis.  Patient placed on intraperitoneal vancomycin and ceftazidime initially.  Eventually transition over to intraperitoneal vancomycin only.  Patient is recommended for total 14 days of intraperitoneal antibiotics.  PT/OT saw the patient and recommend skilled nursing facility.  Initially sent patient to the skilled nursing facility after they informed us they were ready on 2/7.  Patient came back on 2/8 because the facility apparently did not have the PD supplies. Patient requesting different facility.  Patient stayed in the hospital long enough that she finished her intraperitoneal antibiotics on 2/15.  On 2/19 we finally have an accepting facility that has received their peritoneal dialysis training and can help her with peritoneal dialysis.     Assessment and plan:     ESRD and PD bacterial peritonitis   -Nephrology consulted and following.  -Dilaudid discontinued since treatment of peritonitis finished.  -Completed intraperitoneal antibiotics on 2/15/2024.         Chronic Left common iliac occlusion/Hx of DVT/PE   Supratherapeutic INR with no signs of bleeding on 2/17  -INR today 2.5.  Will continue patient on warfarin 3 mg daily.  Recommend skilled  nursing facility continue intermittently monitoring INR.     Chronic HFrEF, not in exacerbation   -Not on any GDMT. No echo in chart. OP follow up. Referral placed  -Patient does not make much urine and most fluid removal will be done with peritoneal dialysis.     Chronic diarrhea  -Continue follow up with GI.  Once again patient advised that nephrology needs her to have a bowel movement a day and if she gets loose stools with stool softeners and laxatives then that is susceptible for now.     Hypokalemia   -Resolved.     Physical deconditioning  -Patient is recommended for skilled nursing facility.    Right great toe superficial skin tear  -Patient reported bumping her toe 2 days ago and reports that nursing assistant wrapped her toe and it felt better 2 days ago.  Afebrile.  No erythema or pustular drainage.  Wound care consulted and gave recommendations.  Recommend continuing wound care at facility.    Time spent caring for patient and coordinating discharge total 40 minutes.    Pertinent Physical Exam At Time of Discharge  General: sitting at side of bed comfortable   Skin: Skin tear superficially of right great big toe noted without any erythema and no pustular drainage.  Neck: Supple.  No JVD.  Cardiac: Regular rate and rhythm, S1/S2 normal.  Lungs: Clear to auscultation bilaterally, no wheezing or crackles, no accessory muscle use at rest.  Abdomen: bowel sounds present, no tenderness . PD catheter in place.   Extremities: No cyanosis.  No lower extremity edema.  Neurologic: Alert and oriented x3.  No focal deficits.  Psychiatric: Appropriate mood and behavior.  Currently no agitation.    Home Medications     Medication List      START taking these medications     * dextrose 2.5 % - LOW calcium 2.5 mEq/L Ca 2.5 mEq/L- Mg 0.5 mEq/L   solution 5,000 mL with heparin 1,000 unit/mL solution 2,500 Units; Inject   5,000 mL into the abdomen / abdominal cavity once every 24 hours.   * dextrose 2.5 % - LOW calcium 2.5  mEq/L Ca 2.5 mEq/L- Mg 0.5 mEq/L   solution 5,000 mL with heparin 1,000 unit/mL solution 2,500 Units; Inject   5,000 mL into the abdomen / abdominal cavity once every 24 hours.   lidocaine 4 % cream; Commonly known as: LMX; Apply topically 4 times a   day.  * This list has 2 medication(s) that are the same as other medications   prescribed for you. Read the directions carefully, and ask your doctor or   other care provider to review them with you.     CHANGE how you take these medications     warfarin 3 mg tablet; Commonly known as: Coumadin; Take as directed per   After Visit Summary.; What changed: medication strength     CONTINUE taking these medications     acetaminophen 325 mg tablet; Commonly known as: Tylenol; Take 3 tablets   (975 mg) by mouth 3 times a day as needed (pain or fever).   allopurinol 100 mg tablet; Commonly known as: Zyloprim; Take 1 tablet   (100 mg) by mouth once daily.   atorvastatin 40 mg tablet; Commonly known as: Lipitor; TAKE 1 TABLET BY   MOUTH ONCE DAILY AT BEDTIME   B complex-vitamin C-folic acid 1 mg capsule; Commonly known as:   Nephrocaps; Take 1 capsule by mouth once daily. Do not start before   February 6, 2024.   Creon 24,000-76,000 -120,000 unit capsule; Generic drug:   lipase-protease-amylase; Take 3 capsules by mouth 3 times a day with   meals.   diphenoxylate-atropine 2.5-0.025 mg tablet; Commonly known as: Lomotil;   Take 1 tablet by mouth 4 times a day as needed for diarrhea.   epoetin treasure 10,000 unit/mL injection; Commonly known as: Epogen,Procrit   gentamicin 0.1 % cream; Commonly known as: Garamycin; Apply topically   once every 24 hours.   levothyroxine 25 mcg tablet; Commonly known as: Synthroid, Levoxyl; Take   1 tablet (25 mcg) by mouth once daily in the morning. Take before meals.   melatonin 10 mg tablet; Take 1 tablet (10 mg) by mouth once daily as   needed for sleep.   mirtazapine 15 mg tablet; Commonly known as: Remeron; Take 1 tablet (15   mg) by mouth once  daily at bedtime.   nystatin 100,000 unit/mL suspension; Commonly known as: Mycostatin; Take   5 mL (500,000 Units) by mouth every 8 hours.   ondansetron ODT 4 mg disintegrating tablet; Commonly known as:   Zofran-ODT; Take 1 tablet (4 mg) by mouth every 8 hours if needed for   nausea.   polyethylene glycol 17 gram packet; Commonly known as: Glycolax,   Miralax; Take 17 g by mouth once daily as needed (constipation).   sennosides 8.6 mg tablet; Commonly known as: Senokot; Take 2 tablets   (17.2 mg) by mouth once daily as needed for constipation.   sevelamer carbonate 800 mg tablet; Commonly known as: Renvela; Take 1   tablet (800 mg) by mouth 3 times a day with meals. Swallow tablet whole;   do not crush, break, or chew.   VITAMIN D3 ORAL     STOP taking these medications     HYDROmorphone 2 mg tablet; Commonly known as: Dilaudid       Outpatient Follow-Up  Future Appointments   Date Time Provider Department Center   2/28/2024  2:00 PM TADEO Diamond-CNP [email or web address] Tru Wiley MD

## 2024-02-19 NOTE — NURSING NOTE
Patient discharged from [place] [address] [facility]cher [facility]. Report called to Natalia at [facility]. Patient collected all belongings and took them with her. No IV acces at time of discharge. Patient agreeable and seemingly happy to be discharged, no distress noted at time of discharge.

## 2024-02-19 NOTE — PROGRESS NOTES
Nephrology Consult Progress Note    Admit Date: 2/8/2024    Interval history:  Pt doing OK, no issues overnight, c/p non improving LE edema, no SOB    CURRENT MEDICATIONS:    Current Facility-Administered Medications:     acetaminophen (Tylenol) tablet 975 mg, 975 mg, oral, TID PRN, Kaylyn BURROWS Kerry APRN-CNP, 975 mg at 02/09/24 0824    allopurinol (Zyloprim) tablet 100 mg, 100 mg, oral, Daily, TADEO Sanchez-CNP, 100 mg at 02/19/24 0912    atorvastatin (Lipitor) tablet 40 mg, 40 mg, oral, Nightly, Kaylyn BURROWS Kerry APRN-CNP, 40 mg at 02/18/24 2211    B complex-vitamin C-folic acid (Nephrocaps) capsule 1 capsule, 1 capsule, oral, Daily, Kaylyn MARKOS TADEO Payne-CNP, 1 capsule at 02/19/24 0912    cholecalciferol (Vitamin D-3) tablet 1,000 Units, 1,000 Units, oral, Daily, TADEO Sanchez-CNP, 1,000 Units at 02/19/24 0912    dextrose 2.5 % - LOW calcium 2.5 mEq/L 5,000 mL with heparin 2,500 Units peritoneal dialysate, , intraperitoneal, q24h, Ta Seay, DO, Given at 02/18/24 1727    dextrose 2.5 % - LOW calcium 2.5 mEq/L 5,000 mL with heparin 2,500 Units peritoneal dialysate, , intraperitoneal, q24h, Ta Seay DO, Given at 02/18/24 1727    diphenoxylate-atropine (Lomotil) 2.5-0.025 mg per tablet 1 tablet, 1 tablet, oral, 4x daily PRN, Kaylyn Payne APRPEGGY-CNP    gentamicin (Garamycin) 0.1 % cream, , Topical, q24h, TADEO Sanchez-CNP, Given at 02/18/24 1727    ipratropium-albuteroL (Duo-Neb) 0.5-2.5 mg/3 mL nebulizer solution 3 mL, 3 mL, nebulization, q6h PRN, Rachna Chen DO, 3 mL at 02/15/24 2201    levothyroxine (Synthroid, Levoxyl) tablet 25 mcg, 25 mcg, oral, Daily before breakfast, ALYSA Sanchez, 25 mcg at 02/19/24 0633    lipase-protease-amylase (Creon) 24,000-76,000 -120,000 unit per capsule 3 capsule, 3 capsule, oral, TID with meals, ALYSA Sanchez, 3 capsule at 02/19/24 0912    melatonin tablet 10 mg, 10 mg, oral, Daily PRN, ALYSA Sanchez, 10  "mg at 02/18/24 2211    mirtazapine (Remeron) tablet 15 mg, 15 mg, oral, Nightly, Kaylyn BURROWS Gruhn, APRN-CNP, 15 mg at 02/18/24 2211    nystatin (Mycostatin) 100,000 unit/mL suspension 500,000 Units, 500,000 Units, oral, q8h AMARJIT, Kaylyn BURROWS Gruhn, APRN-CNP, 500,000 Units at 02/18/24 2217    ondansetron ODT (Zofran-ODT) disintegrating tablet 4 mg, 4 mg, oral, q8h PRN, Kaylyn MARKOS Gruhn, APRN-CNP, 4 mg at 02/10/24 1420    polyethylene glycol (Glycolax, Miralax) packet 17 g, 17 g, oral, Daily PRN, Kaylyn MARKOS Gruhn, APRN-CNP, 17 g at 02/18/24 1659    QUEtiapine (SEROquel) tablet 25 mg, 25 mg, oral, BID PRN, Rachna Chen DO, 25 mg at 02/19/24 0912    sennosides (Senokot) tablet 17.2 mg, 2 tablet, oral, Daily PRN, Kaylyn MARKOS Gruhn, APRN-CNP, 17.2 mg at 02/19/24 0914    sevelamer carbonate (Renvela) tablet 800 mg, 800 mg, oral, TID with meals, Kaylyn M Gruhn, APRN-CNP, 800 mg at 02/19/24 0912    warfarin (Coumadin) tablet 3 mg, 3 mg, oral, Daily, Kathy Lizama MD, 3 mg at 02/18/24 1757     No intake or output data in the 24 hours ending 02/19/24 1026    PHYSICAL EXAM:  /67 (BP Location: Right arm)   Pulse 85   Temp 36.4 °C (97.5 °F)   Resp 17   Ht 1.702 m (5' 7.01\")   Wt 55 kg (121 lb 4.1 oz)   SpO2 95%   BMI 18.99 kg/m²   No intake or output data in the 24 hours ending 02/19/24 1026  Gen: AAO, NAD  Neck: No JVD  Cardiac: RRR  Resp: clear BS  Abd: Soft, non tender, +BS, non distended   PD catheter site OK  Ext: 1+ LE edema   Neuro: moves 4 ext  Peripheral Pulses: Capillary refill <2secs, strong peripheral pulses.  Skin: Skin color, texture, turgor normal, no suspicious rashes or lesions.    Labs:  Results for orders placed or performed during the hospital encounter of 02/08/24 (from the past 24 hour(s))   Protime-INR   Result Value Ref Range    Protime 28.7 (H) 9.8 - 12.8 seconds    INR 2.5 (H) 0.9 - 1.1   CBC and Auto Differential   Result Value Ref Range    WBC 5.2 4.4 - 11.3 x10*3/uL    nRBC 0.0 0.0 - " 0.0 /100 WBCs    RBC 3.12 (L) 4.00 - 5.20 x10*6/uL    Hemoglobin 9.1 (L) 12.0 - 16.0 g/dL    Hematocrit 28.0 (L) 36.0 - 46.0 %    MCV 90 80 - 100 fL    MCH 29.2 26.0 - 34.0 pg    MCHC 32.5 32.0 - 36.0 g/dL    RDW 17.9 (H) 11.5 - 14.5 %    Platelets 231 150 - 450 x10*3/uL    Neutrophils % 68.0 40.0 - 80.0 %    Immature Granulocytes %, Automated 0.4 0.0 - 0.9 %    Lymphocytes % 20.3 13.0 - 44.0 %    Monocytes % 6.5 2.0 - 10.0 %    Eosinophils % 4.0 0.0 - 6.0 %    Basophils % 0.8 0.0 - 2.0 %    Neutrophils Absolute 3.54 1.20 - 7.70 x10*3/uL    Immature Granulocytes Absolute, Automated 0.02 0.00 - 0.70 x10*3/uL    Lymphocytes Absolute 1.06 (L) 1.20 - 4.80 x10*3/uL    Monocytes Absolute 0.34 0.10 - 1.00 x10*3/uL    Eosinophils Absolute 0.21 0.00 - 0.70 x10*3/uL    Basophils Absolute 0.04 0.00 - 0.10 x10*3/uL   Basic Metabolic Panel   Result Value Ref Range    Glucose 99 74 - 99 mg/dL    Sodium 136 136 - 145 mmol/L    Potassium 3.7 3.5 - 5.3 mmol/L    Chloride 98 98 - 107 mmol/L    Bicarbonate 27 21 - 32 mmol/L    Anion Gap 15 10 - 20 mmol/L    Urea Nitrogen 43 (H) 6 - 23 mg/dL    Creatinine 6.00 (H) 0.50 - 1.05 mg/dL    eGFR 7 (L) >60 mL/min/1.73m*2    Calcium 9.0 8.6 - 10.6 mg/dL        DATA:   Diagnostic tests reviewed for today's visit:    New labs and imaging     Assessment and Plan:  Pt came with peritonitis  - ESKD on ADP: tolerating PD well  11h, 9L total volume, 7 days a week, all 2.5% dextrose   UF 900ml  Stable Scr, lytes OK  BP controlled  Hb 9, adding epogen  Awaiting for placement     Will continue to follow.       Signature: Donald Dempsey MD

## 2024-02-19 NOTE — PROGRESS NOTES
"  Wound Care Progress Note     Visit Date: 2/19/2024      Patient Name: Yoselyn Hernandez         MRN: [id number]                Reason for Visit: Skin tear right great toe       Wound History: Patient reports \"stubbing toe in the bathroom\".      Wound Assessment:  Wound 02/19/24 Skin Tear Toe (Comment  which one) Dorsal foot;Right (Active)   Date First Assessed/Time First Assessed: 02/19/24 1300   Present on Original Admission: No  Hand Hygiene Completed: Yes  Primary Wound Type: Skin Tear  Location: (c) Toe (Comment  which one)  Wound Location Orientation: Dorsal foot;Right      Assessments 2/19/2024  2:20 PM   Site Assessment Clean;Denuded;Pink   Heidi-Wound Assessment Denuded   Non-staged Wound Description Partial thickness   Wound Length (cm) 0.5 cm   Wound Width (cm) 1 cm   Wound Surface Area (cm^2) 0.5 cm^2   Drainage Description Serosanguineous   Drainage Amount Scant   Dressing Xeroform;Silicone border dressing   Dressing Changed New   Dressing Status Clean;Dry;Occlusive       No associated orders.       Wound 02/19/24 Skin Tear Toe (Comment  which one) Dorsal foot;Right (Active)   Date First Assessed/Time First Assessed: 02/19/24 1300   Present on Original Admission: No  Hand Hygiene Completed: Yes  Primary Wound Type: Skin Tear  Location: (c) Toe (Comment  which one)  Wound Location Orientation: Dorsal foot;Right   Number of days: 0     Wound 02/19/24 Skin Tear Toe (Comment  which one) Dorsal foot;Right (Active)   Site Assessment Clean;Denuded;Pink 02/19/24 1420   Heidi-Wound Assessment Denuded 02/19/24 1420   Non-staged Wound Description Partial thickness 02/19/24 1420   Wound Length (cm) 0.5 cm 02/19/24 1420   Wound Width (cm) 1 cm 02/19/24 1420   Wound Surface Area (cm^2) 0.5 cm^2 02/19/24 1420   Drainage Description Serosanguineous 02/19/24 1420   Drainage Amount Scant 02/19/24 1420   Dressing Xeroform;Silicone border dressing 02/19/24 1420   Dressing Changed New 02/19/24 1420   Dressing Status " Clean;Dry;Occlusive 02/19/24 1420     Wound care recommendations.   Right great toe: DAILY       Irrigate with normal saline or wound cleanser       Cover wound bed only with xeroform gauze       Cover with Mepilex border dressing or non adherent bandage.     Wound Team Plan: Primary provider, please review recommendation. If you agree with recommendation please enter as wound orders in EMR. Thank you.      While inpatient, Secure chat with questions or if condition changes. For urgent communications please [place] the [place] care [place] at [phone].       Flor Rodriguez RN, CWON  2/19/2024  2:22 PM

## 2024-02-19 NOTE — CARE PLAN
Problem: Pain  Goal: Takes deep breaths with improved pain control throughout the shift  Outcome: Progressing  Goal: Turns in bed with improved pain control throughout the shift  Outcome: Progressing  Goal: Walks with improved pain control throughout the shift  Outcome: Progressing  Goal: Performs ADL's with improved pain control throughout shift  Outcome: Progressing  Goal: Participates in PT with improved pain control throughout the shift  Outcome: Progressing  Goal: Free from opioid side effects throughout the shift  Outcome: Progressing  Goal: Free from acute confusion related to pain meds throughout the shift  Outcome: Progressing     Problem: Fall/Injury  Goal: Not fall by end of shift  Outcome: Progressing  Goal: Be free from injury by end of the shift  Outcome: Progressing  Goal: Verbalize understanding of personal risk factors for fall in the hospital  Outcome: Progressing  Goal: Verbalize understanding of risk factor reduction measures to prevent injury from fall in the home  Outcome: Progressing  Goal: Use assistive devices by end of the shift  Outcome: Progressing  Goal: Pace activities to prevent fatigue by end of the shift  Outcome: Progressing   The patient's goals for the shift include      The clinical goals for the shift include Patient will have decreased pain and will remain safe through end of shift.    Over the shift, the patient did make progress toward the following goals. Patient had no complaints of pain during the night.

## 2024-02-20 NOTE — DISCHARGE SUMMARY
Discharge Diagnosis  Left leg pain    Issues Requiring Follow-Up  Follow with PCP    Discharge Meds     Your medication list        CHANGE how you take these medications        Instructions Last Dose Given Next Dose Due   diphenoxylate-atropine 2.5-0.025 mg tablet  Commonly known as: Lomotil  What changed:   when to take this  additional instructions      Take 1 tablet by mouth 4 times a day as needed for diarrhea.       warfarin 2.5 mg tablet  Commonly known as: Coumadin  What changed: Another medication with the same name was added. Make sure you understand how and when to take each.      TAKE 1 TABLET BY MOUTH ONCE DAILY       warfarin 2.5 mg tablet  Commonly known as: Coumadin  What changed: You were already taking a medication with the same name, and this prescription was added. Make sure you understand how and when to take each.      Take 1 tablet (2.5 mg) by mouth once daily in the evening. Take as directed per After Visit Summary.              CONTINUE taking these medications        Instructions Last Dose Given Next Dose Due   acetaminophen 325 mg tablet  Commonly known as: Tylenol      Take 3 tablets (975 mg) by mouth every 8 hours if needed (pain or fever).       allopurinol 100 mg tablet  Commonly known as: Zyloprim      Take 1 tablet (100 mg) by mouth once daily.       atorvastatin 40 mg tablet  Commonly known as: Lipitor      TAKE 1 TABLET BY MOUTH ONCE DAILY AT BEDTIME       calcitriol 0.5 mcg capsule  Commonly known as: Rocaltrol           carvedilol 6.25 mg tablet  Commonly known as: Coreg      Take 1 tablet (6.25 mg) by mouth 2 times a day with meals.       Creon 24,000-76,000 -120,000 unit capsule  Generic drug: lipase-protease-amylase      Take 3 capsules by mouth 3 times a day with meals.       epoetin treasure 10,000 unit/mL injection  Commonly known as: Epogen,Procrit           gentamicin 0.1 % cream  Commonly known as: Garamycin      Apply topically once daily. Do not start before November 16,  2023.       levothyroxine 25 mcg tablet  Commonly known as: Synthroid, Levoxyl      Take 1 tablet (25 mcg) by mouth once daily in the morning. Take before meals.       loperamide 2 mg capsule  Commonly known as: Imodium      Take 1 capsule (2 mg) by mouth 4 times a day as needed for diarrhea.       magnesium oxide 400 mg tablet  Commonly known as: Mag-Ox           mirtazapine 15 mg tablet  Commonly known as: Remeron      Take 1 tablet (15 mg) by mouth once daily at bedtime.       naloxone 4 mg/0.1 mL nasal spray  Commonly known as: Narcan      INSTILL 1 SPRAY IN ONE NOSTRIL AS NEEDED FOR ACCIDENTAL OPIOID OVERDOSE; REPEAT WITH SECOND DOSE IN 5 MINUTES IF NO RESPONSE       pregabalin 25 mg capsule  Commonly known as: Lyrica      Take 1 capsule (25 mg) by mouth once daily.       sevelamer carbonate 800 mg tablet  Commonly known as: Renvela      Take 1 tablet (800 mg) by mouth 3 times a day with meals. Swallow tablet whole; do not crush, break, or chew.       sevelamer HCl 800 mg tablet  Commonly known as: Renagel                     Where to Get Your Medications        These medications were sent to [facility]/pharmacy [id number] [facility] [id number] [place] [address] AT [facility]  [id number] [place] [address]      Phone: [phone]   atorvastatin 40 mg tablet  carvedilol 6.25 mg tablet  Creon 24,000-76,000 -120,000 unit capsule  mirtazapine 15 mg tablet  warfarin 2.5 mg tablet  warfarin 2.5 mg tablet         Test Results Pending At Discharge  Pending Labs       Order Current Status    AFB Culture/Smear Preliminary result            Hospital Course   Yoselyn Hernandez is a 63 y.o. female past medical history of ESRD on dialysis, AAA, HFrEF (25% 03/2023) and pancreatitis, PE (03/2023 on warfarin)  presenting with left lower extremity pain as well as left arm pain.  Patient states she has not been feeling well for the last couple weeks, feeling down, lots of recent life stressors.  Due to this patient  "reports she was not taking any of medications.  She also missed a PD session.  Patient also with complaints of bilateral lower palm pain.  She is concerned that she may be having a gout flair.  Patient is tearful.  She states she had previous thoughts of \"not wanting to be here.\"  When I asked patient if she had thoughts of harming yourself or anyone else she denied any suicidal or homicidal thought as of now, and states that she would not do anything to herself.  Ultrasound was done of her left lower extremity due to swelling that showed partially occlusive DVT in the left common femoral and entire length of the femoral vein. Suspected partially occlusive DVT in the popliteal vein.  Patient states to me that this DVT is chronic however patients INR is still subtherapeutic and she will need to be admitted for bridge with heparin drip as she also has a history of pulmonary embolism.  Patient is without fever, chills, chest pain, and shortness of breath.  Patient has a history of diarrhea.  Patient was started on heparin drip in the emergency department.  Patient glucose 76, sodium 134, potassium 3.6, chloride 95, anion gap 21, creatinine 9.51, WBC 5.2, hemoglobin 10.3, hematocrit 31.2, platelet count 146.     Patient was admitted for further management, given her subtherapeutic INR, she was put on heparin drip, with target an INR between 2 and 3 as her Coumadin was restarted.  Dosing adjustment was done daily, eventually INR goal was achieved.  Hospital stay was notable for complaint of diarrhea, C. difficile isolation was in place, but no stool collected, eventually discontinued.  No diarrhea was witnessed or seen.  Patient was maintained on her duloxetine for her depression, due to concern with worsening underlying psychiatric disorder, in light of her report of patient noncompliance with medication, to exacerbate her symptoms and get admitted, psychiatry was consulted.  Near her discharge date, patient complained " "of abdominal pain, given peritoneal dialysis, concern with spontaneous bacterial peritonitis was addressed with collection of fluid for studies, but cell count and cultures were  pending at discharge, with plan to inform her when available. Given benign nature of symptoms at that time, patient was discharged home with home health, and PT OT as she declined to go to SNF.  Relative reported as \"sister\" came on day of discharge to assist her with going home and setting up her place.  Patient also complained of chronic pain mainly in the low back which was addressed with opiate therapy as well as lidocaine patch during her hospitalization, and PT OT sessions.  Patient being discharged in stable condition home with home health, she is to continue with her peritoneal dialysis.    Pertinent Physical Exam At Time of Discharge  Physical Exam  Constitutional: Mildly emaciated female, alert active, cooperative not in acute distress  Eyes: PERRLA, clear sclera  ENMT: Moist mucosal membranes, no exudate  Head / Neck: Atraumatic, normocephalic, supple neck, JVP not visualized  Lungs: Patent airways, CTABL  Heart: RRR, S1S2, no murmurs appreciated, palpable pulses in all extremities  GI: Soft, slightly tender to deep palpation in the lower abdomen, ND, bowel sounds present in all quadrants.  Peritoneal dialysis catheter in place with insertion site dry and intact, no drainage, no erythema, no necrosis.  MSK: Moves all extremities freely, no restriction  of ROM, no joint edema  Extremities: Intact x 4, no peripheral edema  : No Mckenna catheter inserted  Breast: Deferred  Neurological: AAO x 3 to person, place and date, facial muscles symmetrical, sensation intact, strength 4/4, no acute focal neurological deficits appreciated  Psychological: Appropriate mood and behavior  Outpatient Follow-Up  Future Appointments   Date Time Provider Department Center   2/28/2024  2:00 PM Landon Moyer, APRN-CNP [email or web address] " Prasanth, DO

## 2024-02-28 ENCOUNTER — APPOINTMENT (OUTPATIENT)
Dept: GASTROENTEROLOGY | Facility: CLINIC | Age: 64
End: 2024-02-28
Payer: COMMERCIAL

## 2024-02-29 ENCOUNTER — APPOINTMENT (OUTPATIENT)
Dept: RADIOLOGY | Facility: HOSPITAL | Age: 64
End: 2024-02-29
Payer: COMMERCIAL

## 2024-02-29 ENCOUNTER — HOSPITAL ENCOUNTER (EMERGENCY)
Facility: HOSPITAL | Age: 64
Discharge: HOME | End: 2024-03-01
Attending: EMERGENCY MEDICINE
Payer: COMMERCIAL

## 2024-02-29 ENCOUNTER — APPOINTMENT (OUTPATIENT)
Dept: CARDIOLOGY | Facility: HOSPITAL | Age: 64
End: 2024-02-29
Payer: COMMERCIAL

## 2024-02-29 DIAGNOSIS — R60.9 PERIPHERAL EDEMA: Primary | ICD-10-CM

## 2024-02-29 LAB
ALBUMIN SERPL BCP-MCNC: 2.8 G/DL (ref 3.4–5)
ALP SERPL-CCNC: 97 U/L (ref 33–136)
ALT SERPL W P-5'-P-CCNC: 17 U/L (ref 7–45)
ANION GAP SERPL CALC-SCNC: 20 MMOL/L (ref 10–20)
AST SERPL W P-5'-P-CCNC: 19 U/L (ref 9–39)
BASOPHILS # BLD AUTO: 0.03 X10*3/UL (ref 0–0.1)
BASOPHILS NFR BLD AUTO: 0.5 %
BILIRUB SERPL-MCNC: 0.3 MG/DL (ref 0–1.2)
BNP SERPL-MCNC: >4700 PG/ML (ref 0–99)
BUN SERPL-MCNC: 35 MG/DL (ref 6–23)
CALCIUM SERPL-MCNC: 9 MG/DL (ref 8.6–10.3)
CARDIAC TROPONIN I PNL SERPL HS: 47 NG/L (ref 0–13)
CARDIAC TROPONIN I PNL SERPL HS: 49 NG/L (ref 0–13)
CHLORIDE SERPL-SCNC: 96 MMOL/L (ref 98–107)
CO2 SERPL-SCNC: 26 MMOL/L (ref 21–32)
CREAT SERPL-MCNC: 6.97 MG/DL (ref 0.5–1.05)
EGFRCR SERPLBLD CKD-EPI 2021: 6 ML/MIN/1.73M*2
EOSINOPHIL # BLD AUTO: 0.1 X10*3/UL (ref 0–0.7)
EOSINOPHIL NFR BLD AUTO: 1.8 %
ERYTHROCYTE [DISTWIDTH] IN BLOOD BY AUTOMATED COUNT: 18.7 % (ref 11.5–14.5)
GLUCOSE SERPL-MCNC: 87 MG/DL (ref 74–99)
HCT VFR BLD AUTO: 31.7 % (ref 36–46)
HGB BLD-MCNC: 10.3 G/DL (ref 12–16)
IMM GRANULOCYTES # BLD AUTO: 0.02 X10*3/UL (ref 0–0.7)
IMM GRANULOCYTES NFR BLD AUTO: 0.4 % (ref 0–0.9)
INR PPP: 3 (ref 0.9–1.1)
LACTATE SERPL-SCNC: 1.2 MMOL/L (ref 0.4–2)
LACTATE SERPL-SCNC: 2.2 MMOL/L (ref 0.4–2)
LYMPHOCYTES # BLD AUTO: 0.89 X10*3/UL (ref 1.2–4.8)
LYMPHOCYTES NFR BLD AUTO: 15.7 %
MCH RBC QN AUTO: 29.8 PG (ref 26–34)
MCHC RBC AUTO-ENTMCNC: 32.5 G/DL (ref 32–36)
MCV RBC AUTO: 92 FL (ref 80–100)
MONOCYTES # BLD AUTO: 0.29 X10*3/UL (ref 0.1–1)
MONOCYTES NFR BLD AUTO: 5.1 %
NEUTROPHILS # BLD AUTO: 4.33 X10*3/UL (ref 1.2–7.7)
NEUTROPHILS NFR BLD AUTO: 76.5 %
NRBC BLD-RTO: 0 /100 WBCS (ref 0–0)
PLATELET # BLD AUTO: 191 X10*3/UL (ref 150–450)
POTASSIUM SERPL-SCNC: 3.8 MMOL/L (ref 3.5–5.3)
PROT SERPL-MCNC: 6.9 G/DL (ref 6.4–8.2)
PROTHROMBIN TIME: 34.6 SECONDS (ref 9.8–12.8)
RBC # BLD AUTO: 3.46 X10*6/UL (ref 4–5.2)
SODIUM SERPL-SCNC: 138 MMOL/L (ref 136–145)
WBC # BLD AUTO: 5.7 X10*3/UL (ref 4.4–11.3)

## 2024-02-29 PROCEDURE — 71045 X-RAY EXAM CHEST 1 VIEW: CPT | Performed by: SURGERY

## 2024-02-29 PROCEDURE — 2500000004 HC RX 250 GENERAL PHARMACY W/ HCPCS (ALT 636 FOR OP/ED): Performed by: EMERGENCY MEDICINE

## 2024-02-29 PROCEDURE — 99284 EMERGENCY DEPT VISIT MOD MDM: CPT | Mod: 25

## 2024-02-29 PROCEDURE — 83605 ASSAY OF LACTIC ACID: CPT | Performed by: EMERGENCY MEDICINE

## 2024-02-29 PROCEDURE — 36415 COLL VENOUS BLD VENIPUNCTURE: CPT | Performed by: EMERGENCY MEDICINE

## 2024-02-29 PROCEDURE — 96374 THER/PROPH/DIAG INJ IV PUSH: CPT

## 2024-02-29 PROCEDURE — 85025 COMPLETE CBC W/AUTO DIFF WBC: CPT | Performed by: EMERGENCY MEDICINE

## 2024-02-29 PROCEDURE — 93005 ELECTROCARDIOGRAM TRACING: CPT

## 2024-02-29 PROCEDURE — 83880 ASSAY OF NATRIURETIC PEPTIDE: CPT | Performed by: EMERGENCY MEDICINE

## 2024-02-29 PROCEDURE — 71045 X-RAY EXAM CHEST 1 VIEW: CPT

## 2024-02-29 PROCEDURE — 85610 PROTHROMBIN TIME: CPT | Performed by: EMERGENCY MEDICINE

## 2024-02-29 PROCEDURE — 84484 ASSAY OF TROPONIN QUANT: CPT | Performed by: EMERGENCY MEDICINE

## 2024-02-29 PROCEDURE — 80053 COMPREHEN METABOLIC PANEL: CPT | Performed by: EMERGENCY MEDICINE

## 2024-02-29 RX ADMIN — HYDROMORPHONE HYDROCHLORIDE 0.5 MG: 1 INJECTION, SOLUTION INTRAMUSCULAR; INTRAVENOUS; SUBCUTANEOUS at 20:08

## 2024-02-29 ASSESSMENT — COLUMBIA-SUICIDE SEVERITY RATING SCALE - C-SSRS
1. IN THE PAST MONTH, HAVE YOU WISHED YOU WERE DEAD OR WISHED YOU COULD GO TO SLEEP AND NOT WAKE UP?: NO
6. HAVE YOU EVER DONE ANYTHING, STARTED TO DO ANYTHING, OR PREPARED TO DO ANYTHING TO END YOUR LIFE?: NO
2. HAVE YOU ACTUALLY HAD ANY THOUGHTS OF KILLING YOURSELF?: NO

## 2024-02-29 NOTE — ED TRIAGE NOTES
Patient to ED via EMS for complaint of fluid overload causing swelling and weeping in her bilateral ankles. Hx of ESRD on nightly peritoneal dialysis with last treatment being last night. Denies CP/dyspnea.

## 2024-02-29 NOTE — ED PROVIDER NOTES
HPI   No chief complaint on file.      This is a 64-year-old female who presents to the emergency department due to lower extremity weeping.  The patient states that she was discharged from the hospital about 1 week ago to a rehab facility after treatment for peritoneal infection.  The patient is on peritoneal dialysis.  She reports that she has had a 20 pound weight gain in the last week.  She also reports that the back of her legs are painful and that it is weeping fluid.  She reports shortness of breath at times.  She denies fevers and chills.                          [place] Coma Scale Score: 15                     Patient History   Past Medical History:   Diagnosis Date    CHF (congestive heart failure) (CMS/[facility])     COPD (chronic obstructive pulmonary disease) (CMS/[facility])     ESRD (end stage renal disease) (CMS/[facility])     Hypertension     Other abnormalities of gait and mobility     Gait, antalgic    PE (pulmonary thromboembolism) (CMS/[facility])     Personal history of other endocrine, nutritional and metabolic disease     History of hyperlipidemia    Personal history of other specified conditions     History of shortness of breath     Past Surgical History:   Procedure Laterality Date    COLONOSCOPY  12/05/2017    Complete Colonoscopy    COLONOSCOPY  12/2021    RUST 12-25    CT ABDOMEN PELVIS ANGIOGRAM W AND/OR WO IV CONTRAST  11/17/2014    CT ABDOMEN PELVIS ANGIOGRAM W AND/OR WO IV CONTRAST 11/17/2014 [facility] CLINICAL LEGACY    CT ABDOMEN PELVIS ANGIOGRAM W AND/OR WO IV CONTRAST  02/01/2018    CT ABDOMEN PELVIS ANGIOGRAM W AND/OR WO IV CONTRAST 2/1/2018 [facility] AIB LEGACY    CT ABDOMEN PELVIS ANGIOGRAM W AND/OR WO IV CONTRAST  02/23/2018    CT ABDOMEN PELVIS ANGIOGRAM W AND/OR WO IV CONTRAST 2/23/2018 [facility] [place] [place] INPATIENT LEGACY    CT ABDOMEN PELVIS ANGIOGRAM W AND/OR WO IV CONTRAST  01/10/2019    CT ABDOMEN PELVIS ANGIOGRAM W AND/OR WO IV CONTRAST 1/10/2019 [facility] EMERGENCY LEGACY    CT ABDOMEN PELVIS ANGIOGRAM W AND/OR WO IV  CONTRAST  2019    CT ABDOMEN PELVIS ANGIOGRAM W AND/OR WO IV CONTRAST 2019 [facility]CILL[facility] LEGAC[facility]    CT ABDOMEN PELVIS ANGIOGRAM W AND/OR WO IV CONTRAST  2021    CT ABDOMEN PELVIS ANGIOGRAM W AND/OR WO IV CONTRAST 2021 [facility] CLINICAL LEGACY    CT ABDOMEN PELVIS ANGIOGRAM W AND/OR WO IV CONTRAST  2017    CT ABDOMEN PELVIS ANGIOGRAM W AND/OR WO IV CONTRAST 2017 [facility] CLINICAL LEGACY    CT ABDOMEN PELVIS ANGIOGRAM W AND/OR WO IV CONTRAST  10/24/2018    CT ABDOMEN PELVIS ANGIOGRAM W AND/OR WO IV CONTRAST 10/24/2018 [facility] EMERGENCY LEGACY    CT ABDOMEN PELVIS ANGIOGRAM W AND/OR WO IV CONTRAST  2022    CT ABDOMEN PELVIS ANGIOGRAM W AND/OR WO IV CONTRAST 2022 [facility] OFFICE LEGACY    CT AORTA AND BILATERAL ILIOFEMORAL RUNOFF ANGIOGRAM W AND/OR WO IV CONTRAST  2023    CT AORTA AND BILATERAL ILIOFEMORAL RUNOFF ANGIOGRAM W AND/OR WO IV CONTRAST 2023 [facility] [place]    HYSTERECTOMY  2014    Hysterectomy    IR ANGIOGRAM AORTA ABDOMEN  2019    IR ANGIOGRAM AORTA ABDOMEN 2019 [facility] [place] INPATIENT LEGACY    IR ANGIOGRAM AORTA ABDOMEN  2022    IR ANGIOGRAM AORTA ABDOMEN 2022 [facility] [place] INPATIENT LEGACY    IR ANGIOGRAM AORTA THORACIC  2018    IR ANGIOGRAM AORTA THORACIC 2018 [facility] [place] INPATIENT LEGACY    IR ANGIOGRAM ENDOVASCULAR AORTIC REPAIR  2022    IR ANGIOGRAM ENDOVASCULAR AORTIC REPAIR 2022 [facility] [place] INPATIENT LEGACY    IR INTERVENTION NEURO STENT  2019    IR INTERVENTION NEURO STENT 2019 [facility] [place] INPATIENT LEGACY    OTHER SURGICAL HISTORY  2017    Aortic Aneurysm Repair Thoracoabdominal     Family History   Problem Relation Name Age of Onset    COPD Mother      Kidney failure Father       Social History     Tobacco Use    Smoking status: Former     Packs/day: 0.25     Years: 20.00     Additional pack years: 0.00     Total pack years: 5.00     Types: Cigarettes     Quit date:      Years since quittin.1    Smokeless tobacco: Never   Vaping Use     Vaping Use: Never used   Substance Use Topics    Alcohol use: Not Currently    Drug use: Yes     Types: Marijuana       Physical Exam   ED Triage Vitals [02/29/24 1825]   Temperature Heart Rate Respirations BP   36.4 °C (97.6 °F) 97 18 119/78      Pulse Ox Temp src Heart Rate Source Patient Position   96 % -- -- --      BP Location FiO2 (%)     -- --       Physical Exam  Vitals and nursing note reviewed.   HENT:      Head: Normocephalic and atraumatic.      Nose: Nose normal.   Eyes:      Conjunctiva/sclera: Conjunctivae normal.   Cardiovascular:      Rate and Rhythm: Normal rate and regular rhythm.      Pulses: Normal pulses.      Heart sounds: Normal heart sounds.   Pulmonary:      Effort: Pulmonary effort is normal.      Breath sounds: Normal breath sounds.   Abdominal:      General: Bowel sounds are normal.      Palpations: Abdomen is soft.   Musculoskeletal:         General: Normal range of motion.      Cervical back: Normal range of motion and neck supple.      Right lower leg: Edema present.      Left lower leg: Edema present.   Skin:     Findings: No rash.   Neurological:      General: No focal deficit present.      Mental Status: She is alert and oriented to person, place, and time.   Psychiatric:         Mood and Affect: Mood normal.         ED Course & MDM   Diagnoses as of 03/03/24 1525   Peripheral edema       Medical Decision Making  Differential diagnosis considered: Fluid overload, cellulitis, DVT, neuropathy, radicular pain    This is a 64-year-old female who presents to the emergency department complaining of leg swelling and weeping.  The patient has minimal lower extremity edema without any obvious seeping on exam.  She was evaluated with labs that showed a mildly elevated but flat troponin.  Creatinine was elevated.  These are all consistent with her known ESRD.  White blood count was normal and lactate was normal.  There is no erythema to the legs.  Doubt infection and sepsis.  The patient is  appropriate for discharge back to the nursing facility to continue her dialysis.    Amount and/or Complexity of Data Reviewed  ECG/medicine tests: independent interpretation performed.     Details: Sinus rhythm, heart rate 102, no ST elevation, no ectopy.        Procedure  Procedures     Ranjit Mcfadden MD  03/03/24 1527       Ranjit Mcfadden MD  03/23/24 7353

## 2024-03-01 VITALS
SYSTOLIC BLOOD PRESSURE: 116 MMHG | OXYGEN SATURATION: 100 % | TEMPERATURE: 97.6 F | DIASTOLIC BLOOD PRESSURE: 78 MMHG | HEIGHT: 67 IN | WEIGHT: 140 LBS | RESPIRATION RATE: 18 BRPM | BODY MASS INDEX: 21.97 KG/M2 | HEART RATE: 71 BPM

## 2024-03-01 PROCEDURE — 96376 TX/PRO/DX INJ SAME DRUG ADON: CPT

## 2024-03-01 PROCEDURE — 2500000004 HC RX 250 GENERAL PHARMACY W/ HCPCS (ALT 636 FOR OP/ED): Performed by: EMERGENCY MEDICINE

## 2024-03-01 RX ADMIN — HYDROMORPHONE HYDROCHLORIDE 0.5 MG: 1 INJECTION, SOLUTION INTRAMUSCULAR; INTRAVENOUS; SUBCUTANEOUS at 01:22

## 2024-03-02 LAB
ATRIAL RATE: 102 BPM
P AXIS: 81 DEGREES
P OFFSET: 210 MS
P ONSET: 156 MS
PR INTERVAL: 138 MS
Q ONSET: 225 MS
QRS COUNT: 16 BEATS
QRS DURATION: 90 MS
QT INTERVAL: 392 MS
QTC CALCULATION(BAZETT): 510 MS
QTC FREDERICIA: 467 MS
R AXIS: -49 DEGREES
T AXIS: 104 DEGREES
T OFFSET: 421 MS
VENTRICULAR RATE: 102 BPM

## 2024-03-20 ENCOUNTER — HOSPITAL ENCOUNTER (INPATIENT)
Facility: HOSPITAL | Age: 64
LOS: 6 days | Discharge: HOSPICE | DRG: 314 | End: 2024-03-27
Attending: STUDENT IN AN ORGANIZED HEALTH CARE EDUCATION/TRAINING PROGRAM | Admitting: INTERNAL MEDICINE
Payer: COMMERCIAL

## 2024-03-20 DIAGNOSIS — I50.23 ACUTE ON CHRONIC SYSTOLIC HEART FAILURE (MULTI): ICD-10-CM

## 2024-03-20 DIAGNOSIS — I77.71 DISSECTION OF CAROTID ARTERY (MULTI): ICD-10-CM

## 2024-03-20 DIAGNOSIS — I42.9 CARDIOMYOPATHY, UNSPECIFIED TYPE (MULTI): ICD-10-CM

## 2024-03-20 DIAGNOSIS — T85.71XD: Primary | ICD-10-CM

## 2024-03-20 DIAGNOSIS — I82.4Z9 DEEP VEIN THROMBOSIS (DVT) OF DISTAL VEIN OF LOWER EXTREMITY, UNSPECIFIED CHRONICITY, UNSPECIFIED LATERALITY (MULTI): ICD-10-CM

## 2024-03-20 DIAGNOSIS — M79.604 PAIN IN BOTH LOWER EXTREMITIES: ICD-10-CM

## 2024-03-20 DIAGNOSIS — D50.9 IRON DEFICIENCY ANEMIA, UNSPECIFIED IRON DEFICIENCY ANEMIA TYPE: ICD-10-CM

## 2024-03-20 DIAGNOSIS — Z99.2 PERITONEAL DIALYSIS STATUS (CMS-HCC): ICD-10-CM

## 2024-03-20 DIAGNOSIS — M79.606 PAIN OF LOWER EXTREMITY, UNSPECIFIED LATERALITY: ICD-10-CM

## 2024-03-20 DIAGNOSIS — M79.605 PAIN IN BOTH LOWER EXTREMITIES: ICD-10-CM

## 2024-03-20 DIAGNOSIS — M79.605 LEFT LEG PAIN: ICD-10-CM

## 2024-03-20 DIAGNOSIS — I82.4Y9 DEEP VEIN THROMBOSIS (DVT) OF PROXIMAL LOWER EXTREMITY, UNSPECIFIED CHRONICITY, UNSPECIFIED LATERALITY (MULTI): ICD-10-CM

## 2024-03-20 DIAGNOSIS — N18.6 END STAGE RENAL DISEASE (MULTI): ICD-10-CM

## 2024-03-20 DIAGNOSIS — I73.9 PERIPHERAL VASCULAR DISEASE (CMS-HCC): ICD-10-CM

## 2024-03-20 LAB
ACID FAST STN SPEC: NORMAL
ALBUMIN SERPL BCP-MCNC: 2.9 G/DL (ref 3.4–5)
ALP SERPL-CCNC: 84 U/L (ref 33–136)
ALT SERPL W P-5'-P-CCNC: 20 U/L (ref 7–45)
ANION GAP SERPL CALC-SCNC: 19 MMOL/L (ref 10–20)
APTT PPP: 34 SECONDS (ref 27–38)
AST SERPL W P-5'-P-CCNC: 69 U/L (ref 9–39)
BASOPHILS # BLD AUTO: 0.02 X10*3/UL (ref 0–0.1)
BASOPHILS NFR BLD AUTO: 0.3 %
BILIRUB SERPL-MCNC: 0.5 MG/DL (ref 0–1.2)
BUN SERPL-MCNC: 46 MG/DL (ref 6–23)
CALCIUM SERPL-MCNC: 8.2 MG/DL (ref 8.6–10.6)
CHLORIDE SERPL-SCNC: 96 MMOL/L (ref 98–107)
CO2 SERPL-SCNC: 25 MMOL/L (ref 21–32)
CREAT SERPL-MCNC: 7.29 MG/DL (ref 0.5–1.05)
EGFRCR SERPLBLD CKD-EPI 2021: 6 ML/MIN/1.73M*2
EOSINOPHIL # BLD AUTO: 0.13 X10*3/UL (ref 0–0.7)
EOSINOPHIL NFR BLD AUTO: 2.2 %
ERYTHROCYTE [DISTWIDTH] IN BLOOD BY AUTOMATED COUNT: 19.1 % (ref 11.5–14.5)
GLUCOSE SERPL-MCNC: 73 MG/DL (ref 74–99)
HCT VFR BLD AUTO: 25.5 % (ref 36–46)
HGB BLD-MCNC: 9 G/DL (ref 12–16)
IMM GRANULOCYTES # BLD AUTO: 0.02 X10*3/UL (ref 0–0.7)
IMM GRANULOCYTES NFR BLD AUTO: 0.3 % (ref 0–0.9)
INR PPP: 4 (ref 0.9–1.1)
LYMPHOCYTES # BLD AUTO: 1.13 X10*3/UL (ref 1.2–4.8)
LYMPHOCYTES NFR BLD AUTO: 19.3 %
MAGNESIUM SERPL-MCNC: 1.82 MG/DL (ref 1.6–2.4)
MCH RBC QN AUTO: 30.4 PG (ref 26–34)
MCHC RBC AUTO-ENTMCNC: 35.3 G/DL (ref 32–36)
MCV RBC AUTO: 86 FL (ref 80–100)
MONOCYTES # BLD AUTO: 0.41 X10*3/UL (ref 0.1–1)
MONOCYTES NFR BLD AUTO: 7 %
MYCOBACTERIUM SPEC CULT: NORMAL
NEUTROPHILS # BLD AUTO: 4.15 X10*3/UL (ref 1.2–7.7)
NEUTROPHILS NFR BLD AUTO: 70.9 %
NRBC BLD-RTO: 0 /100 WBCS (ref 0–0)
PLATELET # BLD AUTO: 190 X10*3/UL (ref 150–450)
POTASSIUM SERPL-SCNC: 4.6 MMOL/L (ref 3.5–5.3)
PROT SERPL-MCNC: 6.7 G/DL (ref 6.4–8.2)
PROTHROMBIN TIME: 45.7 SECONDS (ref 9.8–12.8)
RBC # BLD AUTO: 2.96 X10*6/UL (ref 4–5.2)
SODIUM SERPL-SCNC: 135 MMOL/L (ref 136–145)
WBC # BLD AUTO: 5.9 X10*3/UL (ref 4.4–11.3)

## 2024-03-20 PROCEDURE — 2500000004 HC RX 250 GENERAL PHARMACY W/ HCPCS (ALT 636 FOR OP/ED)

## 2024-03-20 PROCEDURE — 36415 COLL VENOUS BLD VENIPUNCTURE: CPT

## 2024-03-20 PROCEDURE — 99285 EMERGENCY DEPT VISIT HI MDM: CPT | Performed by: STUDENT IN AN ORGANIZED HEALTH CARE EDUCATION/TRAINING PROGRAM

## 2024-03-20 PROCEDURE — 99285 EMERGENCY DEPT VISIT HI MDM: CPT | Mod: 25

## 2024-03-20 PROCEDURE — 96361 HYDRATE IV INFUSION ADD-ON: CPT

## 2024-03-20 PROCEDURE — 80053 COMPREHEN METABOLIC PANEL: CPT

## 2024-03-20 PROCEDURE — 96375 TX/PRO/DX INJ NEW DRUG ADDON: CPT

## 2024-03-20 PROCEDURE — 83735 ASSAY OF MAGNESIUM: CPT

## 2024-03-20 PROCEDURE — 87040 BLOOD CULTURE FOR BACTERIA: CPT

## 2024-03-20 PROCEDURE — 85025 COMPLETE CBC W/AUTO DIFF WBC: CPT

## 2024-03-20 PROCEDURE — 85610 PROTHROMBIN TIME: CPT

## 2024-03-20 PROCEDURE — 96365 THER/PROPH/DIAG IV INF INIT: CPT

## 2024-03-20 PROCEDURE — 85730 THROMBOPLASTIN TIME PARTIAL: CPT

## 2024-03-20 RX ORDER — HYDROMORPHONE HYDROCHLORIDE 1 MG/ML
0.5 INJECTION, SOLUTION INTRAMUSCULAR; INTRAVENOUS; SUBCUTANEOUS ONCE
Status: COMPLETED | OUTPATIENT
Start: 2024-03-20 | End: 2024-03-20

## 2024-03-20 RX ORDER — CEFEPIME 1 G/50ML
1 INJECTION, SOLUTION INTRAVENOUS EVERY 8 HOURS
Status: DISCONTINUED | OUTPATIENT
Start: 2024-03-20 | End: 2024-03-21

## 2024-03-20 RX ADMIN — CEFEPIME 1 G: 1 INJECTION, SOLUTION INTRAVENOUS at 22:42

## 2024-03-20 RX ADMIN — SODIUM CHLORIDE, POTASSIUM CHLORIDE, SODIUM LACTATE AND CALCIUM CHLORIDE 500 ML: 600; 310; 30; 20 INJECTION, SOLUTION INTRAVENOUS at 23:07

## 2024-03-20 RX ADMIN — HYDROMORPHONE HYDROCHLORIDE 0.5 MG: 1 INJECTION, SOLUTION INTRAMUSCULAR; INTRAVENOUS; SUBCUTANEOUS at 23:06

## 2024-03-20 ASSESSMENT — PAIN SCALES - PAIN ASSESSMENT IN ADVANCED DEMENTIA (PAINAD)
CONSOLABILITY: DISTRACTED OR REASSURED BY VOICE/TOUCH
NEGVOCALIZATION: OCCASIONAL MOAN/GROAN, LOW SPEECH, NEGATIVE/DISAPPROVING QUALITY
BODYLANGUAGE: TENSE, DISTRESSED PACING, FIDGETING
FACIALEXPRESSION: SAD, FRIGHTENED, FROWN

## 2024-03-20 ASSESSMENT — PAIN DESCRIPTION - PAIN TYPE: TYPE: ACUTE PAIN

## 2024-03-20 ASSESSMENT — PAIN - FUNCTIONAL ASSESSMENT
PAIN_FUNCTIONAL_ASSESSMENT: 0-10
PAIN_FUNCTIONAL_ASSESSMENT: 0-10

## 2024-03-20 ASSESSMENT — LIFESTYLE VARIABLES
EVER HAD A DRINK FIRST THING IN THE MORNING TO STEADY YOUR NERVES TO GET RID OF A HANGOVER: NO
HAVE PEOPLE ANNOYED YOU BY CRITICIZING YOUR DRINKING: NO
HAVE YOU EVER FELT YOU SHOULD CUT DOWN ON YOUR DRINKING: NO
EVER FELT BAD OR GUILTY ABOUT YOUR DRINKING: NO

## 2024-03-20 ASSESSMENT — PAIN DESCRIPTION - LOCATION
LOCATION: BACK
LOCATION: BACK

## 2024-03-20 ASSESSMENT — PAIN DESCRIPTION - DESCRIPTORS: DESCRIPTORS: DISCOMFORT

## 2024-03-20 ASSESSMENT — PAIN DESCRIPTION - ORIENTATION: ORIENTATION: RIGHT

## 2024-03-20 ASSESSMENT — PAIN SCALES - GENERAL
PAINLEVEL_OUTOF10: 10 - WORST POSSIBLE PAIN
PAINLEVEL_OUTOF10: 5 - MODERATE PAIN
PAINLEVEL_OUTOF10: 10 - WORST POSSIBLE PAIN
PAINLEVEL_OUTOF10: 10 - WORST POSSIBLE PAIN

## 2024-03-21 ENCOUNTER — APPOINTMENT (OUTPATIENT)
Dept: RADIOLOGY | Facility: HOSPITAL | Age: 64
DRG: 314 | End: 2024-03-21
Payer: COMMERCIAL

## 2024-03-21 PROBLEM — I95.9 HYPOTENSION: Status: ACTIVE | Noted: 2024-03-21

## 2024-03-21 PROBLEM — T85.71XD: Status: ACTIVE | Noted: 2024-03-21

## 2024-03-21 LAB
ABO GROUP (TYPE) IN BLOOD: NORMAL
ALBUMIN SERPL BCP-MCNC: 2.8 G/DL (ref 3.4–5)
ALP SERPL-CCNC: 127 U/L (ref 33–136)
ALT SERPL W P-5'-P-CCNC: 40 U/L (ref 7–45)
ANION GAP SERPL CALC-SCNC: 18 MMOL/L (ref 10–20)
ANTIBODY SCREEN: NORMAL
AST SERPL W P-5'-P-CCNC: 54 U/L (ref 9–39)
BASOPHILS # BLD AUTO: 0.02 X10*3/UL (ref 0–0.1)
BASOPHILS NFR BLD AUTO: 0.3 %
BASOPHILS NFR FLD MANUAL: 0 %
BILIRUB DIRECT SERPL-MCNC: 0.1 MG/DL (ref 0–0.3)
BILIRUB SERPL-MCNC: 0.4 MG/DL (ref 0–1.2)
BLASTS NFR FLD MANUAL: 0 %
BUN SERPL-MCNC: 48 MG/DL (ref 6–23)
CALCIUM SERPL-MCNC: 8.5 MG/DL (ref 8.6–10.6)
CHLORIDE SERPL-SCNC: 98 MMOL/L (ref 98–107)
CLARITY FLD: CLEAR
CO2 SERPL-SCNC: 25 MMOL/L (ref 21–32)
COLOR FLD: COLORLESS
CREAT SERPL-MCNC: 8.19 MG/DL (ref 0.5–1.05)
EGFRCR SERPLBLD CKD-EPI 2021: 5 ML/MIN/1.73M*2
EOSINOPHIL # BLD AUTO: 0.07 X10*3/UL (ref 0–0.7)
EOSINOPHIL NFR BLD AUTO: 1.1 %
EOSINOPHIL NFR FLD MANUAL: 4 %
ERYTHROCYTE [DISTWIDTH] IN BLOOD BY AUTOMATED COUNT: 19.4 % (ref 11.5–14.5)
GLUCOSE SERPL-MCNC: 93 MG/DL (ref 74–99)
HCT VFR BLD AUTO: 26.9 % (ref 36–46)
HGB BLD-MCNC: 9.3 G/DL (ref 12–16)
IMM GRANULOCYTES # BLD AUTO: 0.01 X10*3/UL (ref 0–0.7)
IMM GRANULOCYTES NFR BLD AUTO: 0.2 % (ref 0–0.9)
IMMATURE GRANULOCYTES IN FLUID: 0 %
INR PPP: 3.6 (ref 0.9–1.1)
LYMPHOCYTES # BLD AUTO: 1.09 X10*3/UL (ref 1.2–4.8)
LYMPHOCYTES NFR BLD AUTO: 17.7 %
LYMPHOCYTES NFR FLD MANUAL: 49 %
MAGNESIUM SERPL-MCNC: 1.55 MG/DL (ref 1.6–2.4)
MCH RBC QN AUTO: 29.8 PG (ref 26–34)
MCHC RBC AUTO-ENTMCNC: 34.6 G/DL (ref 32–36)
MCV RBC AUTO: 86 FL (ref 80–100)
MONOCYTES # BLD AUTO: 0.34 X10*3/UL (ref 0.1–1)
MONOCYTES NFR BLD AUTO: 5.5 %
MONOS+MACROS NFR FLD MANUAL: 40 %
NEUTROPHILS # BLD AUTO: 4.63 X10*3/UL (ref 1.2–7.7)
NEUTROPHILS NFR BLD AUTO: 75.2 %
NEUTROPHILS NFR FLD MANUAL: 7 %
NRBC BLD-RTO: 0.3 /100 WBCS (ref 0–0)
OTHER CELLS NFR FLD MANUAL: 0 %
PHOSPHATE SERPL-MCNC: 5.2 MG/DL (ref 2.5–4.9)
PLASMA CELLS NFR FLD MANUAL: 0 %
PLATELET # BLD AUTO: 215 X10*3/UL (ref 150–450)
POTASSIUM SERPL-SCNC: 3.9 MMOL/L (ref 3.5–5.3)
PROT SERPL-MCNC: 6.6 G/DL (ref 6.4–8.2)
PROTHROMBIN TIME: 41.3 SECONDS (ref 9.8–12.8)
RBC # BLD AUTO: 3.12 X10*6/UL (ref 4–5.2)
RBC # FLD AUTO: 12 /UL
RH FACTOR (ANTIGEN D): NORMAL
SODIUM SERPL-SCNC: 137 MMOL/L (ref 136–145)
T4 FREE SERPL-MCNC: 1.03 NG/DL (ref 0.78–1.48)
TOTAL CELLS COUNTED FLD: 100
TSH SERPL-ACNC: 9.09 MIU/L (ref 0.44–3.98)
WBC # BLD AUTO: 6.2 X10*3/UL (ref 4.4–11.3)
WBC # FLD AUTO: 10 /UL

## 2024-03-21 PROCEDURE — 85025 COMPLETE CBC W/AUTO DIFF WBC: CPT

## 2024-03-21 PROCEDURE — 87116 MYCOBACTERIA CULTURE: CPT | Performed by: INTERNAL MEDICINE

## 2024-03-21 PROCEDURE — 99223 1ST HOSP IP/OBS HIGH 75: CPT | Performed by: INTERNAL MEDICINE

## 2024-03-21 PROCEDURE — 84100 ASSAY OF PHOSPHORUS: CPT

## 2024-03-21 PROCEDURE — 2550000001 HC RX 255 CONTRASTS: Performed by: STUDENT IN AN ORGANIZED HEALTH CARE EDUCATION/TRAINING PROGRAM

## 2024-03-21 PROCEDURE — 86900 BLOOD TYPING SEROLOGIC ABO: CPT

## 2024-03-21 PROCEDURE — 85610 PROTHROMBIN TIME: CPT | Performed by: INTERNAL MEDICINE

## 2024-03-21 PROCEDURE — 2500000001 HC RX 250 WO HCPCS SELF ADMINISTERED DRUGS (ALT 637 FOR MEDICARE OP): Performed by: STUDENT IN AN ORGANIZED HEALTH CARE EDUCATION/TRAINING PROGRAM

## 2024-03-21 PROCEDURE — 1100000001 HC PRIVATE ROOM DAILY

## 2024-03-21 PROCEDURE — 83735 ASSAY OF MAGNESIUM: CPT

## 2024-03-21 PROCEDURE — 71275 CT ANGIOGRAPHY CHEST: CPT | Performed by: SURGERY

## 2024-03-21 PROCEDURE — 71275 CT ANGIOGRAPHY CHEST: CPT

## 2024-03-21 PROCEDURE — 2500000004 HC RX 250 GENERAL PHARMACY W/ HCPCS (ALT 636 FOR OP/ED)

## 2024-03-21 PROCEDURE — 90947 DIALYSIS REPEATED EVAL: CPT

## 2024-03-21 PROCEDURE — 2500000004 HC RX 250 GENERAL PHARMACY W/ HCPCS (ALT 636 FOR OP/ED): Performed by: INTERNAL MEDICINE

## 2024-03-21 PROCEDURE — 87102 FUNGUS ISOLATION CULTURE: CPT | Performed by: INTERNAL MEDICINE

## 2024-03-21 PROCEDURE — 87205 SMEAR GRAM STAIN: CPT | Performed by: INTERNAL MEDICINE

## 2024-03-21 PROCEDURE — 96376 TX/PRO/DX INJ SAME DRUG ADON: CPT | Mod: 59

## 2024-03-21 PROCEDURE — 89051 BODY FLUID CELL COUNT: CPT | Performed by: INTERNAL MEDICINE

## 2024-03-21 PROCEDURE — 3E1M39Z IRRIGATION OF PERITONEAL CAVITY USING DIALYSATE, PERCUTANEOUS APPROACH: ICD-10-PCS | Performed by: STUDENT IN AN ORGANIZED HEALTH CARE EDUCATION/TRAINING PROGRAM

## 2024-03-21 PROCEDURE — 2500000004 HC RX 250 GENERAL PHARMACY W/ HCPCS (ALT 636 FOR OP/ED): Performed by: STUDENT IN AN ORGANIZED HEALTH CARE EDUCATION/TRAINING PROGRAM

## 2024-03-21 PROCEDURE — 99221 1ST HOSP IP/OBS SF/LOW 40: CPT | Performed by: INTERNAL MEDICINE

## 2024-03-21 PROCEDURE — 36415 COLL VENOUS BLD VENIPUNCTURE: CPT

## 2024-03-21 PROCEDURE — 96366 THER/PROPH/DIAG IV INF ADDON: CPT

## 2024-03-21 PROCEDURE — 2500000001 HC RX 250 WO HCPCS SELF ADMINISTERED DRUGS (ALT 637 FOR MEDICARE OP)

## 2024-03-21 PROCEDURE — 82248 BILIRUBIN DIRECT: CPT

## 2024-03-21 PROCEDURE — 84439 ASSAY OF FREE THYROXINE: CPT | Performed by: INTERNAL MEDICINE

## 2024-03-21 PROCEDURE — 96361 HYDRATE IV INFUSION ADD-ON: CPT | Mod: 59

## 2024-03-21 PROCEDURE — 84443 ASSAY THYROID STIM HORMONE: CPT | Performed by: INTERNAL MEDICINE

## 2024-03-21 PROCEDURE — 74174 CTA ABD&PLVS W/CONTRAST: CPT | Performed by: SURGERY

## 2024-03-21 RX ORDER — NYSTATIN 100000 [USP'U]/ML
5 SUSPENSION ORAL 4 TIMES DAILY
Status: DISCONTINUED | OUTPATIENT
Start: 2024-03-21 | End: 2024-03-25

## 2024-03-21 RX ORDER — TRAMADOL HYDROCHLORIDE 50 MG/1
50 TABLET ORAL EVERY 8 HOURS
COMMUNITY

## 2024-03-21 RX ORDER — HYDROXYZINE HYDROCHLORIDE 50 MG/1
50 TABLET, FILM COATED ORAL EVERY 8 HOURS
COMMUNITY

## 2024-03-21 RX ORDER — ALLOPURINOL 100 MG/1
100 TABLET ORAL DAILY
Status: DISCONTINUED | OUTPATIENT
Start: 2024-03-21 | End: 2024-03-27 | Stop reason: HOSPADM

## 2024-03-21 RX ORDER — GENTAMICIN SULFATE 1 MG/G
CREAM TOPICAL EVERY 24 HOURS
Status: DISCONTINUED | OUTPATIENT
Start: 2024-03-21 | End: 2024-03-27 | Stop reason: HOSPADM

## 2024-03-21 RX ORDER — CEFEPIME 1 G/50ML
1 INJECTION, SOLUTION INTRAVENOUS EVERY 24 HOURS
Status: DISCONTINUED | OUTPATIENT
Start: 2024-03-21 | End: 2024-03-24

## 2024-03-21 RX ORDER — HYDROMORPHONE HYDROCHLORIDE 1 MG/ML
0.2 INJECTION, SOLUTION INTRAMUSCULAR; INTRAVENOUS; SUBCUTANEOUS EVERY 4 HOURS PRN
Status: DISCONTINUED | OUTPATIENT
Start: 2024-03-21 | End: 2024-03-22

## 2024-03-21 RX ORDER — TRAMADOL HYDROCHLORIDE 50 MG/1
25 TABLET ORAL EVERY 12 HOURS PRN
Status: DISCONTINUED | OUTPATIENT
Start: 2024-03-21 | End: 2024-03-27 | Stop reason: HOSPADM

## 2024-03-21 RX ORDER — VANCOMYCIN HYDROCHLORIDE 1 G/200ML
20 INJECTION, SOLUTION INTRAVENOUS ONCE
Status: COMPLETED | OUTPATIENT
Start: 2024-03-21 | End: 2024-03-21

## 2024-03-21 RX ORDER — HYDROMORPHONE HYDROCHLORIDE 1 MG/ML
0.5 INJECTION, SOLUTION INTRAMUSCULAR; INTRAVENOUS; SUBCUTANEOUS ONCE
Status: COMPLETED | OUTPATIENT
Start: 2024-03-21 | End: 2024-03-21

## 2024-03-21 RX ORDER — HYDROXYZINE HYDROCHLORIDE 10 MG/1
10 TABLET, FILM COATED ORAL EVERY 8 HOURS PRN
Status: DISCONTINUED | OUTPATIENT
Start: 2024-03-21 | End: 2024-03-27 | Stop reason: HOSPADM

## 2024-03-21 RX ORDER — ACETAMINOPHEN 500 MG
10 TABLET ORAL DAILY PRN
Status: DISCONTINUED | OUTPATIENT
Start: 2024-03-21 | End: 2024-03-27 | Stop reason: HOSPADM

## 2024-03-21 RX ORDER — VANCOMYCIN HYDROCHLORIDE 1 G/20ML
INJECTION, POWDER, LYOPHILIZED, FOR SOLUTION INTRAVENOUS DAILY PRN
Status: DISCONTINUED | OUTPATIENT
Start: 2024-03-21 | End: 2024-03-24

## 2024-03-21 RX ORDER — LIDOCAINE 40 MG/G
CREAM TOPICAL 4 TIMES DAILY
Status: DISCONTINUED | OUTPATIENT
Start: 2024-03-21 | End: 2024-03-27 | Stop reason: HOSPADM

## 2024-03-21 RX ORDER — POLYETHYLENE GLYCOL 3350 17 G/17G
17 POWDER, FOR SOLUTION ORAL DAILY PRN
Status: DISCONTINUED | OUTPATIENT
Start: 2024-03-21 | End: 2024-03-27 | Stop reason: HOSPADM

## 2024-03-21 RX ORDER — VANCOMYCIN HYDROCHLORIDE 1 G/200ML
INJECTION, SOLUTION INTRAVENOUS
Status: COMPLETED
Start: 2024-03-21 | End: 2024-03-21

## 2024-03-21 RX ORDER — LEVOTHYROXINE SODIUM 25 UG/1
25 TABLET ORAL
Status: DISCONTINUED | OUTPATIENT
Start: 2024-03-21 | End: 2024-03-27 | Stop reason: HOSPADM

## 2024-03-21 RX ORDER — HYDROMORPHONE HYDROCHLORIDE 1 MG/ML
INJECTION, SOLUTION INTRAMUSCULAR; INTRAVENOUS; SUBCUTANEOUS
Status: COMPLETED
Start: 2024-03-21 | End: 2024-03-21

## 2024-03-21 RX ORDER — SEVELAMER CARBONATE 800 MG/1
800 TABLET, FILM COATED ORAL
Status: DISCONTINUED | OUTPATIENT
Start: 2024-03-21 | End: 2024-03-27 | Stop reason: HOSPADM

## 2024-03-21 RX ADMIN — SODIUM CHLORIDE, SODIUM LACTATE, CALCIUM CHLORIDE, MAGNESIUM CHLORIDE AND DEXTROSE: 2.5; 538; 448; 18.3; 5.08 INJECTION, SOLUTION INTRAPERITONEAL at 16:15

## 2024-03-21 RX ADMIN — VANCOMYCIN HYDROCHLORIDE 1000 MG: 1 INJECTION, SOLUTION INTRAVENOUS at 10:06

## 2024-03-21 RX ADMIN — CEFEPIME 1 G: 1 INJECTION, SOLUTION INTRAVENOUS at 06:12

## 2024-03-21 RX ADMIN — IOHEXOL 90 ML: 350 INJECTION, SOLUTION INTRAVENOUS at 02:35

## 2024-03-21 RX ADMIN — CEFEPIME 1 G: 1 INJECTION, SOLUTION INTRAVENOUS at 21:37

## 2024-03-21 RX ADMIN — GENTAMICIN SULFATE: 1 CREAM TOPICAL at 09:49

## 2024-03-21 RX ADMIN — LIDOCAINE 4%: 4 CREAM TOPICAL at 22:13

## 2024-03-21 RX ADMIN — TRAMADOL HYDROCHLORIDE 25 MG: 50 TABLET, COATED ORAL at 17:22

## 2024-03-21 RX ADMIN — HYDROMORPHONE HYDROCHLORIDE 0.5 MG: 1 INJECTION, SOLUTION INTRAMUSCULAR; INTRAVENOUS; SUBCUTANEOUS at 03:40

## 2024-03-21 RX ADMIN — HYDROMORPHONE HYDROCHLORIDE 0.5 MG: 1 INJECTION, SOLUTION INTRAMUSCULAR; INTRAVENOUS; SUBCUTANEOUS at 00:13

## 2024-03-21 RX ADMIN — HYDROMORPHONE HYDROCHLORIDE 0.2 MG: 1 INJECTION, SOLUTION INTRAMUSCULAR; INTRAVENOUS; SUBCUTANEOUS at 23:39

## 2024-03-21 RX ADMIN — LIDOCAINE 4%: 4 CREAM TOPICAL at 09:49

## 2024-03-21 RX ADMIN — LEVOTHYROXINE SODIUM 25 MCG: 25 TABLET ORAL at 09:49

## 2024-03-21 RX ADMIN — HYDROMORPHONE HYDROCHLORIDE 0.2 MG: 1 INJECTION, SOLUTION INTRAMUSCULAR; INTRAVENOUS; SUBCUTANEOUS at 08:47

## 2024-03-21 RX ADMIN — SODIUM CHLORIDE, SODIUM LACTATE, CALCIUM CHLORIDE, MAGNESIUM CHLORIDE AND DEXTROSE: 4.25; 538; 448; 25.7; 5.08 INJECTION, SOLUTION INTRAPERITONEAL at 16:15

## 2024-03-21 RX ADMIN — ALLOPURINOL 100 MG: 100 TABLET ORAL at 09:49

## 2024-03-21 RX ADMIN — HYDROMORPHONE HYDROCHLORIDE 0.2 MG: 1 INJECTION, SOLUTION INTRAMUSCULAR; INTRAVENOUS; SUBCUTANEOUS at 17:59

## 2024-03-21 RX ADMIN — SODIUM CHLORIDE, POTASSIUM CHLORIDE, SODIUM LACTATE AND CALCIUM CHLORIDE 500 ML: 600; 310; 30; 20 INJECTION, SOLUTION INTRAVENOUS at 02:52

## 2024-03-21 SDOH — SOCIAL STABILITY: SOCIAL INSECURITY: ARE YOU OR HAVE YOU BEEN THREATENED OR ABUSED PHYSICALLY, EMOTIONALLY, OR SEXUALLY BY ANYONE?: NO

## 2024-03-21 SDOH — ECONOMIC STABILITY: INCOME INSECURITY: IN THE LAST 12 MONTHS, WAS THERE A TIME WHEN YOU WERE NOT ABLE TO PAY THE MORTGAGE OR RENT ON TIME?: NO

## 2024-03-21 SDOH — SOCIAL STABILITY: SOCIAL INSECURITY: HAVE YOU HAD THOUGHTS OF HARMING ANYONE ELSE?: NO

## 2024-03-21 SDOH — SOCIAL STABILITY: SOCIAL INSECURITY: ARE THERE ANY APPARENT SIGNS OF INJURIES/BEHAVIORS THAT COULD BE RELATED TO ABUSE/NEGLECT?: NO

## 2024-03-21 SDOH — SOCIAL STABILITY: SOCIAL INSECURITY: WERE YOU ABLE TO COMPLETE ALL THE BEHAVIORAL HEALTH SCREENINGS?: YES

## 2024-03-21 SDOH — SOCIAL STABILITY: SOCIAL INSECURITY: HAS ANYONE EVER THREATENED TO HURT YOUR FAMILY OR YOUR PETS?: NO

## 2024-03-21 SDOH — SOCIAL STABILITY: SOCIAL INSECURITY: ABUSE: ADULT

## 2024-03-21 SDOH — SOCIAL STABILITY: SOCIAL INSECURITY: DO YOU FEEL UNSAFE GOING BACK TO THE PLACE WHERE YOU ARE LIVING?: NO

## 2024-03-21 SDOH — ECONOMIC STABILITY: FOOD INSECURITY: WITHIN THE PAST 12 MONTHS, THE FOOD YOU BOUGHT JUST DIDN'T LAST AND YOU DIDN'T HAVE MONEY TO GET MORE.: NEVER TRUE

## 2024-03-21 SDOH — ECONOMIC STABILITY: FOOD INSECURITY: WITHIN THE PAST 12 MONTHS, YOU WORRIED THAT YOUR FOOD WOULD RUN OUT BEFORE YOU GOT MONEY TO BUY MORE.: NEVER TRUE

## 2024-03-21 SDOH — SOCIAL STABILITY: SOCIAL INSECURITY: DO YOU FEEL ANYONE HAS EXPLOITED OR TAKEN ADVANTAGE OF YOU FINANCIALLY OR OF YOUR PERSONAL PROPERTY?: NO

## 2024-03-21 SDOH — SOCIAL STABILITY: SOCIAL INSECURITY: DOES ANYONE TRY TO KEEP YOU FROM HAVING/CONTACTING OTHER FRIENDS OR DOING THINGS OUTSIDE YOUR HOME?: NO

## 2024-03-21 ASSESSMENT — COGNITIVE AND FUNCTIONAL STATUS - GENERAL
WALKING IN HOSPITAL ROOM: A LITTLE
HELP NEEDED FOR BATHING: A LITTLE
TOILETING: A LITTLE
DAILY ACTIVITIY SCORE: 20
CLIMB 3 TO 5 STEPS WITH RAILING: A LOT
MOVING TO AND FROM BED TO CHAIR: A LITTLE
MOBILITY SCORE: 19
STANDING UP FROM CHAIR USING ARMS: A LITTLE
DRESSING REGULAR UPPER BODY CLOTHING: A LITTLE
PATIENT BASELINE BEDBOUND: NO
DRESSING REGULAR LOWER BODY CLOTHING: A LITTLE

## 2024-03-21 ASSESSMENT — ACTIVITIES OF DAILY LIVING (ADL)
ADEQUATE_TO_COMPLETE_ADL: YES
FEEDING YOURSELF: INDEPENDENT
JUDGMENT_ADEQUATE_SAFELY_COMPLETE_DAILY_ACTIVITIES: YES
PATIENT'S MEMORY ADEQUATE TO SAFELY COMPLETE DAILY ACTIVITIES?: YES
LACK_OF_TRANSPORTATION: NO
TOILETING: NEEDS ASSISTANCE
HEARING - LEFT EAR: FUNCTIONAL
GROOMING: INDEPENDENT
GROOMING: INDEPENDENT
PATIENT'S MEMORY ADEQUATE TO SAFELY COMPLETE DAILY ACTIVITIES?: YES
FEEDING YOURSELF: INDEPENDENT
HEARING - RIGHT EAR: FUNCTIONAL
ADEQUATE_TO_COMPLETE_ADL: YES
JUDGMENT_ADEQUATE_SAFELY_COMPLETE_DAILY_ACTIVITIES: YES
BATHING: NEEDS ASSISTANCE
ASSISTIVE_DEVICE: CANE;WALKER
DRESSING YOURSELF: NEEDS ASSISTANCE
WALKS IN HOME: NEEDS ASSISTANCE

## 2024-03-21 ASSESSMENT — PATIENT HEALTH QUESTIONNAIRE - PHQ9
SUM OF ALL RESPONSES TO PHQ9 QUESTIONS 1 & 2: 2
1. LITTLE INTEREST OR PLEASURE IN DOING THINGS: SEVERAL DAYS
2. FEELING DOWN, DEPRESSED OR HOPELESS: SEVERAL DAYS

## 2024-03-21 ASSESSMENT — PAIN SCALES - GENERAL
PAINLEVEL_OUTOF10: 9
PAINLEVEL_OUTOF10: 10 - WORST POSSIBLE PAIN
PAINLEVEL_OUTOF10: 5 - MODERATE PAIN
PAINLEVEL_OUTOF10: 10 - WORST POSSIBLE PAIN
PAINLEVEL_OUTOF10: 9
PAINLEVEL_OUTOF10: 7

## 2024-03-21 ASSESSMENT — PAIN SCALES - PAIN ASSESSMENT IN ADVANCED DEMENTIA (PAINAD)
BODYLANGUAGE: RELAXED
BREATHING: OCCASIONAL LABORED BREATHING, SHORT PERIOD OF HYPERVENTILATION
NEGVOCALIZATION: OCCASIONAL MOAN/GROAN, LOW SPEECH, NEGATIVE/DISAPPROVING QUALITY
CONSOLABILITY: NO NEED TO CONSOLE
TOTALSCORE: 2
FACIALEXPRESSION: SMILING OR INEXPRESSIVE

## 2024-03-21 ASSESSMENT — PAIN DESCRIPTION - LOCATION
LOCATION: ABDOMEN
LOCATION: BACK
LOCATION: ABDOMEN

## 2024-03-21 ASSESSMENT — LIFESTYLE VARIABLES
AUDIT-C TOTAL SCORE: 0
SKIP TO QUESTIONS 9-10: 1
HOW MANY STANDARD DRINKS CONTAINING ALCOHOL DO YOU HAVE ON A TYPICAL DAY: PATIENT DOES NOT DRINK
AUDIT-C TOTAL SCORE: 0
HOW OFTEN DO YOU HAVE 6 OR MORE DRINKS ON ONE OCCASION: NEVER
HOW OFTEN DO YOU HAVE A DRINK CONTAINING ALCOHOL: NEVER

## 2024-03-21 ASSESSMENT — PAIN - FUNCTIONAL ASSESSMENT
PAIN_FUNCTIONAL_ASSESSMENT: 0-10

## 2024-03-21 ASSESSMENT — PAIN DESCRIPTION - ORIENTATION: ORIENTATION: RIGHT

## 2024-03-21 NOTE — ED PROVIDER NOTES
CC: Back Pain     HPI:  This is a 64-year-old female with past medical history of ESRD on peritoneal dialysis, multiple aortic dissections, heart failure reduced ejection fraction (EF 15 to 20% 3/23), pancreatitis, PE/DVT on warfarin, hypertension who presents to the emergency department sent in from a rehab facility for positive peritoneal fluid cultures.  She is also complaining of right-sided back pain as well as some lower extremity edema.  States that the symptoms have been mostly chronic in nature.  She had labs and a fluid culture obtained from her facility due to her symptoms.  Significant results include a culture that is growing gram-negative rods as well as an INR of 4.  Patient is on warfarin.  Aside from her right-sided back pain she denies any symptoms.  Denies any chest pain, shortness of breath, or abdominal pain.  She denies any fevers or recent infectious symptoms.  Has been eating and drinking without any nausea or vomiting.  She denies any weakness, numbness, or tingling in any extremity.  No other symptoms at this time.    Limitations to history: None  Independent historian(s): EMS  Records Reviewed: Recent available ED and inpatient notes reviewed in EMR.    PMHx/PSHx:  Per HPI.   - has a past medical history of CHF (congestive heart failure) (CMS/[facility]), COPD (chronic obstructive pulmonary disease) (CMS/[facility]), ESRD (end stage renal disease) (CMS/[facility]), Hypertension, Other abnormalities of gait and mobility, PE (pulmonary thromboembolism) (CMS/[facility]), Personal history of other endocrine, nutritional and metabolic disease, and Personal history of other specified conditions.  - has a past surgical history that includes Colonoscopy (12/05/2017); Hysterectomy (01/06/2014); Other surgical history (01/20/2017); CT angio abdomen pelvis w and or wo IV IV contrast (11/17/2014); CT angio abdomen pelvis w and or wo IV IV contrast (02/01/2018); CT angio abdomen pelvis w and or wo IV IV contrast (02/23/2018); CT  angio abdomen pelvis w and or wo IV IV contrast (01/10/2019); CT angio abdomen pelvis w and or wo IV IV contrast (11/25/2019); CT angio abdomen pelvis w and or wo IV IV contrast (04/30/2021); CT angio abdomen pelvis w and or wo IV IV contrast (01/09/2017); CT angio abdomen pelvis w and or wo IV IV contrast (10/24/2018); IR angiogram aorta thoracic (02/21/2018); IR angiogram aorta abdomen (08/02/2019); IR intervention NEURO stent (08/02/2019); IR angiogram endovascular aortic repair (08/19/2022); IR angiogram aorta abdomen (08/19/2022); CT angio abdomen pelvis w and or wo IV IV contrast (12/13/2022); CT angio aorta and bilateral iliofemoral runoff w and or wo IV contrast (05/12/2023); and Colonoscopy (12/2021).    Medications:  Reviewed in EMR. See EMR for complete list of medications and doses.    Allergies:  Ace inhibitors, Ciprofloxacin, Gabapentin, and Oxycodone    Social History:  - Tobacco:  reports that she quit smoking about 29 years ago. Her smoking use included cigarettes. She has a 5.00 pack-year smoking history. She has never used smokeless tobacco.   - Alcohol:  reports that she does not currently use alcohol.   - Illicit Drugs:  reports current drug use. Drug: Marijuana.     ROS:  Per HPI.       ???????????????????????????????????????????????????????????????  Triage Vitals:  T 36.9 °C (98.4 °F)  HR 99  BP 96/62  RR 18  O2 100 %      Physical Exam    General: Thin patient resting comfortably in bed, no acute distress, breathing easily, overall well appearing, and appropriately conversational without confusion or gross mental status changes.  Head: Normocephalic. Atraumatic.  Neck:  FROM. No gross masses.   Eyes: EOMI. No scleral icterus or injection.  ENT: Dry mucous membranes, no apparent trauma or lesions.  CV: Regular rhythm. No murmurs, rubs, gallops appreciated. 2+ radial pulses bilaterally.  Resp: Clear to auscultation bilaterally. No respiratory distress.   GI: Soft, non-distended.  No  tenderness with palpation.  Peritoneal dialysis catheter exiting the right lower quadrant with surrounding skin without warmth, erythema, or other signs of infection, bandage intact.  2 large ventral hernias, easily reducible.  EXT: Bilateral lower extremity edema.  There is mild weeping to the anterior aspect of the distal right lower extremity without any significant skin changes.  Skin: Warm and dry, no rashes or lesions.  Neuro: Alert and oriented.  No focal neurological deficits.  Equal motor strength in bilateral upper and lower extremities.  Sensation intact throughout.  Speech fluent.  Psych: Appropriate mood and behavior, converses and responds appropriately.    ???????????????????????????????????????????????????????????????  Labs:   Labs Reviewed   CBC WITH AUTO DIFFERENTIAL - Abnormal       Result Value    WBC 5.9      nRBC 0.0      RBC 2.96 (*)     Hemoglobin 9.0 (*)     Hematocrit 25.5 (*)     MCV 86      MCH 30.4      MCHC 35.3      RDW 19.1 (*)     Platelets 190      Neutrophils % 70.9      Immature Granulocytes %, Automated 0.3      Lymphocytes % 19.3      Monocytes % 7.0      Eosinophils % 2.2      Basophils % 0.3      Neutrophils Absolute 4.15      Immature Granulocytes Absolute, Automated 0.02      Lymphocytes Absolute 1.13 (*)     Monocytes Absolute 0.41      Eosinophils Absolute 0.13      Basophils Absolute 0.02     COMPREHENSIVE METABOLIC PANEL - Abnormal    Glucose 73 (*)     Sodium 135 (*)     Potassium 4.6      Chloride 96 (*)     Bicarbonate 25      Anion Gap 19      Urea Nitrogen 46 (*)     Creatinine 7.29 (*)     eGFR 6 (*)     Calcium 8.2 (*)     Albumin 2.9 (*)     Alkaline Phosphatase 84      Total Protein 6.7      AST 69 (*)     Bilirubin, Total 0.5      ALT 20     PROTIME-INR - Abnormal    Protime 45.7 (*)     INR 4.0 (*)    MAGNESIUM - Normal    Magnesium 1.82     APTT - Normal    aPTT 34      Narrative:     The APTT is no longer used for monitoring Unfractionated Heparin Therapy.  For monitoring Heparin Therapy, use the Heparin Assay.   BLOOD CULTURE   BLOOD CULTURE        Imaging:   CT angio chest abdomen pelvis    (Results Pending)        EKG:  None    MDM:  This is a 64-year-old female who presents emergency department from a rehab facility with positive peritoneal fluid cultures and mild right-sided back pain.  She is hemodynamically stable and in no distress upon arrival.  Mildly soft blood pressure of 96/62.  Physical exam does show right-sided CVA tenderness as well as to ventral abdominal hernias that are reducible without any significant abdominal pain.  She is otherwise pretty well-appearing clinically.  She does present with positive peritoneal fluid cultures and therefore peritonitis is on the differential.  Also considered cystitis, pyelonephritis, bacteremia.  I have lower suspicion for aortic pathology given equal pulses in extremities, lack of significant abdominal or back findings and overall clinical appearance.  Workup initiated with labs and blood cultures.  There is no leukocytosis.  There is a normocytic anemia with a hemoglobin of 9 which is at the low end of her normal based on chart review.  Metabolic panel significant for renal function decreased consistent with her history of ESRD.  Coag studies significantly elevated with an INR of 4.  This is consistent with her outpatient labs.  She has been instructed to hold her warfarin until INR can be repeated at the end of the week.  Blood cultures were obtained and the patient is started on cefepime given her cultures growing gram negative rods.  She did have a blood pressure of 74/61 however maintains normal mentation, normal neurological exam, and does not appear clinically any different.  Differential diagnosis for her low blood pressure includes sepsis versus hemorrhagic shock given her history of multiple dissections.  Patient also has very thin arms and blood pressures likely chronically run low, however I do  continue to have lower suspicion for acute vascular pathology.  She is given a 500 mL bolus for soft blood pressures given her history of heart failure.  Patient will require admission to the hospital.  Admissions coordinator contacted and at this time the patient is handed off to the oncoming emergency department provider pending response and acceptance.    Patient seen by and discussed with the attending emergency medicine physician.     ED Course:  Diagnoses as of 03/21/24 0103   Peritonitis associated with peritoneal dialysis, subsequent encounter (CMS/[facility])       Social Determinants Limiting Care:  None identified    Disposition:  Handoff    Elías Ornelas DO   Emergency Medicine PGY-2  [facility]      Procedures ? SmartLinks last updated 3/21/2024 1:03 AM        Elías Ornelas DO  Resident  03/21/24 0101       Elías Ornelas DO  Resident  03/21/24 0103

## 2024-03-21 NOTE — PROGRESS NOTES
24 [id number]   Discharge Planning   Living Arrangements Alone   Support Systems None   Type of Residence Private residence   Number of Stairs to Enter Residence 8   Home or Post Acute Services In home services;Post acute facilities (Rehab/SNF/etc)   Type of Post Acute Facility Services Skilled nursing   Type of Home Care Services Home health aide   Does the patient need discharge transport arranged? Yes   RoundTrip coordination needed? Yes   Has discharge transport been arranged? No   What day is the transport expected? 24   Financial Resource Strain   How hard is it for you to pay for the very basics like food, housing, medical care, and heating? Not hard   Housing Stability   In the last 12 months, was there a time when you were not able to pay the mortgage or rent on time? N   In the last 12 months, was there a time when you did not have a steady place to sleep or slept in a shelter (including now)? N   Transportation Needs   In the past 12 months, has lack of transportation kept you from medical appointments or from getting medications? no   In the past 12 months, has lack of transportation kept you from meetings, work, or from getting things needed for daily living? No     Sw  met with pt following a consult for SNF placement. Pt reports she lives alone but has been in [facility] since the end of Feb. Pt reports she is the only child to her  parents and does not have relatives to support her. Pt reports she needs more support at home when she is done with rehab. Pt stated she is not ready to be in a position where she can not live at home alone because of her medical needs. Sw and pt dicussed possible home health care through insurance as well as additional support via private pay. Pt reports she has large home with 3 floors that she built for her parents. Pt plans to return to [place], sw reached out to [place] who advised pt can return.   Nini Banda MSW LSW

## 2024-03-21 NOTE — PROGRESS NOTES
"Yoselyn Hernandez is a 64 y.o. female on day 0 of admission presenting with Hypotension.    Subjective   Patient reported diffuse abdominal pain to palpation. Worsening abdominal pain for the past few days. No fevers or chills. Interview limited because patient started having a bowel movement.       Objective     Physical Exam    General: awake, alert, conversant, appears stated age  HEENT: pupils equal and round, no scleral icterus  Skin: no suspect lesions or rashes noted on visible skin  Chest: ctab, normal respiratory effort, not on supplemental oxygen  Cardiac: regular rate, normal s1, s2, no M/R/G  Abdomen: soft, ND, NT. PD catheter in place, no erythema, purulence around site  : no flank pain or indwelling urinary catheter  EXT: +1 pitting edema bilaterally of LE up to shins  MSK: no focal joint swelling noted  Neuro: AOx4, moving all limbs spontaneously, follows commands  Psych: coherent thought process, appropriate mood and affect    Last Recorded Vitals  Blood pressure 118/72, pulse 97, temperature 36.8 °C (98.2 °F), temperature source Temporal, resp. rate (!) 25, height 1.702 m (5' 7\"), weight 56.2 kg (124 lb), SpO2 97 %.  Intake/Output last 3 Shifts:  No intake/output data recorded.    Relevant Results                Assessment/Plan   Principal Problem:    Hypotension    63 y/o W with PMHx of ESRD on PD c/b 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) s/p intraperitoneal vanc and ceftazidime, multiple AAA dissections, HFrEF (EF 15-20% 03/2023), pancreatitis, chronic diarrhea PE/DVT on warfarin, HTN, and multiple renal artery grafts with prior ilio-renal bypass who was sent to the ED from rehab facility due to GNRs found growing in her peritoneal fluid. Admitted for treatment of peritonitis  In the ED, was found to be hypotensive with BP of 70s/60s with no altered mentation, tachycardia, or physical exam signs of hypoperfusion. Baseline BP per prior visits appears to be 90s/60s. " She was given 1 L IV fluids. Due to concern for vascular etiology (hx of dissections), CT angio was obtained, which showed patent graft with no endoleak.   Will treat with IV abx and likely switch to intraperitoneal abx after consulting renal. Pending speciation and susceptibilities of GNRs.     Changes Today:  - ID consulted  - Nephrology consulted  - called patient's facility: [facility]  [phone] first floor blue  room 125  To ask for culture results, was sent to voicemail => will try calling again at a later time  - resumed tramadol  - added PRN dilaudid for breakthrough pain  - cefepime held until nephrology clarifies the dialysis schedule; if nephrology does not give new recs will plan for 1 g q24     #peritonitis (PD related)  ::concern for secondary peritonitis low but not entirely ruled out. Per CT scan, Prominence of gastric and small-bowel  mucosal folds with fluid-filled nondilated loops of small bowel suggesting indolent gastroenteritis in the appropriate clinical context.  ::GNRs per culture drawn at nursing home (in paper chart)  ::s/p Cefepime in the ED  -continue cefepime   -IV antibiotics for now due to hypotension. Consider switching to intraperitoneal antibiotics if secondary peritonitis ruled out  - nephrology consulted  - ID consulted     #ESRD on PD  -continue sevelamer      #hypotension (BP in the 70/60s) (baseline BP in the 100s/60s) - resolved in the ED  ::s/p 1 L IV fluids to treat hypovolemia/sepsis. Low concern for hemorrhage based on CT angio findings  -monitor for s/s of volume overload, mainly pulmonary edema      #5.4 cm descending aortic aneurysm  #Left common iliac occlusion - chronic  #hx of multiple dissections (11 per patient)  - appears stable compared to prior, vascular surgery was consulted at previous hospitalization; low threshold to consult vascular surgery again     #HFrEF (EF 15-30% ON 03/2023)  -not on GDMT at home. Cannot tolerate currently due to  soft BPs     #DVT/PE (on warfarin)  ::INR 4  -hold warfarin  -daily INRs, if unfractionated heparin required, start once INR <2     #chronic diarrhea - continue lomotil  -per renal, needs to have at least one BM a day. Continue miralax, senna PRN     #hypothyroidism-continue synthyroid   #chronic pancreatitis-continue home creon    #Chronic pain  - resumed Tramadol     F: caution  E: monitor K  N: reg diet  Access: PD cath, pIV  DVT: SCDs. Has   T+S/consent: TS to be collected  Surrogate decision maker: sister Farida   Code status: FULL CODE (confirmed on admission)           Shabbir Pardo MD PhD

## 2024-03-21 NOTE — NURSING NOTE
..Peritoneal Dialysis - CCPD      Tonight's Therapy   3/21/24    Dialysis cycler primed:   Y  Patient connected:  Y  Therapy started at:  1600  [employer]or:  Y  Dressing changed:  Y  Gent. cream applied:  Y    Therapy Orders     Total time:  11 hours   Cycles:   5   Fill Volume:  1400 mL   Last fill:   1200 mL   Last fill solution:  SAME   Total volume:  8200 mL     Solution(s):  Dextrose 4.25% Dianeal 2.5% calcium-- 5000 mL (x1)   Additive(s): NONE        Dextrose 2.5% Dianeal 2.5% calcium-- 5000 mL (x1)   Additive(s): NONE     Last Fill: SAME    **Dialysis nurses in house Monday through Friday [id number] to setup and disconnect patients,   please call our office at [phone], follow prompts for after hours paging.    **Machine Trouble Shooting: [employer] 24 hour technical support available at [phone]     **Bedside nursing staff to disconnect patients as needed outside of office hours.  -Instructions and supplies located on dialysis cart.  -Reference  policy [id number] -->

## 2024-03-21 NOTE — PROGRESS NOTES
Pharmacy Medication History Review    Yoselyn Hernandez is a 64 y.o. female admitted for Hypotension. Pharmacy reviewed the patient's pxjmn-ea-caexetmtb medications and allergies for accuracy.      PTA Medication List has been verified/updated as appears on Medication Review Report from [facility] (3/20/24, 16:46:25 ET).       The list below reflects the updated PTA list.   Prior to Admission Medications   Prescriptions  Facility Reported?   B complex-vitamin C-folic acid (Nephrocaps) 1 mg capsule  Yes   Sig: Take 1 capsule by mouth once daily.    Creon 24,000-76,000 -120,000 unit capsule  Yes   Sig: Take 3 capsules by mouth 3 times a day with meals.   acetaminophen (Tylenol) 325 mg tablet  Yes   Sig: Take 3 tablets (975 mg) by mouth 3 times   a day as needed (pain or fever).   allopurinol (Zyloprim) 100 mg tablet  Yes   Sig: Take 1 tablet (100 mg) by mouth once daily.   atorvastatin (Lipitor) 40 mg tablet  Yes   Sig: TAKE 1 TABLET BY MOUTH ONCE DAILY AT BEDTIME   diphenoxylate-atropine (Lomotil) 2.5-0.025 mg tablet  Yes   Sig: Take 1 tablet by mouth 4 times a day as needed for diarrhea.   epoetin treasure (Epogen,Procrit) 10,000 unit/mL injection  Yes   Sig: Inject 1 mL (10,000 Units) under the skin every 14 (fourteen) days.   gentamicin (Garamycin) 0.1 % cream  Yes   Sig: Apply topically once every 24 hours. (To catheter   Insertion site for infection prevention)   hydrOXYzine HCL (Atarax) 10 mg tablet  Yes   Sig: Take 1 tablet (10 mg) by mouth every 8 hours   if needed for anxiety. For 14 days starting 3/15/24   levothyroxine (Synthroid, Levoxyl) 25 mcg tablet  Yes   Sig: Take 1 tablet (25 mcg) by mouth once daily in the morning.    lidocaine (LMX) 4 % cream  Yes   Sig: Apply topically 4 times a day as needed (to painful areas).   melatonin 10 mg tablet  Yes   Sig: Take 1 tablet (10 mg) by mouth once daily as needed for sleep.   mirtazapine (Remeron) 15 mg tablet  Yes   Sig: Take 1 tablet (15 mg) by  mouth once daily at bedtime.      ondansetron ODT (Zofran-ODT) 4 mg disintegrating tablet  Yes   Sig: Take 1 tablet (4 mg) by mouth every 8 hours if needed for nausea.   polyethylene glycol (Glycolax, Miralax) 17 gram packet  Yes   Sig: Take 17 g by mouth once daily as needed (constipation).   sennosides (Senokot) 8.6 mg tablet  Yes   Sig: Take 2 tablets (17.2 mg) by mouth once daily as needed for constipation.   sevelamer carbonate (Renvela) 800 mg tablet  Yes   Sig: Take 1 tablet (800 mg) by mouth 3 times a day with meals.    traMADol (Ultram) 50 mg tablet  Yes   Sig: Take 0.5 tablets (25 mg) by mouth every 12 hours if needed.   warfarin (Coumadin) 3 mg tablet  Yes   Sig: Take 1 tablet (3 mg) by mouth once daily in the evening.  --> SNF List states on hold from 3/20/24 - 3/22/24 (?)      Facility-Administered Medications: None        The list below reflects the updated allergy list. Please review each documented allergy for additional clarification and justification.  Allergies  Reviewed by Su Rice RN on 3/20/2024        Severity Reactions Comments    Ace Inhibitors High Angioedema, Other, Swelling, Unknown     Ciprofloxacin High GI intolerance, Nausea Only, Diarrhea     Gabapentin Not Specified Unknown Jerking head movements    Oxycodone Low Itching           Below are additional concerns with the patient's PTA list:  SNF Order Summary Report states warfarin 3 mg on hold 3/20/24 15:43 - 3/22/24 15:00. (?) Reason unknown.     -----------------------------  Miguel Angel Mc, [place], [facility]  Transitions of Care Pharmacist  Medication reconciliation complete  Please reach out via [employer] Secure Chat for questions,   or if no response call [employer] or [employer].  [facility]

## 2024-03-21 NOTE — PROGRESS NOTES
Vancomycin Dosing by Pharmacy- INITIAL    Yoselyn Hernandez is a 64 y.o. year old female who Pharmacy has been consulted for vancomycin dosing for other peritonitis . Based on the patient's indication and renal status this patient will be dosed based on a goal trough/random level of 15-20.     Renal function - peritoneal dialysis patient    Visit Vitals  /69 (BP Location: Right arm, Patient Position: Sitting)   Pulse 96   Temp 36.8 °C (98.2 °F) (Temporal)   Resp 16        Lab Results   Component Value Date    CREATININE 8.19 (H) 03/21/2024    CREATININE 7.29 (H) 03/20/2024    CREATININE 6.97 (H) 02/29/2024    CREATININE 6.00 (H) 02/19/2024          Lab Results   Component Value Date    PATIENTTEMP 37.0 11/06/2023    PATIENTTEMP 37.0 05/12/2023    PATIENTTEMP 37.0 03/13/2023    PATIENTTEMP 37.0 10/28/2022      Patient is anuric    PD fluid cultures at [facility] reportedly growing gram negative rods  Patient has history MRSE in PD fluid    Assessment/Plan     Patient will be given a loading dose of 1000 mg (20mg/kg) once.  Follow-up level will be ordered on 3/22 at AM labs unless clinically indicated sooner.  Will continue to monitor renal function daily while on vancomycin and order serum creatinine at least every 48 hours if not already ordered.  Follow for continued vancomycin needs, clinical response, and signs/symptoms of toxicity.       Lissette Ruiz, PharmD

## 2024-03-21 NOTE — ED TRIAGE NOTES
Pt arrived to ED by EMS due to back pain and edema . +blood cultures from nursing facility. Pt peritoneal dialysis Mon, wed, and Friday. Possible Peritoneal infection.

## 2024-03-21 NOTE — CONSULTS
"Reason For Consult  ESRD management    History Of Present Illness  Yoselyn Hernandez is a 64 y.o. female presenting to ED from NH for positive peritoneal fluid cultures and c/f PD associated peritonitis. Patient interviewed and examined by me. She has chronic abdominal pain for which she is prescribed Tramadol. Denies any recent change in pain pattern. She is unsure if the fluid sent from NH was cloudy and the specific reason it was sent. Regardless, fluid culture reported with GNR by the lab, so patient was sent by NH physician to ED. I contacted Dr. Lora earlier today (patient's nephrologist), who was not informed about any of the above.     Past Medical History  cardiomyopathy, HFrEF (last EF 15-20% 3/16/23)  HTN  PE,  DVT,  aortic dissection   carotid disease post bypass  ESRD on peritoneal dialysis  Anemia  JO ANN  HLD  vertigo     Past surgical history  Hysterectomy  TEVAR (2018), Aortic dissection repair  peritoneal dialysis cath placement  lap cholecystectomy     Family History:  breast cancer, CAD, HTN     Social History:  denies tobacco use, social ETOH use, denies illicit drug use    Allergies  Ace inhibitors, Ciprofloxacin, Gabapentin, and Oxycodone    Review of Systems  Negative x 10 except as above       Vitals 24HR  Heart Rate:  [81-99]   Temperature:  [36.8 °C (98.2 °F)-36.9 °C (98.4 °F)]   Respirations:  [8-26]   BP: ()/(56-77)   Height:  [170.2 cm (5' 7\")]   Weight:  [56.2 kg (124 lb)]   Pulse Ox:  [73 %-100 %]     Physical Exam  General: Awake, alert, in no distress   HEENT: no scleral icterus, mucosa moist   CVS: Regular rate, Normal S1, S2, no murmurs   RESP: Lungs clear to auscultation   Abdomen: Soft, non-tender, no masses, guarding, rebound tenderness, PD catheter in place, dressing clean. 2 reducible abdominal hernias  EXT: 3+ dependent leg edema  Skin: No rashes noted on visible skin  Psych: appropriate mood and affect     Medications:  Scheduled medications  allopurinol, 100 mg, oral, " Daily  [Held by provider] cefepime, 1 g, intravenous, q8h  dextrose 2.5 % - LOW calcium 2.5 mEq/L 5,000 mL peritoneal dialysate, , intraperitoneal, q24h  dextrose 4.25 % - calcium 3.5 mEq/L 5,000 mL peritoneal dialysate, , intraperitoneal, q24h  gentamicin, , Topical, q24h  levothyroxine, 25 mcg, oral, Daily before breakfast  lidocaine, , Topical, 4x daily  lipase-protease-amylase, 3 capsule, oral, TID with meals  nystatin, 5 mL, Swish & Swallow, 4x daily  sevelamer carbonate, 800 mg, oral, TID with meals         PRN medications  PRN medications: HYDROmorphone, hydrOXYzine HCL, melatonin, polyethylene glycol, traMADol, vancomycin       Relevant Results  Recent Results (from the past 12 hour(s))   CBC and Auto Differential    Collection Time: 03/21/24  7:25 AM   Result Value Ref Range    WBC 6.2 4.4 - 11.3 x10*3/uL    nRBC 0.3 (H) 0.0 - 0.0 /100 WBCs    RBC 3.12 (L) 4.00 - 5.20 x10*6/uL    Hemoglobin 9.3 (L) 12.0 - 16.0 g/dL    Hematocrit 26.9 (L) 36.0 - 46.0 %    MCV 86 80 - 100 fL    MCH 29.8 26.0 - 34.0 pg    MCHC 34.6 32.0 - 36.0 g/dL    RDW 19.4 (H) 11.5 - 14.5 %    Platelets 215 150 - 450 x10*3/uL    Neutrophils % 75.2 40.0 - 80.0 %    Immature Granulocytes %, Automated 0.2 0.0 - 0.9 %    Lymphocytes % 17.7 13.0 - 44.0 %    Monocytes % 5.5 2.0 - 10.0 %    Eosinophils % 1.1 0.0 - 6.0 %    Basophils % 0.3 0.0 - 2.0 %    Neutrophils Absolute 4.63 1.20 - 7.70 x10*3/uL    Immature Granulocytes Absolute, Automated 0.01 0.00 - 0.70 x10*3/uL    Lymphocytes Absolute 1.09 (L) 1.20 - 4.80 x10*3/uL    Monocytes Absolute 0.34 0.10 - 1.00 x10*3/uL    Eosinophils Absolute 0.07 0.00 - 0.70 x10*3/uL    Basophils Absolute 0.02 0.00 - 0.10 x10*3/uL   Magnesium    Collection Time: 03/21/24  7:25 AM   Result Value Ref Range    Magnesium 1.55 (L) 1.60 - 2.40 mg/dL   Hepatic function panel    Collection Time: 03/21/24  7:25 AM   Result Value Ref Range    Albumin 2.8 (L) 3.4 - 5.0 g/dL    Bilirubin, Total 0.4 0.0 - 1.2 mg/dL     "Bilirubin, Direct 0.1 0.0 - 0.3 mg/dL    Alkaline Phosphatase 127 33 - 136 U/L    ALT 40 7 - 45 U/L    AST 54 (H) 9 - 39 U/L    Total Protein 6.6 6.4 - 8.2 g/dL   Phosphorus    Collection Time: 03/21/24  7:25 AM   Result Value Ref Range    Phosphorus 5.2 (H) 2.5 - 4.9 mg/dL   Basic Metabolic Panel    Collection Time: 03/21/24  7:25 AM   Result Value Ref Range    Glucose 93 74 - 99 mg/dL    Sodium 137 136 - 145 mmol/L    Potassium 3.9 3.5 - 5.3 mmol/L    Chloride 98 98 - 107 mmol/L    Bicarbonate 25 21 - 32 mmol/L    Anion Gap 18 10 - 20 mmol/L    Urea Nitrogen 48 (H) 6 - 23 mg/dL    Creatinine 8.19 (H) 0.50 - 1.05 mg/dL    eGFR 5 (L) >60 mL/min/1.73m*2    Calcium 8.5 (L) 8.6 - 10.6 mg/dL   Type and screen    Collection Time: 03/21/24  8:45 AM   Result Value Ref Range    ABO TYPE B     Rh TYPE POS     ANTIBODY SCREEN NEG          Assessment/Plan   NICOLAS ALFONSO is a 63 year old Female with PMH cardiomyopathy, HFrEF (last EF 15-20% 3/16/23), HTN, PE, aortic dissection x7, AAA post TEVAR in 2018, carotid disease post bypass, ESRD on peritoneal dialysis, anemia, JO ANN, DVT, HLD, and vertigo who is admitted due to concerns for PD associated peritonitis (see HPI). Nephrology consulted for dialysis care.    Impression:  ESRD on PD - recent NH regimen: CCPD, 11 hours, 5 x 1400 ml dianeal exchanges (4.25% and 2.5%) and a last fill of 1200cc (same- no extraneal). Total therapy volume 8200cc.  Suspected PD associated peritonitis -mild tenderness on abdominal exam. PD fluid cell count pending, but per  PD nurse fluid sample obtained clear, so unlikely peritonitis. However, pending fluid actual cell count, differential, gram stain and cultures, it is reasonable to treat with empiric antibiotics. Will review CT abdomen looking for \"abdominal catastrophy\".     Recommend:  - agree with empiric antibiotic coverage pending additional data (above)  - nystatin 232602 units swish and sallow QID while on antibiotics  - we will resume " outpatient DIALLO  - ordered CCPD for tonight- mixture of 4.25 and 2.25 to facilitate fluid removal/leg edema. No intraperitoneal antibiotics at this time  Will follow along. Thank you for the consult.          Maxine Zhao MD

## 2024-03-21 NOTE — PROGRESS NOTES
I received Yoselyn Hernandez in signout from previous provider.  Please see the previous note for all HPI, PE and MDM up to the time of signout at 2300.    In brief Yoselyn Hernandez is an 64-year-old female history of multiple prior aortic dissections, ESRD on peritoneal dialysis at home presenting to the ED after peritoneal cultures grew gram-negative rods.  Treating with cefepime.  Patient afebrile, nontoxic-appearing and in no acute distress.  Soft blood pressure on the ED arrival with worsening hypotension at time of signout, no overt tachycardia and extremities warm well-perfused. Patient was handed off to me pending stability for anticipated admission to floor.  At time of signout patient was evaluated and was  endorsing some abdominal and back pain which in the setting of her history of aortic pathology consider aortic dissection on differential.  Hemoglobin down from prior with an INR of 4.  Treated with IV fluid bolus for suspected volume depletion as well as given dose of Dilaudid for pain.     Patient had improvement in blood pressure s/p 1 L IV fluids, CT scan showing no contrast extravasation to suggest endoleak, no signs of active pathology.  Patient was admitted to medical service for IV antibiotics in the setting of positive peritoneal studies.  Chief Complaint   Patient presents with   • Back Pain       ED Course as of 03/21/24 0419   Thu Mar 21, 2024   0324 CT angio chest abdomen pelvis  CT showing no evidence of contrast extravasation to suggest endoleak, no surgical intra-abdominal findings [KR]      ED Course User Index  [KR] Beth Arguello DO         Diagnoses as of 03/21/24 0419   Peritonitis associated with peritoneal dialysis, subsequent encounter (CMS/[facility])       Pt Disposition: Admit    Procedures

## 2024-03-21 NOTE — H&P
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     Interview limited since patient was anxious and frustrated about recurrent admissions and episodes of peritonitis. She states that the fluid cultures were drawn because she was complaining of back pain for a few days. Denies fevers, chills, abdominal pain. Had chronic diarrhea previously but no diarrhea currently (States the creon helped).   She states that she has been on PD for 2 years and the episodes of peritonitis happened recently. She also expressed concerns about her fluid status and her heart condition.     ED COURSE:  - Vital Signs: 36.9 C, BP 96/62 (as low as 74/61). Returned to 91/65 after 1 L bolus. 99% on RA  - Labs:  CBC: 5.9/9.0/190 (hb baseline around 9)  CHEM: 135/4.6/96/25/46/7.29   LFTs: wnl except for AST of 69. Albumin 2.9  Ca Mg P: 8.2, 1.82  Coag: INR 4.0   Micro: BCx obtainted two sets    - Imaging  ED CT angio chest abdomen pelvis:  1. Postsurgical changes of endograft repair of aortic dissection with  a graft extending to the abdominal aorta. No evidence of contrast  extravasation to suggest an endoleak; unchanged caliber of the  thoracoabdominal aorta. There are postsurgical changes of prior left  common iliac graft supplying the renal arteries and right common  iliac graft supplying the superior mesenteric artery and celiac  artery. There is moderate stenosis of the right renal artery and  complete occlusion of the left renal artery. The remainder of the  bypass grafts are patent.  2. There is similar appearance of  dissection involving the right  common carotid and brachiocephalic arteries and right external iliac  and left femoral arteries.  3. Cardiomegaly. Reflux of IV contrast into the hepatic veins  consistent with right heart dysfunction. Hypoenhancement of thoracic  aorta on initial arterial phase imaging denotes left ventricular  systolic dysfunction.  4. Fissural and mild smooth interlobular septal thickening suggests  acute pulmonary interstitial edema/CHF. Correlate with clinical  volume status and consider correlation with BNP. No consolidation,  pulmonary edema, pleural effusion, or pneumothorax. Mild-to-moderate  centrilobular emphysema with paraseptal emphysema in the apices with  bullae.  5. Prominence of gastric and small-bowel mucosal folds with  fluid-filled nondilated loops of small bowel suggesting indolent  gastroenteritis in the appropriate clinical context. No dilated or  frankly thickened bowel. Moderate volume of colonic stool. Colonic  diverticula without definite evidence of acute diverticulitis.  6. Left lower quadrant peritoneal dialysis catheter. Small to  moderate volume of low-density ascites, slightly increased in volume.  No pneumoperitoneum or loculated intraperitoneal collection.  7. Additional findings as discussed above.    Current Meds:  B complex-vitamin C-folic acid (Nephrocaps) 1 mg capsule   Yes   Sig: Take 1 capsule by mouth once daily.    Creon 24,000-76,000 -120,000 unit capsule   Yes   Sig: Take 3 capsules by mouth 3 times a day with meals.   acetaminophen (Tylenol) 325 mg tablet   Yes   Sig: Take 3 tablets (975 mg) by mouth 3 times   a day as needed (pain or fever).   allopurinol (Zyloprim) 100 mg tablet   Yes   Sig: Take 1 tablet (100 mg) by mouth once daily.   atorvastatin (Lipitor) 40 mg tablet   Yes   Sig: TAKE 1 TABLET BY MOUTH ONCE DAILY AT BEDTIME   diphenoxylate-atropine (Lomotil) 2.5-0.025 mg tablet   Yes   Sig: Take 1 tablet by mouth 4 times a day as needed for  diarrhea.   epoetin treasure (Epogen,Procrit) 10,000 unit/mL injection   Yes   Sig: Inject 1 mL (10,000 Units) under the skin every 14 (fourteen) days.   gentamicin (Garamycin) 0.1 % cream   Yes   Sig: Apply topically once every 24 hours. (To catheter   Insertion site for infection prevention)   hydrOXYzine HCL (Atarax) 10 mg tablet   Yes   Sig: Take 1 tablet (10 mg) by mouth every 8 hours   if needed for anxiety. For 14 days starting 3/15/24   levothyroxine (Synthroid, Levoxyl) 25 mcg tablet   Yes   Sig: Take 1 tablet (25 mcg) by mouth once daily in the morning.    lidocaine (LMX) 4 % cream   Yes   Sig: Apply topically 4 times a day as needed (to painful areas).   melatonin 10 mg tablet   Yes   Sig: Take 1 tablet (10 mg) by mouth once daily as needed for sleep.   mirtazapine (Remeron) 15 mg tablet   Yes   Sig: Take 1 tablet (15 mg) by mouth once daily at bedtime.       ondansetron ODT (Zofran-ODT) 4 mg disintegrating tablet   Yes   Sig: Take 1 tablet (4 mg) by mouth every 8 hours if needed for nausea.   polyethylene glycol (Glycolax, Miralax) 17 gram packet   Yes   Sig: Take 17 g by mouth once daily as needed (constipation).   sennosides (Senokot) 8.6 mg tablet   Yes   Sig: Take 2 tablets (17.2 mg) by mouth once daily as needed for constipation.   sevelamer carbonate (Renvela) 800 mg tablet   Yes   Sig: Take 1 tablet (800 mg) by mouth 3 times a day with meals.    traMADol (Ultram) 50 mg tablet   Yes   Sig: Take 0.5 tablets (25 mg) by mouth every 12 hours if needed.   warfarin (Coumadin) 3 mg tablet   Yes   Sig: Take 1 tablet (3 mg) by mouth once daily in the evening.  --> SNF List states on hold from 3/20/24 - 3/22/24 (?)     PHYSICAL EXAM:  General: awake, alert, conversant, appears stated age  HEENT: pupils equal and round, no scleral icterus  Skin: no suspect lesions or rashes noted on visible skin  Chest: ctab, normal respiratory effort, not on supplemental oxygen  Cardiac: regular rate, normal s1, s2, no  M/R/G  Abdomen: soft, ND, NT. 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 Admitted for treatment of peritonitis  In the ED, was found to be hypotensive with BP of 70s/60s with no altered mentation, tachycardia, or physical exam signs of hypoperfusion. Baseline BP per prior visits appears to be 90s/60s. She was given 1 L IV fluids. Due to concern for vascular etiology (hx of dissections), CT angio was obtained, which showed patent graft with no endoleak.   Will treat with IV abx and likely switch to intraperitoneal abx after consulting renal. Pending speciation and susceptibilities of GNRs.     #peritonitis (PD related)  ::concern for secondary peritonitis low but not entirely ruled out. Per CT scan, Prominence of gastric and small-bowel  mucosal folds with fluid-filled nondilated loops of small bowel suggesting indolent gastroenteritis in the appropriate clinical context.  ::GNRs per culture drawn at nursing home (in paper chart)  ::s/p Cefepime in the ED  -continue cefepime   -IV antibiotics for now due to hypotension. Consider switching to intraperitoneal antibiotics if secondary peritonitis ruled out  [ ] f/u speciation and susceptibilities    #ESRD on PD  -continue sevelamer   [ ] renal consult in the AM    #hypotension  (BP in the 70/60s) (baseline BP in the 100s/60s) - resolved in the ED  ::s/p 1 L IV fluids to treat hypovolemia/sepsis. Low concern for hemorrhage based on CT angio findings  -monitor for s/s of volume overload, mainly pulmonary edema     #5.4 cm descending aortic aneurysm  #Left common iliac occlusion - chronic  #hx of multiple dissections (11 per patient)  [ ] Cutoff for indication to repair is 5.5 cm but consider vasc surgery consult given hx of dissections.     #HFrEF (EF 15-30% ON 03/2023)  -not on GDMT at home. Cannot tolerate currently due to soft BPs    #DVT/PE (on warfarin)  ::INR 4  -hold warfarin  -daily INRs, if unfractionated heparin required, start once INR <2    #chronic illness related anxiety and hopelessness  [ ] consider pall care consult    #chronic diarrhea - continue lomotil  -per renal, needs to have at least one BM a day. Continue miralax, senna PRN    #hypothyroidism-continue synthyroid   #chronic pancreatitis-continue home creon    F: caution  E: monitor K  N: reg diet  Access: PD cath, pIV  DVT: SCDs. Has   T+S/consent: TS to be collected  Surrogate decision maker: sister Farida   Code status: FULL CODE (confirmed on admission)

## 2024-03-22 ENCOUNTER — TELEPHONE (OUTPATIENT)
Dept: DIALYSIS | Facility: HOSPITAL | Age: 64
End: 2024-03-22
Payer: COMMERCIAL

## 2024-03-22 LAB
ALBUMIN SERPL BCP-MCNC: 2.7 G/DL (ref 3.4–5)
ANION GAP SERPL CALC-SCNC: 19 MMOL/L (ref 10–20)
APTT PPP: 40 SECONDS (ref 27–38)
BASOPHILS NFR FLD MANUAL: 0 %
BASOPHILS NFR FLD MANUAL: 0 %
BLASTS NFR FLD MANUAL: 0 %
BLASTS NFR FLD MANUAL: 0 %
BUN SERPL-MCNC: 45 MG/DL (ref 6–23)
CALCIUM SERPL-MCNC: 8.8 MG/DL (ref 8.6–10.6)
CHLORIDE SERPL-SCNC: 97 MMOL/L (ref 98–107)
CLARITY FLD: CLEAR
CLARITY FLD: CLEAR
CO2 SERPL-SCNC: 25 MMOL/L (ref 21–32)
COLOR FLD: NORMAL
COLOR FLD: NORMAL
CREAT SERPL-MCNC: 7.28 MG/DL (ref 0.5–1.05)
EGFRCR SERPLBLD CKD-EPI 2021: 6 ML/MIN/1.73M*2
EOSINOPHIL NFR FLD MANUAL: 0 %
EOSINOPHIL NFR FLD MANUAL: 0 %
ERYTHROCYTE [DISTWIDTH] IN BLOOD BY AUTOMATED COUNT: 19.1 % (ref 11.5–14.5)
GLUCOSE SERPL-MCNC: 93 MG/DL (ref 74–99)
HCT VFR BLD AUTO: 24 % (ref 36–46)
HGB BLD-MCNC: 8.5 G/DL (ref 12–16)
IMMATURE GRANULOCYTES IN FLUID: 0 %
IMMATURE GRANULOCYTES IN FLUID: 0 %
INR PPP: 2.7 (ref 0.9–1.1)
LYMPHOCYTES NFR FLD MANUAL: 42 %
LYMPHOCYTES NFR FLD MANUAL: NORMAL %
MAGNESIUM SERPL-MCNC: 1.47 MG/DL (ref 1.6–2.4)
MCH RBC QN AUTO: 30.2 PG (ref 26–34)
MCHC RBC AUTO-ENTMCNC: 35.4 G/DL (ref 32–36)
MCV RBC AUTO: 85 FL (ref 80–100)
MONOS+MACROS NFR FLD MANUAL: 53 %
MONOS+MACROS NFR FLD MANUAL: NORMAL %
NEUTROPHILS NFR FLD MANUAL: 0 %
NEUTROPHILS NFR FLD MANUAL: 5 %
NRBC BLD-RTO: 0 /100 WBCS (ref 0–0)
OTHER CELLS NFR FLD MANUAL: 0 %
OTHER CELLS NFR FLD MANUAL: 0 %
PHOSPHATE SERPL-MCNC: 5.9 MG/DL (ref 2.5–4.9)
PLASMA CELLS NFR FLD MANUAL: 0 %
PLASMA CELLS NFR FLD MANUAL: 0 %
PLATELET # BLD AUTO: 208 X10*3/UL (ref 150–450)
POTASSIUM SERPL-SCNC: 3.5 MMOL/L (ref 3.5–5.3)
PROTHROMBIN TIME: 30.2 SECONDS (ref 9.8–12.8)
RBC # BLD AUTO: 2.81 X10*6/UL (ref 4–5.2)
RBC # FLD AUTO: 1 /UL
RBC # FLD MANUAL: 0 /UL
SODIUM SERPL-SCNC: 137 MMOL/L (ref 136–145)
TOTAL CELLS COUNTED FLD: 0
TOTAL CELLS COUNTED FLD: 19
VANCOMYCIN TROUGH SERPL-MCNC: 15.7 UG/ML (ref 5–20)
WBC # BLD AUTO: 5.1 X10*3/UL (ref 4.4–11.3)
WBC # FLD AUTO: 3 /UL
WBC # FLD MANUAL: NORMAL /UL

## 2024-03-22 PROCEDURE — 85027 COMPLETE CBC AUTOMATED: CPT

## 2024-03-22 PROCEDURE — 2500000002 HC RX 250 W HCPCS SELF ADMINISTERED DRUGS (ALT 637 FOR MEDICARE OP, ALT 636 FOR OP/ED)

## 2024-03-22 PROCEDURE — 89051 BODY FLUID CELL COUNT: CPT | Performed by: INTERNAL MEDICINE

## 2024-03-22 PROCEDURE — 2500000004 HC RX 250 GENERAL PHARMACY W/ HCPCS (ALT 636 FOR OP/ED)

## 2024-03-22 PROCEDURE — 2500000004 HC RX 250 GENERAL PHARMACY W/ HCPCS (ALT 636 FOR OP/ED): Performed by: INTERNAL MEDICINE

## 2024-03-22 PROCEDURE — 2500000001 HC RX 250 WO HCPCS SELF ADMINISTERED DRUGS (ALT 637 FOR MEDICARE OP)

## 2024-03-22 PROCEDURE — 89050 BODY FLUID CELL COUNT: CPT | Performed by: INTERNAL MEDICINE

## 2024-03-22 PROCEDURE — 1100000001 HC PRIVATE ROOM DAILY

## 2024-03-22 PROCEDURE — 36415 COLL VENOUS BLD VENIPUNCTURE: CPT

## 2024-03-22 PROCEDURE — 99232 SBSQ HOSP IP/OBS MODERATE 35: CPT | Performed by: INTERNAL MEDICINE

## 2024-03-22 PROCEDURE — 97165 OT EVAL LOW COMPLEX 30 MIN: CPT | Mod: GO

## 2024-03-22 PROCEDURE — 97530 THERAPEUTIC ACTIVITIES: CPT | Mod: GP

## 2024-03-22 PROCEDURE — 80069 RENAL FUNCTION PANEL: CPT

## 2024-03-22 PROCEDURE — 2500000001 HC RX 250 WO HCPCS SELF ADMINISTERED DRUGS (ALT 637 FOR MEDICARE OP): Performed by: STUDENT IN AN ORGANIZED HEALTH CARE EDUCATION/TRAINING PROGRAM

## 2024-03-22 PROCEDURE — 2500000004 HC RX 250 GENERAL PHARMACY W/ HCPCS (ALT 636 FOR OP/ED): Performed by: STUDENT IN AN ORGANIZED HEALTH CARE EDUCATION/TRAINING PROGRAM

## 2024-03-22 PROCEDURE — 80202 ASSAY OF VANCOMYCIN: CPT

## 2024-03-22 PROCEDURE — 85610 PROTHROMBIN TIME: CPT | Performed by: STUDENT IN AN ORGANIZED HEALTH CARE EDUCATION/TRAINING PROGRAM

## 2024-03-22 PROCEDURE — 97161 PT EVAL LOW COMPLEX 20 MIN: CPT | Mod: GP

## 2024-03-22 PROCEDURE — 83735 ASSAY OF MAGNESIUM: CPT

## 2024-03-22 RX ORDER — VANCOMYCIN HYDROCHLORIDE 1 G/200ML
1000 INJECTION, SOLUTION INTRAVENOUS ONCE
Status: COMPLETED | OUTPATIENT
Start: 2024-03-22 | End: 2024-03-22

## 2024-03-22 RX ORDER — VANCOMYCIN HYDROCHLORIDE 750 MG/150ML
15 INJECTION, SOLUTION INTRAVENOUS ONCE
Status: DISCONTINUED | OUTPATIENT
Start: 2024-03-22 | End: 2024-03-22 | Stop reason: ENTERED-IN-ERROR

## 2024-03-22 RX ORDER — ACETAMINOPHEN 325 MG/1
650 TABLET ORAL EVERY 6 HOURS PRN
Status: DISCONTINUED | OUTPATIENT
Start: 2024-03-22 | End: 2024-03-27 | Stop reason: HOSPADM

## 2024-03-22 RX ADMIN — HYDROXYZINE HYDROCHLORIDE 10 MG: 10 TABLET ORAL at 08:27

## 2024-03-22 RX ADMIN — SODIUM CHLORIDE, SODIUM LACTATE, CALCIUM CHLORIDE, MAGNESIUM CHLORIDE AND DEXTROSE: 4.25; 538; 448; 25.7; 5.08 INJECTION, SOLUTION INTRAPERITONEAL at 16:14

## 2024-03-22 RX ADMIN — PANCRELIPASE 3 CAPSULE: 24000; 76000; 120000 CAPSULE, DELAYED RELEASE PELLETS ORAL at 11:51

## 2024-03-22 RX ADMIN — Medication 10 MG: at 21:00

## 2024-03-22 RX ADMIN — SEVELAMER CARBONATE 800 MG: 800 TABLET, FILM COATED ORAL at 11:51

## 2024-03-22 RX ADMIN — VANCOMYCIN HYDROCHLORIDE 1000 MG: 1 INJECTION, SOLUTION INTRAVENOUS at 16:13

## 2024-03-22 RX ADMIN — PANCRELIPASE 3 CAPSULE: 24000; 76000; 120000 CAPSULE, DELAYED RELEASE PELLETS ORAL at 08:18

## 2024-03-22 RX ADMIN — HYDROXYZINE HYDROCHLORIDE 10 MG: 10 TABLET ORAL at 21:01

## 2024-03-22 RX ADMIN — HYDROMORPHONE HYDROCHLORIDE 0.2 MG: 1 INJECTION, SOLUTION INTRAMUSCULAR; INTRAVENOUS; SUBCUTANEOUS at 08:28

## 2024-03-22 RX ADMIN — SEVELAMER CARBONATE 800 MG: 800 TABLET, FILM COATED ORAL at 08:18

## 2024-03-22 RX ADMIN — LEVOTHYROXINE SODIUM 25 MCG: 25 TABLET ORAL at 07:00

## 2024-03-22 RX ADMIN — ALLOPURINOL 100 MG: 100 TABLET ORAL at 08:18

## 2024-03-22 RX ADMIN — CEFEPIME 1 G: 1 INJECTION, SOLUTION INTRAVENOUS at 20:57

## 2024-03-22 RX ADMIN — LIDOCAINE 4%: 4 CREAM TOPICAL at 16:15

## 2024-03-22 RX ADMIN — LIDOCAINE 4%: 4 CREAM TOPICAL at 08:19

## 2024-03-22 RX ADMIN — SODIUM CHLORIDE, SODIUM LACTATE, CALCIUM CHLORIDE, MAGNESIUM CHLORIDE AND DEXTROSE: 2.5; 538; 448; 18.3; 5.08 INJECTION, SOLUTION INTRAPERITONEAL at 16:14

## 2024-03-22 RX ADMIN — TRAMADOL HYDROCHLORIDE 25 MG: 50 TABLET, COATED ORAL at 17:38

## 2024-03-22 RX ADMIN — GENTAMICIN SULFATE: 1 CREAM TOPICAL at 08:20

## 2024-03-22 RX ADMIN — LIDOCAINE 4%: 4 CREAM TOPICAL at 13:21

## 2024-03-22 RX ADMIN — ACETAMINOPHEN 650 MG: 325 TABLET ORAL at 21:00

## 2024-03-22 ASSESSMENT — COGNITIVE AND FUNCTIONAL STATUS - GENERAL
CLIMB 3 TO 5 STEPS WITH RAILING: A LITTLE
PERSONAL GROOMING: A LITTLE
TURNING FROM BACK TO SIDE WHILE IN FLAT BAD: A LITTLE
DRESSING REGULAR UPPER BODY CLOTHING: A LITTLE
DRESSING REGULAR LOWER BODY CLOTHING: A LITTLE
TURNING FROM BACK TO SIDE WHILE IN FLAT BAD: A LITTLE
STANDING UP FROM CHAIR USING ARMS: A LITTLE
STANDING UP FROM CHAIR USING ARMS: A LITTLE
HELP NEEDED FOR BATHING: A LITTLE
PERSONAL GROOMING: A LITTLE
TOILETING: A LITTLE
DAILY ACTIVITIY SCORE: 19
MOBILITY SCORE: 16
WALKING IN HOSPITAL ROOM: A LITTLE
CLIMB 3 TO 5 STEPS WITH RAILING: TOTAL
HELP NEEDED FOR BATHING: A LITTLE
TOILETING: A LITTLE
DRESSING REGULAR UPPER BODY CLOTHING: A LITTLE
MOVING TO AND FROM BED TO CHAIR: A LITTLE
MOVING FROM LYING ON BACK TO SITTING ON SIDE OF FLAT BED WITH BEDRAILS: A LITTLE
MOVING TO AND FROM BED TO CHAIR: A LITTLE
MOVING TO AND FROM BED TO CHAIR: A LITTLE
CLIMB 3 TO 5 STEPS WITH RAILING: TOTAL
DAILY ACTIVITIY SCORE: 19
STANDING UP FROM CHAIR USING ARMS: A LITTLE
PERSONAL GROOMING: A LITTLE
DRESSING REGULAR LOWER BODY CLOTHING: A LITTLE
MOBILITY SCORE: 20
HELP NEEDED FOR BATHING: A LITTLE
MOVING FROM LYING ON BACK TO SITTING ON SIDE OF FLAT BED WITH BEDRAILS: A LITTLE
DRESSING REGULAR LOWER BODY CLOTHING: A LITTLE
WALKING IN HOSPITAL ROOM: A LITTLE
MOBILITY SCORE: 16
WALKING IN HOSPITAL ROOM: A LITTLE
TOILETING: A LITTLE
DRESSING REGULAR UPPER BODY CLOTHING: A LITTLE
DAILY ACTIVITIY SCORE: 19

## 2024-03-22 ASSESSMENT — PAIN DESCRIPTION - LOCATION
LOCATION: ABDOMEN
LOCATION: LEG

## 2024-03-22 ASSESSMENT — PAIN SCALES - GENERAL
PAINLEVEL_OUTOF10: 0 - NO PAIN
PAINLEVEL_OUTOF10: 4
PAINLEVEL_OUTOF10: 9
PAINLEVEL_OUTOF10: 10 - WORST POSSIBLE PAIN
PAINLEVEL_OUTOF10: 9
PAINLEVEL_OUTOF10: 4

## 2024-03-22 ASSESSMENT — ACTIVITIES OF DAILY LIVING (ADL)
BATHING_ASSISTANCE: MINIMAL
ADL_ASSISTANCE: INDEPENDENT

## 2024-03-22 ASSESSMENT — PAIN DESCRIPTION - DESCRIPTORS: DESCRIPTORS: ACHING

## 2024-03-22 ASSESSMENT — PAIN - FUNCTIONAL ASSESSMENT
PAIN_FUNCTIONAL_ASSESSMENT: 0-10

## 2024-03-22 ASSESSMENT — PAIN SCALES - PAIN ASSESSMENT IN ADVANCED DEMENTIA (PAINAD)
NEGVOCALIZATION: OCCASIONAL MOAN/GROAN, LOW SPEECH, NEGATIVE/DISAPPROVING QUALITY
BREATHING: NORMAL
FACIALEXPRESSION: SMILING OR INEXPRESSIVE

## 2024-03-22 ASSESSMENT — PAIN DESCRIPTION - ORIENTATION: ORIENTATION: RIGHT;LEFT;UPPER

## 2024-03-22 ASSESSMENT — PAIN SCALES - WONG BAKER: WONGBAKER_NUMERICALRESPONSE: HURTS EVEN MORE

## 2024-03-22 NOTE — PROGRESS NOTES
"Yoselyn Hernandez is a 64 y.o. female on day 1 of admission presenting with Hypotension.    Subjective   Patient reports abdominal pain back to baseline (left sided 5/10). No fevers or chills. Tolerated dialysis overnight well. Tearful and depressed today. Pain adequately controlled.       Objective     Physical Exam    General: a bit somnolent, was not oriented to time and thinking appeared slowed  HEENT: pupils equal and round, no scleral icterus  Skin: no suspect lesions or rashes noted on visible skin  Chest: ctab, normal respiratory effort, not on supplemental oxygen  Cardiac: regular rate, normal s1, s2, no M/R/G  Abdomen: soft, ND, NT. PD catheter in place, no erythema, purulence around site  EXT: +1 pitting edema bilaterally of LE up to shins  MSK: no focal joint swelling noted  Neuro: AOx4, moving all limbs spontaneously, follows commands    Last Recorded Vitals  Blood pressure 94/53, pulse 89, temperature 35.7 °C (96.3 °F), resp. rate 18, height 1.702 m (5' 7\"), weight 56.2 kg (124 lb), SpO2 98 %.  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 Admitted for treatment of peritonitis  In the ED, was found to be hypotensive with BP of 70s/60s with no altered mentation, tachycardia, or physical exam signs of hypoperfusion. Baseline BP per prior visits " appears to be 90s/60s. She was given 1 L IV fluids. Due to concern for vascular etiology (hx of dissections), CT angio was obtained, which showed patent graft with no endoleak.   Will treat with IV abx and likely switch to intraperitoneal abx after consulting renal. Pending speciation and susceptibilities of GNRs.     Changes Today:  - per ID: continue abx until culture returns  - per nephrology: okay to dialyze and continue abx until culture returns  - will call lab to get culture results ([facility]) => called no sensitivities yet  - might be a bit encephalopathic; low threshold for cefepime neurotoxicity  - pet, music, art therapy ordered  - will consider palliative consult   - if INR decreases to low 2s then will restart coumadin 1;  - palliative care consulted for support  - echo ordered to assess heart failure progression      #peritonitis (PD related)  ::concern for secondary peritonitis low but not entirely ruled out. Per CT scan, Prominence of gastric and small-bowel  mucosal folds with fluid-filled nondilated loops of small bowel suggesting indolent gastroenteritis in the appropriate clinical context.  ::GNRs per culture drawn at nursing home (in paper chart)  ::s/p Cefepime in the ED  -continue cefepime   -IV antibiotics for now due to hypotension. Consider switching to intraperitoneal antibiotics if secondary peritonitis ruled out  - Cefepime (3/21 - )  - Vancomycin ( 3/21 - )  - nephrology consulted  - ID consulted     #ESRD on PD  -continue sevelamer      #hypotension (BP in the 70/60s) (baseline BP in the 100s/60s) - resolved in the ED  ::s/p 1 L IV fluids to treat hypovolemia/sepsis. Low concern for hemorrhage based on CT angio findings  -monitor for s/s of volume overload, mainly pulmonary edema      #5.4 cm descending aortic aneurysm  #Left common iliac occlusion - chronic  #hx of multiple dissections (11 per patient)  - appears stable compared to prior, vascular surgery was consulted at previous  hospitalization; low threshold to consult vascular surgery again     #HFrEF (EF 15-30% ON 03/2023)  -not on GDMT at home. Cannot tolerate currently due to soft BPs     #DVT/PE (on warfarin)  ::INR 4  -hold warfarin  -daily INRs, if unfractionated heparin required, start once INR <2     #chronic diarrhea - continue lomotil  -per renal, needs to have at least one BM a day. Continue miralax, senna PRN     #hypothyroidism-continue synthyroid   #chronic pancreatitis-continue home creon    #Chronic pain  - resumed Tramadol     F: caution  E: monitor K  N: reg diet  Access: PD cath, pIV  DVT: SCDs.  Surrogate decision maker: sister Farida   Code status: FULL CODE (confirmed on admission)           Shabbir Pardo MD PhD

## 2024-03-22 NOTE — PROGRESS NOTES
Physical Therapy    Physical Therapy Evaluation & Treatment    Patient Name: Yoselyn Hernandez  MRN: [id number]  Today's Date: 3/22/2024   Time Calculation  Start Time: 0832  Stop Time: 0907  Time Calculation (min): 35 min    Assessment/Plan   PT Assessment  PT Assessment Results: Decreased strength, Decreased endurance, Impaired balance, Decreased mobility, Pain  Rehab Prognosis: Good  Barriers to Discharge: home setup, needing to be fully (I) with all mobility and ADLS for safe return home  End of Session Communication: Bedside nurse  Assessment Comment: Pt completes short distance amb and therex this session, unsteady with gait, experiences fatigue and pain.  Will benefit from skilled PT to return to (I) functional mobility.  End of Session Patient Position: Bed, 2 rail up, Alarm off, not on at start of session   IP OR SWING BED PT PLAN  Inpatient or Swing Bed: Inpatient  PT Plan  Treatment/Interventions: Transfer training, Gait training, Stair training, Balance training, Therapeutic exercise, Endurance training, Strengthening  PT Plan: Skilled PT  PT Frequency: 3 times per week  PT Discharge Recommendations: Moderate intensity level of continued care  PT Recommended Transfer Status: Assist x1  PT - OK to Discharge: Yes (POC/goals/discharge rec intensity created)      Subjective     General Visit Information:  General  Reason for Referral: ? peritonitis, back pain  Past Medical History Relevant to Rehab: ESRD on daily PD, AAA dissections s/p repairs, HF, pancreatitis, PE/DVT, HTN  Prior to Session Communication: Bedside nurse  General Comment: Pt just unhooked from PD prior to PT entry.  Pt initially states she does not feel up to participating however recognizes this PT from recent hospital admission, engages easily with this PT giving updates on how she was doing with therapy at SNF and what her current struggles are. Completes transfers, short distance amb and therex.  Home Living:  Home Living  Type of Home:   ([facility])  Lives With: Alone  Home Adaptive Equipment: Cane, Reacher  Home Layout: Stairs to alternate level with rails, Two level  Alternate Level Stairs-Number of Steps: 12  Home Access: Stairs to enter with rails  Entrance Stairs-Number of Steps: 8  Home Living Comments: admitted from SNF, has not been home since earlier this year  Prior Level of Function:  Prior Function Per Pt/Caregiver Report  ADL Assistance:  (reports she was (I) with PD, was handling PD mostly on her own at SNF)  Ambulatory Assistance:  (reports on days she does not feel swollen, she moves relatively (I).  Reports she was working well with PT, gaining endurance and strength.  At baseline, furniture walks household distances, uses cane for short distance community)  Precautions:  Precautions  Hearing/Visual Limitations: WFL  Medical Precautions: Fall precautions     Objective   Pain:  Pain Assessment  Pain Assessment: 0-10  Pain Score: 9  Pain Location: Abdomen (thighs and hips post short distance amb)  Cognition:  Cognition  Arousal/Alertness: Appropriate responses to stimuli  Orientation Level: Oriented X4  Following Commands: Follows all commands and directions without difficulty  Safety Judgment: Decreased awareness of need for safety precautions  Cognition Comments: Admits to having a new kind of anxiety, feels her back pain is related to her feeling the need to sit upright unsupported recently due to anxiety.  Admits to feeling down about her overall health, but motivated by her goddtr to get better.    General Assessments:     Activity Tolerance  Endurance: Tolerates 10 - 20 min exercise with multiple rests  Activity Tolerance Comments: acute pain post short distance amb    Sensation  Sensation Comment: denies N/T        Perception  Inattention/Neglect: Appears intact  Initiation: Appears intact  Motor Planning: Appears intact  Perseveration: Not present    Coordination  Coordination Comment: has mild tremor/decreased control in (L)  LE, with noted edema in (L) LE    Postural Control  Postural Control: Within Functional Limits    Static Sitting Balance  Static Sitting-Balance Support: Feet supported  Static Sitting-Level of Assistance: Independent    Static Standing Balance  Static Standing-Balance Support: Left upper extremity supported  Static Standing-Level of Assistance: Close supervision  Dynamic Standing Balance  Dynamic Standing-Balance Support:  (varying UE support)  Dynamic Standing-Comments: CGA  Functional Assessments:  Bed Mobility  Bed Mobility: No (pt longsitting towards EOB upon PT entry, sitting at EOB upon PT exit with needs in reach)    Transfers  Transfer: Yes  Transfer 1  Transfer From 1: Bed to  Transfer to 1: Stand  Transfer Level of Assistance 1: Contact guard, Minimal verbal cues  Trials/Comments 1: holds onto bed rail to complete, use of momentum  Transfers 2  Transfer From 2: Stand to  Transfer to 2: Bed  Transfer Level of Assistance 2: Contact guard, Minimal verbal cues  Trials/Comments 2: decreased control    Ambulation/Gait Training  Ambulation/Gait Training Performed: Yes  Ambulation/Gait Training 1  Surface 1: Level tile  Device 1:  (furniture walks)  Assistance 1: Contact guard, Minimal verbal cues  Quality of Gait 1: Shuffling gait, Decreased step length, Antalgic (forward flex/leans forward to reach for objects, fatigues/increased pain quickly)  Comments/Distance (ft) 1: 10 ft    Stairs  Stairs: No  Extremity/Trunk Assessments:  RLE   RLE : Exceptions to WFL  Strength RLE  RLE Overall Strength: Greater than or equal to 3/5 as evidenced by functional mobility  LLE   LLE : Exceptions to WFL  Strength LLE  LLE Overall Strength: Greater than or equal to 3/5 as evidenced by functional mobility  Treatments:  Therapeutic Exercise  Therapeutic Exercise Performed: Yes  Therapeutic Exercise Activity 1: (B) LE: LAQ and hip flexion as tolerated    Balance/Neuromuscular Re-Education  Balance/Neuromuscular Re-Education  Activity Performed: Yes  Balance/Neuromuscular Re-Education Activity 1: static stood at bedside ~ 3 min prior to ambulation, held bed rail for support  Outcome Measures:  [facility] Basic Mobility  Turning from your back to your side while in a flat bed without using bedrails: A little  Moving from lying on your back to sitting on the side of a flat bed without using bedrails: A little  Moving to and from bed to chair (including a wheelchair): A little  Standing up from a chair using your arms (e.g. wheelchair or bedside chair): A little  To walk in hospital room: A little  Climbing 3-5 steps with railing: Total  Basic Mobility - Total Score: 16    Tinetti  Sitting Balance: Steady, safe  Arises: Able, uses arms to help  Attempts to Arise: Able to arise, one attempt  Immediate Standing Balance (First 5 Seconds): Steady but uses walker or other support  Standing Balance: Steady but wide stance, uses cane or other support  Nudged: Staggers, grabs, catches self  Eyes Closed: Steady  Turned 360 Degrees: Steadiness: Unsteady (Grabs, staggers)  Turned 360 Degrees: Continuity of Steps: Discontinuous steps  Sitting Down: Uses arms or not a smooth motion  Balance Score: 9  Initiation of Gait: Any hesitancy or multiple attempts to start  Step Height: R Swing Foot: Right foot complete clears floor  Step Length: R Swing Foot: Passes left stance foot  Step Height: L Swing Foot: Left foot complete clears floor  Step Length: L Swing Foot: Passes right stance foot  Step Symmetry: Right and left step appear equal  Step Continuity: Stopping or discontinuity between steps  Path: Mild/moderate deviation or uses walking aid  Trunk: Marked sway or uses walking aid  Walking Time: Heels apart  Gait Score: 6  Total Score: 15    Encounter Problems       Encounter Problems (Active)       Balance       Tinetti > 20 to demo improvements in balance.        Start:  03/22/24    Expected End:  04/05/24               Mobility       STG - Patient will  ambulate with LRAD and close supervision 25 ft x2.        Start:  03/22/24    Expected End:  04/05/24            As feasibly safe to attempt, will navigate 8+ steps to simulate home setup with cane and rail, min Ax1.        Start:  03/22/24    Expected End:  04/05/24               PT Transfers       STG - Patient will perform bed mobility flat bed surface, close supervision.        Start:  03/22/24    Expected End:  04/05/24            STG - Patient will transfer sit to and from stand with close supervision.        Start:  03/22/24    Expected End:  04/05/24                   Education Documentation  Mobility Training, taught by Ayla Lomeli, PT at 3/22/2024 10:14 AM.  Learner: Patient  Readiness: Acceptance  Method: Explanation, Demonstration  Response: Verbalizes Understanding, Needs Reinforcement  Comment: safety with gait in new environment    03/22/24 at 10:15 AM - Ayla Lomeli, PT

## 2024-03-22 NOTE — NURSING NOTE
Peritoneal Dialysis - [facility]CPD    Completed Overnight Therapy   3/22/2024    Full treatment received:               Y  I-Drain:    243 mL  Total UF:   1198 mL  Combined 24 hour UF:  1441 mL  Minicap placed:   Y  Effluent Color:   Yellow   Cloudy:   N   Fibrin:   N    Tonight's Therapy   3/23/2024    Dialysis cycler primed:   -  Patient connected:  -  Therapy started at:  -  [employer]:  -  Dressing changed:  -  Gent. cream applied:  -    Therapy Orders     Total time:  11 hours   Cycles:   5   Fill Volume:  1400 mL   Last fill:   1200 mL   Last fill solution:  SAME   Total volume:  8200 mL     Solution(s):  Dextrose 2.5% Dianeal 2.5 calcium-- 5000 mL (x1)   Additive(s): None        Dextrose 4.25% Dianeal 2.5 calcium-- 5000 mL (x1)   Additive(s): None     Last Fill:  Same      **Dialysis nurses in house Monday through Friday [id number] to setup and disconnect patients,   please call our office at [phone], follow prompts for after hours paging.    **Machine Trouble Shooting: [facility] 24 hour technical support available at [phone]     **Bedside nursing staff to disconnect patients as needed outside of office hours.  -Instructions and supplies located on dialysis cart.  -Reference  policy G-595 -->

## 2024-03-22 NOTE — CONSULTS
"Nutrition Initial Assessment:   Nutrition Assessment    Reason for Assessment: Admission nursing screening (MST 2)    Patient is a 64 y.o. 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       Nutrition History:  Energy Intake: Fair 50-75 %  Food and Nutrient History: Met with Pt at bedside for nutrition assessment. Pt reports a fair to poor appetite, Pt tolerating diet well. pt reports to have lost a significant amount of weight due to health problems but this weight loss was not recent, weight is somewhat stable per pt report. Pt reports to be 264#. Pt has tried Nepro to aid in Po intake and weight gain however Pt dislikes flavor. Pt states to be trying to eat as much as possible, usually orders a big Breakfast  with an omelet and hard boiled eggs. No other nutrition related questions or concerns reported at this time. Pt declined need of supplement.  Food Allergies/Intolerances:  None  GI Symptoms: None  Oral Problems: None       Anthropometrics:  Height: 170.2 cm (5' 7.01\")   Weight: 56.2 kg (124 lb)   BMI (Calculated): 19.42  IBW/kg (Dietitian Calculated): 61.4 kg  Percent of IBW: 92 %       Weight History:   Wt Readings from Last 15 Encounters:   03/20/24 56.2 kg (124 lb)   02/29/24 63.5 kg (140 lb)   02/10/24 55 kg (121 lb 4.1 oz)   02/05/24 55 kg (121 lb 4.1 oz)   01/18/24 63.9 kg (140 lb 14.4 oz)   12/20/23 58.8 kg (129 lb 10.1 oz)   11/19/23 56.7 kg (125 lb)   11/09/23 57 kg (125 lb 10.6 oz)   10/18/23 68 kg (149 lb 14.6 oz)   10/17/23 59.1 kg (130 lb 3.2 oz)   08/31/23 52.2 kg (115 lb)   08/17/23 54.9 kg (121 lb) "   05/25/23 62.6 kg (138 lb)   09/19/22 58.5 kg (129 lb 0.1 oz)   08/12/22 59.4 kg (131 lb)      Weight Change %:  Weight History / % Weight Change: Noting a 5# weight loss 3.9% not significant in 3 months  Significant Weight Loss: No    Nutrition Focused Physical Exam Findings:    Subcutaneous Fat Loss:   Orbital Fat Pads: Mild-Moderate (slight dark circles and slight hollowing)  Buccal Fat Pads: Severe (hollow, sunken and narrow face)  Triceps: Severe (negligible fat tissue)  Muscle Wasting:  Temporalis: Severe (hollowed scooping depression)  Pectoralis (Clavicular Region): Severe (protruding prominent clavicle)  Deltoid/Trapezius: Mild-Moderate (slight protrusion of acromion process)  Interosseous: Severe (depressed area between thumb and forefinger)  Edema:  Edema: +1 trace  Edema Location: 1+ pitting edema bilaterally of LE up to shins  Physical Findings:  Skin: Positive (Skin teal dorsal or foot)    Nutrition Significant Labs:  BMP Trend:   Results from last 7 days   Lab Units 03/21/24  0725 03/20/24  2106   GLUCOSE mg/dL 93 73*   CALCIUM mg/dL 8.5* 8.2*   SODIUM mmol/L 137 135*   POTASSIUM mmol/L 3.9 4.6   CO2 mmol/L 25 25   CHLORIDE mmol/L 98 96*   BUN mg/dL 48* 46*   CREATININE mg/dL 8.19* 7.29*    , A1C:  Lab Results   Component Value Date    HGBA1C 5.3 08/04/2023   , BG POCT trend:    , Renal Lab Trend:   Results from last 7 days   Lab Units 03/21/24  0725 03/20/24  2106   POTASSIUM mmol/L 3.9 4.6   PHOSPHORUS mg/dL 5.2*  --    SODIUM mmol/L 137 135*   MAGNESIUM mg/dL 1.55* 1.82   EGFR mL/min/1.73m*2 5* 6*   BUN mg/dL 48* 46*   CREATININE mg/dL 8.19* 7.29*    , Vit D:   Lab Results   Component Value Date    VITD25 15 (L) 01/13/2024    , Vit B12:   Lab Results   Component Value Date    VQBKHQGB21 1,187 (H) 12/10/2022    , Iron Panel:   Lab Results   Component Value Date    IRON 118 03/21/2023    TIBC <173 (A) 03/21/2023    FERRITIN 1,250 (H) 12/10/2022    , Folate:   Lab Results   Component Value Date     FOLATE >24.0 12/10/2022        Nutrition Specific Medications:  Reviewed     I/O:   Last BM Date: 03/21/24;      Dietary Orders (From admission, onward)       Start     Ordered    03/22/24 0836  Adult diet Regular  Diet effective now        Question:  Diet type  Answer:  Regular    03/22/24 0835                     Estimated Needs:   Total Energy Estimated Needs (kCal): 1800 kCal (3344-1725 kcals)  Method for Estimating Needs: 30 kcal/kg of IBW  Total Protein Estimated Needs (g): 84 g  Method for Estimating Needs: 1.5 gm/kg of ABW  Total Fluid Estimated Needs (mL): 1800 mL  Method for Estimating Needs: 1mL/kcal or per team        Nutrition Diagnosis   Malnutrition Diagnosis  Patient has Malnutrition Diagnosis: Yes  Diagnosis Status: New  Malnutrition Diagnosis: Moderate malnutrition related to chronic disease or condition  As Evidenced by: Pt has adipose and muscle tissue loss, pt consuming <75% of energy needs for >7 days  Additional Assessment Information: pt consuming 1-2 meals a day, weight continues to trend down            Nutrition Interventions/Recommendations         Nutrition Prescription:  Individualized Nutrition Prescription Provided for : 0907-9132 kcals, 84 gm PRO        Nutrition Interventions:   Interventions: Meals and snacks, Medical food supplement  Goal: Continue adult diet regular as tolerated  Medical Food Supplement: Commercial beverage  Goal: Pt may benefit from ONS use however pt declined need of ONS at this time, Pt dislikes supplement taste  Please document meal intake percent   Consider renal MVI     Nutrition Education:   Importance of protein intake   Encourage adequate Po intake  Review increased energy and protein needs due to ESRD on PD          Nutrition Monitoring and Evaluation   Food/Nutrient Related History Monitoring  Monitoring and Evaluation Plan: Energy intake, Amount of food  Energy Intake: Estimated energy intake  Criteria: >75% of energy needs met via PO  Amount of Food:  Estimated amout of food  Criteria: >50% of meals    Body Composition/Growth/Weight History  Monitoring and Evaluation Plan: Weight  Criteria: no sig weight changes    Biochemical Data, Medical Tests and Procedures  Monitoring and Evaluation Plan: Glucose/endocrine profile  Glucose/Endocrine Profile: Glucose, casual, Glucose, fasting  Criteria: <180            Time Spent/Follow-up Reminder:   Time Spent (min): 45 minutes  Last Date of Nutrition Visit: 03/22/24  Nutrition Follow-Up Needed?: Dietitian to reassess per policy

## 2024-03-22 NOTE — PROGRESS NOTES
"Vancomycin Dosing by Pharmacy- FOLLOW UP    Yoselyn Hernandez is a 64 y.o. year old female who Pharmacy has been consulted for vancomycin dosing for other peritonitis . Based on the patient's indication and renal status this patient will be dosed based on a goal trough/random level of 15-20.     Renal function- Receives overnight peritoneal dialysis.     Patient is dose by level    Most recent random level: 15.7 mcg/mL    Visit Vitals  /69   Pulse 95   Temp 35.7 °C (96.3 °F)   Resp 18        Lab Results   Component Value Date    CREATININE 7.28 (H) 03/22/2024    CREATININE 8.19 (H) 03/21/2024    CREATININE 7.29 (H) 03/20/2024    CREATININE 6.97 (H) 02/29/2024        Patient weight is No results found for: \"PTWEIGHT\"    No results found for: \"CULTURE\"     I/O last 3 completed shifts:  In: 50 (0.9 mL/kg) [IV Piggyback:50]  Out: - (0 mL/kg)   Weight: 56.2 kg     Assessment/Plan    Within goal random/trough level  The next level will be obtained on 2/22 evening draw. May be obtained sooner if clinically indicated.   Will continue to monitor renal function daily while on vancomycin and order serum creatinine at least every 48 hours if not already ordered.  Follow for continued vancomycin needs, clinical response, and signs/symptoms of toxicity.       Johanna Smith, PharmD           "

## 2024-03-22 NOTE — PROGRESS NOTES
"Yoselyn Hernandez is a 64 y.o. female on day 1 of admission presenting with Hypotension and c/f PD associated peritonitis      Subjective   No complaints today except anxiety and frustration with needing to be recertified before discharge       Objective     Peritoneal Dialysis  Effluent Appearance: Clear (yellow)  Total UF: 1198  IDrain: 243  Effluent Volume Out (mL): 1441 ml    Vitals 24HR  Heart Rate:  [57-96]   Temp:  [35.7 °C (96.3 °F)-36.8 °C (98.2 °F)]   Resp:  [12-18]   BP: ()/(51-83)   Height:  [170.2 cm (5' 7.01\")]   SpO2:  [93 %-100 %]   Intake/Output last 3 Shifts:    Intake/Output Summary (Last 24 hours) at 3/22/2024 1412  Last data filed at 3/22/2024 1100  Gross per 24 hour   Intake 290 ml   Output 1441 ml   Net -1151 ml       Physical Exam  General: no apparent distress  HEENT: normocephalic  Lungs: bilateral coarse breath sounds, no wheezing, rhonchi or rales  Heart: S1, S2, RRR, no pericardial rub  Abdomen: soft , non-tender  Extr - edema 1+ upper thighs, both legs wrapped in ace bandages below knee/bandage not removed.   Skin: warm and dry    Medications  Current Facility-Administered Medications:     acetaminophen (Tylenol) tablet 650 mg, 650 mg, oral, q6h PRN, Marcela Vieira MD    allopurinol (Zyloprim) tablet 100 mg, 100 mg, oral, Daily, Bello Meyer MD, 100 mg at 03/22/24 0818    cefepime (Maxipime) IV 1 g, 1 g, intravenous, q24h, Marcela Vieira MD, Stopped at 03/21/24 2207    dextrose 2.5 % - LOW calcium 2.5 mEq/L 5,000 mL peritoneal dialysate, , intraperitoneal, q24h, Maxine Zhao MD, Given at 03/21/24 1615    dextrose 4.25 % - calcium 3.5 mEq/L 5,000 mL peritoneal dialysate, , intraperitoneal, q24h, Maxine Zhao MD, Given at 03/21/24 1615    gentamicin (Garamycin) 0.1 % cream, , Topical, q24h, Bello Meyer MD, Given at 03/22/24 0820    hydrOXYzine HCL (Atarax) tablet 10 mg, 10 mg, oral, q8h PRN, Bello Meyer MD, 10 mg at 03/22/24 0827    " levothyroxine (Synthroid, Levoxyl) tablet 25 mcg, 25 mcg, oral, Daily before breakfast, Bello Meyer MD, 25 mcg at 03/22/24 0700    lidocaine (LMX) 4 % cream, , Topical, 4x daily, Bello Meyer MD, Given at 03/22/24 1321    lipase-protease-amylase (Creon) 24,000-76,000 -120,000 unit per capsule 3 capsule, 3 capsule, oral, TID with meals, Bello Meyer MD, 3 capsule at 03/22/24 1151    melatonin tablet 10 mg, 10 mg, oral, Daily PRN, Bello Meyer MD    nystatin (Mycostatin) 100,000 unit/mL suspension 500,000 Units, 5 mL, Swish & Swallow, 4x daily, Marcela Vieira MD    polyethylene glycol (Glycolax, Miralax) packet 17 g, 17 g, oral, Daily PRN, Bello Meyer MD    sevelamer carbonate (Renvela) tablet 800 mg, 800 mg, oral, TID with meals, Bello Meyer MD, 800 mg at 03/22/24 1151    traMADol (Ultram) tablet 25 mg, 25 mg, oral, q12h PRN, Marcela Vieira MD, 25 mg at 03/21/24 1722    vancomycin (Vancocin) pharmacy to dose - pharmacy monitoring, , miscellaneous, Daily PRN, Marcela Vieira MD      Recent Results (from the past 36 hour(s))   CBC and Auto Differential    Collection Time: 03/21/24  7:25 AM   Result Value Ref Range    WBC 6.2 4.4 - 11.3 x10*3/uL    nRBC 0.3 (H) 0.0 - 0.0 /100 WBCs    RBC 3.12 (L) 4.00 - 5.20 x10*6/uL    Hemoglobin 9.3 (L) 12.0 - 16.0 g/dL    Hematocrit 26.9 (L) 36.0 - 46.0 %    MCV 86 80 - 100 fL    MCH 29.8 26.0 - 34.0 pg    MCHC 34.6 32.0 - 36.0 g/dL    RDW 19.4 (H) 11.5 - 14.5 %    Platelets 215 150 - 450 x10*3/uL    Neutrophils % 75.2 40.0 - 80.0 %    Immature Granulocytes %, Automated 0.2 0.0 - 0.9 %    Lymphocytes % 17.7 13.0 - 44.0 %    Monocytes % 5.5 2.0 - 10.0 %    Eosinophils % 1.1 0.0 - 6.0 %    Basophils % 0.3 0.0 - 2.0 %    Neutrophils Absolute 4.63 1.20 - 7.70 x10*3/uL    Immature Granulocytes Absolute, Automated 0.01 0.00 - 0.70 x10*3/uL    Lymphocytes Absolute 1.09 (L) 1.20 - 4.80 x10*3/uL    Monocytes Absolute 0.34 0.10 - 1.00  x10*3/uL    Eosinophils Absolute 0.07 0.00 - 0.70 x10*3/uL    Basophils Absolute 0.02 0.00 - 0.10 x10*3/uL   Magnesium    Collection Time: 03/21/24  7:25 AM   Result Value Ref Range    Magnesium 1.55 (L) 1.60 - 2.40 mg/dL   Hepatic function panel    Collection Time: 03/21/24  7:25 AM   Result Value Ref Range    Albumin 2.8 (L) 3.4 - 5.0 g/dL    Bilirubin, Total 0.4 0.0 - 1.2 mg/dL    Bilirubin, Direct 0.1 0.0 - 0.3 mg/dL    Alkaline Phosphatase 127 33 - 136 U/L    ALT 40 7 - 45 U/L    AST 54 (H) 9 - 39 U/L    Total Protein 6.6 6.4 - 8.2 g/dL   Phosphorus    Collection Time: 03/21/24  7:25 AM   Result Value Ref Range    Phosphorus 5.2 (H) 2.5 - 4.9 mg/dL   Basic Metabolic Panel    Collection Time: 03/21/24  7:25 AM   Result Value Ref Range    Glucose 93 74 - 99 mg/dL    Sodium 137 136 - 145 mmol/L    Potassium 3.9 3.5 - 5.3 mmol/L    Chloride 98 98 - 107 mmol/L    Bicarbonate 25 21 - 32 mmol/L    Anion Gap 18 10 - 20 mmol/L    Urea Nitrogen 48 (H) 6 - 23 mg/dL    Creatinine 8.19 (H) 0.50 - 1.05 mg/dL    eGFR 5 (L) >60 mL/min/1.73m*2    Calcium 8.5 (L) 8.6 - 10.6 mg/dL   TSH    Collection Time: 03/21/24  7:25 AM   Result Value Ref Range    Thyroid Stimulating Hormone 9.09 (H) 0.44 - 3.98 mIU/L   T4, free    Collection Time: 03/21/24  7:25 AM   Result Value Ref Range    Thyroxine, Free 1.03 0.78 - 1.48 ng/dL   Type and screen    Collection Time: 03/21/24  8:45 AM   Result Value Ref Range    ABO TYPE B     Rh TYPE POS     ANTIBODY SCREEN NEG    Peritoneal Dialysis Fluid Culture/Smear    Collection Time: 03/21/24  4:07 PM    Specimen: Peritoneal Dialysis; Fluid   Result Value Ref Range    Peritoneal Dialysis Fluid Culture No growth to date     Gram Stain No polymorphonuclear leukocytes seen     Gram Stain No organisms seen    Body Fluid Cell Count    Collection Time: 03/21/24  4:07 PM   Result Value Ref Range    Color, Fluid Colorless Colorless, Straw, Yellow    Clarity, Fluid Clear Clear    WBC, Fluid 10 See Comment /uL     RBC, Fluid 12 0  /uL /uL   Body Fluid Differential    Collection Time: 03/21/24  4:07 PM   Result Value Ref Range    Neutrophils %, Manual, Fluid 7 <25 % %    Lymphocytes %, Manual, Fluid 49 <75 % %    Mono/Macrophages %, Manual, Fluid 40 <70 % %    Eosinophils %, Manual, Fluid 4 0 % %    Basophils %, Manual, Fluid 0 0 % %    Immature Granulocytes %, Manual, Fluid 0 0 % %    Blasts %, Manual, Fluid 0 0 % %    Unclassified Cells %, Manual, Fluid 0 0 % %    Plasma Cells %, Manual, Fluid 0 0 % %    Total Cells Counted, Fluid 100    Fungal Culture/Smear    Collection Time: 03/21/24  4:09 PM    Specimen: Other (specify in comments); Fluid   Result Value Ref Range    Fungal Culture/Smear       Culture in progress, a report will be issued when positive or after 2 weeks of incubation.    Fungal Smear No fungal elements seen    Protime-INR    Collection Time: 03/21/24  6:37 PM   Result Value Ref Range    Protime 41.3 (H) 9.8 - 12.8 seconds    INR 3.6 (H) 0.9 - 1.1   Renal function panel    Collection Time: 03/22/24 11:12 AM   Result Value Ref Range    Glucose 93 74 - 99 mg/dL    Sodium 137 136 - 145 mmol/L    Potassium 3.5 3.5 - 5.3 mmol/L    Chloride 97 (L) 98 - 107 mmol/L    Bicarbonate 25 21 - 32 mmol/L    Anion Gap 19 10 - 20 mmol/L    Urea Nitrogen 45 (H) 6 - 23 mg/dL    Creatinine 7.28 (H) 0.50 - 1.05 mg/dL    eGFR 6 (L) >60 mL/min/1.73m*2    Calcium 8.8 8.6 - 10.6 mg/dL    Phosphorus 5.9 (H) 2.5 - 4.9 mg/dL    Albumin 2.7 (L) 3.4 - 5.0 g/dL   CBC    Collection Time: 03/22/24 11:12 AM   Result Value Ref Range    WBC 5.1 4.4 - 11.3 x10*3/uL    nRBC 0.0 0.0 - 0.0 /100 WBCs    RBC 2.81 (L) 4.00 - 5.20 x10*6/uL    Hemoglobin 8.5 (L) 12.0 - 16.0 g/dL    Hematocrit 24.0 (L) 36.0 - 46.0 %    MCV 85 80 - 100 fL    MCH 30.2 26.0 - 34.0 pg    MCHC 35.4 32.0 - 36.0 g/dL    RDW 19.1 (H) 11.5 - 14.5 %    Platelets 208 150 - 450 x10*3/uL   Magnesium    Collection Time: 03/22/24 11:12 AM   Result Value Ref Range    Magnesium 1.47  (L) 1.60 - 2.40 mg/dL   Coagulation Screen    Collection Time: 03/22/24 11:12 AM   Result Value Ref Range    Protime 30.2 (H) 9.8 - 12.8 seconds    INR 2.7 (H) 0.9 - 1.1    aPTT 40 (H) 27 - 38 seconds   Vancomycin, Trough    Collection Time: 03/22/24 11:12 AM   Result Value Ref Range    Vancomycin, Trough 15.7 5.0 - 20.0 ug/mL   Body Fluid Cell Count    Collection Time: 03/22/24 12:29 PM   Result Value Ref Range    Color, Fluid      Clarity, Fluid      WBC, Fluid 1 See Comment /uL    RBC, Fluid 0 0  /uL /uL   Body Fluid Differential    Collection Time: 03/22/24 12:30 PM   Result Value Ref Range    Neutrophils %, Manual, Fluid 0 <25 % %    Lymphocytes %, Manual, Fluid 75 <75 % %    Mono/Macrophages %, Manual, Fluid 25 <70 % %    Eosinophils %, Manual, Fluid 0 0 % %    Basophils %, Manual, Fluid 0 0 % %    Immature Granulocytes %, Manual, Fluid 0 0 % %    Blasts %, Manual, Fluid 0 0 % %    Unclassified Cells %, Manual, Fluid 0 0 % %    Plasma Cells %, Manual, Fluid 0 0 % %    Total Cells Counted, Fluid 4      CT ANGIO CHEST ABDOMEN AND PELVIS 3/21/2024 2:32 am   ABDOMINAL AND PELVIC ARTERIES:  There is a graft arising from the right common iliac artery supplying  the celiac and superior mesenteric arteries, which is patent. There  is a graft arising from the left common iliac artery supplying the  bilateral renal arteries. The proximal aspect of the graft  demonstrates mural thrombus with associated moderate-to-severe  stenosis. The thrombus extends to the right renal artery with  moderate stenosis and is a left renal artery with non-opacification  of the left renal artery. Again noted is a focal chronic dissection  in the right external iliac artery. Similar appearance of  short-segment left common femoral artery dissection. Unchanged  ectatic appearance of the right internal iliac artery measuring 1.5  cm. Postsurgical changes of coil embolization of the left internal  artery are present.  CT  ABDOMEN/PELVIS:  ABDOMINAL WALL: Fluid-containing ventral hernias. Anasarca.  LIVER: No significant parenchymal abnormality.  BILE DUCTS: No significant intrahepatic or extrahepatic dilatation.   GALLBLADDER: Surgically absent.  PANCREAS: Again noted is mild pancreatic ductal dilatation,  unchanged. Interval stability to minimal increase in size of  peripherally calcified cystic lesion that appears inseparable from  the pancreas, presumed to represent a pseudocyst.  SPLEEN: No significant abnormality.   ADRENALS: No significant abnormality.  KIDNEYS, URETERS, BLADDER: Bilateral kidneys are atrophic.  VESSELS: (See above).  RETROPERITONEUM/LYMPH NODES: No enlarged lymph nodes.  BOWEL/MESENTERY/PERITONEUM: Prominence of gastric and small-bowel  mucosal folds with fluid-filled nondilated loops of small bowel  suggesting indolent gastroenteritis in the appropriate clinical  context. No dilated or frankly thickened bowel. Moderate volume of  colonic stool. Colonic diverticula without definite evidence of acute  diverticulitis.  Normal appendix.  Left lower quadrant peritoneal dialysis catheter. Small to moderate  volume of low-density ascites, slightly increased in volume. No  pneumoperitoneum or loculated intraperitoneal collection.  Leno Aguirre 3/21/2024 3:40 AM       Assessment/Plan      Impression:  ESRD on PD - regimen: CCPD, 11 hours, 5 x 1400 ml dianeal exchanges (4.25% and 2.5%) and a last fill of 1200cc (same- no extraneal). Total therapy volume 8200cc.  Suspected PD associated peritonitis   3.   Anemia    3/22 update:  PD fluid cell count and gram stain unremarkable. Repeat dialysate cultures pending - anticipate will be no growth.  H/H low - will inquire into outpatient DIALLO dose and restart while in house     Recommend:  - agree continuing empiric antibiotic coverage pending [facility] dialysate cultures, but low/no suspicion for peritonitis based on cell count and clinical presentation.  - continue nystatin  [id number] units swish and sallow QID while on antibiotics  - will resume Epogen 10,000 units weekly (done)  - same PD regimen tonight- mixture of 4.25 and 2.25, no additives  Will continue to follow.       Maxine Zhao MD

## 2024-03-22 NOTE — PROGRESS NOTES
Occupational Therapy    Evaluation    Patient Name: Yoselyn Hernandez  MRN: [id number]  Today's Date: 3/22/2024  Time Calculation  Start Time: 1112  Stop Time: 1128  Time Calculation (min): 16 min        Assessment:  OT Assessment: Pt is a 64 year old female admitted for ? peritonitis and back pain. Pt requiring CGA/min A for ADLs and mobility. Pt would benefit from skilled OT services during hospital stay.  Prognosis: Good  Barriers to Discharge: Decreased caregiver support  Evaluation/Treatment Tolerance: Patient limited by fatigue (poor endurance)  End of Session Communication: Bedside nurse  End of Session Patient Position: Bed, 2 rail up, Alarm off, not on at start of session  OT Assessment Results: Decreased ADL status, Decreased safe judgment during ADL, Decreased endurance, Decreased IADLs  Prognosis: Good  Barriers to Discharge: Decreased caregiver support  Evaluation/Treatment Tolerance: Patient limited by fatigue (poor endurance)  Strengths: Ability to acquire knowledge, Access to adaptive/assistive products, Rehab experience, Attitude of self  Barriers to Participation: Housing layout, Support of Caregivers  Plan:  Treatment Interventions: ADL retraining, Functional transfer training, UE strengthening/ROM, Endurance training, Patient/family training, Equipment evaluation/education, Compensatory technique education  OT Frequency: 3 times per week  OT Discharge Recommendations: Moderate intensity level of continued care  Equipment Recommended upon Discharge:  (TBD)  OT Recommended Transfer Status: Assist of 1  OT - OK to Discharge: Yes (OT eval complete)  Treatment Interventions: ADL retraining, Functional transfer training, UE strengthening/ROM, Endurance training, Patient/family training, Equipment evaluation/education, Compensatory technique education    Subjective   Current Problem:  1. Peritonitis associated with peritoneal dialysis, subsequent encounter (CMS/[facility])  Referral to Primary Care      2.  Cardiomyopathy, unspecified type (CMS/HCC)  Referral to Cardiology - [facility] - Heart Failure [facility]      3. End stage renal disease (CMS/[facility])  Referral to Primary Care      4. Acute on chronic systolic heart failure (CMS/HCC)  Transthoracic Echo (TTE) Complete    Transthoracic Echo (TTE) Complete        General:  General  Reason for Referral: ? peritonitis, back pain  Past Medical History Relevant to Rehab: ESRD on daily PD, AAA dissections s/p repairs, HF, pancreatitis, PE/DVT, HTN  Family/Caregiver Present: No  Prior to Session Communication: Bedside nurse  Patient Position Received: Alarm off, not on at start of session, Bed, 2 rail up (seated EOB)  General Comment: Pt was seated EOB and was agreeable to OT session.  Precautions:  Hearing/Visual Limitations: WFL  Medical Precautions: Fall precautions  Vital Signs:     Pain:  Pain Assessment  Pain Assessment: 0-10  Pain Score:  (Pt reports R shoulder pain but does not report numerical value.)  Pain Type: Chronic pain  Pain Location: Shoulder  Pain Interventions: Repositioned    Objective   Cognition:  Overall Cognitive Status: Within Functional Limits  Arousal/Alertness: Appropriate responses to stimuli  Orientation Level: Oriented X4  Following Commands: Follows all commands and directions without difficulty  Safety Judgment: Decreased awareness of need for safety precautions  Insight: Within function limits  Impulsive: Mildly           Home Living:  Lives With: Alone  Home Adaptive Equipment: Cane, Reacher  Home Layout: Stairs to alternate level with rails, Two level  Alternate Level Stairs-Number of Steps: 12  Home Access: Stairs to enter with rails  Entrance Stairs-Number of Steps: 8  Home Living Comments: Pt admitted from SNF, at SNF since February.  Prior Function:  Level of [place]: Independent with ADLs and functional transfers  ADL Assistance: Independent  Homemaking Assistance: Needs assistance  IADL History:     ADL:  Eating Assistance: Independent  (anticipate)  Grooming Assistance: Stand by (anticipate)  Bathing Assistance: Minimal (anticipate)  UE Dressing Assistance: Stand by (anticipate)  LE Dressing Assistance: Stand by (anticipate)  Toileting Assistance with Device: Stand by (anticipate)  Activity Tolerance:  Endurance: Tolerates 10 - 20 min exercise with multiple rests  Bed Mobility/Transfers: Bed Mobility  Bed Mobility: No (Pt sitting EOB at start/end of session.)    Transfers  Transfer: Yes  Transfer 1  Transfer From 1: Bed to  Transfer to 1: Stand  Transfer Level of Assistance 1: Contact guard, Minimal verbal cues  Trials/Comments 1: STS from EOB using FWW  Transfers 2  Transfer From 2: Stand to  Transfer to 2: Bed  Transfer Level of Assistance 2: Contact guard, Minimal verbal cues  Trials/Comments 2: slight impulsivity noted      Functional Mobility:  Functional Mobility  Functional Mobility Performed: Yes  Functional Mobility 1  Comments 1: Pt ambulated short functional distance with FWW, overall SBA/CGA. Pt impulsive at times. Encourage pacing for safety.  Sitting Balance:  Static Sitting Balance  Static Sitting-Balance Support: Bilateral upper extremity supported  Static Sitting-Level of Assistance: Independent  Standing Balance:  Static Standing Balance  Static Standing-Balance Support: Bilateral upper extremity supported  Static Standing-Level of Assistance: Contact guard  Static Standing-Comment/Number of Minutes: Pt standing using FWW for BUE support   Modalities:     Vision:Vision - Basic Assessment  Current Vision: No visual deficits  Sensation:  Light Touch: No apparent deficits  Sensation Comment: Pt reports intermittent numbness/tingling in bilateral hands.  Strength:  Strength Comments: BUE +3/5  Perception:  Inattention/Neglect: Appears intact  Initiation: Appears intact  Motor Planning: Appears intact  Perseveration: Not present  Coordination:  Movements are Fluid and Coordinated: Yes  Coordination Comment: edema noted in LLE   Hand  Function:  Gross Grasp: Functional  Coordination: Functional  Extremities: RUE   RUE : Within Functional Limits and LUE   LUE: Within Functional Limits    Outcome Measures:[facility] Daily Activity  Putting on and taking off regular lower body clothing: A little  Bathing (including washing, rinsing, drying): A little  Putting on and taking off regular upper body clothing: A little  Toileting, which includes using toilet, bedpan or urinal: A little  Taking care of personal grooming such as brushing teeth: A little  Eating Meals: None  Daily Activity - Total Score: 19         and Brief Confusion Assessment Method (bCAM)  CAM Result: CAM -    Education Documentation  Precautions, taught by Shamika Sánchez OT at 3/22/2024  1:25 PM.  Learner: Patient  Readiness: Acceptance  Method: Explanation  Response: Needs Reinforcement    ADL Training, taught by Shamika Sánchez OT at 3/22/2024  1:25 PM.  Learner: Patient  Readiness: Acceptance  Method: Explanation  Response: Needs Reinforcement    Education Comments  No comments found.        OP EDUCATION:  Education  Individual(s) Educated: Patient  Education Provided: Fall precautons, Risk and benefits of OT discussed with patient or other  Patient Response to Education: Patient/Caregiver Verbalized Understanding of Information    Goals:  Encounter Problems       Encounter Problems (Active)       ADLs       Patient with complete lower body dressing with modified independent level of assistance donning and doffing all LE clothes  with PRN adaptive equipment. (Progressing)       Start:  03/22/24    Expected End:  04/05/24            Patient will complete toileting including hygiene clothing management/hygiene with modified independent level of assistance. (Progressing)       Start:  03/22/24    Expected End:  04/05/24               BALANCE       Patient will tolerate standing for ~3-5 minutes to modified independent level of assistance with least restrictive device in order to improve functional  activity tolerance for ADL tasks. (Progressing)       Start:  03/22/24    Expected End:  04/05/24               MOBILITY       Patient will perform Functional mobility Household distances/Community Distances with modified independent level of assistance and least restrictive device in order to improve safety and functional mobility. (Progressing)       Start:  03/22/24    Expected End:  04/05/24               TRANSFERS       Patient will complete functional transfers with least restrictive device with modified independent level of assistance. (Progressing)       Start:  03/22/24    Expected End:  04/05/24                    CJ SOLER OT

## 2024-03-22 NOTE — PROGRESS NOTES
Transitional Care Coordination Progress Note:  Patient discussed during interdisciplinary rounds.  Team members present: ANGEL WOOD  Plan per Medical/Surgical team: Pt admitted from SNF for c/f peritonitis, previously on home PD and at SNF, per ID: continue abx (IV Cefepmine/Vanco) until culture returns, team anticipate pt will likely need IV abx at time of discharge.  Payer: [facility]   Status: Inpatient  Discharge disposition: [facility]  Potential Barriers: none  ADOD: 3/25  Met with pt to introduce myself and role as care coordinator with Care Transitions team for discharge planning. Pt voiced no concerns related to discharge, and confirmed she would like to return to [facility] as previously discussed with SW. Pt stated while inpatient if possible she would like her medical care to be managed collectively with Cardiologist and Nephrologist, aware I will relay concerns to the medical team, team updated. [facility] confirmed via [facility] they are able to accept pt back at time of discharge and continue managing PD along with IV abx. [facility] still has pt PD machine and supplies at their facility according to pt, staff previously trained on how to perform PD prior to last discharge. Attempted to update PD nurse Anamaria at [facility] [phone] of pts current admission but received voice mail, message left for return call. Updated PT/OT notes sent to [facility], and they were asked to submit for precert today. Pt provided with my business card with contact info and instructed to notify me regarding any questions or concerns related to discharge planning. Care coordinator will continue to follow for discharge planning needs.     Paulie Yadav RN  Transitional Care Coordinator/[facility]  [id number]

## 2024-03-22 NOTE — CONSULTS
Inpatient consult to Infectious Diseases  Consult performed by: Nasra Fontanez MD MPH  Consult ordered by: Brendon Rodriguez MD        Primary MD: Gregg Kerr MD    Reason For Consult  Bacterial peritonitis    History Of Present Illness  Yoselyn Hernandez is a 64 y.o. female PMH  ESRD on PD 2/2 multiple AAA dissections, HFrEF (EF 15-20% 03/2023), pancreatitis, chronic diarrhea,  PE/DVT on warfarin, HTN, and multiple renal artery grafts with prior ilio-renal bypass,  recurrent peritonitis most recently staph epidermis (11/2023 & 02/2024) s/p intraperitoneal abx  and retained PD who is now admitted for management of back pain with finding of G-rods peritonitis.    Pt had peritoneal fluid tested due to few days of increasing back pain,  Denies fever or any other symptoms  She manages her own PD under sterile conditions  She has chronic diarrhea and takes stool thickeners but has not had issues with diarrhea recently.   Denies uti symptoms.    In feb 2024-  Fluid obtained from peritoneal  grew out staph epidermis.  Patient placed on intraperitoneal vancomycin and ceftazidime initially.  Eventually transition over to intraperitoneal vancomycin only  and completed 14 course that ended 2/15/24.  Similar course also in Nov 2023, treated with IP vanc for staph epidermis peritonitis.    ED COURSE:  - Vital Signs: 36.9 C, BP 96/62 (as low as 74/61). Returned to 91/65 after 1 L bolus. 99% on RA  - Labs:  CBC: 5.9/9.0/190 (hb baseline around 9)  CHEM: 135/4.6/96/25/46/7.29   LFTs: wnl except for AST of 69. Albumin 2.9  Ca Mg P: 8.2, 1.82  Coag: INR 4.0   Micro: BCx obtainted two sets     - Imaging  3/21 ED CT angio chest abdomen pelvis:  1. Postsurgical changes of endograft repair of aortic dissection with  a graft extending to the abdominal aorta. No evidence of contrast  extravasation to suggest an endoleak; unchanged caliber of the  thoracoabdominal aorta. There are postsurgical changes of prior left  common iliac graft  supplying the renal arteries and right common  iliac graft supplying the superior mesenteric artery and celiac  artery. There is moderate stenosis of the right renal artery and  complete occlusion of the left renal artery. The remainder of the  bypass grafts are patent.  2. There is similar appearance of dissection involving the right  common carotid and brachiocephalic arteries and right external iliac  and left femoral arteries.  3. Cardiomegaly. Reflux of IV contrast into the hepatic veins  consistent with right heart dysfunction. Hypoenhancement of thoracic  aorta on initial arterial phase imaging denotes left ventricular  systolic dysfunction.  4. Fissural and mild smooth interlobular septal thickening suggests  acute pulmonary interstitial edema/CHF. Correlate with clinical  volume status and consider correlation with BNP. No consolidation,  pulmonary edema, pleural effusion, or pneumothorax. Mild-to-moderate  centrilobular emphysema with paraseptal emphysema in the apices with  bullae.  5. Prominence of gastric and small-bowel mucosal folds with  fluid-filled nondilated loops of small bowel suggesting indolent  gastroenteritis in the appropriate clinical context. No dilated or  frankly thickened bowel. Moderate volume of colonic stool. Colonic  diverticula without definite evidence of acute diverticulitis.  6. Left lower quadrant peritoneal dialysis catheter. Small to  moderate volume of low-density ascites, slightly increased in volume.  No pneumoperitoneum or loculated intraperitoneal collection.  7. Additional findings as discussed above.     Past Medical History  She has a past medical history of CHF (congestive heart failure) (CMS/[facility]), COPD (chronic obstructive pulmonary disease) (CMS/HCC), ESRD (end stage renal disease) (CMS/[facility]), Hypertension, Other abnormalities of gait and mobility, PE (pulmonary thromboembolism) (CMS/[facility]), Personal history of other endocrine, nutritional and metabolic disease, and  Personal history of other specified conditions.    Surgical History  She has a past surgical history that includes Colonoscopy (2017); Hysterectomy (2014); Other surgical history (2017); CT angio abdomen pelvis w and or wo IV IV contrast (2014); CT angio abdomen pelvis w and or wo IV IV contrast (2018); CT angio abdomen pelvis w and or wo IV IV contrast (2018); CT angio abdomen pelvis w and or wo IV IV contrast (01/10/2019); CT angio abdomen pelvis w and or wo IV IV contrast (2019); CT angio abdomen pelvis w and or wo IV IV contrast (2021); CT angio abdomen pelvis w and or wo IV IV contrast (2017); CT angio abdomen pelvis w and or wo IV IV contrast (10/24/2018); IR angiogram aorta thoracic (2018); IR angiogram aorta abdomen (2019); IR intervention NEURO stent (2019); IR angiogram endovascular aortic repair (2022); IR angiogram aorta abdomen (2022); CT angio abdomen pelvis w and or wo IV IV contrast (2022); CT angio aorta and bilateral iliofemoral runoff w and or wo IV contrast (2023); and Colonoscopy (2021).     Social History     Occupational History    Not on file   Tobacco Use    Smoking status: Former     Packs/day: 0.25     Years: 20.00     Additional pack years: 0.00     Total pack years: 5.00     Types: Cigarettes     Quit date:      Years since quittin.2    Smokeless tobacco: Never   Vaping Use    Vaping Use: Never used   Substance and Sexual Activity    Alcohol use: Not Currently    Drug use: Yes     Types: Marijuana    Sexual activity: Defer     Travel History   Travel since 25    No documented travel since 25            Pets: no  Hobbies: no    Family History  Family History   Problem Relation Name Age of Onset    COPD Mother      Kidney failure Father       Allergies  Ace inhibitors, Ciprofloxacin, Gabapentin, and Oxycodone     Immunization History   Administered Date(s) Administered     Pfizer Purple Cap SARS-CoV-2 05/15/2021, 06/05/2021     Medications  Home medications:  Medications Prior to Admission   Medication Sig Dispense Refill Last Dose    acetaminophen (Tylenol) 325 mg tablet Take 3 tablets (975 mg) by mouth 3 times a day as needed (pain or fever). 30 tablet 0     allopurinol (Zyloprim) 100 mg tablet Take 1 tablet (100 mg) by mouth once daily. 90 tablet 1     atorvastatin (Lipitor) 40 mg tablet TAKE 1 TABLET BY MOUTH ONCE DAILY AT BEDTIME 30 tablet 0     B complex-vitamin C-folic acid (Nephrocaps) 1 mg capsule Take 1 capsule by mouth once daily. Do not start before February 6, 2024.       Creon 24,000-76,000 -120,000 unit capsule Take 3 capsules by mouth 3 times a day with meals. 279 capsule 0     dextrose 2.5 % - LOW calcium 2.5 mEq/L Ca 2.5 mEq/L- Mg 0.5 mEq/L solution 5,000 mL with heparin 1,000 unit/mL solution 2,500 Units Inject 5,000 mL into the abdomen / abdominal cavity once every 24 hours.       dextrose 2.5 % - LOW calcium 2.5 mEq/L Ca 2.5 mEq/L- Mg 0.5 mEq/L solution 5,000 mL with heparin 1,000 unit/mL solution 2,500 Units Inject 5,000 mL into the abdomen / abdominal cavity once every 24 hours.       diphenoxylate-atropine (Lomotil) 2.5-0.025 mg tablet Take 1 tablet by mouth 4 times a day as needed for diarrhea. 120 tablet 2     epoetin treasure (Epogen,Procrit) 10,000 unit/mL injection Inject 1 mL (10,000 Units) under the skin every 14 (fourteen) days.       gentamicin (Garamycin) 0.1 % cream Apply topically once every 24 hours.       hydrOXYzine HCL (Atarax) 10 mg tablet Take 1 tablet (10 mg) by mouth every 8 hours if needed for anxiety. For 14 days starting 3/15/24       levothyroxine (Synthroid, Levoxyl) 25 mcg tablet Take 1 tablet (25 mcg) by mouth once daily in the morning. Take before meals. 90 tablet 0     lidocaine (LMX) 4 % cream Apply topically 4 times a day. (Patient taking differently: Apply 0.1 g topically 4 times a day as needed (to painful areas).)       melatonin  10 mg tablet Take 1 tablet (10 mg) by mouth once daily as needed for sleep.  0     mirtazapine (Remeron) 15 mg tablet Take 1 tablet (15 mg) by mouth once daily at bedtime. 30 tablet 0     nystatin (Mycostatin) 100,000 unit/mL suspension Take 5 mL (500,000 Units) by mouth every 8 hours. (Patient not taking: Reported on 3/21/2024)   Not Taking    ondansetron ODT (Zofran-ODT) 4 mg disintegrating tablet Take 1 tablet (4 mg) by mouth every 8 hours if needed for nausea. 20 tablet 0     polyethylene glycol (Glycolax, Miralax) 17 gram packet Take 17 g by mouth once daily as needed (constipation).       sennosides (Senokot) 8.6 mg tablet Take 2 tablets (17.2 mg) by mouth once daily as needed for constipation.       sevelamer carbonate (Renvela) 800 mg tablet Take 1 tablet (800 mg) by mouth 3 times a day with meals. Swallow tablet whole; do not crush, break, or chew.       traMADol (Ultram) 50 mg tablet Take 0.5 tablets (25 mg) by mouth every 12 hours if needed.       warfarin (Coumadin) 3 mg tablet Take as directed per After Visit Summary. (Patient taking differently: Take 1 tablet (3 mg) by mouth once daily in the evening. daily)        Current medications:  Scheduled medications  allopurinol, 100 mg, oral, Daily  cefepime, 1 g, intravenous, q24h  dextrose 2.5 % - LOW calcium 2.5 mEq/L 5,000 mL peritoneal dialysate, , intraperitoneal, q24h  dextrose 4.25 % - calcium 3.5 mEq/L 5,000 mL peritoneal dialysate, , intraperitoneal, q24h  gentamicin, , Topical, q24h  levothyroxine, 25 mcg, oral, Daily before breakfast  lidocaine, , Topical, 4x daily  lipase-protease-amylase, 3 capsule, oral, TID with meals  nystatin, 5 mL, Swish & Swallow, 4x daily  sevelamer carbonate, 800 mg, oral, TID with meals      Continuous medications     PRN medications  PRN medications: HYDROmorphone, hydrOXYzine HCL, melatonin, polyethylene glycol, traMADol, vancomycin    Review of Systems     Objective  Range of Vitals (last 24 hours)  Heart Rate:   [81-97]   Temp:  [36.4 °C (97.5 °F)-36.8 °C (98.2 °F)]   Resp:  [8-26]   BP: ()/(62-83)   SpO2:  [73 %-100 %]   Daily Weight  03/20/24 : 56.2 kg (124 lb)    Body mass index is 19.42 kg/m².     Physical Exam   General Appearance: alert and oriented to person, place and time and in no acute distress  Skin: warm and dry  Head: normocephalic and atraumatic  Eyes: not injected or jaundicedl  Neck: neck supple   Pulmonary/Chest: clear to auscultation anteriorly  Cardiovascular: normal rate, normal S1 and S2   Abdomen: soft, non-tender, non-distended, unremarkable appearing PD catheter seen w/o warmth/drainage  Extremities: +bilat pitting edema, +superfical nearly healed pinpoint erosions on shins bilat    Relevant Results  Outside Hospital Results  No  Labs  Results from last 72 hours   Lab Units 03/21/24 0725 03/20/24 2106   WBC AUTO x10*3/uL 6.2 5.9   HEMOGLOBIN g/dL 9.3* 9.0*   HEMATOCRIT % 26.9* 25.5*   PLATELETS AUTO x10*3/uL 215 190   NEUTROS PCT AUTO % 75.2 70.9   LYMPHS PCT AUTO % 17.7 19.3   MONOS PCT AUTO % 5.5 7.0   EOS PCT AUTO % 1.1 2.2     Results from last 72 hours   Lab Units 03/21/24 0725 03/20/24 2106   SODIUM mmol/L 137 135*   POTASSIUM mmol/L 3.9 4.6   CHLORIDE mmol/L 98 96*   CO2 mmol/L 25 25   BUN mg/dL 48* 46*   CREATININE mg/dL 8.19* 7.29*   GLUCOSE mg/dL 93 73*   CALCIUM mg/dL 8.5* 8.2*   ANION GAP mmol/L 18 19   EGFR mL/min/1.73m*2 5* 6*   PHOSPHORUS mg/dL 5.2*  --      Results from last 72 hours   Lab Units 03/21/24 0725 03/20/24 2106   ALK PHOS U/L 127 84   BILIRUBIN TOTAL mg/dL 0.4 0.5   BILIRUBIN DIRECT mg/dL 0.1  --    PROTEIN TOTAL g/dL 6.6 6.7   ALT U/L 40 20   AST U/L 54* 69*   ALBUMIN g/dL 2.8* 2.9*     Estimated Creatinine Clearance: 6.2 mL/min (A) (by C-G formula based on SCr of 8.19 mg/dL (H)).  C-Reactive Protein   Date Value Ref Range Status   01/14/2024 5.33 (H) <1.00 mg/dL Final   11/06/2023 4.91 (H) <1.00 mg/dL Final     CRP   Date Value Ref Range Status   08/08/2023  0.85 mg/dL Final     Comment:     REF VALUE  < 1.00       Sedimentation Rate   Date Value Ref Range Status   11/05/2023 60 (H) 0 - 30 mm/h Final   10/24/2018 78 (H) 0 - 30 mm/h Final     Comment:      Note new reference range (males age 17-50) and new    methodology as of 07/30/2018.       HIV 1 and 2 Screen   Date Value Ref Range Status   05/25/2022 NONREACTIVE NONREACTIVE Final     Comment:      HIV Ag/Ab screen is performed using the [employer]   HIV Ag/Ab Combo assay which detects the presence of HIV    p24 antigen as well as antibodies to HIV-1   (Group M and O) and HIV-2.  .  No laboratory evidence of HIV infection. If acute HIV infection is   suspected, consider testing for HIV RNA by PCR (viral load).       Hepatitis C Ab   Date Value Ref Range Status   05/25/2022 NONREACTIVE NONREACTIVE Final     Comment:      Results from patients taking biotin supplements or receiving   high-dose biotin therapy should be interpreted with caution   due to possible interference with this test. Providers may    contact their local laboratory for further information.       Microbiology  Susceptibility data from last 90 days.  Collected Specimen Info Organism Clindamycin Erythromycin Oxacillin Tetracycline Trimethoprim/Sulfamethoxazole Vancomycin   01/30/24 Fluid from Peritoneal Dialysis Staphylococcus epidermidis R R S R S S   01/22/24 Fluid from Peritoneal Dialysis Staphylococcus epidermidis R R R S R S       Assessment/Plan     63y/o F PMH  ESRD on PD 2/2 multiple AAA dissections, HFrEF (EF 15-20% 03/2023), pancreatitis, chronic diarrhea,  PE/DVT on warfarin, HTN, and multiple renal artery grafts with prior ilio-renal bypass,  recurrent peritonitis most recently staph epidermis (11/2023 & 02/2024) s/p intraperitoneal abx  and retained PD who is now admitted for management of back pain with finding of G-rods peritonitis.    Past infection in past 6months have been CONS, now has G-R. Will follow id and sensitivities to  guide treatment. PT founds HDS at visit.    RECOMMMENDATION:  - Continue for now systemic IV vanc and cefepime  - Will follow peritoneal fluid cultures and sensitivities to guide final abx  - Appreciate nephrology's input into transition to IP antibiotics and ordering     Based on discussion with nephrology, PD catheter will be retained,    - Thank you for consult, ID will follow  Case seen and discussed with Dr. Rojas  ID team D pager [phone]  For new consults, contact pager [phone].     Nasra Fontanez MD MPH

## 2024-03-23 LAB
ALBUMIN SERPL BCP-MCNC: 2.5 G/DL (ref 3.4–5)
ANION GAP SERPL CALC-SCNC: 18 MMOL/L (ref 10–20)
APTT PPP: 33 SECONDS (ref 27–38)
BUN SERPL-MCNC: 48 MG/DL (ref 6–23)
CALCIUM SERPL-MCNC: 8.6 MG/DL (ref 8.6–10.6)
CHLORIDE SERPL-SCNC: 97 MMOL/L (ref 98–107)
CO2 SERPL-SCNC: 25 MMOL/L (ref 21–32)
CREAT SERPL-MCNC: 7.27 MG/DL (ref 0.5–1.05)
EGFRCR SERPLBLD CKD-EPI 2021: 6 ML/MIN/1.73M*2
ERYTHROCYTE [DISTWIDTH] IN BLOOD BY AUTOMATED COUNT: 20.2 % (ref 11.5–14.5)
GLUCOSE SERPL-MCNC: 97 MG/DL (ref 74–99)
HCT VFR BLD AUTO: 27.1 % (ref 36–46)
HGB BLD-MCNC: 8.5 G/DL (ref 12–16)
INR PPP: 2 (ref 0.9–1.1)
INR PPP: 2 (ref 0.9–1.1)
MAGNESIUM SERPL-MCNC: 1.44 MG/DL (ref 1.6–2.4)
MCH RBC QN AUTO: 29.3 PG (ref 26–34)
MCHC RBC AUTO-ENTMCNC: 31.4 G/DL (ref 32–36)
MCV RBC AUTO: 93 FL (ref 80–100)
NRBC BLD-RTO: 0 /100 WBCS (ref 0–0)
PHOSPHATE SERPL-MCNC: 5.7 MG/DL (ref 2.5–4.9)
PLATELET # BLD AUTO: 191 X10*3/UL (ref 150–450)
POTASSIUM SERPL-SCNC: 3.6 MMOL/L (ref 3.5–5.3)
PROTHROMBIN TIME: 23.2 SECONDS (ref 9.8–12.8)
PROTHROMBIN TIME: 23.2 SECONDS (ref 9.8–12.8)
RBC # BLD AUTO: 2.9 X10*6/UL (ref 4–5.2)
SODIUM SERPL-SCNC: 136 MMOL/L (ref 136–145)
VANCOMYCIN SERPL-MCNC: 26.3 UG/ML (ref 5–20)
WBC # BLD AUTO: 5.1 X10*3/UL (ref 4.4–11.3)

## 2024-03-23 PROCEDURE — 2500000004 HC RX 250 GENERAL PHARMACY W/ HCPCS (ALT 636 FOR OP/ED): Performed by: INTERNAL MEDICINE

## 2024-03-23 PROCEDURE — 2500000002 HC RX 250 W HCPCS SELF ADMINISTERED DRUGS (ALT 637 FOR MEDICARE OP, ALT 636 FOR OP/ED)

## 2024-03-23 PROCEDURE — 99233 SBSQ HOSP IP/OBS HIGH 50: CPT | Performed by: INTERNAL MEDICINE

## 2024-03-23 PROCEDURE — 85610 PROTHROMBIN TIME: CPT

## 2024-03-23 PROCEDURE — 99232 SBSQ HOSP IP/OBS MODERATE 35: CPT | Performed by: INTERNAL MEDICINE

## 2024-03-23 PROCEDURE — 36415 COLL VENOUS BLD VENIPUNCTURE: CPT

## 2024-03-23 PROCEDURE — 1100000001 HC PRIVATE ROOM DAILY

## 2024-03-23 PROCEDURE — 80202 ASSAY OF VANCOMYCIN: CPT

## 2024-03-23 PROCEDURE — 2500000004 HC RX 250 GENERAL PHARMACY W/ HCPCS (ALT 636 FOR OP/ED)

## 2024-03-23 PROCEDURE — 85027 COMPLETE CBC AUTOMATED: CPT

## 2024-03-23 PROCEDURE — 90947 DIALYSIS REPEATED EVAL: CPT

## 2024-03-23 PROCEDURE — 6350000001 HC RX 635 EPOETIN >10,000 UNITS: Mod: JZ | Performed by: INTERNAL MEDICINE

## 2024-03-23 PROCEDURE — 2500000004 HC RX 250 GENERAL PHARMACY W/ HCPCS (ALT 636 FOR OP/ED): Performed by: STUDENT IN AN ORGANIZED HEALTH CARE EDUCATION/TRAINING PROGRAM

## 2024-03-23 PROCEDURE — 2500000001 HC RX 250 WO HCPCS SELF ADMINISTERED DRUGS (ALT 637 FOR MEDICARE OP): Performed by: STUDENT IN AN ORGANIZED HEALTH CARE EDUCATION/TRAINING PROGRAM

## 2024-03-23 PROCEDURE — 80069 RENAL FUNCTION PANEL: CPT

## 2024-03-23 PROCEDURE — 99233 SBSQ HOSP IP/OBS HIGH 50: CPT

## 2024-03-23 PROCEDURE — 2500000001 HC RX 250 WO HCPCS SELF ADMINISTERED DRUGS (ALT 637 FOR MEDICARE OP)

## 2024-03-23 PROCEDURE — 83735 ASSAY OF MAGNESIUM: CPT

## 2024-03-23 RX ORDER — MAGNESIUM SULFATE HEPTAHYDRATE 40 MG/ML
4 INJECTION, SOLUTION INTRAVENOUS ONCE
Status: COMPLETED | OUTPATIENT
Start: 2024-03-23 | End: 2024-03-23

## 2024-03-23 RX ORDER — HYDROMORPHONE HYDROCHLORIDE 1 MG/ML
0.2 INJECTION, SOLUTION INTRAMUSCULAR; INTRAVENOUS; SUBCUTANEOUS ONCE
Status: COMPLETED | OUTPATIENT
Start: 2024-03-23 | End: 2024-03-23

## 2024-03-23 RX ORDER — HYDROMORPHONE HYDROCHLORIDE 1 MG/ML
0.4 INJECTION, SOLUTION INTRAMUSCULAR; INTRAVENOUS; SUBCUTANEOUS ONCE
Status: COMPLETED | OUTPATIENT
Start: 2024-03-23 | End: 2024-03-23

## 2024-03-23 RX ORDER — WARFARIN 3 MG/1
3 TABLET ORAL ONCE
Status: COMPLETED | OUTPATIENT
Start: 2024-03-23 | End: 2024-03-23

## 2024-03-23 RX ADMIN — HYDROMORPHONE HYDROCHLORIDE 0.2 MG: 1 INJECTION, SOLUTION INTRAMUSCULAR; INTRAVENOUS; SUBCUTANEOUS at 03:21

## 2024-03-23 RX ADMIN — TRAMADOL HYDROCHLORIDE 25 MG: 50 TABLET, COATED ORAL at 21:37

## 2024-03-23 RX ADMIN — PANCRELIPASE 3 CAPSULE: 24000; 76000; 120000 CAPSULE, DELAYED RELEASE PELLETS ORAL at 08:00

## 2024-03-23 RX ADMIN — EPOETIN ALFA 10000 UNITS: 10000 SOLUTION INTRAVENOUS; SUBCUTANEOUS at 08:39

## 2024-03-23 RX ADMIN — LEVOTHYROXINE SODIUM 25 MCG: 25 TABLET ORAL at 06:02

## 2024-03-23 RX ADMIN — SODIUM CHLORIDE, SODIUM LACTATE, CALCIUM CHLORIDE, MAGNESIUM CHLORIDE AND DEXTROSE: 4.25; 538; 448; 25.7; 5.08 INJECTION, SOLUTION INTRAPERITONEAL at 17:15

## 2024-03-23 RX ADMIN — HYDROXYZINE HYDROCHLORIDE 10 MG: 10 TABLET ORAL at 21:35

## 2024-03-23 RX ADMIN — TRAMADOL HYDROCHLORIDE 25 MG: 50 TABLET, COATED ORAL at 06:01

## 2024-03-23 RX ADMIN — ACETAMINOPHEN 650 MG: 325 TABLET ORAL at 08:01

## 2024-03-23 RX ADMIN — PANCRELIPASE 3 CAPSULE: 24000; 76000; 120000 CAPSULE, DELAYED RELEASE PELLETS ORAL at 18:05

## 2024-03-23 RX ADMIN — SEVELAMER CARBONATE 800 MG: 800 TABLET, FILM COATED ORAL at 13:27

## 2024-03-23 RX ADMIN — CEFEPIME 1 G: 1 INJECTION, SOLUTION INTRAVENOUS at 20:12

## 2024-03-23 RX ADMIN — GENTAMICIN SULFATE: 1 CREAM TOPICAL at 20:12

## 2024-03-23 RX ADMIN — SODIUM CHLORIDE, SODIUM LACTATE, CALCIUM CHLORIDE, MAGNESIUM CHLORIDE AND DEXTROSE: 2.5; 538; 448; 18.3; 5.08 INJECTION, SOLUTION INTRAPERITONEAL at 17:16

## 2024-03-23 RX ADMIN — HYDROMORPHONE HYDROCHLORIDE 0.4 MG: 1 INJECTION, SOLUTION INTRAMUSCULAR; INTRAVENOUS; SUBCUTANEOUS at 08:39

## 2024-03-23 RX ADMIN — MAGNESIUM SULFATE 4 G: 4 INJECTION INTRAVENOUS at 13:26

## 2024-03-23 RX ADMIN — SEVELAMER CARBONATE 800 MG: 800 TABLET, FILM COATED ORAL at 18:05

## 2024-03-23 RX ADMIN — Medication 10 MG: at 20:12

## 2024-03-23 RX ADMIN — ALLOPURINOL 100 MG: 100 TABLET ORAL at 08:01

## 2024-03-23 RX ADMIN — SEVELAMER CARBONATE 800 MG: 800 TABLET, FILM COATED ORAL at 08:01

## 2024-03-23 RX ADMIN — PANCRELIPASE 3 CAPSULE: 24000; 76000; 120000 CAPSULE, DELAYED RELEASE PELLETS ORAL at 13:27

## 2024-03-23 RX ADMIN — WARFARIN SODIUM 3 MG: 3 TABLET ORAL at 18:05

## 2024-03-23 ASSESSMENT — COGNITIVE AND FUNCTIONAL STATUS - GENERAL
PERSONAL GROOMING: A LITTLE
TURNING FROM BACK TO SIDE WHILE IN FLAT BAD: A LITTLE
WALKING IN HOSPITAL ROOM: A LITTLE
MOBILITY SCORE: 16
HELP NEEDED FOR BATHING: A LITTLE
MOVING FROM LYING ON BACK TO SITTING ON SIDE OF FLAT BED WITH BEDRAILS: A LITTLE
MOVING TO AND FROM BED TO CHAIR: A LITTLE
DRESSING REGULAR UPPER BODY CLOTHING: A LITTLE
MOVING FROM LYING ON BACK TO SITTING ON SIDE OF FLAT BED WITH BEDRAILS: A LITTLE
STANDING UP FROM CHAIR USING ARMS: A LITTLE
DRESSING REGULAR LOWER BODY CLOTHING: A LITTLE
PERSONAL GROOMING: A LITTLE
MOVING TO AND FROM BED TO CHAIR: A LITTLE
MOBILITY SCORE: 16
EATING MEALS: A LITTLE
HELP NEEDED FOR BATHING: A LITTLE
DAILY ACTIVITIY SCORE: 18
CLIMB 3 TO 5 STEPS WITH RAILING: TOTAL
TOILETING: A LITTLE
EATING MEALS: A LITTLE
TURNING FROM BACK TO SIDE WHILE IN FLAT BAD: A LITTLE
TOILETING: A LITTLE
WALKING IN HOSPITAL ROOM: A LITTLE
CLIMB 3 TO 5 STEPS WITH RAILING: TOTAL
STANDING UP FROM CHAIR USING ARMS: A LITTLE
DRESSING REGULAR UPPER BODY CLOTHING: A LITTLE
DRESSING REGULAR LOWER BODY CLOTHING: A LITTLE
DAILY ACTIVITIY SCORE: 18

## 2024-03-23 ASSESSMENT — PAIN - FUNCTIONAL ASSESSMENT
PAIN_FUNCTIONAL_ASSESSMENT: 0-10

## 2024-03-23 ASSESSMENT — PAIN SCALES - GENERAL
PAINLEVEL_OUTOF10: 7
PAINLEVEL_OUTOF10: 7
PAINLEVEL_OUTOF10: 10 - WORST POSSIBLE PAIN
PAINLEVEL_OUTOF10: 10 - WORST POSSIBLE PAIN
PAINLEVEL_OUTOF10: 4
PAINLEVEL_OUTOF10: 10 - WORST POSSIBLE PAIN

## 2024-03-23 NOTE — PROGRESS NOTES
Subjective     Interval History: Yoselyn Hernandez    Patient seen, no new complaints. States breathing and ankle swelling improved          Current Facility-Administered Medications:     acetaminophen (Tylenol) tablet 650 mg, 650 mg, oral, q6h PRN, Marcela Vieira MD, 650 mg at 03/23/24 0801    allopurinol (Zyloprim) tablet 100 mg, 100 mg, oral, Daily, Bello Meyer MD, 100 mg at 03/23/24 0801    cefepime (Maxipime) IV 1 g, 1 g, intravenous, q24h, Marcela Vieira MD, Stopped at 03/22/24 2127    dextrose 2.5 % - LOW calcium 2.5 mEq/L 5,000 mL peritoneal dialysate, , intraperitoneal, q24h, Maxine Zhao MD, Given at 03/22/24 1614    dextrose 4.25 % - calcium 3.5 mEq/L 5,000 mL peritoneal dialysate, , intraperitoneal, q24h, Maxine Zhao MD, Given at 03/22/24 1614    epoetin treasure (Epogen,Procrit) injection 10,000 Units, 10,000 Units, intravenous, Weekly, Maxine Zhao MD, 10,000 Units at 03/23/24 0839    gentamicin (Garamycin) 0.1 % cream, , Topical, q24h, Bello Meyer MD, Given at 03/22/24 0820    hydrOXYzine HCL (Atarax) tablet 10 mg, 10 mg, oral, q8h PRN, Bello Meyer MD, 10 mg at 03/22/24 2101    levothyroxine (Synthroid, Levoxyl) tablet 25 mcg, 25 mcg, oral, Daily before breakfast, Bello Meyer MD, 25 mcg at 03/23/24 0602    lidocaine (LMX) 4 % cream, , Topical, 4x daily, Bello Meyer MD, Given at 03/22/24 1615    lipase-protease-amylase (Creon) 24,000-76,000 -120,000 unit per capsule 3 capsule, 3 capsule, oral, TID with meals, Bello Meyer MD, 3 capsule at 03/23/24 1327    magnesium sulfate IV 4 g, 4 g, intravenous, Once, Shirley De Jesus MD, Last Rate: 25 mL/hr at 03/23/24 1326, 4 g at 03/23/24 1326    melatonin tablet 10 mg, 10 mg, oral, Daily PRN, Bello Meyer MD, 10 mg at 03/22/24 2100    nystatin (Mycostatin) 100,000 unit/mL suspension 500,000 Units, 5 mL, Swish & Swallow, 4x daily, Marcela Vieira MD    polyethylene glycol (Glycolax,  Miralax) packet 17 g, 17 g, oral, Daily PRN, Bello Meyer MD    sevelamer carbonate (Renvela) tablet 800 mg, 800 mg, oral, TID with meals, Bello Meyer MD, 800 mg at 03/23/24 1327    traMADol (Ultram) tablet 25 mg, 25 mg, oral, q12h PRN, Marcela Vieira MD, 25 mg at 03/23/24 0601    vancomycin (Vancocin) pharmacy to dose - pharmacy monitoring, , miscellaneous, Daily PRN, Marcela Vieira MD    warfarin (Coumadin) tablet 3 mg, 3 mg, oral, Once, Shirley De Jesus MD    Physical Exam  Physical Exam  Heart S1 S2 RRR, Lungs CTA, ++ edema  Abdomen soft, nontender      Vital signs in last 24 hours:  Temp:  [36.5 °C (97.7 °F)-37.1 °C (98.8 °F)] 36.6 °C (97.9 °F)  Heart Rate:  [54-92] 89  Resp:  [18] 18  BP: (108-117)/(61-69) 117/69         Labs:  Results from last 7 days   Lab Units 03/23/24  0706   WBC AUTO x10*3/uL 5.1   RBC AUTO x10*6/uL 2.90*   HEMOGLOBIN g/dL 8.5*   HEMATOCRIT % 27.1*     Results from last 7 days   Lab Units 03/23/24  0706 03/22/24  1112 03/21/24  0725   SODIUM mmol/L 136   < > 137   POTASSIUM mmol/L 3.6   < > 3.9   CHLORIDE mmol/L 97*   < > 98   CO2 mmol/L 25   < > 25   BUN mg/dL 48*   < > 48*   CREATININE mg/dL 7.27*   < > 8.19*   CALCIUM mg/dL 8.6   < > 8.5*   PHOSPHORUS mg/dL 5.7*   < > 5.2*   MAGNESIUM mg/dL 1.44*   < > 1.55*   BILIRUBIN TOTAL mg/dL  --   --  0.4   ALT U/L  --   --  40   AST U/L  --   --  54*    < > = values in this interval not displayed.            Assessment/Plan   ESRD patient on PD with concern for infection and fluid overload.  Per discussions with Dr,. Negrea yesterday, unclear validity of outside cultures, cultures from [facility] negative so far, agree with empiric IV antibiotics.  Please repeat cell count and culture from PD fluid today.  Still with significant volume overload on exam, BP low, but stable, continue current regimen,    Principal Problem:    Hypotension  Active Problems:    Peritonitis associated with peritoneal dialysis, subsequent encounter  (CMS/HCC)        Sonya Lazaro MD  3/23/2024  2:55 PM

## 2024-03-23 NOTE — PROGRESS NOTES
"Yoselyn Hernandez is a 64 y.o. female on day 2 of admission presenting with Hypotension.    Subjective   Had worsening right leg pain overnight. Right leg more tender to palpation.       Objective     Physical Exam    General: a bit somnolent, was not oriented to time and thinking appeared slowed  HEENT: pupils equal and round, no scleral icterus  Skin: no suspect lesions or rashes noted on visible skin  Chest: ctab, normal respiratory effort, not on supplemental oxygen  Cardiac: regular rate, normal s1, s2, no M/R/G  Abdomen: soft, ND, NT. PD catheter in place, no erythema, purulence around site  EXT: +1 pitting edema bilaterally of LE up to shins  MSK: no focal joint swelling noted  Neuro: AOx4, moving all limbs spontaneously, follows commands    Last Recorded Vitals  Blood pressure 110/64, pulse 92, temperature 36.6 °C (97.9 °F), resp. rate 18, height 1.702 m (5' 7.01\"), weight 56.2 kg (124 lb), SpO2 99 %.  Intake/Output last 3 Shifts:  I/O last 3 completed shifts:  In: 490 (8.7 mL/kg) [P.O.:240; IV Piggyback:250]  Out: 1441 (25.6 mL/kg) [Other:1441]  Weight: 56.2 kg           Assessment/Plan   Principal Problem:    Hypotension  Active Problems:    Peritonitis associated with peritoneal dialysis, subsequent encounter (CMS/[facility])    63 y/o W with PMHx of ESRD on PD c/b 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) s/p intraperitoneal vanc and ceftazidime, multiple AAA dissections, HFrEF (EF 15-20% 03/2023), pancreatitis, chronic diarrhea PE/DVT on warfarin, HTN, and multiple renal artery grafts with prior ilio-renal bypass who was sent to the ED from rehab facility due to GNRs found growing in her peritoneal fluid. Admitted for treatment of peritonitis  In the ED, was found to be hypotensive with BP of 70s/60s with no altered mentation, tachycardia, or physical exam signs of hypoperfusion. Baseline BP per prior visits appears to be 90s/60s. She was given 1 L IV fluids. Due to concern for " vascular etiology (hx of dissections), CT angio was obtained, which showed patent graft with no endoleak.   Will treat with IV abx and likely switch to intraperitoneal abx after consulting renal. Pending speciation and susceptibilities of GNRs.     Changes Today:  - DVT scan ordered  - called lab to get culture results ([facility]) => results below  - restart coumadin  - palliative care consulted for support  - echo ordered to assess heart failure progression    Sensitivities (called lab)  Klebsiella:  Sensitive to AMP/Sulbactam; Augmentin; Ceftriaxone; Ciprofloxacin; Cefuroxime; Gentamicin; Levofloxacin; Zosyn; Bactrim  Resistant to: none  Pseudomonas: resistance to aztreonam;   Sensitive to cefepime; gentamicin; piperacilin, tobramycin    #peritonitis (PD related)  ::concern for secondary peritonitis low but not entirely ruled out. Per CT scan, Prominence of gastric and small-bowel  mucosal folds with fluid-filled nondilated loops of small bowel suggesting indolent gastroenteritis in the appropriate clinical context.  ::GNRs per culture drawn at nursing home (in paper chart)  ::s/p Cefepime in the ED  -continue cefepime   -IV antibiotics for now due to hypotension. Consider switching to intraperitoneal antibiotics if secondary peritonitis ruled out  - Cefepime (3/21 - )  - Vancomycin ( 3/21 - )  - nephrology consulted  - ID consulted     #ESRD on PD  -continue sevelamer      #hypotension (BP in the 70/60s) (baseline BP in the 100s/60s) - resolved in the ED  ::s/p 1 L IV fluids to treat hypovolemia/sepsis. Low concern for hemorrhage based on CT angio findings  -monitor for s/s of volume overload, mainly pulmonary edema      #5.4 cm descending aortic aneurysm  #Left common iliac occlusion - chronic  #hx of multiple dissections (11 per patient)  - appears stable compared to prior, vascular surgery was consulted at previous hospitalization; low threshold to consult vascular surgery again     #HFrEF (EF 15-30% ON  03/2023)  -not on GDMT at home. Cannot tolerate currently due to soft BPs     #DVT/PE (on warfarin)  ::INR 4  -hold warfarin  -daily INRs, if unfractionated heparin required, start once INR <2     #chronic diarrhea - continue lomotil  -per renal, needs to have at least one BM a day. Continue miralax, senna PRN     #hypothyroidism-continue synthyroid   #chronic pancreatitis-continue home creon    #Chronic pain  - resumed Tramadol     F: caution  E: monitor K  N: reg diet  Access: PD cath, pIV  DVT: SCDs.  Surrogate decision maker: sister Farida   Code status: FULL CODE (confirmed on admission)           Shabbir Pardo MD PhD

## 2024-03-23 NOTE — PROGRESS NOTES
Yoselyn Hernandez is a 64 y.o. female on day 2 of admission presenting with Hypotension.    Subjective   Interval History:   No issues         Review of Systems    Objective   Range of Vitals (last 24 hours)  Heart Rate:  [54-95]   Temp:  [36.5 °C (97.7 °F)-37.1 °C (98.8 °F)]   Resp:  [18]   BP: (108-112)/(61-69)   SpO2:  [99 %-100 %]   Daily Weight  03/20/24 : 56.2 kg (124 lb)    Body mass index is 19.42 kg/m².    Physical Exam  Constitutional: WD, NAD  Head: AT/NC.  No contusions.   Eyes: PERRLA,  sclera not injected, anicteric.    LUNGS: Breathing unlabored.  Clear anterolaterally.  CVS: RRR, S1 & S2 normal.    ABD: Soft, PD cath site with erythema. NT / ND, No HSM      Antibiotics  cefepime (Maxipime) IV 1 g      Relevant Results  Labs  Results from last 72 hours   Lab Units 03/23/24  0706 03/22/24  1112 03/21/24  0725 03/20/24  2106   WBC AUTO x10*3/uL 5.1 5.1 6.2 5.9   HEMOGLOBIN g/dL 8.5* 8.5* 9.3* 9.0*   HEMATOCRIT % 27.1* 24.0* 26.9* 25.5*   PLATELETS AUTO x10*3/uL 191 208 215 190   NEUTROS PCT AUTO %  --   --  75.2 70.9   LYMPHS PCT AUTO %  --   --  17.7 19.3   MONOS PCT AUTO %  --   --  5.5 7.0   EOS PCT AUTO %  --   --  1.1 2.2     Results from last 72 hours   Lab Units 03/23/24  0706 03/22/24  1112 03/21/24  0725   SODIUM mmol/L 136 137 137   POTASSIUM mmol/L 3.6 3.5 3.9   CHLORIDE mmol/L 97* 97* 98   CO2 mmol/L 25 25 25   BUN mg/dL 48* 45* 48*   CREATININE mg/dL 7.27* 7.28* 8.19*   GLUCOSE mg/dL 97 93 93   CALCIUM mg/dL 8.6 8.8 8.5*   ANION GAP mmol/L 18 19 18   EGFR mL/min/1.73m*2 6* 6* 5*   PHOSPHORUS mg/dL 5.7* 5.9* 5.2*     Results from last 72 hours   Lab Units 03/23/24  0706 03/22/24  1112 03/21/24  0725 03/20/24  2106   ALK PHOS U/L  --   --  127 84   BILIRUBIN TOTAL mg/dL  --   --  0.4 0.5   BILIRUBIN DIRECT mg/dL  --   --  0.1  --    PROTEIN TOTAL g/dL  --   --  6.6 6.7   ALT U/L  --   --  40 20   AST U/L  --   --  54* 69*   ALBUMIN g/dL 2.5* 2.7* 2.8* 2.9*     Estimated Creatinine Clearance: 7  mL/min (A) (by C-G formula based on SCr of 7.27 mg/dL (H)).  C-Reactive Protein   Date Value Ref Range Status   01/14/2024 5.33 (H) <1.00 mg/dL Final   11/06/2023 4.91 (H) <1.00 mg/dL Final     CRP   Date Value Ref Range Status   08/08/2023 0.85 mg/dL Final     Comment:     REF VALUE  < 1.00       Microbiology    3/18/24:  Collected at [facility], culture sent to [facility].    PD catheter fluid with  Klebsiella oxytoca and Pseudomonas putida  Peritoneal Dialysis Fluid Culture/Smear  Order: [id number]  Collected 3/21/2024 16:07       Status: Preliminary result       Visible to patient: No (not released)    Specimen Information: Peritoneal Dialysis; Fluid   0 Result Notes  Peritoneal Dialysis Fluid Culture No growth to date               Gram Stain No polymorphonuclear leukocytes seen      No organisms seen              Resulting Agency: [facility]           Specimen Collected: 03/21/24 16:07 Last Resulted: 03/23/24 09:19                 Assessment/Plan   65y/o F PMH  ESRD on PD 2/2 multiple AAA dissections, HFrEF (EF 15-20% 03/2023), pancreatitis, chronic diarrhea,  PE/DVT on warfarin, HTN, and multiple renal artery grafts with prior ilio-renal bypass,  recurrent peritonitis most recently staph epidermis (11/2023 & 02/2024) s/p intraperitoneal abx  and retained PD who is now admitted for management of back pain with concern for PD associated peritonitis.  Per patient, fluid was sent for reasons unclear to her and were sent from a collection vessel collecting fluid overnight.  Fluid culture from 3/18 at [facility] grew K. Oxytoca and P. Putida.  She denies receiving antibiotics prior to transfer.  Would not expect fluid to normalize if cultures were indee from fresh PD fluid.  Studies at  on arrival are not consistent with peritonitis.  WBC counts in fluid range from 10 to 3 with colorless fluid. Gram stains without PMNs or organisms and culture remains negative.  Our studies were obtained before antibiotics  administered at [facility].      Low suspicion for PD associated peritonitis clinically.  Discussed with nephrology who agreed that clinically inconsistent.    RECOMMENDATIONS:     Stop antibiotics as she does not have peritonitis clinically.    Thank-you for allowing us to assist in your patient's management.  We are signing off.  Call if any further issues should arise or you should have any questions.     Joana Mayo MD.  (please reach through EPIC Chat)  Infectious Diseases, Senior Attending Physician  Team A,  pager [phone]      I spent 35 minutes in the professional and overall care of this patient.      Joana Mayo MD

## 2024-03-24 ENCOUNTER — APPOINTMENT (OUTPATIENT)
Dept: RADIOLOGY | Facility: HOSPITAL | Age: 64
DRG: 314 | End: 2024-03-24
Payer: COMMERCIAL

## 2024-03-24 LAB
ALBUMIN SERPL BCP-MCNC: 2.7 G/DL (ref 3.4–5)
ANION GAP SERPL CALC-SCNC: 20 MMOL/L (ref 10–20)
APTT PPP: 30 SECONDS (ref 27–38)
BACTERIA DIAFP CULT: NORMAL
BUN SERPL-MCNC: 52 MG/DL (ref 6–23)
CALCIUM SERPL-MCNC: 8.6 MG/DL (ref 8.6–10.6)
CHLORIDE SERPL-SCNC: 97 MMOL/L (ref 98–107)
CLARITY FLD: CLEAR
CO2 SERPL-SCNC: 21 MMOL/L (ref 21–32)
COLOR FLD: COLORLESS
CREAT SERPL-MCNC: 7.46 MG/DL (ref 0.5–1.05)
EGFRCR SERPLBLD CKD-EPI 2021: 6 ML/MIN/1.73M*2
ERYTHROCYTE [DISTWIDTH] IN BLOOD BY AUTOMATED COUNT: 20.1 % (ref 11.5–14.5)
GLUCOSE SERPL-MCNC: 103 MG/DL (ref 74–99)
GRAM STN SPEC: NORMAL
GRAM STN SPEC: NORMAL
HCT VFR BLD AUTO: 25.8 % (ref 36–46)
HGB BLD-MCNC: 8.1 G/DL (ref 12–16)
INR PPP: 1.5 (ref 0.9–1.1)
MAGNESIUM SERPL-MCNC: 2.25 MG/DL (ref 1.6–2.4)
MCH RBC QN AUTO: 29.5 PG (ref 26–34)
MCHC RBC AUTO-ENTMCNC: 31.4 G/DL (ref 32–36)
MCV RBC AUTO: 94 FL (ref 80–100)
NRBC BLD-RTO: 0.4 /100 WBCS (ref 0–0)
PHOSPHATE SERPL-MCNC: 5.5 MG/DL (ref 2.5–4.9)
PLATELET # BLD AUTO: 188 X10*3/UL (ref 150–450)
POTASSIUM SERPL-SCNC: 3.8 MMOL/L (ref 3.5–5.3)
PROTHROMBIN TIME: 17.1 SECONDS (ref 9.8–12.8)
RBC # BLD AUTO: 2.75 X10*6/UL (ref 4–5.2)
RBC # FLD AUTO: 1 /UL
SODIUM SERPL-SCNC: 134 MMOL/L (ref 136–145)
WBC # BLD AUTO: 5.6 X10*3/UL (ref 4.4–11.3)
WBC # FLD AUTO: 1 /UL

## 2024-03-24 PROCEDURE — 2500000002 HC RX 250 W HCPCS SELF ADMINISTERED DRUGS (ALT 637 FOR MEDICARE OP, ALT 636 FOR OP/ED)

## 2024-03-24 PROCEDURE — 85027 COMPLETE CBC AUTOMATED: CPT

## 2024-03-24 PROCEDURE — 89050 BODY FLUID CELL COUNT: CPT

## 2024-03-24 PROCEDURE — 99233 SBSQ HOSP IP/OBS HIGH 50: CPT | Performed by: INTERNAL MEDICINE

## 2024-03-24 PROCEDURE — 93971 EXTREMITY STUDY: CPT | Performed by: RADIOLOGY

## 2024-03-24 PROCEDURE — 2500000004 HC RX 250 GENERAL PHARMACY W/ HCPCS (ALT 636 FOR OP/ED): Performed by: INTERNAL MEDICINE

## 2024-03-24 PROCEDURE — 85610 PROTHROMBIN TIME: CPT

## 2024-03-24 PROCEDURE — 99233 SBSQ HOSP IP/OBS HIGH 50: CPT

## 2024-03-24 PROCEDURE — 87070 CULTURE OTHR SPECIMN AEROBIC: CPT

## 2024-03-24 PROCEDURE — 2500000004 HC RX 250 GENERAL PHARMACY W/ HCPCS (ALT 636 FOR OP/ED)

## 2024-03-24 PROCEDURE — 2500000001 HC RX 250 WO HCPCS SELF ADMINISTERED DRUGS (ALT 637 FOR MEDICARE OP)

## 2024-03-24 PROCEDURE — 80069 RENAL FUNCTION PANEL: CPT

## 2024-03-24 PROCEDURE — 90947 DIALYSIS REPEATED EVAL: CPT

## 2024-03-24 PROCEDURE — 93970 EXTREMITY STUDY: CPT

## 2024-03-24 PROCEDURE — 83735 ASSAY OF MAGNESIUM: CPT

## 2024-03-24 PROCEDURE — 1100000001 HC PRIVATE ROOM DAILY

## 2024-03-24 PROCEDURE — 36415 COLL VENOUS BLD VENIPUNCTURE: CPT

## 2024-03-24 PROCEDURE — 2500000001 HC RX 250 WO HCPCS SELF ADMINISTERED DRUGS (ALT 637 FOR MEDICARE OP): Performed by: STUDENT IN AN ORGANIZED HEALTH CARE EDUCATION/TRAINING PROGRAM

## 2024-03-24 RX ORDER — LANOLIN ALCOHOL/MO/W.PET/CERES
400 CREAM (GRAM) TOPICAL DAILY
Status: DISCONTINUED | OUTPATIENT
Start: 2024-03-24 | End: 2024-03-27 | Stop reason: HOSPADM

## 2024-03-24 RX ORDER — HYDROMORPHONE HYDROCHLORIDE 1 MG/ML
0.4 INJECTION, SOLUTION INTRAMUSCULAR; INTRAVENOUS; SUBCUTANEOUS ONCE
Status: COMPLETED | OUTPATIENT
Start: 2024-03-24 | End: 2024-03-24

## 2024-03-24 RX ORDER — POLYETHYLENE GLYCOL 3350 17 G/17G
17 POWDER, FOR SOLUTION ORAL 2 TIMES DAILY
Status: DISCONTINUED | OUTPATIENT
Start: 2024-03-24 | End: 2024-03-27 | Stop reason: HOSPADM

## 2024-03-24 RX ORDER — WARFARIN 3 MG/1
3 TABLET ORAL ONCE
Status: COMPLETED | OUTPATIENT
Start: 2024-03-24 | End: 2024-03-24

## 2024-03-24 RX ORDER — BISACODYL 10 MG/1
10 SUPPOSITORY RECTAL ONCE
Status: COMPLETED | OUTPATIENT
Start: 2024-03-24 | End: 2024-03-24

## 2024-03-24 RX ORDER — SENNOSIDES 8.6 MG/1
2 TABLET ORAL 2 TIMES DAILY
Status: DISCONTINUED | OUTPATIENT
Start: 2024-03-24 | End: 2024-03-27 | Stop reason: HOSPADM

## 2024-03-24 RX ADMIN — TRAMADOL HYDROCHLORIDE 25 MG: 50 TABLET, COATED ORAL at 09:44

## 2024-03-24 RX ADMIN — ACETAMINOPHEN 650 MG: 325 TABLET ORAL at 14:02

## 2024-03-24 RX ADMIN — LIDOCAINE 4%: 4 CREAM TOPICAL at 22:10

## 2024-03-24 RX ADMIN — LEVOTHYROXINE SODIUM 25 MCG: 25 TABLET ORAL at 06:07

## 2024-03-24 RX ADMIN — SODIUM CHLORIDE, SODIUM LACTATE, CALCIUM CHLORIDE, MAGNESIUM CHLORIDE AND DEXTROSE: 2.5; 538; 448; 18.3; 5.08 INJECTION, SOLUTION INTRAPERITONEAL at 17:21

## 2024-03-24 RX ADMIN — PANCRELIPASE 3 CAPSULE: 24000; 76000; 120000 CAPSULE, DELAYED RELEASE PELLETS ORAL at 09:43

## 2024-03-24 RX ADMIN — ALLOPURINOL 100 MG: 100 TABLET ORAL at 09:43

## 2024-03-24 RX ADMIN — SENNOSIDES 17.2 MG: 8.6 TABLET, FILM COATED ORAL at 22:07

## 2024-03-24 RX ADMIN — SENNOSIDES 17.2 MG: 8.6 TABLET, FILM COATED ORAL at 09:44

## 2024-03-24 RX ADMIN — ACETAMINOPHEN 650 MG: 325 TABLET ORAL at 20:48

## 2024-03-24 RX ADMIN — POLYETHYLENE GLYCOL 3350 17 G: 17 POWDER, FOR SOLUTION ORAL at 22:10

## 2024-03-24 RX ADMIN — BISACODYL 10 MG: 10 SUPPOSITORY RECTAL at 14:02

## 2024-03-24 RX ADMIN — SEVELAMER CARBONATE 800 MG: 800 TABLET, FILM COATED ORAL at 09:43

## 2024-03-24 RX ADMIN — HYDROMORPHONE HYDROCHLORIDE 0.4 MG: 1 INJECTION, SOLUTION INTRAMUSCULAR; INTRAVENOUS; SUBCUTANEOUS at 02:18

## 2024-03-24 RX ADMIN — WARFARIN SODIUM 3 MG: 3 TABLET ORAL at 22:07

## 2024-03-24 RX ADMIN — SODIUM CHLORIDE, SODIUM LACTATE, CALCIUM CHLORIDE, MAGNESIUM CHLORIDE AND DEXTROSE: 2.5; 538; 448; 18.3; 5.08 INJECTION, SOLUTION INTRAPERITONEAL at 17:20

## 2024-03-24 RX ADMIN — GENTAMICIN SULFATE: 1 CREAM TOPICAL at 22:10

## 2024-03-24 RX ADMIN — POLYETHYLENE GLYCOL 3350 17 G: 17 POWDER, FOR SOLUTION ORAL at 09:44

## 2024-03-24 RX ADMIN — TRAMADOL HYDROCHLORIDE 25 MG: 50 TABLET, COATED ORAL at 22:17

## 2024-03-24 RX ADMIN — Medication 400 MG: at 09:43

## 2024-03-24 ASSESSMENT — PAIN DESCRIPTION - LOCATION
LOCATION: ABDOMEN
LOCATION: LEG

## 2024-03-24 ASSESSMENT — PAIN - FUNCTIONAL ASSESSMENT
PAIN_FUNCTIONAL_ASSESSMENT: 0-10

## 2024-03-24 ASSESSMENT — COGNITIVE AND FUNCTIONAL STATUS - GENERAL
PERSONAL GROOMING: A LITTLE
CLIMB 3 TO 5 STEPS WITH RAILING: TOTAL
MOVING FROM LYING ON BACK TO SITTING ON SIDE OF FLAT BED WITH BEDRAILS: A LITTLE
STANDING UP FROM CHAIR USING ARMS: A LITTLE
DAILY ACTIVITIY SCORE: 18
DRESSING REGULAR UPPER BODY CLOTHING: A LITTLE
TURNING FROM BACK TO SIDE WHILE IN FLAT BAD: A LITTLE
HELP NEEDED FOR BATHING: A LITTLE
TOILETING: A LITTLE
DRESSING REGULAR LOWER BODY CLOTHING: A LITTLE
MOBILITY SCORE: 16
EATING MEALS: A LITTLE
MOVING TO AND FROM BED TO CHAIR: A LITTLE
WALKING IN HOSPITAL ROOM: A LITTLE

## 2024-03-24 ASSESSMENT — PAIN DESCRIPTION - ORIENTATION: ORIENTATION: RIGHT;LEFT

## 2024-03-24 ASSESSMENT — PAIN SCALES - GENERAL
PAINLEVEL_OUTOF10: 10 - WORST POSSIBLE PAIN
PAINLEVEL_OUTOF10: 8
PAINLEVEL_OUTOF10: 7

## 2024-03-24 NOTE — PROGRESS NOTES
"Vancomycin Dosing by Pharmacy- FOLLOW UP    Yoselyn Hernandez is a 64 y.o. year old female who Pharmacy has been consulted for vancomycin dosing for other peritonitis . Based on the patient's indication and renal status this patient is being dosed based on a goal trough/random level of 15-20.     Renal function is currently declining.    Current vancomycin dose: 1000 mg given every 24 x 1 dose on 3/22 @1600    Most recent trough level: 26.3 mcg/mL    Visit Vitals  /69   Pulse 89   Temp 36.6 °C (97.9 °F)   Resp 18        Lab Results   Component Value Date    CREATININE 7.27 (H) 03/23/2024    CREATININE 7.28 (H) 03/22/2024    CREATININE 8.19 (H) 03/21/2024    CREATININE 7.29 (H) 03/20/2024        Patient weight is No results found for: \"PTWEIGHT\"    No results found for: \"CULTURE\"     I/O last 3 completed shifts:  In: 800 (14.2 mL/kg) [P.O.:600; IV Piggyback:200]  Out: 1441 (25.6 mL/kg) [Other:1441]  Weight: 56.2 kg   [unfilled]    Lab Results   Component Value Date    PATIENTTEMP 37.0 11/06/2023    PATIENTTEMP 37.0 05/12/2023    PATIENTTEMP 37.0 03/13/2023    PATIENTTEMP 37.0 10/28/2022        Assessment/Plan    Above goal random/trough level. Orders placed for new vancomycin regimen of 0 every 0 hours to begin at 0 .    This dosing regimen is predicted by [employer] to result in the following pharmacokinetic parameters:  Will hold dose and draw level again in 24hrs so 3/24 @1600    The next level will be obtained on 3/24 at 1600. May be obtained sooner if clinically indicated.   Will continue to monitor renal function daily while on vancomycin and order serum creatinine at least every 48 hours if not already ordered.  Follow for continued vancomycin needs, clinical response, and signs/symptoms of toxicity.       Margie Rodriguez, PharmD           "

## 2024-03-24 NOTE — PROGRESS NOTES
"Yoselyn Hernandez is a 64 y.o. female on day 3 of admission presenting with Hypotension.    Subjective   NAOE. Feels bloated and had concerns that was receiving too much PD volume. When asked, said that she has not had a bowel movement since admission. Enjoyed the coloring book that was provided.       Objective     Physical Exam    General: alert and oriented, answering questions appropriately  Skin: no suspect lesions or rashes noted on visible skin  Chest: ctab, normal respiratory effort, not on supplemental oxygen  Abdomen: soft, ND, NT. PD catheter in place, no erythema, purulence around site  EXT: +1 pitting edema bilaterally of LE up to shins  MSK: no focal joint swelling noted  Neuro: AOx4, moving all limbs spontaneously, follows commands    Last Recorded Vitals  Blood pressure 92/58, pulse 83, temperature 36.4 °C (97.5 °F), resp. rate 18, height 1.702 m (5' 7.01\"), weight 56.2 kg (124 lb), SpO2 95 %.  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 Admitted for treatment of peritonitis  In the ED, was found to be hypotensive with BP of 70s/60s with no altered mentation, tachycardia, or physical exam signs of hypoperfusion. Baseline BP per prior visits appears to be 90s/60s. She was given 1 L IV fluids. Due to concern for vascular etiology " (hx of dissections), CT angio was obtained, which showed patent graft with no endoleak. She was treated with several days of IV abx including cefepime and vancomycin. Repeat dialysis culture no growth and minimal white blood cells so did not think that patient had true peritonitis. Possibly culture from outside facility was contaminant. Per ID, stopped abx on 3/24. Active issues: constipation, DVT scan.    Changes Today:  - DVT scan likely to be done today  - PT dispo rec is moderate intensity level  - ordered aggressive bowel regimen  - echo pending  - will look into pain medication regiemn  - per ID stopped abx  - messaged nephrology about any updated recs  - switched to renal diet  - spoke to pharmacist about warfarin regimen; continue to hold at current dosing; they are considering whether heparin bridge might be needed  - called vascular lab about having DVT scan done; they did not  but left a message about expediting the DVT scan  - awaiting palliative care rec  - spoke with nurse about placing ACE wraps around legs    Pain Regimen Changes from Chart Review  - 2/08/24 admission: Dialudid 3 mg Q4H tablet was stopped   - 1/30/24 admission: Pregabalin was stopped  and replaced with Dilaudid 3 mg Q4H  - 11/06/23 admission: oxycodone 5 mg Q4H  was active  - 10/18/23 admission: patient was on 25 mg pregabalin daily  - PCP note 08/31/2023 noted patient on lexapro for depression with anxiety  -8/01/2023: ED noted plan for back pain was flexeril, NSAIDs, jessica, and opioids as needed      Sensitivities (called lab)  Klebsiella:  Sensitive to AMP/Sulbactam; Augmentin; Ceftriaxone; Ciprofloxacin; Cefuroxime; Gentamicin; Levofloxacin; Zosyn; Bactrim  Resistant to: none  Pseudomonas: resistance to aztreonam;   Sensitive to cefepime; gentamicin; piperacilin, tobramycin    #peritonitis (PD related)  ::concern for secondary peritonitis low but not entirely ruled out. Per CT scan, Prominence of gastric and  small-bowel  mucosal folds with fluid-filled nondilated loops of small bowel suggesting indolent gastroenteritis in the appropriate clinical context.  ::GNRs per culture drawn at nursing home (in paper chart)  ::s/p Cefepime in the ED  -continue cefepime   -IV antibiotics for now due to hypotension. Consider switching to intraperitoneal antibiotics if secondary peritonitis ruled out  - Cefepime (3/21 - 3/24 )  - Vancomycin   - nephrology consulted  - ID consulted     #ESRD on PD  -continue sevelamer      #hypotension (BP in the 70/60s) (baseline BP in the 100s/60s) - resolved in the ED  ::s/p 1 L IV fluids to treat hypovolemia/sepsis. Low concern for hemorrhage based on CT angio findings  -monitor for s/s of volume overload, mainly pulmonary edema      #5.4 cm descending aortic aneurysm  #Left common iliac occlusion - chronic  #hx of multiple dissections (11 per patient)  - appears stable compared to prior, vascular surgery was consulted at previous hospitalization; low threshold to consult vascular surgery again     #HFrEF (EF 15-30% ON 03/2023)  -not on GDMT at home. Cannot tolerate currently due to soft BPs     #DVT/PE (on warfarin)  ::supra therapeutic INR on admission  -warfarin  -daily INRs, if unfractionated heparin required, start once INR <2     #chronic diarrhea - (holding) lomotil  -per renal, needs to have at least one BM a day. Continue miralax, senna PRN     #hypothyroidism-continue synthyroid   #chronic pancreatitis-continue home creon    #Chronic pain  - resumed Tramadol     F: caution  E: monitor K  N: reg diet  Access: PD cath, pIV  DVT: SCDs, warfarin  Surrogate decision maker: sister Farida   Code status: FULL CODE (confirmed on admission)           Shabbir Pardo MD PhD

## 2024-03-24 NOTE — PROGRESS NOTES
Vancomycin Dosing by Pharmacy- Cessation of Therapy    Consult to pharmacy for vancomycin dosing has been discontinued by the prescriber, pharmacy will sign off at this time.    Please call pharmacy if there are further questions or re-enter a consult if vancomycin is resumed.     Johanna Smith, PharmD

## 2024-03-25 ENCOUNTER — APPOINTMENT (OUTPATIENT)
Dept: CARDIOLOGY | Facility: HOSPITAL | Age: 64
DRG: 314 | End: 2024-03-25
Payer: COMMERCIAL

## 2024-03-25 LAB
ALBUMIN SERPL BCP-MCNC: 2.5 G/DL (ref 3.4–5)
ANION GAP SERPL CALC-SCNC: 18 MMOL/L (ref 10–20)
AORTIC VALVE PEAK VELOCITY: 0.61 M/S
APTT PPP: 33 SECONDS (ref 27–38)
AV PEAK GRADIENT: 1.5 MMHG
AVA (PEAK VEL): 2.45 CM2
BACTERIA BLD CULT: NORMAL
BACTERIA BLD CULT: NORMAL
BUN SERPL-MCNC: 47 MG/DL (ref 6–23)
CALCIUM SERPL-MCNC: 8.9 MG/DL (ref 8.6–10.6)
CHLORIDE SERPL-SCNC: 95 MMOL/L (ref 98–107)
CO2 SERPL-SCNC: 24 MMOL/L (ref 21–32)
CREAT SERPL-MCNC: 7.35 MG/DL (ref 0.5–1.05)
EGFRCR SERPLBLD CKD-EPI 2021: 6 ML/MIN/1.73M*2
EJECTION FRACTION APICAL 4 CHAMBER: 22.7
EJECTION FRACTION: 20 %
ERYTHROCYTE [DISTWIDTH] IN BLOOD BY AUTOMATED COUNT: 20.2 % (ref 11.5–14.5)
GLUCOSE SERPL-MCNC: 96 MG/DL (ref 74–99)
HCT VFR BLD AUTO: 24.7 % (ref 36–46)
HGB BLD-MCNC: 8.3 G/DL (ref 12–16)
INR PPP: 1.7 (ref 0.9–1.1)
LEFT ATRIUM VOLUME AREA LENGTH INDEX BSA: 91.8 ML/M2
LEFT VENTRICLE INTERNAL DIMENSION DIASTOLE: 6.55 CM (ref 3.5–6)
LEFT VENTRICULAR OUTFLOW TRACT DIAMETER: 2 CM
MAGNESIUM SERPL-MCNC: 2.08 MG/DL (ref 1.6–2.4)
MCH RBC QN AUTO: 30.4 PG (ref 26–34)
MCHC RBC AUTO-ENTMCNC: 33.6 G/DL (ref 32–36)
MCV RBC AUTO: 91 FL (ref 80–100)
MITRAL VALVE E/A RATIO: 2.44
MITRAL VALVE E/E' RATIO: 15.68
NRBC BLD-RTO: 1.3 /100 WBCS (ref 0–0)
PHOSPHATE SERPL-MCNC: 5.4 MG/DL (ref 2.5–4.9)
PLATELET # BLD AUTO: 177 X10*3/UL (ref 150–450)
POTASSIUM SERPL-SCNC: 3.4 MMOL/L (ref 3.5–5.3)
PROTHROMBIN TIME: 19.5 SECONDS (ref 9.8–12.8)
RBC # BLD AUTO: 2.73 X10*6/UL (ref 4–5.2)
RIGHT VENTRICLE FREE WALL PEAK S': 0.07 CM/S
RIGHT VENTRICLE PEAK SYSTOLIC PRESSURE: 58.6 MMHG
SODIUM SERPL-SCNC: 134 MMOL/L (ref 136–145)
TRICUSPID ANNULAR PLANE SYSTOLIC EXCURSION: 1.5 CM
WBC # BLD AUTO: 4.8 X10*3/UL (ref 4.4–11.3)

## 2024-03-25 PROCEDURE — 1100000001 HC PRIVATE ROOM DAILY

## 2024-03-25 PROCEDURE — 2500000001 HC RX 250 WO HCPCS SELF ADMINISTERED DRUGS (ALT 637 FOR MEDICARE OP): Performed by: STUDENT IN AN ORGANIZED HEALTH CARE EDUCATION/TRAINING PROGRAM

## 2024-03-25 PROCEDURE — 2500000004 HC RX 250 GENERAL PHARMACY W/ HCPCS (ALT 636 FOR OP/ED)

## 2024-03-25 PROCEDURE — 85610 PROTHROMBIN TIME: CPT

## 2024-03-25 PROCEDURE — 2500000004 HC RX 250 GENERAL PHARMACY W/ HCPCS (ALT 636 FOR OP/ED): Performed by: INTERNAL MEDICINE

## 2024-03-25 PROCEDURE — 80069 RENAL FUNCTION PANEL: CPT

## 2024-03-25 PROCEDURE — 2500000002 HC RX 250 W HCPCS SELF ADMINISTERED DRUGS (ALT 637 FOR MEDICARE OP, ALT 636 FOR OP/ED)

## 2024-03-25 PROCEDURE — 90947 DIALYSIS REPEATED EVAL: CPT

## 2024-03-25 PROCEDURE — 2500000001 HC RX 250 WO HCPCS SELF ADMINISTERED DRUGS (ALT 637 FOR MEDICARE OP)

## 2024-03-25 PROCEDURE — 99233 SBSQ HOSP IP/OBS HIGH 50: CPT

## 2024-03-25 PROCEDURE — 36415 COLL VENOUS BLD VENIPUNCTURE: CPT

## 2024-03-25 PROCEDURE — 93306 TTE W/DOPPLER COMPLETE: CPT | Performed by: INTERNAL MEDICINE

## 2024-03-25 PROCEDURE — 2500000002 HC RX 250 W HCPCS SELF ADMINISTERED DRUGS (ALT 637 FOR MEDICARE OP, ALT 636 FOR OP/ED): Performed by: STUDENT IN AN ORGANIZED HEALTH CARE EDUCATION/TRAINING PROGRAM

## 2024-03-25 PROCEDURE — 83735 ASSAY OF MAGNESIUM: CPT

## 2024-03-25 PROCEDURE — 93306 TTE W/DOPPLER COMPLETE: CPT

## 2024-03-25 PROCEDURE — 85027 COMPLETE CBC AUTOMATED: CPT

## 2024-03-25 RX ORDER — WARFARIN 3 MG/1
3 TABLET ORAL DAILY
Status: DISCONTINUED | OUTPATIENT
Start: 2024-03-25 | End: 2024-03-26

## 2024-03-25 RX ORDER — PREGABALIN 25 MG/1
25 CAPSULE ORAL DAILY
Status: DISCONTINUED | OUTPATIENT
Start: 2024-03-25 | End: 2024-03-27 | Stop reason: HOSPADM

## 2024-03-25 RX ORDER — HYDROMORPHONE HYDROCHLORIDE 2 MG/1
1 TABLET ORAL ONCE
Status: COMPLETED | OUTPATIENT
Start: 2024-03-25 | End: 2024-03-25

## 2024-03-25 RX ORDER — PREGABALIN 25 MG/1
25 CAPSULE ORAL DAILY
Start: 2024-03-26 | End: 2024-06-10 | Stop reason: HOSPADM

## 2024-03-25 RX ADMIN — POLYETHYLENE GLYCOL 3350 17 G: 17 POWDER, FOR SOLUTION ORAL at 09:02

## 2024-03-25 RX ADMIN — SEVELAMER CARBONATE 800 MG: 800 TABLET, FILM COATED ORAL at 11:42

## 2024-03-25 RX ADMIN — SENNOSIDES 17.2 MG: 8.6 TABLET, FILM COATED ORAL at 09:06

## 2024-03-25 RX ADMIN — PREGABALIN 25 MG: 25 CAPSULE ORAL at 00:20

## 2024-03-25 RX ADMIN — PSYLLIUM HUSK 1 PACKET: 3.4 POWDER ORAL at 20:29

## 2024-03-25 RX ADMIN — Medication 10 MG: at 00:19

## 2024-03-25 RX ADMIN — SODIUM CHLORIDE, SODIUM LACTATE, CALCIUM CHLORIDE, MAGNESIUM CHLORIDE AND DEXTROSE: 2.5; 538; 448; 18.3; 5.08 INJECTION, SOLUTION INTRAPERITONEAL at 17:18

## 2024-03-25 RX ADMIN — POLYETHYLENE GLYCOL 3350 17 G: 17 POWDER, FOR SOLUTION ORAL at 20:29

## 2024-03-25 RX ADMIN — WARFARIN SODIUM 3 MG: 3 TABLET ORAL at 17:04

## 2024-03-25 RX ADMIN — ALLOPURINOL 100 MG: 100 TABLET ORAL at 08:59

## 2024-03-25 RX ADMIN — LIDOCAINE 4%: 4 CREAM TOPICAL at 20:35

## 2024-03-25 RX ADMIN — SEVELAMER CARBONATE 800 MG: 800 TABLET, FILM COATED ORAL at 16:57

## 2024-03-25 RX ADMIN — Medication 10 MG: at 20:29

## 2024-03-25 RX ADMIN — LIDOCAINE 4%: 4 CREAM TOPICAL at 09:02

## 2024-03-25 RX ADMIN — SENNOSIDES 17.2 MG: 8.6 TABLET, FILM COATED ORAL at 20:28

## 2024-03-25 RX ADMIN — LIDOCAINE 4%: 4 CREAM TOPICAL at 17:05

## 2024-03-25 RX ADMIN — TRAMADOL HYDROCHLORIDE 25 MG: 50 TABLET, COATED ORAL at 22:37

## 2024-03-25 RX ADMIN — GENTAMICIN SULFATE: 1 CREAM TOPICAL at 20:35

## 2024-03-25 RX ADMIN — PSYLLIUM HUSK 1 PACKET: 3.4 POWDER ORAL at 11:42

## 2024-03-25 RX ADMIN — HYDROMORPHONE HYDROCHLORIDE 1 MG: 2 TABLET ORAL at 00:21

## 2024-03-25 RX ADMIN — PANCRELIPASE 3 CAPSULE: 24000; 76000; 120000 CAPSULE, DELAYED RELEASE PELLETS ORAL at 11:42

## 2024-03-25 RX ADMIN — LEVOTHYROXINE SODIUM 25 MCG: 25 TABLET ORAL at 06:41

## 2024-03-25 RX ADMIN — Medication 400 MG: at 09:00

## 2024-03-25 RX ADMIN — PSYLLIUM HUSK 1 PACKET: 3.4 POWDER ORAL at 16:57

## 2024-03-25 ASSESSMENT — PAIN SCALES - GENERAL
PAINLEVEL_OUTOF10: 0 - NO PAIN
PAINLEVEL_OUTOF10: 4
PAINLEVEL_OUTOF10: 3
PAINLEVEL_OUTOF10: 3
PAINLEVEL_OUTOF10: 0 - NO PAIN
PAINLEVEL_OUTOF10: 0 - NO PAIN

## 2024-03-25 ASSESSMENT — COGNITIVE AND FUNCTIONAL STATUS - GENERAL
TURNING FROM BACK TO SIDE WHILE IN FLAT BAD: A LITTLE
TURNING FROM BACK TO SIDE WHILE IN FLAT BAD: A LITTLE
MOBILITY SCORE: 16
MOBILITY SCORE: 16
STANDING UP FROM CHAIR USING ARMS: A LITTLE
PERSONAL GROOMING: A LITTLE
DAILY ACTIVITIY SCORE: 18
EATING MEALS: A LITTLE
DRESSING REGULAR LOWER BODY CLOTHING: A LITTLE
MOVING TO AND FROM BED TO CHAIR: A LITTLE
CLIMB 3 TO 5 STEPS WITH RAILING: TOTAL
MOVING FROM LYING ON BACK TO SITTING ON SIDE OF FLAT BED WITH BEDRAILS: A LITTLE
DRESSING REGULAR UPPER BODY CLOTHING: A LITTLE
WALKING IN HOSPITAL ROOM: A LITTLE
MOVING FROM LYING ON BACK TO SITTING ON SIDE OF FLAT BED WITH BEDRAILS: A LITTLE
WALKING IN HOSPITAL ROOM: A LITTLE
STANDING UP FROM CHAIR USING ARMS: A LITTLE
HELP NEEDED FOR BATHING: A LITTLE
CLIMB 3 TO 5 STEPS WITH RAILING: TOTAL
TOILETING: A LITTLE
MOVING TO AND FROM BED TO CHAIR: A LITTLE

## 2024-03-25 ASSESSMENT — PAIN SCALES - PAIN ASSESSMENT IN ADVANCED DEMENTIA (PAINAD)
CONSOLABILITY: NO NEED TO CONSOLE
FACIALEXPRESSION: SMILING OR INEXPRESSIVE
BODYLANGUAGE: RELAXED
TOTALSCORE: 0
BREATHING: NORMAL

## 2024-03-25 ASSESSMENT — PAIN - FUNCTIONAL ASSESSMENT
PAIN_FUNCTIONAL_ASSESSMENT: 0-10

## 2024-03-25 ASSESSMENT — PAIN SCALES - WONG BAKER
WONGBAKER_NUMERICALRESPONSE: NO HURT

## 2024-03-25 NOTE — DISCHARGE INSTRUCTIONS
Dear Mrs. Hernandez    You were treated at [facility] for concerns for peritonitis. Your peritoneal dialysis fluid studies were all negative here and there was very low suspicion that you actually had a peritoneal infection. Thus antibiotics were stopped. A new echo was obtained of your right heart and your heart function as stable compared to your last scan from 2023. This imaging will be reviewed with your cardiologist so they can best help protect your heart at outpatient follow up. At discharge, we have also set you up to follow up with thoracic surgery, your primary care physician, as well as pain medicine. All four of these teams will help manage your care going forward. Please see your kidney doctor as well after you leave. They are not in our system but you preferred to see your original nephrologist so we did not request a change.    For your pain, we restarted a pain medication called pregabalin.    Your blood thinning medication was adjusted this hospitalization because your levels were too high. We will have you take 2 mg daily instead of 3 mg. We would like your INR to be checked after you discharge to make sure your levels do not become too high or too low.    Thank you for entrusting us with your care.    We wish you all the best.    Sincerely,  Your  Medical Team

## 2024-03-25 NOTE — PROGRESS NOTES
Transitional Care Coordination Progress Note:  Patient discussed during interdisciplinary rounds.  Team members present: MD, TCC  Plan per Medical/Surgical team: Per medical team pt is completely off abx and medically clear for discharge pending precert which was started 3/22.  [facility]  Status: Inpatient  Discharge disposition: [facility]  Potential Barriers: none  ADOD: 3/25  Per [facility] precert is still pending. Updated therapy notes requesting via rehab columns for precert. Transport placed in Will Call via [facility] for tomorrow. Care coordinator will continue to follow for discharge planning needs.     Paulie Yadav RN  Transitional Care Coordinator/TCC  [id number]

## 2024-03-25 NOTE — PROGRESS NOTES
"Yoselyn Hernandez is a 64 y.o. female on day 4 of admission presenting with Hypotension.    Subjective   NAOE. Had one small bowel movement yesterday. Pain is improved. Concerned about pancreas issues.       Objective     Physical Exam    General: alert and oriented, answering questions appropriately  Skin: no suspect lesions or rashes noted on visible skin  Chest: ctab, normal respiratory effort, not on supplemental oxygen  Abdomen: soft, ND, NT. PD catheter in place, no erythema, purulence around site  EXT: +1 pitting edema bilaterally of LE up to shins  MSK: no focal joint swelling noted  Neuro: AOx4, moving all limbs spontaneously, follows commands    Last Recorded Vitals  Blood pressure 115/67, pulse 86, temperature 36.6 °C (97.9 °F), resp. rate 18, height 1.702 m (5' 7.01\"), weight 56.2 kg (124 lb), SpO2 99 %.  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 Admitted for treatment of peritonitis  In the ED, was found to be hypotensive with BP of 70s/60s with no altered mentation, tachycardia, or physical exam signs of hypoperfusion. Baseline BP per prior visits appears to be 90s/60s. She was given 1 L IV fluids. Due to concern for vascular etiology (hx of dissections), CT angio was obtained, which showed patent graft with no endoleak. She was treated " with several days of IV abx including cefepime and vancomycin. Repeat dialysis culture no growth and minimal white blood cells so did not think that patient had true peritonitis. Possibly culture from outside facility was contaminant. Per ID, stopped abx on 3/24. DVT scan performed on 3/24 showed no DVT. Active issues: constipation, echo, anticoagulation with warfarin titration.    Changes Today:  - likely medically ready for discharge tomorrow.  - Echo pending  - follow up requested with vasc surg, PCP, pain mangement, and cardiology HF  - spoke with palliative care, recommended cancelling consult so it was cancelled.  - restarted on pregablin    Pain Regimen Changes from Chart Review  - 2/08/24 admission: Dialudid 3 mg Q4H tablet was stopped   - 1/30/24 admission: Pregabalin was stopped  and replaced with Dilaudid 3 mg Q4H  - 11/06/23 admission: oxycodone 5 mg Q4H  was active  - 10/18/23 admission: patient was on 25 mg pregabalin daily  - PCP note 08/31/2023 noted patient on lexapro for depression with anxiety  -8/01/2023: ED noted plan for back pain was flexeril, NSAIDs, jessica, and opioids as needed      Sensitivities (called lab)  Klebsiella:  Sensitive to AMP/Sulbactam; Augmentin; Ceftriaxone; Ciprofloxacin; Cefuroxime; Gentamicin; Levofloxacin; Zosyn; Bactrim  Resistant to: none  Pseudomonas: resistance to aztreonam;   Sensitive to cefepime; gentamicin; piperacilin, tobramycin    #peritonitis (PD related)  ::concern for secondary peritonitis low but not entirely ruled out. Per CT scan, Prominence of gastric and small-bowel  mucosal folds with fluid-filled nondilated loops of small bowel suggesting indolent gastroenteritis in the appropriate clinical context.  ::GNRs per culture drawn at nursing home (in paper chart)  ::s/p Cefepime in the ED  -continue cefepime   -IV antibiotics for now due to hypotension. Consider switching to intraperitoneal antibiotics if secondary peritonitis ruled out  - Cefepime (3/21 -  3/24 )  - Vancomycin   - nephrology consulted  - ID consulted     #ESRD on PD  -continue sevelamer      #hypotension (BP in the 70/60s) (baseline BP in the 100s/60s) - resolved in the ED  ::s/p 1 L IV fluids to treat hypovolemia/sepsis. Low concern for hemorrhage based on CT angio findings  -monitor for s/s of volume overload, mainly pulmonary edema      #5.4 cm descending aortic aneurysm  #Left common iliac occlusion - chronic  #hx of multiple dissections (11 per patient)  - appears stable compared to prior, vascular surgery was consulted at previous hospitalization; low threshold to consult vascular surgery again     #HFrEF (EF 15-30% ON 03/2023)  -not on GDMT at home. Cannot tolerate currently due to soft BPs     #DVT/PE (on warfarin)  ::supra therapeutic INR on admission  -warfarin  -daily INRs, if unfractionated heparin required, start once INR <2     #chronic diarrhea - (holding) lomotil  -per renal, needs to have at least one BM a day. Continue miralax, senna PRN     #hypothyroidism-continue synthyroid   #chronic pancreatitis-continue home creon    #Chronic pain  - resumed Tramadol  - started pregabalin (3/25)  - outpatient follow up with chronic pain     F: caution  E: monitor K  N: reg diet  Access: PD cath, pIV  DVT: SCDs, warfarin  Surrogate decision maker: sister Farida   Code status: FULL CODE (confirmed on admission)           Shabbir Pardo MD PhD

## 2024-03-26 LAB
ALBUMIN SERPL BCP-MCNC: 2.6 G/DL (ref 3.4–5)
ANION GAP SERPL CALC-SCNC: 16 MMOL/L (ref 10–20)
APTT PPP: 35 SECONDS (ref 27–38)
BUN SERPL-MCNC: 49 MG/DL (ref 6–23)
CALCIUM SERPL-MCNC: 8.9 MG/DL (ref 8.6–10.6)
CHLORIDE SERPL-SCNC: 95 MMOL/L (ref 98–107)
CO2 SERPL-SCNC: 25 MMOL/L (ref 21–32)
CREAT SERPL-MCNC: 6.68 MG/DL (ref 0.5–1.05)
EGFRCR SERPLBLD CKD-EPI 2021: 6 ML/MIN/1.73M*2
ERYTHROCYTE [DISTWIDTH] IN BLOOD BY AUTOMATED COUNT: 20.5 % (ref 11.5–14.5)
GLUCOSE SERPL-MCNC: 103 MG/DL (ref 74–99)
HCT VFR BLD AUTO: 25.4 % (ref 36–46)
HGB BLD-MCNC: 8.2 G/DL (ref 12–16)
INR PPP: 2.5 (ref 0.9–1.1)
MAGNESIUM SERPL-MCNC: 2.04 MG/DL (ref 1.6–2.4)
MCH RBC QN AUTO: 29.8 PG (ref 26–34)
MCHC RBC AUTO-ENTMCNC: 32.3 G/DL (ref 32–36)
MCV RBC AUTO: 92 FL (ref 80–100)
NRBC BLD-RTO: 0.8 /100 WBCS (ref 0–0)
PHOSPHATE SERPL-MCNC: 5.5 MG/DL (ref 2.5–4.9)
PLATELET # BLD AUTO: 169 X10*3/UL (ref 150–450)
POTASSIUM SERPL-SCNC: 3.4 MMOL/L (ref 3.5–5.3)
PROTHROMBIN TIME: 27.9 SECONDS (ref 9.8–12.8)
RBC # BLD AUTO: 2.75 X10*6/UL (ref 4–5.2)
SODIUM SERPL-SCNC: 133 MMOL/L (ref 136–145)
WBC # BLD AUTO: 4.7 X10*3/UL (ref 4.4–11.3)

## 2024-03-26 PROCEDURE — 97530 THERAPEUTIC ACTIVITIES: CPT | Mod: GO

## 2024-03-26 PROCEDURE — 2500000002 HC RX 250 W HCPCS SELF ADMINISTERED DRUGS (ALT 637 FOR MEDICARE OP, ALT 636 FOR OP/ED)

## 2024-03-26 PROCEDURE — 2500000004 HC RX 250 GENERAL PHARMACY W/ HCPCS (ALT 636 FOR OP/ED)

## 2024-03-26 PROCEDURE — 85610 PROTHROMBIN TIME: CPT

## 2024-03-26 PROCEDURE — 2500000001 HC RX 250 WO HCPCS SELF ADMINISTERED DRUGS (ALT 637 FOR MEDICARE OP)

## 2024-03-26 PROCEDURE — 97530 THERAPEUTIC ACTIVITIES: CPT | Mod: GP,CQ

## 2024-03-26 PROCEDURE — 2500000001 HC RX 250 WO HCPCS SELF ADMINISTERED DRUGS (ALT 637 FOR MEDICARE OP): Performed by: STUDENT IN AN ORGANIZED HEALTH CARE EDUCATION/TRAINING PROGRAM

## 2024-03-26 PROCEDURE — 2500000004 HC RX 250 GENERAL PHARMACY W/ HCPCS (ALT 636 FOR OP/ED): Performed by: INTERNAL MEDICINE

## 2024-03-26 PROCEDURE — 83735 ASSAY OF MAGNESIUM: CPT

## 2024-03-26 PROCEDURE — 36415 COLL VENOUS BLD VENIPUNCTURE: CPT

## 2024-03-26 PROCEDURE — 1100000001 HC PRIVATE ROOM DAILY

## 2024-03-26 PROCEDURE — 85027 COMPLETE CBC AUTOMATED: CPT

## 2024-03-26 PROCEDURE — 99231 SBSQ HOSP IP/OBS SF/LOW 25: CPT

## 2024-03-26 PROCEDURE — 84100 ASSAY OF PHOSPHORUS: CPT

## 2024-03-26 RX ORDER — POTASSIUM CHLORIDE 1.5 G/1.58G
20 POWDER, FOR SOLUTION ORAL ONCE
Status: DISCONTINUED | OUTPATIENT
Start: 2024-03-26 | End: 2024-03-27 | Stop reason: HOSPADM

## 2024-03-26 RX ORDER — MULTIVIT-MIN/IRON FUM/FOLIC AC 7.5 MG-4
1 TABLET ORAL DAILY
Start: 2024-03-27 | End: 2024-06-10 | Stop reason: HOSPADM

## 2024-03-26 RX ORDER — MULTIVIT-MIN/IRON FUM/FOLIC AC 7.5 MG-4
1 TABLET ORAL DAILY
Status: DISCONTINUED | OUTPATIENT
Start: 2024-03-26 | End: 2024-03-27 | Stop reason: HOSPADM

## 2024-03-26 RX ORDER — WARFARIN 2 MG/1
TABLET ORAL
Start: 2024-03-27 | End: 2024-06-10 | Stop reason: HOSPADM

## 2024-03-26 RX ORDER — POTASSIUM CHLORIDE 20 MEQ/1
20 TABLET, EXTENDED RELEASE ORAL ONCE
Status: COMPLETED | OUTPATIENT
Start: 2024-03-26 | End: 2024-03-26

## 2024-03-26 RX ORDER — WARFARIN 2 MG/1
2 TABLET ORAL DAILY
Status: DISCONTINUED | OUTPATIENT
Start: 2024-03-27 | End: 2024-03-27 | Stop reason: HOSPADM

## 2024-03-26 RX ADMIN — PREGABALIN 25 MG: 25 CAPSULE ORAL at 09:30

## 2024-03-26 RX ADMIN — SODIUM CHLORIDE, SODIUM LACTATE, CALCIUM CHLORIDE, MAGNESIUM CHLORIDE AND DEXTROSE: 2.5; 538; 448; 18.3; 5.08 INJECTION, SOLUTION INTRAPERITONEAL at 17:23

## 2024-03-26 RX ADMIN — SEVELAMER CARBONATE 800 MG: 800 TABLET, FILM COATED ORAL at 09:31

## 2024-03-26 RX ADMIN — POTASSIUM CHLORIDE 20 MEQ: 1500 TABLET, EXTENDED RELEASE ORAL at 11:23

## 2024-03-26 RX ADMIN — LEVOTHYROXINE SODIUM 25 MCG: 25 TABLET ORAL at 06:53

## 2024-03-26 RX ADMIN — ALLOPURINOL 100 MG: 100 TABLET ORAL at 09:31

## 2024-03-26 RX ADMIN — Medication 400 MG: at 09:30

## 2024-03-26 RX ADMIN — TRAMADOL HYDROCHLORIDE 25 MG: 50 TABLET, COATED ORAL at 13:07

## 2024-03-26 RX ADMIN — Medication 1 TABLET: at 09:30

## 2024-03-26 RX ADMIN — GENTAMICIN SULFATE: 1 CREAM TOPICAL at 20:42

## 2024-03-26 RX ADMIN — PANCRELIPASE 3 CAPSULE: 24000; 76000; 120000 CAPSULE, DELAYED RELEASE PELLETS ORAL at 09:30

## 2024-03-26 ASSESSMENT — COGNITIVE AND FUNCTIONAL STATUS - GENERAL
STANDING UP FROM CHAIR USING ARMS: A LITTLE
MOBILITY SCORE: 21
DAILY ACTIVITIY SCORE: 23
TOILETING: A LITTLE
MOVING TO AND FROM BED TO CHAIR: A LITTLE
TOILETING: A LITTLE
WALKING IN HOSPITAL ROOM: A LITTLE
DRESSING REGULAR LOWER BODY CLOTHING: A LITTLE
DRESSING REGULAR UPPER BODY CLOTHING: A LITTLE
WALKING IN HOSPITAL ROOM: A LITTLE
CLIMB 3 TO 5 STEPS WITH RAILING: TOTAL
DRESSING REGULAR UPPER BODY CLOTHING: A LITTLE
PERSONAL GROOMING: A LITTLE
TOILETING: A LITTLE
DAILY ACTIVITIY SCORE: 17
DRESSING REGULAR LOWER BODY CLOTHING: A LOT
HELP NEEDED FOR BATHING: A LOT
WALKING IN HOSPITAL ROOM: A LITTLE
TURNING FROM BACK TO SIDE WHILE IN FLAT BAD: A LITTLE
CLIMB 3 TO 5 STEPS WITH RAILING: A LOT
MOVING FROM LYING ON BACK TO SITTING ON SIDE OF FLAT BED WITH BEDRAILS: A LITTLE
HELP NEEDED FOR BATHING: A LITTLE
PERSONAL GROOMING: A LITTLE
MOBILITY SCORE: 16
DAILY ACTIVITIY SCORE: 19
CLIMB 3 TO 5 STEPS WITH RAILING: A LITTLE
MOBILITY SCORE: 22

## 2024-03-26 ASSESSMENT — PAIN SCALES - GENERAL
PAINLEVEL_OUTOF10: 0 - NO PAIN
PAINLEVEL_OUTOF10: 0 - NO PAIN
PAINLEVEL_OUTOF10: 3
PAINLEVEL_OUTOF10: 10 - WORST POSSIBLE PAIN
PAINLEVEL_OUTOF10: 10 - WORST POSSIBLE PAIN

## 2024-03-26 ASSESSMENT — PAIN DESCRIPTION - LOCATION: LOCATION: LEG

## 2024-03-26 ASSESSMENT — PAIN - FUNCTIONAL ASSESSMENT
PAIN_FUNCTIONAL_ASSESSMENT: 0-10
PAIN_FUNCTIONAL_ASSESSMENT: 0-10

## 2024-03-26 ASSESSMENT — PAIN DESCRIPTION - DESCRIPTORS: DESCRIPTORS: ACHING

## 2024-03-26 NOTE — PROGRESS NOTES
"Physical Therapy    Physical Therapy Treatment    Patient Name: Yoselyn Hernandez  MRN: [id number]  Today's Date: 3/26/2024  Time Calculation  Start Time: 1152  Stop Time: 1225  Time Calculation (min): 33 min       Assessment/Plan   PT Assessment  PT Assessment Results: Decreased strength, Decreased endurance, Impaired balance, Decreased mobility, Pain  Rehab Prognosis: Good  Medical Staff Made Aware: Yes  Strengths: Ability to acquire knowledge, Attitude of self, Rehab experience  Barriers to Participation: Support of Caregivers, Housing layout  End of Session Communication: Bedside nurse  End of Session Patient Position: Alarm off, not on at start of session (seated EOB)     PT Plan  Treatment/Interventions: Transfer training, Gait training, Stair training, Balance training, Therapeutic exercise, Endurance training, Strengthening  PT Plan: Skilled PT  PT Frequency: 3 times per week  PT Discharge Recommendations: Moderate intensity level of continued care  PT Recommended Transfer Status: Assist x1  PT - OK to Discharge: Yes (POC/goals/discharge rec intensity created)      General Visit Information:   PT  Visit  PT Received On: 03/26/24  Response to Previous Treatment: Patient with no complaints from previous session.  General  Family/Caregiver Present: No  Prior to Session Communication: Bedside nurse  Patient Position Received: Alarm off, not on at start of session (seated EOB)  Preferred Learning Style: verbal  General Comment: Patient seated EOB appearing somewhat frustrated stating \"I cant do anything anymore\". Willing to participate in tehrapy session.    Subjective   Precautions:  Precautions  Medical Precautions: Fall precautions  Vital Signs:       Objective   Pain:  Pain Assessment  Pain Assessment: 0-10  Pain Score: 10 - Worst possible pain  Pain Type: Acute pain  Pain Location: Leg  Pain Orientation: Right, Left  Pain Radiating Towards: upper legs  Pain Descriptors: Aching  Pain Frequency: " Constant/continuous  Pain Interventions: Medication (See MAR) (RN aware and checking to see if patient is due for pain meds)  Cognition:  Cognition  Overall Cognitive Status: Within Functional Limits  Arousal/Alertness: Appropriate responses to stimuli  Orientation Level: Oriented X4  Postural Control:  Static Sitting Balance  Static Sitting-Balance Support: Feet supported  Static Sitting-Level of Assistance: Distant supervision  Dynamic Sitting Balance  Dynamic Sitting-Balance Support: Feet supported  Dynamic Sitting-Balance: Reaching across midline  Dynamic Sitting-Comments: distant sup  Static Standing Balance  Static Standing-Balance Support: Bilateral upper extremity supported  Static Standing-Level of Assistance: Contact guard  Dynamic Standing Balance  Dynamic Standing-Balance Support: Bilateral upper extremity supported  Dynamic Standing-Balance: Turning  Dynamic Standing-Comments: min a x 1    Treatments:       Ambulation/Gait Training  Ambulation/Gait Training Performed: Yes  Ambulation/Gait Training 1  Surface 1: Level tile  Device 1: Single point cane (and L HHA)  Assistance 1: Contact guard, Minimal verbal cues  Quality of Gait 1: Shuffling gait, Decreased step length, Antalgic  Comments/Distance (ft) 1: 10 ft x 2, 5 ft x 2  Transfers  Transfer: Yes  Transfer 1  Transfer From 1: Bed to  Transfer to 1: Stand  Technique 1: Sit to stand  Transfer Device 1: Cane  Transfer Level of Assistance 1: Contact guard, Minimal verbal cues  Trials/Comments 1: x2 trials  Transfers 2  Transfer From 2: Stand to  Transfer to 2: Bed  Technique 2: Stand to sit  Transfer Level of Assistance 2: Contact guard  Trials/Comments 2: x2 trials  Transfers 3  Transfer From 3: Stand to  Transfer to 3: Toilet  Technique 3: Stand to sit  Transfer Level of Assistance 3: Contact guard, Minimal verbal cues  Trials/Comments 3: VCs for hand placement  Transfers 4  Transfer From 4: Toilet to  Transfer to 4: Stand  Technique 4: Sit to  stand  Transfer Device 4: Cane  Transfer Level of Assistance 4: Contact guard    Outcome Measures:  [facility] Basic Mobility  Turning from your back to your side while in a flat bed without using bedrails: A little  Moving from lying on your back to sitting on the side of a flat bed without using bedrails: A little  Moving to and from bed to chair (including a wheelchair): A little  Standing up from a chair using your arms (e.g. wheelchair or bedside chair): A little  To walk in hospital room: A little  Climbing 3-5 steps with railing: Total  Basic Mobility - Total Score: 16    Tinetti  Sitting Balance: Steady, safe  Arises: Able, uses arms to help  Attempts to Arise: Able to arise, one attempt  Immediate Standing Balance (First 5 Seconds): Steady but uses walker or other support  Standing Balance: Steady but wide stance, uses cane or other support  Nudged: Steady without walker or other support  Eyes Closed: Unsteady  Turned 360 Degrees: Steadiness: Unsteady (Grabs, staggers)  Turned 360 Degrees: Continuity of Steps: Discontinuous steps  Sitting Down: Uses arms or not a smooth motion  Balance Score: 9  Initiation of Gait: No hesitancy  Step Height: R Swing Foot: Right foot complete clears floor  Step Length: R Swing Foot: Passes left stance foot  Step Height: L Swing Foot: Left foot complete clears floor  Step Length: L Swing Foot: Passes right stance foot  Step Symmetry: Right and left step appear equal  Step Continuity: Steps appear continuous  Path: Mild/moderate deviation or uses walking aid  Trunk: Marked sway or uses walking aid  Walking Time: Heels almost touching while walking  Gait Score: 9  Total Score: 18    Education Documentation  Body Mechanics, taught by Velia Chawla PTA at 3/26/2024  1:19 PM.  Learner: Patient  Readiness: Acceptance  Method: Explanation  Response: Verbalizes Understanding    Mobility Training, taught by Velia Chawla PTA at 3/26/2024  1:19 PM.  Learner: Patient  Readiness:  Acceptance  Method: Explanation  Response: Verbalizes Understanding    Education Comments  No comments found.        OP EDUCATION:       Encounter Problems       Encounter Problems (Active)       Balance       Tinetti > 20 to demo improvements in balance.  (Progressing)       Start:  03/22/24    Expected End:  04/05/24               Mobility       STG - Patient will ambulate with LRAD and close supervision 25 ft x2.  (Progressing)       Start:  03/22/24    Expected End:  04/05/24            As feasibly safe to attempt, will navigate 8+ steps to simulate home setup with cane and rail, min Ax1.  (Progressing)       Start:  03/22/24    Expected End:  04/05/24               PT Transfers       STG - Patient will perform bed mobility flat bed surface, close supervision.  (Progressing)       Start:  03/22/24    Expected End:  04/05/24            STG - Patient will transfer sit to and from stand with close supervision.  (Progressing)       Start:  03/22/24    Expected End:  04/05/24

## 2024-03-26 NOTE — PROGRESS NOTES
Updated PT/OT notes sent to [facility] to submit to the insurance company for precert per their request. Secure chat sent to direct precert team to escalate precert which was submitted 3/22. HENS completed on prior admission as pt is a return to [facility]. Care coordinator will continue to follow for discharge planning needs.    Paulie Yadav RN  Transitional Care Coordinator/[facility]  [id number]

## 2024-03-26 NOTE — PROGRESS NOTES
"Yoselyn Hernandez is a 64 y.o. female on day 5 of admission presenting with Hypotension.    Subjective   NAOE. No bowel movements. Thinks pain slightly improved after starting pregabalin. No side effects. Also happy that lower diasylate volumes being used.       Objective     Physical Exam    General: alert and oriented, answering questions appropriately  Skin: no suspect lesions or rashes noted on visible skin  Chest: ctab, normal respiratory effort, not on supplemental oxygen  Abdomen: soft, ND, NT. PD catheter in place, no erythema, purulence around site  Neuro: AOx4, moving all limbs spontaneously, follows commands    Last Recorded Vitals  Blood pressure 120/78, pulse 81, temperature 36.5 °C (97.7 °F), temperature source Temporal, resp. rate 18, height 1.702 m (5' 7.01\"), weight 56.2 kg (124 lb), SpO2 100 %.  Intake/Output last 3 Shifts:  No intake/output data recorded.          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 Admitted for treatment of peritonitis  In the ED, was found to be hypotensive with BP of 70s/60s with no altered mentation, tachycardia, or physical exam signs of hypoperfusion. Baseline BP per prior visits appears to be 90s/60s. She was given 1 L IV fluids. Due to concern for vascular etiology (hx of dissections), CT angio was obtained, which showed patent graft with no endoleak. She was treated with several days of IV abx including cefepime and vancomycin. Repeat dialysis " culture no growth and minimal white blood cells so did not think that patient had true peritonitis. Possibly culture from outside facility was contaminant. Per ID, stopped abx on 3/24. DVT scan performed on 3/24 showed no DVT. Echo obtained on 3/25 showed multiple issues including EF of 15-20% but stable compared to last year's. Active issues: medically ready for discharge.    Changes Today:  - medically ready for discharge  - pending coordination on transportation back to facility  - INR increased from 1.7 to 2.5 => discussed with pharmacy, hold dose today and discharge on 2 mg daily    Pain Regimen Changes from Chart Review  - 2/08/24 admission: Dialudid 3 mg Q4H tablet was stopped   - 1/30/24 admission: Pregabalin was stopped  and replaced with Dilaudid 3 mg Q4H  - 11/06/23 admission: oxycodone 5 mg Q4H  was active  - 10/18/23 admission: patient was on 25 mg pregabalin daily  - PCP note 08/31/2023 noted patient on lexapro for depression with anxiety  -8/01/2023: ED noted plan for back pain was flexeril, NSAIDs, jessica, and opioids as needed      Sensitivities (called lab)  Klebsiella:  Sensitive to AMP/Sulbactam; Augmentin; Ceftriaxone; Ciprofloxacin; Cefuroxime; Gentamicin; Levofloxacin; Zosyn; Bactrim  Resistant to: none  Pseudomonas: resistance to aztreonam;   Sensitive to cefepime; gentamicin; piperacilin, tobramycin    #peritonitis (PD related)  ::concern for secondary peritonitis low but not entirely ruled out. Per CT scan, Prominence of gastric and small-bowel  mucosal folds with fluid-filled nondilated loops of small bowel suggesting indolent gastroenteritis in the appropriate clinical context.  ::GNRs per culture drawn at nursing home (in paper chart)  ::s/p Cefepime in the ED  -continue cefepime   -IV antibiotics for now due to hypotension. Consider switching to intraperitoneal antibiotics if secondary peritonitis ruled out  - Cefepime (3/21 - 3/24 )  - Vancomycin   - nephrology consulted  - ID  consulted     #ESRD on PD  -continue sevelamer      #hypotension (BP in the 70/60s) (baseline BP in the 100s/60s) - resolved in the ED  ::s/p 1 L IV fluids to treat hypovolemia/sepsis. Low concern for hemorrhage based on CT angio findings  -monitor for s/s of volume overload, mainly pulmonary edema      #5.4 cm descending aortic aneurysm  #Left common iliac occlusion - chronic  #hx of multiple dissections (11 per patient)  - appears stable compared to prior, vascular surgery was consulted at previous hospitalization; low threshold to consult vascular surgery again     #HFrEF (EF 15-30% ON 03/2023)  -not on GDMT at home. Cannot tolerate currently due to soft BPs     #DVT/PE (on warfarin)  ::supra therapeutic INR on admission  -warfarin  -daily INRs, if unfractionated heparin required, start once INR <2     #chronic diarrhea - (holding) lomotil  -per renal, needs to have at least one BM a day. Continue miralax, senna PRN     #hypothyroidism-continue synthyroid   #chronic pancreatitis-continue home creon    #Chronic pain  - resumed Tramadol  - started pregabalin (3/25)  - outpatient follow up with chronic pain     F: caution  E: monitor K  N: reg diet  Access: PD cath, pIV  DVT: SCDs, warfarin  Surrogate decision maker: sister Farida   Code status: FULL CODE (confirmed on admission)           Shabbir Pardo MD PhD

## 2024-03-26 NOTE — NURSING NOTE
Peritoneal Dialysis - CCPD  3/26/24    1630: Patient reporting concern for overfilling peritoneal cavity with PD cycles and associated ABD pain. Overnight therapy orders reviewed and confirmed with bedside staff, patient assessed and educated on importance of receiving adequate dialysis therapy.    Therapy Orders     Total time:  11 hours   Cycles:   5   Fill Volume:  1,200 mL   Last fill:   1,000 mL   Last fill solution: SAME   Total volume:  7,000 mL     Solution(s):  Dextrose 2.5% Dianeal 2.5 calcium-- 5000 mL (x2)   Additive(s): NONE           Last Fill: SAME      **Dialysis nurses in house Monday through Friday 9193-1091 to setup and disconnect patients,   please call our office at [phone], follow prompts for after hours paging.    **Machine Trouble Shooting: [employer] 24 hour technical support available at [phone]     **Bedside nursing staff to disconnect patients as needed outside of office hours.  -Instructions and supplies located on dialysis cart.  -Reference  policy [id number] -->

## 2024-03-26 NOTE — CARE PLAN
Problem: Pain  Goal: My pain/discomfort is manageable  3/26/2024 0738 by Breanna Cleveland RN  Outcome: Progressing  3/26/2024 0737 by Breanna Cleveland RN  Outcome: Progressing  3/26/2024 0736 by Breanna Cleveland RN  Outcome: Progressing     Problem: Safety  Goal: Patient will be injury free during hospitalization  3/26/2024 0738 by Breanna Cleveland RN  Outcome: Progressing  3/26/2024 0737 by Breanna Cleveland RN  Outcome: Progressing  3/26/2024 0736 by Breanna Cleveland RN  Outcome: Progressing  Goal: I will remain free of falls  3/26/2024 0738 by Breanna Cleveland RN  Outcome: Progressing  3/26/2024 0737 by Breanna Cleveland RN  Outcome: Progressing  3/26/2024 0736 by Breanna Cleveland RN  Outcome: Progressing     Problem: Daily Care  Goal: Daily care needs are met  3/26/2024 0738 by Breanna Cleveland RN  Outcome: Progressing  3/26/2024 0737 by Breanna Cleveland RN  Outcome: Progressing  3/26/2024 0736 by Breanna Cleveland RN  Outcome: Progressing     Problem: Psychosocial Needs  Goal: Demonstrates ability to cope with hospitalization/illness  3/26/2024 0738 by Breanna Cleveland RN  Outcome: Progressing  3/26/2024 0737 by Breanna Cleveland RN  Outcome: Progressing  3/26/2024 0736 by Breanna Cleveland RN  Outcome: Progressing  Goal: Collaborate with me, my family, and caregiver to identify my specific goals  3/26/2024 0738 by Breanna Cleveland RN  Outcome: Progressing  3/26/2024 0737 by Breanna Cleveland RN  Outcome: Progressing  3/26/2024 0736 by Breanna Cleveland RN  Outcome: Progressing     Problem: Discharge Barriers  Goal: My discharge needs are met  3/26/2024 0738 by Breanna Cleveland RN  Outcome: Progressing  3/26/2024 0737 by Breanna Cleveland RN  Outcome: Progressing  3/26/2024 0736 by Breanna Cleveland RN  Outcome: Progressing     Problem: Constipation  Goal: I will have a regular, well-formed bowel movement every 2-3 days    Outcome: Patient did not have a BM last night, but reported a small BM  yesterday on day shift. Will continue to monitor.

## 2024-03-26 NOTE — PROGRESS NOTES
Occupational Therapy    OT Treatment    Patient Name: Yoselyn Hernandez  MRN: [id number]  Today's Date: 3/26/2024  Time Calculation  Start Time: 1125  Stop Time: 1137  Time Calculation (min): 12 min         Assessment:  Prognosis: Good  Barriers to Discharge: Decreased caregiver support  Evaluation/Treatment Tolerance: Patient limited by fatigue, Patient limited by pain  End of Session Patient Position: Alarm off, not on at start of session (sitting EOB)  OT Assessment Results: Decreased ADL status, Decreased safe judgment during ADL, Decreased endurance, Decreased IADLs  Prognosis: Good  Barriers to Discharge: Decreased caregiver support  Evaluation/Treatment Tolerance: Patient limited by fatigue, Patient limited by pain  Strengths: Ability to acquire knowledge, Attitude of self, Rehab experience  Barriers to Participation: Comorbidities  Plan:  Treatment Interventions: ADL retraining, Functional transfer training, Endurance training, Patient/family training, Equipment evaluation/education, Compensatory technique education  OT Frequency: 3 times per week  OT Discharge Recommendations: Moderate intensity level of continued care  Equipment Recommended upon Discharge:  (TBD)  OT Recommended Transfer Status: Assist of 1  OT - OK to Discharge: Yes (OT eval complete)  Treatment Interventions: ADL retraining, Functional transfer training, Endurance training, Patient/family training, Equipment evaluation/education, Compensatory technique education    Subjective   Previous Visit Info:  OT Last Visit  OT Received On: 03/26/24  General:  General  Reason for Referral: ? peritonitis, back pain  Past Medical History Relevant to Rehab: ESRD on daily PD, AAA dissections s/p repairs, HF, pancreatitis, PE/DVT, HTN  Family/Caregiver Present: No  Prior to Session Communication: Bedside nurse  Patient Position Received: Bed, 3 rail up, Alarm off, not on at start of session  Preferred Learning Style: visual, verbal  General Comment: Pt  agreeable to therapy, however pt increasingly emotional/frustrated with current situation and recent hospital re-admissions. Supportive listening provided.  Precautions:  Medical Precautions: Fall precautions    Pain:  Pain Assessment  Pain Assessment: 0-10  Pain Score:  (initially denied pain, but reported pain in her legs with standing, did not formally rate)  Pain Location: Leg  Pain Orientation: Right, Left    Objective    Cognition:  Cognition  Overall Cognitive Status: Within Functional Limits  Arousal/Alertness: Appropriate responses to stimuli  Orientation Level: Oriented X4  Following Commands: Follows all commands and directions without difficulty  Safety Judgment: Decreased awareness of need for safety precautions  Cognition Comments: Pt emotional/tearful at times  Insight: Mild    Activities of Daily Living:    LE Dressing  LE Dressing: Yes  Sock Level of Assistance: Maximum assistance  LE Dressing Where Assessed: Edge of bed  LE Dressing Comments: Pt limited by increased pain and increased LB edema affecting functional reach  Toileting  Toileting Comments: Pt reports recent episode of incontinence prior to session    Bed Mobility/Transfers: Bed Mobility  Bed Mobility: Yes  Bed Mobility 1  Bed Mobility 1: Supine to sitting  Level of Assistance 1: Close supervision  Bed Mobility Comments 1: HOB elevated, encouraged pacing for endurance purposes    Transfers  Transfer: Yes  Transfer 1  Transfer From 1: Bed to  Transfer to 1: Sit, Stand  Technique 1: Sit to stand, Stand to sit  Transfer Device 1:  (HHA)  Transfer Level of Assistance 1: Minimum assistance  Trials/Comments 1: Verbal cueing for mechanics and positioning for controlled seated transfer      Functional Mobility:  Functional Mobility  Functional Mobility Performed: Yes  Functional Mobility 1  Comments 1: Pt completed short funcitonal distance towards restroom (declined use of AD) overall CGA to min A for stabilty. Pt with wide STEPHAN and limited by  BLE pain. Obvserved furniture walking for stability.    Outcome Measures:[facility] Daily Activity  Putting on and taking off regular lower body clothing: A lot  Bathing (including washing, rinsing, drying): A lot  Putting on and taking off regular upper body clothing: A little  Toileting, which includes using toilet, bedpan or urinal: A little  Taking care of personal grooming such as brushing teeth: A little  Eating Meals: None  Daily Activity - Total Score: 17      Education Documentation  ADL Training, taught by Caren Valdez OT at 3/26/2024  1:25 PM.  Learner: Patient  Readiness: Acceptance  Method: Explanation, Demonstration  Response: Verbalizes Understanding, Demonstrated Understanding, Needs Reinforcement    EDUCATION:  Education  Individual(s) Educated: Patient  Education Provided: Diagnosis & Precautions, Fall precautons, Risk and benefits of OT discussed with patient or other, POC discussed and agreed upon  Patient Response to Education: Patient/Caregiver Verbalized Understanding of Information    Goals:  Encounter Problems       Encounter Problems (Active)       ADLs       Patient with complete lower body dressing with modified independent level of assistance donning and doffing all LE clothes  with PRN adaptive equipment. (Progressing)       Start:  03/22/24    Expected End:  04/05/24            Patient will complete toileting including hygiene clothing management/hygiene with modified independent level of assistance. (Progressing)       Start:  03/22/24    Expected End:  04/05/24               BALANCE       Patient will tolerate standing for ~3-5 minutes to modified independent level of assistance with least restrictive device in order to improve functional activity tolerance for ADL tasks. (Progressing)       Start:  03/22/24    Expected End:  04/05/24               MOBILITY       Patient will perform Functional mobility Household distances/Community Distances with modified independent level of  assistance and least restrictive device in order to improve safety and functional mobility. (Progressing)       Start:  03/22/24    Expected End:  04/05/24               TRANSFERS       Patient will complete functional transfers with least restrictive device with modified independent level of assistance. (Progressing)       Start:  03/22/24    Expected End:  04/05/24

## 2024-03-27 VITALS
OXYGEN SATURATION: 100 % | HEART RATE: 90 BPM | RESPIRATION RATE: 18 BRPM | WEIGHT: 139.11 LBS | BODY MASS INDEX: 21.83 KG/M2 | DIASTOLIC BLOOD PRESSURE: 69 MMHG | HEIGHT: 67 IN | SYSTOLIC BLOOD PRESSURE: 111 MMHG | TEMPERATURE: 98.6 F

## 2024-03-27 LAB
ACID FAST STN SPEC: NORMAL
ALBUMIN SERPL BCP-MCNC: 2.8 G/DL (ref 3.4–5)
ANION GAP SERPL CALC-SCNC: 17 MMOL/L (ref 10–20)
APTT PPP: 37 SECONDS (ref 27–38)
BACTERIA DIAFP CULT: NORMAL
BUN SERPL-MCNC: 49 MG/DL (ref 6–23)
CALCIUM SERPL-MCNC: 9.2 MG/DL (ref 8.6–10.6)
CHLORIDE SERPL-SCNC: 94 MMOL/L (ref 98–107)
CO2 SERPL-SCNC: 25 MMOL/L (ref 21–32)
CREAT SERPL-MCNC: 6.89 MG/DL (ref 0.5–1.05)
EGFRCR SERPLBLD CKD-EPI 2021: 6 ML/MIN/1.73M*2
ERYTHROCYTE [DISTWIDTH] IN BLOOD BY AUTOMATED COUNT: 21 % (ref 11.5–14.5)
GLUCOSE SERPL-MCNC: 107 MG/DL (ref 74–99)
GRAM STN SPEC: NORMAL
GRAM STN SPEC: NORMAL
HCT VFR BLD AUTO: 25.4 % (ref 36–46)
HGB BLD-MCNC: 8.3 G/DL (ref 12–16)
INR PPP: 2.2 (ref 0.9–1.1)
MAGNESIUM SERPL-MCNC: 2.08 MG/DL (ref 1.6–2.4)
MCH RBC QN AUTO: 30.5 PG (ref 26–34)
MCHC RBC AUTO-ENTMCNC: 32.7 G/DL (ref 32–36)
MCV RBC AUTO: 93 FL (ref 80–100)
MYCOBACTERIUM SPEC CULT: NORMAL
NRBC BLD-RTO: 0.8 /100 WBCS (ref 0–0)
PHOSPHATE SERPL-MCNC: 5.9 MG/DL (ref 2.5–4.9)
PLATELET # BLD AUTO: 198 X10*3/UL (ref 150–450)
POTASSIUM SERPL-SCNC: 3.7 MMOL/L (ref 3.5–5.3)
PROTHROMBIN TIME: 25.1 SECONDS (ref 9.8–12.8)
RBC # BLD AUTO: 2.72 X10*6/UL (ref 4–5.2)
SODIUM SERPL-SCNC: 132 MMOL/L (ref 136–145)
WBC # BLD AUTO: 4.9 X10*3/UL (ref 4.4–11.3)

## 2024-03-27 PROCEDURE — 80069 RENAL FUNCTION PANEL: CPT

## 2024-03-27 PROCEDURE — 99239 HOSP IP/OBS DSCHRG MGMT >30: CPT

## 2024-03-27 PROCEDURE — 85027 COMPLETE CBC AUTOMATED: CPT

## 2024-03-27 PROCEDURE — 2500000001 HC RX 250 WO HCPCS SELF ADMINISTERED DRUGS (ALT 637 FOR MEDICARE OP)

## 2024-03-27 PROCEDURE — 2500000004 HC RX 250 GENERAL PHARMACY W/ HCPCS (ALT 636 FOR OP/ED)

## 2024-03-27 PROCEDURE — 36415 COLL VENOUS BLD VENIPUNCTURE: CPT

## 2024-03-27 PROCEDURE — 83735 ASSAY OF MAGNESIUM: CPT

## 2024-03-27 PROCEDURE — 85610 PROTHROMBIN TIME: CPT

## 2024-03-27 PROCEDURE — 2500000002 HC RX 250 W HCPCS SELF ADMINISTERED DRUGS (ALT 637 FOR MEDICARE OP, ALT 636 FOR OP/ED)

## 2024-03-27 PROCEDURE — 2500000001 HC RX 250 WO HCPCS SELF ADMINISTERED DRUGS (ALT 637 FOR MEDICARE OP): Performed by: STUDENT IN AN ORGANIZED HEALTH CARE EDUCATION/TRAINING PROGRAM

## 2024-03-27 RX ADMIN — Medication 400 MG: at 08:25

## 2024-03-27 RX ADMIN — LEVOTHYROXINE SODIUM 25 MCG: 25 TABLET ORAL at 08:24

## 2024-03-27 RX ADMIN — SENNOSIDES 17.2 MG: 8.6 TABLET, FILM COATED ORAL at 08:25

## 2024-03-27 RX ADMIN — PANCRELIPASE 3 CAPSULE: 24000; 76000; 120000 CAPSULE, DELAYED RELEASE PELLETS ORAL at 08:25

## 2024-03-27 RX ADMIN — ALLOPURINOL 100 MG: 100 TABLET ORAL at 08:24

## 2024-03-27 RX ADMIN — Medication 1 TABLET: at 08:25

## 2024-03-27 RX ADMIN — TRAMADOL HYDROCHLORIDE 25 MG: 50 TABLET, COATED ORAL at 03:30

## 2024-03-27 RX ADMIN — SEVELAMER CARBONATE 800 MG: 800 TABLET, FILM COATED ORAL at 08:24

## 2024-03-27 RX ADMIN — PREGABALIN 25 MG: 25 CAPSULE ORAL at 08:25

## 2024-03-27 ASSESSMENT — PAIN SCALES - GENERAL
PAINLEVEL_OUTOF10: 7
PAINLEVEL_OUTOF10: 8

## 2024-03-27 NOTE — PROGRESS NOTES
Discharge Transfer to Facility  Patient will discharge today to: SNF  Facility name: [facility]  [facility] phone number: [phone]   [place] aware.  Bedside nurse (name) aware to call report: Beatrice  Phone number for report: [phone]   Ambulance transport has been arranged.  Ambulance Company name: [facility]   [email or web address] phone number: [phone]   time: 10:20 am  Patient and family(name) aware of transport time  Patient has been approved for discharge after 8pm: n/a  [facility] confirmed precert is approved. Medical team and facility updated on transport time. Discharge orders, AVS, and discharge summary sent to facility via [facility]. Facility already has 7000 from prior hospital admission. Unit secretary provided with blue transfer slip. Care coordinator will continue to follow for discharge planning needs.    Paulie Yadav RN  Transitional Care Coordinator/[facility]  [id number]

## 2024-03-27 NOTE — DISCHARGE SUMMARY
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 Admitted for treatment of peritonitis  In the ED, was found to be hypotensive with BP of 70s/60s with no altered mentation, tachycardia, or physical exam signs of hypoperfusion. Baseline BP per prior visits appears to be 90s/60s. She was given 1 L IV fluids. Due to concern for vascular etiology (hx of dissections), CT angio was obtained, which showed patent graft with no endoleak. She was treated with several days of IV abx including cefepime and vancomycin. Repeat dialysis culture no growth and minimal white blood cells so did not think that patient had true peritonitis. Possibly culture from outside facility was contaminant. Per ID, stopped abx on 3/24. DVT scan performed on 3/24 showed no DVT. Echo obtained on 3/25 showed multiple issues including EF of 15-20% but stable compared to last year's. Discharged back to facility on 3/27.    Pertinent Physical Exam At Time of Discharge  Physical Exam  General: alert and oriented, answering questions appropriately  Skin: no suspect lesions or rashes noted on visible skin  Chest: ctab, normal respiratory effort, not on supplemental oxygen  Abdomen: soft, ND, NT. PD catheter in place,  no erythema, purulence around site  Extremities: leg swelling improved but still minor edema  Neuro: AOx4, moving all limbs spontaneously, follows commands    Home Medications     Medication List      START taking these medications     multivitamin with minerals tablet; Take 1 tablet by mouth once daily. Do   not start before March 27, 2024.   pregabalin 25 mg capsule; Commonly known as: Lyrica; Take 1 capsule (25   mg) by mouth once daily. Do not start before March 26, 2024.   therapeutic multivitamin-iron-minerals tablet; Commonly known as:   Theragran-M; Take 1 tablet by mouth once daily.     CHANGE how you take these medications     lidocaine 4 % cream; Commonly known as: LMX; Apply topically 4 times a   day.; What changed: how much to take, when to take this, reasons to take   this   warfarin 2 mg tablet; Commonly known as: Coumadin; Take as directed per   After Visit Summary. Do not start before March 27, 2024.; What changed:   medication strength     CONTINUE taking these medications     acetaminophen 325 mg tablet; Commonly known as: Tylenol; Take 3 tablets   (975 mg) by mouth 3 times a day as needed (pain or fever).   allopurinol 100 mg tablet; Commonly known as: Zyloprim; Take 1 tablet   (100 mg) by mouth once daily.   atorvastatin 40 mg tablet; Commonly known as: Lipitor; TAKE 1 TABLET BY   MOUTH ONCE DAILY AT BEDTIME   B complex-vitamin C-folic acid 1 mg capsule; Commonly known as:   Nephrocaps; Take 1 capsule by mouth once daily. Do not start before   February 6, 2024.   Creon 24,000-76,000 -120,000 unit capsule; Generic drug:   lipase-protease-amylase; Take 3 capsules by mouth 3 times a day with   meals.   * dextrose 2.5 % - LOW calcium 2.5 mEq/L Ca 2.5 mEq/L- Mg 0.5 mEq/L   solution 5,000 mL with heparin 1,000 unit/mL solution 2,500 Units; Inject   5,000 mL into the abdomen / abdominal cavity once every 24 hours.   * dextrose 2.5 % - LOW calcium 2.5 mEq/L Ca 2.5 mEq/L- Mg 0.5 mEq/L   solution 5,000 mL  with heparin 1,000 unit/mL solution 2,500 Units; Inject   5,000 mL into the abdomen / abdominal cavity once every 24 hours.   epoetin treasure 10,000 unit/mL injection; Commonly known as: Epogen,Procrit   gentamicin 0.1 % cream; Commonly known as: Garamycin; Apply topically   once every 24 hours.   hydrOXYzine HCL 10 mg tablet; Commonly known as: Atarax   levothyroxine 25 mcg tablet; Commonly known as: Synthroid, Levoxyl; Take   1 tablet (25 mcg) by mouth once daily in the morning. Take before meals.   melatonin 10 mg tablet; Take 1 tablet (10 mg) by mouth once daily as   needed for sleep.   mirtazapine 15 mg tablet; Commonly known as: Remeron; Take 1 tablet (15   mg) by mouth once daily at bedtime.   polyethylene glycol 17 gram packet; Commonly known as: Glycolax,   Miralax; Take 17 g by mouth once daily as needed (constipation).   sennosides 8.6 mg tablet; Commonly known as: Senokot; Take 2 tablets   (17.2 mg) by mouth once daily as needed for constipation.   sevelamer carbonate 800 mg tablet; Commonly known as: Renvela; Take 1   tablet (800 mg) by mouth 3 times a day with meals. Swallow tablet whole;   do not crush, break, or chew.   traMADol 50 mg tablet; Commonly known as: Ultram  * This list has 2 medication(s) that are the same as other medications   prescribed for you. Read the directions carefully, and ask your doctor or   other care provider to review them with you.     STOP taking these medications     diphenoxylate-atropine 2.5-0.025 mg tablet; Commonly known as: Lomotil   nystatin 100,000 unit/mL suspension; Commonly known as: Mycostatin   ondansetron ODT 4 mg disintegrating tablet; Commonly known as:   Zofran-ODT       Outpatient Follow-Up  PCP  Nephrology (patient to follow up with out of network nephrologist who she prefers)  Vascular medicine  Vascular surgery  Heart Failure      Shabbir Pardo MD PhD

## 2024-03-27 NOTE — HOSPITAL COURSE
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 Admitted for treatment of peritonitis  In the ED, was found to be hypotensive with BP of 70s/60s with no altered mentation, tachycardia, or physical exam signs of hypoperfusion. Baseline BP per prior visits appears to be 90s/60s. She was given 1 L IV fluids. Due to concern for vascular etiology (hx of dissections), CT angio was obtained, which showed patent graft with no endoleak. She was treated with several days of IV abx including cefepime and vancomycin. Repeat dialysis culture no growth and minimal white blood cells so did not think that patient had true peritonitis. Possibly culture from outside facility was contaminant. Per ID, stopped abx on 3/24. DVT scan performed on 3/24 showed no DVT. Echo obtained on 3/25 showed multiple issues including EF of 15-20% but stable compared to last year's. Discharged back to facility on 3/27.

## 2024-03-27 NOTE — CARE PLAN
The patient's goals for the shift include    Problem: Pain  Goal: My pain/discomfort is manageable  Outcome: Progressing     Problem: Safety  Goal: Patient will be injury free during hospitalization  Outcome: Progressing  Goal: I will remain free of falls  Outcome: Progressing     Problem: Daily Care  Goal: Daily care needs are met  Outcome: Progressing     Problem: Psychosocial Needs  Goal: Demonstrates ability to cope with hospitalization/illness  Outcome: Progressing  Goal: Collaborate with me, my family, and caregiver to identify my specific goals  Outcome: Progressing     Problem: Discharge Barriers  Goal: My discharge needs are met  Outcome: Progressing     Problem: Constipation  Goal: I will have a regular, well-formed bowel movement every 2-3 days  Outcome: Progressing       The clinical goals for the shift include Pt will remain safe and free from injury throughout this shift.

## 2024-04-08 LAB
FUNGUS SPEC CULT: NORMAL
FUNGUS SPEC FUNGUS STN: NORMAL

## 2024-04-08 NOTE — DOCUMENTATION CLARIFICATION NOTE
PATIENT:               NICOLAS ALFONSO  ACCT #:                  [id number]  MRN:                       [id number]  :                       1960  ADMIT DATE:       3/20/2024 7:57 PM  DISCH DATE:        3/27/2024 10:57 AM  RESPONDING PROVIDER #:        [id number]          PROVIDER RESPONSE TEXT:    Sepsis was a differential diagnosis and ruled out after study    CDI QUERY TEXT:    [facility]_CV Sepsis    Instruction:  Based on your assessment of the patient and the clinical information, please provide the requested documentation by clicking on the appropriate radio button and enter any additional information if prompted.    Question: Sepsis was documented in the medical record. Based on the documentation and the clinical information, can the diagnosis be further clarified as      When answering this query, please exercise your independent professional judgment. The fact that a question is being asked, does not imply that any particular answer is desired or expected.    The patient's clinical indicators include:  Clinical Information: The patient is a 64 year old female who was sent to the ED from rehab facility due to GNRs found growing in her peritoneal fluid.    Documented Diagnosis: hypovolemia/sepsis    Clinical Indicators:  -Vital Signs: on 3/20 at 2019: BP 96/62 HR 99, RR 18, T 36.9, SPO2 100 percent on room air  -Vital Signs: on 3/20 at 2247: BP 74/61, HR 99, RR 17, SPO2 97 percent on room air  -WBC: 3/20 5.9, 3/21: 6.2, 3/22 5.1, 3/23: 5.1, 3/24: 5.6, 3/25: 4.8, 3/26: 4.7, 3/27: 4.9  -Microbiology Results: AFB Culture/Smear from 3/20 at 16:09: Culture in progress and will be examined weekly; Stain: No acid fast bacilli seen; Fungal Culture/Smear from 3/21 at 16:09: Culture in progress; Smear: No fungal elements seen, Peritoneal Dialysis Fluid Culture/Smear from 3/21 at 16:07 and 3/24 at 10:09: Fluid Culture: No growth aerobically and anaerobically; Gram Stain:  No polymorphonuclear leukocytes seen, No organisms  seen  -Band Neutrophil Count/percent Band Neutrophil:  -Lactic acid: n/a  -BUN/Creat: 3/20: 46/7.29, 3/21: 48/8.19, 3/22: 45/7.28, 3/23: 48/7.27, 3/24: 52/7.46, 3/25: 47/7.35, 3/26: 49/6.68, 3/27:  49/6.89  -Blood cultures: from 03/20/2024 at 21:53 No growth  -Bilirubin: Total 3/20: 0.5, 3/21: 0.4, Direct 3/21: 0.1  -MAP: 71, 64  -Rebecca Coma Scale: n/a  -PAO2/FIO2: pt on room air  -Procalcitonin: n/a  -Platelets: 3/20: 190  -Other clinical indicators: ESRD on PD    Treatment: 1L IV fluids, cefepime, vancomycin    Risk Factors: GNRs found growing in her peritoneal fluid, ESRD on PD  Options provided:  -- Sepsis was a differential diagnosis and ruled out after study  -- Sepsis with other organ dysfunction, Please specify sepsis associated organ dysfunction below  -- Other - I will add my own diagnosis  -- Refer to Clinical Documentation Reviewer    Query created by: Krystle Benson on 4/3/2024 11:56 AM      Electronically signed by:  EVELINE LOVETT MD 4/8/2024 9:48 AM

## 2024-05-15 LAB
ACID FAST STN SPEC: NORMAL
MYCOBACTERIUM SPEC CULT: NORMAL

## 2024-05-16 ENCOUNTER — APPOINTMENT (OUTPATIENT)
Dept: CARDIOLOGY | Facility: HOSPITAL | Age: 64
End: 2024-05-16
Payer: COMMERCIAL

## 2024-05-28 ENCOUNTER — OFFICE VISIT (OUTPATIENT)
Dept: VASCULAR SURGERY | Facility: CLINIC | Age: 64
End: 2024-05-28
Payer: COMMERCIAL

## 2024-05-28 VITALS
SYSTOLIC BLOOD PRESSURE: 91 MMHG | RESPIRATION RATE: 20 BRPM | WEIGHT: 144 LBS | HEIGHT: 67 IN | DIASTOLIC BLOOD PRESSURE: 61 MMHG | HEART RATE: 94 BPM | BODY MASS INDEX: 22.6 KG/M2

## 2024-05-28 DIAGNOSIS — I73.9 PERIPHERAL VASCULAR DISEASE (CMS-HCC): ICD-10-CM

## 2024-05-28 DIAGNOSIS — I71.62 PARAVISCERAL ABDOMINAL AORTIC ANEURYSM (AAA) WITHOUT RUPTURE (CMS-HCC): Primary | ICD-10-CM

## 2024-05-28 DIAGNOSIS — I77.71 DISSECTION OF CAROTID ARTERY (MULTI): ICD-10-CM

## 2024-05-28 PROCEDURE — 99213 OFFICE O/P EST LOW 20 MIN: CPT | Performed by: SURGERY

## 2024-05-28 ASSESSMENT — PATIENT HEALTH QUESTIONNAIRE - PHQ9
2. FEELING DOWN, DEPRESSED OR HOPELESS: NOT AT ALL
1. LITTLE INTEREST OR PLEASURE IN DOING THINGS: NOT AT ALL
SUM OF ALL RESPONSES TO PHQ9 QUESTIONS 1 AND 2: 0

## 2024-05-28 ASSESSMENT — ENCOUNTER SYMPTOMS
LOSS OF SENSATION IN FEET: 1
OCCASIONAL FEELINGS OF UNSTEADINESS: 1
DEPRESSION: 0

## 2024-05-28 ASSESSMENT — LIFESTYLE VARIABLES
AUDIT-C TOTAL SCORE: 0
HOW MANY STANDARD DRINKS CONTAINING ALCOHOL DO YOU HAVE ON A TYPICAL DAY: PATIENT DOES NOT DRINK
HOW OFTEN DO YOU HAVE SIX OR MORE DRINKS ON ONE OCCASION: NEVER
HOW OFTEN DO YOU HAVE A DRINK CONTAINING ALCOHOL: NEVER
SKIP TO QUESTIONS 9-10: 1

## 2024-05-28 ASSESSMENT — PAIN SCALES - GENERAL: PAINLEVEL: 8

## 2024-05-28 NOTE — PROGRESS NOTES
Vascular Surgery Consultation, History, Physical    Yoselyn Hernandez is a 64 y.o. year old female patient.   History: Patient comes in follow-up for thoracoabdominal aortic dissection treated with arch endograft and deep branching and this really branching.  She lost her bypasses to her renal arteries based on her CT scan and is currently on dialysis.  She had hospitalizations for sepsis and other medical issues recently and currently resides in a nursing home.  She does not recall exactly when she had her aorta repaired.  She says that I repaired her aorta but I have not any recollection of her and the chart is unsearchable.  She denies any current symptoms.    Review her CAT scan from 24 March, 2020 for her arch endograft extending to her abdominal aorta is patent without any endoleak or new aneurysm development.  She has ilio mesenteric and ileal renal bypasses.  As noted the ileal renal bypass is of occluded.  The ileal mesenteric bypasses remain patent.  She is wheelchair-bound and needs physical therapy.  She has lymphedema and is leaking edema fluid onto her left pant leg.    Past Medical History:   Diagnosis Date    CHF (congestive heart failure) (Multi)     COPD (chronic obstructive pulmonary disease) (Multi)     ESRD (end stage renal disease) (Multi)     Hypertension     Other abnormalities of gait and mobility     Gait, antalgic    PE (pulmonary thromboembolism) (Multi)     Personal history of other endocrine, nutritional and metabolic disease     History of hyperlipidemia    Personal history of other specified conditions     History of shortness of breath       Past Surgical History:   Procedure Laterality Date    COLONOSCOPY  12/05/2017    Complete Colonoscopy    COLONOSCOPY  12/2021    rpt 12-25    CT ABDOMEN PELVIS ANGIOGRAM W AND/OR WO IV CONTRAST  11/17/2014    CT ABDOMEN PELVIS ANGIOGRAM W AND/OR WO IV CONTRAST 11/17/2014 [facility] CL[facility]ICAL LEGACY    CT ABDOMEN PELVIS ANGIOGRAM W AND/OR WO IV CONTRAST   02/01/2018    CT ABDOMEN PELVIS ANGIOGRAM W AND/OR WO IV CONTRAST 2/1/2018 [facility] AIB LEGACY    CT ABDOMEN PELVIS ANGIOGRAM W AND/OR WO IV CONTRAST  02/23/2018    CT ABDOMEN PELVIS ANGIOGRAM W AND/OR WO IV CONTRAST 2/23/2018 [facility] INPATIENT LEGACY    CT ABDOMEN PELVIS ANGIOGRAM W AND/OR WO IV CONTRAST  01/10/2019    CT ABDOMEN PELVIS ANGIOGRAM W AND/OR WO IV CONTRAST 1/10/2019 [facility] EMERGENCY LEGACY    CT ABDOMEN PELVIS ANGIOGRAM W AND/OR WO IV CONTRAST  11/25/2019    CT ABDOMEN PELVIS ANGIOGRAM W AND/OR WO IV CONTRAST 11/25/2019 [facility] ANCILLARY LEGACY    CT ABDOMEN PELVIS ANGIOGRAM W AND/OR WO IV CONTRAST  04/30/2021    CT ABDOMEN PELVIS ANGIOGRAM W AND/OR WO IV CONTRAST 4/30/2021 [facility] CLINICAL LEGACY    CT ABDOMEN PELVIS ANGIOGRAM W AND/OR WO IV CONTRAST  01/09/2017    CT ABDOMEN PELVIS ANGIOGRAM W AND/OR WO IV CONTRAST 1/9/2017 [facility] CLINICAL LEGACY    CT ABDOMEN PELVIS ANGIOGRAM W AND/OR WO IV CONTRAST  10/24/2018    CT ABDOMEN PELVIS ANGIOGRAM W AND/OR WO IV CONTRAST 10/24/2018 [facility] EMERGENCY LEGACY    CT ABDOMEN PELVIS ANGIOGRAM W AND/OR WO IV CONTRAST  12/13/2022    CT ABDOMEN PELVIS ANGIOGRAM W AND/OR WO IV CONTRAST 12/13/2022 DOCTOR OFFICE LEGACY    CT AORTA AND BILATERAL ILIOFEMORAL RUNOFF ANGIOGRAM W AND/OR WO IV CONTRAST  05/12/2023    CT AORTA AND BILATERAL ILIOFEMORAL RUNOFF ANGIOGRAM W AND/OR WO IV CONTRAST 5/12/2023 [facility] CT    HYSTERECTOMY  01/06/2014    Hysterectomy    IR ANGIOGRAM AORTA ABDOMEN  08/02/2019    IR ANGIOGRAM AORTA ABDOMEN 8/2/2019 [facility] INPATIENT LEGACY    IR ANGIOGRAM AORTA ABDOMEN  08/19/2022    IR ANGIOGRAM AORTA ABDOMEN 8/19/2022 [facility] INPATIENT LEGACY    IR ANGIOGRAM AORTA THORACIC  02/21/2018    IR ANGIOGRAM AORTA THORACIC 2/21/2018 [facility] INPATIENT LEGACY    IR ANGIOGRAM ENDOVASCULAR AORTIC REPAIR  08/19/2022    IR ANGIOGRAM ENDOVASCULAR AORTIC REPAIR 8/19/2022 [facility] INPATIENT LEGACY    IR INTERVENTION NEURO STENT  08/02/2019    IR INTERVENTION NEURO STENT 8/2/2019 [facility] INPATIENT LEGACY    OTHER  SURGICAL HISTORY  01/20/2017    Aortic Aneurysm Repair Thoracoabdominal       Active Ambulatory Problems     Diagnosis Date Noted    Asthma ([facility]) 08/17/2023    Cardiomyopathy (Multi) 08/17/2023    Depression, unspecified 10/28/2022    Calcification of coronary artery 03/08/2023    Dissection of aorta (Multi) 08/17/2023    Hyperlipidemia 03/08/2023    Peritoneal dialysis status (CMS-HCC) 08/17/2023    Thoraco abdominal aneurysm (CMS-HCC) 08/17/2023    Chronic obstructive pulmonary disease (Multi) 10/03/2022    DVT (deep venous thrombosis) (Multi) 08/31/2023    Hyperparathyroidism due to renal insufficiency (Multi) 06/02/2021    Opioid dependence, uncomplicated (Multi) 10/17/2023    Acquired hypothyroidism 04/17/2023    Acute on chronic systolic heart failure (Multi) 03/06/2023    Anemia 01/12/2024    Disorder of pancreatic duct ([facility]) 08/08/2023    Dissection of carotid artery (Multi) 08/02/2023    Do not resuscitate 03/08/2023    Fracture of calcaneus 01/12/2024    Talus fracture 01/12/2024    Gastritis 01/12/2024    GERD (gastroesophageal reflux disease) 03/08/2023    Peritonitis (Multi) 11/06/2023    Heart failure (Multi) 01/12/2024    Sanchez sign present 01/12/2024    Hyperreflexia of lower extremity 01/12/2024    Peripheral vascular disease (CMS-HCC) 06/20/2022    Hypertensive heart disease without congestive heart failure 08/03/2022    Iron deficiency anemia, unspecified 06/02/2021    Irritable bowel syndrome with diarrhea 10/28/2022    Left inguinal hernia 03/08/2023    Leg pain 01/12/2024    Pain of lower extremity 01/12/2024    Mild aortic regurgitation 04/17/2023    Muscle strain 01/12/2024    Muscle weakness (generalized) 10/28/2022    Neck pain 01/12/2024    Nicotine dependence 10/17/2023    Occlusion of subclavian artery 01/12/2024    Other and unspecified ventral hernia with obstruction, without gangrene 08/02/2023    Other disorders of plasma-protein metabolism, not elsewhere classified  08/03/2021    Other disorders resulting from impaired renal tubular function 06/02/2021    Other pulmonary embolism with acute cor pulmonale (Multi) 03/08/2023    Sciatica, left side 10/28/2022    Sciatica, right side 10/28/2022    Seizure (Multi) 07/30/2022    Tubular adenoma of colon 01/12/2024    Left leg pain 01/12/2024    End stage renal disease (Multi) 06/01/2021    Hypotension 03/21/2024    Peritonitis associated with peritoneal dialysis, subsequent encounter (CMS-HCC) 03/21/2024     Resolved Ambulatory Problems     Diagnosis Date Noted    Diarrhea 08/17/2023    End stage renal disease (Multi) 06/01/2021    Other specified coagulation defects (Multi) 10/17/2023    Pleural effusion 10/18/2023    Fluid excess 10/18/2023    Abdominal pain 11/06/2023    Abdominal pain, RUQ (right upper quadrant) 11/06/2023    Abnormal cardiovascular stress test 08/03/2022    Abnormal mammogram 01/12/2024    Abnormal renal function 01/12/2024    Abnormal weight loss 01/12/2024    Acute bronchitis with bronchospasm 01/12/2024    Acute colitis 01/12/2024    Colitis 01/12/2024    Acute duodenitis 01/12/2024    Antalgic gait 01/12/2024    Atypical chest pain 01/12/2024    Carotid bruit 01/12/2024    Chronic deep vein thrombosis (DVT) of lower extremity (Multi) 12/19/2022    Chronic pain 10/03/2022    Clostridium difficile colitis 03/05/2023    Contact with and (suspected) exposure to covid-19 03/08/2023    Deficiency of macronutrients 04/17/2023    Dehydration 10/28/2022    Diaphragmatic hernia without obstruction or gangrene 08/02/2023    Dizziness 01/12/2024    Ecchymosis 01/12/2024    Elevated blood pressure reading 01/12/2024    Enterocolitis due to Clostridium difficile, recurrent 07/21/2022    Fall 01/12/2024    History of elevated lipids 01/12/2024    Sanchez's reflex positive 01/12/2024    Hyperkalemia 07/30/2022    Hyperreflexia 01/12/2024    Hypertension 06/02/2021    Hypocalcemia 10/05/2023    Intractable vomiting with  nausea 01/12/2024    Irritable bowel syndrome 12/19/2022    Symptom of leg swelling 01/12/2024    Numbness 01/12/2024    Postphlebitic syndrome 01/12/2024    Primary snoring 01/12/2024    Radicular pain 01/12/2024    Rectal hemorrhage 01/12/2024    Shortness of breath 09/13/2021    Syncope and collapse 08/08/2023    Systemic inflammatory response syndrome (SIRS) (Multi) 01/12/2024    Low platelet count (CMS-HCC) 07/30/2022    Toxic encephalopathy 03/08/2023    Acute UTI 01/12/2024    Urinary tract infection 01/12/2024    Age-related physical debility 03/08/2023     Past Medical History:   Diagnosis Date    CHF (congestive heart failure) (Multi)     COPD (chronic obstructive pulmonary disease) (Multi)     ESRD (end stage renal disease) (Multi)     Other abnormalities of gait and mobility     PE (pulmonary thromboembolism) (Multi)     Personal history of other endocrine, nutritional and metabolic disease     Personal history of other specified conditions        Review of Systems   Constitutional: Negative.    HENT: Negative.     Eyes: Negative.    Respiratory: Negative.     Cardiovascular: Negative.    Gastrointestinal: Negative.    Endocrine: Negative.    Genitourinary: Negative.    Musculoskeletal: Negative.    Skin: Negative.    Allergic/Immunologic: Negative.    Neurological: Negative.    Hematological: Negative.    Psychiatric/Behavioral: Negative.       Allergies   Allergen Reactions    Ace Inhibitors Angioedema, Other, Swelling and Unknown    Ciprofloxacin GI intolerance, Nausea Only and Diarrhea    Gabapentin Unknown     Jerking head movements    Oxycodone Itching       Vitals:   Visit Vitals  BP 91/61   Pulse 94   Resp 20     Physical Exam:   Vascular Physical Exam  Pulmonary:      Effort: Pulmonary effort is normal.   Abdominal:      General: Abdomen is protuberant.      Palpations: Abdomen is soft.   Musculoskeletal:      Neck: Normal range of motion.   Skin:     General: Skin is warm.   Neurological:       General: No focal deficit present.   Psychiatric:         Mood and Affect: Mood normal.         Labs:  LABS:  CBC with Differential:    Lab Results   Component Value Date    WBC 4.9 03/27/2024    RBC 2.72 (L) 03/27/2024    HGB 8.3 (L) 03/27/2024    HCT 25.4 (L) 03/27/2024     03/27/2024    MCV 93 03/27/2024    MCH 30.5 03/27/2024    MCHC 32.7 03/27/2024    RDW 21.0 (H) 03/27/2024    NRBC 0.8 (H) 03/27/2024    LYMPHOPCT 17.7 03/21/2024    LYMPHOPCT 17.4 02/17/2024    MONOPCT 5.5 03/21/2024    MONOPCT 0.0 02/17/2024    EOSPCT 1.1 03/21/2024    EOSPCT 2.6 02/17/2024    BASOPCT 0.3 03/21/2024    BASOPCT 0.0 02/17/2024    MONOSABS 0.34 03/21/2024    LYMPHSABS 1.09 (L) 03/21/2024    EOSABS 0.07 03/21/2024    EOSABS 0.14 02/17/2024    BASOSABS 0.02 03/21/2024    BASOSABS 0.00 02/17/2024     CMP:    Lab Results   Component Value Date     (L) 03/27/2024    K 3.7 03/27/2024    CL 94 (L) 03/27/2024    CO2 25 03/27/2024    BUN 49 (H) 03/27/2024    CREATININE 6.89 (H) 03/27/2024    GLUCOSE 107 (H) 03/27/2024    PROT 6.6 03/21/2024    CALCIUM 9.2 03/27/2024    BILITOT 0.4 03/21/2024    ALKPHOS 127 03/21/2024    AST 54 (H) 03/21/2024    ALT 40 03/21/2024     BMP:    Lab Results   Component Value Date     (L) 03/27/2024    K 3.7 03/27/2024    CL 94 (L) 03/27/2024    CO2 25 03/27/2024    BUN 49 (H) 03/27/2024    CREATININE 6.89 (H) 03/27/2024    CALCIUM 9.2 03/27/2024    GLUCOSE 107 (H) 03/27/2024     Magnesium:  Lab Results   Component Value Date    MG 2.08 03/27/2024     Troponin:    Lab Results   Component Value Date    TROPHS 47 (H) 02/29/2024    TROPHS 49 (H) 02/29/2024    TROPHS 29 01/30/2024     BNP:   Lab Results   Component Value Date    BNP >4,700 (H) 02/29/2024       Lab Results   Component Value Date    INR 2.2 (H) 03/27/2024    PROTIME 25.1 (H) 03/27/2024    CHOL 119 06/30/2021    LDLF 56 06/30/2021    HDL 38.6 (A) 06/30/2021       Imaging: reviewed      Assessment/Plan   Diagnoses and all orders for  this visit:  Paravisceral abdominal aortic aneurysm (AAA) without rupture (CMS-HCC)  -     CT angio chest abdomen pelvis; Future  Peripheral vascular disease (CMS-HCC)  -     Referral to Vascular Surgery  -     CT angio chest abdomen pelvis; Future  Dissection of carotid artery (Multi)  -     Referral to Vascular Surgery  -     CT angio chest abdomen pelvis; Future    Patient is stable for now.  I am going to have her repeat CT scan order chest abdomen pelvis for 1 years time.  She is scheduled to see physical therapy in a few weeks in June.  She needs compression therapy of her legs.  She is going to get her nursing home to do this.

## 2024-06-02 ENCOUNTER — APPOINTMENT (OUTPATIENT)
Dept: RADIOLOGY | Facility: HOSPITAL | Age: 64
DRG: 388 | End: 2024-06-02
Payer: COMMERCIAL

## 2024-06-02 ENCOUNTER — HOSPITAL ENCOUNTER (INPATIENT)
Facility: HOSPITAL | Age: 64
LOS: 8 days | Discharge: NURSING, REHAB OR LONG-TERM CARE | End: 2024-06-10
Attending: EMERGENCY MEDICINE | Admitting: INTERNAL MEDICINE
Payer: COMMERCIAL

## 2024-06-02 ENCOUNTER — CLINICAL SUPPORT (OUTPATIENT)
Dept: EMERGENCY MEDICINE | Facility: HOSPITAL | Age: 64
DRG: 388 | End: 2024-06-02
Payer: COMMERCIAL

## 2024-06-02 DIAGNOSIS — K65.9 PERITONITIS (MULTI): ICD-10-CM

## 2024-06-02 DIAGNOSIS — T14.8XXA MUSCLE STRAIN: ICD-10-CM

## 2024-06-02 DIAGNOSIS — F11.20 OPIOID DEPENDENCE, UNCOMPLICATED (MULTI): ICD-10-CM

## 2024-06-02 DIAGNOSIS — R56.9 SEIZURE (MULTI): ICD-10-CM

## 2024-06-02 DIAGNOSIS — M54.2 NECK PAIN: ICD-10-CM

## 2024-06-02 DIAGNOSIS — K58.0 IRRITABLE BOWEL SYNDROME WITH DIARRHEA: ICD-10-CM

## 2024-06-02 DIAGNOSIS — M54.31 SCIATICA, RIGHT SIDE: ICD-10-CM

## 2024-06-02 DIAGNOSIS — N25.81 HYPERPARATHYROIDISM DUE TO RENAL INSUFFICIENCY (MULTI): ICD-10-CM

## 2024-06-02 DIAGNOSIS — K86.9 DISORDER OF PANCREATIC DUCT (HHS-HCC): ICD-10-CM

## 2024-06-02 DIAGNOSIS — M54.32 SCIATICA, LEFT SIDE: ICD-10-CM

## 2024-06-02 DIAGNOSIS — E88.09 OTHER DISORDERS OF PLASMA-PROTEIN METABOLISM, NOT ELSEWHERE CLASSIFIED: ICD-10-CM

## 2024-06-02 DIAGNOSIS — T85.71XD: ICD-10-CM

## 2024-06-02 DIAGNOSIS — Z99.2 PERITONEAL DIALYSIS STATUS (CMS-HCC): ICD-10-CM

## 2024-06-02 DIAGNOSIS — N18.6 END STAGE RENAL DISEASE (MULTI): ICD-10-CM

## 2024-06-02 DIAGNOSIS — I26.99 OTHER PULMONARY EMBOLISM WITHOUT ACUTE COR PULMONALE (MULTI): ICD-10-CM

## 2024-06-02 DIAGNOSIS — R29.2 HYPERREFLEXIA OF LOWER EXTREMITY: ICD-10-CM

## 2024-06-02 DIAGNOSIS — N25.89 OTHER DISORDERS RESULTING FROM IMPAIRED RENAL TUBULAR FUNCTION: ICD-10-CM

## 2024-06-02 DIAGNOSIS — K43.6 OTHER AND UNSPECIFIED VENTRAL HERNIA WITH OBSTRUCTION, WITHOUT GANGRENE: ICD-10-CM

## 2024-06-02 DIAGNOSIS — I50.23 ACUTE ON CHRONIC SYSTOLIC HEART FAILURE (MULTI): ICD-10-CM

## 2024-06-02 DIAGNOSIS — I25.84 CALCIFICATION OF CORONARY ARTERY: ICD-10-CM

## 2024-06-02 DIAGNOSIS — M79.605 LEFT LEG PAIN: ICD-10-CM

## 2024-06-02 DIAGNOSIS — K40.90 LEFT INGUINAL HERNIA: ICD-10-CM

## 2024-06-02 DIAGNOSIS — M62.81 MUSCLE WEAKNESS (GENERALIZED): ICD-10-CM

## 2024-06-02 DIAGNOSIS — I25.10 CALCIFICATION OF CORONARY ARTERY: ICD-10-CM

## 2024-06-02 DIAGNOSIS — I77.71 DISSECTION OF CAROTID ARTERY (MULTI): ICD-10-CM

## 2024-06-02 DIAGNOSIS — S99.921A TOE INJURY, RIGHT, INITIAL ENCOUNTER: ICD-10-CM

## 2024-06-02 DIAGNOSIS — I26.94 MULTIPLE SUBSEGMENTAL PULMONARY EMBOLI WITHOUT ACUTE COR PULMONALE (MULTI): ICD-10-CM

## 2024-06-02 DIAGNOSIS — E03.9 ACQUIRED HYPOTHYROIDISM: ICD-10-CM

## 2024-06-02 DIAGNOSIS — D12.6 TUBULAR ADENOMA OF COLON: ICD-10-CM

## 2024-06-02 DIAGNOSIS — I11.9 HYPERTENSIVE HEART DISEASE WITHOUT CONGESTIVE HEART FAILURE: ICD-10-CM

## 2024-06-02 DIAGNOSIS — I35.1 MILD AORTIC REGURGITATION: ICD-10-CM

## 2024-06-02 DIAGNOSIS — I73.9 PERIPHERAL VASCULAR DISEASE (CMS-HCC): ICD-10-CM

## 2024-06-02 DIAGNOSIS — R10.84 GENERALIZED ABDOMINAL PAIN: Primary | ICD-10-CM

## 2024-06-02 DIAGNOSIS — Z66 DO NOT RESUSCITATE: ICD-10-CM

## 2024-06-02 DIAGNOSIS — I70.8: ICD-10-CM

## 2024-06-02 DIAGNOSIS — R29.2 HOFFMAN SIGN PRESENT: ICD-10-CM

## 2024-06-02 LAB
ALBUMIN SERPL BCP-MCNC: 2.3 G/DL (ref 3.4–5)
ALBUMIN SERPL BCP-MCNC: 2.6 G/DL (ref 3.4–5)
ALP SERPL-CCNC: 136 U/L (ref 33–136)
ALT SERPL W P-5'-P-CCNC: 23 U/L (ref 7–45)
ANION GAP BLDV CALCULATED.4IONS-SCNC: 8 MMOL/L (ref 10–25)
ANION GAP BLDV CALCULATED.4IONS-SCNC: 9 MMOL/L (ref 10–25)
ANION GAP SERPL CALC-SCNC: 20 MMOL/L (ref 10–20)
ANION GAP SERPL CALC-SCNC: 20 MMOL/L (ref 10–20)
APTT PPP: 39 SECONDS (ref 27–38)
AST SERPL W P-5'-P-CCNC: 54 U/L (ref 9–39)
ATRIAL RATE: 102 BPM
ATRIAL RATE: 98 BPM
BASE EXCESS BLDV CALC-SCNC: 8.2 MMOL/L (ref -2–3)
BASE EXCESS BLDV CALC-SCNC: 9 MMOL/L (ref -2–3)
BASOPHILS # BLD AUTO: 0.02 X10*3/UL (ref 0–0.1)
BASOPHILS NFR BLD AUTO: 0.3 %
BASOPHILS NFR FLD MANUAL: 1 %
BILIRUB SERPL-MCNC: 0.6 MG/DL (ref 0–1.2)
BLASTS NFR FLD MANUAL: 0 %
BODY TEMPERATURE: 37 DEGREES CELSIUS
BODY TEMPERATURE: 37 DEGREES CELSIUS
BUN SERPL-MCNC: 49 MG/DL (ref 6–23)
BUN SERPL-MCNC: 51 MG/DL (ref 6–23)
CA-I BLDV-SCNC: 1.05 MMOL/L (ref 1.1–1.33)
CA-I BLDV-SCNC: 1.05 MMOL/L (ref 1.1–1.33)
CALCIUM SERPL-MCNC: 7.8 MG/DL (ref 8.6–10.6)
CALCIUM SERPL-MCNC: 8.3 MG/DL (ref 8.6–10.6)
CARDIAC TROPONIN I PNL SERPL HS: 49 NG/L (ref 0–34)
CARDIAC TROPONIN I PNL SERPL HS: 50 NG/L (ref 0–34)
CHLORIDE BLDV-SCNC: 100 MMOL/L (ref 98–107)
CHLORIDE BLDV-SCNC: 100 MMOL/L (ref 98–107)
CHLORIDE SERPL-SCNC: 97 MMOL/L (ref 98–107)
CHLORIDE SERPL-SCNC: 98 MMOL/L (ref 98–107)
CLARITY FLD: ABNORMAL
CO2 SERPL-SCNC: 30 MMOL/L (ref 21–32)
CO2 SERPL-SCNC: 32 MMOL/L (ref 21–32)
COLOR FLD: ABNORMAL
CREAT SERPL-MCNC: 6.77 MG/DL (ref 0.5–1.05)
CREAT SERPL-MCNC: 6.89 MG/DL (ref 0.5–1.05)
CRP SERPL-MCNC: 23.71 MG/DL
EGFRCR SERPLBLD CKD-EPI 2021: 6 ML/MIN/1.73M*2
EGFRCR SERPLBLD CKD-EPI 2021: 6 ML/MIN/1.73M*2
EOSINOPHIL # BLD AUTO: 0.01 X10*3/UL (ref 0–0.7)
EOSINOPHIL NFR BLD AUTO: 0.2 %
EOSINOPHIL NFR FLD MANUAL: 1 %
ERYTHROCYTE [DISTWIDTH] IN BLOOD BY AUTOMATED COUNT: 17.3 % (ref 11.5–14.5)
ERYTHROCYTE [SEDIMENTATION RATE] IN BLOOD BY WESTERGREN METHOD: 91 MM/H (ref 0–30)
GLUCOSE BLD MANUAL STRIP-MCNC: 124 MG/DL (ref 74–99)
GLUCOSE BLDV-MCNC: 128 MG/DL (ref 74–99)
GLUCOSE BLDV-MCNC: 56 MG/DL (ref 74–99)
GLUCOSE SERPL-MCNC: 117 MG/DL (ref 74–99)
GLUCOSE SERPL-MCNC: 49 MG/DL (ref 74–99)
HCO3 BLDV-SCNC: 33.5 MMOL/L (ref 22–26)
HCO3 BLDV-SCNC: 34.4 MMOL/L (ref 22–26)
HCT VFR BLD AUTO: 35.4 % (ref 36–46)
HCT VFR BLD EST: 32 % (ref 36–46)
HCT VFR BLD EST: 40 % (ref 36–46)
HGB BLD-MCNC: 12.1 G/DL (ref 12–16)
HGB BLDV-MCNC: 10.7 G/DL (ref 12–16)
HGB BLDV-MCNC: 13.3 G/DL (ref 12–16)
IMM GRANULOCYTES # BLD AUTO: 0.05 X10*3/UL (ref 0–0.7)
IMM GRANULOCYTES NFR BLD AUTO: 0.8 % (ref 0–0.9)
IMMATURE GRANULOCYTES IN FLUID: 0 %
INHALED O2 CONCENTRATION: 21 %
INHALED O2 CONCENTRATION: 21 %
INR PPP: 1.7 (ref 0.9–1.1)
LACTATE BLDV-SCNC: 1.5 MMOL/L (ref 0.4–2)
LACTATE BLDV-SCNC: 2 MMOL/L (ref 0.4–2)
LACTATE SERPL-SCNC: 1.9 MMOL/L (ref 0.4–2)
LDH FLD L TO P-CCNC: 148 U/L
LIPASE SERPL-CCNC: 19 U/L (ref 9–82)
LYMPHOCYTES # BLD AUTO: 0.66 X10*3/UL (ref 1.2–4.8)
LYMPHOCYTES NFR BLD AUTO: 10.8 %
LYMPHOCYTES NFR FLD MANUAL: 21 %
MCH RBC QN AUTO: 31.7 PG (ref 26–34)
MCHC RBC AUTO-ENTMCNC: 34.2 G/DL (ref 32–36)
MCV RBC AUTO: 93 FL (ref 80–100)
MONOCYTES # BLD AUTO: 0.5 X10*3/UL (ref 0.1–1)
MONOCYTES NFR BLD AUTO: 8.2 %
MONOS+MACROS NFR FLD MANUAL: 9 %
NEUTROPHILS # BLD AUTO: 4.86 X10*3/UL (ref 1.2–7.7)
NEUTROPHILS NFR BLD AUTO: 79.7 %
NEUTROPHILS NFR FLD MANUAL: 60 %
NRBC BLD-RTO: 0 /100 WBCS (ref 0–0)
OTHER CELLS NFR FLD MANUAL: 8 %
OXYHGB MFR BLDV: 35.7 % (ref 45–75)
OXYHGB MFR BLDV: 60 % (ref 45–75)
P AXIS: 70 DEGREES
P AXIS: 92 DEGREES
P OFFSET: 213 MS
P ONSET: 121 MS
PCO2 BLDV: 45 MM HG (ref 41–51)
PCO2 BLDV: 53 MM HG (ref 41–51)
PH BLDV: 7.42 PH (ref 7.33–7.43)
PH BLDV: 7.48 PH (ref 7.33–7.43)
PHOSPHATE SERPL-MCNC: 6.6 MG/DL (ref 2.5–4.9)
PLASMA CELLS NFR FLD MANUAL: 0 %
PLATELET # BLD AUTO: 136 X10*3/UL (ref 150–450)
PO2 BLDV: 33 MM HG (ref 35–45)
PO2 BLDV: 43 MM HG (ref 35–45)
POTASSIUM BLDV-SCNC: 4.4 MMOL/L (ref 3.5–5.3)
POTASSIUM BLDV-SCNC: 4.9 MMOL/L (ref 3.5–5.3)
POTASSIUM SERPL-SCNC: 4.1 MMOL/L (ref 3.5–5.3)
POTASSIUM SERPL-SCNC: 6.7 MMOL/L (ref 3.5–5.3)
PR INTERVAL: 160 MS
PR INTERVAL: 192 MS
PROT SERPL-MCNC: 6.3 G/DL (ref 6.4–8.2)
PROTHROMBIN TIME: 19 SECONDS (ref 9.8–12.8)
Q ONSET: 217 MS
Q ONSET: 225 MS
QRS COUNT: 16 BEATS
QRS COUNT: 17 BEATS
QRS DURATION: 82 MS
QRS DURATION: 84 MS
QT INTERVAL: 388 MS
QT INTERVAL: 526 MS
QTC CALCULATION(BAZETT): 505 MS
QTC CALCULATION(BAZETT): 678 MS
QTC FREDERICIA: 463 MS
QTC FREDERICIA: 624 MS
R AXIS: -52 DEGREES
R AXIS: -60 DEGREES
RBC # BLD AUTO: 3.82 X10*6/UL (ref 4–5.2)
RBC # FLD AUTO: ABNORMAL /UL
SAO2 % BLDV: 36 % (ref 45–75)
SAO2 % BLDV: 61 % (ref 45–75)
SODIUM BLDV-SCNC: 137 MMOL/L (ref 136–145)
SODIUM BLDV-SCNC: 139 MMOL/L (ref 136–145)
SODIUM SERPL-SCNC: 143 MMOL/L (ref 136–145)
SODIUM SERPL-SCNC: 143 MMOL/L (ref 136–145)
T AXIS: -87 DEGREES
T AXIS: 119 DEGREES
T OFFSET: 411 MS
T OFFSET: 488 MS
TOTAL CELLS COUNTED FLD: 100
UFH PPP CHRO-ACNC: 0.6 IU/ML
VENTRICULAR RATE: 100 BPM
VENTRICULAR RATE: 102 BPM
WBC # BLD AUTO: 6.1 X10*3/UL (ref 4.4–11.3)
WBC # FLD AUTO: 6033 /UL

## 2024-06-02 PROCEDURE — 99285 EMERGENCY DEPT VISIT HI MDM: CPT | Mod: 25

## 2024-06-02 PROCEDURE — 86140 C-REACTIVE PROTEIN: CPT

## 2024-06-02 PROCEDURE — 85018 HEMOGLOBIN: CPT

## 2024-06-02 PROCEDURE — 99285 EMERGENCY DEPT VISIT HI MDM: CPT | Performed by: EMERGENCY MEDICINE

## 2024-06-02 PROCEDURE — 2500000004 HC RX 250 GENERAL PHARMACY W/ HCPCS (ALT 636 FOR OP/ED): Performed by: STUDENT IN AN ORGANIZED HEALTH CARE EDUCATION/TRAINING PROGRAM

## 2024-06-02 PROCEDURE — 93005 ELECTROCARDIOGRAM TRACING: CPT

## 2024-06-02 PROCEDURE — 74174 CTA ABD&PLVS W/CONTRAST: CPT

## 2024-06-02 PROCEDURE — 36415 COLL VENOUS BLD VENIPUNCTURE: CPT | Performed by: EMERGENCY MEDICINE

## 2024-06-02 PROCEDURE — 82810 BLOOD GASES O2 SAT ONLY: CPT | Performed by: EMERGENCY MEDICINE

## 2024-06-02 PROCEDURE — 82810 BLOOD GASES O2 SAT ONLY: CPT

## 2024-06-02 PROCEDURE — 85652 RBC SED RATE AUTOMATED: CPT

## 2024-06-02 PROCEDURE — 85025 COMPLETE CBC W/AUTO DIFF WBC: CPT | Performed by: STUDENT IN AN ORGANIZED HEALTH CARE EDUCATION/TRAINING PROGRAM

## 2024-06-02 PROCEDURE — 2500000004 HC RX 250 GENERAL PHARMACY W/ HCPCS (ALT 636 FOR OP/ED)

## 2024-06-02 PROCEDURE — 36415 COLL VENOUS BLD VENIPUNCTURE: CPT | Performed by: STUDENT IN AN ORGANIZED HEALTH CARE EDUCATION/TRAINING PROGRAM

## 2024-06-02 PROCEDURE — 99223 1ST HOSP IP/OBS HIGH 75: CPT | Performed by: SURGERY

## 2024-06-02 PROCEDURE — 1210000001 HC SEMI-PRIVATE ROOM DAILY

## 2024-06-02 PROCEDURE — 2500000001 HC RX 250 WO HCPCS SELF ADMINISTERED DRUGS (ALT 637 FOR MEDICARE OP)

## 2024-06-02 PROCEDURE — 96376 TX/PRO/DX INJ SAME DRUG ADON: CPT

## 2024-06-02 PROCEDURE — 82947 ASSAY GLUCOSE BLOOD QUANT: CPT

## 2024-06-02 PROCEDURE — 2550000001 HC RX 255 CONTRASTS: Performed by: EMERGENCY MEDICINE

## 2024-06-02 PROCEDURE — 82805 BLOOD GASES W/O2 SATURATION: CPT

## 2024-06-02 PROCEDURE — 93010 ELECTROCARDIOGRAM REPORT: CPT | Performed by: EMERGENCY MEDICINE

## 2024-06-02 PROCEDURE — C9113 INJ PANTOPRAZOLE SODIUM, VIA: HCPCS

## 2024-06-02 PROCEDURE — 36000 PLACE NEEDLE IN VEIN: CPT | Performed by: EMERGENCY MEDICINE

## 2024-06-02 PROCEDURE — 71275 CT ANGIOGRAPHY CHEST: CPT | Performed by: RADIOLOGY

## 2024-06-02 PROCEDURE — 84132 ASSAY OF SERUM POTASSIUM: CPT | Performed by: EMERGENCY MEDICINE

## 2024-06-02 PROCEDURE — 87075 CULTR BACTERIA EXCEPT BLOOD: CPT

## 2024-06-02 PROCEDURE — 85730 THROMBOPLASTIN TIME PARTIAL: CPT | Performed by: EMERGENCY MEDICINE

## 2024-06-02 PROCEDURE — 2500000005 HC RX 250 GENERAL PHARMACY W/O HCPCS

## 2024-06-02 PROCEDURE — 87040 BLOOD CULTURE FOR BACTERIA: CPT | Performed by: EMERGENCY MEDICINE

## 2024-06-02 PROCEDURE — 80053 COMPREHEN METABOLIC PANEL: CPT | Performed by: STUDENT IN AN ORGANIZED HEALTH CARE EDUCATION/TRAINING PROGRAM

## 2024-06-02 PROCEDURE — 82330 ASSAY OF CALCIUM: CPT | Performed by: EMERGENCY MEDICINE

## 2024-06-02 PROCEDURE — 85520 HEPARIN ASSAY: CPT | Performed by: STUDENT IN AN ORGANIZED HEALTH CARE EDUCATION/TRAINING PROGRAM

## 2024-06-02 PROCEDURE — 0D9670Z DRAINAGE OF STOMACH WITH DRAINAGE DEVICE, VIA NATURAL OR ARTIFICIAL OPENING: ICD-10-PCS | Performed by: EMERGENCY MEDICINE

## 2024-06-02 PROCEDURE — 84484 ASSAY OF TROPONIN QUANT: CPT | Performed by: STUDENT IN AN ORGANIZED HEALTH CARE EDUCATION/TRAINING PROGRAM

## 2024-06-02 PROCEDURE — 89051 BODY FLUID CELL COUNT: CPT

## 2024-06-02 PROCEDURE — 83605 ASSAY OF LACTIC ACID: CPT | Performed by: STUDENT IN AN ORGANIZED HEALTH CARE EDUCATION/TRAINING PROGRAM

## 2024-06-02 PROCEDURE — 87070 CULTURE OTHR SPECIMN AEROBIC: CPT | Performed by: EMERGENCY MEDICINE

## 2024-06-02 PROCEDURE — 83880 ASSAY OF NATRIURETIC PEPTIDE: CPT

## 2024-06-02 PROCEDURE — 96374 THER/PROPH/DIAG INJ IV PUSH: CPT

## 2024-06-02 PROCEDURE — 83615 LACTATE (LD) (LDH) ENZYME: CPT

## 2024-06-02 PROCEDURE — 74174 CTA ABD&PLVS W/CONTRAST: CPT | Performed by: RADIOLOGY

## 2024-06-02 PROCEDURE — 84132 ASSAY OF SERUM POTASSIUM: CPT

## 2024-06-02 PROCEDURE — 71275 CT ANGIOGRAPHY CHEST: CPT

## 2024-06-02 PROCEDURE — 83690 ASSAY OF LIPASE: CPT | Performed by: STUDENT IN AN ORGANIZED HEALTH CARE EDUCATION/TRAINING PROGRAM

## 2024-06-02 RX ORDER — ONDANSETRON HYDROCHLORIDE 2 MG/ML
4 INJECTION, SOLUTION INTRAVENOUS ONCE
Status: COMPLETED | OUTPATIENT
Start: 2024-06-02 | End: 2024-06-02

## 2024-06-02 RX ORDER — LOPERAMIDE HYDROCHLORIDE 2 MG/1
2 CAPSULE ORAL EVERY 6 HOURS PRN
COMMUNITY

## 2024-06-02 RX ORDER — ACETAMINOPHEN 500 MG
10 TABLET ORAL NIGHTLY
Status: DISCONTINUED | OUTPATIENT
Start: 2024-06-02 | End: 2024-06-10 | Stop reason: HOSPADM

## 2024-06-02 RX ORDER — TRAMADOL HYDROCHLORIDE 50 MG/1
50 TABLET ORAL EVERY 8 HOURS PRN
Status: DISCONTINUED | OUTPATIENT
Start: 2024-06-02 | End: 2024-06-10 | Stop reason: HOSPADM

## 2024-06-02 RX ORDER — SERTRALINE HYDROCHLORIDE 50 MG/1
50 TABLET, FILM COATED ORAL DAILY
COMMUNITY

## 2024-06-02 RX ORDER — LEVOTHYROXINE SODIUM 50 UG/1
25 TABLET ORAL
Status: DISCONTINUED | OUTPATIENT
Start: 2024-06-03 | End: 2024-06-10 | Stop reason: HOSPADM

## 2024-06-02 RX ORDER — ONDANSETRON HYDROCHLORIDE 2 MG/ML
4 INJECTION, SOLUTION INTRAVENOUS EVERY 8 HOURS PRN
Status: DISCONTINUED | OUTPATIENT
Start: 2024-06-02 | End: 2024-06-10 | Stop reason: HOSPADM

## 2024-06-02 RX ORDER — DEXTROSE 50 % IN WATER (D50W) INTRAVENOUS SYRINGE
25 ONCE
Status: COMPLETED | OUTPATIENT
Start: 2024-06-02 | End: 2024-06-02

## 2024-06-02 RX ORDER — MULTIVIT-MIN/IRON FUM/FOLIC AC 7.5 MG-4
1 TABLET ORAL DAILY
Status: DISCONTINUED | OUTPATIENT
Start: 2024-06-03 | End: 2024-06-02 | Stop reason: SDUPTHER

## 2024-06-02 RX ORDER — ONDANSETRON 4 MG/1
4 TABLET, FILM COATED ORAL EVERY 8 HOURS PRN
COMMUNITY

## 2024-06-02 RX ORDER — GENTAMICIN SULFATE 1 MG/G
CREAM TOPICAL EVERY 24 HOURS
Status: DISCONTINUED | OUTPATIENT
Start: 2024-06-02 | End: 2024-06-10 | Stop reason: HOSPADM

## 2024-06-02 RX ORDER — MIRTAZAPINE 15 MG/1
15 TABLET, FILM COATED ORAL NIGHTLY
Status: DISCONTINUED | OUTPATIENT
Start: 2024-06-02 | End: 2024-06-10 | Stop reason: HOSPADM

## 2024-06-02 RX ORDER — POTASSIUM CHLORIDE 20 MEQ/1
20 TABLET, EXTENDED RELEASE ORAL DAILY
COMMUNITY

## 2024-06-02 RX ORDER — MAGNESIUM HYDROXIDE 2400 MG/10ML
30 SUSPENSION ORAL
COMMUNITY

## 2024-06-02 RX ORDER — DIPHENOXYLATE HYDROCHLORIDE AND ATROPINE SULFATE 2.5; .025 MG/1; MG/1
1 TABLET ORAL EVERY 12 HOURS PRN
COMMUNITY

## 2024-06-02 RX ORDER — ATORVASTATIN CALCIUM 40 MG/1
40 TABLET, FILM COATED ORAL NIGHTLY
Status: DISCONTINUED | OUTPATIENT
Start: 2024-06-02 | End: 2024-06-10 | Stop reason: HOSPADM

## 2024-06-02 RX ORDER — WARFARIN 3 MG/1
3 TABLET ORAL EVERY EVENING
COMMUNITY

## 2024-06-02 RX ORDER — PANTOPRAZOLE SODIUM 40 MG/10ML
40 INJECTION, POWDER, LYOPHILIZED, FOR SOLUTION INTRAVENOUS DAILY
Status: DISCONTINUED | OUTPATIENT
Start: 2024-06-02 | End: 2024-06-05 | Stop reason: ALTCHOICE

## 2024-06-02 RX ORDER — HEPARIN SODIUM 10000 [USP'U]/100ML
0-4500 INJECTION, SOLUTION INTRAVENOUS CONTINUOUS
Status: DISCONTINUED | OUTPATIENT
Start: 2024-06-02 | End: 2024-06-04

## 2024-06-02 RX ORDER — SEVELAMER CARBONATE 800 MG/1
800 TABLET, FILM COATED ORAL
Status: DISCONTINUED | OUTPATIENT
Start: 2024-06-03 | End: 2024-06-10 | Stop reason: HOSPADM

## 2024-06-02 RX ORDER — CALCITRIOL 0.25 UG/1
0.25 CAPSULE ORAL
COMMUNITY

## 2024-06-02 RX ORDER — DEXTROSE 50 % IN WATER (D50W) INTRAVENOUS SYRINGE
Status: COMPLETED
Start: 2024-06-02 | End: 2024-06-02

## 2024-06-02 RX ORDER — BISACODYL 10 MG/1
10 SUPPOSITORY RECTAL
COMMUNITY

## 2024-06-02 RX ORDER — ALLOPURINOL 100 MG/1
100 TABLET ORAL DAILY
Status: DISCONTINUED | OUTPATIENT
Start: 2024-06-03 | End: 2024-06-10 | Stop reason: HOSPADM

## 2024-06-02 RX ORDER — LIDOCAINE HYDROCHLORIDE 20 MG/ML
1 JELLY TOPICAL ONCE
Status: DISCONTINUED | OUTPATIENT
Start: 2024-06-02 | End: 2024-06-10 | Stop reason: HOSPADM

## 2024-06-02 RX ADMIN — DEXTROSE 50 % IN WATER (D50W) INTRAVENOUS SYRINGE 25 G: at 15:18

## 2024-06-02 RX ADMIN — Medication 10 MG: at 22:00

## 2024-06-02 RX ADMIN — DEXTROSE MONOHYDRATE 25 G: 25 INJECTION, SOLUTION INTRAVENOUS at 15:18

## 2024-06-02 RX ADMIN — ATORVASTATIN CALCIUM 40 MG: 40 TABLET, FILM COATED ORAL at 22:00

## 2024-06-02 RX ADMIN — PANTOPRAZOLE SODIUM 40 MG: 40 INJECTION, POWDER, FOR SOLUTION INTRAVENOUS at 23:13

## 2024-06-02 RX ADMIN — HEPARIN SODIUM 1200 UNITS/HR: 10000 INJECTION, SOLUTION INTRAVENOUS at 18:32

## 2024-06-02 RX ADMIN — IOHEXOL 75 ML: 350 INJECTION, SOLUTION INTRAVENOUS at 16:59

## 2024-06-02 RX ADMIN — ONDANSETRON 4 MG: 2 INJECTION INTRAMUSCULAR; INTRAVENOUS at 12:08

## 2024-06-02 RX ADMIN — MIRTAZAPINE 15 MG: 15 TABLET, FILM COATED ORAL at 22:00

## 2024-06-02 ASSESSMENT — LIFESTYLE VARIABLES
HAVE PEOPLE ANNOYED YOU BY CRITICIZING YOUR DRINKING: NO
EVER HAD A DRINK FIRST THING IN THE MORNING TO STEADY YOUR NERVES TO GET RID OF A HANGOVER: NO
TOTAL SCORE: 0
EVER FELT BAD OR GUILTY ABOUT YOUR DRINKING: NO
HAVE YOU EVER FELT YOU SHOULD CUT DOWN ON YOUR DRINKING: NO

## 2024-06-02 ASSESSMENT — PAIN - FUNCTIONAL ASSESSMENT: PAIN_FUNCTIONAL_ASSESSMENT: 0-10

## 2024-06-02 ASSESSMENT — PAIN SCALES - GENERAL: PAINLEVEL_OUTOF10: 9

## 2024-06-02 NOTE — ED PROCEDURE NOTE
Procedure  Procedures    There was difficulty obtaining IV access on this patient, and multiple attempts by nursing staff and/or medics have failed. Patient required IV access by ED provider under the assistance of ultrasound.      Site was prepped and sterilized before IV insertion.     Ultrasound images were not saved in the patient's chart demonstrating cannulization.     IV Size:20  IV Side: Left  IV Site: Upper Arm    DO Beth Valdes DO  Resident  06/02/24 [id number]

## 2024-06-02 NOTE — ED TRIAGE NOTES
"Pt from [facility], presents  with c/o abd pain with N/V/D, onset yesterday, given imodium at facility on yesterday, today given zofran in squad. Pt given 100ml bolus of NS en route. Pt is a daily peritoneal dialysis pt, last treatment on Friday due to \"not feeling well\". Pt is alert and oriented x4. Pt is on coumadin nad has a hx of multiple aortic dissections.  "

## 2024-06-02 NOTE — CONSULTS
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 Vascular Surgery consulted to evaluate in the setting of L renal artery stent thrombosis apparent on CTA.     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 She was just seen by Dr. 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      Past Medical History  She has a past medical history of CHF (congestive heart failure) (Multi), COPD (chronic obstructive pulmonary disease) (Multi), ESRD (end stage renal disease) (Multi), Hypertension, Other abnormalities of gait and mobility, PE (pulmonary thromboembolism) (Multi), Personal history of other endocrine, nutritional and metabolic disease, and Personal history of other specified conditions.    Surgical History  See above     Social History  She reports that she has been smoking cigarettes. She started smoking about 49 years ago. She has a 5 pack-year smoking history. She has never used smokeless tobacco. She reports that she does not currently use alcohol. She reports current drug use. Drug: Marijuana.    Family History  Family History   Problem Relation Name Age  "of Onset    COPD Mother      Kidney failure Father          Allergies  Ace inhibitors, Ciprofloxacin, Gabapentin, and Oxycodone    Review of Systems  All systems reviewed and otherwise negative.     Physical Exam  General: uncomfortable appearing  HEENT: NCAT  Resp: nonlabored breathing on RA  CV: tachycardic   Abd: soft, markedly distended, TTP in RLQ with no rebound or guarding  : no ryan  MSK: moves all extremities  Ext: bilateral lower extremity edema  Psych: appropriate mood and behavior    Last Recorded Vitals  Blood pressure 113/72, pulse (!) 105, temperature 36.5 °C (97.7 °F), temperature source Oral, resp. rate 19, height 1.702 m (5' 7\"), weight 65.3 kg (144 lb), SpO2 98%.    Relevant Results  CTA 6/2: Pending read. Appears to be unchanged from vascular standpoint.    CTA 3/21:  Postsurgical changes of endograft repair of aortic dissection with a graft extending to the abdominal aorta. No evidence of contrast extravasation to suggest an endoleak; unchanged caliber of the thoracoabdominal aorta. There are postsurgical changes of prior left common iliac graft supplying the renal arteries and right common iliac graft supplying the superior mesenteric artery and celiac artery. There is moderate stenosis of the right renal artery and complete occlusion of the left renal artery. The remainder of the bypass grafts are patent.  There is similar appearance of dissection involving the right common carotid and brachiocephalic arteries and right external iliac and left femoral arteries.  Cardiomegaly. Reflux of IV contrast into the hepatic veins consistent with right heart dysfunction. Hypoenhancement of thoracic aorta on initial arterial phase imaging denotes left ventricular systolic dysfunction.  Fissural and mild smooth interlobular septal thickening suggests acute pulmonary interstitial edema/CHF. Correlate with clinical volume status and consider correlation with BNP. No consolidation, pulmonary edema, pleural " effusion, or pneumothorax. Mild-to-moderate centrilobular emphysema with paraseptal emphysema in the apices with bullae.  Prominence of gastric and small-bowel mucosal folds with fluid-filled nondilated loops of small bowel suggesting indolent gastroenteritis in the appropriate clinical context. No dilated or frankly thickened bowel. Moderate volume of colonic stool. Colonic diverticula without definite evidence of acute diverticulitis.  Left lower quadrant peritoneal dialysis catheter. Small to moderate volume of low-density ascites, slightly increased in volume.   No pneumoperitoneum or loculated intraperitoneal collection.  Additional findings as discussed above.     Assessment/Plan   63 y/o F with a complex PMH including multiple aortic dissections s/p multiple TEVARs and ileo-renal bypass who presents with abdominal pain. 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    Recommendations:  No surgical interventions  Outpatient follow up per Dr. Will's clinic note from 5/28/24    Discussed with fellow, Dr. Conley.    Gissell Beck MD  Vascular Surgery

## 2024-06-02 NOTE — PROGRESS NOTES
I received this patient during signout.  Please see previous provider's note for detailed H&P, labs and imaging.    Briefly, this is a 64-year-old female with history of ESRD on peritoneal dialysis, hypertension, heart failure with reduced EF of 15 to 20%, multiple aortic dissection status post multiple QR and ileal renal bypass, PE and DVT on warfarin who presents with intractable abdominal pain, nausea, vomiting and diarrhea.  Patient denies any fevers, chills, chest pain, shortness of breath, dysuria.  Patient did have a bowel movement yesterday and is passing gas.  Patient has been admitted in the past for PD catheter related peritonitis.  Patient did have abdominal distention prior to my shift.  ACS and vascular surgery consulted prior to my shift.  Vascular surgery was consulted for left renal artery stent thrombosis on CTA.    Plan at the time of signout was await specialty recommendations and admit patient.    Under my care, patient reassessed and remains clinically stable. Please see ED course.  Patient will be admitted for abdominal pain, SBO monitoring, peritonitis monitoring and pulmonary emboli.  Troponins 49 and 50 which is similar to prior.  Lactate is 1.9.  Hyperkalemia on initial metabolic panel improved on repeat without intervention.  Heparin initiated.  I discussed case with admission coordinator for admission.  ACS will continue to follow while patient is admitted. Peritoneal fluid analysis pending at time of admission.    @  ED Course as of 06/02/24 1954   Sun Jun 02, 2024   1515 GLUCOSE(!!): 49  D50 [AS]   1605 POCT Glucose(!): 124  recovered [AS]   1735 CT angio chest abdomen pelvis  Concern for SBO. Pending final read [AS]   1735 POTASSIUM(!!): 6.7  Hemolyzed, rpt WNL [AS]   1736 POTASSIUM: 4.1 [AS]   1951 I reviewed patient's CT chest abdomen and pelvis angio.  Patient does have nonocclusive filling defects concerning for pulmonary embolism therefore heparin initiated.  I reviewed vascular  surgery and ACS recommendations, NG tube decompression placed.  No acute surgical intervention per vascular or ACS.  ACS will continue to follow while patient is admitted to medicine.  Peritoneal fluid sent off to evaluate for peritonitis.  Given patient does not have a WBC count, fever will hold off on antibiotics until fluid studies returned [SA]      ED Course User Index  [AS] Oly Wolfe DO  [SA] Tana Krishnamurthy DO   @    Disposition: Admitted      Patient seen and staffed with attending physician.     Tana Krishnamurthy DO   EM PGY2

## 2024-06-02 NOTE — ED PROVIDER NOTES
EMERGENCY DEPARTMENT ENCOUNTER      Pt Name: Yoselyn Hernandez  MRN: [id number]  Birthdate 1960  Date of evaluation: 6/2/2024  Provider: Oly Wolfe DO    CHIEF COMPLAINT       Chief Complaint   Patient presents with    Abdominal Pain    Vomiting         HISTORY OF PRESENT ILLNESS    HPI   64-year-old female with end-stage renal disease on peritoneal dialysis and a history of multiple prior aortic dissections who presents to the emergency department with new onset epigastric pain that began the night before last.  It is associated with nausea and vomiting.  She also has a longstanding history of diarrhea for which she takes an atropine derivative.  She denies any recent fevers, chills, shortness of breath or coughing.           Nursing Notes were reviewed.       PAST MEDICAL HISTORY   Patient History   Past Medical History:   Diagnosis Date    CHF (congestive heart failure) (Multi)     COPD (chronic obstructive pulmonary disease) (Multi)     ESRD (end stage renal disease) (Multi)     Hypertension     Other abnormalities of gait and mobility     Gait, antalgic    PE (pulmonary thromboembolism) (Multi)     Personal history of other endocrine, nutritional and metabolic disease     History of hyperlipidemia    Personal history of other specified conditions     History of shortness of breath         SURGICAL HISTORY       Past Surgical History:   Procedure Laterality Date    COLONOSCOPY  12/05/2017    Complete Colonoscopy    COLONOSCOPY  12/2021    rpt 12-25    CT ABDOMEN PELVIS ANGIOGRAM W AND/OR WO IV CONTRAST  11/17/2014    CT ABDOMEN PELVIS ANGIOGRAM W AND/OR WO IV CONTRAST 11/17/2014 [facility] CLINICAL LEGACY    CT ABDOMEN PELVIS ANGIOGRAM W AND/OR WO IV CONTRAST  02/01/2018    CT ABDOMEN PELVIS ANGIOGRAM W AND/OR WO IV CONTRAST 2/1/2018 [facility] AIB LEGACY    CT ABDOMEN PELVIS ANGIOGRAM W AND/OR WO IV CONTRAST  02/23/2018    CT ABDOMEN PELVIS ANGIOGRAM W AND/OR WO IV CONTRAST 2/23/2018 [facility] [place] INPATIENT LEGACY    CT ABDOMEN  PELVIS ANGIOGRAM W AND/OR WO IV CONTRAST  01/10/2019    CT ABDOMEN PELVIS ANGIOGRAM W AND/OR WO IV CONTRAST 1/10/2019 [facility] EMERGENCY LEGACY    CT ABDOMEN PELVIS ANGIOGRAM W AND/OR WO IV CONTRAST  11/25/2019    CT ABDOMEN PELVIS ANGIOGRAM W AND/OR WO IV CONTRAST 11/25/2019 [facility] ANCILLARY LEGACY    CT ABDOMEN PELVIS ANGIOGRAM W AND/OR WO IV CONTRAST  04/30/2021    CT ABDOMEN PELVIS ANGIOGRAM W AND/OR WO IV CONTRAST 4/30/2021 [facility] CLINICAL LEGACY    CT ABDOMEN PELVIS ANGIOGRAM W AND/OR WO IV CONTRAST  01/09/2017    CT ABDOMEN PELVIS ANGIOGRAM W AND/OR WO IV CONTRAST 1/9/2017 [facility] CLINICAL LEGACY    CT ABDOMEN PELVIS ANGIOGRAM W AND/OR WO IV CONTRAST  10/24/2018    CT ABDOMEN PELVIS ANGIOGRAM W AND/OR WO IV CONTRAST 10/24/2018 [facility] EMERGENCY LEGACY    CT ABDOMEN PELVIS ANGIOGRAM W AND/OR WO IV CONTRAST  12/13/2022    CT ABDOMEN PELVIS ANGIOGRAM W AND/OR WO IV CONTRAST 12/13/2022 DOCTOR OFFICE LEGACY    CT AORTA AND BILATERAL ILIOFEMORAL RUNOFF ANGIOGRAM W AND/OR WO IV CONTRAST  05/12/2023    CT AORTA AND BILATERAL ILIOFEMORAL RUNOFF ANGIOGRAM W AND/OR WO IV CONTRAST [place] [facility] [place] [facility] [place]    [place]STERECTOMY  01/06/2014    Hysterectomy    IR ANGIOGRAM AORTA ABDOMEN  08/02/2019    IR ANGIOGRAM AORTA ABDOMEN [place] [facility] [place]    IR ANGIOGRAM AORTA ABDOMEN  08/19/2022    IR ANGIOGRAM AORTA ABDOMEN [place]20[place] [facility] [place]    IR ANGIOGRAM AORTA THORACIC  02/21/2018    IR ANGIOGRAM AORTA THORACIC [place] [facility] [place]    IR ANGIOGRAM ENDOVASCULAR AORTIC REPAIR  08/19/2022    IR ANGIOGRAM ENDOVASCULAR AORTIC REPAIR [place]20[place] [facility] [place]    IR INTERVENTION NEURO STENT  08/02/2019    IR INTERVENTION NEURO STENT [place] [facility] [place]    OTHER SURGICAL HISTORY  01/20/2017    Aortic Aneurysm Repair Thoracoabdominal         CURRENT MEDICATIONS       Current Discharge Medication List        CONTINUE these medications which have NOT CHANGED    Details   acetaminophen (Tylenol) 325 mg tablet Take 3  tablets (975 mg) by mouth 3 times a day as needed (pain or fever).  Qty: 30 tablet, Refills: 0    Associated Diagnoses: Peritonitis (Multi)      allopurinol (Zyloprim) 100 mg tablet Take 1 tablet (100 mg) by mouth once daily.  Qty: 90 tablet, Refills: 1    Associated Diagnoses: Gout, unspecified cause, unspecified chronicity, unspecified site      atorvastatin (Lipitor) 40 mg tablet TAKE 1 TABLET BY MOUTH ONCE DAILY AT BEDTIME  Qty: 30 tablet, Refills: 0    Associated Diagnoses: Hyperlipidemia, unspecified hyperlipidemia type      B complex-vitamin C-folic acid (Nephrocaps) 1 mg capsule Take 1 capsule by mouth once daily. Do not start before February 6, 2024.    Associated Diagnoses: ESRD (end stage renal disease) on dialysis (Multi)      bisacodyl (Dulcolax, bisacodyl,) 10 mg suppository Insert 1 suppository (10 mg) into the rectum every 24 (twenty four) hours if needed for constipation. Use if MOM ineffective or adversary for use      calcitriol (Rocaltrol) 0.25 mcg capsule Take 1 capsule (0.25 mcg) by mouth once a day on Monday, Wednesday, and Friday.      Creon 24,000-76,000 -120,000 unit capsule Take 3 capsules by mouth 3 times a day with meals.  Qty: 279 capsule, Refills: 0    Associated Diagnoses: Diarrhea, unspecified type      !! dextrose 2.5 % - LOW calcium 2.5 mEq/L Ca 2.5 mEq/L- Mg 0.5 mEq/L solution 5,000 mL with heparin 1,000 unit/mL solution 2,500 Units Inject 5,000 mL into the abdomen / abdominal cavity once every 24 hours.    Associated Diagnoses: Peritoneal dialysis status (CMS-HCC)      !! dextrose 2.5 % - LOW calcium 2.5 mEq/L Ca 2.5 mEq/L- Mg 0.5 mEq/L solution 5,000 mL with heparin 1,000 unit/mL solution 2,500 Units Inject 5,000 mL into the abdomen / abdominal cavity once every 24 hours.    Associated Diagnoses: Peritoneal dialysis status (CMS-HCC)      diphenoxylate-atropine (Lomotil) 2.5-0.025 mg tablet Take 1 tablet by mouth every 12 hours if needed for diarrhea.      epoetin treasure  (Epogen,Procrit) 10,000 unit/mL injection Inject 1 mL (10,000 Units) under the skin once daily at bedtime. Every 14 Days      gentamicin (Garamycin) 0.1 % cream Apply topically once every 24 hours.    Associated Diagnoses: Peritonitis (Multi)      GUAIFENESIN ORAL Take 600 mg by mouth every 6 hours if needed (congestion).      hydrOXYzine HCL (Atarax) 50 mg tablet Take 1 tablet (50 mg) by mouth every 8 hours. For Anxiety      levothyroxine (Synthroid, Levoxyl) 25 mcg tablet Take 1 tablet (25 mcg) by mouth once daily in the morning. Take before meals.  Qty: 90 tablet, Refills: 0    Associated Diagnoses: Hypothyroidism, unspecified type      lidocaine (LMX) 4 % cream Apply topically 4 times a day.    Associated Diagnoses: Toe injury, right, initial encounter      loperamide (Imodium) 2 mg capsule Take 1 capsule (2 mg) by mouth every 6 hours if needed (loose stools).      magnesium hydroxide (Milk of Magnesia) 2,400 mg/10 mL suspension suspension Take 30 mL by mouth every 24 (twenty four) hours if needed for constipation. Give If no BM for 3 days      melatonin 10 mg tablet Take 1 tablet (10 mg) by mouth once daily as needed for sleep.  Refills: 0    Associated Diagnoses: Insomnia, unspecified type      mirtazapine (Remeron) 15 mg tablet Take 1 tablet (15 mg) by mouth once daily at bedtime.  Qty: 30 tablet, Refills: 0    Associated Diagnoses: Depression with anxiety      multivitamin with minerals tablet Take 1 tablet by mouth once daily. Do not start before March 27, 2024.    Associated Diagnoses: Iron deficiency anemia, unspecified iron deficiency anemia type      ondansetron (Zofran) 4 mg tablet Take 1 tablet (4 mg) by mouth every 8 hours if needed for nausea or vomiting.      polyethylene glycol (Glycolax, Miralax) 17 gram packet Take 17 g by mouth once daily as needed (constipation).    Associated Diagnoses: Peritoneal dialysis status (CMS-[facility])      potassium chloride CR 20 mEq ER tablet Take 1 tablet (20 mEq) by  mouth once daily. Do not crush or chew.      pregabalin (Lyrica) 25 mg capsule Take 1 capsule (25 mg) by mouth once daily. Do not start before March 26, 2024.    Associated Diagnoses: Pain of lower extremity, unspecified laterality      sennosides (Senokot) 8.6 mg tablet Take 2 tablets (17.2 mg) by mouth once daily as needed for constipation.    Associated Diagnoses: Peritoneal dialysis status (CMS-[facility])      sertraline (Zoloft) 50 mg tablet Take 1 tablet (50 mg) by mouth once daily. For Depression      sevelamer carbonate (Renvela) 800 mg tablet Take 1 tablet (800 mg) by mouth 3 times a day with meals. Swallow tablet whole; do not crush, break, or chew.    Associated Diagnoses: ESRD (end stage renal disease) on dialysis (Multi)      sodium phosphates (Fleets) 19-7 gram/118 mL enema enema Insert into the rectum if needed for constipation. Use if MOM and suppository is ineffective      therapeutic multivitamin-iron-minerals (Theragran-M) tablet Take 1 tablet by mouth once daily.    Associated Diagnoses: Deep vein thrombosis (DVT) of proximal lower extremity, unspecified chronicity, unspecified laterality (Multi)      traMADol (Ultram) 50 mg tablet Take 1 tablet (50 mg) by mouth every 8 hours. For pain      vit B comp no.3/folic/C/biotin (NEPHRO-NENO RX ORAL) Take 1 mg by mouth once daily.      !! warfarin (Coumadin) 2 mg tablet Take as directed per After Visit Summary. Do not start before March 27, 2024.    Associated Diagnoses: Deep vein thrombosis (DVT) of distal vein of lower extremity, unspecified chronicity, unspecified laterality (Multi)      !! warfarin (Coumadin) 3 mg tablet Take 1 tablet (3 mg) by mouth once daily in the evening. For Blood Thinner       !! - Potential duplicate medications found. Please discuss with provider.          ALLERGIES     Ace inhibitors, Ciprofloxacin, Gabapentin, and Oxycodone    FAMILY HISTORY       Family History   Problem Relation Name Age of Onset    COPD Mother      Kidney  failure Father            SOCIAL HISTORY       Social History     Socioeconomic History    Marital status: Single     Spouse name: Not on file    Number of children: Not on file    Years of education: Not on file    Highest education level: Not on file   Occupational History    Not on file   Tobacco Use    Smoking status: Some Days     Current packs/day: 0.00     Average packs/day: 0.3 packs/day for 20.0 years (5.0 ttl pk-yrs)     Types: Cigarettes     Start date:      Last attempt to quit:      Years since quittin.4    Smokeless tobacco: Never   Vaping Use    Vaping status: Never Used   Substance and Sexual Activity    Alcohol use: Not Currently    Drug use: Yes     Types: Marijuana    Sexual activity: Defer   Other Topics Concern    Not on file   Social History Narrative    Not on file     Social Determinants of Health     Financial Resource Strain: Low Risk  (6/3/2024)    Overall Financial Resource Strain (CARDIA)     Difficulty of Paying Living Expenses: Not hard at all   Food Insecurity: No Food Insecurity (3/21/2024)    Hunger Vital Sign     Worried About Running Out of Food in the Last Year: Never true     Ran Out of Food in the Last Year: Never true   Transportation Needs: No Transportation Needs (6/3/2024)    PRAPARE - Transportation     Lack of Transportation (Medical): No     Lack of Transportation (Non-Medical): No   Physical Activity: Inactive (2024)    Exercise Vital Sign     Days of Exercise per Week: 0 days     Minutes of Exercise per Session: 0 min   Stress: Stress Concern Present (2024)    [employer] - Occupational Stress Questionnaire     Feeling of Stress : To some extent   Social Connections: Moderately Isolated (2024)    Social Connection and Isolation Panel [NHANES]     Frequency of Communication with Friends and Family: More than three times a week     Frequency of Social Gatherings with Friends and Family: More than three times a week      Attends Jain Services: Never     Active Member of Clubs or Organizations: No     Attends Club or Organization Meetings: Never     Marital Status: Living with partner   Intimate Partner Violence: Not At Risk (1/12/2024)    Humiliation, Afraid, Rape, and Kick questionnaire     Fear of Current or Ex-Partner: No     Emotionally Abused: No     Physically Abused: No     Sexually Abused: No   Housing Stability: Low Risk  (6/3/2024)    Housing Stability Vital Sign     Unable to Pay for Housing in the Last Year: No     Number of Places Lived in the Last Year: 1     Unstable Housing in the Last Year: No       SCREENINGS                             PHYSICAL EXAM    (up to 7 for level 4, 8 or more for level 5)   Physical Exam   ED Triage Vitals [06/02/24 1040]   Temperature Heart Rate Respirations BP   36.5 °C (97.7 °F) (!) 104 17 111/71      Pulse Ox Temp Source Heart Rate Source Patient Position   96 % Oral Monitor Lying      BP Location FiO2 (%)     Right arm --       Physical Exam  Vitals and nursing note reviewed.   HENT:      Head: Normocephalic and atraumatic.   Eyes:      Conjunctiva/sclera: Conjunctivae normal.   Cardiovascular:      Rate and Rhythm: Regular rhythm. Tachycardia present.      Heart sounds: No murmur heard.  Pulmonary:      Effort: Pulmonary effort is normal. No respiratory distress.      Breath sounds: Normal breath sounds.   Abdominal:      Palpations: Abdomen is soft.      Tenderness: There is abdominal tenderness in the epigastric area.   Musculoskeletal:         General: No swelling.      Cervical back: Neck supple.   Skin:     General: Skin is warm and dry.      Capillary Refill: Capillary refill takes less than 2 seconds.   Neurological:      Mental Status: She is alert and oriented to person, place, and time.   Psychiatric:         Mood and Affect: Mood normal.          DIAGNOSTIC RESULTS     LABS:  Labs Reviewed   CBC WITH AUTO DIFFERENTIAL - Abnormal       Result Value    WBC 6.1       nRBC 0.0      RBC 3.82 (*)     Hemoglobin 12.1      Hematocrit 35.4 (*)     MCV 93      MCH 31.7      MCHC 34.2      RDW 17.3 (*)     Platelets 136 (*)     Neutrophils % 79.7      Immature Granulocytes %, Automated 0.8      Lymphocytes % 10.8      Monocytes % 8.2      Eosinophils % 0.2      Basophils % 0.3      Neutrophils Absolute 4.86      Immature Granulocytes Absolute, Automated 0.05      Lymphocytes Absolute 0.66 (*)     Monocytes Absolute 0.50      Eosinophils Absolute 0.01      Basophils Absolute 0.02     COMPREHENSIVE METABOLIC PANEL - Abnormal    Glucose 49 (*)     Sodium 143      Potassium 6.7 (*)     Chloride 98      Bicarbonate 32      Anion Gap 20      Urea Nitrogen 49 (*)     Creatinine 6.77 (*)     eGFR 6 (*)     Calcium 7.8 (*)     Albumin 2.3 (*)     Alkaline Phosphatase 136      Total Protein 6.3 (*)     AST 54 (*)     Bilirubin, Total 0.6      ALT 23     SERIAL TROPONIN-INITIAL - Abnormal    Troponin I, High Sensitivity 49 (*)     Narrative:     Less than 99th percentile of normal range cutoff-  Female and children under 18 years old <35 ng/L; Male <54 ng/L: Negative  Repeat testing should be performed if clinically indicated.     Female and children under 18 years old  ng/L; Male  ng/L:  Consistent with possible cardiac damage and possible increased clinical   risk. Serial measurements may help to assess extent of myocardial damage.     >120 ng/L: Consistent with cardiac damage, increased clinical risk and  myocardial infarction. Serial measurements may help assess extent of   myocardial damage.      NOTE: Children less than 1 year old may have higher baseline troponin   levels and results should be interpreted in conjunction with the overall   clinical context.    NOTE: Troponin I testing is performed using a different   testing methodology at [facility] [place] [facility] than at other   [facility]. Direct result comparisons should only   be made within the same method.     SERIAL  TROPONIN, 1 HOUR - Abnormal    Troponin I, High Sensitivity 50 (*)     Narrative:     Less than 99th percentile of normal range cutoff-  Female and children under 18 years old <35 ng/L; Male <54 ng/L: Negative  Repeat testing should be performed if clinically indicated.     Female and children under 18 years old  ng/L; Male  ng/L:  Consistent with possible cardiac damage and possible increased clinical   risk. Serial measurements may help to assess extent of myocardial damage.     >120 ng/L: Consistent with cardiac damage, increased clinical risk and  myocardial infarction. Serial measurements may help assess extent of   myocardial damage.      NOTE: Children less than 1 year old may have higher baseline troponin   levels and results should be interpreted in conjunction with the overall   clinical context.    NOTE: Troponin I testing is performed using a different   testing methodology at [facility] than at other   [facility]. Direct result comparisons should only   be made within the same method.     RENAL FUNCTION PANEL - Abnormal    Glucose 117 (*)     Sodium 143      Potassium 4.1      Chloride 97 (*)     Bicarbonate 30      Anion Gap 20      Urea Nitrogen 51 (*)     Creatinine 6.89 (*)     eGFR 6 (*)     Calcium 8.3 (*)     Phosphorus 6.6 (*)     Albumin 2.6 (*)    BLOOD GAS VENOUS FULL PANEL - Abnormal    POCT pH, Venous 7.42      POCT pCO2, Venous 53 (*)     POCT pO2, Venous 33 (*)     POCT SO2, Venous 36 (*)     POCT Oxy Hemoglobin, Venous 35.7 (*)     POCT Hematocrit Calculated, Venous 40.0      POCT Sodium, Venous 139      POCT Potassium, Venous 4.4      POCT Chloride, Venous 100      POCT Ionized Calicum, Venous 1.05 (*)     POCT Glucose, Venous 128 (*)     POCT Lactate, Venous 2.0      POCT Base Excess, Venous 8.2 (*)     POCT HCO3 Calculated, Venous 34.4 (*)     POCT Hemoglobin, Venous 13.3      POCT Anion Gap, Venous 9.0 (*)     Patient Temperature 37.0      FiO2 21      COAGULATION SCREEN - Abnormal    Protime 19.0 (*)     INR 1.7 (*)     aPTT 39 (*)     Narrative:     The APTT is no longer used for monitoring Unfractionated Heparin Therapy. For monitoring Heparin Therapy, use the Heparin Assay.   BODY FLUID CELL COUNT - Abnormal    Color, Fluid Red-brown (*)     Clarity, Fluid Turbid (*)     WBC, Fluid 6,033      RBC, Fluid 3,375,000      Narrative:     Body Fluid cell count reference ranges have not been established by [facility]. Based on published references.   CBC WITH AUTO DIFFERENTIAL - Abnormal    WBC 5.1      nRBC 0.0      RBC 3.29 (*)     Hemoglobin 10.0 (*)     Hematocrit 30.4 (*)     MCV 92      MCH 30.4      MCHC 32.9      RDW 17.5 (*)     Platelets 147 (*)     Neutrophils % 66.2      Immature Granulocytes %, Automated 0.4      Lymphocytes % 21.8      Monocytes % 9.6      Eosinophils % 1.4      Basophils % 0.6      Neutrophils Absolute 3.37      Immature Granulocytes Absolute, Automated 0.02      Lymphocytes Absolute 1.11 (*)     Monocytes Absolute 0.49      Eosinophils Absolute 0.07      Basophils Absolute 0.03     MAGNESIUM - Abnormal    Magnesium 1.31 (*)    RENAL FUNCTION PANEL - Abnormal    Glucose 47 (*)     Sodium 143      Potassium 4.6      Chloride 100      Bicarbonate 28      Anion Gap 20      Urea Nitrogen 52 (*)     Creatinine 7.25 (*)     eGFR 6 (*)     Calcium 8.2 (*)     Phosphorus 6.6 (*)     Albumin 2.1 (*)    C-REACTIVE PROTEIN - Abnormal    C-Reactive Protein 23.71 (*)    SEDIMENTATION RATE, AUTOMATED - Abnormal    Sedimentation Rate 91 (*)    BLOOD GAS VENOUS FULL PANEL - Abnormal    POCT pH, Venous 7.48 (*)     POCT pCO2, Venous 45      POCT pO2, Venous 43      POCT SO2, Venous 61      POCT Oxy Hemoglobin, Venous 60.0      POCT Hematocrit Calculated, Venous 32.0 (*)     POCT Sodium, Venous 137      POCT Potassium, Venous 4.9      POCT Chloride, Venous 100      POCT Ionized Calicum, Venous 1.05 (*)     POCT Glucose, Venous  56 (*)     POCT Lactate, Venous 1.5      POCT Base Excess, Venous 9.0 (*)     POCT HCO3 Calculated, Venous 33.5 (*)     POCT Hemoglobin, Venous 10.7 (*)     POCT Anion Gap, Venous 8.0 (*)     Patient Temperature 37.0      FiO2 21     B-TYPE NATRIURETIC PEPTIDE - Abnormal    BNP >5,000 (*)     Narrative:        <100 pg/mL - Heart failure unlikely  100-299 pg/mL - Intermediate probability of acute heart                  failure exacerbation. Correlate with clinical                  context and patient history.    >=300 pg/mL - Heart Failure likely. Correlate with clinical                  context and patient history.     Biotin interference may cause falsely decreased results. Patients taking a Biotin dose of up to 5 mg/day should refrain from taking Biotin for 24 hours before sample  collection. Providers may contact their local laboratory for further information.   POCT GLUCOSE - Abnormal    POCT Glucose 124 (*)    POCT GLUCOSE - Abnormal    POCT Glucose 50 (*)    BODY FLUID CELL DIFFERENTIAL - Abnormal    Neutrophils %, Manual, Fluid 60      Lymphocytes %, Manual, Fluid 21      Mono/Macrophages %, Manual, Fluid 9      Eosinophils %, Manual, Fluid 1      Basophils %, Manual, Fluid 1      Immature Granulocytes %, Manual, Fluid 0      Blasts %, Manual, Fluid 0      Unclassified Cells %, Manual, Fluid 8 (*)     Plasma Cells %, Manual, Fluid 0      Total Cells Counted, Fluid 100      Narrative:     Body Fluid cell differential reference ranges have not been established by [facility]. Based on published references.   BLOOD GAS VENOUS FULL PANEL UNSOLICITED - Abnormal    POCT pH, Venous 7.45 (*)     POCT pCO2, Venous 51      POCT pO2, Venous 25 (*)     POCT SO2, Venous 20 (*)     POCT Oxy Hemoglobin, Venous 19.3 (*)     POCT Hematocrit Calculated, Venous 34.0 (*)     POCT Sodium, Venous 140      POCT Potassium, Venous 5.1      POCT Chloride, Venous 101      POCT Ionized Calicum, Venous 1.04 (*)      POCT Glucose, Venous 123 (*)     POCT Lactate, Venous 1.1      POCT Base Excess, Venous 9.9 (*)     POCT HCO3 Calculated, Venous 35.4 (*)     POCT Hemoglobin, Venous 11.4 (*)     POCT Anion Gap, Venous 9.0 (*)     Patient Temperature 37.0     BLOOD CULTURE - Normal    Blood Culture Loaded on Instrument - Culture in progress     BLOOD CULTURE - Normal    Blood Culture Loaded on Instrument - Culture in progress     LIPASE - Normal    Lipase 19      Narrative:     Venipuncture immediately after or during the administration of Metamizole may lead to falsely low results. Testing should be performed immediately prior to Metamizole dosing.   LACTATE - Normal    Lactate 1.9      Narrative:     Venipuncture immediately after or during the administration of Metamizole may lead to falsely low results. Testing should be performed immediately  prior to Metamizole dosing.   HEPARIN ASSAY - Normal    Heparin Unfractionated 0.6      Narrative:     The therapeutic reference range for UFH may be either 0.3-0.6 IU/mL or 0.3-0.7 IU/mL based on the clinical setting for anticoagulant therapy and the associated nomogram used. For Heparin dosing guidelines based on clinical scenario and Heparin Assay results, please refer to local Pharmacy and the [facility] Guidelines for Anticoagulation Therapy available on the [facility] intranet at: [email or web address]   HEPARIN ASSAY - Normal    Heparin Unfractionated 0.4      Narrative:     The therapeutic reference range for UFH may be either 0.3-0.6 IU/mL or 0.3-0.7 IU/mL based on the clinical setting for anticoagulant therapy and the associated nomogram used. For Heparin dosing guidelines based on clinical scenario and Heparin Assay results, please refer to local Pharmacy and the [facility] Guidelines for Anticoagulation Therapy available on the [facility] intranet at:  [email or web address]    INTERFERENCE DETECTED. The result may be affected due to hemolysis. Clinical correlation is recommended. Repeat testing may be considered.   HEPARIN ASSAY - Normal    Heparin Unfractionated 0.5      Narrative:     The therapeutic reference range for UFH may be either 0.3-0.6 IU/mL or 0.3-0.7 IU/mL based on the clinical setting for anticoagulant therapy and the associated nomogram used. For Heparin dosing guidelines based on clinical scenario and Heparin Assay results, please refer to local Pharmacy and the [facility] Guidelines for Anticoagulation Therapy available on the [facility] intranet at: [email or web address]   PERITONEAL DIALYSIS FLUID CULTURE/SMEAR    Peritoneal Dialysis Fluid Culture Culture in progress      Gram Stain (4+) Abundant Polymorphonuclear leukocytes      Gram Stain No organisms seen     STERILE FLUID CULTURE/SMEAR   TROPONIN SERIES- (INITIAL, 1 HR)    Narrative:     The following orders were created for panel order Troponin I Series, High Sensitivity (0, 1 HR).  Procedure                               Abnormality         Status                     ---------                               -----------         ------                     Troponin I, High Sensiti...[[id number]]  Abnormal            Final result               Troponin, High Sensitivi...[[id number]]  Abnormal            Final result                 Please view results for these tests on the individual orders.   BODY FLUID CELL COUNT WITH DIFFERENTIAL    Narrative:     The following orders were created for panel order Body Fluid Cell Count With Differential.  Procedure                               Abnormality         Status                     ---------                               -----------         ------                     Body Fluid Cell  Count[[id number]]        Abnormal            Final result               Body Fluid Differential[[id number]]      Abnormal            Final result                 Please view results for these tests on the individual orders.   LACTATE DEHYDROGENASE, FLUID    Narrative:     The following orders were created for panel order Lactate Dehydrogenase, Fluid.  Procedure                               Abnormality         Status                     ---------                               -----------         ------                     Lactate Dehydrogenase, F...[[id number]]                      Final result                 Please view results for these tests on the individual orders.   LACTATE DEHYDROGENASE, FLUID    LD, Fluid 148      Narrative:     The performance characteristics of this test have been validated on peritoneal/ascites,pleural, pericardial and drain fluid by the [facility] laboratory. This test has not been approved by the FDA; however, such approval is not necessary.     HEPARIN ASSAY   APTT   CBC   POCT GLUCOSE METER   POCT GLUCOSE METER   POCT GLUCOSE METER   POCT GLUCOSE METER   PATH REVIEW-CELL CT,FLUID       All other labs were within normal range or not returned as of this dictation.    Imaging  CT angio chest abdomen pelvis   Final Result   1. Acute nonocclusive pulmonary emboli within the distal left   pulmonary artery and several segmental and subsegmental branches in   the posterior left lower pulmonary lobe. Interval development   periphery pulmonary consolidation within the left lower lobe which   may indicate developing infectious/inflammatory process or pulmonary   infarct. No evidence of right heart strain.   2. Extensive postsurgical changes of the thoracoabdominal aorta. Near   complete occlusion of the bypass graft arising from the left common   iliac artery, which supplies the renal arteries. The degree of   occlusion appears increased since 03/21/2024  proximally, with no   significant contrast opacification of the renal arteries. Previously,   some contrast was seen in the right renal artery.   3. Small bowel obstruction with transition point in the mid abdomen.   Small amount of free air within the peritoneum and within the hernia   may be secondary to peritoneal dialysis rather than bowel   perforation. Clinical correlation is necessary and short-term   follow-up imaging may be considered to assess for stability or   resolution of the free air.   4. 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   5. Diffuse body wall edema/anasarca, with asymmetric edema in the   imaged right proximal thigh.        I personally reviewed the image(s)/study and resident interpretation   as stated by Dr. Keyla Vick MD. I agree with the findings as   stated. This study was interpreted at [facility], [place], [place].        MACRO:   Keyla Vick discussed the significance and urgency of this   critical finding by secure chat with  Oly brown on 6/2/2024 at   5:45 p.m.. (**-RCF-**) Findings:  See findings.        Signed by: Manju Sandoval 6/2/2024 8:38 PM   Dictation workstation:   [id number]      Lower extremity venous duplex bilateral    (Results Pending)           Procedures  Procedures     EMERGENCY DEPARTMENT COURSE/MDM:   Yoselyn Hernandez is a 64 y.o. female presenting to the ED for evaluation of had concerns including Abdominal Pain and Vomiting..   Medical Decision Making  64-year-old female on peritoneal dialysis with past medical history of multiple prior dissections who presents with epigastric pain with vomiting.  Ordered a scan of her aorta to rule out dissection complications or graft failure.  Additional laboratory studies ordered, and a culture and smear of her peritoneal fluid.  She was given Zofran for  her nausea.  Initial potassium hemolyzed and should 6.7, however repeat was within normal limits.  She did have a very low glucose and was given D50 and her glucose recovered.  The CT angio of her abdomen and pelvis showed intact grafts without leak and no new dissections but did show occlusions of several of the branch stents.  She also had dilated small bowel and fluid-filled stomach consistent with ileus versus SBO, although there is no clear transition point.  She was also found to have multiple nonocclusive pulmonary emboli.  Her troponin was baseline for her and flat, therefore she was started on heparin high intensity.  She has had clots in the past, and is on warfarin, however suspect that with her recent vomiting she has not been able to keep them down as her INR today is out of the therapeutic range.  Discussed with vascular surgery and ACS, who evaluated this patient, and stated that the occlusions in her stents appear chronic and unchanged, and are unlikely to be contributing to her acute picture.  They agreed with the decision to place NG tube, and this was ordered for low intermittent wall suction.  Patient was signed out to the oncoming resident pending collection of peritoneal fluid and further ACS recommendations.        ED Course as of 06/03/24 1128   Sun Jun 02, 2024   1515 GLUCOSE(!!): 49  D50 [AS]   1605 POCT Glucose(!): 124  recovered [AS]   1735 CT angio chest abdomen pelvis  Concern for SBO. Pending final read [AS]   1735 POTASSIUM(!!): 6.7  Hemolyzed, rpt WNL [AS]   1736 POTASSIUM: 4.1 [AS]   1951 I reviewed patient's CT chest abdomen and pelvis angio.  Patient does have nonocclusive filling defects concerning for pulmonary embolism therefore heparin initiated.  I reviewed vascular surgery and ACS recommendations, NG tube decompression placed.  No acute surgical intervention per vascular or ACS.  ACS will continue to follow while patient is admitted to medicine.  Peritoneal fluid sent off to  evaluate for peritonitis.  Given patient does not have a WBC count, fever will hold off on antibiotics until fluid studies returned [SA]      ED Course User Index  [AS] Oly Wolfe DO  [SA] Tana KrishnamurthyDO         Diagnoses as of 06/03/24 1128   Generalized abdominal pain   Multiple subsegmental pulmonary emboli without acute cor pulmonale (Multi)          External records reviewed: I reviewed external records including outpatient, PCP records, and prior discharge summaries    I have reviewed this case with the ED attending physician, and the attending agrees with the plan. Patient or family was counselled regarding labs, imaging, likely diagnosis, and plan. All questions were answered.     Oly Wolfe DO  PGY-4, emergency medicine    The above documentation was completed with the use of speech recognition software. It may contain dictation errors secondary to limitations of the software.      ED Medications administered this visit:    Medications   heparin 25,000 Units in dextrose 5% 250 mL (100 Units/mL) infusion (premix) (1,200 Units/hr intravenous Rate/Dose Verify 6/2/24 2302)   heparin bolus from bag 2,000-4,000 Units (has no administration in time range)   lidocaine (Xylocaine) 2 % jelly 1 Application (1 Application Topical Not Given 6/2/24 1800)   allopurinol (Zyloprim) tablet 100 mg ( oral Dose Auto Held 6/7/24 0900)   atorvastatin (Lipitor) tablet 40 mg (40 mg oral Given 6/2/24 2200)   B complex-vitamin C-folic acid (Nephrocaps) capsule 1 capsule (1 capsule oral Given 6/3/24 1115)   lipase-protease-amylase (Creon) 24,000-76,000 -120,000 unit per capsule 3 capsule (3 capsules oral Not Given 6/3/24 0800)   gentamicin (Garamycin) 0.1 % cream ( Topical Not Given 6/2/24 2155)   levothyroxine (Synthroid, Levoxyl) tablet 25 mcg (25 mcg oral Not Given 6/3/24 0700)   melatonin tablet 10 mg (10 mg oral Given 6/2/24 2200)   mirtazapine (Remeron) tablet 15 mg (15 mg oral Given 6/2/24 2200)   sevelamer  carbonate (Renvela) tablet 800 mg (800 mg oral Given 6/3/24 1115)   ondansetron (Zofran) injection 4 mg (has no administration in time range)   pantoprazole (ProtoNix) injection 40 mg (40 mg intravenous Given 6/2/24 2313)   traMADol (Ultram) tablet 50 mg (50 mg oral Not Given 6/2/24 2313)   warfarin (Coumadin) tablet 3 mg (has no administration in time range)   glucagon (Glucagen) injection 1 mg (has no administration in time range)   dextrose 50 % injection 25 g (has no administration in time range)   glucagon (Glucagen) injection 1 mg (has no administration in time range)   dextrose 50 % injection 12.5 g (has no administration in time range)   calcitriol (Rocaltrol) capsule 0.25 mcg (has no administration in time range)   sertraline (Zoloft) tablet 50 mg (50 mg oral Not Given 6/3/24 1100)   calcium gluconate in NS IV 1 g (has no administration in time range)   magnesium sulfate IV 2 g (has no administration in time range)   ondansetron (Zofran) injection 4 mg (4 mg intravenous Given 6/2/24 1208)   dextrose 50 % injection 25 g (25 g intravenous Given 6/2/24 1518)   iohexol (OMNIPaque) 350 mg iodine/mL solution 75 mL (75 mL intravenous Given 6/2/24 1659)   heparin bolus from bag 5,224 Units (5,224 Units intravenous Bolus from Bag 6/2/24 1755)   HYDROmorphone PF (Dilaudid) injection 0.2 mg (0.2 mg intravenous Given 6/3/24 0037)       New Prescriptions from this visit:    Current Discharge Medication List          Final Impression:   1. Generalized abdominal pain    2. Multiple subsegmental pulmonary emboli without acute cor pulmonale (Multi)          (Please note that portions of this note were completed with a voice recognition program.  Efforts were made to edit the dictations but occasionally words are mis-transcribed.)      Attending Note:  The patient was seen by the resident/fellow/MEGAN.  I have personally performed a substantive portion of the encounter.  I have seen and examined the patient; agree with the  workup, evaluation, MDM, management and diagnosis with the exception/addition of the following.  The care plan has been discussed with the resident/fellow; I have reviewed the resident/fellow’s note and agree with the documented findings with the exception/addition of the following:       HPI:   Yoselyn Hernandez is a 64 year old female with history of ESRD on PD (complicated by recurrent peritonitis s/p intraperitoneal vancomyin and ceftazidime), multiple aortic dissections (s/p multiple TEVARs, renal artery grafts, and ileo-renal bypass), HFrEF (LVEF 15-20% 3/2023), pancreatitis, DVT/PE (warfarin), HTN, and chronic diarrhea presenting to the ED for concern for abdominal pain. Per chart review, admitted 3/2024 for peritonitis, with cultures growing GNBs.  She was treated with cefepime and vancomycin. Repeat dialysis culture with no growth.  DVT US on 3/24 without DVT.      She reports two nights ago, she developed abdominal pain, nausea, vomiting and diarrhea. No melena, hematochezia, hematemesis or coffee ground emesis. She reports passage of stool most recently last night.  She notes abddominal pain is epigastric and non-radiating. She reports mid sternal chest burning but at present, but denies exertional pain or pleuritic pain. She called EMS and she was given 100 cc of NS. She reports generalized malaise and fatigue but is unsure if febrile. She reports adherence to home anticoagulation. She notes that due to discomfort, last PD was Thursday or Friday.     Additional History Obtained from: See HPI       Physical Exam:  General: Grossly well-developed, awake, alert, NAD  HEENT: Normocephalic, no overt external signs of trauma. Mucus membranes moist, nares patent  Neck: Supple, trachea midline  Neurologic: A&Ox4, FS, TITUS x 4 with grossly symmetric strength, SILT grossly intact BUE and BLE  Cardiovascular: Borderline tachycardic but regular, extremities are warm and well-perfused  Respiratory: Breathing comfortably on  RA, equal chest rise, CTA B/L, no wheeze, rales or rhonchi  Abdomen: Soft, mildly distended, TTP in epigastrium and RLQ without evident rebound/guarding. RLQ peritoneal dialysis catheter in place, no obvious erythema, induration or fluctunace. Multiple soft, reducible ventral hernias, non-tender to palpation   Back: No apparent CVA TTP  MSK: No gross bony deformities in BUE and BLE. 2+ pitting edema to BLE. No calf pain or palpable cords.  Skin/Integumentary: Warm and dry  Psychiatric: Calm and cooperative.          EKG Interpretation:  EKG 6/2/24 -1359 - sinus tachycardia, rate 103, axis left, intervals , , QTc 440, marked background artifict limiting interpretation. No obvious marked ST elevation or depression otherwise.  Rate increased from 102 2/29/24.  Interpreted by provider as above    Differential Diagnoses Considered:  See MDM below    Chronic Medical Conditions Significantly Affecting Care:  See MDM below    External Records Reviewed:  I reviewed recent and relevant outside records as noted in HPI above and MDM below.     Independent Interpretation of Studies:    Laboratory/Imaging Studies:  Laboratory results personally reviewed and independently interpreted:  CBC without significant thrombocytopenia or leukocytosis. Hb 12.1, prior 8.3 (3/2024)  Metabolic panel consistent with ESRD. Hemolyzed. Glucose 49, K 6.7 (hemolyzed). No marked HAGMA. AST 54. ALT 23, T bili WNL.   Lipase not elevated  Lactate WNL  HS troponin 49, repeat pending  Blood cultures and PD culture pending  Repeat RFP pending  VBG 7.42/53, K 4.4, lactate 2.0  INR pending    Radiology imaging and preliminary and/or final reports personally reviewed/independently interpreted:  CTA abdomen/pelvis pending    Social Determinants of Health Significantly Affecting Care:  See HPI/MDM    Prescription Drug Consideration:  See MDM    Diagnostic testing considered:  See MDM and relevant sections      Medical Decision Making/Course:  64  year old female with history of ESRD on PD (complicated by recurrent peritonitis s/p intraperitoneal vancomyin and ceftazidime), multiple aortic dissections (s/p multiple TEVARs, renal artery grafts, and ileo-renal bypass), HFrEF (LVEF 15-20% 3/2023), pancreatitis, DVT/PE (warfarin), HTN, and chronic diarrhea presenting to the ED for concern for abdominal pain. The patient was afebrile, non-toxic and HDS on presentation. She reports non-bloody diarrhea and abdominal pain as noted above.  She has had multiple PD catheter infections, as noted above, as well as multiple aortic dissections.  Her EKG was limited 2/2 background artifact, but she denies exertional chest pain or pleurisy. HS troponin elevated at 49, which was similar to 2/2024. Repeat pending.  Lower suspiscion for ACS. She had no lactic acidosis, hypotension or signs of sepsis. Given recurrent infections, we ordered blood cultures and PD cultures which were pending.  We also ordered a CTA chest/abdomen/pelvis to rule out acute aortic pathology, as well as assess for acute intraabdominal process given presenting symptoms.  She was not grossly peritonitic on exam.  Her extremities were warm and well perfused on exam, without obvious signs of acute vascular compromise.  Pending imaging results and obtaining cultures, will likely require antibiotic coverage.  Labs did result with hypoglycemia for which she was given D50 with improvement of hypoglycemia.  Labs also notable for hemolyzed potassium which was repeated on VBG and normal.  Repeat RFP and EKG pending.  [facility], pending imaging, outstanding labs, and final disposition. She had no signs of hemodynamic decompensation during ED shift.  An opportunity to ask questions was provided and all were addressed at that time.  The patient verbalized understanding and was in agreement with plan.          Oly Wolfe DO  Resident  06/03/24 1128       Brennen FULLER  MD Clarita  06/03/24 [id number]

## 2024-06-02 NOTE — CONSULTS
[facility]  ACUTE CARE SURGERY - HISTORY AND PHYSICAL / CONSULT    Patient Name: Yoselyn Hernandez  MRN: [id number]  Admit Date: 602  : 1960  AGE: 64 y.o.   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 ACS consulted to r/o SBO.     Per patient, her pain was sudden in onset and has not subsided since. Passed gas 1-2 hours ago, had a BM yesterday. Denies fever, chills, chest pain, SOB, dysuria. Of note, she has been admitted for PD-cath related peritonitis numerous times, most recently in 2024.     Ddx for current presentation includes recurrent PD-cath related peritonitis vs SBO, however not clinically obstructed.    Recommendations:  - No surgical interventions currently  - Will place NGT for decompression  - Admission to medicine for management of complex comorbities  - ACS to follow closely    Discussed with attending, Dr. Pichardo.    Gissell Beck MD  PGY-2 General Surgery  ACS [id number]    ==============================================================================  CHIEF COMPLAINT/REASON FOR CONSULT:  SBO    PAST MEDICAL HISTORY:   PMH:   Past Medical History:   Diagnosis Date    CHF (congestive heart failure) (Multi)     COPD (chronic obstructive pulmonary disease) (Multi)     ESRD (end stage renal disease) (Multi)     Hypertension     Other abnormalities of gait and mobility     Gait, antalgic    PE (pulmonary thromboembolism) (Multi)     Personal history of other endocrine, nutritional and metabolic disease     History of hyperlipidemia    Personal history of other specified conditions     History of shortness of breath     PSH:    Vascular surgical history:  -  TAAD with ascending and arch replacement with 28mm Gelweave, individual reimplantation of great vessels  -  TEVAR with elective coverage of L SCA  -  TEVAR extension down to celiac artery  -  Abdominal aortic visceral debranching (left common iliac bifurcated graft to renals; right common iliac bifurcated graft to celiac/ SMA)  -  L CCA to L SCA bypass with extension of stent graft down to aortic bifurcation  -  TEVAR relining for type 3 endoleak    FH:   Family History   Problem Relation Name Age of Onset    COPD Mother      Kidney failure Father       SOCIAL HISTORY:    Smoking:   Social History     Tobacco Use   Smoking Status Some Days    Current packs/day: 0.00    Average packs/day: 0.3 packs/day for 20.0 years (5.0 ttl pk-yrs)    Types: Cigarettes    Start date:     Last attempt to quit:     Years since quittin.4   Smokeless Tobacco Never       Alcohol:    Social History     Substance and Sexual Activity   Alcohol Use Not Currently       Drug use: Denies     MEDICATIONS:   Prior to Admission medications    Medication Sig Start Date End Date Taking? Authorizing Provider   acetaminophen (Tylenol) 325 mg tablet Take 3 tablets (975 mg) by mouth 3 times a day as needed (pain or fever). 24   Carl Wiley MD   allopurinol (Zyloprim) 100 mg tablet Take 1 tablet (100 mg) by mouth once daily. 10/17/23   Gregg Kerr MD   atorvastatin (Lipitor) 40 mg tablet TAKE 1 TABLET BY MOUTH ONCE DAILY AT BEDTIME 24   Renetta Lucas PA-C   B complex-vitamin C-folic acid (Nephrocaps) 1 mg capsule Take 1 capsule by mouth once daily. Do not start before 2024.  Patient not taking: Reported on 2024   Carl Wiley MD   Creon 24,000-76,000 -120,000 unit capsule Take 3 capsules by mouth 3 times a day with meals. 24   Renetta Lucas PA-C   dextrose 2.5 % - LOW calcium 2.5 mEq/L Ca 2.5 mEq/L- Mg 0.5 mEq/L solution  5,000 mL with heparin 1,000 unit/mL solution 2,500 Units Inject 5,000 mL into the abdomen / abdominal cavity once every 24 hours. 2/19/24   Carl Wiley MD   dextrose 2.5 % - LOW calcium 2.5 mEq/L Ca 2.5 mEq/L- Mg 0.5 mEq/L solution 5,000 mL with heparin 1,000 unit/mL solution 2,500 Units Inject 5,000 mL into the abdomen / abdominal cavity once every 24 hours. 2/19/24   Carl Wiley MD   epoetin treasure (Epogen,Procrit) 10,000 unit/mL injection Inject 1 mL (10,000 Units) under the skin every 14 (fourteen) days.    Historical Provider, MD   gentamicin (Garamycin) 0.1 % cream Apply topically once every 24 hours. 2/5/24   Carl Wiley MD   hydrOXYzine HCL (Atarax) 10 mg tablet Take 1 tablet (10 mg) by mouth every 8 hours if needed for anxiety. For 14 days starting 3/15/24    Historical Provider, MD   levothyroxine (Synthroid, Levoxyl) 25 mcg tablet Take 1 tablet (25 mcg) by mouth once daily in the morning. Take before meals. 10/17/23   Gregg Kerr MD   lidocaine (LMX) 4 % cream Apply topically 4 times a day. 2/19/24   Carl Wiley MD   melatonin 10 mg tablet Take 1 tablet (10 mg) by mouth once daily as needed for sleep. 2/5/24   Carl Wiley MD   mirtazapine (Remeron) 15 mg tablet Take 1 tablet (15 mg) by mouth once daily at bedtime. 1/12/24 2/11/24  Renetta Lucas PA-C   multivitamin with minerals tablet Take 1 tablet by mouth once daily. Do not start before March 27, 2024. 3/27/24   Shabbir Pardo MD PhD   polyethylene glycol (Glycolax, Miralax) 17 gram packet Take 17 g by mouth once daily as needed (constipation). 2/5/24   Carl Wiley MD   pregabalin (Lyrica) 25 mg capsule Take 1 capsule (25 mg) by mouth once daily. Do not start before March 26, 2024. 3/26/24   Shabbir Pardo MD PhD   sennosides (Senokot) 8.6 mg tablet Take 2 tablets (17.2 mg) by mouth once daily as needed for constipation. 2/5/24   Carl Wiley MD   sevelamer carbonate (Renvela) 800 mg tablet Take 1  tablet (800 mg) by mouth 3 times a day with meals. Swallow tablet whole; do not crush, break, or chew. 2/5/24   Carl Wiley MD   therapeutic multivitamin-iron-minerals (Theragran-M) tablet Take 1 tablet by mouth once daily. 3/26/24   Shabbir Pardo MD PhD   traMADol (Ultram) 50 mg tablet Take 0.5 tablets (25 mg) by mouth every 12 hours if needed.    Historical Provider, MD   warfarin (Coumadin) 2 mg tablet Take as directed per After Visit Summary. Do not start before March 27, 2024. 3/27/24 3/26/25  Shabbir Pardo MD PhD     ALLERGIES:   Allergies   Allergen Reactions    Ace Inhibitors Angioedema, Other, Swelling and Unknown    Ciprofloxacin Diarrhea, GI intolerance, Nausea Only and Nausea/vomiting    Gabapentin Unknown     Jerking head movements    Oxycodone Itching     REVIEW OF SYSTEMS: All systems reviewed and otherwise negative.    PHYSICAL EXAM:  General: uncomfortable appearing  HEENT: NCAT  Resp: nonlabored breathing on RA  CV: tachycardic   Abd: soft, markedly distended, TTP in RLQ with no rebound or guarding  : no ryan  MSK: moves all extremities  Ext: bilateral lower extremity edema  Psych: appropriate mood and behavior    IMAGING SUMMARY:   CTA 6/2:   Nonocclusive filling defects within the distal left pulmonary artery and several segmental and subsegmental branches leading to the posterior left lower pulmonary lobe. Interval development of few focal regions of pulmonary consolidation within the left lower lobe which may indicate developing infectious/inflammatory process or pulmonary infarcts.  Similar marked cardiomegaly with mild straightening of the interventricular septum which may indicate right heart strain.  Diffuse distention of the stomach and proximal small bowel with variable air-fluid levels concerning for high-grade small bowel obstruction, with possible transitional within the mid lower abdomen.   Extensive postsurgical changes of the thoracoabdominal aorta with chronic occlusion of  the left common iliac bypass graft.  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  Peritoneal dialysis catheter traversing the right anterior abdominal wall and coiling within the pelvis with associated moderate volume of ascites and several locules of intraperitoneal air, which are favored to be secondary to peritoneal dialysis rather than hollow viscera perforation.  Ventral abdominal hernias containing gas, also likely secondary to peritoneal dialysis.    CTA 3/21:  IMPRESSION:  Postsurgical changes of endograft repair of aortic dissection with a graft extending to the abdominal aorta. No evidence of contrast extravasation to suggest an endoleak; unchanged caliber of the thoracoabdominal aorta. There are postsurgical changes of prior left common iliac graft supplying the renal arteries and right common iliac graft supplying the superior mesenteric artery and celiac artery. There is moderate stenosis of the right renal artery and complete occlusion of the left renal artery. The remainder of the bypass grafts are patent.  There is similar appearance of dissection involving the right common carotid and brachiocephalic arteries and right external iliac and left femoral arteries.  Cardiomegaly. Reflux of IV contrast into the hepatic veins consistent with right heart dysfunction. Hypoenhancement of thoracic aorta on initial arterial phase imaging denotes left ventricular systolic dysfunction.  Fissural and mild smooth interlobular septal thickening suggests acute pulmonary interstitial edema/CHF. Correlate with clinical volume status and consider correlation with BNP. No consolidation, pulmonary edema, pleural effusion, or pneumothorax. Mild-to-moderate centrilobular emphysema with paraseptal emphysema in the apices with bullae.  Prominence of gastric and small-bowel mucosal folds with fluid-filled nondilated loops of small bowel suggesting indolent gastroenteritis in the  appropriate clinical context. No dilated or frankly thickened bowel. Moderate volume of colonic stool. Colonic diverticula without definite evidence of acute diverticulitis.  Left lower quadrant peritoneal dialysis catheter. Small to moderate volume of low-density ascites, slightly increased in volume.   No pneumoperitoneum or loculated intraperitoneal collection.  Additional findings as discussed above.    LABS:  Results from last 7 days   Lab Units 06/02/24  1248   WBC AUTO x10*3/uL 6.1   HEMOGLOBIN g/dL 12.1   HEMATOCRIT % 35.4*   PLATELETS AUTO x10*3/uL 136*   NEUTROS PCT AUTO % 79.7   LYMPHS PCT AUTO % 10.8   MONOS PCT AUTO % 8.2   EOS PCT AUTO % 0.2     Results from last 7 days   Lab Units 06/02/24  1521   APTT seconds 39*   INR  1.7*     Results from last 7 days   Lab Units 06/02/24  1521 06/02/24  1248   SODIUM mmol/L 143 143   POTASSIUM mmol/L 4.1 6.7*   CHLORIDE mmol/L 97* 98   CO2 mmol/L 30 32   BUN mg/dL 51* 49*   CREATININE mg/dL 6.89* 6.77*   CALCIUM mg/dL 8.3* 7.8*   PROTEIN TOTAL g/dL  --  6.3*   BILIRUBIN TOTAL mg/dL  --  0.6   ALK PHOS U/L  --  136   ALT U/L  --  23   AST U/L  --  54*   GLUCOSE mg/dL 117* 49*     Results from last 7 days   Lab Units 06/02/24  1248   BILIRUBIN TOTAL mg/dL 0.6         I have reviewed all laboratory and imaging results ordered/pertinent for this encounter.

## 2024-06-03 ENCOUNTER — APPOINTMENT (OUTPATIENT)
Dept: RADIOLOGY | Facility: HOSPITAL | Age: 64
DRG: 388 | End: 2024-06-03
Payer: COMMERCIAL

## 2024-06-03 ENCOUNTER — APPOINTMENT (OUTPATIENT)
Dept: VASCULAR MEDICINE | Facility: HOSPITAL | Age: 64
End: 2024-06-03
Payer: COMMERCIAL

## 2024-06-03 LAB
ALBUMIN SERPL BCP-MCNC: 2.1 G/DL (ref 3.4–5)
ANION GAP BLDV CALCULATED.4IONS-SCNC: 9 MMOL/L (ref 10–25)
ANION GAP SERPL CALC-SCNC: 20 MMOL/L (ref 10–20)
BASE EXCESS BLDV CALC-SCNC: 9.9 MMOL/L (ref -2–3)
BASOPHILS # BLD AUTO: 0.03 X10*3/UL (ref 0–0.1)
BASOPHILS NFR BLD AUTO: 0.6 %
BASOPHILS NFR FLD MANUAL: 0 %
BLASTS NFR FLD MANUAL: 0 %
BNP SERPL-MCNC: >5000 PG/ML (ref 0–99)
BODY TEMPERATURE: 37 DEGREES CELSIUS
BUN SERPL-MCNC: 52 MG/DL (ref 6–23)
CA-I BLDV-SCNC: 1.04 MMOL/L (ref 1.1–1.33)
CALCIUM SERPL-MCNC: 8.2 MG/DL (ref 8.6–10.6)
CHLORIDE BLDV-SCNC: 101 MMOL/L (ref 98–107)
CHLORIDE SERPL-SCNC: 100 MMOL/L (ref 98–107)
CLARITY FLD: ABNORMAL
CO2 SERPL-SCNC: 28 MMOL/L (ref 21–32)
COLOR FLD: YELLOW
CREAT SERPL-MCNC: 7.25 MG/DL (ref 0.5–1.05)
EGFRCR SERPLBLD CKD-EPI 2021: 6 ML/MIN/1.73M*2
EOSINOPHIL # BLD AUTO: 0.07 X10*3/UL (ref 0–0.7)
EOSINOPHIL NFR BLD AUTO: 1.4 %
EOSINOPHIL NFR FLD MANUAL: 1 %
ERYTHROCYTE [DISTWIDTH] IN BLOOD BY AUTOMATED COUNT: 17.5 % (ref 11.5–14.5)
GLUCOSE BLD MANUAL STRIP-MCNC: 118 MG/DL (ref 74–99)
GLUCOSE BLD MANUAL STRIP-MCNC: 50 MG/DL (ref 74–99)
GLUCOSE BLD MANUAL STRIP-MCNC: 73 MG/DL (ref 74–99)
GLUCOSE BLDV-MCNC: 123 MG/DL (ref 74–99)
GLUCOSE SERPL-MCNC: 47 MG/DL (ref 74–99)
HCO3 BLDV-SCNC: 35.4 MMOL/L (ref 22–26)
HCT VFR BLD AUTO: 30.4 % (ref 36–46)
HCT VFR BLD EST: 34 % (ref 36–46)
HGB BLD-MCNC: 10 G/DL (ref 12–16)
HGB BLDV-MCNC: 11.4 G/DL (ref 12–16)
IMM GRANULOCYTES # BLD AUTO: 0.02 X10*3/UL (ref 0–0.7)
IMM GRANULOCYTES NFR BLD AUTO: 0.4 % (ref 0–0.9)
IMMATURE GRANULOCYTES IN FLUID: 0 %
LACTATE BLDV-SCNC: 1.1 MMOL/L (ref 0.4–2)
LYMPHOCYTES # BLD AUTO: 1.11 X10*3/UL (ref 1.2–4.8)
LYMPHOCYTES NFR BLD AUTO: 21.8 %
LYMPHOCYTES NFR FLD MANUAL: 1 %
MAGNESIUM SERPL-MCNC: 1.31 MG/DL (ref 1.6–2.4)
MCH RBC QN AUTO: 30.4 PG (ref 26–34)
MCHC RBC AUTO-ENTMCNC: 32.9 G/DL (ref 32–36)
MCV RBC AUTO: 92 FL (ref 80–100)
MONOCYTES # BLD AUTO: 0.49 X10*3/UL (ref 0.1–1)
MONOCYTES NFR BLD AUTO: 9.6 %
MONOS+MACROS NFR FLD MANUAL: 9 %
NEUTROPHILS # BLD AUTO: 3.37 X10*3/UL (ref 1.2–7.7)
NEUTROPHILS NFR BLD AUTO: 66.2 %
NEUTROPHILS NFR FLD MANUAL: 89 %
NRBC BLD-RTO: 0 /100 WBCS (ref 0–0)
OTHER CELLS NFR FLD MANUAL: 0 %
OXYHGB MFR BLDV: 19.3 % (ref 45–75)
PATH REVIEW-CELL CT,FLUID: NORMAL
PCO2 BLDV: 51 MM HG (ref 41–51)
PH BLDV: 7.45 PH (ref 7.33–7.43)
PHOSPHATE SERPL-MCNC: 6.6 MG/DL (ref 2.5–4.9)
PLASMA CELLS NFR FLD MANUAL: 0 %
PLATELET # BLD AUTO: 147 X10*3/UL (ref 150–450)
PO2 BLDV: 25 MM HG (ref 35–45)
POTASSIUM BLDV-SCNC: 5.1 MMOL/L (ref 3.5–5.3)
POTASSIUM SERPL-SCNC: 4.6 MMOL/L (ref 3.5–5.3)
RBC # BLD AUTO: 3.29 X10*6/UL (ref 4–5.2)
RBC # FLD AUTO: 1000 /UL
SAO2 % BLDV: 20 % (ref 45–75)
SODIUM BLDV-SCNC: 140 MMOL/L (ref 136–145)
SODIUM SERPL-SCNC: 143 MMOL/L (ref 136–145)
TOTAL CELLS COUNTED FLD: 100
UFH PPP CHRO-ACNC: 0.4 IU/ML
UFH PPP CHRO-ACNC: 0.5 IU/ML
WBC # BLD AUTO: 5.1 X10*3/UL (ref 4.4–11.3)
WBC # FLD AUTO: ABNORMAL /UL

## 2024-06-03 PROCEDURE — 3E1M39Z IRRIGATION OF PERITONEAL CAVITY USING DIALYSATE, PERCUTANEOUS APPROACH: ICD-10-PCS | Performed by: EMERGENCY MEDICINE

## 2024-06-03 PROCEDURE — 2500000004 HC RX 250 GENERAL PHARMACY W/ HCPCS (ALT 636 FOR OP/ED)

## 2024-06-03 PROCEDURE — 87102 FUNGUS ISOLATION CULTURE: CPT | Performed by: INTERNAL MEDICINE

## 2024-06-03 PROCEDURE — 2500000001 HC RX 250 WO HCPCS SELF ADMINISTERED DRUGS (ALT 637 FOR MEDICARE OP): Performed by: INTERNAL MEDICINE

## 2024-06-03 PROCEDURE — 74018 RADEX ABDOMEN 1 VIEW: CPT | Performed by: RADIOLOGY

## 2024-06-03 PROCEDURE — 89051 BODY FLUID CELL COUNT: CPT | Performed by: INTERNAL MEDICINE

## 2024-06-03 PROCEDURE — 99233 SBSQ HOSP IP/OBS HIGH 50: CPT | Performed by: INTERNAL MEDICINE

## 2024-06-03 PROCEDURE — 87205 SMEAR GRAM STAIN: CPT | Performed by: INTERNAL MEDICINE

## 2024-06-03 PROCEDURE — 36415 COLL VENOUS BLD VENIPUNCTURE: CPT | Performed by: STUDENT IN AN ORGANIZED HEALTH CARE EDUCATION/TRAINING PROGRAM

## 2024-06-03 PROCEDURE — 83735 ASSAY OF MAGNESIUM: CPT

## 2024-06-03 PROCEDURE — 2500000002 HC RX 250 W HCPCS SELF ADMINISTERED DRUGS (ALT 637 FOR MEDICARE OP, ALT 636 FOR OP/ED)

## 2024-06-03 PROCEDURE — 85025 COMPLETE CBC W/AUTO DIFF WBC: CPT

## 2024-06-03 PROCEDURE — 99254 IP/OBS CNSLTJ NEW/EST MOD 60: CPT | Performed by: INTERNAL MEDICINE

## 2024-06-03 PROCEDURE — 87075 CULTR BACTERIA EXCEPT BLOOD: CPT | Performed by: INTERNAL MEDICINE

## 2024-06-03 PROCEDURE — 1210000001 HC SEMI-PRIVATE ROOM DAILY

## 2024-06-03 PROCEDURE — 2500000004 HC RX 250 GENERAL PHARMACY W/ HCPCS (ALT 636 FOR OP/ED): Performed by: INTERNAL MEDICINE

## 2024-06-03 PROCEDURE — 2500000001 HC RX 250 WO HCPCS SELF ADMINISTERED DRUGS (ALT 637 FOR MEDICARE OP)

## 2024-06-03 PROCEDURE — 82947 ASSAY GLUCOSE BLOOD QUANT: CPT

## 2024-06-03 PROCEDURE — 2500000002 HC RX 250 W HCPCS SELF ADMINISTERED DRUGS (ALT 637 FOR MEDICARE OP, ALT 636 FOR OP/ED): Performed by: STUDENT IN AN ORGANIZED HEALTH CARE EDUCATION/TRAINING PROGRAM

## 2024-06-03 PROCEDURE — 99232 SBSQ HOSP IP/OBS MODERATE 35: CPT | Performed by: SURGERY

## 2024-06-03 PROCEDURE — 93970 EXTREMITY STUDY: CPT

## 2024-06-03 PROCEDURE — 80069 RENAL FUNCTION PANEL: CPT

## 2024-06-03 PROCEDURE — 74018 RADEX ABDOMEN 1 VIEW: CPT

## 2024-06-03 PROCEDURE — 2500000004 HC RX 250 GENERAL PHARMACY W/ HCPCS (ALT 636 FOR OP/ED): Performed by: STUDENT IN AN ORGANIZED HEALTH CARE EDUCATION/TRAINING PROGRAM

## 2024-06-03 PROCEDURE — 36415 COLL VENOUS BLD VENIPUNCTURE: CPT

## 2024-06-03 PROCEDURE — 2500000004 HC RX 250 GENERAL PHARMACY W/ HCPCS (ALT 636 FOR OP/ED): Mod: JZ | Performed by: STUDENT IN AN ORGANIZED HEALTH CARE EDUCATION/TRAINING PROGRAM

## 2024-06-03 PROCEDURE — 85520 HEPARIN ASSAY: CPT | Performed by: STUDENT IN AN ORGANIZED HEALTH CARE EDUCATION/TRAINING PROGRAM

## 2024-06-03 PROCEDURE — 93970 EXTREMITY STUDY: CPT | Performed by: INTERNAL MEDICINE

## 2024-06-03 RX ORDER — HEPARIN SODIUM 1000 [USP'U]/ML
INJECTION, SOLUTION INTRAVENOUS; SUBCUTANEOUS
Status: DISCONTINUED
Start: 2024-06-03 | End: 2024-06-03 | Stop reason: WASHOUT

## 2024-06-03 RX ORDER — SERTRALINE HYDROCHLORIDE 50 MG/1
50 TABLET, FILM COATED ORAL DAILY
Status: DISCONTINUED | OUTPATIENT
Start: 2024-06-03 | End: 2024-06-10 | Stop reason: HOSPADM

## 2024-06-03 RX ORDER — DEXTROSE 50 % IN WATER (D50W) INTRAVENOUS SYRINGE
12.5
Status: DISCONTINUED | OUTPATIENT
Start: 2024-06-03 | End: 2024-06-10 | Stop reason: HOSPADM

## 2024-06-03 RX ORDER — WARFARIN 3 MG/1
3 TABLET ORAL DAILY
Status: DISCONTINUED | OUTPATIENT
Start: 2024-06-03 | End: 2024-06-03

## 2024-06-03 RX ORDER — WARFARIN 3 MG/1
3 TABLET ORAL DAILY
Status: DISCONTINUED | OUTPATIENT
Start: 2024-06-03 | End: 2024-06-10 | Stop reason: HOSPADM

## 2024-06-03 RX ORDER — ACETAMINOPHEN 325 MG/1
975 TABLET ORAL EVERY 8 HOURS PRN
Status: DISCONTINUED | OUTPATIENT
Start: 2024-06-03 | End: 2024-06-10 | Stop reason: HOSPADM

## 2024-06-03 RX ORDER — DEXTROSE 50 % IN WATER (D50W) INTRAVENOUS SYRINGE
25
Status: DISCONTINUED | OUTPATIENT
Start: 2024-06-03 | End: 2024-06-10 | Stop reason: HOSPADM

## 2024-06-03 RX ORDER — LIDOCAINE 560 MG/1
1 PATCH PERCUTANEOUS; TOPICAL; TRANSDERMAL DAILY
Status: DISCONTINUED | OUTPATIENT
Start: 2024-06-03 | End: 2024-06-10 | Stop reason: HOSPADM

## 2024-06-03 RX ORDER — CALCIUM GLUCONATE 20 MG/ML
1 INJECTION, SOLUTION INTRAVENOUS EVERY 4 HOURS
Status: COMPLETED | OUTPATIENT
Start: 2024-06-03 | End: 2024-06-03

## 2024-06-03 RX ORDER — NYSTATIN 100000 [USP'U]/ML
5 SUSPENSION ORAL EVERY 6 HOURS SCHEDULED
Status: DISCONTINUED | OUTPATIENT
Start: 2024-06-03 | End: 2024-06-10 | Stop reason: HOSPADM

## 2024-06-03 RX ORDER — CALCITRIOL 0.25 UG/1
0.25 CAPSULE ORAL
Status: DISCONTINUED | OUTPATIENT
Start: 2024-06-03 | End: 2024-06-10 | Stop reason: HOSPADM

## 2024-06-03 RX ORDER — VANCOMYCIN HYDROCHLORIDE 500 MG/10ML
INJECTION, POWDER, LYOPHILIZED, FOR SOLUTION INTRAVENOUS
Status: DISCONTINUED
Start: 2024-06-03 | End: 2024-06-03 | Stop reason: WASHOUT

## 2024-06-03 RX ORDER — MAGNESIUM SULFATE HEPTAHYDRATE 40 MG/ML
2 INJECTION, SOLUTION INTRAVENOUS ONCE
Status: COMPLETED | OUTPATIENT
Start: 2024-06-03 | End: 2024-06-03

## 2024-06-03 RX ORDER — VANCOMYCIN HYDROCHLORIDE 1 G/20ML
INJECTION, POWDER, LYOPHILIZED, FOR SOLUTION INTRAVENOUS
Status: DISCONTINUED
Start: 2024-06-03 | End: 2024-06-03 | Stop reason: WASHOUT

## 2024-06-03 RX ORDER — CEFTAZIDIME 1 G/1
INJECTION, POWDER, FOR SOLUTION INTRAMUSCULAR; INTRAVENOUS
Status: DISCONTINUED
Start: 2024-06-03 | End: 2024-06-03 | Stop reason: WASHOUT

## 2024-06-03 RX ADMIN — CALCITRIOL CAPSULES 0.25 MCG 0.25 MCG: 0.25 CAPSULE ORAL at 11:00

## 2024-06-03 RX ADMIN — PANCRELIPASE 3 CAPSULE: 120000; 24000; 76000 CAPSULE, DELAYED RELEASE PELLETS ORAL at 16:19

## 2024-06-03 RX ADMIN — HEPARIN SODIUM 12 UNITS/HR: 10000 INJECTION, SOLUTION INTRAVENOUS at 11:52

## 2024-06-03 RX ADMIN — HEPARIN SODIUM: 1000 INJECTION INTRAVENOUS; SUBCUTANEOUS at 16:35

## 2024-06-03 RX ADMIN — SODIUM CHLORIDE, SODIUM LACTATE, CALCIUM CHLORIDE, MAGNESIUM CHLORIDE AND DEXTROSE: 1.5; 538; 448; 18.3; 5.08 INJECTION, SOLUTION INTRAPERITONEAL at 16:36

## 2024-06-03 RX ADMIN — ASCORBIC ACID, THIAMINE MONONITRATE,RIBOFLAVIN, NIACINAMIDE, PYRIDOXINE HYDROCHLORIDE, FOLIC ACID, CYANOCOBALAMIN, BIOTIN, CALCIUM PANTOTHENATE, 1 CAPSULE: 100; 1.5; 1.7; 20; 10; 1; 6000; 150000; 5 CAPSULE, LIQUID FILLED ORAL at 11:15

## 2024-06-03 RX ADMIN — ATORVASTATIN CALCIUM 40 MG: 40 TABLET, FILM COATED ORAL at 21:13

## 2024-06-03 RX ADMIN — MIRTAZAPINE 15 MG: 15 TABLET, FILM COATED ORAL at 21:13

## 2024-06-03 RX ADMIN — HYDROMORPHONE HYDROCHLORIDE 0.2 MG: 0.2 INJECTION, SOLUTION INTRAMUSCULAR; INTRAVENOUS; SUBCUTANEOUS at 00:37

## 2024-06-03 RX ADMIN — NYSTATIN 500000 UNITS: 100000 SUSPENSION ORAL at 19:36

## 2024-06-03 RX ADMIN — CALCIUM GLUCONATE 1 G: 20 INJECTION, SOLUTION INTRAVENOUS at 11:39

## 2024-06-03 RX ADMIN — MAGNESIUM SULFATE HEPTAHYDRATE 2 G: 40 INJECTION, SOLUTION INTRAVENOUS at 12:24

## 2024-06-03 RX ADMIN — CALCIUM GLUCONATE 1 G: 20 INJECTION, SOLUTION INTRAVENOUS at 15:58

## 2024-06-03 RX ADMIN — WARFARIN SODIUM 3 MG: 3 TABLET ORAL at 19:36

## 2024-06-03 RX ADMIN — SEVELAMER CARBONATE 800 MG: 800 TABLET, FILM COATED ORAL at 11:15

## 2024-06-03 RX ADMIN — SEVELAMER CARBONATE 800 MG: 800 TABLET, FILM COATED ORAL at 16:06

## 2024-06-03 RX ADMIN — HEPARIN SODIUM: 1000 INJECTION INTRAVENOUS; SUBCUTANEOUS at 19:27

## 2024-06-03 RX ADMIN — Medication 10 MG: at 21:13

## 2024-06-03 SDOH — SOCIAL STABILITY: SOCIAL INSECURITY: ABUSE: ADULT

## 2024-06-03 SDOH — SOCIAL STABILITY: SOCIAL INSECURITY: DO YOU FEEL UNSAFE GOING BACK TO THE PLACE WHERE YOU ARE LIVING?: NO

## 2024-06-03 SDOH — SOCIAL STABILITY: SOCIAL INSECURITY: DOES ANYONE TRY TO KEEP YOU FROM HAVING/CONTACTING OTHER FRIENDS OR DOING THINGS OUTSIDE YOUR HOME?: NO

## 2024-06-03 SDOH — HEALTH STABILITY: MENTAL HEALTH: HOW MANY STANDARD DRINKS CONTAINING ALCOHOL DO YOU HAVE ON A TYPICAL DAY?: PATIENT DOES NOT DRINK

## 2024-06-03 SDOH — SOCIAL STABILITY: SOCIAL INSECURITY: DO YOU FEEL ANYONE HAS EXPLOITED OR TAKEN ADVANTAGE OF YOU FINANCIALLY OR OF YOUR PERSONAL PROPERTY?: NO

## 2024-06-03 SDOH — ECONOMIC STABILITY: INCOME INSECURITY: IN THE LAST 12 MONTHS, WAS THERE A TIME WHEN YOU WERE NOT ABLE TO PAY THE MORTGAGE OR RENT ON TIME?: NO

## 2024-06-03 SDOH — ECONOMIC STABILITY: HOUSING INSECURITY: IN THE LAST 12 MONTHS, HOW MANY PLACES HAVE YOU LIVED?: 1

## 2024-06-03 SDOH — SOCIAL STABILITY: SOCIAL INSECURITY: ARE YOU OR HAVE YOU BEEN THREATENED OR ABUSED PHYSICALLY, EMOTIONALLY, OR SEXUALLY BY ANYONE?: NO

## 2024-06-03 SDOH — HEALTH STABILITY: MENTAL HEALTH: HOW OFTEN DO YOU HAVE 6 OR MORE DRINKS ON ONE OCCASION?: NEVER

## 2024-06-03 SDOH — SOCIAL STABILITY: SOCIAL INSECURITY: HAVE YOU HAD ANY THOUGHTS OF HARMING ANYONE ELSE?: NO

## 2024-06-03 SDOH — SOCIAL STABILITY: SOCIAL INSECURITY: HAS ANYONE EVER THREATENED TO HURT YOUR FAMILY OR YOUR PETS?: NO

## 2024-06-03 SDOH — SOCIAL STABILITY: SOCIAL INSECURITY: ARE THERE ANY APPARENT SIGNS OF INJURIES/BEHAVIORS THAT COULD BE RELATED TO ABUSE/NEGLECT?: NO

## 2024-06-03 SDOH — SOCIAL STABILITY: SOCIAL INSECURITY: WERE YOU ABLE TO COMPLETE ALL THE BEHAVIORAL HEALTH SCREENINGS?: YES

## 2024-06-03 SDOH — HEALTH STABILITY: MENTAL HEALTH: HOW OFTEN DO YOU HAVE A DRINK CONTAINING ALCOHOL?: NEVER

## 2024-06-03 SDOH — SOCIAL STABILITY: SOCIAL INSECURITY: HAVE YOU HAD THOUGHTS OF HARMING ANYONE ELSE?: NO

## 2024-06-03 SDOH — ECONOMIC STABILITY: INCOME INSECURITY: HOW HARD IS IT FOR YOU TO PAY FOR THE VERY BASICS LIKE FOOD, HOUSING, MEDICAL CARE, AND HEATING?: NOT HARD AT ALL

## 2024-06-03 ASSESSMENT — PATIENT HEALTH QUESTIONNAIRE - PHQ9
2. FEELING DOWN, DEPRESSED OR HOPELESS: NOT AT ALL
SUM OF ALL RESPONSES TO PHQ9 QUESTIONS 1 & 2: 0
1. LITTLE INTEREST OR PLEASURE IN DOING THINGS: NOT AT ALL

## 2024-06-03 ASSESSMENT — PAIN SCALES - GENERAL
PAINLEVEL_OUTOF10: 0 - NO PAIN
PAINLEVEL_OUTOF10: 0 - NO PAIN

## 2024-06-03 ASSESSMENT — COGNITIVE AND FUNCTIONAL STATUS - GENERAL
DRESSING REGULAR LOWER BODY CLOTHING: A LITTLE
WALKING IN HOSPITAL ROOM: A LITTLE
HELP NEEDED FOR BATHING: A LITTLE
EATING MEALS: A LITTLE
STANDING UP FROM CHAIR USING ARMS: A LITTLE
PERSONAL GROOMING: A LITTLE
MOBILITY SCORE: 18
DRESSING REGULAR LOWER BODY CLOTHING: A LITTLE
DRESSING REGULAR UPPER BODY CLOTHING: A LITTLE
CLIMB 3 TO 5 STEPS WITH RAILING: A LITTLE
HELP NEEDED FOR BATHING: A LITTLE
MOVING TO AND FROM BED TO CHAIR: A LITTLE
CLIMB 3 TO 5 STEPS WITH RAILING: A LITTLE
TURNING FROM BACK TO SIDE WHILE IN FLAT BAD: A LITTLE
PERSONAL GROOMING: A LITTLE
TOILETING: A LITTLE
PERSONAL GROOMING: A LITTLE
STANDING UP FROM CHAIR USING ARMS: A LITTLE
DRESSING REGULAR UPPER BODY CLOTHING: A LITTLE
TURNING FROM BACK TO SIDE WHILE IN FLAT BAD: A LITTLE
WALKING IN HOSPITAL ROOM: A LITTLE
DRESSING REGULAR UPPER BODY CLOTHING: A LITTLE
TOILETING: A LITTLE
DAILY ACTIVITIY SCORE: 18
STANDING UP FROM CHAIR USING ARMS: A LITTLE
MOBILITY SCORE: 18
DAILY ACTIVITIY SCORE: 19
MOVING FROM LYING ON BACK TO SITTING ON SIDE OF FLAT BED WITH BEDRAILS: A LITTLE
MOVING TO AND FROM BED TO CHAIR: A LITTLE
MOVING TO AND FROM BED TO CHAIR: A LITTLE
TURNING FROM BACK TO SIDE WHILE IN FLAT BAD: A LITTLE
WALKING IN HOSPITAL ROOM: A LITTLE
DAILY ACTIVITIY SCORE: 19
MOBILITY SCORE: 18
MOVING FROM LYING ON BACK TO SITTING ON SIDE OF FLAT BED WITH BEDRAILS: A LITTLE
PATIENT BASELINE BEDBOUND: NO
DRESSING REGULAR LOWER BODY CLOTHING: A LITTLE
CLIMB 3 TO 5 STEPS WITH RAILING: A LITTLE
HELP NEEDED FOR BATHING: A LITTLE
MOVING FROM LYING ON BACK TO SITTING ON SIDE OF FLAT BED WITH BEDRAILS: A LITTLE
TOILETING: A LITTLE

## 2024-06-03 ASSESSMENT — ACTIVITIES OF DAILY LIVING (ADL)
GROOMING: INDEPENDENT
WALKS IN HOME: NEEDS ASSISTANCE
HEARING - LEFT EAR: FUNCTIONAL
ASSISTIVE_DEVICE: CANE;WALKER
ADEQUATE_TO_COMPLETE_ADL: YES
PATIENT'S MEMORY ADEQUATE TO SAFELY COMPLETE DAILY ACTIVITIES?: YES
TOILETING: NEEDS ASSISTANCE
HEARING - RIGHT EAR: FUNCTIONAL
JUDGMENT_ADEQUATE_SAFELY_COMPLETE_DAILY_ACTIVITIES: YES
BATHING: NEEDS ASSISTANCE
FEEDING YOURSELF: INDEPENDENT
DRESSING YOURSELF: NEEDS ASSISTANCE
LACK_OF_TRANSPORTATION: NO

## 2024-06-03 ASSESSMENT — LIFESTYLE VARIABLES
AUDIT-C TOTAL SCORE: 0
AUDIT-C TOTAL SCORE: 0
SKIP TO QUESTIONS 9-10: 1
AUDIT-C TOTAL SCORE: 0
HOW OFTEN DO YOU HAVE A DRINK CONTAINING ALCOHOL: NEVER
SKIP TO QUESTIONS 9-10: 1
HOW OFTEN DO YOU HAVE 6 OR MORE DRINKS ON ONE OCCASION: NEVER
HOW MANY STANDARD DRINKS CONTAINING ALCOHOL DO YOU HAVE ON A TYPICAL DAY: PATIENT DOES NOT DRINK

## 2024-06-03 NOTE — H&P
"History Of Present Illness  Yoselyn Hernandez is a 64 y.o. female presenting with ESRD on PD c/b 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    Patient reports that 3 days ago at rehab she ate too much and started having abdominal pain afterwards, pain is \"all over abdomen\".  Patient did not start having vomiting that same day but the next day she vomited twice and it was \"reddish color but not blood, it was like ketchup\".  Patient did not try anything for the pain.  Endorsing \"chest pain\" that is located in the epigastric region and does not radiate anywhere.  Reports that this pain is similar to prior pain when she was admitted.  Endorsing chronic diarrhea, last bowel movement yesterday was not jose diarrhea but was loose stools.  No hematochezia.  Denies shortness of breath at rest but endorsing orthopnea and PND chronically.  Does not use oxygen at home.  Denies fevers, chills.  Does not make urine. Ambulates with assistance only.    In the ED, general surgery and vascular surgery were consulted for concern of small bowel obstruction and left artery stent thrombosis on CTA.  No surgical interventions.  Nasogastric tube placed for decompression.  Patient reports feeling better after the decompression and Zofran.  On CTA, found to have acute pulmonary emboli within the distal left pulmonary artery and several segmental and subsegmental branches in the posterior left lower pulmonary lobe.  Please read rest of CTA report further findings below.  Patient was started on heparin drip for the new PE.    Patient was tachycardic 100-105, otherwise vitally stable on room air potassium initially 6.7 hemolyzed repeat was " 4.1.  No leukocytosis, hemoglobin stable.  INR subtherapeutic at 1.7.  Patient reports that she does not follow at Coumadin clinic and was not told to do so.    Blood cultures and peritoneal dialysis fluid was obtained and sent for testing with culture.    Of note, patient was recently admitted with similar presentation with concern for PD-related peritonitis, nephrology and ID were consulted and was thought to likely not be peritonitis, cultures negative, and abx stopped. Discharged back to SNF.     - Vitals:   T 36.5, , /71, RR 17, SpO2 96 on RA  - Labs:   CBC: WBC 6.1, Hgb 12.1, plt 136   BMP: Na 143, K 4.1, Cl 97, HCO3 30, BUN 51, Cr 6.89, glu 117   LFT: Ca 8.3, tprot 6.3, alb 2.3, alkphos 136, AST 54, ALT 23, tbili 0.6   Electrolytes: PO4 6.6   Heme: INR 1.7, aPTT 39   hs-TnI 49 to 50 (chronically elevated    BCx x2 drawn   Peritoneal fluid sent for testing   Lipase 19   VBG: pH 7.42, pC)2 53, lactate 2.0    - Imaging:  CT angio chest and A/P    IMPRESSION:  1. Acute nonocclusive pulmonary emboli within the distal left  pulmonary artery and several segmental and subsegmental branches in  the posterior left lower pulmonary lobe. Interval development  periphery pulmonary consolidation within the left lower lobe which  may indicate developing infectious/inflammatory process or pulmonary  infarct. No evidence of right heart strain.  2. Extensive postsurgical changes of the thoracoabdominal aorta. Near  complete occlusion of the bypass graft arising from the left common  iliac artery, which supplies the renal arteries. The degree of  occlusion appears increased since 03/21/2024 proximally, with no  significant contrast opacification of the renal arteries. Previously,  some contrast was seen in the right renal artery.  3. Small bowel obstruction with transition point in the mid abdomen.  Small amount of free air within the peritoneum and within the hernia  may be secondary to peritoneal dialysis rather  than bowel  perforation. Clinical correlation is necessary and short-term  follow-up imaging may be considered to assess for stability or  resolution of the free air.  4. Chronic dissection of the right common carotid and brachiocephalic  arteries, unchanged. Chronic nonocclusive dissection of the right  external iliac artery.  5. Diffuse body wall edema/anasarca, with asymmetric edema in the  imaged right proximal thigh.    FINDINGS:  VASCULATURE:      PULMONARY ARTERIES: There are acute nonocclusive filling defects  within the distal left pulmonary artery segmental and subsegmental  branches of the left lower lobe. The main pulmonary artery is  moderately enlarged, similar the prior exam.      THORACIC AORTA: Status post endograft repair of the thoracic and  abdominal aorta with arch debranching with persistent dissection flap  extending along the brachiocephalic and right common carotid artery  visualized to the lower neck. No significant change in appearance.  The endograft appears patent. Stable size of the distal aortic arch  excluded aneurysm, measuring up to 5.5 cm. There is high-density  material within the excluded aneurysm sac which is not significantly  changed between the noncontrast and contrast enhanced examination,  consistent with calcification. No evidence of endoleak.      ABDOMINAL AORTA: Endograft extends distally to the iliac bifurcation.  Graft arise from the bilateral common iliac arteries to the abdominal  vasculature. The left bypass graft supplies the renal arteries.  Thrombus was noted within the graft on the prior examination, now  there is near complete occlusion of the proximal left bypass graft  with non opacification of distal branches (series 801, image 427).      The right bypass graft supplies the superior mesenteric and celiac  artery branches and appears patent with tapering and eventual poor  opacification of distal branches in the region of the hepatic hilum  (series 801 image  341). The abdominal aortic endo grafts otherwise  appears patent with no evidence of endoleak.      ABDOMINAL AND PELVIC ARTERIES:  There is a simple fluid attenuating  and peripherally calcified structure surrounding unopacified branch  of the left bypass graft which ultimately supplies the superior  mesenteric artery. This is stable in size, measuring 6.7 x 7.3 cm,  possibly representing a chronic thrombosed/bypassed pseudoaneurysm or  hematoma.  There is poor contrast opacification of celiac and  superior mesenteric artery branches which may be related to delayed  contrast opacification. Similar focal ectasia of the proximal right  internal iliac artery. Embolization material within the left internal  iliac artery. Stable chronic nonocclusive dissection of the right  external iliac artery, though evaluation is limited due to delayed  contrast opacification.          CT CHEST:      MEDIASTINUM AND LYMPH NODES: The esophageal wall appears within  normal limits. No enlarged intrathoracic or axillary lymph nodes by  imaging criteria. No pneumomediastinum.      HEART: The heart is markedly enlarged, similar compared to the prior  exam.  RV to LV ratio is 0.8. Severe coronary artery calcifications.  Small pericardial effusion.      LUNG, PLEURA, LARGE AIRWAYS:  There has been interval development  focal, peripheral consolidation within posterior left lower lobe  which may relate developing infectious/inflammatory process or  pulmonary infarcts given is location distal to the above-described  pulmonary emboli. Additional grossly stable atelectasis in the medial  left lower lobe. Stable small left pleural effusion. Small right  pleural effusion. Paraseptal emphysematous changes. Streaky  scarring/atelectatic changes of the bilateral bases.      OSSEOUS STRUCTURES: No acute osseous abnormality.      CHEST WALL SOFT TISSUES: No discernible abnormality.          CT ABDOMEN/PELVIS:      ABDOMINAL WALL: Ventral abdominal  hernia containing multiple locules  of gas, which favored to be secondary to the patient's history of  peritoneal dialysis. Right anterior abdominal approach peritoneal  dialysis catheter.      LIVER: No significant parenchymal abnormality.      BILE DUCTS: No significant intrahepatic or extrahepatic dilatation.      GALLBLADDER: Gallbladder is surgically absent.      PANCREAS: The pancreas is atrophic. No peripancreatic inflammatory  stranding      SPLEEN: The spleen is atrophic.      ADRENALS: Bilateral adrenal glands are poorly visualized, however no  adrenal mass is seen.      KIDNEYS, URETERS, BLADDER: The bilateral kidneys are diffusely and  symmetrically atrophic. No evidence of hydroureteronephrosis or  nephrolithiasis. Stable fluid density lesions in the right kidney,  likely simple cysts. The urinary bladder is completely nondistended.      REPRODUCTIVE ORGANS: Uterus is not seen and may be atrophic or  surgically removed.      VESSELS: (See above).      RETROPERITONEUM/LYMPH NODES: No enlarged lymph nodes. No acute  retroperitoneal abnormality.      BOWEL/MESENTERY/PERITONEUM: Diffuse distention of the stomach and  multiple loops of small bowel with variable air-fluid levels  concerning for acute high-grade small bowel obstruction. There is a  transition point between the dilated bowel and distally decompressed  bowel within the mid lower abdomen as seen on series 801, image 488.  the colon is nondistended. The appendix is not definitively  visualized.      Moderate ascites with few small locules of free air within the  peritoneum. Diffuse anasarca. Large amount of subcutaneous edema in  the imaged right proximal thigh.      OSSEOUS STRUCTURES: No acute osseous abnormality. Diffuse osseous  demineralization.            - EKG:  Sinus tachycardia with PACs, left axis deviation, no ST changes, repolarization in the setting of left ventricular hypertrophy     - Echocardiography:  TTE  3/25/2024:  CONCLUSIONS:   1. Left ventricular systolic function is severely decreased with a 15-20% estimated ejection fraction.   2. There is severely reduced right ventricular systolic function.   3. Moderate to severely elevated right ventricular systolic pressure.   4. Spectral Doppler shows a restrictive pattern of left ventricular diastolic filling.   5. There are elevated left atrial and left ventricular end diastolic pressures.   6. There is severe eccentric left ventricular hypertrophy.   7. The left atrium is severely dilated.   8. The right atrium is moderately to severely dilated.   9. Moderate mitral valve regurgitation.  10. Severe tricuspid regurgitation visualized.  11. Mild aortic valve regurgitation.  12. Left ventricular cavity size is severely dilated.  13. There is reversed systolic hepatic vein flow.  14. There is global hypokinesis of the left ventricle with minor regional variations.  15. Compared with study from 3/16/2023, no significant change.      - [facility]  3/22/2023:  CONCLUSIONS:  1. Mild non-obstructive coronary artery disease.  2. Left Ventricular end-diastolic pressure = 15.  3. Normal LV filling pressures.      - EGD:  11/2022:  Impression:              - Z-line regular, 38 cm from the incisors.  - Gastritis.  - Duodenitis.  - Normal second portion of the duodenum. Biopsied.      - Colonoscopy:  10/2021:  Impression:  - Preparation of the colon was fair.  - One 3 mm polyp in the cecum, removed with a cold biopsy forceps. Resected and retrieved.  - Normal mucosa in the entire examined colon. Biopsied.  - The examined portion of the ileum was normal.  - Non-bleeding internal hemorrhoids.    PMH: per HPI  SurgHx: Multiple endovascular repairs  Allergies: ACEi, Cipro, Gabapentin, Lyrica, Oxy  SocHx: She reports that she has been smoking cigarettes. She started smoking about 49 years ago. She has a 5 pack-year smoking history. She has never used smokeless tobacco. She reports that she does  not currently use alcohol. She reports current drug use. Drug: Marijuana.  FamHx: COPD mother, kidney disease father      Home Medications   Atorvastatin 40  Allopurinaol 100 mg  Levothyroxine 25 mcg  Creon  Mirtazepine 15 mg  Sevelamer  Lyrica (patient says not taking and cause her to twitch)  Tramadol prn  Warfarin 2 mg    Constitutional: in NAD  HEENT: sclerae anicteric, EOM grossly intact  CV: Tachycardic, regular rhythm, no murmurs noted  Pulm: CTAB, no increased WOB  GI: abdomen soft, tender to palpation over epigastric & LUQ and LLQ region, peritoneal tube in place with no leakage visible or purulence,   Skin: warm and dry  Ext: bilateral LE with +3 pitting edema up to the knees, left leg more edematous, non-tender  Neuro: alert and conversant  Psych: affect appropriate    Assessment & Plan  65 y/o W with PMHx of ESRD on PD c/b 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 DDx for include peritonitis (less likely given only one sepsis criteria being tachycardic, however, peritoneal fluids sent for analysis and cultures, along with blood cultures), small bowel obstruction given imaging findings but no surgical emergencies given normal lactate and passing gas, hernia contributing but less likely to be culprit, gastritis and duodenitits on prior EGD, known chronic pancreatitis contributing but now normal lipase, divertilulitis less likely, chronic mesenteric ischemia given extensive vascular history and interventions, chronic thrombosis of left bypass supplying renal arteries, right bypass supplying SMA and celiac arteries appears patent, however there is 6.7 x 7.3 cm fluid structure (chronic  thrombosis/bypass pseudoaneurysm or hematoma) around left bypass graft which eventually also supplies SMA that could be contributing to patient's symptoms. Daily marijuana use c/f cyclic vomiting syndrome vs. cannabinoid hyperemesis syndrome. Patient also found to have new pulmonary embolism with subtherapeutic INR 1.7, however is hemodynamically stable overall and is on room air.     #Abdominal pain  #Nausea/Vomiting  ::Most concerning for SBO vs. chronic mesenteric ischemia  ::Prior admission with peritoneal fluid positive for staph epi thought to be contaminant, blood cultures negative. ID was sonsulted and did not think patient had true peritonitis.  ::Current presentation with CT A/P finding of 6.7 x 7.3 cm pseudoaneurysm or hematoma close to left bypass graft   -ACS consulted, no surgical interventions currently given not clinically obstructed and passing gas and had bowel movement yesterday, lactate is 2.0  -Continue NGT for decompression, output green bile consistent with possible SBO  -Zofran prn for nausea, normal QTc   -Keep NPO  -Tramadol 50 q8h for pain  -Pantoprazole 40 mg IV  -Continue to monitor for worsening symptoms of SBO with repeat lactate and symptoms  -Will hold off on giving antibiotics given less likely PD-related peritonitis with no hypotension/fever/leukocytosis. Mild tachycardia insufficient for sepsis  -Follow up peritoneal fluid analysis and blood culutres  -Confirm with radiology which graft supplies SMA c/f chronic mesenteric ischemia  -Vascular surgery consulted in ED for L renal artery stent thrombosis, no surgical interventions, follow up in clinic  -If antibiotics started, add Nystatin swish and swallow for fungal coverage      #New pulmonary embolism   ::Subtherapeutic INR 1.7 while on Warfarin  ::Low risk with negative RV to LV ratio 0.8, PESI score 73 low risk, hemodynamically stable, no oxygen requirement  ::BNP > 5000 chronically 2/2 to HFrEF  -Needs Coumadin clinic or  switching to alternative AC  -Currently continue Heparin infusion while inpatient  -Holding Warfarin  -DVT bilateral lower extremities ordered    #5.5 cm descending aortic aneurysm  #Left common iliac occlusion - chronic  #hx of multiple dissections   ::5.4 cm prior  -Vascular surgery consulted for L renal artery stent thrombosis  -Follow up with surgery to comment on current aortic aneurysm      #ESRD   ::On peritoneal dialysis at home  -Consult nephrology in AM for peritoneal dialysis supplies  -Continue Sevelamer and renal multivitamins  -Monitor electrolytes    #HFrEF (EF 15-30% in 03/2023)  -Not on GDMT at home  -Not candidate for Archie or ACE/ARB/ARNi given ESRD  -Consider Hydralazine/Isordil for GDMT close to discharge  -Needs outpatient follow up with cardiology     #Chronic diarrhea   -Holding Lomotil for now c/f SBO  -per renal, needs to have at least one BM a day. Continue miralax, senna PRN     #Hypothyroidism-continue synthyroid   #chronic pancreatitis-continue home creon      N: NPO, NGT  A: PIV    DVT ppx: Heparin infusion  GI ppx: IV pantoprazole    Code Status: Full Code (confirmed on Admission)  Surrogate Medical Decision-maker: sister Farida , Su Nair (best friend) [phone]        Luis A Sanchez MD

## 2024-06-03 NOTE — PROGRESS NOTES
"Yoselyn Hernandez is a 64 y.o. female on day 1 of admission presenting with Generalized abdominal pain.    Subjective   No acute events. Pt states that abdominal pain has improved significantly. Denies any other new issues.        Objective     Physical Exam  Constitutional: in NAD  HEENT: sclerae anicteric, EOM grossly intact  CV: Tachycardic, regular rhythm, no murmurs noted  Pulm: CTAB, no increased WOB  GI: abdomen soft, tender to palpation over epigastric & LUQ and LLQ region, peritoneal tube in place with no leakage visible or purulence,   Skin: warm and dry  Ext: bilateral LE with +2 pitting edema up to the knees, left leg more edematous, non-tender  Neuro: alert and conversant  Psych: affect appropriate    Last Recorded Vitals  Blood pressure 99/64, pulse 102, temperature 36.7 °C (98.1 °F), resp. rate 18, height 1.702 m (5' 7\"), weight 65.3 kg (144 lb), SpO2 92%.  Intake/Output last 3 Shifts:  I/O last 3 completed shifts:  In: - (0 mL/kg)   Out: 900 (13.8 mL/kg) [Emesis/NG output:900]  Weight: 65.3 kg     Relevant Results  Results for orders placed or performed during the hospital encounter of 06/02/24 (from the past 24 hour(s))   CBC and Auto Differential   Result Value Ref Range    WBC 6.1 4.4 - 11.3 x10*3/uL    nRBC 0.0 0.0 - 0.0 /100 WBCs    RBC 3.82 (L) 4.00 - 5.20 x10*6/uL    Hemoglobin 12.1 12.0 - 16.0 g/dL    Hematocrit 35.4 (L) 36.0 - 46.0 %    MCV 93 80 - 100 fL    MCH 31.7 26.0 - 34.0 pg    MCHC 34.2 32.0 - 36.0 g/dL    RDW 17.3 (H) 11.5 - 14.5 %    Platelets 136 (L) 150 - 450 x10*3/uL    Neutrophils % 79.7 40.0 - 80.0 %    Immature Granulocytes %, Automated 0.8 0.0 - 0.9 %    Lymphocytes % 10.8 13.0 - 44.0 %    Monocytes % 8.2 2.0 - 10.0 %    Eosinophils % 0.2 0.0 - 6.0 %    Basophils % 0.3 0.0 - 2.0 %    Neutrophils Absolute 4.86 1.20 - 7.70 x10*3/uL    Immature Granulocytes Absolute, Automated 0.05 0.00 - 0.70 x10*3/uL    Lymphocytes Absolute 0.66 (L) 1.20 - 4.80 x10*3/uL    Monocytes Absolute 0.50 " 0.10 - 1.00 x10*3/uL    Eosinophils Absolute 0.01 0.00 - 0.70 x10*3/uL    Basophils Absolute 0.02 0.00 - 0.10 x10*3/uL   Comprehensive Metabolic Panel   Result Value Ref Range    Glucose 49 (LL) 74 - 99 mg/dL    Sodium 143 136 - 145 mmol/L    Potassium 6.7 (HH) 3.5 - 5.3 mmol/L    Chloride 98 98 - 107 mmol/L    Bicarbonate 32 21 - 32 mmol/L    Anion Gap 20 10 - 20 mmol/L    Urea Nitrogen 49 (H) 6 - 23 mg/dL    Creatinine 6.77 (H) 0.50 - 1.05 mg/dL    eGFR 6 (L) >60 mL/min/1.73m*2    Calcium 7.8 (L) 8.6 - 10.6 mg/dL    Albumin 2.3 (L) 3.4 - 5.0 g/dL    Alkaline Phosphatase 136 33 - 136 U/L    Total Protein 6.3 (L) 6.4 - 8.2 g/dL    AST 54 (H) 9 - 39 U/L    Bilirubin, Total 0.6 0.0 - 1.2 mg/dL    ALT 23 7 - 45 U/L   Lipase   Result Value Ref Range    Lipase 19 9 - 82 U/L   Lactate   Result Value Ref Range    Lactate 1.9 0.4 - 2.0 mmol/L   Troponin I, High Sensitivity, Initial   Result Value Ref Range    Troponin I, High Sensitivity 49 (H) 0 - 34 ng/L   Blood Culture    Specimen: Peripheral Venipuncture; Blood culture   Result Value Ref Range    Blood Culture Loaded on Instrument - Culture in progress    Sedimentation rate, automated   Result Value Ref Range    Sedimentation Rate 91 (H) 0 - 30 mm/h   B-type natriuretic peptide   Result Value Ref Range    BNP >5,000 (H) 0 - 99 pg/mL   Blood Culture    Specimen: Peripheral Venipuncture; Blood culture   Result Value Ref Range    Blood Culture Loaded on Instrument - Culture in progress    ECG 12 Lead   Result Value Ref Range    Ventricular Rate 100 BPM    Atrial Rate 98 BPM    WA Interval 160 ms    QRS Duration 84 ms    QT Interval 526 ms    QTC Calculation(Bazett) 678 ms    P Axis 70 degrees    R Axis -60 degrees    T Axis -87 degrees    QRS Count 17 beats    Q Onset 225 ms    T Offset 488 ms    QTC Fredericia 624 ms   Troponin, High Sensitivity, 1 Hour   Result Value Ref Range    Troponin I, High Sensitivity 50 (H) 0 - 34 ng/L   Renal function panel   Result Value Ref  Range    Glucose 117 (H) 74 - 99 mg/dL    Sodium 143 136 - 145 mmol/L    Potassium 4.1 3.5 - 5.3 mmol/L    Chloride 97 (L) 98 - 107 mmol/L    Bicarbonate 30 21 - 32 mmol/L    Anion Gap 20 10 - 20 mmol/L    Urea Nitrogen 51 (H) 6 - 23 mg/dL    Creatinine 6.89 (H) 0.50 - 1.05 mg/dL    eGFR 6 (L) >60 mL/min/1.73m*2    Calcium 8.3 (L) 8.6 - 10.6 mg/dL    Phosphorus 6.6 (H) 2.5 - 4.9 mg/dL    Albumin 2.6 (L) 3.4 - 5.0 g/dL   BLOOD GAS VENOUS FULL PANEL   Result Value Ref Range    POCT pH, Venous 7.42 7.33 - 7.43 pH    POCT pCO2, Venous 53 (H) 41 - 51 mm Hg    POCT pO2, Venous 33 (L) 35 - 45 mm Hg    POCT SO2, Venous 36 (L) 45 - 75 %    POCT Oxy Hemoglobin, Venous 35.7 (L) 45.0 - 75.0 %    POCT Hematocrit Calculated, Venous 40.0 36.0 - 46.0 %    POCT Sodium, Venous 139 136 - 145 mmol/L    POCT Potassium, Venous 4.4 3.5 - 5.3 mmol/L    POCT Chloride, Venous 100 98 - 107 mmol/L    POCT Ionized Calicum, Venous 1.05 (L) 1.10 - 1.33 mmol/L    POCT Glucose, Venous 128 (H) 74 - 99 mg/dL    POCT Lactate, Venous 2.0 0.4 - 2.0 mmol/L    POCT Base Excess, Venous 8.2 (H) -2.0 - 3.0 mmol/L    POCT HCO3 Calculated, Venous 34.4 (H) 22.0 - 26.0 mmol/L    POCT Hemoglobin, Venous 13.3 12.0 - 16.0 g/dL    POCT Anion Gap, Venous 9.0 (L) 10.0 - 25.0 mmol/L    Patient Temperature 37.0 degrees Celsius    FiO2 21 %   Coagulation Screen   Result Value Ref Range    Protime 19.0 (H) 9.8 - 12.8 seconds    INR 1.7 (H) 0.9 - 1.1    aPTT 39 (H) 27 - 38 seconds   C-reactive protein   Result Value Ref Range    C-Reactive Protein 23.71 (H) <1.00 mg/dL   Blood Gas Venous Full Panel Unsolicited   Result Value Ref Range    POCT pH, Venous 7.45 (H) 7.33 - 7.43 pH    POCT pCO2, Venous 51 41 - 51 mm Hg    POCT pO2, Venous 25 (L) 35 - 45 mm Hg    POCT SO2, Venous 20 (L) 45 - 75 %    POCT Oxy Hemoglobin, Venous 19.3 (L) 45.0 - 75.0 %    POCT Hematocrit Calculated, Venous 34.0 (L) 36.0 - 46.0 %    POCT Sodium, Venous 140 136 - 145 mmol/L    POCT Potassium, Venous  5.1 3.5 - 5.3 mmol/L    POCT Chloride, Venous 101 98 - 107 mmol/L    POCT Ionized Calicum, Venous 1.04 (L) 1.10 - 1.33 mmol/L    POCT Glucose, Venous 123 (H) 74 - 99 mg/dL    POCT Lactate, Venous 1.1 0.4 - 2.0 mmol/L    POCT Base Excess, Venous 9.9 (H) -2.0 - 3.0 mmol/L    POCT HCO3 Calculated, Venous 35.4 (H) 22.0 - 26.0 mmol/L    POCT Hemoglobin, Venous 11.4 (L) 12.0 - 16.0 g/dL    POCT Anion Gap, Venous 9.0 (L) 10.0 - 25.0 mmol/L    Patient Temperature 37.0 degrees Celsius   POCT GLUCOSE   Result Value Ref Range    POCT Glucose 124 (H) 74 - 99 mg/dL   ECG 12 lead   Result Value Ref Range    Ventricular Rate 102 BPM    Atrial Rate 102 BPM    MI Interval 192 ms    QRS Duration 82 ms    QT Interval 388 ms    QTC Calculation(Bazett) 505 ms    P Axis 92 degrees    R Axis -52 degrees    T Axis 119 degrees    QRS Count 16 beats    Q Onset 217 ms    P Onset 121 ms    P Offset 213 ms    T Offset 411 ms    QTC Fredericia 463 ms   Peritoneal Dialysis Fluid Culture/Smear    Specimen: Peritoneal Dialysis; Fluid   Result Value Ref Range    Peritoneal Dialysis Fluid Culture Culture in progress     Gram Stain (4+) Abundant Polymorphonuclear leukocytes     Gram Stain No organisms seen    Body Fluid Cell Count   Result Value Ref Range    Color, Fluid Red-brown (A) Colorless, Straw, Yellow    Clarity, Fluid Turbid (A) Clear    WBC, Fluid 6,033 See Comment /uL    RBC, Fluid 3,375,000 0  /uL /uL   Body Fluid Differential   Result Value Ref Range    Neutrophils %, Manual, Fluid 60 <25 % %    Lymphocytes %, Manual, Fluid 21 <75 % %    Mono/Macrophages %, Manual, Fluid 9 <70 % %    Eosinophils %, Manual, Fluid 1 0 % %    Basophils %, Manual, Fluid 1 0 % %    Immature Granulocytes %, Manual, Fluid 0 0 % %    Blasts %, Manual, Fluid 0 0 % %    Unclassified Cells %, Manual, Fluid 8 (H) 0 % %    Plasma Cells %, Manual, Fluid 0 0 % %    Total Cells Counted, Fluid 100    Lactate Dehydrogenase, Fluid   Result Value Ref Range    LD, Fluid 148  Not established. U/L   Heparin Assay, UFH   Result Value Ref Range    Heparin Unfractionated 0.6 See Comment Below for Therapeutic Ranges IU/mL   Blood Gas Venous Full Panel   Result Value Ref Range    POCT pH, Venous 7.48 (H) 7.33 - 7.43 pH    POCT pCO2, Venous 45 41 - 51 mm Hg    POCT pO2, Venous 43 35 - 45 mm Hg    POCT SO2, Venous 61 45 - 75 %    POCT Oxy Hemoglobin, Venous 60.0 45.0 - 75.0 %    POCT Hematocrit Calculated, Venous 32.0 (L) 36.0 - 46.0 %    POCT Sodium, Venous 137 136 - 145 mmol/L    POCT Potassium, Venous 4.9 3.5 - 5.3 mmol/L    POCT Chloride, Venous 100 98 - 107 mmol/L    POCT Ionized Calicum, Venous 1.05 (L) 1.10 - 1.33 mmol/L    POCT Glucose, Venous 56 (L) 74 - 99 mg/dL    POCT Lactate, Venous 1.5 0.4 - 2.0 mmol/L    POCT Base Excess, Venous 9.0 (H) -2.0 - 3.0 mmol/L    POCT HCO3 Calculated, Venous 33.5 (H) 22.0 - 26.0 mmol/L    POCT Hemoglobin, Venous 10.7 (L) 12.0 - 16.0 g/dL    POCT Anion Gap, Venous 8.0 (L) 10.0 - 25.0 mmol/L    Patient Temperature 37.0 degrees Celsius    FiO2 21 %   Heparin Assay, UFH   Result Value Ref Range    Heparin Unfractionated 0.4 See Comment Below for Therapeutic Ranges IU/mL   CBC and Auto Differential   Result Value Ref Range    WBC 5.1 4.4 - 11.3 x10*3/uL    nRBC 0.0 0.0 - 0.0 /100 WBCs    RBC 3.29 (L) 4.00 - 5.20 x10*6/uL    Hemoglobin 10.0 (L) 12.0 - 16.0 g/dL    Hematocrit 30.4 (L) 36.0 - 46.0 %    MCV 92 80 - 100 fL    MCH 30.4 26.0 - 34.0 pg    MCHC 32.9 32.0 - 36.0 g/dL    RDW 17.5 (H) 11.5 - 14.5 %    Platelets 147 (L) 150 - 450 x10*3/uL    Neutrophils % 66.2 40.0 - 80.0 %    Immature Granulocytes %, Automated 0.4 0.0 - 0.9 %    Lymphocytes % 21.8 13.0 - 44.0 %    Monocytes % 9.6 2.0 - 10.0 %    Eosinophils % 1.4 0.0 - 6.0 %    Basophils % 0.6 0.0 - 2.0 %    Neutrophils Absolute 3.37 1.20 - 7.70 x10*3/uL    Immature Granulocytes Absolute, Automated 0.02 0.00 - 0.70 x10*3/uL    Lymphocytes Absolute 1.11 (L) 1.20 - 4.80 x10*3/uL    Monocytes Absolute 0.49  0.10 - 1.00 x10*3/uL    Eosinophils Absolute 0.07 0.00 - 0.70 x10*3/uL    Basophils Absolute 0.03 0.00 - 0.10 x10*3/uL   Magnesium   Result Value Ref Range    Magnesium 1.31 (L) 1.60 - 2.40 mg/dL   Renal function panel   Result Value Ref Range    Glucose 47 (LL) 74 - 99 mg/dL    Sodium 143 136 - 145 mmol/L    Potassium 4.6 3.5 - 5.3 mmol/L    Chloride 100 98 - 107 mmol/L    Bicarbonate 28 21 - 32 mmol/L    Anion Gap 20 10 - 20 mmol/L    Urea Nitrogen 52 (H) 6 - 23 mg/dL    Creatinine 7.25 (H) 0.50 - 1.05 mg/dL    eGFR 6 (L) >60 mL/min/1.73m*2    Calcium 8.2 (L) 8.6 - 10.6 mg/dL    Phosphorus 6.6 (H) 2.5 - 4.9 mg/dL    Albumin 2.1 (L) 3.4 - 5.0 g/dL   Heparin Assay, UFH   Result Value Ref Range    Heparin Unfractionated 0.5 See Comment Below for Therapeutic Ranges IU/mL   POCT GLUCOSE   Result Value Ref Range    POCT Glucose 50 (L) 74 - 99 mg/dL     Scheduled medications  [Held by provider] allopurinol, 100 mg, oral, Daily  atorvastatin, 40 mg, oral, Nightly  B complex-vitamin C-folic acid, 1 capsule, oral, Daily  calcitriol, 0.25 mcg, oral, Every Mon/Wed/Fri  calcium gluconate, 1 g, intravenous, q4h  gentamicin, , Topical, q24h  levothyroxine, 25 mcg, oral, Daily before breakfast  lidocaine, 1 Application, Topical, Once  lipase-protease-amylase, 3 capsule, oral, TID  magnesium sulfate, 2 g, intravenous, Once  melatonin, 10 mg, oral, Nightly  mirtazapine, 15 mg, oral, Nightly  pantoprazole, 40 mg, intravenous, Daily  sertraline, 50 mg, oral, Daily  sevelamer carbonate, 800 mg, oral, TID  warfarin, 3 mg, oral, Daily      Continuous medications  heparin, 0-4,500 Units/hr, Last Rate: 1,200 Units/hr (06/02/24 2302)      PRN medications  PRN medications: dextrose, dextrose, glucagon, glucagon, heparin, ondansetron, traMADol    Assessment & Plan  63 y/o W with PMHx of ESRD on PD c/b 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 Daily marijuana use c/f cyclic vomiting syndrome vs. cannabinoid hyperemesis syndrome. Patient also found to have new pulmonary embolism with subtherapeutic INR 1.7, however is hemodynamically stable overall and is on room air.     Update 6/3:   - Consulted nephrology dialysis for peritoneal dialysis supplies   - Abdominal pain improving significantly with NG decompression, minimal output now  - NG camp and remove if tolerated   - Start clears, advance as tolerated   - Resume home warfarin 3 mg daily tonight  - Replete Mg   - LE DVT US pending       #Abdominal pain  #Nausea/Vomiting  :: Most concerning for SBO vs. chronic mesenteric ischemia  :: Prior admission with peritoneal fluid positive for staph epi thought to be contaminant, blood cultures negative. ID was sonsulted and did not think patient had true peritonitis.  :: Current  presentation with CT A/P finding of 6.7 x 7.3 cm pseudoaneurysm or hematoma close to left bypass graft   - ACS consulted, no surgical interventions   - NGT to be removed today if tolerating  - Clears, then advance as tolerated    - Zofran prn for nausea, normal QTc   - Tramadol 50 q8h for pain  - Pantoprazole 40 mg IV  - Continue to monitor for worsening symptoms of SBO with repeat lactate and symptoms  - Will hold off on giving antibiotics given less likely PD-related peritonitis with no hypotension/fever/leukocytosis. Mild tachycardia insufficient for sepsis  - Follow up peritoneal fluid analysis and blood culutres  - Vascular surgery consulted in ED for L renal artery stent thrombosis, no surgical interventions, follow up in clinic  - If antibiotics started, add Nystatin swish and swallow for fungal coverage     #New pulmonary embolism   :: Subtherapeutic INR 1.7 while on Warfarin  :: Low risk with negative RV to LV ratio 0.8, PESI score 73 low risk, hemodynamically stable, no oxygen requirement  :: BNP > 5000 chronically 2/2 to HFrEF  - Needs Coumadin clinic  - Currently continue Heparin infusion while inpatient  - Cont home Warfarin 3 mg daily   - DVT bilateral lower extremities ordered     #5.5 cm descending aortic aneurysm  #Left common iliac occlusion - chronic  #hx of multiple dissections   :: 5.4 cm prior  - Vascular surgery consulted for L renal artery stent thrombosis  - Follow up with surgery to comment on current aortic aneurysm      #ESRD   :: On peritoneal dialysis at home  - Consulted nephrology for peritoneal dialysis supplies  - Continue Sevelamer and renal multivitamins  - Monitor electrolytes     #HFrEF (EF 15-30% in 03/2023)  - Not on GDMT at home   -Not candidate for [place] or ACE/ARB/ARNi given ESRD  - Consider Hydralazine/Isordil for GDMT close to discharge  - Needs outpatient follow up with cardiology     #Chronic diarrhea   - Holding Lomotil for now c/f SBO  - per renal, needs to have at  least one BM a day. Continue miralax, senna PRN     #Hypothyroidism-continue synthyroid   #chronic pancreatitis-continue home creon        N: Clears   A: PIV     DVT ppx: Heparin infusion (resumed home warfarin)   GI ppx: IV pantoprazole    Code Status: Full Code (confirmed on Admission)  Surrogate Medical Decision-maker: sister Farida , Su Korey (best friend) [phone]

## 2024-06-03 NOTE — PROGRESS NOTES
06/03/24 1402   Discharge Planning   Living Arrangements Other (Comment)  ([facility])   Support Systems Family members;Friends/neighbors   Assistance Needed Assist for adls   Type of Residence Skilled nursing facility   Do you have animals or pets at home? No   Home or Post Acute Services Post acute facilities (Rehab/SNF/etc)   Type of Post Acute Facility Services Skilled nursing   Patient expects to be discharged to: [facility]   Does the patient need discharge transport arranged? Yes   RoundTrip coordination needed? Yes   Has discharge transport been arranged? No   Financial Resource Strain   How hard is it for you to pay for the very basics like food, housing, medical care, and heating? Not hard   Housing Stability   In the last 12 months, was there a time when you were not able to pay the mortgage or rent on time? N   In the last 12 months, was there a time when you did not have a steady place to sleep or slept in a shelter (including now)? N   Transportation Needs   In the past 12 months, has lack of transportation kept you from medical appointments or from getting medications? no   In the past 12 months, has lack of transportation kept you from meetings, work, or from getting things needed for daily living? No   Patient Choice   Provider Choice list and CMS website (https://medicare.gov/care-compare#search) for post-acute Quality and Resource Measure Data were provided and reviewed with: Patient   Patient / Family choosing to utilize agency / facility established prior to hospitalization Yes     Transitional Care Coordination Progress Note:  Patient discussed during interdisciplinary rounds.   Team members present: ANGEL WOOD  Plan per Medical/Surgical team: Generalized abd pain  Payor: Sathish  Discharge disposition: [facility]  Potential Barriers: none  ADOD: 1-2 days  Met with patient at bedside, provided introduction of self and role. Patient  states she has been a resident of [facility] for the past few months. Patient receives nightly peritoneal dialysis. Patient states plan would be to return to facility at discharge. DSC asked to submit referral via careport. Facility is able to accept patient back at discharge and state she will need new precert to readmit. PT/OT pending. Will continue to monitor for discharge planning needs.     Flower CALVILLO, RN  Transitional Care Coordinator (TCC)  [phone]

## 2024-06-03 NOTE — NURSING NOTE
..Peritoneal Dialysis - CCPD    Completed Overnight Therapy       Full treatment received:                 I-Drain:    mL  Total UF:   mL  Combined 24 hour UF: mL  Minicap placed:    Effluent Color:      Cloudy:      Fibrin:     Tonight's Therapy   6/3/24    Dialysis cycler primed:   y  Patient connected:  y  Therapy started at:  Will start therapy at 0200 after 6 hour well  [employer] Adaptor:  y  Dressing changed:  y  Gent. cream applied:  N, Pt did not have it with her, it is not ordered by nephrologist     Therapy Orders     Total time:  9 hours   Cycles:   5   Fill Volume:  1800 mL   Last fill:   500 mL   Last fill solution:  1.5% Dextrose 2.5 calcium with 1000 units heparin, 4 mg ceftazidime, 6 g vancomycin.    Total volume:  2000 mL (just 500 ml of it instilled)      Solution(s):  Dextrose 1.5% Dianeal 2.5 calcium-- 5000 mL (x2)      Last Fill:  Dextrose 1.5% Dianeal 2.5 calcium-- 500 mL (x1)   Additive(s): 1000 units heparin, 4mg ceftazidime, 6g vancomycin.   Cultures, fungal, and cell count/diff obtained prior to starting pd tx. Instilled pd solution for the 6 hour dwell at 2000    needs to begin pd treatment at  0200      . Educated patient on what to do and day nurse Katelynn/night nurse Morena. Instructed pt needs to start treatment at  0200    . Instructed day floor /nightnurse on how to disconnect patient with instructions present (written) . PD nurse will come in the morning to check on patient/breakdown machine.     **Dialysis nurses in house Monday through Friday [phone] to setup and disconnect patients,   please call our office at [phone], follow prompts for after hours paging.    **Machine Trouble Shooting: [employer] 24 hour technical support available at [phone]     **Bedside nursing staff to disconnect patients as needed outside of office hours.  -Instructions and supplies located on dialysis cart.  -Reference  policy [id number] -->

## 2024-06-03 NOTE — HOSPITAL COURSE
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 Daily marijuana use c/f cyclic vomiting syndrome vs. cannabinoid hyperemesis syndrome. Patient also found to have new pulmonary embolism with subtherapeutic INR 1.7, however is hemodynamically stable overall and is on room air.     Consulted nephrology dialysis for peritoneal dialysis supplies. On 6/3, abdominal pain improved significantly with NG decompression. NG tube removed on 6/3. Started empiric intraperitoneal abx on 6/3 per nephrology recs with ceftazidime and vancomycin. Started nystatin swish + swallow. DVT US LE with no acute DVT but 2.02 cm x 2.78 cm non-vascularized hypoechoic cystic structure seen in left groin. Heparin stopped on 6/4, continued on  home warfarin as INR 2. Prelim peritoneal fluid Cx from 6/2 and 6/3 grew strep mitis/oralis group. Discontinued intraperitoneal ceftazidime on 6/5 per nephrology. Advanced to solid foods on 6/6 as pt tolerated full liquid diet without abdominal pain or n/v.  Bcx (6/2) with no growth, finalized. Peritoneal fluid analysis showed improvement in WBC counts also.       Follow up:   [ ] PCP   [ ] Cardiology   [ ] Nephrology   [ ] Warfarin clinic   [ ] continue antibiotics for 2 weeks total, to end 6/17/2024 (Intraperitoneal vancomycin, daily vancomycin trough levels, if above 20, hold dose)

## 2024-06-03 NOTE — CONSULTS
Reason For Consult  ESRD on PD    History Of Present Illness  Yoselyn Hernandez is a 64 y.o. female  with extensive PMH as noted below who presented to the emergency room yesterday with 2 days of abdominal pain.  She initially told me she also had diarrhea previously but her last bowel movement was 2 days ago.  She had 1 bout of vomiting at the rehab unit.  PD fluid sent from emergency room was described as red-brown and turbid with 6033 WBCs with 60% neutrophils, suggestive of the associated peritonitis.  The patient had had an episode of PD associated peritonitis in February with coagulase-negative staph.  She is currently at a rehab unit where she performs her own PD.  Her outpatient nephrologist is Dr. Lora and her prescription has fill volume of 1800 mL with a last fill of 500 mL, total volume 9500 mL with 6 exchanges on the cycler.  Seen and examined in her room.  She stated that she has not had a bowel movement since admission, no further episodes of vomiting or nausea but continues to have abdominal pain.  Denies fever, chills.  Denies any recent episode of accidental disconnection or contamination.     Past Medical History  Past Medical History:   Diagnosis Date    CHF (congestive heart failure) (Multi)     COPD (chronic obstructive pulmonary disease) (Multi)     ESRD (end stage renal disease) (Multi)     Hypertension     Other abnormalities of gait and mobility     Gait, antalgic    PE (pulmonary thromboembolism) (Multi)     Personal history of other endocrine, nutritional and metabolic disease     History of hyperlipidemia    Personal history of other specified conditions     History of shortness of breath        Surgical History  She has a past surgical history that includes Colonoscopy (12/05/2017); Hysterectomy (01/06/2014); Other surgical history (01/20/2017); CT angio abdomen pelvis w and or wo IV IV contrast (11/17/2014); CT angio abdomen pelvis w and or wo IV IV contrast (02/01/2018); CT angio abdomen  pelvis w and or wo IV IV contrast (02/23/2018); CT angio abdomen pelvis w and or wo IV IV contrast (01/10/2019); CT angio abdomen pelvis w and or wo IV IV contrast (11/25/2019); CT angio abdomen pelvis w and or wo IV IV contrast (04/30/2021); CT angio abdomen pelvis w and or wo IV IV contrast (01/09/2017); CT angio abdomen pelvis w and or wo IV IV contrast (10/24/2018); IR angiogram aorta thoracic (02/21/2018); IR angiogram aorta abdomen (08/02/2019); IR intervention NEURO stent (08/02/2019); IR angiogram endovascular aortic repair (08/19/2022); IR angiogram aorta abdomen (08/19/2022); CT angio abdomen pelvis w and or wo IV IV contrast (12/13/2022); CT angio aorta and bilateral iliofemoral runoff w and or wo IV contrast (05/12/2023); and Colonoscopy (12/2021).     Social History  She reports that she has been smoking cigarettes. She started smoking about 49 years ago. She has a 5 pack-year smoking history. She has never used smokeless tobacco. She reports that she does not currently use alcohol. She reports current drug use. Drug: Marijuana.    Family History  Family History   Problem Relation Name Age of Onset    COPD Mother      Kidney failure Father          Allergies  Ace inhibitors, Ciprofloxacin, Gabapentin, and Oxycodone    Review of Systems  RoS negative for all other systems except as noted above.       Physical Exam  No distress  HEENT:  moist, no pallor  1-2+ edema joel LE  Breath sounds joel equal, clear  S1 S2 regular, normal, no rub or murmur  Abdomen soft, distended, exit site covered by dressing, no significant tenderness on palpation  AAO x3, non focal       Peritoneal Dialysis  Effluent Appearance: Cloudy (Dark yellow effluent, cloudy, with fibrin particles.)  Effluent Volume Out (mL): 400 ml    I&O 24HR    Intake/Output Summary (Last 24 hours) at 6/3/2024 1657  Last data filed at 6/3/2024 1546  Gross per 24 hour   Intake --   Output 1300 ml   Net -1300 ml       Vitals 24HR  Heart Rate:  [102-105]    Temp:  [36.7 °C (98.1 °F)-37.3 °C (99.1 °F)]   Resp:  [18-19]   BP: ()/(62-72)   SpO2:  [92 %-100 %]     [Held by provider] allopurinol, 100 mg, oral, Daily  atorvastatin, 40 mg, oral, Nightly  B complex-vitamin C-folic acid, 1 capsule, oral, Daily  calcitriol, 0.25 mcg, oral, Every Mon/Wed/Fri  dextrose 1.5% - LOW calcium 2.5mEq/L 2,000 mL with heparin 1,000 Units, cefTAZidime 4,000 mg, vancomycin 6 g peritoneal dialysate, , intraperitoneal, Once  dextrose 1.5% - LOW calcium 2.5mEq/L 5,000 mL peritoneal dialysate, , intraperitoneal, q24h  dextrose 1.5% - LOW calcium 2.5mEq/L 5,000 mL peritoneal dialysate, , intraperitoneal, q24h  gentamicin, , Topical, q24h  levothyroxine, 25 mcg, oral, Daily before breakfast  lidocaine, 1 Application, Topical, Once  lipase-protease-amylase, 3 capsule, oral, TID  melatonin, 10 mg, oral, Nightly  mirtazapine, 15 mg, oral, Nightly  nystatin, 5 mL, Swish & Swallow, q6h AMARJIT  pantoprazole, 40 mg, intravenous, Daily  sertraline, 50 mg, oral, Daily  sevelamer carbonate, 800 mg, oral, TID  warfarin, 3 mg, oral, Daily          Assessment/Plan   64-year-old with history of end-stage renal disease on peritoneal dialysis presenting with 2-day history of abdominal pain, PD fluid cell count suggestive of PD associated peritonitis.  Cultures are in process.  -Daily cell count and differential  -Will start empiric treatment with intraperitoneal ceftazidime 1 g and vancomycin 1.5 g in the long dwell [at least 6 hours]  -Start nystatin swish and swallow, continue as long as patient is on antibiotic therapy  -Continue exit site care per protocol with gentamicin cream  -Resume home prescription of peritoneal dialysis, for today will use all 1.5% dextrose in view of low blood pressure  -Continue sevelamer 800 mg p.o. 3 times daily with meals  Will continue to follow.

## 2024-06-03 NOTE — PROGRESS NOTES
Pharmacy Medication History Review    Yoselyn Hernandez is a 64 y.o. female admitted for Generalized abdominal pain. Pharmacy reviewed the patient's [id number] medications and allergies for accuracy.    The list below reflects the updated PTA list. Comments regarding how patient may be taking medications differently can be found in the Admit Orders Activity  Prior to Admission Medications   Prescriptions Informant Facility Reported? Taking?   Creon 24,000-76,000 -120,000 unit capsule Other Yes Yes   Sig: Take 3 capsules by mouth 3 times a day with meals.   GUAIFENESIN ORAL Other Yes PRN   Sig: Take 600 mg by mouth every 6 hours if needed   (congestion).   acetaminophen (Tylenol) 325 mg tablet Other Yes PRN   Sig: Take 3 tablets (975 mg) by mouth every 6 hours   if needed (general discomfort).   allopurinol (Zyloprim) 100 mg tablet Other Yes Yes   Sig: Take 1 tablet (100 mg) by mouth once daily.   atorvastatin (Lipitor) 40 mg tablet Other Yes Yes   Sig: TAKE 1 TABLET BY MOUTH ONCE DAILY AT BEDTIME   bisacodyl (Dulcolax, bisacodyl,) 10 mg suppository Other Yes PRN   Sig: Insert 1 suppository (10 mg) into the rectum every   24 (twenty four) hours if needed for constipation.   Use if MOM ineffective or adversary for use   calcitriol (Rocaltrol) 0.25 mcg capsule Other Yes Yes   Sig: Take 1 capsule (0.25 mcg) by mouth once a day on   Monday, Wednesday, and Friday.   diphenoxylate-atropine (Lomotil) 2.5-0.025 mg tablet Other Yes PRN   Sig: Take 1 tablet by mouth every 12 hours if needed   for diarrhea.   epoetin treasure (Epogen,Procrit) 10,000 unit/mL injection Other Yes Yes   Sig: Inject 1 mL (10,000 Units) under the skin once daily   at bedtime. Every 14 Days   gentamicin (Garamycin) 0.1 % cream Other Yes Yes   Sig: Apply topically once every 24 hours at bedtime.   Apply to affected area topically at bedtime for rash   hydrOXYzine HCL (Atarax) 50 mg tablet Other Yes Yes   Sig: Take 1 tablet (50 mg) by mouth every 8  hours for Anxiety   levothyroxine (Synthroid, Levoxyl) 25 mcg tablet Other Yes Yes   Sig: Take 1 tablet (25 mcg) by mouth once daily in the morning.   Take before meals.   lidocaine (LMX) 4 % cream Other Yes Yes   Sig: Apply topically 4 times a day.   Apply to back topically four times a day for pain   loperamide (Imodium) 2 mg capsule Other Yes PRN   Sig: Take 1 capsule (2 mg) by mouth every 6 hours if needed  (loose stools).   magnesium hydroxide (Milk of Magnesia) 2,400 mg/10 mL suspension suspension Other Yes PRN   Sig: Take 30 mL by mouth every 24 (twenty four) hours   if needed for constipation. Give If no BM for 3 days   melatonin 10 mg tablet Other Yes PRN   Sig: Take 1 tablet (10 mg) by mouth once daily as needed   for sleep.   mirtazapine (Remeron) 15 mg tablet Other Yes Yes   Sig: Take 1 tablet (15 mg) by mouth once daily at bedtime   for depression      ondansetron (Zofran) 4 mg tablet Other Yes PRN   Sig: Take 1 tablet (4 mg) by mouth every 8 hours if needed  for nausea or vomiting.   polyethylene glycol (Glycolax, Miralax) 17 gram packet Other Yes PRN   Sig: Take 17 g by mouth once daily as needed (constipation).   potassium chloride CR 20 mEq ER tablet Other Yes Yes   Sig: Take 1 tablet (20 mEq) by mouth once daily.   Do not crush or chew.      sennosides (Senokot) 8.6 mg tablet Other Yes PRN   Sig: Take 2 tablets (17.2 mg) by mouth once daily as needed  for constipation.   sertraline (Zoloft) 50 mg tablet Other Yes Yes   Sig: Take 1 tablet (50 mg) by mouth once daily for depression   sevelamer carbonate (Renvela) 800 mg tablet Other Yes Yes   Sig: Take 1 tablet (800 mg) by mouth 3 times a day with meals.  Swallow tablet whole; do not crush, break, or chew.   sodium phosphates (Fleet, Pediatric,) 9.5-3.5 gram/59 mL enema Other Yes PRN   Sig: Insert into the rectum if needed for constipation.   Use if MOM and suppository is ineffective   therapeutic multivitamin-iron-minerals (Theragran-M) tablet Other  Yes Yes   Sig: Take 1 tablet by mouth once daily.   traMADol (Ultram) 50 mg tablet Other Yes Yes   Sig: Take 1 tablet (50 mg) by mouth every 8 hours. For pain   vit B comp no.3/folic/C/biotin (NEPHRO-NENO RX ORAL) Other Yes Yes   Sig: Take 1 mg by mouth once daily.      warfarin (Coumadin) 3 mg tablet Other Yes Yes   Sig: Take 1 tablet (3 mg) by mouth once daily in the evening.   For Blood Thinner      Facility-Administered Medications: None        The list below reflects the updated allergy list. Please review each documented allergy for additional clarification and justification.  Allergies  Reviewed by Merritt Garza on 6/2/2024        Severity Reactions Comments    Ace Inhibitors High Angioedema, Other, Swelling, Unknown     Ciprofloxacin High Diarrhea, GI intolerance, Nausea Only, Nausea/vomiting     Gabapentin Not Specified Unknown Jerking head movements    Oxycodone Low Itching             Sources used to complete the med history include:  PTA Medication List has been updated as appears on Order Summary Report from [facility] (6/2/24 - 08:40:43)    ---------------------------------  Merritt Garza CPhT  Transitions of Care Technician  Medication reconciliation complete  Please reach out via [employer] Secure Chat for questions,   or if no response call [employer] or io[employer].  [facility]

## 2024-06-03 NOTE — PROGRESS NOTES
"[facility]  ACUTE CARE SURGERY - PROGRESS NOTE    Patient Name: Yoselyn Hernandez  MRN: [id number]  Admit Date: 602  : 1960  AGE: 64 y.o.   GENDER: female  ==============================================================================  TODAY'S ASSESSMENT AND PLAN OF CARE:  63 y/o F with a PMH of ESRD on peritoneal dialysis, HTN, HFrEF (15-20%), multiple aortic dissections s/p multiple TEVARs and ileo-renal bypass, and PE/DVT (on Warfarin) who presents to the ED with abdominal pain, N/V, and diarrhea x1 day. ACS consulted to r/o SBO and NGT placed . Patient continues to have some antegrade bowel function but remains nauseous with dark bilious NGT output. Would maintain NGT at this time.    Recommendations:    - NPO, okay for ice chips for comfort (please ensure ice chips are subtracted from recorded NGT output)  - mIVF  - Maintain NGT to LIWS  - strict Is/Os  - Rest of management per medicine    Discussed with Dr. Leroy Guy MD  Acute Care Surgery [phone]    ==============================================================================  CHIEF COMPLAINT / EVENTS LAST 24HRS / HPI:  NAEON. Patient reports some persistent nausea. Denies abdominal pain. Still passing flatus and having liquid bowel movements.    MEDICAL HISTORY / ROS:   Admission history and ROS reviewed. Pertinent changes as follows:  None    PHYSICAL EXAM:  Heart Rate:  []   Temp:  [36.5 °C (97.7 °F)-37.3 °C (99.1 °F)]   Resp:  [14-21]   BP: ()/(62-72)   Height:  [170.2 cm (5' 7\")]   Weight:  [65.3 kg (144 lb)]   SpO2:  [92 %-100 %]     Physical Exam  Gen: resting comfortably, NAD  HEENT: NGT in nare with dark bilious output in canister  CV: tachycardic to low 100s  Pulm: nonlabored on RA  Abd: soft, nontender, mildly distended, prior midline laparotomy scar well-healed  Ext: no edema  Neuro: awake and alert, no focal deficits  Skin: warm and dry    LABS:  Results for orders " placed or performed during the hospital encounter of 06/02/24 (from the past 24 hour(s))   CBC and Auto Differential   Result Value Ref Range    WBC 6.1 4.4 - 11.3 x10*3/uL    nRBC 0.0 0.0 - 0.0 /100 WBCs    RBC 3.82 (L) 4.00 - 5.20 x10*6/uL    Hemoglobin 12.1 12.0 - 16.0 g/dL    Hematocrit 35.4 (L) 36.0 - 46.0 %    MCV 93 80 - 100 fL    MCH 31.7 26.0 - 34.0 pg    MCHC 34.2 32.0 - 36.0 g/dL    RDW 17.3 (H) 11.5 - 14.5 %    Platelets 136 (L) 150 - 450 x10*3/uL    Neutrophils % 79.7 40.0 - 80.0 %    Immature Granulocytes %, Automated 0.8 0.0 - 0.9 %    Lymphocytes % 10.8 13.0 - 44.0 %    Monocytes % 8.2 2.0 - 10.0 %    Eosinophils % 0.2 0.0 - 6.0 %    Basophils % 0.3 0.0 - 2.0 %    Neutrophils Absolute 4.86 1.20 - 7.70 x10*3/uL    Immature Granulocytes Absolute, Automated 0.05 0.00 - 0.70 x10*3/uL    Lymphocytes Absolute 0.66 (L) 1.20 - 4.80 x10*3/uL    Monocytes Absolute 0.50 0.10 - 1.00 x10*3/uL    Eosinophils Absolute 0.01 0.00 - 0.70 x10*3/uL    Basophils Absolute 0.02 0.00 - 0.10 x10*3/uL   Comprehensive Metabolic Panel   Result Value Ref Range    Glucose 49 (LL) 74 - 99 mg/dL    Sodium 143 136 - 145 mmol/L    Potassium 6.7 (HH) 3.5 - 5.3 mmol/L    Chloride 98 98 - 107 mmol/L    Bicarbonate 32 21 - 32 mmol/L    Anion Gap 20 10 - 20 mmol/L    Urea Nitrogen 49 (H) 6 - 23 mg/dL    Creatinine 6.77 (H) 0.50 - 1.05 mg/dL    eGFR 6 (L) >60 mL/min/1.73m*2    Calcium 7.8 (L) 8.6 - 10.6 mg/dL    Albumin 2.3 (L) 3.4 - 5.0 g/dL    Alkaline Phosphatase 136 33 - 136 U/L    Total Protein 6.3 (L) 6.4 - 8.2 g/dL    AST 54 (H) 9 - 39 U/L    Bilirubin, Total 0.6 0.0 - 1.2 mg/dL    ALT 23 7 - 45 U/L   Lipase   Result Value Ref Range    Lipase 19 9 - 82 U/L   Lactate   Result Value Ref Range    Lactate 1.9 0.4 - 2.0 mmol/L   Troponin I, High Sensitivity, Initial   Result Value Ref Range    Troponin I, High Sensitivity 49 (H) 0 - 34 ng/L   Blood Culture    Specimen: Peripheral Venipuncture; Blood culture   Result Value Ref Range     Blood Culture Loaded on Instrument - Culture in progress    Sedimentation rate, automated   Result Value Ref Range    Sedimentation Rate 91 (H) 0 - 30 mm/h   B-type natriuretic peptide   Result Value Ref Range    BNP >5,000 (H) 0 - 99 pg/mL   Blood Culture    Specimen: Peripheral Venipuncture; Blood culture   Result Value Ref Range    Blood Culture Loaded on Instrument - Culture in progress    ECG 12 Lead   Result Value Ref Range    Ventricular Rate 100 BPM    Atrial Rate 98 BPM    TX Interval 160 ms    QRS Duration 84 ms    QT Interval 526 ms    QTC Calculation(Bazett) 678 ms    P Axis 70 degrees    R Axis -60 degrees    T Axis -87 degrees    QRS Count 17 beats    Q Onset 225 ms    T Offset 488 ms    QTC Fredericia 624 ms   Troponin, High Sensitivity, 1 Hour   Result Value Ref Range    Troponin I, High Sensitivity 50 (H) 0 - 34 ng/L   Renal function panel   Result Value Ref Range    Glucose 117 (H) 74 - 99 mg/dL    Sodium 143 136 - 145 mmol/L    Potassium 4.1 3.5 - 5.3 mmol/L    Chloride 97 (L) 98 - 107 mmol/L    Bicarbonate 30 21 - 32 mmol/L    Anion Gap 20 10 - 20 mmol/L    Urea Nitrogen 51 (H) 6 - 23 mg/dL    Creatinine 6.89 (H) 0.50 - 1.05 mg/dL    eGFR 6 (L) >60 mL/min/1.73m*2    Calcium 8.3 (L) 8.6 - 10.6 mg/dL    Phosphorus 6.6 (H) 2.5 - 4.9 mg/dL    Albumin 2.6 (L) 3.4 - 5.0 g/dL   BLOOD GAS VENOUS FULL PANEL   Result Value Ref Range    POCT pH, Venous 7.42 7.33 - 7.43 pH    POCT pCO2, Venous 53 (H) 41 - 51 mm Hg    POCT pO2, Venous 33 (L) 35 - 45 mm Hg    POCT SO2, Venous 36 (L) 45 - 75 %    POCT Oxy Hemoglobin, Venous 35.7 (L) 45.0 - 75.0 %    POCT Hematocrit Calculated, Venous 40.0 36.0 - 46.0 %    POCT Sodium, Venous 139 136 - 145 mmol/L    POCT Potassium, Venous 4.4 3.5 - 5.3 mmol/L    POCT Chloride, Venous 100 98 - 107 mmol/L    POCT Ionized Calicum, Venous 1.05 (L) 1.10 - 1.33 mmol/L    POCT Glucose, Venous 128 (H) 74 - 99 mg/dL    POCT Lactate, Venous 2.0 0.4 - 2.0 mmol/L    POCT Base Excess, Venous  8.2 (H) -2.0 - 3.0 mmol/L    POCT HCO3 Calculated, Venous 34.4 (H) 22.0 - 26.0 mmol/L    POCT Hemoglobin, Venous 13.3 12.0 - 16.0 g/dL    POCT Anion Gap, Venous 9.0 (L) 10.0 - 25.0 mmol/L    Patient Temperature 37.0 degrees Celsius    FiO2 21 %   Coagulation Screen   Result Value Ref Range    Protime 19.0 (H) 9.8 - 12.8 seconds    INR 1.7 (H) 0.9 - 1.1    aPTT 39 (H) 27 - 38 seconds   C-reactive protein   Result Value Ref Range    C-Reactive Protein 23.71 (H) <1.00 mg/dL   Blood Gas Venous Full Panel Unsolicited   Result Value Ref Range    POCT pH, Venous 7.45 (H) 7.33 - 7.43 pH    POCT pCO2, Venous 51 41 - 51 mm Hg    POCT pO2, Venous 25 (L) 35 - 45 mm Hg    POCT SO2, Venous 20 (L) 45 - 75 %    POCT Oxy Hemoglobin, Venous 19.3 (L) 45.0 - 75.0 %    POCT Hematocrit Calculated, Venous 34.0 (L) 36.0 - 46.0 %    POCT Sodium, Venous 140 136 - 145 mmol/L    POCT Potassium, Venous 5.1 3.5 - 5.3 mmol/L    POCT Chloride, Venous 101 98 - 107 mmol/L    POCT Ionized Calicum, Venous 1.04 (L) 1.10 - 1.33 mmol/L    POCT Glucose, Venous 123 (H) 74 - 99 mg/dL    POCT Lactate, Venous 1.1 0.4 - 2.0 mmol/L    POCT Base Excess, Venous 9.9 (H) -2.0 - 3.0 mmol/L    POCT HCO3 Calculated, Venous 35.4 (H) 22.0 - 26.0 mmol/L    POCT Hemoglobin, Venous 11.4 (L) 12.0 - 16.0 g/dL    POCT Anion Gap, Venous 9.0 (L) 10.0 - 25.0 mmol/L    Patient Temperature 37.0 degrees Celsius   POCT GLUCOSE   Result Value Ref Range    POCT Glucose 124 (H) 74 - 99 mg/dL   ECG 12 lead   Result Value Ref Range    Ventricular Rate 102 BPM    Atrial Rate 102 BPM    WI Interval 192 ms    QRS Duration 82 ms    QT Interval 388 ms    QTC Calculation(Bazett) 505 ms    P Axis 92 degrees    R Axis -52 degrees    T Axis 119 degrees    QRS Count 16 beats    Q Onset 217 ms    P Onset 121 ms    P Offset 213 ms    T Offset 411 ms    QTC Fredericia 463 ms   Peritoneal Dialysis Fluid Culture/Smear    Specimen: Peritoneal Dialysis; Fluid   Result Value Ref Range    Gram Stain (4+)  Abundant Polymorphonuclear leukocytes     Gram Stain No organisms seen    Body Fluid Cell Count   Result Value Ref Range    Color, Fluid Red-brown (A) Colorless, Straw, Yellow    Clarity, Fluid Turbid (A) Clear    WBC, Fluid 6,033 See Comment /uL    RBC, Fluid 3,375,000 0  /uL /uL   Body Fluid Differential   Result Value Ref Range    Neutrophils %, Manual, Fluid 60 <25 % %    Lymphocytes %, Manual, Fluid 21 <75 % %    Mono/Macrophages %, Manual, Fluid 9 <70 % %    Eosinophils %, Manual, Fluid 1 0 % %    Basophils %, Manual, Fluid 1 0 % %    Immature Granulocytes %, Manual, Fluid 0 0 % %    Blasts %, Manual, Fluid 0 0 % %    Unclassified Cells %, Manual, Fluid 8 (H) 0 % %    Plasma Cells %, Manual, Fluid 0 0 % %    Total Cells Counted, Fluid 100    Lactate Dehydrogenase, Fluid   Result Value Ref Range    LD, Fluid 148 Not established. U/L   Heparin Assay, UFH   Result Value Ref Range    Heparin Unfractionated 0.6 See Comment Below for Therapeutic Ranges IU/mL   Blood Gas Venous Full Panel   Result Value Ref Range    POCT pH, Venous 7.48 (H) 7.33 - 7.43 pH    POCT pCO2, Venous 45 41 - 51 mm Hg    POCT pO2, Venous 43 35 - 45 mm Hg    POCT SO2, Venous 61 45 - 75 %    POCT Oxy Hemoglobin, Venous 60.0 45.0 - 75.0 %    POCT Hematocrit Calculated, Venous 32.0 (L) 36.0 - 46.0 %    POCT Sodium, Venous 137 136 - 145 mmol/L    POCT Potassium, Venous 4.9 3.5 - 5.3 mmol/L    POCT Chloride, Venous 100 98 - 107 mmol/L    POCT Ionized Calicum, Venous 1.05 (L) 1.10 - 1.33 mmol/L    POCT Glucose, Venous 56 (L) 74 - 99 mg/dL    POCT Lactate, Venous 1.5 0.4 - 2.0 mmol/L    POCT Base Excess, Venous 9.0 (H) -2.0 - 3.0 mmol/L    POCT HCO3 Calculated, Venous 33.5 (H) 22.0 - 26.0 mmol/L    POCT Hemoglobin, Venous 10.7 (L) 12.0 - 16.0 g/dL    POCT Anion Gap, Venous 8.0 (L) 10.0 - 25.0 mmol/L    Patient Temperature 37.0 degrees Celsius    FiO2 21 %   Heparin Assay, UFH   Result Value Ref Range    Heparin Unfractionated 0.4 See Comment Below  for Therapeutic Ranges IU/mL   CBC and Auto Differential   Result Value Ref Range    WBC 5.1 4.4 - 11.3 x10*3/uL    nRBC 0.0 0.0 - 0.0 /100 WBCs    RBC 3.29 (L) 4.00 - 5.20 x10*6/uL    Hemoglobin 10.0 (L) 12.0 - 16.0 g/dL    Hematocrit 30.4 (L) 36.0 - 46.0 %    MCV 92 80 - 100 fL    MCH 30.4 26.0 - 34.0 pg    MCHC 32.9 32.0 - 36.0 g/dL    RDW 17.5 (H) 11.5 - 14.5 %    Platelets 147 (L) 150 - 450 x10*3/uL    Neutrophils % 66.2 40.0 - 80.0 %    Immature Granulocytes %, Automated 0.4 0.0 - 0.9 %    Lymphocytes % 21.8 13.0 - 44.0 %    Monocytes % 9.6 2.0 - 10.0 %    Eosinophils % 1.4 0.0 - 6.0 %    Basophils % 0.6 0.0 - 2.0 %    Neutrophils Absolute 3.37 1.20 - 7.70 x10*3/uL    Immature Granulocytes Absolute, Automated 0.02 0.00 - 0.70 x10*3/uL    Lymphocytes Absolute 1.11 (L) 1.20 - 4.80 x10*3/uL    Monocytes Absolute 0.49 0.10 - 1.00 x10*3/uL    Eosinophils Absolute 0.07 0.00 - 0.70 x10*3/uL    Basophils Absolute 0.03 0.00 - 0.10 x10*3/uL   Magnesium   Result Value Ref Range    Magnesium 1.31 (L) 1.60 - 2.40 mg/dL   Renal function panel   Result Value Ref Range    Glucose 47 (LL) 74 - 99 mg/dL    Sodium 143 136 - 145 mmol/L    Potassium 4.6 3.5 - 5.3 mmol/L    Chloride 100 98 - 107 mmol/L    Bicarbonate 28 21 - 32 mmol/L    Anion Gap 20 10 - 20 mmol/L    Urea Nitrogen 52 (H) 6 - 23 mg/dL    Creatinine 7.25 (H) 0.50 - 1.05 mg/dL    eGFR 6 (L) >60 mL/min/1.73m*2    Calcium 8.2 (L) 8.6 - 10.6 mg/dL    Phosphorus 6.6 (H) 2.5 - 4.9 mg/dL    Albumin 2.1 (L) 3.4 - 5.0 g/dL   Heparin Assay, UFH   Result Value Ref Range    Heparin Unfractionated 0.5 See Comment Below for Therapeutic Ranges IU/mL       IMAGING SUMMARY:  CTA 6/2:   Nonocclusive filling defects within the distal left pulmonary artery and several segmental and subsegmental branches leading to the posterior left lower pulmonary lobe. Interval development of few focal regions of pulmonary consolidation within the left lower lobe which may indicate developing  infectious/inflammatory process or pulmonary infarcts.  Similar marked cardiomegaly with mild straightening of the interventricular septum which may indicate right heart strain.  Diffuse distention of the stomach and proximal small bowel with variable air-fluid levels concerning for high-grade small bowel obstruction, with possible transitional within the mid lower abdomen.   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  Peritoneal dialysis catheter traversing the right anterior abdominal wall and coiling within the pelvis with associated moderate volume of ascites and several locules of intraperitoneal air, which are favored to be secondary to peritoneal dialysis rather than hollow viscera perforation.  Ventral abdominal hernias containing gas, also likely secondary to peritoneal dialysis.      I have reviewed all laboratory and imaging results ordered/pertinent for today's encounter.

## 2024-06-04 LAB
ALBUMIN SERPL BCP-MCNC: 2.5 G/DL (ref 3.4–5)
ANION GAP SERPL CALC-SCNC: 22 MMOL/L (ref 10–20)
APTT PPP: >200 SECONDS (ref 27–38)
BASOPHILS # BLD AUTO: 0.01 X10*3/UL (ref 0–0.1)
BASOPHILS NFR BLD AUTO: 0.2 %
BASOPHILS NFR FLD MANUAL: 0 %
BLASTS NFR FLD MANUAL: 0 %
BUN SERPL-MCNC: 52 MG/DL (ref 6–23)
CALCIUM SERPL-MCNC: 8.6 MG/DL (ref 8.6–10.6)
CHLORIDE SERPL-SCNC: 95 MMOL/L (ref 98–107)
CLARITY FLD: CLEAR
CO2 SERPL-SCNC: 26 MMOL/L (ref 21–32)
COLOR FLD: COLORLESS
CREAT SERPL-MCNC: 7.32 MG/DL (ref 0.5–1.05)
EGFRCR SERPLBLD CKD-EPI 2021: 6 ML/MIN/1.73M*2
EOSINOPHIL # BLD AUTO: 0.06 X10*3/UL (ref 0–0.7)
EOSINOPHIL NFR BLD AUTO: 0.9 %
EOSINOPHIL NFR FLD MANUAL: 0 %
ERYTHROCYTE [DISTWIDTH] IN BLOOD BY AUTOMATED COUNT: 17.5 % (ref 11.5–14.5)
GLUCOSE BLD MANUAL STRIP-MCNC: 113 MG/DL (ref 74–99)
GLUCOSE BLD MANUAL STRIP-MCNC: 139 MG/DL (ref 74–99)
GLUCOSE BLD MANUAL STRIP-MCNC: 89 MG/DL (ref 74–99)
GLUCOSE SERPL-MCNC: 121 MG/DL (ref 74–99)
HCT VFR BLD AUTO: 32.4 % (ref 36–46)
HGB BLD-MCNC: 10.7 G/DL (ref 12–16)
IMM GRANULOCYTES # BLD AUTO: 0.03 X10*3/UL (ref 0–0.7)
IMM GRANULOCYTES NFR BLD AUTO: 0.5 % (ref 0–0.9)
IMMATURE GRANULOCYTES IN FLUID: 0 %
INR PPP: 2 (ref 0.9–1.1)
INR PPP: 2.3 (ref 0.9–1.1)
LYMPHOCYTES # BLD AUTO: 1.33 X10*3/UL (ref 1.2–4.8)
LYMPHOCYTES NFR BLD AUTO: 20 %
LYMPHOCYTES NFR FLD MANUAL: 0 %
MAGNESIUM SERPL-MCNC: 1.72 MG/DL (ref 1.6–2.4)
MCH RBC QN AUTO: 31.2 PG (ref 26–34)
MCHC RBC AUTO-ENTMCNC: 33 G/DL (ref 32–36)
MCV RBC AUTO: 95 FL (ref 80–100)
MONOCYTES # BLD AUTO: 0.36 X10*3/UL (ref 0.1–1)
MONOCYTES NFR BLD AUTO: 5.4 %
MONOS+MACROS NFR FLD MANUAL: 12 %
NEUTROPHILS # BLD AUTO: 4.85 X10*3/UL (ref 1.2–7.7)
NEUTROPHILS NFR BLD AUTO: 73 %
NEUTROPHILS NFR FLD MANUAL: 88 %
NRBC BLD-RTO: 0.3 /100 WBCS (ref 0–0)
OTHER CELLS NFR FLD MANUAL: 0 %
PHOSPHATE SERPL-MCNC: 6.4 MG/DL (ref 2.5–4.9)
PLASMA CELLS NFR FLD MANUAL: 0 %
PLATELET # BLD AUTO: 182 X10*3/UL (ref 150–450)
POTASSIUM SERPL-SCNC: 4.4 MMOL/L (ref 3.5–5.3)
PROTHROMBIN TIME: 22.6 SECONDS (ref 9.8–12.8)
PROTHROMBIN TIME: 25.7 SECONDS (ref 9.8–12.8)
RBC # BLD AUTO: 3.43 X10*6/UL (ref 4–5.2)
RBC # FLD AUTO: 0 /UL
SODIUM SERPL-SCNC: 139 MMOL/L (ref 136–145)
TOTAL CELLS COUNTED FLD: 17
UFH PPP CHRO-ACNC: 0.8 IU/ML
UFH PPP CHRO-ACNC: 0.8 IU/ML
WBC # BLD AUTO: 6.6 X10*3/UL (ref 4.4–11.3)
WBC # FLD AUTO: 108 /UL

## 2024-06-04 PROCEDURE — 80069 RENAL FUNCTION PANEL: CPT

## 2024-06-04 PROCEDURE — 2500000002 HC RX 250 W HCPCS SELF ADMINISTERED DRUGS (ALT 637 FOR MEDICARE OP, ALT 636 FOR OP/ED)

## 2024-06-04 PROCEDURE — 85520 HEPARIN ASSAY: CPT | Performed by: STUDENT IN AN ORGANIZED HEALTH CARE EDUCATION/TRAINING PROGRAM

## 2024-06-04 PROCEDURE — 2500000002 HC RX 250 W HCPCS SELF ADMINISTERED DRUGS (ALT 637 FOR MEDICARE OP, ALT 636 FOR OP/ED): Performed by: STUDENT IN AN ORGANIZED HEALTH CARE EDUCATION/TRAINING PROGRAM

## 2024-06-04 PROCEDURE — 1210000001 HC SEMI-PRIVATE ROOM DAILY

## 2024-06-04 PROCEDURE — 83735 ASSAY OF MAGNESIUM: CPT

## 2024-06-04 PROCEDURE — C9113 INJ PANTOPRAZOLE SODIUM, VIA: HCPCS

## 2024-06-04 PROCEDURE — 89051 BODY FLUID CELL COUNT: CPT | Performed by: INTERNAL MEDICINE

## 2024-06-04 PROCEDURE — 2500000001 HC RX 250 WO HCPCS SELF ADMINISTERED DRUGS (ALT 637 FOR MEDICARE OP): Performed by: INTERNAL MEDICINE

## 2024-06-04 PROCEDURE — 90945 DIALYSIS ONE EVALUATION: CPT | Performed by: INTERNAL MEDICINE

## 2024-06-04 PROCEDURE — 90947 DIALYSIS REPEATED EVAL: CPT

## 2024-06-04 PROCEDURE — 99233 SBSQ HOSP IP/OBS HIGH 50: CPT

## 2024-06-04 PROCEDURE — 2500000004 HC RX 250 GENERAL PHARMACY W/ HCPCS (ALT 636 FOR OP/ED)

## 2024-06-04 PROCEDURE — 85730 THROMBOPLASTIN TIME PARTIAL: CPT

## 2024-06-04 PROCEDURE — 36415 COLL VENOUS BLD VENIPUNCTURE: CPT | Performed by: STUDENT IN AN ORGANIZED HEALTH CARE EDUCATION/TRAINING PROGRAM

## 2024-06-04 PROCEDURE — 85610 PROTHROMBIN TIME: CPT | Performed by: STUDENT IN AN ORGANIZED HEALTH CARE EDUCATION/TRAINING PROGRAM

## 2024-06-04 PROCEDURE — 97165 OT EVAL LOW COMPLEX 30 MIN: CPT | Mod: GO

## 2024-06-04 PROCEDURE — 2500000001 HC RX 250 WO HCPCS SELF ADMINISTERED DRUGS (ALT 637 FOR MEDICARE OP)

## 2024-06-04 PROCEDURE — 97161 PT EVAL LOW COMPLEX 20 MIN: CPT | Mod: GP

## 2024-06-04 PROCEDURE — 85025 COMPLETE CBC W/AUTO DIFF WBC: CPT

## 2024-06-04 PROCEDURE — 36415 COLL VENOUS BLD VENIPUNCTURE: CPT

## 2024-06-04 PROCEDURE — 82947 ASSAY GLUCOSE BLOOD QUANT: CPT

## 2024-06-04 PROCEDURE — 2500000004 HC RX 250 GENERAL PHARMACY W/ HCPCS (ALT 636 FOR OP/ED): Performed by: INTERNAL MEDICINE

## 2024-06-04 RX ORDER — CHOLECALCIFEROL (VITAMIN D3) 25 MCG
5000 TABLET ORAL DAILY
Status: DISCONTINUED | OUTPATIENT
Start: 2024-06-04 | End: 2024-06-10 | Stop reason: HOSPADM

## 2024-06-04 RX ADMIN — ATORVASTATIN CALCIUM 40 MG: 40 TABLET, FILM COATED ORAL at 20:58

## 2024-06-04 RX ADMIN — PANTOPRAZOLE SODIUM 40 MG: 40 INJECTION, POWDER, FOR SOLUTION INTRAVENOUS at 06:56

## 2024-06-04 RX ADMIN — ACETAMINOPHEN 975 MG: 325 TABLET ORAL at 08:38

## 2024-06-04 RX ADMIN — ASCORBIC ACID, THIAMINE MONONITRATE,RIBOFLAVIN, NIACINAMIDE, PYRIDOXINE HYDROCHLORIDE, FOLIC ACID, CYANOCOBALAMIN, BIOTIN, CALCIUM PANTOTHENATE, 1 CAPSULE: 100; 1.5; 1.7; 20; 10; 1; 6000; 150000; 5 CAPSULE, LIQUID FILLED ORAL at 08:35

## 2024-06-04 RX ADMIN — SODIUM CHLORIDE, SODIUM LACTATE, CALCIUM CHLORIDE, MAGNESIUM CHLORIDE AND DEXTROSE: 1.5; 538; 448; 18.3; 5.08 INJECTION, SOLUTION INTRAPERITONEAL at 16:37

## 2024-06-04 RX ADMIN — MIRTAZAPINE 15 MG: 15 TABLET, FILM COATED ORAL at 20:58

## 2024-06-04 RX ADMIN — TRAMADOL HYDROCHLORIDE 50 MG: 50 TABLET, COATED ORAL at 21:11

## 2024-06-04 RX ADMIN — NYSTATIN 500000 UNITS: 100000 SUSPENSION ORAL at 06:56

## 2024-06-04 RX ADMIN — LEVOTHYROXINE SODIUM 25 MCG: 0.05 TABLET ORAL at 06:56

## 2024-06-04 RX ADMIN — NYSTATIN 500000 UNITS: 100000 SUSPENSION ORAL at 01:05

## 2024-06-04 RX ADMIN — Medication 10 MG: at 20:58

## 2024-06-04 RX ADMIN — WARFARIN SODIUM 3 MG: 3 TABLET ORAL at 20:58

## 2024-06-04 RX ADMIN — HEPARIN SODIUM: 1000 INJECTION INTRAVENOUS; SUBCUTANEOUS at 16:37

## 2024-06-04 ASSESSMENT — COGNITIVE AND FUNCTIONAL STATUS - GENERAL
TURNING FROM BACK TO SIDE WHILE IN FLAT BAD: A LITTLE
DRESSING REGULAR UPPER BODY CLOTHING: A LITTLE
CLIMB 3 TO 5 STEPS WITH RAILING: A LITTLE
MOVING TO AND FROM BED TO CHAIR: A LITTLE
PERSONAL GROOMING: A LITTLE
MOBILITY SCORE: 12
EATING MEALS: A LITTLE
TOILETING: A LITTLE
MOVING FROM LYING ON BACK TO SITTING ON SIDE OF FLAT BED WITH BEDRAILS: A LITTLE
CLIMB 3 TO 5 STEPS WITH RAILING: TOTAL
PERSONAL GROOMING: A LITTLE
TOILETING: A LOT
TURNING FROM BACK TO SIDE WHILE IN FLAT BAD: A LITTLE
MOVING TO AND FROM BED TO CHAIR: A LOT
DRESSING REGULAR LOWER BODY CLOTHING: A LOT
WALKING IN HOSPITAL ROOM: TOTAL
DRESSING REGULAR LOWER BODY CLOTHING: A LITTLE
DRESSING REGULAR UPPER BODY CLOTHING: A LITTLE
WALKING IN HOSPITAL ROOM: A LITTLE
EATING MEALS: A LITTLE
MOBILITY SCORE: 18
DAILY ACTIVITIY SCORE: 15
HELP NEEDED FOR BATHING: A LOT
STANDING UP FROM CHAIR USING ARMS: A LITTLE
HELP NEEDED FOR BATHING: A LITTLE
DAILY ACTIVITIY SCORE: 18
MOVING FROM LYING ON BACK TO SITTING ON SIDE OF FLAT BED WITH BEDRAILS: A LITTLE
STANDING UP FROM CHAIR USING ARMS: A LOT

## 2024-06-04 ASSESSMENT — ACTIVITIES OF DAILY LIVING (ADL)
ADL_ASSISTANCE: NEEDS ASSISTANCE
ADL_ASSISTANCE: NEEDS ASSISTANCE

## 2024-06-04 ASSESSMENT — PAIN - FUNCTIONAL ASSESSMENT
PAIN_FUNCTIONAL_ASSESSMENT: 0-10

## 2024-06-04 ASSESSMENT — PAIN SCALES - GENERAL
PAINLEVEL_OUTOF10: 8
PAINLEVEL_OUTOF10: 8

## 2024-06-04 NOTE — PROGRESS NOTES
[facility]  ACUTE CARE SURGERY - PROGRESS NOTE    Patient Name: Yoselyn Hernandez  MRN: [id number]  Admit Date: 602  : 1960  AGE: 64 y.o.   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 ACS consulted to r/o SBO and NGT placed . NGT removed by primary team 6/3 after patient's nausea resolved; this morning patient denies nausea, belching, hiccups but endorses more abdominal cramping with distention.    Recommendations:    - Diet advancement as tolerated per primary team; would consider NGT replacement if patient starts vomiting or develops worsening abdominal distention  - Please page with further questions/concerns    Discussed with Dr. Leroy Guy MD  Acute Care Surgery [id number]    ==============================================================================  CHIEF COMPLAINT / EVENTS LAST 24HRS / HPI:  NAEON. NGT removed yesterday after patient's nausea resolved yesterday. This morning she reports some cramping abdominal pain and profuse diarrhea. Still passing flatus. Denies nausea, belching, hiccups.    MEDICAL HISTORY / ROS:   Admission history and ROS reviewed. Pertinent changes as follows:  None    PHYSICAL EXAM:  Heart Rate:  [100-104]   Temp:  [36.7 °C (98.1 °F)-36.9 °C (98.4 °F)]   Resp:  [18]   BP: (101-107)/(64-67)   SpO2:  [97 %-98 %]     Physical Exam  Gen: resting comfortably, NAD  HEENT: normocephalic  CV: tachycardic to low 100s  Pulm: nonlabored on RA  Abd: soft, mildly tender diffusely, distended, prior midline laparotomy scar well-healed  Ext: no edema  Neuro: awake and alert, no focal deficits  Skin: warm and  dry    LABS:  Results for orders placed or performed during the hospital encounter of 06/02/24 (from the past 24 hour(s))   Peritoneal Dialysis Fluid Culture/Smear    Specimen: Peritoneal Dialysis; Fluid   Result Value Ref Range    Gram Stain (4+) Abundant Polymorphonuclear leukocytes (AA)     Gram Stain Gram positive cocci (AA)     Gram Stain (2+) Few Polymorphonuclear leukocytes (AA)     Gram Stain (3+) Moderate Gram positive cocci (AA)     Gram Stain     Fungal Culture/Smear    Specimen: Other (specify in comments); Fluid   Result Value Ref Range    Fungal Culture/Smear       Culture in progress, a report will be issued when positive or after 2 weeks of incubation.    Fungal Smear No fungal elements seen    Body Fluid Cell Count   Result Value Ref Range    Color, Fluid Yellow Colorless, Straw, Yellow    Clarity, Fluid Cloudy (A) Clear    WBC, Fluid 14,667 See Comment /uL    RBC, Fluid 1,000 0  /uL /uL   Body Fluid Differential   Result Value Ref Range    Neutrophils %, Manual, Fluid 89 <25 % %    Lymphocytes %, Manual, Fluid 1 <75 % %    Mono/Macrophages %, Manual, Fluid 9 <70 % %    Eosinophils %, Manual, Fluid 1 0 % %    Basophils %, Manual, Fluid 0 0 % %    Immature Granulocytes %, Manual, Fluid 0 0 % %    Blasts %, Manual, Fluid 0 0 % %    Unclassified Cells %, Manual, Fluid 0 0 % %    Plasma Cells %, Manual, Fluid 0 0 % %    Total Cells Counted, Fluid 100    POCT GLUCOSE   Result Value Ref Range    POCT Glucose 118 (H) 74 - 99 mg/dL   Heparin Assay, UFH   Result Value Ref Range    Heparin Unfractionated 0.8 See Comment Below for Therapeutic Ranges IU/mL   CBC and Auto Differential   Result Value Ref Range    WBC 6.6 4.4 - 11.3 x10*3/uL    nRBC 0.3 (H) 0.0 - 0.0 /100 WBCs    RBC 3.43 (L) 4.00 - 5.20 x10*6/uL    Hemoglobin 10.7 (L) 12.0 - 16.0 g/dL    Hematocrit 32.4 (L) 36.0 - 46.0 %    MCV 95 80 - 100 fL    MCH 31.2 26.0 - 34.0 pg    MCHC 33.0 32.0 - 36.0 g/dL    RDW 17.5 (H) 11.5 - 14.5 %    Platelets 182  150 - 450 x10*3/uL    Neutrophils % 73.0 40.0 - 80.0 %    Immature Granulocytes %, Automated 0.5 0.0 - 0.9 %    Lymphocytes % 20.0 13.0 - 44.0 %    Monocytes % 5.4 2.0 - 10.0 %    Eosinophils % 0.9 0.0 - 6.0 %    Basophils % 0.2 0.0 - 2.0 %    Neutrophils Absolute 4.85 1.20 - 7.70 x10*3/uL    Immature Granulocytes Absolute, Automated 0.03 0.00 - 0.70 x10*3/uL    Lymphocytes Absolute 1.33 1.20 - 4.80 x10*3/uL    Monocytes Absolute 0.36 0.10 - 1.00 x10*3/uL    Eosinophils Absolute 0.06 0.00 - 0.70 x10*3/uL    Basophils Absolute 0.01 0.00 - 0.10 x10*3/uL   Magnesium   Result Value Ref Range    Magnesium 1.72 1.60 - 2.40 mg/dL   Renal function panel   Result Value Ref Range    Glucose 121 (H) 74 - 99 mg/dL    Sodium 139 136 - 145 mmol/L    Potassium 4.4 3.5 - 5.3 mmol/L    Chloride 95 (L) 98 - 107 mmol/L    Bicarbonate 26 21 - 32 mmol/L    Anion Gap 22 (H) 10 - 20 mmol/L    Urea Nitrogen 52 (H) 6 - 23 mg/dL    Creatinine 7.32 (H) 0.50 - 1.05 mg/dL    eGFR 6 (L) >60 mL/min/1.73m*2    Calcium 8.6 8.6 - 10.6 mg/dL    Phosphorus 6.4 (H) 2.5 - 4.9 mg/dL    Albumin 2.5 (L) 3.4 - 5.0 g/dL   Coagulation Screen   Result Value Ref Range    Protime 22.6 (H) 9.8 - 12.8 seconds    INR 2.0 (H) 0.9 - 1.1    aPTT >200 (HH) 27 - 38 seconds   Heparin Assay, UFH   Result Value Ref Range    Heparin Unfractionated 0.8 See Comment Below for Therapeutic Ranges IU/mL   POCT GLUCOSE   Result Value Ref Range    POCT Glucose 139 (H) 74 - 99 mg/dL   Protime-INR   Result Value Ref Range    Protime 25.7 (H) 9.8 - 12.8 seconds    INR 2.3 (H) 0.9 - 1.1   POCT GLUCOSE   Result Value Ref Range    POCT Glucose 113 (H) 74 - 99 mg/dL       IMAGING SUMMARY:  CTA 6/2:   Nonocclusive filling defects within the distal left pulmonary artery and several segmental and subsegmental branches leading to the posterior left lower pulmonary lobe. Interval development of few focal regions of pulmonary consolidation within the left lower lobe which may indicate  developing infectious/inflammatory process or pulmonary infarcts.  Similar marked cardiomegaly with mild straightening of the interventricular septum which may indicate right heart strain.  Diffuse distention of the stomach and proximal small bowel with variable air-fluid levels concerning for high-grade small bowel obstruction, with possible transitional within the mid lower abdomen.   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  Peritoneal dialysis catheter traversing the right anterior abdominal wall and coiling within the pelvis with associated moderate volume of ascites and several locules of intraperitoneal air, which are favored to be secondary to peritoneal dialysis rather than hollow viscera perforation.  Ventral abdominal hernias containing gas, also likely secondary to peritoneal dialysis.      I have reviewed all laboratory and imaging results ordered/pertinent for today's encounter.

## 2024-06-04 NOTE — PROGRESS NOTES
Updated notes sent to [facility]. Facility asked to submit for precert.  Flower CALVILLO, RN  Transitional Care Coordinator (TCC)  [phone]

## 2024-06-04 NOTE — PROGRESS NOTES
Physical Therapy    Physical Therapy Evaluation    Patient Name: Yoselyn Hernandez  MRN: [id number]  Today's Date: 6/4/2024   Time Calculation  Start Time: 1146  Stop Time: 1203  Time Calculation (min): 17 min    Assessment/Plan   PT Assessment  PT Assessment Results: Decreased strength, Decreased endurance, Impaired balance, Decreased mobility  Rehab Prognosis: Good  End of Session Communication: Bedside nurse  End of Session Patient Position: Bed, 3 rail up, Alarm off, not on at start of session  IP OR SWING BED PT PLAN  Inpatient or Swing Bed: Inpatient  PT Plan  Treatment/Interventions: Bed mobility, Transfer training, Gait training, Balance training, Strengthening  PT Plan: Skilled PT  PT Frequency: 3 times per week  PT Discharge Recommendations: Moderate intensity level of continued care  PT Recommended Transfer Status: Assist x2  PT - OK to Discharge: Yes      Subjective   General Visit Information:  Reason for Referral: nausea/vomiting and abdominal pain x3 days  Past Medical History Relevant to Rehab: ESRD on PD c/b 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) s/p intraperitoneal vanc and ceftazidime, multiple AAA dissections, HFrEF (EF 15-20% 03/2023), pancreatitis, chronic diarrhea PE/DVT on warfarin, HTN, and multiple renal artery grafts with prior ilio-renal bypass  Co-Treatment: OT  Co-Treatment Reason: to maximize mobility and safety  Prior to Session Communication: Bedside nurse  Patient Position Received: Bed, 3 rail up, Alarm off, not on at start of session   Home Living:  Home Living  Type of Home:  (nursing home; stated she was not receiving PT/OT)  Prior Level of Function:  Prior Function Per Pt/Caregiver Report  ADL Assistance: Needs assistance  Ambulatory Assistance:  (stated she has not ambulated in ~3 weeks; was transferring with assist x2 to wheelchair at NH)  Prior Function Comments: reports recent fall due to leg giving out  Precautions:  Precautions  Medical  "Precautions: Fall precautions       Objective     Pain:  Pain Assessment  Pain Assessment: 0-10  Pain Location: Abdomen (not rated)  Cognition:  Cognition  Overall Cognitive Status:  (pt upset with current functional/medical status; stated \"I don't want to live anymore\", RN informed of patient's comments)  Orientation Level: Oriented X4      Extremity/Trunk Assessments:  Strength:           RLE   RLE : Exceptions to WFL  Strength RLE  RLE Overall Strength: Greater than or equal to 3/5 as evidenced by functional mobility  LLE   LLE : Exceptions to WFL  Strength LLE  LLE Overall Strength: Greater than or equal to 3/5 as evidenced by functional mobility    General Assessments:            Sensation  Light Touch: No apparent deficits     Static Sitting Balance  Static Sitting-Level of Assistance: Distant supervision  Dynamic Sitting Balance  Dynamic Sitting-Comments: SBA       Functional Assessments:  Bed Mobility  Bed Mobility: Yes  Bed Mobility 1  Bed Mobility 1: Rolling left, Rolling right  Level of Assistance 1: Contact guard  Bed Mobility Comments 1: verbal cues, use of bed rails (rolled right and left for elvira-care)  Bed Mobility 2  Bed Mobility  2: Supine to sitting, Sitting to supine  Level of Assistance 2: Contact guard  Bed Mobility Comments 2: HOB elevated, use of bed rails  Transfers  Transfer: No (pt declined to attempt sit to stand due to c/o feeling tired)             Outcome Measures:  [facility] Basic Mobility  Turning from your back to your side while in a flat bed without using bedrails: A little  Moving from lying on your back to sitting on the side of a flat bed without using bedrails: A little  Moving to and from bed to chair (including a wheelchair): A lot  Standing up from a chair using your arms (e.g. wheelchair or bedside chair): A lot  To walk in hospital room: Total  Climbing 3-5 steps with railing: Total  Basic Mobility - Total Score: 12          Encounter Problems       Encounter Problems " (Active)       Balance       min assist static stance/mod assist dynamic stance with BUE support       Start:  06/04/24    Expected End:  06/25/24               Mobility       STG - Patient will ambulate >15 feet with walker with mod assist       Start:  06/04/24    Expected End:  06/25/24               PT Transfers       STG - Transfer from bed to chair with walker with mod assist       Start:  06/04/24    Expected End:  06/25/24            STG - Patient will perform bed mobility with supervision       Start:  06/04/24    Expected End:  06/25/24            STG - Patient will transfer sit to and from stand with walker with mod assist       Start:  06/04/24    Expected End:  06/25/24               Pain - Adult              Education Documentation  Precautions, taught by Madison Davison PT at 6/4/2024 12:21 PM.  Learner: Patient  Readiness: Acceptance  Method: Explanation  Response: Verbalizes Understanding    Mobility Training, taught by Madison Davison PT at 6/4/2024 12:21 PM.  Learner: Patient  Readiness: Acceptance  Method: Explanation  Response: Verbalizes Understanding    Education Comments  No comments found.            06/04/24 at 12:22 PM   Madison Davison, PT

## 2024-06-04 NOTE — PROGRESS NOTES
Yoselyn Hernandze is a 64 y.o. female on day 2 of admission presenting with Generalized abdominal pain.      Subjective   Seen and examined. Does not feel well today, has abdominal pain     Objective   Vitals 24HR  Heart Rate:  [100-104]   Temp:  [36.6 °C (97.9 °F)-36.9 °C (98.4 °F)]   Resp:  [16-18]   BP: ()/(60-67)   SpO2:  [97 %-98 %]     Intake/Output last 3 Shifts:    Intake/Output Summary (Last 24 hours) at 6/4/2024 1454  Last data filed at 6/4/2024 1047  Gross per 24 hour   Intake 260.15 ml   Output 400 ml   Net -139.85 ml    ml, I drain 3 ml    Physical Exam  No distress  HEENT:  moist, no pallor  1-2+ edema joel LE  Breath sounds joel equal, clear  S1 S2 regular, normal, no rub or murmur  Abdomen soft, distended tender. Exit site clean, no tunnel tenderness/ no expressible pus  AAO x3, non focal    PD fluid cell count and diff   Latest Reference Range & Units 06/03/24 15:43   Neutrophils %, Manual, Fluid <25 % % 89   Lymphocytes %, Manual, Fluid <75 % % 1   Mono/Macrophages %, Manual, Fluid <70 % % 9   Eosinophils %, Manual, Fluid 0 % % 1   Basophils %, Manual, Fluid 0 % % 0   Color, Fluid Colorless, Straw, Yellow  Yellow   RBC, Fluid 0  /uL /uL 1,000   Total Cells Counted, Fluid  100   Clarity, Fluid Clear  Cloudy !   WBC, Fluid See Comment /uL 14,667   Immature Granulocytes %, Manual, Fluid 0 % % 0   Blasts %, Manual, Fluid 0 % % 0   Unclassified Cells %, Manual, Fluid 0 % % 0   Plasma Cells %, Manual, Fluid 0 % % 0   !: Data is abnormal      Contains critical data Peritoneal Dialysis Fluid Culture/Smear  Order: [id number]   Collected 6/3/2024 15:43       Status: Preliminary result       Visible to patient: No (not released)    Specimen Information: Peritoneal Dialysis; Fluid   0 Result Notes  Gram Stain  Panic   (4+) Abundant Polymorphonuclear leukocytes      Gram positive cocci   Aerobic Bottle Positive   (2+) Few Polymorphonuclear leukocytes      (3+) Moderate Gram positive cocci   Anaerobic  Bottle Positive      Fluid in Aerobic & Anaerobic liquid vials.  The previously reported result No organisms seen is no longer being reported.           Resulting Agency: [facility]      [Held by provider] allopurinol, 100 mg, oral, Daily  atorvastatin, 40 mg, oral, Nightly  B complex-vitamin C-folic acid, 1 capsule, oral, Daily  calcitriol, 0.25 mcg, oral, Every Mon/Wed/Fri  cholecalciferol, 5,000 Units, oral, Daily  dextrose 1.5% - LOW calcium 2.5mEq/L 2,000 mL with heparin 1,000 Units, cefTAZidime 4,000 mg, vancomycin 6 g peritoneal dialysate, , intraperitoneal, q24h  dextrose 1.5% - LOW calcium 2.5mEq/L 5,000 mL peritoneal dialysate, , intraperitoneal, q24h  dextrose 1.5% - LOW calcium 2.5mEq/L 5,000 mL peritoneal dialysate, , intraperitoneal, q24h  gentamicin, , Topical, q24h  levothyroxine, 25 mcg, oral, Daily before breakfast  lidocaine, 1 Application, Topical, Once  lidocaine, 1 patch, transdermal, Daily  lipase-protease-amylase, 3 capsule, oral, TID  melatonin, 10 mg, oral, Nightly  mirtazapine, 15 mg, oral, Nightly  nystatin, 5 mL, Swish & Swallow, q6h AMARJIT  pantoprazole, 40 mg, intravenous, Daily  sertraline, 50 mg, oral, Daily  sevelamer carbonate, 800 mg, oral, TID  warfarin, 3 mg, oral, Daily         Assessment/Plan      64-year-old with history of end-stage renal disease on peritoneal dialysis presenting with 2-day history of abdominal pain, PD fluid cell count suggestive of PD associated peritonitis. Prelim culture shows GPC   -Daily cell count and differential  -continue empiric treatment with intraperitoneal ceftazidime 1 g and vancomycin 1.5 g in the long dwell [at least 6 hours], will narrow based on final cs and sensitivity result  -ct nystatin swish and swallow, continue as long as patient is on antibiotic therapy  -Continue exit site care per protocol with gentamicin cream  -Resume home prescription of peritoneal dialysis, for today will use all 1.5% dextrose   -Continue sevelamer 800 mg p.o. 3  times daily with meals  -Please check daily standing weights  Will continue to follow.

## 2024-06-04 NOTE — PROGRESS NOTES
Pharmacy Transitions of Care Note    Pharmacy verified the cost of the following medications at discharge.     Medication: Eliquis   Cost: $15    Please contact me with any questions.     Thank you,    Christine Paz, PharmD, BCPS  [place] [id number] Pharmacist    6/3/2024

## 2024-06-04 NOTE — CONSULTS
"Nutrition Assessment      Reason for Assessment: Admission nursing screening  Yoselyn Hernandez is a 64 y.o. female presenting with ESRD on PD c/b 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     S/P NG tube for decompression.    Nutrition History:  Energy Intake: Poor < 50 % (Patient reports drinking some broth, ginger ale and a serving of Ensure Clear last night. Did not order a breakfast this morning. \"I'm not hungry\".)  Food and Nutrient History: Spoke with bedside RN who reports manuel's NG tube to suction was removed yesterday. Patient is teary-eyed this morning and states \"I don't want that thing in my nose anymore. I just need some rest.\" Reports abdominal pain \"was getting better and now it's worse.\" Bedside RN aware. Patient states she was eating at her baseline PTA which is \"two to three meals a day.\" Denies chewing or swallowing problems. Denies nausea. Reports diarrhea and abdominal pain. Takes MVI (Nephro Kamar and Theragran-M) per home med list.   Food Allergies/Intolerances:  None  GI Symptoms: Diarrhea and Abdominal pain  Oral Problems: None       Anthropometrics:  Height: 170.2 cm (5' 7\")   Weight: 65.3 kg (144 lb)   BMI (Calculated): 22.55  IBW/kg (Dietitian Calculated): 67.3 kg  Percent of IBW: 97 %       Weight History:   Wt Readings from Last 15 Encounters:   06/02/24 65.3 kg (144 lb)   05/28/24 65.3 kg (144 lb)   03/26/24 63.1 kg (139 lb 1.8 oz)   02/29/24 63.5 kg (140 lb)   02/10/24 55 kg (121 lb 4.1 oz)   02/05/24 55 kg (121 lb 4.1 oz)   01/18/24 63.9 kg (140 lb 14.4 oz)   12/20/23 58.8 kg (129 lb 10.1 oz)   11/19/23 56.7 kg (125 lb)   11/09/23 57 kg (125 lb 10.6 oz)   10/18/23 68 kg (149 lb " 14.6 oz)   10/17/23 59.1 kg (130 lb 3.2 oz)   08/31/23 52.2 kg (115 lb)   08/17/23 54.9 kg (121 lb)   05/25/23 62.6 kg (138 lb)       Weight Change %:  Weight History / % Weight Change: Patient denies recent weight change. Reports dry weight is 144 lbs (65.5 kg) Question significant swings in weights.     Nutrition Focused Physical Exam Findings:    Subcutaneous Fat Loss:   Orbital Fat Pads: Severe (dark circles, hollowing and loose skin)  Buccal Fat Pads: Severe (hollow, sunken and narrow face)  Triceps: Severe (negligible fat tissue)  Ribs: Severe (depression between the ribs very apparent, iliac crest very prominent)  Muscle Wasting:  Temporalis: Severe (hollowed scooping depression)  Pectoralis (Clavicular Region): Severe (protruding prominent clavicle)  Deltoid/Trapezius: Severe (squared shoulders, acromion process prominent)  Interosseous: Mild-Moderate (slightly depressed area between thumb and forefinger)  Trapezius/Infraspinatus/Supraspinatus (Scapular Region): Severe (prominent visual scapula, depression between ribs, scapula or shoulder)  Quadriceps: Defer  Gastrocnemius: Defer  Edema:  Edema Location: +3 L LE and +2 R LE  Physical Findings:  Hair: Negative  Eyes: Negative  Mouth: Positive (possible thrush - taking Nystatin.)  Nails: Negative  Skin: Negative    Nutrition Significant Labs:  BMP Trend:   Results from last 7 days   Lab Units 06/04/24  0644 06/03/24  0804 06/02/24  1521 06/02/24  1248   GLUCOSE mg/dL 121* 47* 117* 49*   CALCIUM mg/dL 8.6 8.2* 8.3* 7.8*   SODIUM mmol/L 139 143 143 143   POTASSIUM mmol/L 4.4 4.6 4.1 6.7*   CO2 mmol/L 26 28 30 32   CHLORIDE mmol/L 95* 100 97* 98   BUN mg/dL 52* 52* 51* 49*   CREATININE mg/dL 7.32* 7.25* 6.89* 6.77*    , BG POCT trend:   Results from last 7 days   Lab Units 06/04/24  0733 06/03/24  2017 06/03/24  1151 06/03/24  1054 06/02/24  1603   POCT GLUCOSE mg/dL 139* 118* 73* 50* 124*    , Renal Lab Trend:   Results from last 7 days   Lab Units  06/04/24  0644 06/03/24  0804 06/02/24  1521 06/02/24  1521 06/02/24  1248 06/02/24  1248   POTASSIUM mmol/L 4.4 4.6  --  4.1  --  6.7*   PHOSPHORUS mg/dL 6.4* 6.6*  --  6.6*   < >  --    SODIUM mmol/L 139 143  --  143  --  143   MAGNESIUM mg/dL 1.72 1.31*   < >  --   --   --    EGFR mL/min/1.73m*2 6* 6*  --  6*  --  6*   BUN mg/dL 52* 52*  --  51*  --  49*   CREATININE mg/dL 7.32* 7.25*  --  6.89*  --  6.77*    < > = values in this interval not displayed.    , Vit D:   Lab Results   Component Value Date    VITD25 15 (L) 01/13/2024    , Vit B12:   Lab Results   Component Value Date    GTTVBXZW87 1,187 (H) 12/10/2022        Nutrition Specific Medications:      atorvastatin, 40 mg, oral, Nightly  B complex-vitamin C-folic acid, 1 capsule, oral, Daily  calcitriol, 0.25 mcg, oral, Every Mon/Wed/Fri  lipase-protease-amylase, 3 capsule, oral, TID  mirtazapine, 15 mg, oral, Nightly  nystatin, 5 mL, Swish & Swallow, q6h AMARJIT  pantoprazole, 40 mg, intravenous, Daily  sertraline, 50 mg, oral, Daily  sevelamer carbonate, 800 mg, oral, TID  warfarin, 3 mg, oral, Daily      Continuous medications       PRN medications  PRN medications: acetaminophen, dextrose, dextrose, glucagon, glucagon, ondansetron, traMADol    I/O:   Last BM Date: 06/01/24;      Dietary Orders (From admission, onward)       Start     Ordered    06/04/24 0921  Adult diet Clear Liquid  Diet effective now        Question:  Diet type  Answer:  Clear Liquid    06/04/24 0920    06/03/24 0937  Oral nutritional supplements  Until discontinued        Question Answer Comment   Deliver with Breakfast    Select supplement: Ensure Clear        06/03/24 0936    06/03/24 0937  Oral nutritional supplements  Until discontinued        Question Answer Comment   Deliver with Breakfast    Deliver with Dinner    Deliver with Lunch    Select supplement: Ensure Clear        06/03/24 0936                     Estimated Needs:   Total Energy Estimated Needs (kCal): 2100 kCal  Method  for Estimating Needs: 65.3 kg / 30-35 kcal  Total Protein Estimated Needs (g): 90 g  Method for Estimating Needs: 65.3 kg / 1.4 g              Nutrition Diagnosis   Malnutrition Diagnosis  Patient has Malnutrition Diagnosis: Yes  Diagnosis Status: New  Malnutrition Diagnosis: Severe malnutrition related to chronic disease or condition  As Evidenced by: severe muscle wasting, severe fat loss and chronically underweight.    Nutrition Diagnosis  Patient has Nutrition Diagnosis: Yes  Diagnosis Status (1): New  Nutrition Diagnosis 1: Inadequate oral intake  Related to (1): abdominal pain  As Evidenced by (1): report of minimal PO intake, refused breakfast.       Nutrition Interventions/Recommendations         Nutrition Prescription:  Individualized Nutrition Prescription Provided for : Will need Low Phos restriction once able to advance diet. Continue Ensure Clear TID. Continue Renal MVI. Obtain Vitamin D level.        Nutrition Interventions:   Interventions: Medical food supplement  Medical Food Supplement: Commercial beverage  Goal: Patient will drink >/= 2 servings of Ensure Clear a day.         Nutrition Education:   Encourage Ensure Clear as able.        Nutrition Monitoring and Evaluation   Food/Nutrient Related History Monitoring  Monitoring and Evaluation Plan: Energy intake  Criteria: >/= 50% of meals/supplements    Body Composition/Growth/Weight History  Monitoring and Evaluation Plan: Weight  Criteria: Stable weight    Biochemical Data, Medical Tests and Procedures  Monitoring and Evaluation Plan: Electrolyte/renal panel, Glucose/endocrine profile  Criteria: Labs wnl        Time Spent/Follow-up Reminder:   Time Spent (min): -45 minutes  Last Date of Nutrition Visit: 06/04/24  Nutrition Follow-Up Needed?: Dietitian to reassess per policy  Follow up Comment: Severe PCM.

## 2024-06-04 NOTE — PROGRESS NOTES
"Yoselyn Hernandez is a 64 y.o. female on day 2 of admission presenting with Generalized abdominal pain.    Subjective   No acute events. Endorsing diarrhea 2-3 episodes in the last day. Endorsing stomach \"tightness\". Currently on full liquid diet. Denies any other new issues.     Pt was endorsing abdominal pain later this AM, refusing to try tylenol or tramadol, stating that she wanted a stronger pain medication.        Objective     Physical Exam  Constitutional: in NAD  HEENT: sclerae anicteric, EOM grossly intact  CV: Tachycardic, regular rhythm, no murmurs noted  Pulm: CTAB, no increased WOB  GI: abdomen soft, tender to palpation over epigastric & LUQ and LLQ region, peritoneal tube in place with no leakage visible or purulence,   Skin: warm and dry  Ext: bilateral LE with +2 pitting edema up to the knees, left leg more edematous, non-tender  Neuro: alert and conversant  Psych: affect appropriate    Last Recorded Vitals  Blood pressure 101/64, pulse 100, temperature 36.7 °C (98.1 °F), resp. rate 18, height 1.702 m (5' 7\"), weight 65.3 kg (144 lb), SpO2 98%.  Intake/Output last 3 Shifts:  I/O last 3 completed shifts:  In: - (0 mL/kg)   Out: 400 (6.1 mL/kg) [Other:400]  Weight: 65.3 kg     Relevant Results  Results for orders placed or performed during the hospital encounter of 06/02/24 (from the past 24 hour(s))   POCT GLUCOSE   Result Value Ref Range    POCT Glucose 50 (L) 74 - 99 mg/dL   POCT GLUCOSE   Result Value Ref Range    POCT Glucose 73 (L) 74 - 99 mg/dL   Peritoneal Dialysis Fluid Culture/Smear    Specimen: Peritoneal Dialysis; Fluid   Result Value Ref Range    Gram Stain (4+) Abundant Polymorphonuclear leukocytes (AA)     Gram Stain No organisms seen (AA)     Gram Stain Gram positive cocci (AA)    Fungal Culture/Smear    Specimen: Other (specify in comments); Fluid   Result Value Ref Range    Fungal Culture/Smear       Culture in progress, a report will be issued when positive or after 2 weeks of " incubation.   Body Fluid Cell Count   Result Value Ref Range    Color, Fluid Yellow Colorless, Straw, Yellow    Clarity, Fluid Cloudy (A) Clear    WBC, Fluid 14,667 See Comment /uL    RBC, Fluid 1,000 0  /uL /uL   Body Fluid Differential   Result Value Ref Range    Neutrophils %, Manual, Fluid 89 <25 % %    Lymphocytes %, Manual, Fluid 1 <75 % %    Mono/Macrophages %, Manual, Fluid 9 <70 % %    Eosinophils %, Manual, Fluid 1 0 % %    Basophils %, Manual, Fluid 0 0 % %    Immature Granulocytes %, Manual, Fluid 0 0 % %    Blasts %, Manual, Fluid 0 0 % %    Unclassified Cells %, Manual, Fluid 0 0 % %    Plasma Cells %, Manual, Fluid 0 0 % %    Total Cells Counted, Fluid 100    POCT GLUCOSE   Result Value Ref Range    POCT Glucose 118 (H) 74 - 99 mg/dL   CBC and Auto Differential   Result Value Ref Range    WBC 6.6 4.4 - 11.3 x10*3/uL    nRBC 0.3 (H) 0.0 - 0.0 /100 WBCs    RBC 3.43 (L) 4.00 - 5.20 x10*6/uL    Hemoglobin 10.7 (L) 12.0 - 16.0 g/dL    Hematocrit 32.4 (L) 36.0 - 46.0 %    MCV 95 80 - 100 fL    MCH 31.2 26.0 - 34.0 pg    MCHC 33.0 32.0 - 36.0 g/dL    RDW 17.5 (H) 11.5 - 14.5 %    Platelets 182 150 - 450 x10*3/uL    Neutrophils % 73.0 40.0 - 80.0 %    Immature Granulocytes %, Automated 0.5 0.0 - 0.9 %    Lymphocytes % 20.0 13.0 - 44.0 %    Monocytes % 5.4 2.0 - 10.0 %    Eosinophils % 0.9 0.0 - 6.0 %    Basophils % 0.2 0.0 - 2.0 %    Neutrophils Absolute 4.85 1.20 - 7.70 x10*3/uL    Immature Granulocytes Absolute, Automated 0.03 0.00 - 0.70 x10*3/uL    Lymphocytes Absolute 1.33 1.20 - 4.80 x10*3/uL    Monocytes Absolute 0.36 0.10 - 1.00 x10*3/uL    Eosinophils Absolute 0.06 0.00 - 0.70 x10*3/uL    Basophils Absolute 0.01 0.00 - 0.10 x10*3/uL   Magnesium   Result Value Ref Range    Magnesium 1.72 1.60 - 2.40 mg/dL   Renal function panel   Result Value Ref Range    Glucose 121 (H) 74 - 99 mg/dL    Sodium 139 136 - 145 mmol/L    Potassium 4.4 3.5 - 5.3 mmol/L    Chloride 95 (L) 98 - 107 mmol/L    Bicarbonate 26  21 - 32 mmol/L    Anion Gap 22 (H) 10 - 20 mmol/L    Urea Nitrogen 52 (H) 6 - 23 mg/dL    Creatinine 7.32 (H) 0.50 - 1.05 mg/dL    eGFR 6 (L) >60 mL/min/1.73m*2    Calcium 8.6 8.6 - 10.6 mg/dL    Phosphorus 6.4 (H) 2.5 - 4.9 mg/dL    Albumin 2.5 (L) 3.4 - 5.0 g/dL   POCT GLUCOSE   Result Value Ref Range    POCT Glucose 139 (H) 74 - 99 mg/dL     Scheduled medications  [Held by provider] allopurinol, 100 mg, oral, Daily  atorvastatin, 40 mg, oral, Nightly  B complex-vitamin C-folic acid, 1 capsule, oral, Daily  calcitriol, 0.25 mcg, oral, Every Mon/Wed/Fri  dextrose 1.5% - LOW calcium 2.5mEq/L 5,000 mL peritoneal dialysate, , intraperitoneal, q24h  dextrose 1.5% - LOW calcium 2.5mEq/L 5,000 mL peritoneal dialysate, , intraperitoneal, q24h  gentamicin, , Topical, q24h  levothyroxine, 25 mcg, oral, Daily before breakfast  lidocaine, 1 Application, Topical, Once  lidocaine, 1 patch, transdermal, Daily  lipase-protease-amylase, 3 capsule, oral, TID  melatonin, 10 mg, oral, Nightly  mirtazapine, 15 mg, oral, Nightly  nystatin, 5 mL, Swish & Swallow, q6h AMARJIT  pantoprazole, 40 mg, intravenous, Daily  sertraline, 50 mg, oral, Daily  sevelamer carbonate, 800 mg, oral, TID  warfarin, 3 mg, oral, Daily      Continuous medications  heparin, 0-4,500 Units/hr, Last Rate: 12 Units/hr (06/03/24 1152)      PRN medications  PRN medications: acetaminophen, dextrose, dextrose, glucagon, glucagon, heparin, ondansetron, traMADol    Assessment & Plan  63 y/o W with PMHx of ESRD on PD c/b 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 Daily marijuana use c/f cyclic vomiting syndrome vs. cannabinoid hyperemesis syndrome. Patient also found to have new pulmonary embolism with subtherapeutic INR 1.7, however is hemodynamically stable overall and is on room air.     Update 6/4:   - Nephrology following   - Clear liquid, NG removed yesterday   - Started empiric intraperitoneal abx yesterday per nephrology recs with ceftazidime and vancomycin   - DVT US LE with no acute DVT but 2.02 cm x 2.78 cm non-vascularized hypoechoic cystic structure seen in left groin   - Xray abdomen yesterday with mild-moderate stomach distension with diffuse small bowel loops c/w SBO   - Having diarrhea, unlikely to have complete bowel obstruction at this time   - Started nystatin swish + swallow per nephrology recs   - Stop heparin and continue warfarin- INR 2.0 today   - Bcx (6/2): NGTD   - Peritoneal fluid Cx (6/2): no organisms, 4+ PMNs  - Peritoneal fluid Cx (6/3): Gram (+) cocci       # Abdominal pain  # Nausea/Vomiting  :: Most concerning for SBO vs. chronic mesenteric ischemia  :: Prior admission with peritoneal fluid positive for staph epi thought to be contaminant, blood cultures negative. ID  was sonsulted and did not think patient had true peritonitis.  :: Current presentation with CT A/P finding of 6.7 x 7.3 cm pseudoaneurysm or hematoma close to left bypass graft   :: NG removed on 6/3   - ACS consulted, no surgical interventions   - Zofran prn for nausea, normal QTc   - Tylenol prn and tramadol 50 q8h prn for pain  - Pantoprazole 40 mg IV  - Cont. nystatin swish + swallow (6/3 - ) per nephrology recs   - Cont. intraperitoneal ceftazidime (6/3 - ) empiric coverage per nephro  - Cont. intraperitoneal vancomycin (6/3 - ) empiric coverage per nephro  - On clear liquid diet      # New pulmonary embolism   :: Subtherapeutic INR 1.7 while on Warfarin  :: Low risk with negative RV to LV ratio 0.8, PESI score 73 low risk, hemodynamically stable, no oxygen requirement  :: BNP > 5000 chronically 2/2 to HFrEF  :: DVT US LE with no acute DVT but 2.02 cm x 2.78 cm non-vascularized hypoechoic cystic structure seen in left groin   - Needs Coumadin clinic  - Cont home Warfarin 3 mg daily      # 5.5 cm descending aortic aneurysm  # Left common iliac occlusion - chronic  # Hx of multiple dissections   :: 5.4 cm prior  - Vascular surgery consulted in ED for L renal artery stent thrombosis, no surgical interventions, follow up in clinic     # ESRD   :: On peritoneal dialysis at home  - Consulted nephrology for peritoneal dialysis supplies  - Continue Sevelamer and renal multivitamins  - Monitor electrolytes     #HFrEF (EF 15-30% in 03/2023)  :: Not on GDMT at home  :: Not candidate for Archie or ACE/ARB/ARNi given ESRD  - Consider Hydralazine/Isordil for GDMT close to discharge     #Chronic diarrhea   - Holding Lomotil for now c/f SBO  - per renal, needs to have at least one BM a day     # Hypothyroidism  - Continue synthyroid     # Chronic pancreatitis  -continue home creon        N: clear liquid   A: PIV     DVT ppx: warfarin   GI ppx: IV pantoprazole    Code Status: Full Code (confirmed on Admission)  Surrogate Medical  Decision-maker: sister Farida , Su Nair (best friend) [phone]

## 2024-06-04 NOTE — NURSING NOTE
Peritoneal Dialysis - [facility]CP[facility]    Completed Overnight Therapy   6/3/2024    Full treatment received:               Y  I-Drain:    3 mL  Total UF:   620 mL  Combined 24 hour UF:  623 mL  Minicap placed:   Y  Effluent Color:   Yellow   Cloudy:   Y   Fibrin:   Y    Tonight's Therapy   6/4/2024    Dialysis cycler primed:   Y  Patient connected:  Y  Therapy started at:  1700  [employer]or:  Y  Dressing changed:  Y  Gent. cream applied:  N    Therapy Orders     Total time:  9 hours   Cycles:   5   Fill Volume:  1800 mL   Last fill:   500 mL   Last fill solution:  1.5% with Ceftazidime, Vancomycin & Heparin   Total volume:  9500 mL     Solution(s):  Dextrose 1.5% Dianeal 2.5 calcium-- 5000 mL (x1)   Additive(s): None        Dextrose 2.5% Dianeal 2.5 calcium-- 5000 mL (x1)   Additive(s): None     Last Fill:  Dextrose 1.5% Dianeal 2.5 calcium-- 2000 mL (x1)   Additive(s): Ceftazidime, Vancomycin & Heparin      **Dialysis nurses in house Monday through Friday [id number] to setup and disconnect patients,   please call our office at [phone], follow prompts for after hours paging.    **Machine Trouble Shooting: [employer] 24 hour technical support available at [phone]     **Bedside nursing staff to disconnect patients as needed outside of office hours.  -Instructions and supplies located on dialysis cart.  -Reference  policy G-595 -->

## 2024-06-04 NOTE — PROGRESS NOTES
Occupational Therapy    Evaluation    Patient Name: Yoselyn Hernandez  MRN: [id number]  Today's Date: 6/4/2024  Room: [address] [place] [address]  Time Calculation  Start Time: 1146  Stop Time: 1203  Time Calculation (min): 17 min    Assessment  IP OT Assessment  End of Session Communication: Bedside nurse  End of Session Patient Position: Bed, 3 rail up, Alarm off, not on at start of session  Plan:  Treatment Interventions: ADL retraining, Functional transfer training, UE strengthening/ROM, Endurance training, Patient/family training, Equipment evaluation/education, Compensatory technique education, Continued evaluation  OT Discharge Recommendations: Moderate intensity level of continued care  OT - OK to Discharge:  (OT Eval completed.)    Subjective   Current Problem:  1. Generalized abdominal pain        2. Multiple subsegmental pulmonary emboli without acute cor pulmonale (Multi)  Lower extremity venous duplex bilateral    Lower extremity venous duplex bilateral      3. 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) s/p intraperitoneal vanc and ceftazidime, multiple AAA dissections, HFrEF (EF 15-20% 03/2023), pancreatitis, chronic diarrhea PE/DVT on warfarin, HTN, and multiple renal artery grafts with prior ilio-renal bypass  Co-Treatment: PT  Co-Treatment Reason: to maximize mobility and safety  Prior to Session Communication: Bedside nurse  Patient Position Received: Bed, 3 rail up, Alarm off, not on at start of session   Precautions:  Medical Precautions: Fall precautions       Pain:  Pain Assessment  Pain Assessment: 0-10  Pain Location: Abdomen (Pain not rated.)      Objective   Cognition:  Overall Cognitive Status:  (Pt alert, tearful at times. Pt stated much  "frustration re: ongoing illness, reassurance provided. Stated, \"I don't want to live anymore\". RN made aware.)  Orientation Level: Oriented X4           Home Living:  Type of Home:  (Nursing home; reports she was not receiving OT/PT at NH.)   Prior Function:  ADL Assistance: Needs assistance  Ambulatory Assistance:  (Pt reports she hasn't ambulated in approximately 3 weeks. States that staff x2 assisted with transfers to wheelchair or B/S commode at facility.)  Prior Function Comments: Pt stated she had a recent fall when ambulating with staff at NH 2' \"my leg gave out\".       ADL:  UE Dressing Assistance: Minimal (in supine after setup.)  LE Dressing Assistance:  (Pt declined, however, anticipate at least mod assist 2' pt's decreased activity tolerance, c/o pain, decreased strength,)  Toileting Assistance with Device:  (Pt stated she was incontinent of bowel 2' diarrhea; max assist for hygeine in supine 2' copious loose BM. Pt able to assist by rolling side <-> side with CGA with use of bedrail.)     Balance:  Static Sitting Balance  Static Sitting-Level of Assistance: Close supervision  Bed Mobility/Transfers: Bed Mobility  Bed Mobility: Yes  Bed Mobility 1  Bed Mobility 1: Rolling left, Rolling right  Level of Assistance 1: Contact guard  Bed Mobility Comments 1: Bed rail utilized, pt performed above during elvira care after BM.  Bed Mobility 2  Bed Mobility  2: Supine to sitting, Sitting to supine  Level of Assistance 2: Contact guard  Bed Mobility Comments 2: HOB elevated.   and Transfers  Transfer: No (Pt declined despite encouragement. Will continue to assess as pt agreeable.)     Sensation:  Light Touch: No apparent deficits (UE's)  Strength:  Strength Comments: Appears grossly at least 3+/5 as observed during functioanal tasks in session.  Hand Function:  Hand Function  Gross Grasp: Functional  Extremities: RUE   RUE : Within Functional Limits,  ,  , and      Outcome Measures: [facility] Daily Activity  Putting on " and taking off regular lower body clothing: A lot  Bathing (including washing, rinsing, drying): A lot  Putting on and taking off regular upper body clothing: A little  Toileting, which includes using toilet, bedpan or urinal: A lot  Taking care of personal grooming such as brushing teeth: A little  Eating Meals: A little  Daily Activity - Total Score: 15         ,     OT Adult Other Outcome Measures  4AT: 0    Education Documentation  Body Mechanics, taught by Sharri Campuzano OT at 6/4/2024  1:33 PM.  Learner: Patient  Readiness: Acceptance  Method: Explanation  Response: Needs Reinforcement    Precautions, taught by Sharri Campuzano OT at 6/4/2024  1:33 PM.  Learner: Patient  Readiness: Acceptance  Method: Explanation  Response: Needs Reinforcement    ADL Training, taught by Sharri Campuzano OT at 6/4/2024  1:33 PM.  Learner: Patient  Readiness: Acceptance  Method: Explanation  Response: Needs Reinforcement    Education Comments  No comments found.        Goals:     Encounter Problems       Encounter Problems (Active)       ADLs       Patient will perform UB and LB bathing  with set-up and stand by assist level of assistance and extended tub bench and long-handled sponge. (Progressing)       Start:  06/04/24    Expected End:  06/18/24            Patient with complete upper body dressing with set-up and stand by assist level of assistance donning and doffing all UE clothes with no adaptive equipment while supported sitting and edge of bed  (Progressing)       Start:  06/04/24    Expected End:  06/18/24            Patient with complete lower body dressing with minimal assist  level of assistance donning and doffing pants without a zipper/fasteners, underwear, and socks with reacher, shoe horn, and sock-aid while supported sitting and edge of bed  (Progressing)       Start:  06/04/24    Expected End:  06/18/24            Patient will complete daily grooming tasks brushing teeth and washing face/hair with set-up and  supervision level of assistance and PRN adaptive equipment while edge of bed  and standing pending pt's progress. (Progressing)       Start:  06/04/24    Expected End:  06/18/24            Patient will complete toileting including hygiene clothing management/hygiene with minimal assist  level of assistance and raised toilet seat and bedside commode. (Progressing)       Start:  06/04/24    Expected End:  06/18/24               MOBILITY       Patient will perform Functional mobility  Household distances/Community Distances with minimal assist  level of assistance and least restrictive device in order to improve safety and functional mobility. (Progressing)       Start:  06/04/24    Expected End:  06/18/24               TRANSFERS       Patient will perform bed mobility modified independent level of assistance and bed rails in order to improve safety and independence with mobility (Progressing)       Start:  06/04/24    Expected End:  06/18/24            Patient will complete functional transfer to all surfaces with least restrictive device with minimal assist  level of assistance. (Progressing)       Start:  06/04/24    Expected End:  06/18/24 06/04/24 at 1:33 PM   Sharri Campuzano OT   Rehab Office: [phone]

## 2024-06-05 LAB
ALBUMIN SERPL BCP-MCNC: 2 G/DL (ref 3.4–5)
ANION GAP SERPL CALC-SCNC: 21 MMOL/L (ref 10–20)
APTT PPP: 37 SECONDS (ref 27–38)
BACTERIA DIAFP CULT: ABNORMAL
BASOPHILS # BLD AUTO: 0.02 X10*3/UL (ref 0–0.1)
BASOPHILS NFR BLD AUTO: 0.3 %
BASOPHILS NFR FLD MANUAL: 0 %
BLASTS NFR FLD MANUAL: 0 %
BUN SERPL-MCNC: 45 MG/DL (ref 6–23)
CALCIUM SERPL-MCNC: 7.9 MG/DL (ref 8.6–10.6)
CHLORIDE SERPL-SCNC: 96 MMOL/L (ref 98–107)
CLARITY FLD: CLEAR
CO2 SERPL-SCNC: 24 MMOL/L (ref 21–32)
COLOR FLD: COLORLESS
CREAT SERPL-MCNC: 6.2 MG/DL (ref 0.5–1.05)
EGFRCR SERPLBLD CKD-EPI 2021: 7 ML/MIN/1.73M*2
EOSINOPHIL # BLD AUTO: 0.12 X10*3/UL (ref 0–0.7)
EOSINOPHIL NFR BLD AUTO: 1.7 %
EOSINOPHIL NFR FLD MANUAL: 0 %
ERYTHROCYTE [DISTWIDTH] IN BLOOD BY AUTOMATED COUNT: 17.2 % (ref 11.5–14.5)
GLUCOSE BLD MANUAL STRIP-MCNC: 106 MG/DL (ref 74–99)
GLUCOSE BLD MANUAL STRIP-MCNC: 158 MG/DL (ref 74–99)
GLUCOSE BLD MANUAL STRIP-MCNC: 77 MG/DL (ref 74–99)
GLUCOSE BLD MANUAL STRIP-MCNC: 91 MG/DL (ref 74–99)
GLUCOSE SERPL-MCNC: 71 MG/DL (ref 74–99)
GRAM STN SPEC: ABNORMAL
GRAM STN SPEC: ABNORMAL
HCT VFR BLD AUTO: 30.8 % (ref 36–46)
HGB BLD-MCNC: 9.9 G/DL (ref 12–16)
IMM GRANULOCYTES # BLD AUTO: 0.03 X10*3/UL (ref 0–0.7)
IMM GRANULOCYTES NFR BLD AUTO: 0.4 % (ref 0–0.9)
IMMATURE GRANULOCYTES IN FLUID: 0 %
INR PPP: 3.1 (ref 0.9–1.1)
LYMPHOCYTES # BLD AUTO: 1.09 X10*3/UL (ref 1.2–4.8)
LYMPHOCYTES NFR BLD AUTO: 15.6 %
LYMPHOCYTES NFR FLD MANUAL: 11 %
MAGNESIUM SERPL-MCNC: 1.48 MG/DL (ref 1.6–2.4)
MCH RBC QN AUTO: 30.6 PG (ref 26–34)
MCHC RBC AUTO-ENTMCNC: 32.1 G/DL (ref 32–36)
MCV RBC AUTO: 95 FL (ref 80–100)
MONOCYTES # BLD AUTO: 0.24 X10*3/UL (ref 0.1–1)
MONOCYTES NFR BLD AUTO: 3.4 %
MONOS+MACROS NFR FLD MANUAL: 51 %
NEUTROPHILS # BLD AUTO: 5.49 X10*3/UL (ref 1.2–7.7)
NEUTROPHILS NFR BLD AUTO: 78.6 %
NEUTROPHILS NFR FLD MANUAL: 38 %
NRBC BLD-RTO: 0.3 /100 WBCS (ref 0–0)
OTHER CELLS NFR FLD MANUAL: 0 %
PHOSPHATE SERPL-MCNC: 5.7 MG/DL (ref 2.5–4.9)
PLASMA CELLS NFR FLD MANUAL: 0 %
PLATELET # BLD AUTO: 166 X10*3/UL (ref 150–450)
POTASSIUM SERPL-SCNC: 3.9 MMOL/L (ref 3.5–5.3)
PROTHROMBIN TIME: 35.7 SECONDS (ref 9.8–12.8)
RBC # BLD AUTO: 3.24 X10*6/UL (ref 4–5.2)
RBC # FLD AUTO: 0 /UL
SODIUM SERPL-SCNC: 137 MMOL/L (ref 136–145)
TOTAL CELLS COUNTED PRT: 100
VANCOMYCIN SERPL-MCNC: 49.2 UG/ML (ref 5–20)
WBC # BLD AUTO: 7 X10*3/UL (ref 4.4–11.3)
WBC # FLD AUTO: 107 /UL

## 2024-06-05 PROCEDURE — 80069 RENAL FUNCTION PANEL: CPT

## 2024-06-05 PROCEDURE — 90947 DIALYSIS REPEATED EVAL: CPT

## 2024-06-05 PROCEDURE — 2500000001 HC RX 250 WO HCPCS SELF ADMINISTERED DRUGS (ALT 637 FOR MEDICARE OP): Performed by: STUDENT IN AN ORGANIZED HEALTH CARE EDUCATION/TRAINING PROGRAM

## 2024-06-05 PROCEDURE — 82947 ASSAY GLUCOSE BLOOD QUANT: CPT

## 2024-06-05 PROCEDURE — 2500000002 HC RX 250 W HCPCS SELF ADMINISTERED DRUGS (ALT 637 FOR MEDICARE OP, ALT 636 FOR OP/ED)

## 2024-06-05 PROCEDURE — 83735 ASSAY OF MAGNESIUM: CPT

## 2024-06-05 PROCEDURE — 2500000004 HC RX 250 GENERAL PHARMACY W/ HCPCS (ALT 636 FOR OP/ED): Performed by: INTERNAL MEDICINE

## 2024-06-05 PROCEDURE — 36415 COLL VENOUS BLD VENIPUNCTURE: CPT

## 2024-06-05 PROCEDURE — 85025 COMPLETE CBC W/AUTO DIFF WBC: CPT

## 2024-06-05 PROCEDURE — 99233 SBSQ HOSP IP/OBS HIGH 50: CPT

## 2024-06-05 PROCEDURE — 2500000004 HC RX 250 GENERAL PHARMACY W/ HCPCS (ALT 636 FOR OP/ED)

## 2024-06-05 PROCEDURE — 99233 SBSQ HOSP IP/OBS HIGH 50: CPT | Performed by: INTERNAL MEDICINE

## 2024-06-05 PROCEDURE — 2500000001 HC RX 250 WO HCPCS SELF ADMINISTERED DRUGS (ALT 637 FOR MEDICARE OP)

## 2024-06-05 PROCEDURE — 85610 PROTHROMBIN TIME: CPT

## 2024-06-05 PROCEDURE — 1210000001 HC SEMI-PRIVATE ROOM DAILY

## 2024-06-05 PROCEDURE — 80202 ASSAY OF VANCOMYCIN: CPT

## 2024-06-05 PROCEDURE — C9113 INJ PANTOPRAZOLE SODIUM, VIA: HCPCS

## 2024-06-05 PROCEDURE — 89051 BODY FLUID CELL COUNT: CPT

## 2024-06-05 PROCEDURE — 2500000001 HC RX 250 WO HCPCS SELF ADMINISTERED DRUGS (ALT 637 FOR MEDICARE OP): Performed by: INTERNAL MEDICINE

## 2024-06-05 PROCEDURE — 2500000004 HC RX 250 GENERAL PHARMACY W/ HCPCS (ALT 636 FOR OP/ED): Performed by: STUDENT IN AN ORGANIZED HEALTH CARE EDUCATION/TRAINING PROGRAM

## 2024-06-05 RX ORDER — MAGNESIUM SULFATE HEPTAHYDRATE 40 MG/ML
4 INJECTION, SOLUTION INTRAVENOUS ONCE
Status: COMPLETED | OUTPATIENT
Start: 2024-06-05 | End: 2024-06-05

## 2024-06-05 RX ORDER — ESOMEPRAZOLE MAGNESIUM 40 MG/1
40 GRANULE, DELAYED RELEASE ORAL
Status: DISCONTINUED | OUTPATIENT
Start: 2024-06-06 | End: 2024-06-10 | Stop reason: HOSPADM

## 2024-06-05 RX ORDER — PANTOPRAZOLE SODIUM 40 MG/1
40 TABLET, DELAYED RELEASE ORAL
Status: DISCONTINUED | OUTPATIENT
Start: 2024-06-06 | End: 2024-06-10 | Stop reason: HOSPADM

## 2024-06-05 RX ORDER — PANTOPRAZOLE SODIUM 40 MG/10ML
40 INJECTION, POWDER, LYOPHILIZED, FOR SOLUTION INTRAVENOUS
Status: DISCONTINUED | OUTPATIENT
Start: 2024-06-06 | End: 2024-06-10 | Stop reason: HOSPADM

## 2024-06-05 RX ORDER — MAGNESIUM SULFATE HEPTAHYDRATE 40 MG/ML
2 INJECTION, SOLUTION INTRAVENOUS ONCE
Status: DISCONTINUED | OUTPATIENT
Start: 2024-06-05 | End: 2024-06-05

## 2024-06-05 RX ADMIN — SEVELAMER CARBONATE 800 MG: 800 TABLET, FILM COATED ORAL at 18:43

## 2024-06-05 RX ADMIN — NYSTATIN 500000 UNITS: 100000 SUSPENSION ORAL at 14:09

## 2024-06-05 RX ADMIN — PANCRELIPASE 3 CAPSULE: 120000; 24000; 76000 CAPSULE, DELAYED RELEASE PELLETS ORAL at 18:41

## 2024-06-05 RX ADMIN — NYSTATIN 500000 UNITS: 100000 SUSPENSION ORAL at 18:44

## 2024-06-05 RX ADMIN — PANCRELIPASE 3 CAPSULE: 120000; 24000; 76000 CAPSULE, DELAYED RELEASE PELLETS ORAL at 14:09

## 2024-06-05 RX ADMIN — SEVELAMER CARBONATE 800 MG: 800 TABLET, FILM COATED ORAL at 09:33

## 2024-06-05 RX ADMIN — SODIUM CHLORIDE, SODIUM LACTATE, CALCIUM CHLORIDE, MAGNESIUM CHLORIDE AND DEXTROSE: 2.5; 538; 448; 18.3; 5.08 INJECTION, SOLUTION INTRAPERITONEAL at 14:08

## 2024-06-05 RX ADMIN — ASCORBIC ACID, THIAMINE MONONITRATE,RIBOFLAVIN, NIACINAMIDE, PYRIDOXINE HYDROCHLORIDE, FOLIC ACID, CYANOCOBALAMIN, BIOTIN, CALCIUM PANTOTHENATE, 1 CAPSULE: 100; 1.5; 1.7; 20; 10; 1; 6000; 150000; 5 CAPSULE, LIQUID FILLED ORAL at 09:27

## 2024-06-05 RX ADMIN — MIRTAZAPINE 15 MG: 15 TABLET, FILM COATED ORAL at 20:25

## 2024-06-05 RX ADMIN — Medication 5000 UNITS: at 09:27

## 2024-06-05 RX ADMIN — CALCITRIOL CAPSULES 0.25 MCG 0.25 MCG: 0.25 CAPSULE ORAL at 10:00

## 2024-06-05 RX ADMIN — ATORVASTATIN CALCIUM 40 MG: 40 TABLET, FILM COATED ORAL at 20:25

## 2024-06-05 RX ADMIN — ACETAMINOPHEN 975 MG: 325 TABLET ORAL at 09:33

## 2024-06-05 RX ADMIN — Medication 10 MG: at 20:25

## 2024-06-05 RX ADMIN — NYSTATIN 500000 UNITS: 100000 SUSPENSION ORAL at 06:48

## 2024-06-05 RX ADMIN — PANTOPRAZOLE SODIUM 40 MG: 40 INJECTION, POWDER, FOR SOLUTION INTRAVENOUS at 06:48

## 2024-06-05 RX ADMIN — PANCRELIPASE 3 CAPSULE: 120000; 24000; 76000 CAPSULE, DELAYED RELEASE PELLETS ORAL at 09:27

## 2024-06-05 RX ADMIN — MAGNESIUM SULFATE HEPTAHYDRATE 4 G: 40 INJECTION, SOLUTION INTRAVENOUS at 12:36

## 2024-06-05 RX ADMIN — HEPARIN SODIUM: 1000 INJECTION INTRAVENOUS; SUBCUTANEOUS at 14:09

## 2024-06-05 RX ADMIN — LEVOTHYROXINE SODIUM 25 MCG: 0.05 TABLET ORAL at 06:48

## 2024-06-05 RX ADMIN — SODIUM CHLORIDE, SODIUM LACTATE, CALCIUM CHLORIDE, MAGNESIUM CHLORIDE AND DEXTROSE: 1.5; 538; 448; 18.3; 5.08 INJECTION, SOLUTION INTRAPERITONEAL at 14:09

## 2024-06-05 RX ADMIN — SEVELAMER CARBONATE 800 MG: 800 TABLET, FILM COATED ORAL at 14:09

## 2024-06-05 ASSESSMENT — COGNITIVE AND FUNCTIONAL STATUS - GENERAL
WALKING IN HOSPITAL ROOM: TOTAL
TOILETING: A LOT
TURNING FROM BACK TO SIDE WHILE IN FLAT BAD: A LITTLE
STANDING UP FROM CHAIR USING ARMS: A LOT
MOVING FROM LYING ON BACK TO SITTING ON SIDE OF FLAT BED WITH BEDRAILS: A LITTLE
EATING MEALS: A LITTLE
PERSONAL GROOMING: A LITTLE
DAILY ACTIVITIY SCORE: 15
CLIMB 3 TO 5 STEPS WITH RAILING: TOTAL
DRESSING REGULAR LOWER BODY CLOTHING: A LOT
PERSONAL GROOMING: A LITTLE
WALKING IN HOSPITAL ROOM: TOTAL
HELP NEEDED FOR BATHING: A LOT
EATING MEALS: A LITTLE
TOILETING: A LOT
STANDING UP FROM CHAIR USING ARMS: A LOT
DRESSING REGULAR LOWER BODY CLOTHING: A LOT
MOVING TO AND FROM BED TO CHAIR: A LOT
DRESSING REGULAR UPPER BODY CLOTHING: A LITTLE
DAILY ACTIVITIY SCORE: 15
DRESSING REGULAR UPPER BODY CLOTHING: A LITTLE
MOBILITY SCORE: 12
TURNING FROM BACK TO SIDE WHILE IN FLAT BAD: A LITTLE
CLIMB 3 TO 5 STEPS WITH RAILING: TOTAL
MOVING TO AND FROM BED TO CHAIR: A LOT
HELP NEEDED FOR BATHING: A LOT
MOBILITY SCORE: 12
MOVING FROM LYING ON BACK TO SITTING ON SIDE OF FLAT BED WITH BEDRAILS: A LITTLE

## 2024-06-05 ASSESSMENT — PAIN - FUNCTIONAL ASSESSMENT
PAIN_FUNCTIONAL_ASSESSMENT: 0-10
PAIN_FUNCTIONAL_ASSESSMENT: 0-10

## 2024-06-05 ASSESSMENT — PAIN SCALES - GENERAL
PAINLEVEL_OUTOF10: 5 - MODERATE PAIN
PAINLEVEL_OUTOF10: 9

## 2024-06-05 NOTE — NURSING NOTE
..Peritoneal Dialysis - [facility]    Completed Overnight Therapy   6/4/24     Full treatment received:               yes  I-Drain:    563 mL  Total UF:   120 mL  Combined 24 hour UF:  183 mL  Minicap placed:   Yes   Effluent Color:   yellow   Cloudy:   minimal   Fibrin:   no    Tonight's Therapy   6/5/24    Dialysis cycler primed:   yes  Patient connected:  yes  Therapy started at:  1430  [employer] Adaptor:  In place  Dressing changed:  yes  Gent. cream applied:  Not available    Therapy Orders     Total time:  9 hours   Cycles:   5   Fill Volume:  1800 mL   Last fill:   500 mL   Last fill solution:  2.5% with heparin   Total volume:  95434 mL     Solution(s):  Dextrose 1.5% Dianeal 2.5 calcium-- 5000 mL (x1)   Additive(s): none        Dextrose 2.5% Dianeal 2.5 calcium-- 5000 mL (x1)   Additive(s): none     Last Fill:  Dextrose 2.5% Dianeal 2.5 calcium-- 2000 mL (x1)   Additive(s): Heparin      **Dialysis nurses in house Monday through Friday 1501-2729 to setup and disconnect patients,   please call our office at [phone], follow prompts for after hours paging.    **Machine Trouble Shooting: [email or web address] 24 hour technical support available at [phone]     **Bedside nursing staff to disconnect patients as needed outside of office hours.  -Instructions and supplies located on dialysis cart.  -Reference  policy [id number] -->

## 2024-06-05 NOTE — PROGRESS NOTES
Yoselyn Hernandez is a 64 y.o. female on day 3 of admission presenting with Generalized abdominal pain.      Subjective   Seen and examined, abdominal pain better, denies diarrhea       Objective     Peritoneal Dialysis  Effluent Appearance: Cloudy (Yellow minimally cloudy)  Total UF: 120  IDrain: 563  Effluent Volume Out (mL): 183 ml    Vitals 24HR  Heart Rate:  [87-97]   Temp:  [36.1 °C (97 °F)-36.6 °C (97.9 °F)]   Resp:  [16]   BP: (107-110)/(69-72)   SpO2:  [95 %-97 %]     Intake/Output last 3 Shifts:    Intake/Output Summary (Last 24 hours) at 6/5/2024 1534  Last data filed at 6/5/2024 1000  Gross per 24 hour   Intake --   Output 183 ml   Net -183 ml       Physical Exam    No distress  HEENT:  moist, no pallor  No edema joel LE  Breath sounds joel equal, clear  S1 S2 regular, normal, no rub or murmur  Abdomen soft, non tender,exit site covered, no tunnel tenderness  AAO x3, non focal     Latest Reference Range & Units 06/05/24 10:44   Neutrophils %, Manual, Fluid <25 % % 38   Lymphocytes %, Manual, Fluid <75 % % 11   Mono/Macrophages %, Manual, Fluid <70 % % 51   Eosinophils %, Manual, Fluid 0 % % 0   Basophils %, Manual, Fluid 0 % % 0   Color, Fluid Colorless, Straw, Yellow  Colorless   RBC, Fluid 0  /uL /uL 0   Total Cells Counted, Fluid  100   Clarity, Fluid Clear  Clear   WBC, Fluid See Comment /uL 107   Immature Granulocytes %, Manual, Fluid 0 % % 0   Blasts %, Manual, Fluid 0 % % 0   Unclassified Cells %, Manual, Fluid 0 % % 0   Plasma Cells %, Manual, Fluid 0 % % 0         Contains abnormal data Peritoneal Dialysis Fluid Culture/Smear  Order: [id number]   Collected 6/2/2024 20:33       Status: Final result       Visible to patient: Yes (not seen)    Specimen Information: Peritoneal Dialysis; Fluid   0 Result Notes  Peritoneal Dialysis Fluid Culture (1+) Rare Streptococcus mitis/oralis group Abnormal    For antibiotic susceptibility results see Specimen # - [id number]            Gram Stain (4+) Abundant  Polymorphonuclear leukocytes      No organisms seen              Resulting Agency: [facility]        [Held by provider] allopurinol, 100 mg, oral, Daily  atorvastatin, 40 mg, oral, Nightly  B complex-vitamin C-folic acid, 1 capsule, oral, Daily  calcitriol, 0.25 mcg, oral, Every Mon/Wed/Fri  cholecalciferol, 5,000 Units, oral, Daily  dextrose 1.5% - LOW calcium 2.5mEq/L 5,000 mL peritoneal dialysate, , intraperitoneal, q24h  dextrose 2.5 % - LOW calcium 2.5 mEq/L 2,000 mL with heparin 1,000 Units peritoneal dialysate, , intraperitoneal, q24h  dextrose 2.5 % - LOW calcium 2.5 mEq/L 5,000 mL peritoneal dialysate, , intraperitoneal, q24h  [START ON 6/6/2024] pantoprazole, 40 mg, oral, Daily before breakfast   Or  [START ON 6/6/2024] esomeprazole, 40 mg, oral, Daily before breakfast   Or  [START ON 6/6/2024] pantoprazole, 40 mg, intravenous, Daily before breakfast  gentamicin, , Topical, q24h  levothyroxine, 25 mcg, oral, Daily before breakfast  lidocaine, 1 Application, Topical, Once  lidocaine, 1 patch, transdermal, Daily  lipase-protease-amylase, 3 capsule, oral, TID  magnesium sulfate, 4 g, intravenous, Once  melatonin, 10 mg, oral, Nightly  mirtazapine, 15 mg, oral, Nightly  nystatin, 5 mL, Swish & Swallow, q6h AMARJIT  sertraline, 50 mg, oral, Daily  sevelamer carbonate, 800 mg, oral, TID  [Held by provider] warfarin, 3 mg, oral, Daily         Assessment/Plan    64-year-old with history of end-stage renal disease on peritoneal dialysis presenting with 2-day history of abdominal pain, PD fluid cell count suggestive of PD associated peritonitis. Prelim culture shows  Strep mitis  -Daily cell count and differential trending down appropriately  -hold vancomycin today as level is supratherapeutic, next dose once level is <20. Discontinue Ceftazidime as no e/o GN infection  -ct nystatin swish and swallow, continue as long as patient is on antibiotic therapy  -Continue exit site care per protocol with gentamicin cream  --Will use  1.5% dextrose 5 L and 2.5 % D today  -Continue sevelamer 800 mg p.o. 3 times daily with meals  -Please check daily standing weights  Will continue to follow.      Ok for discharge from renal perspective, can continue IP Vancomycin for total 2 weeks at nursing home

## 2024-06-05 NOTE — PROGRESS NOTES
Medication Adjustment    The following medication(s) was/were adjusted for Yoselyn Hernandez per protocol/policy due to indication for the medication . The patient meets criteria for Pharmacist Initiated IV to PO Conversion (MM-15). The patient is hemodynamically stable, has a functioning GI tract, tolerating a diet, and taking other medications orally. Confirmed patient is able to swallow/take PO medications with RN Chio. Diet has been advancing.       Medication(s) adjusted:   IV PPI --> selected the PPI orderset so the RN has options should any changes in diet or NG tubes reoccur (i.e. nexium packet if meds need crushed). Discussed with nurse this morning pt has been taking her other pills orally and able to swallow.     Christine Paz, PharmD  [facility]ac[facility] 3 Pharmacist

## 2024-06-05 NOTE — PROGRESS NOTES
"Yoselyn Hernandez is a 64 y.o. female on day 3 of admission presenting with Generalized abdominal pain.    Subjective   No acute events. States that abdominal pain is much improved. Denies any nausea/vomiting. Feels a bit fatigued/low energy overall today. Denies any other new issues. Tolerating clear liquid diet.        Objective     Physical Exam  Constitutional: in NAD  HEENT: sclerae anicteric, EOM grossly intact  CV: regular rate, regular rhythm, no murmurs noted  Pulm: CTAB, no increased WOB  GI: abdomen soft, non-tender, peritoneal tube in place with no leakage visible or purulence  Skin: warm and dry  Ext: bilateral LE with +2 pitting edema up to the knees, left leg more edematous, non-tender  Neuro: alert and conversant  Psych: affect appropriate    Last Recorded Vitals  Blood pressure 107/69, pulse 97, temperature 36.1 °C (97 °F), resp. rate 16, height 1.702 m (5' 7\"), weight 65.3 kg (144 lb), SpO2 97%.  Intake/Output last 3 Shifts:  I/O last 3 completed shifts:  In: 260.2 (4 mL/kg) [I.V.:260.2 (4 mL/kg)]  Out: 623 (9.5 mL/kg) [Other:623]  Weight: 65.3 kg     Relevant Results  Results for orders placed or performed during the hospital encounter of 06/02/24 (from the past 24 hour(s))   Protime-INR   Result Value Ref Range    Protime 25.7 (H) 9.8 - 12.8 seconds    INR 2.3 (H) 0.9 - 1.1   POCT GLUCOSE   Result Value Ref Range    POCT Glucose 113 (H) 74 - 99 mg/dL   Body Fluid Cell Count   Result Value Ref Range    Color, Fluid Colorless Colorless, Straw, Yellow    Clarity, Fluid Clear Clear    WBC, Fluid 108 See Comment /uL    RBC, Fluid 0 0  /uL /uL   Body Fluid Differential   Result Value Ref Range    Neutrophils %, Manual, Fluid 88 <25 % %    Lymphocytes %, Manual, Fluid 0 <75 % %    Mono/Macrophages %, Manual, Fluid 12 <70 % %    Eosinophils %, Manual, Fluid 0 0 % %    Basophils %, Manual, Fluid 0 0 % %    Immature Granulocytes %, Manual, Fluid 0 0 % %    Blasts %, Manual, Fluid 0 0 % %    Unclassified Cells " %, Manual, Fluid 0 0 % %    Plasma Cells %, Manual, Fluid 0 0 % %    Total Cells Counted, Fluid 17    POCT GLUCOSE   Result Value Ref Range    POCT Glucose 89 74 - 99 mg/dL   POCT GLUCOSE   Result Value Ref Range    POCT Glucose 106 (H) 74 - 99 mg/dL   CBC and Auto Differential   Result Value Ref Range    WBC 7.0 4.4 - 11.3 x10*3/uL    nRBC 0.3 (H) 0.0 - 0.0 /100 WBCs    RBC 3.24 (L) 4.00 - 5.20 x10*6/uL    Hemoglobin 9.9 (L) 12.0 - 16.0 g/dL    Hematocrit 30.8 (L) 36.0 - 46.0 %    MCV 95 80 - 100 fL    MCH 30.6 26.0 - 34.0 pg    MCHC 32.1 32.0 - 36.0 g/dL    RDW 17.2 (H) 11.5 - 14.5 %    Platelets 166 150 - 450 x10*3/uL    Neutrophils % 78.6 40.0 - 80.0 %    Immature Granulocytes %, Automated 0.4 0.0 - 0.9 %    Lymphocytes % 15.6 13.0 - 44.0 %    Monocytes % 3.4 2.0 - 10.0 %    Eosinophils % 1.7 0.0 - 6.0 %    Basophils % 0.3 0.0 - 2.0 %    Neutrophils Absolute 5.49 1.20 - 7.70 x10*3/uL    Immature Granulocytes Absolute, Automated 0.03 0.00 - 0.70 x10*3/uL    Lymphocytes Absolute 1.09 (L) 1.20 - 4.80 x10*3/uL    Monocytes Absolute 0.24 0.10 - 1.00 x10*3/uL    Eosinophils Absolute 0.12 0.00 - 0.70 x10*3/uL    Basophils Absolute 0.02 0.00 - 0.10 x10*3/uL   Magnesium   Result Value Ref Range    Magnesium 1.48 (L) 1.60 - 2.40 mg/dL   Renal function panel   Result Value Ref Range    Glucose 71 (L) 74 - 99 mg/dL    Sodium 137 136 - 145 mmol/L    Potassium 3.9 3.5 - 5.3 mmol/L    Chloride 96 (L) 98 - 107 mmol/L    Bicarbonate 24 21 - 32 mmol/L    Anion Gap 21 (H) 10 - 20 mmol/L    Urea Nitrogen 45 (H) 6 - 23 mg/dL    Creatinine 6.20 (H) 0.50 - 1.05 mg/dL    eGFR 7 (L) >60 mL/min/1.73m*2    Calcium 7.9 (L) 8.6 - 10.6 mg/dL    Phosphorus 5.7 (H) 2.5 - 4.9 mg/dL    Albumin 2.0 (L) 3.4 - 5.0 g/dL   Coagulation Screen   Result Value Ref Range    Protime 35.7 (H) 9.8 - 12.8 seconds    INR 3.1 (H) 0.9 - 1.1    aPTT 37 27 - 38 seconds   Vancomycin   Result Value Ref Range    Vancomycin 49.2 (H) 5.0 - 20.0 ug/mL   POCT GLUCOSE    Result Value Ref Range    POCT Glucose 77 74 - 99 mg/dL     Scheduled medications  [Held by provider] allopurinol, 100 mg, oral, Daily  atorvastatin, 40 mg, oral, Nightly  B complex-vitamin C-folic acid, 1 capsule, oral, Daily  calcitriol, 0.25 mcg, oral, Every Mon/Wed/Fri  cholecalciferol, 5,000 Units, oral, Daily  dextrose 1.5% - LOW calcium 2.5mEq/L 2,000 mL with heparin 1,000 Units, cefTAZidime 4,000 mg, vancomycin 6 g peritoneal dialysate, , intraperitoneal, q24h  dextrose 1.5% - LOW calcium 2.5mEq/L 5,000 mL peritoneal dialysate, , intraperitoneal, q24h  dextrose 1.5% - LOW calcium 2.5mEq/L 5,000 mL peritoneal dialysate, , intraperitoneal, q24h  gentamicin, , Topical, q24h  levothyroxine, 25 mcg, oral, Daily before breakfast  lidocaine, 1 Application, Topical, Once  lidocaine, 1 patch, transdermal, Daily  lipase-protease-amylase, 3 capsule, oral, TID  magnesium sulfate, 4 g, intravenous, Once  melatonin, 10 mg, oral, Nightly  mirtazapine, 15 mg, oral, Nightly  nystatin, 5 mL, Swish & Swallow, q6h AMARJIT  pantoprazole, 40 mg, intravenous, Daily  sertraline, 50 mg, oral, Daily  sevelamer carbonate, 800 mg, oral, TID  [Held by provider] warfarin, 3 mg, oral, Daily      Continuous medications       PRN medications  PRN medications: acetaminophen, dextrose, dextrose, glucagon, glucagon, ondansetron, traMADol    Assessment & Plan  65 y/o W with PMHx of ESRD on PD c/b 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 Daily marijuana use c/f cyclic vomiting syndrome vs. cannabinoid hyperemesis syndrome. Patient also found to have new pulmonary embolism with subtherapeutic INR 1.7, however is hemodynamically stable overall and is on room air.     Update 6/5:   - Nephrology following   - Advance from clears to full liquid diet; sx overall improving   - Holding warfarin dose today per pharmacy recs given INR 3.1 today   - Bcx (6/2): NGTD   - Peritoneal fluid Cx (6/2): no organisms, 4+ PMNs  - Peritoneal fluid Cx (6/3): Gram (+) cocci   - Dispo planning: PT and OT rec'd SNF      # Abdominal pain  # Nausea/Vomiting  :: Most concerning for SBO vs. chronic mesenteric ischemia  :: Prior admission with peritoneal fluid positive for staph epi thought to be contaminant, blood cultures negative. ID was sonsulted and did not think patient had true peritonitis.  :: Current presentation with CT A/P finding of 6.7 x 7.3 cm pseudoaneurysm or hematoma close to left bypass graft   :: NG removed on 6/3   - ACS consulted, no surgical interventions   - Zofran prn for nausea, normal QTc   - Tylenol prn and tramadol 50 q8h prn for pain  - Pantoprazole 40 mg IV  - Cont. nystatin swish + swallow (6/3 - ) per nephrology recs   - Cont. intraperitoneal ceftazidime (6/3 - ) empiric  coverage per nephro  - Cont. intraperitoneal vancomycin (6/3 - ) empiric coverage per nephro  - On full iquid diet      # New pulmonary embolism   :: Subtherapeutic INR 1.7 while on Warfarin  :: Low risk with negative RV to LV ratio 0.8, PESI score 73 low risk, hemodynamically stable, no oxygen requirement  :: BNP > 5000 chronically 2/2 to HFrEF  :: DVT US LE with no acute DVT but 2.02 cm x 2.78 cm non-vascularized hypoechoic cystic structure seen in left groin   - Needs Coumadin clinic  - Cont home Warfarin 3 mg daily- HOLDING on 6/5 given INR 3.1      # 5.5 cm descending aortic aneurysm  # Left common iliac occlusion - chronic  # Hx of multiple dissections   :: 5.4 cm prior  - Vascular surgery consulted in ED for L renal artery stent thrombosis, no surgical interventions, follow up in clinic     # ESRD   :: On peritoneal dialysis at home  - Consulted nephrology for peritoneal dialysis supplies  - Continue Sevelamer and renal multivitamins  - Monitor electrolytes     #HFrEF (EF 15-30% in 03/2023)  :: Not on GDMT at home  :: Not candidate for Archie or ACE/ARB/ARNi given ESRD  - Consider Hydralazine/Isordil for GDMT close to discharge     #Chronic diarrhea   - Holding Lomotil for now c/f SBO  - per renal, needs to have at least one BM a day     # Hypothyroidism  - Continue synthyroid     # Chronic pancreatitis  -continue home creon        N: full liquid   A: PIV     DVT ppx: warfarin   GI ppx: IV pantoprazole    Code Status: Full Code (confirmed on Admission)  Surrogate Medical Decision-maker: sister Farida , Su Nair (best friend) [phone]

## 2024-06-06 LAB
ALBUMIN SERPL BCP-MCNC: 2.1 G/DL (ref 3.4–5)
ANION GAP SERPL CALC-SCNC: 18 MMOL/L (ref 10–20)
APTT PPP: 42 SECONDS (ref 27–38)
BACTERIA BLD CULT: NORMAL
BACTERIA BLD CULT: NORMAL
BASOPHILS # BLD AUTO: 0.02 X10*3/UL (ref 0–0.1)
BASOPHILS NFR BLD AUTO: 0.3 %
BASOPHILS NFR FLD MANUAL: 0 %
BLASTS NFR FLD MANUAL: 0 %
BUN SERPL-MCNC: 44 MG/DL (ref 6–23)
CALCIUM SERPL-MCNC: 8.1 MG/DL (ref 8.6–10.6)
CHLORIDE SERPL-SCNC: 97 MMOL/L (ref 98–107)
CLARITY FLD: CLEAR
CO2 SERPL-SCNC: 24 MMOL/L (ref 21–32)
COLOR FLD: NORMAL
CREAT SERPL-MCNC: 6.68 MG/DL (ref 0.5–1.05)
EGFRCR SERPLBLD CKD-EPI 2021: 6 ML/MIN/1.73M*2
EOSINOPHIL # BLD AUTO: 0.1 X10*3/UL (ref 0–0.7)
EOSINOPHIL NFR BLD AUTO: 1.4 %
EOSINOPHIL NFR FLD MANUAL: 2 %
ERYTHROCYTE [DISTWIDTH] IN BLOOD BY AUTOMATED COUNT: 17.2 % (ref 11.5–14.5)
GLUCOSE BLD MANUAL STRIP-MCNC: 112 MG/DL (ref 74–99)
GLUCOSE BLD MANUAL STRIP-MCNC: 113 MG/DL (ref 74–99)
GLUCOSE BLD MANUAL STRIP-MCNC: 128 MG/DL (ref 74–99)
GLUCOSE SERPL-MCNC: 125 MG/DL (ref 74–99)
HCT VFR BLD AUTO: 30.7 % (ref 36–46)
HGB BLD-MCNC: 10.3 G/DL (ref 12–16)
IMM GRANULOCYTES # BLD AUTO: 0.13 X10*3/UL (ref 0–0.7)
IMM GRANULOCYTES NFR BLD AUTO: 1.8 % (ref 0–0.9)
IMMATURE GRANULOCYTES IN FLUID: 0 %
INR PPP: 4.2 (ref 0.9–1.1)
LYMPHOCYTES # BLD AUTO: 1.17 X10*3/UL (ref 1.2–4.8)
LYMPHOCYTES NFR BLD AUTO: 16.6 %
LYMPHOCYTES NFR FLD MANUAL: 20 %
MAGNESIUM SERPL-MCNC: 1.91 MG/DL (ref 1.6–2.4)
MCH RBC QN AUTO: 31 PG (ref 26–34)
MCHC RBC AUTO-ENTMCNC: 33.6 G/DL (ref 32–36)
MCV RBC AUTO: 93 FL (ref 80–100)
MONOCYTES # BLD AUTO: 0.4 X10*3/UL (ref 0.1–1)
MONOCYTES NFR BLD AUTO: 5.7 %
MONOS+MACROS NFR FLD MANUAL: 49 %
NEUTROPHILS # BLD AUTO: 5.21 X10*3/UL (ref 1.2–7.7)
NEUTROPHILS NFR BLD AUTO: 74.2 %
NEUTROPHILS NFR FLD MANUAL: 29 %
NRBC BLD-RTO: 0 /100 WBCS (ref 0–0)
OTHER CELLS NFR FLD MANUAL: 0 %
PHOSPHATE SERPL-MCNC: 5.6 MG/DL (ref 2.5–4.9)
PLASMA CELLS NFR FLD MANUAL: 0 %
PLATELET # BLD AUTO: 188 X10*3/UL (ref 150–450)
POTASSIUM SERPL-SCNC: 4.2 MMOL/L (ref 3.5–5.3)
PROTHROMBIN TIME: 48.5 SECONDS (ref 9.8–12.8)
RBC # BLD AUTO: 3.32 X10*6/UL (ref 4–5.2)
RBC # FLD AUTO: 7 /UL
SODIUM SERPL-SCNC: 135 MMOL/L (ref 136–145)
TOTAL CELLS COUNTED FLD: 100
VANCOMYCIN SERPL-MCNC: 32.9 UG/ML (ref 5–20)
WBC # BLD AUTO: 7 X10*3/UL (ref 4.4–11.3)
WBC # FLD AUTO: 12 /UL

## 2024-06-06 PROCEDURE — 89051 BODY FLUID CELL COUNT: CPT | Performed by: INTERNAL MEDICINE

## 2024-06-06 PROCEDURE — 80202 ASSAY OF VANCOMYCIN: CPT

## 2024-06-06 PROCEDURE — 2500000001 HC RX 250 WO HCPCS SELF ADMINISTERED DRUGS (ALT 637 FOR MEDICARE OP)

## 2024-06-06 PROCEDURE — 85610 PROTHROMBIN TIME: CPT

## 2024-06-06 PROCEDURE — 2500000002 HC RX 250 W HCPCS SELF ADMINISTERED DRUGS (ALT 637 FOR MEDICARE OP, ALT 636 FOR OP/ED)

## 2024-06-06 PROCEDURE — 2500000004 HC RX 250 GENERAL PHARMACY W/ HCPCS (ALT 636 FOR OP/ED): Performed by: INTERNAL MEDICINE

## 2024-06-06 PROCEDURE — 36415 COLL VENOUS BLD VENIPUNCTURE: CPT

## 2024-06-06 PROCEDURE — 99232 SBSQ HOSP IP/OBS MODERATE 35: CPT | Performed by: STUDENT IN AN ORGANIZED HEALTH CARE EDUCATION/TRAINING PROGRAM

## 2024-06-06 PROCEDURE — 82947 ASSAY GLUCOSE BLOOD QUANT: CPT

## 2024-06-06 PROCEDURE — 2500000001 HC RX 250 WO HCPCS SELF ADMINISTERED DRUGS (ALT 637 FOR MEDICARE OP): Performed by: STUDENT IN AN ORGANIZED HEALTH CARE EDUCATION/TRAINING PROGRAM

## 2024-06-06 PROCEDURE — 1210000001 HC SEMI-PRIVATE ROOM DAILY

## 2024-06-06 PROCEDURE — 99232 SBSQ HOSP IP/OBS MODERATE 35: CPT

## 2024-06-06 PROCEDURE — 90947 DIALYSIS REPEATED EVAL: CPT

## 2024-06-06 PROCEDURE — 2500000004 HC RX 250 GENERAL PHARMACY W/ HCPCS (ALT 636 FOR OP/ED)

## 2024-06-06 PROCEDURE — 2500000001 HC RX 250 WO HCPCS SELF ADMINISTERED DRUGS (ALT 637 FOR MEDICARE OP): Performed by: INTERNAL MEDICINE

## 2024-06-06 PROCEDURE — 85025 COMPLETE CBC W/AUTO DIFF WBC: CPT

## 2024-06-06 PROCEDURE — 97110 THERAPEUTIC EXERCISES: CPT | Mod: GP

## 2024-06-06 PROCEDURE — 80069 RENAL FUNCTION PANEL: CPT

## 2024-06-06 PROCEDURE — 83735 ASSAY OF MAGNESIUM: CPT

## 2024-06-06 RX ADMIN — NYSTATIN 500000 UNITS: 100000 SUSPENSION ORAL at 06:08

## 2024-06-06 RX ADMIN — TRAMADOL HYDROCHLORIDE 50 MG: 50 TABLET, COATED ORAL at 22:11

## 2024-06-06 RX ADMIN — NYSTATIN 500000 UNITS: 100000 SUSPENSION ORAL at 13:13

## 2024-06-06 RX ADMIN — PANCRELIPASE 3 CAPSULE: 120000; 24000; 76000 CAPSULE, DELAYED RELEASE PELLETS ORAL at 09:27

## 2024-06-06 RX ADMIN — ATORVASTATIN CALCIUM 40 MG: 40 TABLET, FILM COATED ORAL at 22:10

## 2024-06-06 RX ADMIN — SODIUM CHLORIDE, SODIUM LACTATE, CALCIUM CHLORIDE, MAGNESIUM CHLORIDE AND DEXTROSE: 1.5; 538; 448; 18.3; 5.08 INJECTION, SOLUTION INTRAPERITONEAL at 15:34

## 2024-06-06 RX ADMIN — PANTOPRAZOLE SODIUM 40 MG: 40 TABLET, DELAYED RELEASE ORAL at 06:08

## 2024-06-06 RX ADMIN — NYSTATIN 500000 UNITS: 100000 SUSPENSION ORAL at 22:10

## 2024-06-06 RX ADMIN — SEVELAMER CARBONATE 800 MG: 800 TABLET, FILM COATED ORAL at 09:28

## 2024-06-06 RX ADMIN — Medication 5000 UNITS: at 09:28

## 2024-06-06 RX ADMIN — SODIUM CHLORIDE, SODIUM LACTATE, CALCIUM CHLORIDE, MAGNESIUM CHLORIDE AND DEXTROSE: 2.5; 538; 448; 18.3; 5.08 INJECTION, SOLUTION INTRAPERITONEAL at 15:34

## 2024-06-06 RX ADMIN — ALLOPURINOL 100 MG: 100 TABLET ORAL at 09:28

## 2024-06-06 RX ADMIN — GENTAMICIN SULFATE: 1 CREAM TOPICAL at 15:37

## 2024-06-06 RX ADMIN — ASCORBIC ACID, THIAMINE MONONITRATE,RIBOFLAVIN, NIACINAMIDE, PYRIDOXINE HYDROCHLORIDE, FOLIC ACID, CYANOCOBALAMIN, BIOTIN, CALCIUM PANTOTHENATE, 1 CAPSULE: 100; 1.5; 1.7; 20; 10; 1; 6000; 150000; 5 CAPSULE, LIQUID FILLED ORAL at 09:28

## 2024-06-06 RX ADMIN — MIRTAZAPINE 15 MG: 15 TABLET, FILM COATED ORAL at 22:12

## 2024-06-06 RX ADMIN — PANCRELIPASE 3 CAPSULE: 120000; 24000; 76000 CAPSULE, DELAYED RELEASE PELLETS ORAL at 13:08

## 2024-06-06 RX ADMIN — LEVOTHYROXINE SODIUM 25 MCG: 0.05 TABLET ORAL at 06:08

## 2024-06-06 RX ADMIN — SEVELAMER CARBONATE 800 MG: 800 TABLET, FILM COATED ORAL at 13:08

## 2024-06-06 RX ADMIN — Medication 10 MG: at 22:11

## 2024-06-06 ASSESSMENT — PAIN - FUNCTIONAL ASSESSMENT
PAIN_FUNCTIONAL_ASSESSMENT: 0-10

## 2024-06-06 ASSESSMENT — COGNITIVE AND FUNCTIONAL STATUS - GENERAL
HELP NEEDED FOR BATHING: A LOT
MOVING TO AND FROM BED TO CHAIR: TOTAL
TURNING FROM BACK TO SIDE WHILE IN FLAT BAD: A LITTLE
DRESSING REGULAR LOWER BODY CLOTHING: A LOT
DRESSING REGULAR UPPER BODY CLOTHING: A LITTLE
DAILY ACTIVITIY SCORE: 15
CLIMB 3 TO 5 STEPS WITH RAILING: TOTAL
TURNING FROM BACK TO SIDE WHILE IN FLAT BAD: A LITTLE
PERSONAL GROOMING: A LITTLE
WALKING IN HOSPITAL ROOM: TOTAL
MOBILITY SCORE: 10
TOILETING: A LOT
WALKING IN HOSPITAL ROOM: TOTAL
MOVING FROM LYING ON BACK TO SITTING ON SIDE OF FLAT BED WITH BEDRAILS: A LITTLE
STANDING UP FROM CHAIR USING ARMS: TOTAL
EATING MEALS: A LITTLE
MOBILITY SCORE: 10
MOVING FROM LYING ON BACK TO SITTING ON SIDE OF FLAT BED WITH BEDRAILS: A LITTLE
MOVING TO AND FROM BED TO CHAIR: TOTAL
STANDING UP FROM CHAIR USING ARMS: TOTAL
CLIMB 3 TO 5 STEPS WITH RAILING: TOTAL

## 2024-06-06 ASSESSMENT — PAIN SCALES - GENERAL
PAINLEVEL_OUTOF10: 4
PAINLEVEL_OUTOF10: 6
PAINLEVEL_OUTOF10: 9

## 2024-06-06 NOTE — PROGRESS NOTES
"Yoselyn Hernandez is a 64 y.o. female on day 4 of admission presenting with Generalized abdominal pain.    Subjective   No acute events. States that abdominal pain is much improved. Denies any nausea/vomiting. Feels a bit fatigued/low energy overall. Denies any other new issues. Tolerating full liquid diet.        Objective     Physical Exam  Constitutional: in NAD  HEENT: sclerae anicteric, EOM grossly intact  CV: regular rate, regular rhythm, no murmurs noted  Pulm: CTAB, no increased WOB  GI: abdomen soft, non-tender, peritoneal tube in place with no leakage visible or purulence  Skin: warm and dry  Ext: bilateral LE with +2 pitting edema up to the knees, left leg more edematous, non-tender  Neuro: alert and conversant  Psych: affect appropriate    Last Recorded Vitals  Blood pressure 114/69, pulse 103, temperature 37 °C (98.6 °F), resp. rate 16, height 1.702 m (5' 7\"), weight 65.3 kg (144 lb), SpO2 96%.  Intake/Output last 3 Shifts:  I/O last 3 completed shifts:  In: - (0 mL/kg)   Out: 183 (2.8 mL/kg) [Other:183]  Weight: 65.3 kg     Relevant Results  Results for orders placed or performed during the hospital encounter of 06/02/24 (from the past 24 hour(s))   Body Fluid Cell Count   Result Value Ref Range    Color, Fluid Colorless Colorless, Straw, Yellow    Clarity, Fluid Clear Clear    WBC, Fluid 107 See Comment /uL    RBC, Fluid 0 0  /uL /uL   Body Fluid Differential   Result Value Ref Range    Neutrophils %, Manual, Fluid 38 <25 % %    Lymphocytes %, Manual, Fluid 11 <75 % %    Mono/Macrophages %, Manual, Fluid 51 <70 % %    Eosinophils %, Manual, Fluid 0 0 % %    Basophils %, Manual, Fluid 0 0 % %    Immature Granulocytes %, Manual, Fluid 0 0 % %    Blasts %, Manual, Fluid 0 0 % %    Unclassified Cells %, Manual, Fluid 0 0 % %    Plasma Cells %, Manual, Fluid 0 0 % %    Total Cells Counted, Fluid 100    POCT GLUCOSE   Result Value Ref Range    POCT Glucose 91 74 - 99 mg/dL   POCT GLUCOSE   Result Value Ref " Range    POCT Glucose 158 (H) 74 - 99 mg/dL   CBC and Auto Differential   Result Value Ref Range    WBC 7.0 4.4 - 11.3 x10*3/uL    nRBC 0.0 0.0 - 0.0 /100 WBCs    RBC 3.32 (L) 4.00 - 5.20 x10*6/uL    Hemoglobin 10.3 (L) 12.0 - 16.0 g/dL    Hematocrit 30.7 (L) 36.0 - 46.0 %    MCV 93 80 - 100 fL    MCH 31.0 26.0 - 34.0 pg    MCHC 33.6 32.0 - 36.0 g/dL    RDW 17.2 (H) 11.5 - 14.5 %    Platelets 188 150 - 450 x10*3/uL    Neutrophils % 74.2 40.0 - 80.0 %    Immature Granulocytes %, Automated 1.8 (H) 0.0 - 0.9 %    Lymphocytes % 16.6 13.0 - 44.0 %    Monocytes % 5.7 2.0 - 10.0 %    Eosinophils % 1.4 0.0 - 6.0 %    Basophils % 0.3 0.0 - 2.0 %    Neutrophils Absolute 5.21 1.20 - 7.70 x10*3/uL    Immature Granulocytes Absolute, Automated 0.13 0.00 - 0.70 x10*3/uL    Lymphocytes Absolute 1.17 (L) 1.20 - 4.80 x10*3/uL    Monocytes Absolute 0.40 0.10 - 1.00 x10*3/uL    Eosinophils Absolute 0.10 0.00 - 0.70 x10*3/uL    Basophils Absolute 0.02 0.00 - 0.10 x10*3/uL   Magnesium   Result Value Ref Range    Magnesium 1.91 1.60 - 2.40 mg/dL   Renal function panel   Result Value Ref Range    Glucose 125 (H) 74 - 99 mg/dL    Sodium 135 (L) 136 - 145 mmol/L    Potassium 4.2 3.5 - 5.3 mmol/L    Chloride 97 (L) 98 - 107 mmol/L    Bicarbonate 24 21 - 32 mmol/L    Anion Gap 18 10 - 20 mmol/L    Urea Nitrogen 44 (H) 6 - 23 mg/dL    Creatinine 6.68 (H) 0.50 - 1.05 mg/dL    eGFR 6 (L) >60 mL/min/1.73m*2    Calcium 8.1 (L) 8.6 - 10.6 mg/dL    Phosphorus 5.6 (H) 2.5 - 4.9 mg/dL    Albumin 2.1 (L) 3.4 - 5.0 g/dL   Coagulation Screen   Result Value Ref Range    Protime 48.5 (H) 9.8 - 12.8 seconds    INR 4.2 (H) 0.9 - 1.1    aPTT 42 (H) 27 - 38 seconds   POCT GLUCOSE   Result Value Ref Range    POCT Glucose 112 (H) 74 - 99 mg/dL     Scheduled medications  allopurinol, 100 mg, oral, Daily  atorvastatin, 40 mg, oral, Nightly  B complex-vitamin C-folic acid, 1 capsule, oral, Daily  calcitriol, 0.25 mcg, oral, Every Mon/Wed/Fri  cholecalciferol, 5,000  Units, oral, Daily  dextrose 1.5% - LOW calcium 2.5mEq/L 5,000 mL peritoneal dialysate, , intraperitoneal, q24h  dextrose 2.5 % - LOW calcium 2.5 mEq/L 2,000 mL with heparin 1,000 Units peritoneal dialysate, , intraperitoneal, q24h  dextrose 2.5 % - LOW calcium 2.5 mEq/L 5,000 mL peritoneal dialysate, , intraperitoneal, q24h  pantoprazole, 40 mg, oral, Daily before breakfast   Or  esomeprazole, 40 mg, oral, Daily before breakfast   Or  pantoprazole, 40 mg, intravenous, Daily before breakfast  gentamicin, , Topical, q24h  levothyroxine, 25 mcg, oral, Daily before breakfast  lidocaine, 1 Application, Topical, Once  lidocaine, 1 patch, transdermal, Daily  lipase-protease-amylase, 3 capsule, oral, TID  melatonin, 10 mg, oral, Nightly  mirtazapine, 15 mg, oral, Nightly  nystatin, 5 mL, Swish & Swallow, q6h AMARJIT  sertraline, 50 mg, oral, Daily  sevelamer carbonate, 800 mg, oral, TID  [Held by provider] warfarin, 3 mg, oral, Daily      Continuous medications       PRN medications  PRN medications: acetaminophen, dextrose, dextrose, glucagon, glucagon, ondansetron, traMADol    Assessment & Plan  63 y/o W with PMHx of ESRD on PD c/b 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 Daily marijuana use c/f cyclic vomiting syndrome vs. cannabinoid hyperemesis syndrome. Patient also found to have new pulmonary embolism with subtherapeutic INR 1.7, however is hemodynamically stable overall and is on room air.     Update 6/6:   - Nephrology following   - Discontinued ceftazidime yesterday per Nephro  - Advance from full liquid to solid foods; sx overall improving   - Holding warfarin dose today given INR 4.2 today   - Bcx (6/2): NGTD   - Peritoneal fluid Cx (6/2): rare strep mitis/oralis group   - Peritoneal fluid Cx (6/3): strep mitis/oralis group   - Dispo planning: PT and OT rec'd SNF- pending pre-cert       # PD associated peritonitis d/t strep mitis/oralis group   # Possible partial SBO   # Abdominal pain/Nausea/Vomiting  :: Most concerning for SBO vs. chronic mesenteric ischemia  :: Prior admission with peritoneal fluid positive for staph epi thought to be contaminant, blood cultures negative. ID was sonsulted and did not think patient had true peritonitis.  :: Current presentation with CT A/P finding of 6.7 x 7.3 cm pseudoaneurysm or hematoma close to left bypass graft   :: NG removed on 6/3   - ACS consulted, no surgical interventions   - Zofran prn for nausea, normal QTc   - Tylenol prn and tramadol 50 q8h prn for pain  - Pantoprazole 40 mg IV  - Cont. nystatin swish + swallow (6/3 - ) per nephrology recs   - S/p intraperitoneal ceftazidime (6/3 - 6/5) empiric coverage per nephro  - Cont. intraperitoneal vancomycin (6/3 - ) empiric coverage per nephro -  will need 2 wks of abx per nephrology   - On full iquid diet      # New pulmonary embolism   :: Subtherapeutic INR 1.7 while on Warfarin  :: Low risk with negative RV to LV ratio 0.8, PESI score 73 low risk, hemodynamically stable, no oxygen requirement  :: BNP > 5000 chronically 2/2 to HFrEF  :: DVT US LE with no acute DVT but 2.02 cm x 2.78 cm non-vascularized hypoechoic cystic structure seen in left groin   - Needs Coumadin clinic  - Cont home Warfarin 3 mg daily- HOLDING on 6/5 and 6/6 given supra-therapeutic INR      # 5.5 cm descending aortic aneurysm  # Left common iliac occlusion - chronic  # Hx of multiple dissections   :: 5.4 cm prior  - Vascular surgery consulted in ED for L renal artery stent thrombosis, no surgical interventions, follow up in clinic     # ESRD   :: On peritoneal dialysis at home  - Consulted nephrology for peritoneal dialysis supplies  - Continue Sevelamer and renal multivitamins  - Monitor electrolytes     #HFrEF (EF 15-30% in 03/2023)  :: Not on GDMT at home  :: Not candidate for [place] or ACE/ARB/ARNi given ESRD  - Consider Hydralazine/Isordil for GDMT close to discharge     #Chronic diarrhea   - Holding Lomotil for now c/f SBO  - per renal, needs to have at least one BM a day     # Hypothyroidism  - Continue synthyroid     # Chronic pancreatitis  -continue home creon        N: Renal  A: PIV     DVT ppx: warfarin   GI ppx: IV pantoprazole    Code Status: Full Code (confirmed on Admission)  Surrogate Medical Decision-maker: sister Farida , Su Nair (best friend) [phone]

## 2024-06-06 NOTE — PROGRESS NOTES
Yoselyn Hernandez   64 [address]    @@  [facility]/Room: 91314319/343/343-A    Subjective: Patient denies any complaints   Denies nausea, vomiting  PO intake is adequate  Denies fevers, abdominal pain is improving      Objective:     Meds:   allopurinol, 100 mg, Daily  atorvastatin, 40 mg, Nightly  B complex-vitamin C-folic acid, 1 capsule, Daily  calcitriol, 0.25 mcg, Every Mon/Wed/Fri  cholecalciferol, 5,000 Units, Daily  dextrose 1.5% - LOW calcium 2.5mEq/L 5,000 mL peritoneal dialysate, , q24h  dextrose 2.5 % - LOW calcium 2.5 mEq/L 2,000 mL with heparin 1,000 Units peritoneal dialysate, , q24h  dextrose 2.5 % - LOW calcium 2.5 mEq/L 5,000 mL peritoneal dialysate, , q24h  pantoprazole, 40 mg, Daily before breakfast   Or  esomeprazole, 40 mg, Daily before breakfast   Or  pantoprazole, 40 mg, Daily before breakfast  gentamicin, , q24h  levothyroxine, 25 mcg, Daily before breakfast  lidocaine, 1 Application, Once  lidocaine, 1 patch, Daily  lipase-protease-amylase, 3 capsule, TID  melatonin, 10 mg, Nightly  mirtazapine, 15 mg, Nightly  nystatin, 5 mL, q6h AMARJIT  sertraline, 50 mg, Daily  sevelamer carbonate, 800 mg, TID  [Held by provider] warfarin, 3 mg, Daily         acetaminophen, 975 mg, q8h PRN  dextrose, 12.5 g, q15 min PRN  dextrose, 25 g, q15 min PRN  glucagon, 1 mg, q15 min PRN  glucagon, 1 mg, q15 min PRN  ondansetron, 4 mg, q8h PRN  traMADol, 50 mg, q8h PRN        Vitals:    06/06/24 1433   BP: 111/71   Pulse: 97   Resp: 16   Temp: 36.5 °C (97.7 °F)   SpO2: 99%          Intake/Output Summary (Last 24 hours) at 6/6/2024 1631  Last data filed at 6/6/2024 0703  Gross per 24 hour   Intake --   Output 501 ml   Net -501 ml       General appearance: no distress  Eyes: non-icteric  Skin: no apparent rash  Heart: regular  Lungs: CTA bilat no wheezing/crackles  Abdomen: soft, nt/nd  Extremities: no edema bilat  Mckenna  Neuro: No FND,asterixis  Access    Blood Labs:  Results for orders placed or performed during the hospital  encounter of 06/02/24 (from the past 24 hour(s))   CBC and Auto Differential   Result Value Ref Range    WBC 7.0 4.4 - 11.3 x10*3/uL    nRBC 0.0 0.0 - 0.0 /100 WBCs    RBC 3.32 (L) 4.00 - 5.20 x10*6/uL    Hemoglobin 10.3 (L) 12.0 - 16.0 g/dL    Hematocrit 30.7 (L) 36.0 - 46.0 %    MCV 93 80 - 100 fL    MCH 31.0 26.0 - 34.0 pg    MCHC 33.6 32.0 - 36.0 g/dL    RDW 17.2 (H) 11.5 - 14.5 %    Platelets 188 150 - 450 x10*3/uL    Neutrophils % 74.2 40.0 - 80.0 %    Immature Granulocytes %, Automated 1.8 (H) 0.0 - 0.9 %    Lymphocytes % 16.6 13.0 - 44.0 %    Monocytes % 5.7 2.0 - 10.0 %    Eosinophils % 1.4 0.0 - 6.0 %    Basophils % 0.3 0.0 - 2.0 %    Neutrophils Absolute 5.21 1.20 - 7.70 x10*3/uL    Immature Granulocytes Absolute, Automated 0.13 0.00 - 0.70 x10*3/uL    Lymphocytes Absolute 1.17 (L) 1.20 - 4.80 x10*3/uL    Monocytes Absolute 0.40 0.10 - 1.00 x10*3/uL    Eosinophils Absolute 0.10 0.00 - 0.70 x10*3/uL    Basophils Absolute 0.02 0.00 - 0.10 x10*3/uL   Magnesium   Result Value Ref Range    Magnesium 1.91 1.60 - 2.40 mg/dL   Renal function panel   Result Value Ref Range    Glucose 125 (H) 74 - 99 mg/dL    Sodium 135 (L) 136 - 145 mmol/L    Potassium 4.2 3.5 - 5.3 mmol/L    Chloride 97 (L) 98 - 107 mmol/L    Bicarbonate 24 21 - 32 mmol/L    Anion Gap 18 10 - 20 mmol/L    Urea Nitrogen 44 (H) 6 - 23 mg/dL    Creatinine 6.68 (H) 0.50 - 1.05 mg/dL    eGFR 6 (L) >60 mL/min/1.73m*2    Calcium 8.1 (L) 8.6 - 10.6 mg/dL    Phosphorus 5.6 (H) 2.5 - 4.9 mg/dL    Albumin 2.1 (L) 3.4 - 5.0 g/dL   Coagulation Screen   Result Value Ref Range    Protime 48.5 (H) 9.8 - 12.8 seconds    INR 4.2 (H) 0.9 - 1.1    aPTT 42 (H) 27 - 38 seconds   Vancomycin   Result Value Ref Range    Vancomycin 32.9 (H) 5.0 - 20.0 ug/mL   POCT GLUCOSE   Result Value Ref Range    POCT Glucose 112 (H) 74 - 99 mg/dL   Body Fluid Cell Count   Result Value Ref Range    Color, Fluid Straw Colorless, Straw, Yellow    Clarity, Fluid Clear Clear    WBC, Fluid  12 See Comment /uL    RBC, Fluid 7 0  /uL /uL   Body Fluid Differential   Result Value Ref Range    Neutrophils %, Manual, Fluid 29 <25 % %    Lymphocytes %, Manual, Fluid 20 <75 % %    Mono/Macrophages %, Manual, Fluid 49 <70 % %    Eosinophils %, Manual, Fluid 2 0 % %    Basophils %, Manual, Fluid 0 0 % %    Immature Granulocytes %, Manual, Fluid 0 0 % %    Blasts %, Manual, Fluid 0 0 % %    Unclassified Cells %, Manual, Fluid 0 0 % %    Plasma Cells %, Manual, Fluid 0 0 % %    Total Cells Counted, Fluid 100    POCT GLUCOSE   Result Value Ref Range    POCT Glucose 128 (H) 74 - 99 mg/dL              ASSESSMENT:  Yoselyn Hernandez is a  64 y.o.  Year old with PMHx of ESRD on PD is admitted for abdominal pain from peritonitis. Nephrology is following for management of PD related issues.    Updates:  - cell count down trending WBC 12 from 107  - PD culture +ve Strep mitis - sensitive to vancomycin   - Vancomycin trough elevated to 32.9 (down trending from 49.2)   - Net     RECOMMENDATIONS:  - will plan to do the same PD prescription as yesterday. Will plan to hold vancomycin due to ongoing high troughs.  - daily cell count and differential   - continue nystatin swish and swallow, continue as long as patient is on antibiotic therapy  - Continue exit site care per protocol with gentamicin cream  - Continue sevelamer 800 mg p.o. 3 times daily with meals  - Please check daily standing weights      Tana Rodríguez DO  Nephrology Fellow   Daytime / Weekend Renal Pager [phone]  After 7 pm Emergencies [phone] Pager [phone]

## 2024-06-06 NOTE — NURSING NOTE
..Peritoneal Dialysis - CCPD    Completed Overnight Therapy   6/5/24     Full treatment received:               yes  I-Drain:    702 mL  Total UF:   299 mL  Combined 24 hour UF:        mL  Minicap placed:   Yes   Effluent Color:   yellow   Cloudy:             No    Fibrin:   no    Tonight's Therapy   6/6/24    Dialysis cycler primed:   yes  Patient connected:  yes  Therapy started at:  1550  [employer] Adaptor:  In place  Dressing changed:  yes  Gent. cream applied:  yes    Therapy Orders     Total time:  9 hours   Cycles:   5   Fill Volume:  1800 mL   Last fill:   500 mL   Last fill solution:  2.5% with heparin   Total volume:  16621 mL     Solution(s):  Dextrose 1.5% Dianeal 2.5 calcium-- 5000 mL (x1)   Additive(s): none        Dextrose 2.5% Dianeal 2.5 calcium-- 5000 mL (x1)   Additive(s): none     Last Fill:  Dextrose 2.5% Dianeal 2.5 calcium-- 2000 mL (x1)   Additive(s): Heparin      **Dialysis nurses in house Monday through Friday 4476-0490 to setup and disconnect patients,   please call our office at [phone], follow prompts for after hours paging.    **Machine Trouble Shooting: [employer] 24 hour technical support available at [phone]     **Bedside nursing staff to disconnect patients as needed outside of office hours.  -Instructions and supplies located on dialysis cart.  -Reference  policy [id number] -->

## 2024-06-06 NOTE — DISCHARGE INSTRUCTIONS
"Dear MsClary Mary,     It was a pleasure caring for  you while you were in the hospital. You were admitted for abdominal pain and there was concern that you were having an obstruction of your bowels. You were treated with a nasogastric tube to decompress your stomach and provided with bowel rest. While in the hospital, your pain improved and your diet was slowly advanced from liquids to solids. You were also treated with antibiotics for an intra-peritoneal infection (infection in the abdominal space). Please continue taking the intraperitoneal antibiotics (vancomycin) through 6/#/24. On presentation to the hospital, you were also found to have a blood clot in your lungs in the setting of a low INR level (measurement of blood clotting). This indicates that your warfarin level was likely not high enough in your blood. You were treated with an anticoagulant called \"heparin\" and then switched back to warfarin.     Follow ups:   [ ] PCP- regarding partial bowel obstruction, intraperitoneal infection, blood clot in lungs    [ ] Cardiology- regarding heart failure and blood clot in lungs    [ ] Nephrology- regarding end-stage kidney disease     Sincerely,   Your  Medicine Team   "

## 2024-06-06 NOTE — PROGRESS NOTES
Physical Therapy    Physical Therapy Treatment    Patient Name: Yoselyn Hernandez  MRN: [id number]  Today's Date: 6/6/2024  Time Calculation  Start Time: 1407  Stop Time: 1418  Time Calculation (min): 11 min       Assessment/Plan   PT Assessment  End of Session Communication: Bedside nurse  End of Session Patient Position: Bed, 3 rail up, Alarm off, not on at start of session     PT Plan  Treatment/Interventions: Bed mobility, Transfer training, Gait training, Balance training, Strengthening  PT Plan: Skilled PT  PT Frequency: 3 times per week  PT Discharge Recommendations: Moderate intensity level of continued care  PT Recommended Transfer Status: Assist x2  PT - OK to Discharge: Yes      General Visit Information:   PT  Visit  PT Received On: 06/06/24  Prior to Session Communication: Bedside nurse  Patient Position Received: Alarm off, not on at start of session (seated EOB)  General Comment: pt sitting EOB, c/o abdominal pain     Subjective   Precautions:  Precautions  Medical Precautions: Fall precautions       Objective   Pain:  Pain Assessment  Pain Assessment: 0-10  Pain Score: 9  Pain Location: Abdomen       Postural Control:     Static Sitting Balance  Static Sitting-Level of Assistance: Distant supervision  Dynamic Sitting Balance  Dynamic Sitting-Comments: SBA         PT Treatments:  Therapeutic Exercise  Therapeutic Exercise Performed: Yes  Therapeutic Exercise Activity 1: seated BLE x10: AP, LAQ, hip flex with rest breaks after each exercise; pt declined additional ther-ex; requested to return to supine        Bed Mobility  Bed Mobility: Yes  Bed Mobility 1  Bed Mobility 1: Sitting to supine  Level of Assistance 1: Minimum assistance  Bed Mobility Comments 1: verbal cues, use of bed rail     Transfers  Transfer: No (pt declined to attempt sit to stand)             Outcome Measures:  [facility] Basic Mobility  Turning from your back to your side while in a flat bed without using bedrails: A little  Moving from  lying on your back to sitting on the side of a flat bed without using bedrails: A little  Moving to and from bed to chair (including a wheelchair): Total  Standing up from a chair using your arms (e.g. wheelchair or bedside chair): Total  To walk in hospital room: Total  Climbing 3-5 steps with railing: Total  Basic Mobility - Total Score: 10                            Education Documentation  Precautions, taught by Madison Davison PT at 6/6/2024  2:28 PM.  Learner: Patient  Readiness: Acceptance  Method: Explanation  Response: Verbalizes Understanding    Mobility Training, taught by Madison Davison PT at 6/6/2024  2:28 PM.  Learner: Patient  Readiness: Acceptance  Method: Explanation  Response: Verbalizes Understanding    Education Comments  No comments found.               Encounter Problems       Encounter Problems (Active)       Balance       min assist static stance/mod assist dynamic stance with BUE support (Progressing)       Start:  06/04/24    Expected End:  06/25/24               Mobility       STG - Patient will ambulate >15 feet with walker with mod assist (Progressing)       Start:  06/04/24    Expected End:  06/25/24               PT Transfers       STG - Transfer from bed to chair with walker with mod assist (Progressing)       Start:  06/04/24    Expected End:  06/25/24            STG - Patient will perform bed mobility with supervision (Progressing)       Start:  06/04/24    Expected End:  06/25/24            STG - Patient will transfer sit to and from stand with walker with mod assist (Progressing)       Start:  06/04/24    Expected End:  06/25/24               Pain - Adult                06/06/24 at 2:29 PM   Madison Davison, PT

## 2024-06-06 NOTE — DOCUMENTATION CLARIFICATION NOTE
PATIENT:               NICOLAS ALFONSO  ACCT #:                  [id number]  MRN:                       [id number]  :                       1960  ADMIT DATE:       2024 10:24 AM  DISCH DATE:  RESPONDING PROVIDER #:        [id number]          PROVIDER RESPONSE TEXT:    Severe Protein Calorie Malnutrition    CDI QUERY TEXT:    Clarification    Instruction:    Based on your assessment of the patient and the clinical information, please provide the requested documentation by clicking on the appropriate radio button and enter any additional information if prompted.    Question: Please further clarify this patient nutritional status as    When answering this query, please exercise your independent professional judgment. The fact that a question is being asked, does not imply that any particular answer is desired or expected.    The patient's clinical indicators include:  Clinical Information: 64 YOF w/PMH s/f ESRD on PD, recurrent peritonitis episodes, multiple AAA dissections, HFrEF, pancreatitis, chronic diarrhea, PE/DVT on coumadin, HTN, and s/p multiple renal artery grafts. Presented to ED for N/V and abd pain.    Clinical Indicators:  Dietician consult: Severe malnutrition related to chronic disease or condition  As Evidenced by: severe muscle wasting, severe fat loss and chronically underweight.    BMI 22.55    Treatment: Dietician consult. Low phos restricted diet with medical food supplement. Ensure Clear TID. Vitamin supplements.    Risk Factors: Older woman. Chronic illness w/ESRD, pancreatitis, diarrhea, peritonitis.  Options provided:  -- Severe Protein Calorie Malnutrition  -- Protein Calorie Malnutrition, Please specify severity  -- Other - I will add my own diagnosis  -- Refer to Clinical Documentation Reviewer    Query created by: Sherie Jack on 2024 7:41 PM      Electronically signed by:  JANES BRADSHAW MD 2024 9:47 AM

## 2024-06-06 NOTE — NURSING NOTE
..Peritoneal Dialysis - CCPD    Completed Overnight Therapy   6/5/24    Full treatment received:               yes  I-Drain:    702 mL  Total UF:   299 mL  Combined 24 hour UF:  501 mL  Minicap placed:   yes  Effluent Color:   yellow   Cloudy:   minimal   Fibrin:   no    Tonight's Therapy       T  **Dialysis nurses in house Monday through Friday 3209-1772 to setup and disconnect patients,   please call our office at [phone], follow prompts for after hours paging.    **Machine Trouble Shooting: [employer] 24 hour technical support available at [phone]     **Bedside nursing staff to disconnect patients as needed outside of office hours.  -Instructions and supplies located on dialysis cart.  -Reference  policy [id number] -->

## 2024-06-06 NOTE — PROGRESS NOTES
Transitional Care Coordination Progress Note:  Patient discussed during interdisciplinary rounds.   Team members present: MD, ANGEL  Plan per Medical/Surgical team: Generalized Abd Pain  Payor: [employer]  Discharge disposition: [facility]  Potential Barriers: insurance authorization  ADOD: 1-2 days  Per MD, patient will discharge on intraperitoneal Vancomycin 1.5 G, stop date 6/17. Facility updated by DSC. Precert pending for facility. Escalated this date.     Flower LAWRENCEN, RN  Transitional Care Coordinator (TCC)  [phone]

## 2024-06-06 NOTE — CARE PLAN
Problem: Pain - Adult  Goal: Verbalizes/displays adequate comfort level or baseline comfort level  Outcome: Progressing     Problem: Safety - Adult  Goal: Free from fall injury  Outcome: Progressing     Problem: Discharge Planning  Goal: Discharge to home or other facility with appropriate resources  Outcome: Progressing     Problem: Chronic Conditions and Co-morbidities  Goal: Patient's chronic conditions and co-morbidity symptoms are monitored and maintained or improved  Outcome: Progressing     Problem: Skin  Goal: Decreased wound size/increased tissue granulation at next dressing change  Outcome: Progressing  Goal: Participates in plan/prevention/treatment measures  Outcome: Progressing  Goal: Prevent/manage excess moisture  Outcome: Progressing  Goal: Prevent/minimize sheer/friction injuries  Outcome: Progressing  Goal: Promote/optimize nutrition  Outcome: Progressing  Goal: Promote skin healing  Outcome: Progressing     Problem: Fall/Injury  Goal: Not fall by end of shift  Outcome: Progressing  Goal: Be free from injury by end of the shift  Outcome: Progressing  Goal: Verbalize understanding of personal risk factors for fall in the hospital  Outcome: Progressing  Goal: Verbalize understanding of risk factor reduction measures to prevent injury from fall in the home  Outcome: Progressing  Goal: Use assistive devices by end of the shift  Outcome: Progressing  Goal: Pace activities to prevent fatigue by end of the shift  Outcome: Progressing   The patient's goals for the shift include      The clinical goals for the shift include improved po tolerance       back

## 2024-06-07 LAB
ALBUMIN SERPL BCP-MCNC: 2.3 G/DL (ref 3.4–5)
ANION GAP SERPL CALC-SCNC: 20 MMOL/L (ref 10–20)
APTT PPP: 46 SECONDS (ref 27–38)
BACTERIA DIAFP CULT: ABNORMAL
BASOPHILS # BLD AUTO: 0.01 X10*3/UL (ref 0–0.1)
BASOPHILS NFR BLD AUTO: 0.1 %
BUN SERPL-MCNC: 39 MG/DL (ref 6–23)
CALCIUM SERPL-MCNC: 8.6 MG/DL (ref 8.6–10.6)
CHLORIDE SERPL-SCNC: 96 MMOL/L (ref 98–107)
CO2 SERPL-SCNC: 25 MMOL/L (ref 21–32)
CREAT SERPL-MCNC: 6.54 MG/DL (ref 0.5–1.05)
EGFRCR SERPLBLD CKD-EPI 2021: 7 ML/MIN/1.73M*2
EOSINOPHIL # BLD AUTO: 0.1 X10*3/UL (ref 0–0.7)
EOSINOPHIL NFR BLD AUTO: 1.2 %
ERYTHROCYTE [DISTWIDTH] IN BLOOD BY AUTOMATED COUNT: 17.2 % (ref 11.5–14.5)
GLUCOSE BLD MANUAL STRIP-MCNC: 81 MG/DL (ref 74–99)
GLUCOSE BLD MANUAL STRIP-MCNC: 86 MG/DL (ref 74–99)
GLUCOSE BLD MANUAL STRIP-MCNC: 97 MG/DL (ref 74–99)
GLUCOSE SERPL-MCNC: 73 MG/DL (ref 74–99)
GRAM STAIN: ABNORMAL
GRAM STN SPEC: ABNORMAL
HCT VFR BLD AUTO: 32.1 % (ref 36–46)
HGB BLD-MCNC: 10.4 G/DL (ref 12–16)
IMM GRANULOCYTES # BLD AUTO: 0.04 X10*3/UL (ref 0–0.7)
IMM GRANULOCYTES NFR BLD AUTO: 0.5 % (ref 0–0.9)
INR PPP: 3.8 (ref 0.9–1.1)
LYMPHOCYTES # BLD AUTO: 1.04 X10*3/UL (ref 1.2–4.8)
LYMPHOCYTES NFR BLD AUTO: 12.2 %
MAGNESIUM SERPL-MCNC: 2.04 MG/DL (ref 1.6–2.4)
MCH RBC QN AUTO: 30.5 PG (ref 26–34)
MCHC RBC AUTO-ENTMCNC: 32.4 G/DL (ref 32–36)
MCV RBC AUTO: 94 FL (ref 80–100)
MONOCYTES # BLD AUTO: 0.32 X10*3/UL (ref 0.1–1)
MONOCYTES NFR BLD AUTO: 3.7 %
NEUTROPHILS # BLD AUTO: 7.03 X10*3/UL (ref 1.2–7.7)
NEUTROPHILS NFR BLD AUTO: 82.3 %
NRBC BLD-RTO: 0 /100 WBCS (ref 0–0)
PHOSPHATE SERPL-MCNC: 5.5 MG/DL (ref 2.5–4.9)
PLATELET # BLD AUTO: 193 X10*3/UL (ref 150–450)
POTASSIUM SERPL-SCNC: 4 MMOL/L (ref 3.5–5.3)
PROTHROMBIN TIME: 43.9 SECONDS (ref 9.8–12.8)
RBC # BLD AUTO: 3.41 X10*6/UL (ref 4–5.2)
SODIUM SERPL-SCNC: 137 MMOL/L (ref 136–145)
VANCOMYCIN SERPL-MCNC: 36.9 UG/ML (ref 5–20)
WBC # BLD AUTO: 8.5 X10*3/UL (ref 4.4–11.3)

## 2024-06-07 PROCEDURE — 2500000001 HC RX 250 WO HCPCS SELF ADMINISTERED DRUGS (ALT 637 FOR MEDICARE OP)

## 2024-06-07 PROCEDURE — 85610 PROTHROMBIN TIME: CPT

## 2024-06-07 PROCEDURE — 1210000001 HC SEMI-PRIVATE ROOM DAILY

## 2024-06-07 PROCEDURE — 2500000002 HC RX 250 W HCPCS SELF ADMINISTERED DRUGS (ALT 637 FOR MEDICARE OP, ALT 636 FOR OP/ED)

## 2024-06-07 PROCEDURE — 2500000004 HC RX 250 GENERAL PHARMACY W/ HCPCS (ALT 636 FOR OP/ED)

## 2024-06-07 PROCEDURE — 80069 RENAL FUNCTION PANEL: CPT

## 2024-06-07 PROCEDURE — 80202 ASSAY OF VANCOMYCIN: CPT

## 2024-06-07 PROCEDURE — 36415 COLL VENOUS BLD VENIPUNCTURE: CPT

## 2024-06-07 PROCEDURE — 99232 SBSQ HOSP IP/OBS MODERATE 35: CPT | Performed by: STUDENT IN AN ORGANIZED HEALTH CARE EDUCATION/TRAINING PROGRAM

## 2024-06-07 PROCEDURE — 85025 COMPLETE CBC W/AUTO DIFF WBC: CPT

## 2024-06-07 PROCEDURE — 2500000004 HC RX 250 GENERAL PHARMACY W/ HCPCS (ALT 636 FOR OP/ED): Performed by: STUDENT IN AN ORGANIZED HEALTH CARE EDUCATION/TRAINING PROGRAM

## 2024-06-07 PROCEDURE — 2500000001 HC RX 250 WO HCPCS SELF ADMINISTERED DRUGS (ALT 637 FOR MEDICARE OP): Performed by: STUDENT IN AN ORGANIZED HEALTH CARE EDUCATION/TRAINING PROGRAM

## 2024-06-07 PROCEDURE — 99232 SBSQ HOSP IP/OBS MODERATE 35: CPT | Performed by: INTERNAL MEDICINE

## 2024-06-07 PROCEDURE — 2500000001 HC RX 250 WO HCPCS SELF ADMINISTERED DRUGS (ALT 637 FOR MEDICARE OP): Performed by: INTERNAL MEDICINE

## 2024-06-07 PROCEDURE — 83735 ASSAY OF MAGNESIUM: CPT

## 2024-06-07 PROCEDURE — 82947 ASSAY GLUCOSE BLOOD QUANT: CPT

## 2024-06-07 RX ADMIN — PANTOPRAZOLE SODIUM 40 MG: 40 TABLET, DELAYED RELEASE ORAL at 06:57

## 2024-06-07 RX ADMIN — SODIUM CHLORIDE, SODIUM LACTATE, CALCIUM CHLORIDE, MAGNESIUM CHLORIDE AND DEXTROSE: 2.5; 538; 448; 18.3; 5.08 INJECTION, SOLUTION INTRAPERITONEAL at 15:19

## 2024-06-07 RX ADMIN — ATORVASTATIN CALCIUM 40 MG: 40 TABLET, FILM COATED ORAL at 22:00

## 2024-06-07 RX ADMIN — GENTAMICIN SULFATE: 1 CREAM TOPICAL at 15:18

## 2024-06-07 RX ADMIN — SEVELAMER CARBONATE 800 MG: 800 TABLET, FILM COATED ORAL at 13:49

## 2024-06-07 RX ADMIN — PANCRELIPASE 3 CAPSULE: 120000; 24000; 76000 CAPSULE, DELAYED RELEASE PELLETS ORAL at 13:50

## 2024-06-07 RX ADMIN — SODIUM CHLORIDE, SODIUM LACTATE, CALCIUM CHLORIDE, MAGNESIUM CHLORIDE AND DEXTROSE: 1.5; 538; 448; 18.3; 5.08 INJECTION, SOLUTION INTRAPERITONEAL at 15:19

## 2024-06-07 RX ADMIN — NYSTATIN 500000 UNITS: 100000 SUSPENSION ORAL at 06:57

## 2024-06-07 RX ADMIN — CALCITRIOL CAPSULES 0.25 MCG 0.25 MCG: 0.25 CAPSULE ORAL at 16:58

## 2024-06-07 RX ADMIN — LEVOTHYROXINE SODIUM 25 MCG: 0.05 TABLET ORAL at 06:57

## 2024-06-07 RX ADMIN — TRAMADOL HYDROCHLORIDE 50 MG: 50 TABLET, COATED ORAL at 10:10

## 2024-06-07 RX ADMIN — SEVELAMER CARBONATE 800 MG: 800 TABLET, FILM COATED ORAL at 10:02

## 2024-06-07 RX ADMIN — TRAMADOL HYDROCHLORIDE 50 MG: 50 TABLET, COATED ORAL at 22:00

## 2024-06-07 RX ADMIN — Medication 10 MG: at 22:00

## 2024-06-07 RX ADMIN — ACETAMINOPHEN 975 MG: 325 TABLET ORAL at 17:55

## 2024-06-07 RX ADMIN — Medication 5000 UNITS: at 09:55

## 2024-06-07 RX ADMIN — ASCORBIC ACID, THIAMINE MONONITRATE,RIBOFLAVIN, NIACINAMIDE, PYRIDOXINE HYDROCHLORIDE, FOLIC ACID, CYANOCOBALAMIN, BIOTIN, CALCIUM PANTOTHENATE, 1 CAPSULE: 100; 1.5; 1.7; 20; 10; 1; 6000; 150000; 5 CAPSULE, LIQUID FILLED ORAL at 09:55

## 2024-06-07 RX ADMIN — SERTRALINE 50 MG: 50 TABLET, FILM COATED ORAL at 09:55

## 2024-06-07 RX ADMIN — HEPARIN SODIUM: 1000 INJECTION INTRAVENOUS; SUBCUTANEOUS at 16:59

## 2024-06-07 RX ADMIN — PANCRELIPASE 3 CAPSULE: 120000; 24000; 76000 CAPSULE, DELAYED RELEASE PELLETS ORAL at 10:02

## 2024-06-07 RX ADMIN — ALLOPURINOL 100 MG: 100 TABLET ORAL at 09:55

## 2024-06-07 RX ADMIN — NYSTATIN 500000 UNITS: 100000 SUSPENSION ORAL at 13:50

## 2024-06-07 RX ADMIN — MIRTAZAPINE 15 MG: 15 TABLET, FILM COATED ORAL at 22:00

## 2024-06-07 ASSESSMENT — COGNITIVE AND FUNCTIONAL STATUS - GENERAL
DRESSING REGULAR LOWER BODY CLOTHING: A LOT
MOVING FROM LYING ON BACK TO SITTING ON SIDE OF FLAT BED WITH BEDRAILS: A LITTLE
MOBILITY SCORE: 10
TURNING FROM BACK TO SIDE WHILE IN FLAT BAD: A LITTLE
STANDING UP FROM CHAIR USING ARMS: TOTAL
CLIMB 3 TO 5 STEPS WITH RAILING: TOTAL
MOVING TO AND FROM BED TO CHAIR: TOTAL
TOILETING: A LOT
EATING MEALS: A LITTLE
PERSONAL GROOMING: A LITTLE
DAILY ACTIVITIY SCORE: 15
HELP NEEDED FOR BATHING: A LOT
WALKING IN HOSPITAL ROOM: TOTAL
DRESSING REGULAR UPPER BODY CLOTHING: A LITTLE

## 2024-06-07 ASSESSMENT — PAIN - FUNCTIONAL ASSESSMENT
PAIN_FUNCTIONAL_ASSESSMENT: 0-10

## 2024-06-07 ASSESSMENT — PAIN DESCRIPTION - LOCATION
LOCATION: ABDOMEN
LOCATION: ABDOMEN

## 2024-06-07 ASSESSMENT — PAIN SCALES - GENERAL
PAINLEVEL_OUTOF10: 7
PAINLEVEL_OUTOF10: 0 - NO PAIN
PAINLEVEL_OUTOF10: 3
PAINLEVEL_OUTOF10: 7
PAINLEVEL_OUTOF10: 0 - NO PAIN

## 2024-06-07 NOTE — NURSING NOTE
Peritoneal Dialysis - CCPD    Completed Overnight Therapy   6/6/2024    Full treatment received:               Y  I-Drain:    453 mL  Total UF:   873 mL  Combined 24 hour UF:  826 mL  Minicap placed:   Y  Effluent Color:   Yellow   Cloudy:   N   Fibrin:   N    Tonight's Therapy   6/7/2024    Dialysis cycler primed:   Y  Patient connected:  Y  Therapy started at:  -  [facility]:  Y  Dressing changed:  Y  Gent. cream applied:  N    Therapy Orders     Total time:  9 hours   Cycles:   5   Fill Volume:  1800 mL   Last fill:   500 mL   Last fill solution:  1.5% with Heparin   Total volume:  9500 mL     Solution(s):  Dextrose 1.5% Dianeal 2.5 calcium-- 5000 mL (x2)   Additive(s): None           Last Fill:  Dextrose 1.5% Dianeal 2.5 calcium-- 2000 mL (x1)   Additive(s): Heparin      **Dialysis nurses in house Monday through Friday [id number] to setup and disconnect patients,   please call our office at [phone], follow prompts for after hours paging.    **Machine Trouble Shooting: [employer] 24 hour technical support available at [phone]     **Bedside nursing staff to disconnect patients as needed outside of office hours.  -Instructions and supplies located on dialysis cart.  -Reference  policy [id number] -->

## 2024-06-07 NOTE — CARE PLAN
Problem: Pain - Adult  Goal: Verbalizes/displays adequate comfort level or baseline comfort level  Outcome: Progressing     Problem: Safety - Adult  Goal: Free from fall injury  Outcome: Progressing     Problem: Discharge Planning  Goal: Discharge to home or other facility with appropriate resources  Outcome: Progressing     Problem: Chronic Conditions and Co-morbidities  Goal: Patient's chronic conditions and co-morbidity symptoms are monitored and maintained or improved  Outcome: Progressing     Problem: Skin  Goal: Decreased wound size/increased tissue granulation at next dressing change  Outcome: Progressing  Goal: Participates in plan/prevention/treatment measures  Outcome: Progressing  Goal: Prevent/manage excess moisture  Outcome: Progressing  Goal: Prevent/minimize sheer/friction injuries  Outcome: Progressing  Goal: Promote/optimize nutrition  Outcome: Progressing  Goal: Promote skin healing  Outcome: Progressing   The patient's goals for the shift include      The clinical goals for the shift include improved po tolerance

## 2024-06-07 NOTE — PROGRESS NOTES
Yoselyn Hernandez   64 [address]    @@  [facility]/Room: 40562926/343/343-A    Subjective: no acute events overnight  C/o chronic low back pain and leg pain b/l   Denies SOB or abdominal pain     Objective:     Meds:   allopurinol, 100 mg, Daily  atorvastatin, 40 mg, Nightly  B complex-vitamin C-folic acid, 1 capsule, Daily  calcitriol, 0.25 mcg, Every Mon/Wed/Fri  cholecalciferol, 5,000 Units, Daily  dextrose 1.5% - LOW calcium 2.5mEq/L 5,000 mL peritoneal dialysate, , q24h  dextrose 2.5 % - LOW calcium 2.5 mEq/L 2,000 mL with heparin 1,000 Units peritoneal dialysate, , q24h  dextrose 2.5 % - LOW calcium 2.5 mEq/L 5,000 mL peritoneal dialysate, , q24h  pantoprazole, 40 mg, Daily before breakfast   Or  esomeprazole, 40 mg, Daily before breakfast   Or  pantoprazole, 40 mg, Daily before breakfast  gentamicin, , q24h  levothyroxine, 25 mcg, Daily before breakfast  lidocaine, 1 Application, Once  lidocaine, 1 patch, Daily  lipase-protease-amylase, 3 capsule, TID  melatonin, 10 mg, Nightly  mirtazapine, 15 mg, Nightly  nystatin, 5 mL, q6h AMARJIT  sertraline, 50 mg, Daily  sevelamer carbonate, 800 mg, TID  [Held by provider] warfarin, 3 mg, Daily         acetaminophen, 975 mg, q8h PRN  dextrose, 12.5 g, q15 min PRN  dextrose, 25 g, q15 min PRN  glucagon, 1 mg, q15 min PRN  glucagon, 1 mg, q15 min PRN  ondansetron, 4 mg, q8h PRN  traMADol, 50 mg, q8h PRN        Vitals:    06/07/24 0547   BP: 86/58   Pulse: 97   Resp:    Temp: 36.9 °C (98.4 °F)   SpO2: 97%          Intake/Output Summary (Last 24 hours) at 6/7/2024 1340  Last data filed at 6/7/2024 0900  Gross per 24 hour   Intake --   Output 826 ml   Net -826 ml       General appearance: no distress  Eyes: non-icteric  Skin: no apparent rash  Heart: regular  Lungs: CTA bilat no wheezing/crackles  Abdomen: soft, nt/nd  Extremities: no edema bilat  Mckenna  Neuro: No FND,asterixis  Access    Blood Labs:  Results for orders placed or performed during the hospital encounter of 06/02/24 (from the  past 24 hour(s))   POCT GLUCOSE   Result Value Ref Range    POCT Glucose 113 (H) 74 - 99 mg/dL   Magnesium   Result Value Ref Range    Magnesium 2.04 1.60 - 2.40 mg/dL   Renal function panel   Result Value Ref Range    Glucose 73 (L) 74 - 99 mg/dL    Sodium 137 136 - 145 mmol/L    Potassium 4.0 3.5 - 5.3 mmol/L    Chloride 96 (L) 98 - 107 mmol/L    Bicarbonate 25 21 - 32 mmol/L    Anion Gap 20 10 - 20 mmol/L    Urea Nitrogen 39 (H) 6 - 23 mg/dL    Creatinine 6.54 (H) 0.50 - 1.05 mg/dL    eGFR 7 (L) >60 mL/min/1.73m*2    Calcium 8.6 8.6 - 10.6 mg/dL    Phosphorus 5.5 (H) 2.5 - 4.9 mg/dL    Albumin 2.3 (L) 3.4 - 5.0 g/dL   POCT GLUCOSE   Result Value Ref Range    POCT Glucose 86 74 - 99 mg/dL   CBC and Auto Differential   Result Value Ref Range    WBC 8.5 4.4 - 11.3 x10*3/uL    nRBC 0.0 0.0 - 0.0 /100 WBCs    RBC 3.41 (L) 4.00 - 5.20 x10*6/uL    Hemoglobin 10.4 (L) 12.0 - 16.0 g/dL    Hematocrit 32.1 (L) 36.0 - 46.0 %    MCV 94 80 - 100 fL    MCH 30.5 26.0 - 34.0 pg    MCHC 32.4 32.0 - 36.0 g/dL    RDW 17.2 (H) 11.5 - 14.5 %    Platelets 193 150 - 450 x10*3/uL    Neutrophils % 82.3 40.0 - 80.0 %    Immature Granulocytes %, Automated 0.5 0.0 - 0.9 %    Lymphocytes % 12.2 13.0 - 44.0 %    Monocytes % 3.7 2.0 - 10.0 %    Eosinophils % 1.2 0.0 - 6.0 %    Basophils % 0.1 0.0 - 2.0 %    Neutrophils Absolute 7.03 1.20 - 7.70 x10*3/uL    Immature Granulocytes Absolute, Automated 0.04 0.00 - 0.70 x10*3/uL    Lymphocytes Absolute 1.04 (L) 1.20 - 4.80 x10*3/uL    Monocytes Absolute 0.32 0.10 - 1.00 x10*3/uL    Eosinophils Absolute 0.10 0.00 - 0.70 x10*3/uL    Basophils Absolute 0.01 0.00 - 0.10 x10*3/uL   Coagulation Screen   Result Value Ref Range    Protime 43.9 (H) 9.8 - 12.8 seconds    INR 3.8 (H) 0.9 - 1.1    aPTT 46 (H) 27 - 38 seconds   POCT GLUCOSE   Result Value Ref Range    POCT Glucose 81 74 - 99 mg/dL              ASSESSMENT:  Yoselyn Echavarrialey is a  64 y.o.  Year old with PMHx of ESRD on PD is admitted for abdominal  pain from peritonitis. Nephrology is following for management of PD related issues.     Updates:  - PD culture +ve Strep mitis - sensitive to vancomycin   - Vancomycin trough elevated to 32.9 (down trending from 49.2). Pending repeat level   - Net  ml's      RECOMMENDATIONS:  - will plan to do the same PD prescription as yesterday. Will plan to hold vancomycin due to ongoing high troughs. Pending repeat.   - continue nystatin swish and swallow, continue as long as patient is on antibiotic therapy  - Continue exit site care per protocol with gentamicin cream  - Continue sevelamer 800 mg p.o. 3 times daily with meals  - Please check daily standing weights         Tana Rodríguez DO  Nephrology Fellow   Daytime / Weekend Renal Pager [phone]  After 7 pm Emergencies [phone] Pager [phone]

## 2024-06-07 NOTE — NURSING NOTE
Peritoneal Dialysis - CCPD    Completed Overnight Therapy   6/6/2024    Full treatment received:               Y  I-Drain:    453 mL  Total UF:   873 mL  Combined 24 hour UF:  826 mL  Minicap placed:   Y  Effluent Color:   Yellow   Cloudy:   N   Fibrin:   N    Tonight's Therapy   6/7/2024    Dialysis cycler primed:   Y  Patient connected:  Y  Therapy started at:  1530  [facility]:  Y  Dressing changed:  Y  Gent. cream applied:  N    Therapy Orders     Total time:  9 hours   Cycles:   5   Fill Volume:  1800 mL   Last fill:   500 mL   Last fill solution:  1.5% with Heparin   Total volume:  9500 mL     Solution(s):  Dextrose 1.5% Dianeal 2.5 calcium-- 5000 mL (x2)   Additive(s): None           Last Fill:  Dextrose 1.5% Dianeal 2.5 calcium-- 2000 mL (x1)   Additive(s): Heparin      **Dialysis nurses in house Monday through Friday 2651-7969 to setup and disconnect patients,   please call our office at [phone], follow prompts for after hours paging.    **Machine Trouble Shooting: [employer] 24 hour technical support available at [phone]     **Bedside nursing staff to disconnect patients as needed outside of office hours.  -Instructions and supplies located on dialysis cart.  -Reference  policy [id number] -->

## 2024-06-07 NOTE — PROGRESS NOTES
"Yoselyn Hernandez is a 64 y.o. female on day 5 of admission presenting with Generalized abdominal pain.    Subjective   No acute events. Endorsed some abdominal pain yesterday evening but improved this AM. Denies any other new issues.        Objective     Physical Exam  Constitutional: in NAD  HEENT: sclerae anicteric, EOM grossly intact  CV: regular rate, regular rhythm, no murmurs noted  Pulm: CTAB, no increased WOB  GI: abdomen soft, non-tender, peritoneal tube in place with no leakage visible or purulence  Skin: warm and dry  Ext: bilateral LE with +2 pitting edema up to the knees, left leg more edematous, non-tender  Neuro: alert and conversant  Psych: affect appropriate    Last Recorded Vitals  Blood pressure 86/58, pulse 97, temperature 36.9 °C (98.4 °F), resp. rate 16, height 1.702 m (5' 7\"), weight 60.5 kg (133 lb 4.8 oz), SpO2 97%.  Intake/Output last 3 Shifts:  I/O last 3 completed shifts:  In: - (0 mL/kg)   Out: 501 (7.7 mL/kg) [Other:501]  Weight: 65.3 kg     Relevant Results  Results for orders placed or performed during the hospital encounter of 06/02/24 (from the past 24 hour(s))   POCT GLUCOSE   Result Value Ref Range    POCT Glucose 128 (H) 74 - 99 mg/dL   POCT GLUCOSE   Result Value Ref Range    POCT Glucose 113 (H) 74 - 99 mg/dL   Magnesium   Result Value Ref Range    Magnesium 2.04 1.60 - 2.40 mg/dL   Renal function panel   Result Value Ref Range    Glucose 73 (L) 74 - 99 mg/dL    Sodium 137 136 - 145 mmol/L    Potassium 4.0 3.5 - 5.3 mmol/L    Chloride 96 (L) 98 - 107 mmol/L    Bicarbonate 25 21 - 32 mmol/L    Anion Gap 20 10 - 20 mmol/L    Urea Nitrogen 39 (H) 6 - 23 mg/dL    Creatinine 6.54 (H) 0.50 - 1.05 mg/dL    eGFR 7 (L) >60 mL/min/1.73m*2    Calcium 8.6 8.6 - 10.6 mg/dL    Phosphorus 5.5 (H) 2.5 - 4.9 mg/dL    Albumin 2.3 (L) 3.4 - 5.0 g/dL   POCT GLUCOSE   Result Value Ref Range    POCT Glucose 86 74 - 99 mg/dL     Scheduled medications  allopurinol, 100 mg, oral, Daily  atorvastatin, 40 " mg, oral, Nightly  B complex-vitamin C-folic acid, 1 capsule, oral, Daily  calcitriol, 0.25 mcg, oral, Every Mon/Wed/Fri  cholecalciferol, 5,000 Units, oral, Daily  dextrose 1.5% - LOW calcium 2.5mEq/L 5,000 mL peritoneal dialysate, , intraperitoneal, q24h  dextrose 2.5 % - LOW calcium 2.5 mEq/L 2,000 mL with heparin 1,000 Units peritoneal dialysate, , intraperitoneal, q24h  dextrose 2.5 % - LOW calcium 2.5 mEq/L 5,000 mL peritoneal dialysate, , intraperitoneal, q24h  pantoprazole, 40 mg, oral, Daily before breakfast   Or  esomeprazole, 40 mg, oral, Daily before breakfast   Or  pantoprazole, 40 mg, intravenous, Daily before breakfast  gentamicin, , Topical, q24h  levothyroxine, 25 mcg, oral, Daily before breakfast  lidocaine, 1 Application, Topical, Once  lidocaine, 1 patch, transdermal, Daily  lipase-protease-amylase, 3 capsule, oral, TID  melatonin, 10 mg, oral, Nightly  mirtazapine, 15 mg, oral, Nightly  nystatin, 5 mL, Swish & Swallow, q6h AMARJIT  sertraline, 50 mg, oral, Daily  sevelamer carbonate, 800 mg, oral, TID  [Held by provider] warfarin, 3 mg, oral, Daily      Continuous medications       PRN medications  PRN medications: acetaminophen, dextrose, dextrose, glucagon, glucagon, ondansetron, traMADol    Assessment & Plan  63 y/o W with PMHx of ESRD on PD c/b 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 Daily marijuana use c/f cyclic vomiting syndrome vs. cannabinoid hyperemesis syndrome. Patient also found to have new pulmonary embolism with subtherapeutic INR 1.7, however is hemodynamically stable overall and is on room air.     Update 6/7:   - Nephrology following   - Advance diet to easy to chew/soft   - Holding warfarin dose- will resume if INR returns less than 3   - Pt is refusing daily weights   - Bcx (6/2): No growth, finalized   - Peritoneal fluid Cx (6/2): rare strep mitis/oralis group   - Peritoneal fluid Cx (6/3): strep mitis/oralis group   - Dispo planning: PT and OT rec'd SNF- pending pre-cert       # PD associated peritonitis d/t strep mitis/oralis group   # Possible partial SBO   # Abdominal pain/Nausea/Vomiting  :: Most concerning for SBO vs. chronic mesenteric ischemia  :: Prior admission with peritoneal fluid positive for staph epi thought to be contaminant, blood cultures negative. ID was sonsulted and did not think patient had true peritonitis.  :: Current presentation with CT A/P finding of 6.7 x 7.3 cm pseudoaneurysm or hematoma close to left bypass graft   :: NG removed on 6/3   - ACS consulted, no surgical interventions   - Zofran prn for nausea, normal QTc   - Tylenol prn and tramadol 50 q8h prn for pain  - Pantoprazole 40 mg IV  - Bcx (6/2): No growth, finalized   - Cont. nystatin swish + swallow (6/3 - ) per  nephrology recs   - S/p intraperitoneal ceftazidime (6/3 - 6/5) empiric coverage per nephro  - Cont. intraperitoneal vancomycin (6/3 - ) empiric coverage per nephro - will need 2 wks of abx per nephrology      # New pulmonary embolism   :: Subtherapeutic INR 1.7 while on Warfarin  :: Low risk with negative RV to LV ratio 0.8, PESI score 73 low risk, hemodynamically stable, no oxygen requirement  :: BNP > 5000 chronically 2/2 to HFrEF  :: DVT US LE with no acute DVT but 2.02 cm x 2.78 cm non-vascularized hypoechoic cystic structure seen in left groin   - Needs Coumadin clinic  - Cont home Warfarin 3 mg daily- HOLDING on 6/5 and 6/6 given supra-therapeutic INR      # 5.5 cm descending aortic aneurysm  # Left common iliac occlusion - chronic  # Hx of multiple dissections   :: 5.4 cm prior  - Vascular surgery consulted in ED for L renal artery stent thrombosis, no surgical interventions, follow up in clinic     # ESRD   :: On peritoneal dialysis at home  - Consulted nephrology for peritoneal dialysis supplies  - Continue Sevelamer and renal multivitamins  - Monitor electrolytes     #HFrEF (EF 15-30% in 03/2023)  :: Not on GDMT at home  :: Not candidate for [place] or ACE/ARB/ARNi given ESRD  - Consider Hydralazine/Isordil for GDMT close to discharge     #Chronic diarrhea   - Holding Lomotil for now c/f SBO  - per renal, needs to have at least one BM a day     # Hypothyroidism  - Continue synthyroid     # Chronic pancreatitis  -continue home creon        N: Renal easy to chew/soft   A: PIV     DVT ppx: warfarin   GI ppx: IV pantoprazole    Code Status: Full Code (confirmed on Admission)  Surrogate Medical Decision-maker: sister Farida , Su Nair (best friend) [phone]

## 2024-06-08 LAB
ALBUMIN SERPL BCP-MCNC: 2.1 G/DL (ref 3.4–5)
ANION GAP SERPL CALC-SCNC: 19 MMOL/L (ref 10–20)
APTT PPP: 43 SECONDS (ref 27–38)
BASOPHILS # BLD AUTO: 0.03 X10*3/UL (ref 0–0.1)
BASOPHILS NFR BLD AUTO: 0.4 %
BUN SERPL-MCNC: 39 MG/DL (ref 6–23)
CALCIUM SERPL-MCNC: 8.1 MG/DL (ref 8.6–10.6)
CHLORIDE SERPL-SCNC: 96 MMOL/L (ref 98–107)
CO2 SERPL-SCNC: 25 MMOL/L (ref 21–32)
CREAT SERPL-MCNC: 6.62 MG/DL (ref 0.5–1.05)
EGFRCR SERPLBLD CKD-EPI 2021: 7 ML/MIN/1.73M*2
EOSINOPHIL # BLD AUTO: 0.15 X10*3/UL (ref 0–0.7)
EOSINOPHIL NFR BLD AUTO: 1.9 %
ERYTHROCYTE [DISTWIDTH] IN BLOOD BY AUTOMATED COUNT: 17.2 % (ref 11.5–14.5)
GLUCOSE BLD MANUAL STRIP-MCNC: 110 MG/DL (ref 74–99)
GLUCOSE BLD MANUAL STRIP-MCNC: 121 MG/DL (ref 74–99)
GLUCOSE BLD MANUAL STRIP-MCNC: 135 MG/DL (ref 74–99)
GLUCOSE SERPL-MCNC: 110 MG/DL (ref 74–99)
HCT VFR BLD AUTO: 30.7 % (ref 36–46)
HGB BLD-MCNC: 10.2 G/DL (ref 12–16)
IMM GRANULOCYTES # BLD AUTO: 0.03 X10*3/UL (ref 0–0.7)
IMM GRANULOCYTES NFR BLD AUTO: 0.4 % (ref 0–0.9)
INR PPP: 3.2 (ref 0.9–1.1)
LYMPHOCYTES # BLD AUTO: 1.42 X10*3/UL (ref 1.2–4.8)
LYMPHOCYTES NFR BLD AUTO: 17.8 %
MAGNESIUM SERPL-MCNC: 1.74 MG/DL (ref 1.6–2.4)
MCH RBC QN AUTO: 30.4 PG (ref 26–34)
MCHC RBC AUTO-ENTMCNC: 33.2 G/DL (ref 32–36)
MCV RBC AUTO: 92 FL (ref 80–100)
MONOCYTES # BLD AUTO: 0.35 X10*3/UL (ref 0.1–1)
MONOCYTES NFR BLD AUTO: 4.4 %
NEUTROPHILS # BLD AUTO: 6.02 X10*3/UL (ref 1.2–7.7)
NEUTROPHILS NFR BLD AUTO: 75.1 %
NRBC BLD-RTO: 0.3 /100 WBCS (ref 0–0)
PHOSPHATE SERPL-MCNC: 5.7 MG/DL (ref 2.5–4.9)
PLATELET # BLD AUTO: 219 X10*3/UL (ref 150–450)
POTASSIUM SERPL-SCNC: 3.4 MMOL/L (ref 3.5–5.3)
PROTHROMBIN TIME: 36.3 SECONDS (ref 9.8–12.8)
RBC # BLD AUTO: 3.35 X10*6/UL (ref 4–5.2)
SODIUM SERPL-SCNC: 137 MMOL/L (ref 136–145)
VANCOMYCIN SERPL-MCNC: 31.3 UG/ML (ref 5–20)
WBC # BLD AUTO: 8 X10*3/UL (ref 4.4–11.3)

## 2024-06-08 PROCEDURE — 99233 SBSQ HOSP IP/OBS HIGH 50: CPT

## 2024-06-08 PROCEDURE — 2500000001 HC RX 250 WO HCPCS SELF ADMINISTERED DRUGS (ALT 637 FOR MEDICARE OP): Performed by: INTERNAL MEDICINE

## 2024-06-08 PROCEDURE — 80069 RENAL FUNCTION PANEL: CPT

## 2024-06-08 PROCEDURE — 1210000001 HC SEMI-PRIVATE ROOM DAILY

## 2024-06-08 PROCEDURE — 83735 ASSAY OF MAGNESIUM: CPT

## 2024-06-08 PROCEDURE — 2500000004 HC RX 250 GENERAL PHARMACY W/ HCPCS (ALT 636 FOR OP/ED)

## 2024-06-08 PROCEDURE — 2500000002 HC RX 250 W HCPCS SELF ADMINISTERED DRUGS (ALT 637 FOR MEDICARE OP, ALT 636 FOR OP/ED)

## 2024-06-08 PROCEDURE — 2500000001 HC RX 250 WO HCPCS SELF ADMINISTERED DRUGS (ALT 637 FOR MEDICARE OP)

## 2024-06-08 PROCEDURE — 90947 DIALYSIS REPEATED EVAL: CPT

## 2024-06-08 PROCEDURE — 82947 ASSAY GLUCOSE BLOOD QUANT: CPT

## 2024-06-08 PROCEDURE — 85730 THROMBOPLASTIN TIME PARTIAL: CPT

## 2024-06-08 PROCEDURE — 80202 ASSAY OF VANCOMYCIN: CPT

## 2024-06-08 PROCEDURE — 85025 COMPLETE CBC W/AUTO DIFF WBC: CPT

## 2024-06-08 PROCEDURE — 36415 COLL VENOUS BLD VENIPUNCTURE: CPT

## 2024-06-08 PROCEDURE — 2500000001 HC RX 250 WO HCPCS SELF ADMINISTERED DRUGS (ALT 637 FOR MEDICARE OP): Performed by: STUDENT IN AN ORGANIZED HEALTH CARE EDUCATION/TRAINING PROGRAM

## 2024-06-08 PROCEDURE — 2500000004 HC RX 250 GENERAL PHARMACY W/ HCPCS (ALT 636 FOR OP/ED): Performed by: STUDENT IN AN ORGANIZED HEALTH CARE EDUCATION/TRAINING PROGRAM

## 2024-06-08 RX ORDER — METOPROLOL TARTRATE 25 MG/1
12.5 TABLET, FILM COATED ORAL 2 TIMES DAILY
Status: DISCONTINUED | OUTPATIENT
Start: 2024-06-08 | End: 2024-06-10 | Stop reason: HOSPADM

## 2024-06-08 RX ORDER — MAGNESIUM SULFATE 1 G/100ML
1 INJECTION INTRAVENOUS ONCE
Status: COMPLETED | OUTPATIENT
Start: 2024-06-08 | End: 2024-06-08

## 2024-06-08 RX ADMIN — NYSTATIN 500000 UNITS: 100000 SUSPENSION ORAL at 17:31

## 2024-06-08 RX ADMIN — GENTAMICIN SULFATE: 1 CREAM TOPICAL at 17:19

## 2024-06-08 RX ADMIN — SERTRALINE 50 MG: 50 TABLET, FILM COATED ORAL at 10:14

## 2024-06-08 RX ADMIN — METOPROLOL TARTRATE 12.5 MG: 25 TABLET, FILM COATED ORAL at 10:14

## 2024-06-08 RX ADMIN — NYSTATIN 500000 UNITS: 100000 SUSPENSION ORAL at 06:25

## 2024-06-08 RX ADMIN — SEVELAMER CARBONATE 800 MG: 800 TABLET, FILM COATED ORAL at 17:28

## 2024-06-08 RX ADMIN — HEPARIN SODIUM: 1000 INJECTION INTRAVENOUS; SUBCUTANEOUS at 13:00

## 2024-06-08 RX ADMIN — MAGNESIUM SULFATE HEPTAHYDRATE 1 G: 1 INJECTION, SOLUTION INTRAVENOUS at 17:32

## 2024-06-08 RX ADMIN — ALLOPURINOL 100 MG: 100 TABLET ORAL at 10:14

## 2024-06-08 RX ADMIN — Medication 5000 UNITS: at 10:14

## 2024-06-08 RX ADMIN — PANCRELIPASE 3 CAPSULE: 120000; 24000; 76000 CAPSULE, DELAYED RELEASE PELLETS ORAL at 17:28

## 2024-06-08 RX ADMIN — TRAMADOL HYDROCHLORIDE 50 MG: 50 TABLET, COATED ORAL at 21:11

## 2024-06-08 RX ADMIN — PANTOPRAZOLE SODIUM 40 MG: 40 TABLET, DELAYED RELEASE ORAL at 06:25

## 2024-06-08 RX ADMIN — TRAMADOL HYDROCHLORIDE 50 MG: 50 TABLET, COATED ORAL at 06:25

## 2024-06-08 RX ADMIN — Medication 10 MG: at 21:11

## 2024-06-08 RX ADMIN — LEVOTHYROXINE SODIUM 25 MCG: 0.05 TABLET ORAL at 06:25

## 2024-06-08 RX ADMIN — ATORVASTATIN CALCIUM 40 MG: 40 TABLET, FILM COATED ORAL at 21:11

## 2024-06-08 RX ADMIN — ACETAMINOPHEN 975 MG: 325 TABLET ORAL at 10:14

## 2024-06-08 RX ADMIN — ASCORBIC ACID, THIAMINE MONONITRATE,RIBOFLAVIN, NIACINAMIDE, PYRIDOXINE HYDROCHLORIDE, FOLIC ACID, CYANOCOBALAMIN, BIOTIN, CALCIUM PANTOTHENATE, 1 CAPSULE: 100; 1.5; 1.7; 20; 10; 1; 6000; 150000; 5 CAPSULE, LIQUID FILLED ORAL at 10:14

## 2024-06-08 RX ADMIN — SODIUM CHLORIDE, SODIUM LACTATE, CALCIUM CHLORIDE, MAGNESIUM CHLORIDE AND DEXTROSE: 2.5; 538; 448; 18.3; 5.08 INJECTION, SOLUTION INTRAPERITONEAL at 15:00

## 2024-06-08 RX ADMIN — MIRTAZAPINE 15 MG: 15 TABLET, FILM COATED ORAL at 21:11

## 2024-06-08 RX ADMIN — SODIUM CHLORIDE, SODIUM LACTATE, CALCIUM CHLORIDE, MAGNESIUM CHLORIDE AND DEXTROSE: 1.5; 538; 448; 18.3; 5.08 INJECTION, SOLUTION INTRAPERITONEAL at 15:00

## 2024-06-08 RX ADMIN — SEVELAMER CARBONATE 800 MG: 800 TABLET, FILM COATED ORAL at 10:14

## 2024-06-08 RX ADMIN — PANCRELIPASE 3 CAPSULE: 120000; 24000; 76000 CAPSULE, DELAYED RELEASE PELLETS ORAL at 10:14

## 2024-06-08 ASSESSMENT — COGNITIVE AND FUNCTIONAL STATUS - GENERAL
TOILETING: A LOT
MOVING TO AND FROM BED TO CHAIR: A LOT
TOILETING: A LOT
DRESSING REGULAR LOWER BODY CLOTHING: A LOT
WALKING IN HOSPITAL ROOM: TOTAL
DAILY ACTIVITIY SCORE: 15
TURNING FROM BACK TO SIDE WHILE IN FLAT BAD: A LITTLE
WALKING IN HOSPITAL ROOM: TOTAL
DRESSING REGULAR UPPER BODY CLOTHING: A LOT
PERSONAL GROOMING: A LOT
HELP NEEDED FOR BATHING: A LOT
DRESSING REGULAR UPPER BODY CLOTHING: A LOT
STANDING UP FROM CHAIR USING ARMS: A LOT
TURNING FROM BACK TO SIDE WHILE IN FLAT BAD: A LITTLE
MOBILITY SCORE: 13
STANDING UP FROM CHAIR USING ARMS: A LOT
CLIMB 3 TO 5 STEPS WITH RAILING: TOTAL
DRESSING REGULAR LOWER BODY CLOTHING: A LOT
HELP NEEDED FOR BATHING: A LOT
MOVING TO AND FROM BED TO CHAIR: A LOT
DAILY ACTIVITIY SCORE: 14
MOBILITY SCORE: 13
PERSONAL GROOMING: A LITTLE
CLIMB 3 TO 5 STEPS WITH RAILING: TOTAL

## 2024-06-08 ASSESSMENT — PAIN SCALES - GENERAL
PAINLEVEL_OUTOF10: 8
PAINLEVEL_OUTOF10: 8
PAINLEVEL_OUTOF10: 10 - WORST POSSIBLE PAIN

## 2024-06-08 ASSESSMENT — PAIN DESCRIPTION - LOCATION: LOCATION: GENERALIZED

## 2024-06-08 ASSESSMENT — PAIN - FUNCTIONAL ASSESSMENT
PAIN_FUNCTIONAL_ASSESSMENT: 0-10

## 2024-06-08 ASSESSMENT — PAIN SCALES - PAIN ASSESSMENT IN ADVANCED DEMENTIA (PAINAD): TOTALSCORE: MEDICATION (SEE MAR)

## 2024-06-08 NOTE — PROGRESS NOTES
"Yoselyn Hernandez is a 64 y.o. female on day 6 of admission presenting with Generalized abdominal pain.    Subjective   No acute events. No subjective complaints this AM.      Objective     Physical Exam  Constitutional: in NAD  HEENT: sclerae anicteric, EOM grossly intact  CV: regular rate, regular rhythm, no murmurs noted  Pulm: CTAB, no increased WOB  GI: abdomen soft, non-tender, peritoneal tube in place with no leakage visible or purulence  Skin: warm and dry  Ext: bilateral LE with +2 pitting edema up to the knees, left leg more edematous, non-tender  Neuro: alert and conversant  Psych: affect appropriate    Last Recorded Vitals  Blood pressure 105/65, pulse 83, temperature 36.6 °C (97.9 °F), resp. rate 16, height 1.702 m (5' 7\"), weight 60.5 kg (133 lb 4.8 oz), SpO2 100%.  Intake/Output last 3 Shifts:  I/O last 3 completed shifts:  In: - (0 mL/kg)   Out: 826 (13.7 mL/kg) [Other:826]  Weight: 60.5 kg     Relevant Results  Results for orders placed or performed during the hospital encounter of 06/02/24 (from the past 24 hour(s))   POCT GLUCOSE   Result Value Ref Range    POCT Glucose 97 74 - 99 mg/dL   CBC and Auto Differential   Result Value Ref Range    WBC 8.0 4.4 - 11.3 x10*3/uL    nRBC 0.3 (H) 0.0 - 0.0 /100 WBCs    RBC 3.35 (L) 4.00 - 5.20 x10*6/uL    Hemoglobin 10.2 (L) 12.0 - 16.0 g/dL    Hematocrit 30.7 (L) 36.0 - 46.0 %    MCV 92 80 - 100 fL    MCH 30.4 26.0 - 34.0 pg    MCHC 33.2 32.0 - 36.0 g/dL    RDW 17.2 (H) 11.5 - 14.5 %    Platelets 219 150 - 450 x10*3/uL    Neutrophils % 75.1 40.0 - 80.0 %    Immature Granulocytes %, Automated 0.4 0.0 - 0.9 %    Lymphocytes % 17.8 13.0 - 44.0 %    Monocytes % 4.4 2.0 - 10.0 %    Eosinophils % 1.9 0.0 - 6.0 %    Basophils % 0.4 0.0 - 2.0 %    Neutrophils Absolute 6.02 1.20 - 7.70 x10*3/uL    Immature Granulocytes Absolute, Automated 0.03 0.00 - 0.70 x10*3/uL    Lymphocytes Absolute 1.42 1.20 - 4.80 x10*3/uL    Monocytes Absolute 0.35 0.10 - 1.00 x10*3/uL    " Eosinophils Absolute 0.15 0.00 - 0.70 x10*3/uL    Basophils Absolute 0.03 0.00 - 0.10 x10*3/uL   Magnesium   Result Value Ref Range    Magnesium 1.74 1.60 - 2.40 mg/dL   Renal function panel   Result Value Ref Range    Glucose 110 (H) 74 - 99 mg/dL    Sodium 137 136 - 145 mmol/L    Potassium 3.4 (L) 3.5 - 5.3 mmol/L    Chloride 96 (L) 98 - 107 mmol/L    Bicarbonate 25 21 - 32 mmol/L    Anion Gap 19 10 - 20 mmol/L    Urea Nitrogen 39 (H) 6 - 23 mg/dL    Creatinine 6.62 (H) 0.50 - 1.05 mg/dL    eGFR 7 (L) >60 mL/min/1.73m*2    Calcium 8.1 (L) 8.6 - 10.6 mg/dL    Phosphorus 5.7 (H) 2.5 - 4.9 mg/dL    Albumin 2.1 (L) 3.4 - 5.0 g/dL   Vancomycin   Result Value Ref Range    Vancomycin 31.3 (H) 5.0 - 20.0 ug/mL   Coagulation Screen   Result Value Ref Range    Protime 36.3 (H) 9.8 - 12.8 seconds    INR 3.2 (H) 0.9 - 1.1    aPTT 43 (H) 27 - 38 seconds   POCT GLUCOSE   Result Value Ref Range    POCT Glucose 121 (H) 74 - 99 mg/dL   POCT GLUCOSE   Result Value Ref Range    POCT Glucose 110 (H) 74 - 99 mg/dL     Scheduled medications  allopurinol, 100 mg, oral, Daily  atorvastatin, 40 mg, oral, Nightly  B complex-vitamin C-folic acid, 1 capsule, oral, Daily  calcitriol, 0.25 mcg, oral, Every Mon/Wed/Fri  cholecalciferol, 5,000 Units, oral, Daily  dextrose 1.5% - LOW calcium 2.5mEq/L 5,000 mL peritoneal dialysate, , intraperitoneal, q24h  dextrose 2.5 % - LOW calcium 2.5 mEq/L 2,000 mL with heparin 1,000 Units peritoneal dialysate, , intraperitoneal, q24h  dextrose 2.5 % - LOW calcium 2.5 mEq/L 5,000 mL peritoneal dialysate, , intraperitoneal, q24h  pantoprazole, 40 mg, oral, Daily before breakfast   Or  esomeprazole, 40 mg, oral, Daily before breakfast   Or  pantoprazole, 40 mg, intravenous, Daily before breakfast  gentamicin, , Topical, q24h  levothyroxine, 25 mcg, oral, Daily before breakfast  lidocaine, 1 Application, Topical, Once  lidocaine, 1 patch, transdermal, Daily  lipase-protease-amylase, 3 capsule, oral,  TID  melatonin, 10 mg, oral, Nightly  metoprolol tartrate, 12.5 mg, oral, BID  mirtazapine, 15 mg, oral, Nightly  nystatin, 5 mL, Swish & Swallow, q6h AMARJIT  sertraline, 50 mg, oral, Daily  sevelamer carbonate, 800 mg, oral, TID  [Held by provider] warfarin, 3 mg, oral, Daily      Continuous medications       PRN medications  PRN medications: acetaminophen, dextrose, dextrose, glucagon, glucagon, ondansetron, traMADol    Assessment & Plan  63 y/o W with PMHx of ESRD on PD c/b 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 Daily marijuana use c/f cyclic vomiting syndrome vs. cannabinoid hyperemesis syndrome. Patient also found to have new pulmonary embolism with subtherapeutic INR 1.7, however is hemodynamically stable overall and is on room air.      DAILY UPDATES:  - Added low dose metoprolol for GDMT  - Holding warfarin dose- will resume if INR returns less than 3   - Dispo planning: PT and OT rec'd SNF- pending pre-cert       # PD associated peritonitis d/t strep mitis/oralis group   # Possible partial SBO   # Abdominal pain/Nausea/Vomiting  :: Most concerning for SBO vs. chronic mesenteric ischemia  :: Prior admission with peritoneal fluid positive for staph epi thought to be contaminant, blood cultures negative. ID was sonsulted and did not think patient had true peritonitis.  :: Current presentation with CT A/P finding of 6.7 x 7.3 cm pseudoaneurysm or hematoma close to left bypass graft   :: NG removed on 6/3   - ACS consulted, no surgical interventions   - Zofran prn for nausea, normal QTc   - Tylenol prn and tramadol 50 q8h prn for pain  - Pantoprazole 40 mg IV  - Bcx (6/2): No growth, finalized   - Cont. nystatin swish + swallow (6/3 - ) per nephrology recs   - S/p intraperitoneal ceftazidime (6/3 - 6/5) empiric coverage per nephro  - Cont. intraperitoneal vancomycin (6/3 - ) empiric coverage per nephro - will need 2 wks of abx per nephrology      # New pulmonary embolism   :: Subtherapeutic INR 1.7 while on Warfarin  :: Low risk with negative RV to LV ratio 0.8, PESI score 73 low risk, hemodynamically stable, no oxygen requirement  :: BNP > 5000 chronically 2/2 to HFrEF  :: DVT US LE with no acute DVT but 2.02 cm x 2.78 cm non-vascularized hypoechoic cystic structure seen in left groin   - Needs Coumadin clinic  - Cont home Warfarin 3 mg daily- HOLDING on 6/5 and 6/6 given supra-therapeutic INR      # 5.5 cm descending aortic aneurysm  # Left common iliac occlusion - chronic  # Hx of multiple dissections   :: 5.4 cm prior  - Vascular surgery consulted in ED for L renal artery stent thrombosis, no surgical interventions, follow up in clinic     # ESRD   :: On peritoneal dialysis at home  - Consulted nephrology for peritoneal dialysis supplies  -  Continue Sevelamer and renal multivitamins  - Monitor electrolytes     #HFrEF (EF 15-30% in 03/2023)  :: Not on GDMT at home  :: Not candidate for Archie or ACE/ARB/ARNi given ESRD  - Consider Hydralazine/Isordil for GDMT close to discharge     #Chronic diarrhea   - Holding Lomotil for now c/f SBO  - per renal, needs to have at least one BM a day     # Hypothyroidism  - Continue synthyroid     # Chronic pancreatitis  -continue home creon        N: Renal easy to chew/soft   A: PIV     DVT ppx: warfarin   GI ppx: IV pantoprazole    Code Status: Full Code (confirmed on Admission)  Surrogate Medical Decision-maker: sister Farida , Su Nair (best friend) [phone]

## 2024-06-08 NOTE — CARE PLAN
The patient's goals for the shift include      The clinical goals for the shift include pt will have improved po tolerance      Problem: Pain - Adult  Goal: Verbalizes/displays adequate comfort level or baseline comfort level  Outcome: Progressing     Problem: Safety - Adult  Goal: Free from fall injury  Outcome: Progressing     Problem: Discharge Planning  Goal: Discharge to home or other facility with appropriate resources  Outcome: Progressing     Problem: Chronic Conditions and Co-morbidities  Goal: Patient's chronic conditions and co-morbidity symptoms are monitored and maintained or improved  Outcome: Progressing     Problem: Skin  Goal: Decreased wound size/increased tissue granulation at next dressing change  Outcome: Progressing  Goal: Participates in plan/prevention/treatment measures  Outcome: Progressing  Goal: Prevent/manage excess moisture  Outcome: Progressing  Goal: Prevent/minimize sheer/friction injuries  Outcome: Progressing  Goal: Promote/optimize nutrition  Outcome: Progressing  Flowsheets (Taken 6/8/2024 1149)  Promote/optimize nutrition: Monitor/record intake including meals  Goal: Promote skin healing  Outcome: Progressing

## 2024-06-08 NOTE — NURSING NOTE
Peritoneal Dialysis - [facility]    Completed Overnight Therapy   6/6/2024    Full treatment received:               Y    Tonight's Therapy   6/8/2024    Dialysis cycler primed:   Y  Patient connected:  Y  Therapy started at:  -  [facility] Adaptor:  Y  Dressing changed:  Y  Gent. cream applied:  N    Therapy Orders     Total time:  9 hours   Cycles:   5   Fill Volume:  1800 mL   Last fill:   500 mL   Last fill solution:  1.5% with Heparin   Total volume:  9500 mL     Solution(s):  Dextrose 1.5% Dianeal 2.5 calcium-- 5000 mL (x2)   Additive(s): None           Last Fill:  Dextrose 1.5% Dianeal 2.5 calcium-- 2000 mL (x1)   Additive(s): Heparin      **Dialysis nurses in house Monday through Friday [id number] to setup and disconnect patients,   please call our office at [phone], follow prompts for after hours paging.    **Machine Trouble Shooting: [facility] [employer] 24 hour technical support available at [phone]     **Bedside nursing staff to disconnect patients as needed outside of office hours.  -Instructions and supplies located on dialysis cart.  -Reference  policy [id number] -->

## 2024-06-09 LAB
ALBUMIN SERPL BCP-MCNC: 2 G/DL (ref 3.4–5)
ALP SERPL-CCNC: 112 U/L (ref 33–136)
ALT SERPL W P-5'-P-CCNC: 11 U/L (ref 7–45)
ANION GAP SERPL CALC-SCNC: 18 MMOL/L (ref 10–20)
APTT PPP: 42 SECONDS (ref 27–38)
AST SERPL W P-5'-P-CCNC: 16 U/L (ref 9–39)
BASOPHILS # BLD AUTO: 0.03 X10*3/UL (ref 0–0.1)
BASOPHILS NFR BLD AUTO: 0.4 %
BILIRUB DIRECT SERPL-MCNC: 0.1 MG/DL (ref 0–0.3)
BILIRUB SERPL-MCNC: 0.3 MG/DL (ref 0–1.2)
BUN SERPL-MCNC: 39 MG/DL (ref 6–23)
CALCIUM SERPL-MCNC: 8.1 MG/DL (ref 8.6–10.6)
CHLORIDE SERPL-SCNC: 97 MMOL/L (ref 98–107)
CO2 SERPL-SCNC: 24 MMOL/L (ref 21–32)
CREAT SERPL-MCNC: 6.84 MG/DL (ref 0.5–1.05)
EGFRCR SERPLBLD CKD-EPI 2021: 6 ML/MIN/1.73M*2
EOSINOPHIL # BLD AUTO: 0.12 X10*3/UL (ref 0–0.7)
EOSINOPHIL NFR BLD AUTO: 1.7 %
ERYTHROCYTE [DISTWIDTH] IN BLOOD BY AUTOMATED COUNT: 17.3 % (ref 11.5–14.5)
GLUCOSE BLD MANUAL STRIP-MCNC: 101 MG/DL (ref 74–99)
GLUCOSE BLD MANUAL STRIP-MCNC: 78 MG/DL (ref 74–99)
GLUCOSE BLD MANUAL STRIP-MCNC: 86 MG/DL (ref 74–99)
GLUCOSE SERPL-MCNC: 83 MG/DL (ref 74–99)
HCT VFR BLD AUTO: 30.7 % (ref 36–46)
HGB BLD-MCNC: 10.3 G/DL (ref 12–16)
IMM GRANULOCYTES # BLD AUTO: 0.03 X10*3/UL (ref 0–0.7)
IMM GRANULOCYTES NFR BLD AUTO: 0.4 % (ref 0–0.9)
INR PPP: 2.6 (ref 0.9–1.1)
LYMPHOCYTES # BLD AUTO: 1.22 X10*3/UL (ref 1.2–4.8)
LYMPHOCYTES NFR BLD AUTO: 16.9 %
MAGNESIUM SERPL-MCNC: 1.92 MG/DL (ref 1.6–2.4)
MCH RBC QN AUTO: 31.5 PG (ref 26–34)
MCHC RBC AUTO-ENTMCNC: 33.6 G/DL (ref 32–36)
MCV RBC AUTO: 94 FL (ref 80–100)
MONOCYTES # BLD AUTO: 0.36 X10*3/UL (ref 0.1–1)
MONOCYTES NFR BLD AUTO: 5 %
NEUTROPHILS # BLD AUTO: 5.44 X10*3/UL (ref 1.2–7.7)
NEUTROPHILS NFR BLD AUTO: 75.6 %
NRBC BLD-RTO: 0 /100 WBCS (ref 0–0)
PHOSPHATE SERPL-MCNC: 5.7 MG/DL (ref 2.5–4.9)
PLATELET # BLD AUTO: 209 X10*3/UL (ref 150–450)
POTASSIUM SERPL-SCNC: 3.5 MMOL/L (ref 3.5–5.3)
PROT SERPL-MCNC: 5.5 G/DL (ref 6.4–8.2)
PROTHROMBIN TIME: 30.1 SECONDS (ref 9.8–12.8)
RBC # BLD AUTO: 3.27 X10*6/UL (ref 4–5.2)
SODIUM SERPL-SCNC: 135 MMOL/L (ref 136–145)
VANCOMYCIN SERPL-MCNC: 27.1 UG/ML (ref 5–20)
WBC # BLD AUTO: 7.2 X10*3/UL (ref 4.4–11.3)

## 2024-06-09 PROCEDURE — 2500000001 HC RX 250 WO HCPCS SELF ADMINISTERED DRUGS (ALT 637 FOR MEDICARE OP)

## 2024-06-09 PROCEDURE — 99232 SBSQ HOSP IP/OBS MODERATE 35: CPT

## 2024-06-09 PROCEDURE — 85730 THROMBOPLASTIN TIME PARTIAL: CPT

## 2024-06-09 PROCEDURE — 36415 COLL VENOUS BLD VENIPUNCTURE: CPT

## 2024-06-09 PROCEDURE — 1210000001 HC SEMI-PRIVATE ROOM DAILY

## 2024-06-09 PROCEDURE — 2500000002 HC RX 250 W HCPCS SELF ADMINISTERED DRUGS (ALT 637 FOR MEDICARE OP, ALT 636 FOR OP/ED): Performed by: STUDENT IN AN ORGANIZED HEALTH CARE EDUCATION/TRAINING PROGRAM

## 2024-06-09 PROCEDURE — 2500000001 HC RX 250 WO HCPCS SELF ADMINISTERED DRUGS (ALT 637 FOR MEDICARE OP): Performed by: STUDENT IN AN ORGANIZED HEALTH CARE EDUCATION/TRAINING PROGRAM

## 2024-06-09 PROCEDURE — 82947 ASSAY GLUCOSE BLOOD QUANT: CPT

## 2024-06-09 PROCEDURE — 83735 ASSAY OF MAGNESIUM: CPT

## 2024-06-09 PROCEDURE — 85025 COMPLETE CBC W/AUTO DIFF WBC: CPT

## 2024-06-09 PROCEDURE — 2500000005 HC RX 250 GENERAL PHARMACY W/O HCPCS: Performed by: INTERNAL MEDICINE

## 2024-06-09 PROCEDURE — 2500000002 HC RX 250 W HCPCS SELF ADMINISTERED DRUGS (ALT 637 FOR MEDICARE OP, ALT 636 FOR OP/ED)

## 2024-06-09 PROCEDURE — 2500000001 HC RX 250 WO HCPCS SELF ADMINISTERED DRUGS (ALT 637 FOR MEDICARE OP): Performed by: INTERNAL MEDICINE

## 2024-06-09 PROCEDURE — 80202 ASSAY OF VANCOMYCIN: CPT

## 2024-06-09 PROCEDURE — 2500000004 HC RX 250 GENERAL PHARMACY W/ HCPCS (ALT 636 FOR OP/ED)

## 2024-06-09 PROCEDURE — 84100 ASSAY OF PHOSPHORUS: CPT

## 2024-06-09 PROCEDURE — 82248 BILIRUBIN DIRECT: CPT

## 2024-06-09 PROCEDURE — 80048 BASIC METABOLIC PNL TOTAL CA: CPT

## 2024-06-09 PROCEDURE — 2500000004 HC RX 250 GENERAL PHARMACY W/ HCPCS (ALT 636 FOR OP/ED): Performed by: STUDENT IN AN ORGANIZED HEALTH CARE EDUCATION/TRAINING PROGRAM

## 2024-06-09 RX ORDER — HYDROXYZINE HYDROCHLORIDE 10 MG/1
10 TABLET, FILM COATED ORAL ONCE
Status: COMPLETED | OUTPATIENT
Start: 2024-06-09 | End: 2024-06-09

## 2024-06-09 RX ADMIN — TRAMADOL HYDROCHLORIDE 50 MG: 50 TABLET, COATED ORAL at 09:40

## 2024-06-09 RX ADMIN — HYDROXYZINE HYDROCHLORIDE 10 MG: 10 TABLET ORAL at 11:38

## 2024-06-09 RX ADMIN — SODIUM CHLORIDE, SODIUM LACTATE, CALCIUM CHLORIDE, MAGNESIUM CHLORIDE AND DEXTROSE: 1.5; 538; 448; 18.3; 5.08 INJECTION, SOLUTION INTRAPERITONEAL at 15:00

## 2024-06-09 RX ADMIN — PANTOPRAZOLE SODIUM 40 MG: 40 TABLET, DELAYED RELEASE ORAL at 05:46

## 2024-06-09 RX ADMIN — ATORVASTATIN CALCIUM 40 MG: 40 TABLET, FILM COATED ORAL at 20:21

## 2024-06-09 RX ADMIN — ASCORBIC ACID, THIAMINE MONONITRATE,RIBOFLAVIN, NIACINAMIDE, PYRIDOXINE HYDROCHLORIDE, FOLIC ACID, CYANOCOBALAMIN, BIOTIN, CALCIUM PANTOTHENATE, 1 CAPSULE: 100; 1.5; 1.7; 20; 10; 1; 6000; 150000; 5 CAPSULE, LIQUID FILLED ORAL at 09:42

## 2024-06-09 RX ADMIN — PANCRELIPASE 3 CAPSULE: 120000; 24000; 76000 CAPSULE, DELAYED RELEASE PELLETS ORAL at 09:42

## 2024-06-09 RX ADMIN — NYSTATIN 500000 UNITS: 100000 SUSPENSION ORAL at 18:13

## 2024-06-09 RX ADMIN — TRAMADOL HYDROCHLORIDE 50 MG: 50 TABLET, COATED ORAL at 20:21

## 2024-06-09 RX ADMIN — LEVOTHYROXINE SODIUM 25 MCG: 0.05 TABLET ORAL at 05:46

## 2024-06-09 RX ADMIN — MIRTAZAPINE 15 MG: 15 TABLET, FILM COATED ORAL at 20:21

## 2024-06-09 RX ADMIN — SEVELAMER CARBONATE 800 MG: 800 TABLET, FILM COATED ORAL at 11:38

## 2024-06-09 RX ADMIN — Medication 10 MG: at 20:21

## 2024-06-09 RX ADMIN — SEVELAMER CARBONATE 800 MG: 800 TABLET, FILM COATED ORAL at 17:00

## 2024-06-09 RX ADMIN — WARFARIN SODIUM 3 MG: 3 TABLET ORAL at 18:13

## 2024-06-09 RX ADMIN — GENTAMICIN SULFATE: 1 CREAM TOPICAL at 18:26

## 2024-06-09 RX ADMIN — PANCRELIPASE 3 CAPSULE: 120000; 24000; 76000 CAPSULE, DELAYED RELEASE PELLETS ORAL at 18:13

## 2024-06-09 RX ADMIN — LIDOCAINE 1 PATCH: 4 PATCH TOPICAL at 09:43

## 2024-06-09 RX ADMIN — ALLOPURINOL 100 MG: 100 TABLET ORAL at 09:41

## 2024-06-09 RX ADMIN — METOPROLOL TARTRATE 12.5 MG: 25 TABLET, FILM COATED ORAL at 09:42

## 2024-06-09 RX ADMIN — NYSTATIN 500000 UNITS: 100000 SUSPENSION ORAL at 11:38

## 2024-06-09 RX ADMIN — Medication 5000 UNITS: at 09:41

## 2024-06-09 RX ADMIN — HEPARIN SODIUM: 1000 INJECTION INTRAVENOUS; SUBCUTANEOUS at 13:00

## 2024-06-09 RX ADMIN — SODIUM CHLORIDE, SODIUM LACTATE, CALCIUM CHLORIDE, MAGNESIUM CHLORIDE AND DEXTROSE: 2.5; 538; 448; 18.3; 5.08 INJECTION, SOLUTION INTRAPERITONEAL at 15:00

## 2024-06-09 RX ADMIN — SERTRALINE 50 MG: 50 TABLET, FILM COATED ORAL at 09:41

## 2024-06-09 ASSESSMENT — COGNITIVE AND FUNCTIONAL STATUS - GENERAL
CLIMB 3 TO 5 STEPS WITH RAILING: TOTAL
MOVING TO AND FROM BED TO CHAIR: A LOT
WALKING IN HOSPITAL ROOM: TOTAL
DRESSING REGULAR UPPER BODY CLOTHING: A LOT
PERSONAL GROOMING: A LOT
CLIMB 3 TO 5 STEPS WITH RAILING: TOTAL
TURNING FROM BACK TO SIDE WHILE IN FLAT BAD: A LITTLE
TOILETING: A LOT
DRESSING REGULAR LOWER BODY CLOTHING: A LOT
DRESSING REGULAR LOWER BODY CLOTHING: A LOT
PERSONAL GROOMING: A LOT
TOILETING: A LOT
DAILY ACTIVITIY SCORE: 14
STANDING UP FROM CHAIR USING ARMS: A LOT
DAILY ACTIVITIY SCORE: 14
HELP NEEDED FOR BATHING: A LOT
WALKING IN HOSPITAL ROOM: TOTAL
DRESSING REGULAR UPPER BODY CLOTHING: A LOT
MOVING TO AND FROM BED TO CHAIR: A LOT
MOBILITY SCORE: 14
HELP NEEDED FOR BATHING: A LOT
STANDING UP FROM CHAIR USING ARMS: A LOT
MOBILITY SCORE: 13

## 2024-06-09 ASSESSMENT — PAIN SCALES - GENERAL: PAINLEVEL_OUTOF10: 10 - WORST POSSIBLE PAIN

## 2024-06-09 ASSESSMENT — PAIN DESCRIPTION - LOCATION: LOCATION: BACK

## 2024-06-09 NOTE — CARE PLAN
The patient's goals for the shift include Patient will remain safe and free from injury    The clinical goals for the shift include Patient will tolerate peritoneal dialysis    Problem: Pain - Adult  Goal: Verbalizes/displays adequate comfort level or baseline comfort level  Outcome: Progressing     Problem: Safety - Adult  Goal: Free from fall injury  Outcome: Progressing     Problem: Discharge Planning  Goal: Discharge to home or other facility with appropriate resources  Outcome: Progressing     Problem: Chronic Conditions and Co-morbidities  Goal: Patient's chronic conditions and co-morbidity symptoms are monitored and maintained or improved  Outcome: Progressing     Problem: Skin  Goal: Decreased wound size/increased tissue granulation at next dressing change  Outcome: Progressing  Goal: Participates in plan/prevention/treatment measures  Outcome: Progressing  Goal: Prevent/manage excess moisture  Outcome: Progressing  Goal: Prevent/minimize sheer/friction injuries  Outcome: Progressing  Goal: Promote/optimize nutrition  Outcome: Progressing  Flowsheets (Taken 6/9/2024 8275)  Promote/optimize nutrition: Monitor/record intake including meals  Goal: Promote skin healing  Outcome: Progressing

## 2024-06-09 NOTE — PROGRESS NOTES
"Yoselyn Hernandez is a 64 y.o. female on day 7 of admission presenting with Generalized abdominal pain.    Subjective   No acute events. No subjective complaints this AM.      Objective     Physical Exam  Constitutional: in NAD  HEENT: sclerae anicteric, EOM grossly intact  CV: regular rate, regular rhythm, no murmurs noted  Pulm: CTAB, no increased WOB  GI: abdomen soft, non-tender, peritoneal tube in place with no leakage visible or purulence  Skin: warm and dry  Ext: bilateral LE with +2 pitting edema up to the knees, left leg more edematous, non-tender  Neuro: alert and conversant  Psych: affect appropriate    Last Recorded Vitals  Blood pressure 106/65, pulse 89, temperature 36.6 °C (97.9 °F), resp. rate 16, height 1.702 m (5' 7\"), weight 60.5 kg (133 lb 4.8 oz), SpO2 99%.  Intake/Output last 3 Shifts:  No intake/output data recorded.    Relevant Results  Results for orders placed or performed during the hospital encounter of 06/02/24 (from the past 24 hour(s))   POCT GLUCOSE   Result Value Ref Range    POCT Glucose 110 (H) 74 - 99 mg/dL   POCT GLUCOSE   Result Value Ref Range    POCT Glucose 135 (H) 74 - 99 mg/dL   POCT GLUCOSE   Result Value Ref Range    POCT Glucose 86 74 - 99 mg/dL   CBC and Auto Differential   Result Value Ref Range    WBC 7.2 4.4 - 11.3 x10*3/uL    nRBC 0.0 0.0 - 0.0 /100 WBCs    RBC 3.27 (L) 4.00 - 5.20 x10*6/uL    Hemoglobin 10.3 (L) 12.0 - 16.0 g/dL    Hematocrit 30.7 (L) 36.0 - 46.0 %    MCV 94 80 - 100 fL    MCH 31.5 26.0 - 34.0 pg    MCHC 33.6 32.0 - 36.0 g/dL    RDW 17.3 (H) 11.5 - 14.5 %    Platelets 209 150 - 450 x10*3/uL    Neutrophils % 75.6 40.0 - 80.0 %    Immature Granulocytes %, Automated 0.4 0.0 - 0.9 %    Lymphocytes % 16.9 13.0 - 44.0 %    Monocytes % 5.0 2.0 - 10.0 %    Eosinophils % 1.7 0.0 - 6.0 %    Basophils % 0.4 0.0 - 2.0 %    Neutrophils Absolute 5.44 1.20 - 7.70 x10*3/uL    Immature Granulocytes Absolute, Automated 0.03 0.00 - 0.70 x10*3/uL    Lymphocytes Absolute " 1.22 1.20 - 4.80 x10*3/uL    Monocytes Absolute 0.36 0.10 - 1.00 x10*3/uL    Eosinophils Absolute 0.12 0.00 - 0.70 x10*3/uL    Basophils Absolute 0.03 0.00 - 0.10 x10*3/uL   Magnesium   Result Value Ref Range    Magnesium 1.92 1.60 - 2.40 mg/dL   Vancomycin   Result Value Ref Range    Vancomycin 27.1 (H) 5.0 - 20.0 ug/mL   Coagulation Screen   Result Value Ref Range    Protime 30.1 (H) 9.8 - 12.8 seconds    INR 2.6 (H) 0.9 - 1.1    aPTT 42 (H) 27 - 38 seconds   Hepatic Function Panel   Result Value Ref Range    Albumin 2.0 (L) 3.4 - 5.0 g/dL    Bilirubin, Total 0.3 0.0 - 1.2 mg/dL    Bilirubin, Direct 0.1 0.0 - 0.3 mg/dL    Alkaline Phosphatase 112 33 - 136 U/L    ALT 11 7 - 45 U/L    AST 16 9 - 39 U/L    Total Protein 5.5 (L) 6.4 - 8.2 g/dL   Phosphorus   Result Value Ref Range    Phosphorus 5.7 (H) 2.5 - 4.9 mg/dL   Basic Metabolic Panel   Result Value Ref Range    Glucose 83 74 - 99 mg/dL    Sodium 135 (L) 136 - 145 mmol/L    Potassium 3.5 3.5 - 5.3 mmol/L    Chloride 97 (L) 98 - 107 mmol/L    Bicarbonate 24 21 - 32 mmol/L    Anion Gap 18 10 - 20 mmol/L    Urea Nitrogen 39 (H) 6 - 23 mg/dL    Creatinine 6.84 (H) 0.50 - 1.05 mg/dL    eGFR 6 (L) >60 mL/min/1.73m*2    Calcium 8.1 (L) 8.6 - 10.6 mg/dL     Scheduled medications  allopurinol, 100 mg, oral, Daily  atorvastatin, 40 mg, oral, Nightly  B complex-vitamin C-folic acid, 1 capsule, oral, Daily  calcitriol, 0.25 mcg, oral, Every Mon/Wed/Fri  cholecalciferol, 5,000 Units, oral, Daily  dextrose 1.5% - LOW calcium 2.5mEq/L 5,000 mL peritoneal dialysate, , intraperitoneal, q24h  dextrose 2.5 % - LOW calcium 2.5 mEq/L 2,000 mL with heparin 1,000 Units peritoneal dialysate, , intraperitoneal, q24h  dextrose 2.5 % - LOW calcium 2.5 mEq/L 5,000 mL peritoneal dialysate, , intraperitoneal, q24h  pantoprazole, 40 mg, oral, Daily before breakfast   Or  esomeprazole, 40 mg, oral, Daily before breakfast   Or  pantoprazole, 40 mg, intravenous, Daily before  breakfast  gentamicin, , Topical, q24h  levothyroxine, 25 mcg, oral, Daily before breakfast  lidocaine, 1 Application, Topical, Once  lidocaine, 1 patch, transdermal, Daily  lipase-protease-amylase, 3 capsule, oral, TID  melatonin, 10 mg, oral, Nightly  metoprolol tartrate, 12.5 mg, oral, BID  mirtazapine, 15 mg, oral, Nightly  nystatin, 5 mL, Swish & Swallow, q6h AMARJIT  sertraline, 50 mg, oral, Daily  sevelamer carbonate, 800 mg, oral, TID  [Held by provider] warfarin, 3 mg, oral, Daily      Continuous medications       PRN medications  PRN medications: acetaminophen, dextrose, dextrose, glucagon, glucagon, ondansetron, traMADol    Assessment & Plan  63 y/o W with PMHx of ESRD on PD c/b 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 Daily  marijuana use c/f cyclic vomiting syndrome vs. cannabinoid hyperemesis syndrome. Patient also found to have new pulmonary embolism with subtherapeutic INR 1.7, however is hemodynamically stable overall and is on room air.     DAILY UPDATES:  - Holding warfarin dose- will resume if INR returns less than 3   - Dispo planning: PT and OT rec'd SNF- pending pre-cert       # PD associated peritonitis d/t strep mitis/oralis group   # Possible partial SBO   # Abdominal pain/Nausea/Vomiting  :: Most concerning for SBO vs. chronic mesenteric ischemia  :: Prior admission with peritoneal fluid positive for staph epi thought to be contaminant, blood cultures negative. ID was sonsulted and did not think patient had true peritonitis.  :: Current presentation with CT A/P finding of 6.7 x 7.3 cm pseudoaneurysm or hematoma close to left bypass graft   :: NG removed on 6/3   - ACS consulted, no surgical interventions   - Zofran prn for nausea, normal QTc   - Tylenol prn and tramadol 50 q8h prn for pain  - Pantoprazole 40 mg IV  - Bcx (6/2): No growth, finalized   - Cont. nystatin swish + swallow (6/3 - ) per nephrology recs   - S/p intraperitoneal ceftazidime (6/3 - 6/5) empiric coverage per nephro  - Cont. intraperitoneal vancomycin (6/3 - ) empiric coverage per nephro - will need 2 wks of abx per nephrology      # New pulmonary embolism   :: Subtherapeutic INR 1.7 while on Warfarin  :: Low risk with negative RV to LV ratio 0.8, PESI score 73 low risk, hemodynamically stable, no oxygen requirement  :: BNP > 5000 chronically 2/2 to HFrEF  :: DVT US LE with no acute DVT but 2.02 cm x 2.78 cm non-vascularized hypoechoic cystic structure seen in left groin   - Needs Coumadin clinic  - Cont home Warfarin 3 mg daily- HOLDING on 6/5 and 6/6 given supra-therapeutic INR      # 5.5 cm descending aortic aneurysm  # Left common iliac occlusion - chronic  # Hx of multiple dissections   :: 5.4 cm prior  - Vascular surgery consulted in ED for L  renal artery stent thrombosis, no surgical interventions, follow up in clinic     # ESRD   :: On peritoneal dialysis at home  - Consulted nephrology for peritoneal dialysis supplies  - Continue Sevelamer and renal multivitamins  - Monitor electrolytes     #HFrEF (EF 15-30% in 03/2023)  :: Not on GDMT at home  :: Not candidate for Archie or ACE/ARB/ARNi given ESRD  - Consider Hydralazine/Isordil for GDMT close to discharge     #Chronic diarrhea   - Holding Lomotil for now c/f SBO  - per renal, needs to have at least one BM a day     # Hypothyroidism  - Continue synthyroid     # Chronic pancreatitis  -continue home creon        N: Renal easy to chew/soft   A: PIV     DVT ppx: warfarin   GI ppx: IV pantoprazole    Code Status: Full Code (confirmed on Admission)  Surrogate Medical Decision-maker: sister Farida , Su Nair (best friend) [phone]

## 2024-06-09 NOTE — CARE PLAN
The patient's goals for the shift include Patient will remain safe and free from injury    The clinical goals for the shift include Patient will tolerate peritoneal dialysis    Problem: Pain - Adult  Goal: Verbalizes/displays adequate comfort level or baseline comfort level  Outcome: Progressing     Problem: Safety - Adult  Goal: Free from fall injury  Outcome: Progressing     Problem: Discharge Planning  Goal: Discharge to home or other facility with appropriate resources  Outcome: Progressing     Problem: Chronic Conditions and Co-morbidities  Goal: Patient's chronic conditions and co-morbidity symptoms are monitored and maintained or improved  Outcome: Progressing     Problem: Skin  Goal: Decreased wound size/increased tissue granulation at next dressing change  Outcome: Progressing  Goal: Participates in plan/prevention/treatment measures  Outcome: Progressing  Goal: Prevent/manage excess moisture  Outcome: Progressing  Goal: Prevent/minimize sheer/friction injuries  Outcome: Progressing  Goal: Promote/optimize nutrition  Outcome: Progressing  Goal: Promote skin healing  Outcome: Progressing     Problem: Fall/Injury  Goal: Not fall by end of shift  Outcome: Progressing  Goal: Be free from injury by end of the shift  Outcome: Progressing  Goal: Verbalize understanding of personal risk factors for fall in the hospital  Outcome: Progressing  Goal: Verbalize understanding of risk factor reduction measures to prevent injury from fall in the home  Outcome: Progressing  Goal: Use assistive devices by end of the shift  Outcome: Progressing  Goal: Pace activities to prevent fatigue by end of the shift  Outcome: Progressing

## 2024-06-09 NOTE — PROGRESS NOTES
"Transitional Care Coordinator Progress Note:   Per the [facility] team, pt is medically ready for discharge.  Requested a completed gold form.  Per [facility] can accept pt back \"whenever\", facility submitted for the auth.  This RN requested transport for 6/10 at 10:00am via [facility], await confirmation.    Addendum 1633:  DSC notified the SNF of discharge time.  The AVS and gold form will have to be sent on 6/10 as they are not signed.    Thea Parks MSN, RN-BC  Transitional Care Coordinator (TCC)  [phone]   "

## 2024-06-09 NOTE — NURSING NOTE
Peritoneal Dialysis - CCPD    Completed Overnight Therapy   6/8/2024    Full treatment received:               Y    Tonight's Therapy   6/9/2024    Dialysis cycler primed:   Y  Patient connected:  Y  The[facility]  [facility]:  Y  Dressing changed:  Y  Gent. cream applied:  N    Therapy Orders     Total time:  9 hours   Cycles:   5   Fill Volume:  1800 mL   Last fill:   500 mL   Last fill solution:  1.5% with Heparin   Total volume:  9500 mL     Solution(s):  Dextrose 1.5% Dianeal 2.5 calcium-- 5000 mL (x2)   Additive(s): None           Last Fill:  Dextrose 1.5% Dianeal 2.5 calcium-- 2000 mL (x1)   Additive(s): Heparin      **Dialysis nurses in house Monday through Friday [id number] to setup and disconnect patients,   please call our office at [phone], follow prompts for after hours paging.    **Machine Trouble Shooting: [employer] 24 hour technical support available at [phone]     **Bedside nursing staff to disconnect patients as needed outside of office hours.  -Instructions and supplies located on dialysis cart.  -Reference  policy G-595 -->

## 2024-06-09 NOTE — NURSING NOTE
Plan to discharge to snf at 1000 6/10/24.  Patient aware of discharge plan.  Patient stated anxiety med helped her today and she is feeling better.

## 2024-06-10 VITALS
HEART RATE: 90 BPM | TEMPERATURE: 97.9 F | OXYGEN SATURATION: 100 % | WEIGHT: 133.3 LBS | SYSTOLIC BLOOD PRESSURE: 107 MMHG | DIASTOLIC BLOOD PRESSURE: 63 MMHG | BODY MASS INDEX: 20.92 KG/M2 | RESPIRATION RATE: 24 BRPM | HEIGHT: 67 IN

## 2024-06-10 LAB
ALBUMIN SERPL BCP-MCNC: 2 G/DL (ref 3.4–5)
ANION GAP SERPL CALC-SCNC: 21 MMOL/L (ref 10–20)
BUN SERPL-MCNC: 39 MG/DL (ref 6–23)
CALCIUM SERPL-MCNC: 8 MG/DL (ref 8.6–10.6)
CHLORIDE SERPL-SCNC: 96 MMOL/L (ref 98–107)
CO2 SERPL-SCNC: 23 MMOL/L (ref 21–32)
CREAT SERPL-MCNC: 6.68 MG/DL (ref 0.5–1.05)
EGFRCR SERPLBLD CKD-EPI 2021: 6 ML/MIN/1.73M*2
ERYTHROCYTE [DISTWIDTH] IN BLOOD BY AUTOMATED COUNT: 16.3 % (ref 11.5–14.5)
FUNGUS SPEC CULT: NORMAL
FUNGUS SPEC FUNGUS STN: NORMAL
GLUCOSE BLD MANUAL STRIP-MCNC: 114 MG/DL (ref 74–99)
GLUCOSE SERPL-MCNC: 84 MG/DL (ref 74–99)
HCT VFR BLD AUTO: 29.6 % (ref 36–46)
HGB BLD-MCNC: 10.2 G/DL (ref 12–16)
MAGNESIUM SERPL-MCNC: 1.87 MG/DL (ref 1.6–2.4)
MCH RBC QN AUTO: 31.1 PG (ref 26–34)
MCHC RBC AUTO-ENTMCNC: 34.5 G/DL (ref 32–36)
MCV RBC AUTO: 90 FL (ref 80–100)
NRBC BLD-RTO: 0 /100 WBCS (ref 0–0)
PHOSPHATE SERPL-MCNC: 5.6 MG/DL (ref 2.5–4.9)
PLATELET # BLD AUTO: 238 X10*3/UL (ref 150–450)
POTASSIUM SERPL-SCNC: 3.5 MMOL/L (ref 3.5–5.3)
RBC # BLD AUTO: 3.28 X10*6/UL (ref 4–5.2)
SODIUM SERPL-SCNC: 136 MMOL/L (ref 136–145)
VANCOMYCIN SERPL-MCNC: 26.8 UG/ML (ref 5–20)
WBC # BLD AUTO: 7.2 X10*3/UL (ref 4.4–11.3)

## 2024-06-10 PROCEDURE — 2500000001 HC RX 250 WO HCPCS SELF ADMINISTERED DRUGS (ALT 637 FOR MEDICARE OP)

## 2024-06-10 PROCEDURE — 83735 ASSAY OF MAGNESIUM: CPT

## 2024-06-10 PROCEDURE — 2500000002 HC RX 250 W HCPCS SELF ADMINISTERED DRUGS (ALT 637 FOR MEDICARE OP, ALT 636 FOR OP/ED)

## 2024-06-10 PROCEDURE — 2500000001 HC RX 250 WO HCPCS SELF ADMINISTERED DRUGS (ALT 637 FOR MEDICARE OP): Performed by: STUDENT IN AN ORGANIZED HEALTH CARE EDUCATION/TRAINING PROGRAM

## 2024-06-10 PROCEDURE — 85027 COMPLETE CBC AUTOMATED: CPT

## 2024-06-10 PROCEDURE — 80202 ASSAY OF VANCOMYCIN: CPT

## 2024-06-10 PROCEDURE — 80069 RENAL FUNCTION PANEL: CPT

## 2024-06-10 PROCEDURE — 99238 HOSP IP/OBS DSCHRG MGMT 30/<: CPT | Performed by: STUDENT IN AN ORGANIZED HEALTH CARE EDUCATION/TRAINING PROGRAM

## 2024-06-10 PROCEDURE — 4055F PT RCVNG PERITON DIALYSIS: CPT | Performed by: INTERNAL MEDICINE

## 2024-06-10 PROCEDURE — 82947 ASSAY GLUCOSE BLOOD QUANT: CPT

## 2024-06-10 PROCEDURE — 2500000004 HC RX 250 GENERAL PHARMACY W/ HCPCS (ALT 636 FOR OP/ED)

## 2024-06-10 PROCEDURE — 36415 COLL VENOUS BLD VENIPUNCTURE: CPT

## 2024-06-10 RX ORDER — PANTOPRAZOLE SODIUM 40 MG/1
40 TABLET, DELAYED RELEASE ORAL
Start: 2024-06-11

## 2024-06-10 RX ORDER — NYSTATIN 100000 [USP'U]/ML
5 SUSPENSION ORAL EVERY 6 HOURS SCHEDULED
Start: 2024-06-10

## 2024-06-10 RX ORDER — LIDOCAINE 40 MG/G
CREAM TOPICAL 2 TIMES DAILY PRN
Start: 2024-06-10

## 2024-06-10 RX ORDER — METOPROLOL TARTRATE 25 MG/1
12.5 TABLET, FILM COATED ORAL 2 TIMES DAILY
Start: 2024-06-10

## 2024-06-10 RX ORDER — ACETAMINOPHEN 325 MG/1
975 TABLET ORAL EVERY 6 HOURS PRN
Start: 2024-06-10

## 2024-06-10 RX ORDER — HYDROXYZINE HYDROCHLORIDE 10 MG/1
10 TABLET, FILM COATED ORAL ONCE
Status: DISCONTINUED | OUTPATIENT
Start: 2024-06-10 | End: 2024-06-10 | Stop reason: HOSPADM

## 2024-06-10 RX ORDER — ACETAMINOPHEN 500 MG
5000 TABLET ORAL DAILY
Start: 2024-06-11

## 2024-06-10 RX ADMIN — Medication 5000 UNITS: at 08:45

## 2024-06-10 RX ADMIN — METOPROLOL TARTRATE 12.5 MG: 25 TABLET, FILM COATED ORAL at 08:45

## 2024-06-10 RX ADMIN — PANTOPRAZOLE SODIUM 40 MG: 40 TABLET, DELAYED RELEASE ORAL at 06:15

## 2024-06-10 RX ADMIN — ASCORBIC ACID, THIAMINE MONONITRATE,RIBOFLAVIN, NIACINAMIDE, PYRIDOXINE HYDROCHLORIDE, FOLIC ACID, CYANOCOBALAMIN, BIOTIN, CALCIUM PANTOTHENATE, 1 CAPSULE: 100; 1.5; 1.7; 20; 10; 1; 6000; 150000; 5 CAPSULE, LIQUID FILLED ORAL at 08:45

## 2024-06-10 RX ADMIN — ALLOPURINOL 100 MG: 100 TABLET ORAL at 08:45

## 2024-06-10 RX ADMIN — LEVOTHYROXINE SODIUM 25 MCG: 0.05 TABLET ORAL at 06:15

## 2024-06-10 RX ADMIN — SEVELAMER CARBONATE 800 MG: 800 TABLET, FILM COATED ORAL at 08:45

## 2024-06-10 ASSESSMENT — PAIN SCALES - GENERAL: PAINLEVEL_OUTOF10: 7

## 2024-06-10 NOTE — PROGRESS NOTES
06/10/24 1007   Discharge Planning   Home or Post Acute Services Post acute facilities (Rehab/SNF/etc)   Type of Post Acute Facility Services Skilled nursing   Patient expects to be discharged to: [facility]   Does the patient need discharge transport arranged? Yes   RoundTrip coordination needed? Yes   Has discharge transport been arranged? Yes   What day is the transport expected? 06/10/24   What time is the transport expected? 1015     Patient is medically ready to discharge. Final goldenrod and AVS will be sent to facility. Transportation is arranged for 10 am via [facility]. Nurse given number for report.    [facility]: [phone]  Number for report: [phone]

## 2024-06-10 NOTE — DISCHARGE SUMMARY
Discharge Diagnosis  Generalized abdominal pain    Issues Requiring Follow-Up  [ ] PCP   [ ] Cardiology   [ ] Nephrology   [ ] Warfarin clinic   [ ] continue antibiotics for 2 weeks total, to end 6/17/2024 (Intraperitoneal vancomycin, daily vancomycin trough levels, if above 20, hold dose)    Test Results Pending At Discharge  Pending Labs       Order Current Status    CBC Collected (06/10/24 0627)    Magnesium Collected (06/10/24 0627)    Renal Function Panel Collected (06/10/24 0627)    Vancomycin Collected (06/10/24 0627)    Fungal Culture/Smear Preliminary result            Hospital Course  63 y/o W with PMHx of ESRD on PD c/b 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 Daily marijuana use c/f cyclic vomiting syndrome vs. cannabinoid  hyperemesis syndrome. Patient also found to have new pulmonary embolism with subtherapeutic INR 1.7, however is hemodynamically stable overall and is on room air.     Consulted nephrology dialysis for peritoneal dialysis supplies. On 6/3, abdominal pain improved significantly with NG decompression. NG tube removed on 6/3. Started empiric intraperitoneal abx on 6/3 per nephrology recs with ceftazidime and vancomycin. Started nystatin swish + swallow. DVT US LE with no acute DVT but 2.02 cm x 2.78 cm non-vascularized hypoechoic cystic structure seen in left groin. Heparin stopped on 6/4, continued on home warfarin as INR 2. Prelim peritoneal fluid Cx from 6/2 and 6/3 grew strep mitis/oralis group. Discontinued intraperitoneal ceftazidime on 6/5 per nephrology. Advanced to solid foods on 6/6 as pt tolerated full liquid diet without abdominal pain or n/v.  Bcx (6/2) with no growth, finalized. Peritoneal fluid analysis showed improvement in WBC counts also.       Follow up:   [ ] PCP   [ ] Cardiology   [ ] Nephrology   [ ] Warfarin clinic   [ ] continue antibiotics for 2 weeks total, to end 6/17/2024 (Intraperitoneal vancomycin, daily vancomycin trough levels, if above 20, hold dose)        Pertinent Physical Exam At Time of Discharge    Constitutional: in NAD  HEENT: sclerae anicteric, EOM grossly intact  CV: regular rate, regular rhythm, no murmurs noted  Pulm: CTAB, no increased WOB  GI: abdomen soft, non-tender, peritoneal tube in place with no leakage visible or purulence  Skin: warm and dry  Neuro: alert and conversant  Psych: affect appropriate    Home Medications     Medication List      START taking these medications     B complex-vitamin C-folic acid 1 mg capsule; Commonly known as:   Nephrocaps; Take 1 capsule by mouth once daily. Do not start before   February 6, 2024.   * dextrose 2.5 % - LOW calcium 2.5 mEq/L Ca 2.5 mEq/L- Mg 0.5 mEq/L   solution 5,000 mL with heparin 1,000 unit/mL solution 2,500 Units;  Inject   5,000 mL into the abdomen / abdominal cavity once every 24 hours.   * dextrose 2.5 % - LOW calcium 2.5 mEq/L Ca 2.5 mEq/L- Mg 0.5 mEq/L   solution 5,000 mL with heparin 1,000 unit/mL solution 2,500 Units; Inject   5,000 mL into the abdomen / abdominal cavity once every 24 hours.   metoprolol tartrate 25 mg tablet; Commonly known as: Lopressor; Take 0.5   tablets (12.5 mg) by mouth 2 times a day.   nystatin 100,000 unit/mL suspension; Commonly known as: Mycostatin;   Swish and swallow 5 mL (500,000 Units) every 6 hours. Continue until   antibiotic course is completed on 6/17   pantoprazole 40 mg EC tablet; Commonly known as: ProtoNix; Take 1 tablet   (40 mg) by mouth once daily in the morning. Take before meals. Do not   crush, chew, or split.; Start taking on: June 11, 2024  * This list has 2 medication(s) that are the same as other medications   prescribed for you. Read the directions carefully, and ask your doctor or   other care provider to review them with you.     CHANGE how you take these medications     gentamicin 0.1 % cream; Commonly known as: Garamycin; Apply topically   once every 24 hours.; What changed: when to take this, additional   instructions   lidocaine 4 % cream; Commonly known as: LMX; Apply topically 2 times a   day as needed for mild pain (1 - 3). Apply to back topically four times a   day for pain; What changed: when to take this, reasons to take this,   additional instructions   warfarin 3 mg tablet; Commonly known as: Coumadin; What changed: Another   medication with the same name was removed. Continue taking this   medication, and follow the directions you see here.     CONTINUE taking these medications     acetaminophen 325 mg tablet; Commonly known as: Tylenol; Take 3 tablets   (975 mg) by mouth every 6 hours if needed (general discomfort).   allopurinol 100 mg tablet; Commonly known as: Zyloprim; Take 1 tablet   (100 mg) by mouth once daily.   atorvastatin 40 mg tablet;  Commonly known as: Lipitor; TAKE 1 TABLET BY   MOUTH ONCE DAILY AT BEDTIME   calcitriol 0.25 mcg capsule; Commonly known as: Rocaltrol   Creon 24,000-76,000 -120,000 unit capsule; Generic drug:   lipase-protease-amylase; Take 3 capsules by mouth 3 times a day with   meals.   diphenoxylate-atropine 2.5-0.025 mg tablet; Commonly known as: Lomotil   Dulcolax (bisacodyl) 10 mg suppository; Generic drug: bisacodyl   epoetin treasure 10,000 unit/mL injection; Commonly known as: Epogen,Procrit   GUAIFENESIN ORAL   hydrOXYzine HCL 50 mg tablet; Commonly known as: Atarax   levothyroxine 25 mcg tablet; Commonly known as: Synthroid, Levoxyl; Take   1 tablet (25 mcg) by mouth once daily in the morning. Take before meals.   loperamide 2 mg capsule; Commonly known as: Imodium   magnesium hydroxide 2,400 mg/10 mL suspension suspension; Commonly known   as: Milk of Magnesia   melatonin 10 mg tablet; Take 1 tablet (10 mg) by mouth once daily as   needed for sleep.   NEPHRO-NENO RX ORAL   ondansetron 4 mg tablet; Commonly known as: Zofran   polyethylene glycol 17 gram packet; Commonly known as: Glycolax,   Miralax; Take 17 g by mouth once daily as needed (constipation).   potassium chloride CR 20 mEq ER tablet; Commonly known as: Klor-Con M20   sennosides 8.6 mg tablet; Commonly known as: Senokot; Take 2 tablets   (17.2 mg) by mouth once daily as needed for constipation.   sertraline 50 mg tablet; Commonly known as: Zoloft   sevelamer carbonate 800 mg tablet; Commonly known as: Renvela; Take 1   tablet (800 mg) by mouth 3 times a day with meals. Swallow tablet whole;   do not crush, break, or chew.   sodium phosphates 19-7 gram/118 mL enema enema; Commonly known as:   Fleets   therapeutic multivitamin-iron-minerals tablet; Commonly known as:   Theragran-M; Take 1 tablet by mouth once daily.   traMADol 50 mg tablet; Commonly known as: Ultram     STOP taking these medications     mirtazapine 15 mg tablet; Commonly known as: Remeron    multivitamin with minerals tablet   pregabalin 25 mg capsule; Commonly known as: Lyrica       Outpatient Follow-Up  Future Appointments   Date Time Provider Department Center   5/27/2025  1:00 PM Erin Will MD [id number] Tru Mcgrath MD

## 2024-06-12 ENCOUNTER — HOSPITAL ENCOUNTER (EMERGENCY)
Facility: HOSPITAL | Age: 64
Discharge: HOME | End: 2024-06-13
Attending: EMERGENCY MEDICINE
Payer: COMMERCIAL

## 2024-06-12 DIAGNOSIS — T85.71XS: Primary | ICD-10-CM

## 2024-06-12 LAB
ALBUMIN SERPL BCP-MCNC: 1.9 G/DL (ref 3.4–5)
ANION GAP SERPL CALC-SCNC: 17 MMOL/L (ref 10–20)
BASOPHILS # BLD AUTO: 0.02 X10*3/UL (ref 0–0.1)
BASOPHILS NFR BLD AUTO: 0.3 %
BUN SERPL-MCNC: 41 MG/DL (ref 6–23)
CALCIUM SERPL-MCNC: 7.5 MG/DL (ref 8.6–10.6)
CHLORIDE SERPL-SCNC: 100 MMOL/L (ref 98–107)
CO2 SERPL-SCNC: 26 MMOL/L (ref 21–32)
CREAT SERPL-MCNC: 6.68 MG/DL (ref 0.5–1.05)
EGFRCR SERPLBLD CKD-EPI 2021: 6 ML/MIN/1.73M*2
EOSINOPHIL # BLD AUTO: 0.11 X10*3/UL (ref 0–0.7)
EOSINOPHIL NFR BLD AUTO: 1.6 %
ERYTHROCYTE [DISTWIDTH] IN BLOOD BY AUTOMATED COUNT: 16.9 % (ref 11.5–14.5)
GLUCOSE SERPL-MCNC: 82 MG/DL (ref 74–99)
HCT VFR BLD AUTO: 26.7 % (ref 36–46)
HGB BLD-MCNC: 9.2 G/DL (ref 12–16)
IMM GRANULOCYTES # BLD AUTO: 0.03 X10*3/UL (ref 0–0.7)
IMM GRANULOCYTES NFR BLD AUTO: 0.4 % (ref 0–0.9)
LYMPHOCYTES # BLD AUTO: 0.99 X10*3/UL (ref 1.2–4.8)
LYMPHOCYTES NFR BLD AUTO: 14.5 %
MCH RBC QN AUTO: 30.6 PG (ref 26–34)
MCHC RBC AUTO-ENTMCNC: 34.5 G/DL (ref 32–36)
MCV RBC AUTO: 89 FL (ref 80–100)
MONOCYTES # BLD AUTO: 0.37 X10*3/UL (ref 0.1–1)
MONOCYTES NFR BLD AUTO: 5.4 %
NEUTROPHILS # BLD AUTO: 5.3 X10*3/UL (ref 1.2–7.7)
NEUTROPHILS NFR BLD AUTO: 77.8 %
NRBC BLD-RTO: 0.3 /100 WBCS (ref 0–0)
PHOSPHATE SERPL-MCNC: 5.7 MG/DL (ref 2.5–4.9)
PLATELET # BLD AUTO: 218 X10*3/UL (ref 150–450)
POTASSIUM SERPL-SCNC: 3.7 MMOL/L (ref 3.5–5.3)
RBC # BLD AUTO: 3.01 X10*6/UL (ref 4–5.2)
SODIUM SERPL-SCNC: 139 MMOL/L (ref 136–145)
WBC # BLD AUTO: 6.8 X10*3/UL (ref 4.4–11.3)

## 2024-06-12 PROCEDURE — 99285 EMERGENCY DEPT VISIT HI MDM: CPT | Performed by: EMERGENCY MEDICINE

## 2024-06-12 PROCEDURE — 96365 THER/PROPH/DIAG IV INF INIT: CPT

## 2024-06-12 PROCEDURE — 85025 COMPLETE CBC W/AUTO DIFF WBC: CPT

## 2024-06-12 PROCEDURE — 99284 EMERGENCY DEPT VISIT MOD MDM: CPT | Mod: 25

## 2024-06-12 PROCEDURE — 80069 RENAL FUNCTION PANEL: CPT | Performed by: STUDENT IN AN ORGANIZED HEALTH CARE EDUCATION/TRAINING PROGRAM

## 2024-06-12 PROCEDURE — 2500000004 HC RX 250 GENERAL PHARMACY W/ HCPCS (ALT 636 FOR OP/ED)

## 2024-06-12 PROCEDURE — 36415 COLL VENOUS BLD VENIPUNCTURE: CPT

## 2024-06-12 PROCEDURE — 96374 THER/PROPH/DIAG INJ IV PUSH: CPT | Mod: 59

## 2024-06-12 RX ORDER — KETOROLAC TROMETHAMINE 15 MG/ML
15 INJECTION, SOLUTION INTRAMUSCULAR; INTRAVENOUS ONCE
Status: COMPLETED | OUTPATIENT
Start: 2024-06-12 | End: 2024-06-12

## 2024-06-12 RX ORDER — ACETAMINOPHEN 325 MG/1
650 TABLET ORAL ONCE
Status: DISCONTINUED | OUTPATIENT
Start: 2024-06-12 | End: 2024-06-13

## 2024-06-12 RX ORDER — VANCOMYCIN HYDROCHLORIDE 1 G/200ML
1000 INJECTION, SOLUTION INTRAVENOUS ONCE
Status: COMPLETED | OUTPATIENT
Start: 2024-06-12 | End: 2024-06-12

## 2024-06-12 RX ADMIN — KETOROLAC TROMETHAMINE 15 MG: 15 INJECTION, SOLUTION INTRAMUSCULAR; INTRAVENOUS at 18:00

## 2024-06-12 RX ADMIN — VANCOMYCIN HYDROCHLORIDE 1000 MG: 1 INJECTION, SOLUTION INTRAVENOUS at 15:50

## 2024-06-12 ASSESSMENT — PAIN SCALES - GENERAL
PAINLEVEL_OUTOF10: 8
PAINLEVEL_OUTOF10: 0 - NO PAIN

## 2024-06-12 ASSESSMENT — PAIN - FUNCTIONAL ASSESSMENT: PAIN_FUNCTIONAL_ASSESSMENT: 0-10

## 2024-06-12 NOTE — ED PROVIDER NOTES
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  There is possible discrepancy between discharge instructions and care coordination with SNF upon discharge.  Patient did not get her intraperitoneal vancomycin for peritonitis treatment when she was supposed to have continuous treatment through 6/17.  Patient denies any new or worsening symptoms.  Remaining review of systems, otherwise unremarkable.                          [place] Coma Scale Score: 15                     Patient History   Past Medical History:   Diagnosis Date    CHF (congestive heart failure) (Multi)     COPD (chronic obstructive pulmonary disease) (Multi)     ESRD (end stage renal disease) (Multi)     Hypertension     Other abnormalities of gait and mobility     Gait, antalgic    PE (pulmonary thromboembolism) (Multi)     Personal history of other endocrine, nutritional and metabolic disease     History of hyperlipidemia    Personal history of other specified conditions     History of shortness of breath     Past Surgical History:   Procedure Laterality Date    COLONOSCOPY  12/05/2017    Complete Colonoscopy    COLONOSCOPY  12/2021    rpt 12-25    CT ABDOMEN PELVIS ANGIOGRAM W AND/OR WO IV CONTRAST  11/17/2014    CT ABDOMEN PELVIS ANGIOGRAM W AND/OR WO IV CONTRAST 11/17/2014 [facility] CLINICAL LEGACY    CT ABDOMEN PELVIS ANGIOGRAM W AND/OR WO IV CONTRAST  02/01/2018    CT ABDOMEN PELVIS ANGIOGRAM W AND/OR WO IV CONTRAST 2/1/2018 [facility] LEGACY    CT ABDOMEN  PELVIS ANGIOGRAM W AND/OR WO IV CONTRAST  02/23/2018    CT ABDOMEN PELVIS ANGIOGRAM W AND/OR WO IV CONTRAST 2/23/2018 [facility] INPATIENT LEGACY    CT ABDOMEN PELVIS ANGIOGRAM W AND/OR WO IV CONTRAST  01/10/2019    CT ABDOMEN PELVIS ANGIOGRAM W AND/OR WO IV CONTRAST 1/10/2019 [facility] EMERGENCY LEGACY    CT ABDOMEN PELVIS ANGIOGRAM W AND/OR WO IV CONTRAST  11/25/2019    CT ABDOMEN PELVIS ANGIOGRAM W AND/OR WO IV CONTRAST 11/25/2019 [facility] ANCILLARY LEGACY    CT ABDOMEN PELVIS ANGIOGRAM W AND/OR WO IV CONTRAST  04/30/2021    CT ABDOMEN PELVIS ANGIOGRAM W AND/OR WO IV CONTRAST 4/30/2021 [facility] CLINICAL LEGACY    CT ABDOMEN PELVIS ANGIOGRAM W AND/OR WO IV CONTRAST  01/09/2017    CT ABDOMEN PELVIS ANGIOGRAM W AND/OR WO IV CONTRAST 1/9/2017 [facility] CLINICAL LEGACY    CT ABDOMEN PELVIS ANGIOGRAM W AND/OR WO IV CONTRAST  10/24/2018    CT ABDOMEN PELVIS ANGIOGRAM W AND/OR WO IV CONTRAST 10/24/2018 [facility] EMERGENCY LEGACY    CT ABDOMEN PELVIS ANGIOGRAM W AND/OR WO IV CONTRAST  12/13/2022    CT ABDOMEN PELVIS ANGIOGRAM W AND/OR WO IV CONTRAST 12/13/2022 [facility] LEGACY    CT AORTA AND BILATERAL ILIOFEMORAL RUNOFF ANGIOGRAM W AND/OR WO IV CONTRAST  05/12/2023    CT AORTA AND BILATERAL ILIOFEMORAL RUNOFF ANGIOGRAM W AND/OR WO IV CONTRAST 5/12/2023 [facility] CT    HYSTERECTOMY  01/06/2014    Hysterectomy    IR ANGIOGRAM AORTA ABDOMEN  08/02/2019    IR ANGIOGRAM AORTA ABDOMEN 8/2/2019 [facility] INPATIENT LEGACY    IR ANGIOGRAM AORTA ABDOMEN  08/19/2022    IR ANGIOGRAM AORTA ABDOMEN 8/19/2022 [facility] INPATIENT LEGACY    IR ANGIOGRAM AORTA THORACIC  02/21/2018    IR ANGIOGRAM AORTA THORACIC 2/21/2018 [facility] INPATIENT LEGACY    IR ANGIOGRAM ENDOVASCULAR AORTIC REPAIR  08/19/2022    IR ANGIOGRAM ENDOVASCULAR AORTIC REPAIR 8/19/2022 [facility] INPATIENT LEGACY    IR INTERVENTION NEURO STENT  08/02/2019    IR INTERVENTION NEURO STENT 8/2/2019 [facility] INPATIENT LEGACY    OTHER SURGICAL HISTORY  01/20/2017    Aortic Aneurysm Repair Thoracoabdominal     Family History   Problem  Relation Name Age of Onset    COPD Mother      Kidney failure Father       Social History     Tobacco Use    Smoking status: Some Days     Current packs/day: 0.00     Average packs/day: 0.3 packs/day for 20.0 years (5.0 ttl pk-yrs)     Types: Cigarettes     Start date:      Last attempt to quit:      Years since quittin.4    Smokeless tobacco: Never   Vaping Use    Vaping status: Never Used   Substance Use Topics    Alcohol use: Not Currently    Drug use: Yes     Types: Marijuana       Physical Exam   ED Triage Vitals [24 1422]   Temp Heart Rate Respirations BP   -- 78 18 108/72      Pulse Ox Temp src Heart Rate Source Patient Position   99 % -- -- --      BP Location FiO2 (%)     -- --       Physical Exam  Constitutional:       Appearance: Normal appearance. She is normal weight.   HENT:      Head: Normocephalic and atraumatic.      Nose: Nose normal.      Mouth/Throat:      Mouth: Mucous membranes are moist.      Pharynx: Oropharynx is clear.   Eyes:      Extraocular Movements: Extraocular movements intact.      Conjunctiva/sclera: Conjunctivae normal.      Pupils: Pupils are equal, round, and reactive to light.   Cardiovascular:      Rate and Rhythm: Normal rate and regular rhythm.      Pulses: Normal pulses.      Heart sounds: Normal heart sounds.   Pulmonary:      Effort: Pulmonary effort is normal.      Breath sounds: Normal breath sounds.   Abdominal:      General: Abdomen is flat. Bowel sounds are normal. There is no distension.      Palpations: Abdomen is soft.      Tenderness: There is no abdominal tenderness. There is no guarding.      Hernia: A hernia is present.   Musculoskeletal:         General: Normal range of motion.      Cervical back: Normal range of motion and neck supple.   Skin:     General: Skin is warm and dry.      Capillary Refill: Capillary refill takes less than 2 seconds.   Neurological:      General: No focal deficit present.      Mental Status: She is alert and  oriented to person, place, and time. Mental status is at baseline.   Psychiatric:         Mood and Affect: Mood normal.         Behavior: Behavior normal.         ED Course & MDM   Diagnoses as of 06/13/24 1818   Peritoneal dialysis-associated peritonitis, sequela (CMS-HCC)       Medical Decision Making  Patient is a 64 y.o. female who presents to [facility] ED for missed antbx treatment for peritonitis. On initial ED evaluation, patient found to be in no acute distress. Per HPI, concern to evaluate and treat for subtherapeutic being, his treatment.  Obtaining basic labs including CBC, CMP, vancomycin level.  Initial lab workup showed no remarkable findings.  Discussed case with nephrology who was previously dosing patient's vancomycin while she was inpatient.  Dr. Donald Dempsey with nephrology recommended vancomycin in the ED for missed dose outpatient and for patient to get future scheduled doses outpatient starting this next Monday.  He recommended that the patient is otherwise stable for discharge.  No further need for vancomycin trough levels.  Diagnostic findings and treatment plan discussed patient.  Patient amenable to plan.  Outpatient intraperitoneal vancomycin treatment was discussed with patient and where to obtain them.    Patient to follow up with PCP outpatient. Anticipatory guidance and return precautions provided.  Patient otherwise stable for discharge.          Procedure  Procedures     Aaron Singh MD  Resident  06/13/24 [id number]

## 2024-06-12 NOTE — PROGRESS NOTES
Yoselyn Hernandez is a 64 y.o. female on day    Assessment/Plan   Provider consulted this SW as Pt came from facility and is reportedly not receiving IV antibiotics which are ordered. SW attempted to call [facility] twice at [phone]. Call was not answered. SW messaged facility on [facility] inquiring if Pt's antibiotics are being given per request of attending Dr Penny. Response provided on [facility] that orders were needed. Attending collaborated with Kristel Terrazas to talk with the facility to clarify discharge plan. Pt to receive antibiotics at OP facility. Nephrology to be consulted by ED. Facility stated they would arrange transport for future treatment date on 6/17. Pt can return to facility once medically cleared.     RUEL Thomas

## 2024-06-12 NOTE — DISCHARGE INSTRUCTIONS
Patient needs transport arranged to [facility] ([address], [place], [address]) on Monday 6/17, and Thursday 6/20 for her intraperitoneal antibiotics. We gave her her missed dose today in the Emergency Department.

## 2024-06-13 VITALS
WEIGHT: 128 LBS | HEIGHT: 66 IN | HEART RATE: 80 BPM | DIASTOLIC BLOOD PRESSURE: 55 MMHG | RESPIRATION RATE: 12 BRPM | BODY MASS INDEX: 20.57 KG/M2 | SYSTOLIC BLOOD PRESSURE: 98 MMHG | OXYGEN SATURATION: 96 % | TEMPERATURE: 97.2 F

## 2024-06-13 PROCEDURE — 2500000005 HC RX 250 GENERAL PHARMACY W/O HCPCS: Performed by: STUDENT IN AN ORGANIZED HEALTH CARE EDUCATION/TRAINING PROGRAM

## 2024-06-13 PROCEDURE — 2500000001 HC RX 250 WO HCPCS SELF ADMINISTERED DRUGS (ALT 637 FOR MEDICARE OP): Performed by: STUDENT IN AN ORGANIZED HEALTH CARE EDUCATION/TRAINING PROGRAM

## 2024-06-13 PROCEDURE — 2500000002 HC RX 250 W HCPCS SELF ADMINISTERED DRUGS (ALT 637 FOR MEDICARE OP, ALT 636 FOR OP/ED): Performed by: STUDENT IN AN ORGANIZED HEALTH CARE EDUCATION/TRAINING PROGRAM

## 2024-06-13 RX ORDER — PANTOPRAZOLE SODIUM 40 MG/10ML
40 INJECTION, POWDER, LYOPHILIZED, FOR SOLUTION INTRAVENOUS
Status: DISCONTINUED | OUTPATIENT
Start: 2024-06-13 | End: 2024-06-13 | Stop reason: HOSPADM

## 2024-06-13 RX ORDER — SERTRALINE HYDROCHLORIDE 50 MG/1
50 TABLET, FILM COATED ORAL DAILY
Status: DISCONTINUED | OUTPATIENT
Start: 2024-06-13 | End: 2024-06-13 | Stop reason: HOSPADM

## 2024-06-13 RX ORDER — TRAMADOL HYDROCHLORIDE 50 MG/1
50 TABLET ORAL ONCE
Status: COMPLETED | OUTPATIENT
Start: 2024-06-13 | End: 2024-06-13

## 2024-06-13 RX ORDER — MIRTAZAPINE 15 MG/1
15 TABLET, FILM COATED ORAL NIGHTLY
Status: DISCONTINUED | OUTPATIENT
Start: 2024-06-13 | End: 2024-06-13 | Stop reason: HOSPADM

## 2024-06-13 RX ORDER — GENTAMICIN SULFATE 1 MG/G
CREAM TOPICAL EVERY 24 HOURS
Status: DISCONTINUED | OUTPATIENT
Start: 2024-06-13 | End: 2024-06-13 | Stop reason: HOSPADM

## 2024-06-13 RX ORDER — PANTOPRAZOLE SODIUM 40 MG/1
40 TABLET, DELAYED RELEASE ORAL
Status: DISCONTINUED | OUTPATIENT
Start: 2024-06-13 | End: 2024-06-13 | Stop reason: HOSPADM

## 2024-06-13 RX ORDER — SEVELAMER CARBONATE 800 MG/1
800 TABLET, FILM COATED ORAL
Status: DISCONTINUED | OUTPATIENT
Start: 2024-06-13 | End: 2024-06-13 | Stop reason: HOSPADM

## 2024-06-13 RX ORDER — ATORVASTATIN CALCIUM 20 MG/1
40 TABLET, FILM COATED ORAL NIGHTLY
Status: DISCONTINUED | OUTPATIENT
Start: 2024-06-13 | End: 2024-06-13 | Stop reason: HOSPADM

## 2024-06-13 RX ORDER — ESOMEPRAZOLE MAGNESIUM 40 MG/1
40 GRANULE, DELAYED RELEASE ORAL
Status: DISCONTINUED | OUTPATIENT
Start: 2024-06-13 | End: 2024-06-13 | Stop reason: HOSPADM

## 2024-06-13 RX ORDER — WARFARIN 3 MG/1
3 TABLET ORAL DAILY
Status: DISCONTINUED | OUTPATIENT
Start: 2024-06-13 | End: 2024-06-13 | Stop reason: HOSPADM

## 2024-06-13 RX ORDER — NYSTATIN 100000 [USP'U]/ML
5 SUSPENSION ORAL EVERY 6 HOURS SCHEDULED
Status: DISCONTINUED | OUTPATIENT
Start: 2024-06-13 | End: 2024-06-13 | Stop reason: HOSPADM

## 2024-06-13 RX ORDER — LEVOTHYROXINE SODIUM 25 UG/1
25 TABLET ORAL
Status: DISCONTINUED | OUTPATIENT
Start: 2024-06-13 | End: 2024-06-13 | Stop reason: HOSPADM

## 2024-06-13 RX ORDER — LIDOCAINE 560 MG/1
1 PATCH PERCUTANEOUS; TOPICAL; TRANSDERMAL DAILY
Status: DISCONTINUED | OUTPATIENT
Start: 2024-06-13 | End: 2024-06-13 | Stop reason: HOSPADM

## 2024-06-13 RX ORDER — METOPROLOL TARTRATE 25 MG/1
12.5 TABLET, FILM COATED ORAL 2 TIMES DAILY
Status: DISCONTINUED | OUTPATIENT
Start: 2024-06-13 | End: 2024-06-13 | Stop reason: HOSPADM

## 2024-06-13 RX ORDER — ACETAMINOPHEN 500 MG
10 TABLET ORAL NIGHTLY
Status: DISCONTINUED | OUTPATIENT
Start: 2024-06-13 | End: 2024-06-13 | Stop reason: HOSPADM

## 2024-06-13 RX ORDER — ACETAMINOPHEN 325 MG/1
975 TABLET ORAL ONCE
Status: DISCONTINUED | OUTPATIENT
Start: 2024-06-13 | End: 2024-06-13 | Stop reason: HOSPADM

## 2024-06-13 RX ORDER — ACETAMINOPHEN 500 MG
5000 TABLET ORAL DAILY
Status: DISCONTINUED | OUTPATIENT
Start: 2024-06-13 | End: 2024-06-13 | Stop reason: HOSPADM

## 2024-06-13 RX ORDER — CALCITRIOL 0.25 UG/1
0.25 CAPSULE ORAL
Status: DISCONTINUED | OUTPATIENT
Start: 2024-06-14 | End: 2024-06-13 | Stop reason: HOSPADM

## 2024-06-13 RX ORDER — ALLOPURINOL 100 MG/1
100 TABLET ORAL DAILY
Status: DISCONTINUED | OUTPATIENT
Start: 2024-06-13 | End: 2024-06-13 | Stop reason: HOSPADM

## 2024-06-13 RX ADMIN — METOPROLOL TARTRATE 12.5 MG: 25 TABLET, FILM COATED ORAL at 01:06

## 2024-06-13 RX ADMIN — ATORVASTATIN CALCIUM 40 MG: 20 TABLET, FILM COATED ORAL at 01:00

## 2024-06-13 RX ADMIN — TRAMADOL HYDROCHLORIDE 50 MG: 50 TABLET ORAL at 01:23

## 2024-06-13 RX ADMIN — Medication 10 MG: at 01:00

## 2024-06-13 RX ADMIN — SEVELAMER CARBONATE 800 MG: 800 TABLET, FILM COATED ORAL at 09:24

## 2024-06-13 RX ADMIN — NYSTATIN 500000 UNITS: 100000 SUSPENSION ORAL at 01:06

## 2024-06-13 RX ADMIN — NYSTATIN 500000 UNITS: 100000 SUSPENSION ORAL at 06:00

## 2024-06-13 RX ADMIN — SERTRALINE 50 MG: 50 TABLET, FILM COATED ORAL at 09:25

## 2024-06-13 RX ADMIN — ALLOPURINOL 100 MG: 100 TABLET ORAL at 09:24

## 2024-06-13 RX ADMIN — PANTOPRAZOLE SODIUM 40 MG: 40 TABLET, DELAYED RELEASE ORAL at 06:41

## 2024-06-13 RX ADMIN — LEVOTHYROXINE SODIUM 25 MCG: 25 TABLET ORAL at 06:41

## 2024-06-13 RX ADMIN — ASCORBIC ACID, THIAMINE MONONITRATE,RIBOFLAVIN, NIACINAMIDE, PYRIDOXINE HYDROCHLORIDE, FOLIC ACID, CYANOCOBALAMIN, BIOTIN, CALCIUM PANTOTHENATE, 1 CAPSULE: 100; 1.5; 1.7; 20; 10; 1; 6000; 150000; 5 CAPSULE, LIQUID FILLED ORAL at 09:24

## 2024-06-13 RX ADMIN — MIRTAZAPINE 15 MG: 15 TABLET, FILM COATED ORAL at 01:00

## 2024-06-13 ASSESSMENT — PAIN SCALES - GENERAL: PAINLEVEL_OUTOF10: 1

## 2024-06-13 ASSESSMENT — PAIN - FUNCTIONAL ASSESSMENT: PAIN_FUNCTIONAL_ASSESSMENT: 0-10

## 2024-06-13 NOTE — PROGRESS NOTES
Yoselyn Hernandez is a 64 y.o. female     Assessment/Plan   SW consulted by charge RN for return to facility.  staff having difficulty reaching facility to give report and confirm discharge. Collaborated with EMILY Tubbs who confirmed talking with EMILY Santillan at the facility who confirmed Pt can return. [facility] referral communication today with facility confirmed they can accept Pt. Discharge summary sent via [facility]. Transport arranged via roundtrip/ stretcher per this SW. Pt to tentatively leave at 1pm and this was confirmed with RN.     RUEL Thomas

## 2024-06-17 LAB
FUNGUS SPEC CULT: NORMAL
FUNGUS SPEC FUNGUS STN: NORMAL

## 2024-06-24 LAB
FUNGUS SPEC CULT: NORMAL
FUNGUS SPEC FUNGUS STN: NORMAL

## 2025-05-27 ENCOUNTER — APPOINTMENT (OUTPATIENT)
Dept: VASCULAR SURGERY | Facility: CLINIC | Age: 65
End: 2025-05-27